# Patient Record
Sex: MALE | Race: WHITE | NOT HISPANIC OR LATINO | Employment: OTHER | URBAN - METROPOLITAN AREA
[De-identification: names, ages, dates, MRNs, and addresses within clinical notes are randomized per-mention and may not be internally consistent; named-entity substitution may affect disease eponyms.]

---

## 2017-08-24 ENCOUNTER — OFFICE VISIT (OUTPATIENT)
Dept: OPTOMETRY | Facility: CLINIC | Age: 77
End: 2017-08-24
Payer: MEDICARE

## 2017-08-24 DIAGNOSIS — Z46.0 FITTING AND ADJUSTMENT OF SPECTACLES AND CONTACT LENSES: Primary | ICD-10-CM

## 2017-08-24 DIAGNOSIS — H25.13 NUCLEAR SCLEROSIS, BILATERAL: ICD-10-CM

## 2017-08-24 DIAGNOSIS — Z13.5 GLAUCOMA SCREENING: ICD-10-CM

## 2017-08-24 DIAGNOSIS — E11.9 TYPE 2 DIABETES MELLITUS WITHOUT RETINOPATHY: Primary | ICD-10-CM

## 2017-08-24 PROCEDURE — 99999 PR PBB SHADOW E&M-NEW PATIENT-LVL II: CPT | Mod: PBBFAC,,, | Performed by: OPTOMETRIST

## 2017-08-24 PROCEDURE — 92004 COMPRE OPH EXAM NEW PT 1/>: CPT | Mod: S$GLB,,, | Performed by: OPTOMETRIST

## 2017-08-24 PROCEDURE — 99999 PR PBB SHADOW E&M-EST. PATIENT-LVL I: CPT | Mod: PBBFAC,,, | Performed by: OPTOMETRIST

## 2017-08-24 PROCEDURE — 99499 UNLISTED E&M SERVICE: CPT | Mod: S$GLB,,, | Performed by: OPTOMETRIST

## 2017-08-24 RX ORDER — NAPROXEN SODIUM 220 MG/1
81 TABLET, FILM COATED ORAL DAILY
Status: ON HOLD | COMMUNITY
End: 2019-03-01 | Stop reason: HOSPADM

## 2017-08-24 NOTE — PROGRESS NOTES
HPI     ILDA: 5+ years ago  Pt states va is blurry. States he has cleaned CL as much as he could and   states va with RGP's not clear. States wearing otc readers does not help.   Pt states he failed DMV vision test.  Denies f/f    No gtts   No results found for: HGBA1C      Last edited by Vega Kong, OD on 8/24/2017  3:27 PM. (History)        ROS     Positive for: Endocrine (DM)    Negative for: Constitutional, Gastrointestinal, Neurological, Skin,   Genitourinary, Musculoskeletal, HENT, Cardiovascular, Eyes, Respiratory,   Psychiatric, Allergic/Imm, Heme/Lymph    Last edited by Vega Kong, OD on 8/24/2017  3:52 PM. (History)        Assessment /Plan     For exam results, see Encounter Report.    Type 2 diabetes mellitus without retinopathy    Nuclear sclerosis, bilateral    Glaucoma screening      1. Reduced VA 2 to cats OU--pt wishes surgery  2. DM- WITHOUT RETINOPATHY.  Advised yearly DFE  3. Pt has been wearing PMMAs for 50+ years, the SAME PAIR for 40 years!!!!!!  CLs very scratched and scuffed.  Discussed w patient risks of warpage in long term wear, and  that once he discontinues CL wear he will have refractive changes as corneal edema reduces.    PLAN:    1. Surgical consult.  For now will just wear CL in left eye to get around as CL OD makes VA worse due to scratches on lens surface  2. NOLOS [facility] charge today

## 2017-09-27 ENCOUNTER — OFFICE VISIT (OUTPATIENT)
Dept: OPHTHALMOLOGY | Facility: CLINIC | Age: 77
End: 2017-09-27
Attending: OPHTHALMOLOGY
Payer: MEDICARE

## 2017-09-27 DIAGNOSIS — H52.13 MYOPIA, BILATERAL: ICD-10-CM

## 2017-09-27 DIAGNOSIS — H25.13 NUCLEAR SCLEROSIS, BILATERAL: Primary | ICD-10-CM

## 2017-09-27 PROCEDURE — 92014 COMPRE OPH EXAM EST PT 1/>: CPT | Mod: S$GLB,,, | Performed by: OPHTHALMOLOGY

## 2017-09-27 PROCEDURE — 99999 PR PBB SHADOW E&M-EST. PATIENT-LVL II: CPT | Mod: PBBFAC,,, | Performed by: OPHTHALMOLOGY

## 2017-09-27 PROCEDURE — 92136 OPHTHALMIC BIOMETRY: CPT | Mod: 26,RT,S$GLB, | Performed by: OPHTHALMOLOGY

## 2017-09-27 RX ORDER — LIDOCAINE HYDROCHLORIDE 10 MG/ML
1 INJECTION, SOLUTION EPIDURAL; INFILTRATION; INTRACAUDAL; PERINEURAL ONCE
Status: CANCELLED | OUTPATIENT
Start: 2017-09-27 | End: 2017-09-27

## 2017-09-27 RX ORDER — PHENYLEPHRINE HYDROCHLORIDE 25 MG/ML
1 SOLUTION/ DROPS OPHTHALMIC
Status: CANCELLED | OUTPATIENT
Start: 2017-09-27

## 2017-09-27 RX ORDER — TROPICAMIDE 10 MG/ML
1 SOLUTION/ DROPS OPHTHALMIC
Status: CANCELLED | OUTPATIENT
Start: 2017-09-27

## 2017-09-27 RX ORDER — TETRACAINE HYDROCHLORIDE 5 MG/ML
1 SOLUTION OPHTHALMIC
Status: CANCELLED | OUTPATIENT
Start: 2017-09-27

## 2017-09-27 RX ORDER — MOXIFLOXACIN 5 MG/ML
1 SOLUTION/ DROPS OPHTHALMIC
Status: CANCELLED | OUTPATIENT
Start: 2017-09-27

## 2017-09-27 NOTE — PROGRESS NOTES
HPI     Cataract    Additional comments: Both Eyes.            Comments   78 y/o male PMMA CTL wearer presents for Cataract Eval per Dr Kong.  Last wore CTL yesterday.  Finds vision just isn't as clear as it used to   be OU.    No pain or discomfort OU.        Last edited by Bonnie Arora on 9/27/2017 11:34 AM. (History)            Assessment /Plan     For exam results, see Encounter Report.    Nuclear sclerosis, bilateral  -     IOL Master - MOD 26 - OD- Right eye    Myopia, bilateral      Visually Significant Cataract: Patient reports decreased vision consistent with the clinical amount of lenticular opacity, which reaches the level of visual significance and affects activities of daily living. Risks, benefits, and alternatives to cataract surgery were discussed and the consent reviewed. IOL options were discussed, including ATIOLs and the associated side effects and additional patient cost associated with them.   IOL Selections:   Right eye  IOL: pcboo 12.5     Left eye  IOL: pcboo 13.5    Pt wishes to have right eye done first.  4+ NSC, brunescent, Still will wear glasses after surgery if needed.    The patient expresses a desire to reduce spectacle dependence. I reviewed various IOL and LASER refractive surgical options and we will attempt to minimize spectacle dependence by managing astigmatism and optimizing IOL selection. Femtosecond LASER assisted cataract surgery (FLACS) technology was explained to the patient with educational videos and discussion.  The patient voices understanding and wishes to implement this technology during the cataract procedure.  I explained the increased precision of the LASER versus manual techniques, especially as it relates to astigmatism reduction with arcuate incisions.  I emphasized that although our goal is to reduce the need for refractive correction after surgery, there may still be a need for spectacle correction to achieve optimal visual acuity, and that a  reasonable range of functional vision should be the expectation.  No guarantees are made about post operative refraction or visual acuity, as the eye may heal in unpredictable ways, and the standard risks, benefits, and alternatives to cataract surgery were explained.  The patient understands that the refractive portions of this cataract procedure are not covered by insurance, and that there is an out of pocket expense of $No charge per eye. I also explained that even though our pre-operative plan is to utilize advanced refractive technologies during surgery, that I may decide to eliminate part or all of this plan if surgical challenges or complications arise, or I feel that it is not in the patient's best interest. Consent forms and an ABN form were given to the patient to review.      Catalys Parameters:  Right Eye:   MARVIN:  12mm   ?Need to bridget patient sitting up?: n  Capsulotomy: Scanned Capsule   Grade:  3+  Arcuate:  Anterior Penetrating:  Symmetric.  Length: 35  at  Axis: 95   Incisions: OFF  Left Eye:   MARVIN:  12mm   ?Need to bridget patient sitting up?: n  Capsulotomy: Scanned Capsule  Grade:  3+  Arcuate:  Anterior Penetrating:  Symmetric.  Length: 20  at  Axis: 90  Incisions:  OFF

## 2017-10-11 ENCOUNTER — TELEPHONE (OUTPATIENT)
Dept: OPHTHALMOLOGY | Facility: CLINIC | Age: 77
End: 2017-10-11

## 2017-10-11 DIAGNOSIS — H25.11 NUCLEAR SCLEROTIC CATARACT OF RIGHT EYE: Primary | ICD-10-CM

## 2017-10-25 ENCOUNTER — TELEPHONE (OUTPATIENT)
Dept: OPHTHALMOLOGY | Facility: CLINIC | Age: 77
End: 2017-10-25

## 2017-10-25 DIAGNOSIS — H25.12 NUCLEAR SCLEROTIC CATARACT OF LEFT EYE: Primary | ICD-10-CM

## 2017-12-13 ENCOUNTER — TELEPHONE (OUTPATIENT)
Dept: OPTOMETRY | Facility: CLINIC | Age: 77
End: 2017-12-13

## 2017-12-18 ENCOUNTER — HOSPITAL ENCOUNTER (OUTPATIENT)
Facility: OTHER | Age: 77
Discharge: HOME | End: 2017-12-18
Attending: OPHTHALMOLOGY | Admitting: OPHTHALMOLOGY
Payer: MEDICARE

## 2017-12-18 ENCOUNTER — ANESTHESIA (OUTPATIENT)
Dept: SURGERY | Facility: OTHER | Age: 77
End: 2017-12-18
Payer: MEDICARE

## 2017-12-18 ENCOUNTER — ANESTHESIA EVENT (OUTPATIENT)
Dept: SURGERY | Facility: OTHER | Age: 77
End: 2017-12-18
Payer: MEDICARE

## 2017-12-18 ENCOUNTER — SURGERY (OUTPATIENT)
Age: 77
End: 2017-12-18

## 2017-12-18 VITALS
RESPIRATION RATE: 16 BRPM | HEART RATE: 85 BPM | SYSTOLIC BLOOD PRESSURE: 113 MMHG | DIASTOLIC BLOOD PRESSURE: 69 MMHG | TEMPERATURE: 98 F | BODY MASS INDEX: 28.7 KG/M2 | WEIGHT: 205 LBS | HEIGHT: 71 IN | OXYGEN SATURATION: 95 %

## 2017-12-18 DIAGNOSIS — H25.13 NUCLEAR SCLEROSIS, BILATERAL: ICD-10-CM

## 2017-12-18 PROCEDURE — 36000706: Performed by: OPHTHALMOLOGY

## 2017-12-18 PROCEDURE — 66982 XCAPSL CTRC RMVL CPLX WO ECP: CPT | Mod: RT,,, | Performed by: OPHTHALMOLOGY

## 2017-12-18 PROCEDURE — 37000008 HC ANESTHESIA 1ST 15 MINUTES: Performed by: OPHTHALMOLOGY

## 2017-12-18 PROCEDURE — 25000003 PHARM REV CODE 250: Performed by: OPHTHALMOLOGY

## 2017-12-18 PROCEDURE — V2632 POST CHMBR INTRAOCULAR LENS: HCPCS | Performed by: OPHTHALMOLOGY

## 2017-12-18 PROCEDURE — 36000707: Performed by: OPHTHALMOLOGY

## 2017-12-18 PROCEDURE — 71000015 HC POSTOP RECOV 1ST HR: Performed by: OPHTHALMOLOGY

## 2017-12-18 PROCEDURE — 37000009 HC ANESTHESIA EA ADD 15 MINS: Performed by: OPHTHALMOLOGY

## 2017-12-18 PROCEDURE — 63600175 PHARM REV CODE 636 W HCPCS: Performed by: NURSE ANESTHETIST, CERTIFIED REGISTERED

## 2017-12-18 DEVICE — IMPLANTABLE DEVICE: Type: IMPLANTABLE DEVICE | Site: EYE | Status: FUNCTIONAL

## 2017-12-18 RX ORDER — MOXIFLOXACIN 5 MG/ML
1 SOLUTION/ DROPS OPHTHALMIC
Status: COMPLETED | OUTPATIENT
Start: 2017-12-18 | End: 2017-12-18

## 2017-12-18 RX ORDER — MOXIFLOXACIN 5 MG/ML
SOLUTION/ DROPS OPHTHALMIC
Status: DISCONTINUED | OUTPATIENT
Start: 2017-12-18 | End: 2017-12-18 | Stop reason: HOSPADM

## 2017-12-18 RX ORDER — PHENYLEPHRINE HYDROCHLORIDE 100 MG/ML
SOLUTION/ DROPS OPHTHALMIC
Status: DISCONTINUED | OUTPATIENT
Start: 2017-12-18 | End: 2017-12-18 | Stop reason: HOSPADM

## 2017-12-18 RX ORDER — PHENYLEPHRINE HYDROCHLORIDE 25 MG/ML
1 SOLUTION/ DROPS OPHTHALMIC
Status: COMPLETED | OUTPATIENT
Start: 2017-12-18 | End: 2017-12-18

## 2017-12-18 RX ORDER — LIDOCAINE HYDROCHLORIDE 10 MG/ML
1 INJECTION, SOLUTION EPIDURAL; INFILTRATION; INTRACAUDAL; PERINEURAL ONCE
Status: DISCONTINUED | OUTPATIENT
Start: 2017-12-18 | End: 2017-12-18 | Stop reason: HOSPADM

## 2017-12-18 RX ORDER — TROPICAMIDE 10 MG/ML
1 SOLUTION/ DROPS OPHTHALMIC
Status: COMPLETED | OUTPATIENT
Start: 2017-12-18 | End: 2017-12-18

## 2017-12-18 RX ORDER — PROPARACAINE HYDROCHLORIDE 5 MG/ML
1 SOLUTION/ DROPS OPHTHALMIC
Status: DISCONTINUED | OUTPATIENT
Start: 2017-12-18 | End: 2017-12-18 | Stop reason: HOSPADM

## 2017-12-18 RX ORDER — MIDAZOLAM HYDROCHLORIDE 1 MG/ML
INJECTION INTRAMUSCULAR; INTRAVENOUS
Status: DISCONTINUED | OUTPATIENT
Start: 2017-12-18 | End: 2017-12-18

## 2017-12-18 RX ORDER — ACETAMINOPHEN 325 MG/1
650 TABLET ORAL EVERY 4 HOURS PRN
Status: DISCONTINUED | OUTPATIENT
Start: 2017-12-18 | End: 2017-12-18 | Stop reason: HOSPADM

## 2017-12-18 RX ORDER — LIDOCAINE HYDROCHLORIDE 40 MG/ML
INJECTION, SOLUTION RETROBULBAR
Status: DISCONTINUED | OUTPATIENT
Start: 2017-12-18 | End: 2017-12-18 | Stop reason: HOSPADM

## 2017-12-18 RX ORDER — TETRACAINE HYDROCHLORIDE 5 MG/ML
1 SOLUTION OPHTHALMIC
Status: COMPLETED | OUTPATIENT
Start: 2017-12-18 | End: 2017-12-18

## 2017-12-18 RX ORDER — TETRACAINE HYDROCHLORIDE 5 MG/ML
SOLUTION OPHTHALMIC
Status: DISCONTINUED | OUTPATIENT
Start: 2017-12-18 | End: 2017-12-18 | Stop reason: HOSPADM

## 2017-12-18 RX ADMIN — PHENYLEPHRINE HYDROCHLORIDE 1 DROP: 25 SOLUTION/ DROPS OPHTHALMIC at 09:12

## 2017-12-18 RX ADMIN — MOXIFLOXACIN HYDROCHLORIDE 1 DROP: 5 SOLUTION/ DROPS OPHTHALMIC at 09:12

## 2017-12-18 RX ADMIN — MOXIFLOXACIN HYDROCHLORIDE 1 DROP: 5 SOLUTION/ DROPS OPHTHALMIC at 12:12

## 2017-12-18 RX ADMIN — MOXIFLOXACIN HYDROCHLORIDE 1 DROP: 5 SOLUTION/ DROPS OPHTHALMIC at 11:12

## 2017-12-18 RX ADMIN — PHENYLEPHRINE HYDROCHLORIDE 1 DROP: 100 SOLUTION/ DROPS OPHTHALMIC at 10:12

## 2017-12-18 RX ADMIN — TETRACAINE HYDROCHLORIDE 1 DROP: 5 SOLUTION OPHTHALMIC at 09:12

## 2017-12-18 RX ADMIN — TROPICAMIDE 1 DROP: 10 SOLUTION/ DROPS OPHTHALMIC at 09:12

## 2017-12-18 RX ADMIN — TETRACAINE HYDROCHLORIDE 1 DROP: 5 SOLUTION OPHTHALMIC at 10:12

## 2017-12-18 RX ADMIN — BALANCED SALT SOLUTION ENRICHED WITH BICARBONATE, DEXTROSE, AND GLUTATHIONE 515 ML: KIT at 11:12

## 2017-12-18 RX ADMIN — TETRACAINE HYDROCHLORIDE 2 DROP: 5 SOLUTION OPHTHALMIC at 11:12

## 2017-12-18 RX ADMIN — LIDOCAINE HYDROCHLORIDE 2 DROP: 40 INJECTION, SOLUTION RETROBULBAR; TOPICAL at 11:12

## 2017-12-18 RX ADMIN — BALANCED SALT SOLUTION ENRICHED WITH BICARBONATE, DEXTROSE, AND GLUTATHIONE 5 ML: KIT at 10:12

## 2017-12-18 RX ADMIN — SODIUM CHONDROITIN SULFATE / SODIUM HYALURONATE 2 ML: 0.55-0.5 INJECTION INTRAOCULAR at 11:12

## 2017-12-18 RX ADMIN — MIDAZOLAM HYDROCHLORIDE 2 MG: 1 INJECTION, SOLUTION INTRAMUSCULAR; INTRAVENOUS at 11:12

## 2017-12-18 NOTE — PLAN OF CARE
Randy Camejo has met all discharge criteria from Phase II. Vital Signs are stable, ambulating  without difficulty. Discharge instructions given, patient verbalized understanding. Discharged from facility via wheelchair in stable condition.

## 2017-12-18 NOTE — ANESTHESIA PREPROCEDURE EVALUATION
12/18/2017  Randy Camejo is a 77 y.o., male.    Anesthesia Evaluation    I have reviewed the Patient Summary Reports.    I have reviewed the Nursing Notes.   I have reviewed the Medications.     Review of Systems  Anesthesia Hx:  Denies Family Hx of Anesthesia complications.   Denies Personal Hx of Anesthesia complications.   Social:  Non-Smoker    Hematology/Oncology:  Hematology Normal   Oncology Normal     EENT/Dental:EENT/Dental Normal   Cardiovascular:   Past MI (20082008 stent) CAD asymptomatic CABG/stent     Pulmonary:   Denies Asthma.    Renal/:  Renal/ Normal     Hepatic/GI:  Hepatic/GI Normal    Musculoskeletal:  Musculoskeletal Normal    Neurological:  Neurology Normal    Endocrine:  Endocrine Normal    Dermatological:  Skin Normal    Psych:  Psychiatric Normal           Physical Exam  General:  Well nourished      Dental:  Dental Findings: In tact, upper partial dentures        Mental Status:  Mental Status Findings:  Cooperative, Alert and Oriented         Anesthesia Plan  Type of Anesthesia, risks & benefits discussed:  Anesthesia Type:  MAC  Patient's Preference:   Intra-op Monitoring Plan: standard ASA monitors  Intra-op Monitoring Plan Comments:   Post Op Pain Control Plan:   Post Op Pain Control Plan Comments:   Induction:    Beta Blocker:         Informed Consent: Patient understands risks and agrees with Anesthesia plan.  Questions answered. Anesthesia consent signed with patient.  ASA Score: 3     Day of Surgery Review of History & Physical:    H&P update referred to the surgeon.         Ready For Surgery From Anesthesia Perspective.

## 2017-12-18 NOTE — DISCHARGE INSTRUCTIONS
Precautions:  DO NOT rub your eye.  You may resume moderate activity the day after surgery.  Wear protective sunglasses during the day and a shield at night for 1(one) week.  If you have pain, redness and decreased vision, call Dr. Beavers (or the on-call doctor after hours) @[phone].    Home Care Instructions for Eye Surgeries    1. ACTIVITY:  Limit your activity today. Relax at home and DO NOT exert yourself for the rest of the day. Increase activity gradually. You may return to work or school as directed by your physician.    2. DIET:  Drink plenty of fluids. Resume your normal diet unless instructed otherwise.    3. PAIN:  Expect a moderate amount of pain. If a prescription for pain is not sent home with you, you may take your commonly used pain reliever as directed. If this is not sufficient, call your physician. You may resume any other prescription medication unless otherwise directed by your physician.     Discuss any problem with your physician as soon as it arises. Do not Delay.      EMERGENCY- If you are unable to contact your physician, please go to the nearest Emergency Room.       Anesthesia: Monitored Anesthesia Care (MAC)    Anesthesia Safety  · Have an adult family member or friend drive you home after the procedure.  · For the first 24 hours after your surgery:  · Do not drive or use heavy equipment.  · Do not make important decisions or sign documents.  · Avoid alcohol.  · Have someone stay with you, if possible. They can watch for problems and help keep you safe.

## 2017-12-18 NOTE — DISCHARGE SUMMARY
Outcome: Successful outpatient ophthalmic surgical procedure  Preprinted Instructions given to patient.  Regular diet.  Activity: No restrictions  Meds: see Med Rec  Condition: stable  Follow up: 1 day with Dr Beavers  Disposition: Home  Diagnosis: s/p eye surgery

## 2017-12-18 NOTE — OP NOTE
SURGEON:  Milo Beavers M.D.    PREOPERATIVE DIAGNOSIS:    Nuclear Sclerotic Cataract    POSTOPERATIVE DIAGNOSIS:    Nuclear Sclerotic Cataract    PROCEDURES:    Complex Phacoemulsification with  intraocular lens, right eye (60837)    DATE OF SURGERY: 12/18/2017    IMPLANT: pcboo 12.5    ANESTHESIA:  MAC with topical Lidocaine    COMPLICATIONS:  None    ESTIMATED BLOOD LOSS: None    SPECIMENS: None    INDICATIONS:    The patient has a history of painless progressive visual loss and  difficulty with activities of daily living secondary to cataract formation.  After a thorough discussion of the risks, benefits, and alternatives to cataract surgery, including, but not limited to, the rare risks of infection, retinal detachment, hemorrhage, need for additional surgery, loss of vision, and even loss of the eye, the patient voices understanding and desires to proceed.    DESCRIPTION OF PROCEDURE:    The patients IOL calculations were reviewed, and the lens selection confirmed.   After verification and marking of the proper eye in the preop holding area, the patient was brought to the operating room in supine position where the eye was prepped and draped in standard sterile fashion with 5% Betadine and a lid speculum placed in the eye.   Topical 4% Lidocaine was used in addition to the preoperative anesthesia and the procedure was begun by the creation of a paracentesis incision through which viscoelastic was used to fill the anterior chamber.  Next, a keratome blade was used to create a triplanar temporal clear corneal incision and a cystotome and Utrata forceps used to fashion a continuous curvilinear capsulorrhexis.  Hydrodissection was carried out using the [place] hydrodissection cannula and the nucleus was found to be mobile.  Phacoemulsification of the nucleus was carried out using a quick chop technique, and all remaining epinuclear and cortical material was removed.  The eye was then reformed with Viscoelastic and  the  intraocular lens was implanted into the capsular bag.  All remaining viscoelastics were removed from the eye and at the end of the case the pupil was round, the lens was well-centered within the capsular bag and all wounds were found to be water tight.  Drops of Vigamox and Pred Forte were instilled and a shield was placed over the eye. The patient will follow up with Dr. Beavers in the morning.    ADDENDUM: Because the patient had a dense cataract and loss of red reflex, trypan blue was used to stain the anterior capsule.

## 2017-12-18 NOTE — ANESTHESIA POSTPROCEDURE EVALUATION
"Anesthesia Post Evaluation    Patient: Randy Camejo    Procedure(s) Performed: Procedure(s) (LRB):  PHACOEMULSIFICATION-ASPIRATION-CATARACT (Right)  INSERTION-INTRAOCULAR LENS (IOL) (Right)    Final Anesthesia Type: MAC  Patient location during evaluation: [facility]  Patient participation: Yes- Able to Participate  Level of consciousness: awake and alert  Post-procedure vital signs: reviewed and stable  Pain management: adequate  Airway patency: patent  PONV status at discharge: No PONV  Anesthetic complications: no      Cardiovascular status: blood pressure returned to baseline  Respiratory status: unassisted, spontaneous ventilation and room air  Hydration status: euvolemic  Follow-up not needed.        Visit Vitals  BP (!) 158/92 (BP Location: Left arm, Patient Position: Lying)   Pulse 92   Temp 36.7 °C (98.1 °F) (Oral)   Resp 16   Ht 5' 11" (1.803 m)   Wt 93 kg (205 lb)   SpO2 97%   BMI 28.59 kg/m²       Pain/Kaylin Score: Pain Assessment Performed: Yes (12/18/2017  9:43 AM)  Presence of Pain: denies (12/18/2017  9:43 AM)      "

## 2017-12-19 ENCOUNTER — OFFICE VISIT (OUTPATIENT)
Dept: OPHTHALMOLOGY | Facility: CLINIC | Age: 77
End: 2017-12-19
Attending: OPHTHALMOLOGY
Payer: MEDICARE

## 2017-12-19 DIAGNOSIS — Z98.890 POST-OPERATIVE STATE: Primary | ICD-10-CM

## 2017-12-19 DIAGNOSIS — H25.11 NUCLEAR SCLEROTIC CATARACT OF RIGHT EYE: ICD-10-CM

## 2017-12-19 PROCEDURE — 99999 PR PBB SHADOW E&M-EST. PATIENT-LVL II: CPT | Mod: PBBFAC,,, | Performed by: OPHTHALMOLOGY

## 2017-12-19 PROCEDURE — 99024 POSTOP FOLLOW-UP VISIT: CPT | Mod: S$GLB,,, | Performed by: OPHTHALMOLOGY

## 2017-12-19 NOTE — PROGRESS NOTES
HPI     POD 1 Phaco w/IOL OD December 18, 2017    MEDS:    Pred/Gati/Nepaf TID OD    Patient states he is doing well with no complaints.    Last edited by Kindra Hwang on 12/19/2017  9:09 AM. (History)            Assessment /Plan     For exam results, see Encounter Report.    Post-operative state    Nuclear sclerotic cataract of right eye      Slit lamp exam:  L/L: nl  K: clear, wound sealed  AC: 1+ cell  Lens: IOL centered and stable    POD1 s/p Phaco/IOL  Appropriate precautions and post op medications reviewed.  Patient instructed to call or come in if symptoms of redness, decreased vision, or pain are experienced.

## 2017-12-26 ENCOUNTER — OFFICE VISIT (OUTPATIENT)
Dept: OPHTHALMOLOGY | Facility: CLINIC | Age: 77
End: 2017-12-26
Attending: OPHTHALMOLOGY
Payer: MEDICARE

## 2017-12-26 DIAGNOSIS — H25.12 NUCLEAR SCLEROSIS OF LEFT EYE: ICD-10-CM

## 2017-12-26 DIAGNOSIS — Z98.890 POST-OPERATIVE STATE: Primary | ICD-10-CM

## 2017-12-26 DIAGNOSIS — H25.11 NUCLEAR SCLEROTIC CATARACT OF RIGHT EYE: ICD-10-CM

## 2017-12-26 PROCEDURE — 99999 PR PBB SHADOW E&M-EST. PATIENT-LVL II: CPT | Mod: PBBFAC,,, | Performed by: OPHTHALMOLOGY

## 2017-12-26 PROCEDURE — 99024 POSTOP FOLLOW-UP VISIT: CPT | Mod: S$GLB,,, | Performed by: OPHTHALMOLOGY

## 2017-12-26 RX ORDER — PHENYLEPHRINE HYDROCHLORIDE 25 MG/ML
1 SOLUTION/ DROPS OPHTHALMIC
Status: CANCELLED | OUTPATIENT
Start: 2017-12-26

## 2017-12-26 RX ORDER — MOXIFLOXACIN 5 MG/ML
1 SOLUTION/ DROPS OPHTHALMIC
Status: CANCELLED | OUTPATIENT
Start: 2017-12-26

## 2017-12-26 RX ORDER — TETRACAINE HYDROCHLORIDE 5 MG/ML
1 SOLUTION OPHTHALMIC
Status: CANCELLED | OUTPATIENT
Start: 2017-12-26

## 2017-12-26 RX ORDER — TROPICAMIDE 10 MG/ML
1 SOLUTION/ DROPS OPHTHALMIC
Status: CANCELLED | OUTPATIENT
Start: 2017-12-26

## 2017-12-26 NOTE — PROGRESS NOTES
HPI     POW 1 Phaco w/IOL OD December 18, 2017    MEDS:    Pred/Gati/Nepaf TID OD    Patient states he is doing well with no complaints.    Last edited by Kindra Hwang on 12/26/2017  9:31 AM. (History)            Assessment /Plan     For exam results, see Encounter Report.    Post-operative state    Nuclear sclerotic cataract of right eye      Slit lamp exam:  L/L: nl  K: clear, wound sealed  AC: trace cell  Iris/Lens: IOL centered and stable    POW1 s/p phaco: Surgery healing well with no signs of infection or abnormal inflammation.    Patient wishes to proceed with surgery in the second eye. Risks, benefits, alternatives reviewed. IOL selection reviewed.     Left eye  IOL: pcboo 13.5    Pt wishes to have right eye done first.  4+ NSC, brunescent, Still will wear glasses after surgery if needed.    The patient expresses a desire to reduce spectacle dependence. I reviewed various IOL and LASER refractive surgical options and we will attempt to minimize spectacle dependence by managing astigmatism and optimizing IOL selection. Femtosecond LASER assisted cataract surgery (FLACS) technology was explained to the patient with educational videos and discussion.  The patient voices understanding and wishes to implement this technology during the cataract procedure.  I explained the increased precision of the LASER versus manual techniques, especially as it relates to astigmatism reduction with arcuate incisions.  I emphasized that although our goal is to reduce the need for refractive correction after surgery, there may still be a need for spectacle correction to achieve optimal visual acuity, and that a reasonable range of functional vision should be the expectation.  No guarantees are made about post operative refraction or visual acuity, as the eye may heal in unpredictable ways, and the standard risks, benefits, and alternatives to cataract surgery were explained.  The patient understands that the refractive  portions of this cataract procedure are not covered by insurance, and that there is an out of pocket expense of $No charge per eye. I also explained that even though our pre-operative plan is to utilize advanced refractive technologies during surgery, that I may decide to eliminate part or all of this plan if surgical challenges or complications arise, or I feel that it is not in the patient's best interest. Consent forms and an ABN form were given to the patient to review.      Catalys Parameters:    Left Eye:   MARVIN:  12mm   ?Need to bridget patient sitting up?: n  Capsulotomy: Scanned Capsule  Grade:  3+  Arcuate:  Anterior Penetrating:  Symmetric.  Length: 20  at  Axis: 90  Incisions:  OFF

## 2018-01-04 ENCOUNTER — TELEPHONE (OUTPATIENT)
Dept: OPTOMETRY | Facility: CLINIC | Age: 78
End: 2018-01-04

## 2018-01-08 ENCOUNTER — ANESTHESIA (OUTPATIENT)
Dept: SURGERY | Facility: OTHER | Age: 78
End: 2018-01-08
Payer: MEDICARE

## 2018-01-08 ENCOUNTER — ANESTHESIA EVENT (OUTPATIENT)
Dept: SURGERY | Facility: OTHER | Age: 78
End: 2018-01-08
Payer: MEDICARE

## 2018-01-08 ENCOUNTER — HOSPITAL ENCOUNTER (OUTPATIENT)
Facility: OTHER | Age: 78
Discharge: HOME | End: 2018-01-08
Attending: OPHTHALMOLOGY | Admitting: OPHTHALMOLOGY
Payer: MEDICARE

## 2018-01-08 ENCOUNTER — SURGERY (OUTPATIENT)
Age: 78
End: 2018-01-08

## 2018-01-08 VITALS
TEMPERATURE: 99 F | OXYGEN SATURATION: 96 % | HEIGHT: 71 IN | HEART RATE: 85 BPM | DIASTOLIC BLOOD PRESSURE: 72 MMHG | BODY MASS INDEX: 26.6 KG/M2 | SYSTOLIC BLOOD PRESSURE: 120 MMHG | WEIGHT: 190 LBS | RESPIRATION RATE: 16 BRPM

## 2018-01-08 DIAGNOSIS — Z98.890 POST-OPERATIVE STATE: ICD-10-CM

## 2018-01-08 DIAGNOSIS — H25.12 NUCLEAR SCLEROSIS OF LEFT EYE: ICD-10-CM

## 2018-01-08 DIAGNOSIS — H25.12 NUCLEAR SCLEROTIC CATARACT OF LEFT EYE: Primary | ICD-10-CM

## 2018-01-08 PROCEDURE — 36000706: Performed by: OPHTHALMOLOGY

## 2018-01-08 PROCEDURE — 25000003 PHARM REV CODE 250: Performed by: NURSE ANESTHETIST, CERTIFIED REGISTERED

## 2018-01-08 PROCEDURE — 71000015 HC POSTOP RECOV 1ST HR: Performed by: OPHTHALMOLOGY

## 2018-01-08 PROCEDURE — 25000003 PHARM REV CODE 250: Performed by: OPHTHALMOLOGY

## 2018-01-08 PROCEDURE — 37000009 HC ANESTHESIA EA ADD 15 MINS: Performed by: OPHTHALMOLOGY

## 2018-01-08 PROCEDURE — 63600175 PHARM REV CODE 636 W HCPCS: Performed by: NURSE ANESTHETIST, CERTIFIED REGISTERED

## 2018-01-08 PROCEDURE — 66984 XCAPSL CTRC RMVL W/O ECP: CPT | Mod: 79,LT,, | Performed by: OPHTHALMOLOGY

## 2018-01-08 PROCEDURE — 37000008 HC ANESTHESIA 1ST 15 MINUTES: Performed by: OPHTHALMOLOGY

## 2018-01-08 PROCEDURE — 36000707: Performed by: OPHTHALMOLOGY

## 2018-01-08 PROCEDURE — V2632 POST CHMBR INTRAOCULAR LENS: HCPCS | Performed by: OPHTHALMOLOGY

## 2018-01-08 DEVICE — LENS IOL ITEC PRELOAD 13.5D: Type: IMPLANTABLE DEVICE | Site: EYE | Status: FUNCTIONAL

## 2018-01-08 RX ORDER — MOXIFLOXACIN 5 MG/ML
SOLUTION/ DROPS OPHTHALMIC
Status: DISCONTINUED | OUTPATIENT
Start: 2018-01-08 | End: 2018-01-08 | Stop reason: HOSPADM

## 2018-01-08 RX ORDER — TROPICAMIDE 10 MG/ML
1 SOLUTION/ DROPS OPHTHALMIC
Status: COMPLETED | OUTPATIENT
Start: 2018-01-08 | End: 2018-01-08

## 2018-01-08 RX ORDER — LIDOCAINE HYDROCHLORIDE 40 MG/ML
INJECTION, SOLUTION RETROBULBAR
Status: DISCONTINUED | OUTPATIENT
Start: 2018-01-08 | End: 2018-01-08 | Stop reason: HOSPADM

## 2018-01-08 RX ORDER — PHENYLEPHRINE HYDROCHLORIDE 100 MG/ML
SOLUTION/ DROPS OPHTHALMIC
Status: DISCONTINUED | OUTPATIENT
Start: 2018-01-08 | End: 2018-01-08 | Stop reason: HOSPADM

## 2018-01-08 RX ORDER — PROPARACAINE HYDROCHLORIDE 5 MG/ML
1 SOLUTION/ DROPS OPHTHALMIC
Status: DISCONTINUED | OUTPATIENT
Start: 2018-01-08 | End: 2018-01-08 | Stop reason: HOSPADM

## 2018-01-08 RX ORDER — PHENYLEPHRINE HYDROCHLORIDE 25 MG/ML
1 SOLUTION/ DROPS OPHTHALMIC
Status: COMPLETED | OUTPATIENT
Start: 2018-01-08 | End: 2018-01-08

## 2018-01-08 RX ORDER — SODIUM CHLORIDE 9 MG/ML
INJECTION, SOLUTION INTRAVENOUS CONTINUOUS PRN
Status: DISCONTINUED | OUTPATIENT
Start: 2018-01-08 | End: 2018-01-08

## 2018-01-08 RX ORDER — MOXIFLOXACIN 5 MG/ML
1 SOLUTION/ DROPS OPHTHALMIC
Status: COMPLETED | OUTPATIENT
Start: 2018-01-08 | End: 2018-01-08

## 2018-01-08 RX ORDER — ACETAMINOPHEN 325 MG/1
650 TABLET ORAL EVERY 4 HOURS PRN
Status: DISCONTINUED | OUTPATIENT
Start: 2018-01-08 | End: 2018-01-08 | Stop reason: HOSPADM

## 2018-01-08 RX ORDER — MIDAZOLAM HYDROCHLORIDE 1 MG/ML
INJECTION INTRAMUSCULAR; INTRAVENOUS
Status: DISCONTINUED | OUTPATIENT
Start: 2018-01-08 | End: 2018-01-08

## 2018-01-08 RX ORDER — TETRACAINE HYDROCHLORIDE 5 MG/ML
1 SOLUTION OPHTHALMIC
Status: COMPLETED | OUTPATIENT
Start: 2018-01-08 | End: 2018-01-08

## 2018-01-08 RX ORDER — TETRACAINE HYDROCHLORIDE 5 MG/ML
SOLUTION OPHTHALMIC
Status: DISCONTINUED | OUTPATIENT
Start: 2018-01-08 | End: 2018-01-08 | Stop reason: HOSPADM

## 2018-01-08 RX ADMIN — MOXIFLOXACIN HYDROCHLORIDE 1 DROP: 5 SOLUTION/ DROPS OPHTHALMIC at 10:01

## 2018-01-08 RX ADMIN — TETRACAINE HYDROCHLORIDE 1 DROP: 5 SOLUTION OPHTHALMIC at 09:01

## 2018-01-08 RX ADMIN — TETRACAINE HYDROCHLORIDE 1 DROP: 5 SOLUTION OPHTHALMIC at 08:01

## 2018-01-08 RX ADMIN — PHENYLEPHRINE HYDROCHLORIDE 1 DROP: 25 SOLUTION/ DROPS OPHTHALMIC at 08:01

## 2018-01-08 RX ADMIN — LIDOCAINE HYDROCHLORIDE 1 DROP: 40 INJECTION, SOLUTION RETROBULBAR; TOPICAL at 10:01

## 2018-01-08 RX ADMIN — SODIUM CHONDROITIN SULFATE / SODIUM HYALURONATE 2 ML: 0.55-0.5 INJECTION INTRAOCULAR at 10:01

## 2018-01-08 RX ADMIN — TROPICAMIDE 1 DROP: 10 SOLUTION/ DROPS OPHTHALMIC at 08:01

## 2018-01-08 RX ADMIN — MOXIFLOXACIN HYDROCHLORIDE 1 DROP: 5 SOLUTION/ DROPS OPHTHALMIC at 08:01

## 2018-01-08 RX ADMIN — SODIUM CHLORIDE: 0.9 INJECTION, SOLUTION INTRAVENOUS at 09:01

## 2018-01-08 RX ADMIN — TETRACAINE HYDROCHLORIDE 1 DROP: 5 SOLUTION OPHTHALMIC at 10:01

## 2018-01-08 RX ADMIN — BALANCED SALT SOLUTION ENRICHED WITH BICARBONATE, DEXTROSE, AND GLUTATHIONE 5 ML: KIT at 09:01

## 2018-01-08 RX ADMIN — MIDAZOLAM HYDROCHLORIDE 2 MG: 1 INJECTION, SOLUTION INTRAMUSCULAR; INTRAVENOUS at 09:01

## 2018-01-08 RX ADMIN — BALANCED SALT SOLUTION ENRICHED WITH BICARBONATE, DEXTROSE, AND GLUTATHIONE 500 ML: KIT at 10:01

## 2018-01-08 RX ADMIN — PHENYLEPHRINE HYDROCHLORIDE 1 DROP: 100 SOLUTION/ DROPS OPHTHALMIC at 09:01

## 2018-01-08 NOTE — OP NOTE
SURGEON:  Milo Beavers M.D.    PREOPERATIVE DIAGNOSIS:    Nuclear Sclerotic Cataract Left Eye    POSTOPERATIVE DIAGNOSIS:    Nuclear Sclerotic Cataract Left Eye    PROCEDURES:    Phacoemulsification with  intraocular lens, Left eye (89310)    DATE OF SURGERY: 01/08/2018    IMPLANT: pcboo 13.5    ANESTHESIA:  MAC with topical Lidocaine    COMPLICATIONS:  None    ESTIMATED BLOOD LOSS: None    SPECIMENS: None    INDICATIONS:    The patient has a history of painless progressive visual loss and  difficulty with activities of daily living secondary to cataract formation.  After a thorough discussion of the risks, benefits, and alternatives to cataract surgery, including, but not limited to, the rare risks of infection, retinal detachment, hemorrhage, need for additional surgery, loss of vision, and even loss of the eye, the patient voices understanding and desires to proceed.    DESCRIPTION OF PROCEDURE:    The patients IOL calculations were reviewed, and the lens selection confirmed.   After verification and marking of the proper eye in the preop holding area, the patient was brought to the operating room in supine position where the eye was prepped and draped in standard sterile fashion with 5% Betadine and a lid speculum placed in the eye.   Topical 4% Lidocaine was used in addition to the preoperative anesthesia and the procedure was begun by the creation of a paracentesis incision through which viscoelastic was used to fill the anterior chamber.  Next, a keratome blade was used to create a triplanar temporal clear corneal incision and a cystotome and Utrata forceps used to fashion a continuous curvilinear capsulorrhexis.  Hydrodissection was carried out using the [place] hydrodissection cannula and the nucleus was found to be mobile.  Phacoemulsification of the nucleus was carried out using a quick chop technique, and all remaining epinuclear and cortical material was removed.  The eye was then reformed with  Viscoelastic and the  intraocular lens was implanted into the capsular bag.  All remaining viscoelastics were removed from the eye and at the end of the case the pupil was round, the lens was well-centered within the capsular bag and all wounds were found to be water tight.  Drops of Vigamox and Pred Forte were instilled and a shield was placed over the eye. The patient will follow up with Dr. Beavers in the morning.

## 2018-01-08 NOTE — ANESTHESIA PREPROCEDURE EVALUATION
01/08/2018  Randy Camejo is a 77 y.o., male.    Pre-op Assessment    I have reviewed the Patient Summary Reports.     I have reviewed the Nursing Notes.   I have reviewed the Medications.     Review of Systems  Anesthesia Hx:  Denies Family Hx of Anesthesia complications.   Denies Personal Hx of Anesthesia complications.   Social:  Non-Smoker    Hematology/Oncology:  Hematology Normal   Oncology Normal     EENT/Dental:EENT/Dental Normal   Cardiovascular:   Past MI (20082008 stent) CAD asymptomatic CABG/stent     Pulmonary:   Denies Asthma.    Renal/:  Renal/ Normal     Hepatic/GI:  Hepatic/GI Normal    Musculoskeletal:  Musculoskeletal Normal    Neurological:  Neurology Normal    Endocrine:  Endocrine Normal    Dermatological:  Skin Normal    Psych:  Psychiatric Normal           Physical Exam  General:  Well nourished      Dental:  Dental Findings: In tact, upper partial dentures        Mental Status:  Mental Status Findings:  Cooperative, Alert and Oriented         Anesthesia Plan  Type of Anesthesia, risks & benefits discussed:  Anesthesia Type:  MAC  Patient's Preference:   Intra-op Monitoring Plan: standard ASA monitors  Intra-op Monitoring Plan Comments:   Post Op Pain Control Plan:   Post Op Pain Control Plan Comments:   Induction:    Beta Blocker:         Informed Consent: Patient understands risks and agrees with Anesthesia plan.  Questions answered. Anesthesia consent signed with patient.  ASA Score: 3     Day of Surgery Review of History & Physical:    H&P update referred to the surgeon.         Ready For Surgery From Anesthesia Perspective.

## 2018-01-08 NOTE — DISCHARGE INSTRUCTIONS
Precautions:  DO NOT rub your eye.  You may resume moderate activity the day after surgery.  Wear protective sunglasses during the day and a shield at night for 1(one) week.  If you have pain, redness and decreased vision, call Dr. Beavers (or the on-call doctor after hours) @[phone].    Home Care Instructions for Eye Surgeries    1. ACTIVITY:  Limit your activity today. Relax at home and DO NOT exert yourself for the rest of the day. Increase activity gradually. You may return to work or school as directed by your physician.    2. DIET:  Drink plenty of fluids. Resume your normal diet unless instructed otherwise.    3. PAIN:  Expect a moderate amount of pain. If a prescription for pain is not sent home with you, you may take your commonly used pain reliever as directed. If this is not sufficient, call your physician. You may resume any other prescription medication unless otherwise directed by your physician.     Discuss any problem with your physician as soon as it arises. Do not Delay.      EMERGENCY- If you are unable to contact your physician, please go to the nearest Emergency Room.       Anesthesia: Monitored Anesthesia Care (MAC)    Anesthesia Safety  · Have an adult family member or friend drive you home after the procedure.  · For the first 24 hours after your surgery:  · Do not drive or use heavy equipment.  · Do not make important decisions or sign documents.  · Avoid alcohol.  · Have someone stay with you, if possible. They can watch for problems and help keep you safe.

## 2018-01-08 NOTE — ANESTHESIA POSTPROCEDURE EVALUATION
"Anesthesia Post Evaluation    Patient: Randy Camejo    Procedure(s) Performed: Procedure(s) (LRB):  PHACOEMULSIFICATION-ASPIRATION-CATARACT (Left)  INSERTION-INTRAOCULAR LENS (IOL) (Left)    Final Anesthesia Type: MAC  Patient location during evaluation: [facility]  Patient participation: Yes- Able to Participate  Level of consciousness: awake and alert and oriented  Post-procedure vital signs: reviewed and stable  Pain management: adequate  Airway patency: patent  PONV status at discharge: No PONV  Anesthetic complications: no      Cardiovascular status: stable  Respiratory status: unassisted, spontaneous ventilation and room air  Hydration status: euvolemic  Follow-up not needed.        Visit Vitals  BP (!) 143/82 (BP Location: Right arm, Patient Position: Sitting)   Pulse 88   Temp 37.1 °C (98.7 °F) (Oral)   Resp 16   Ht 5' 11" (1.803 m)   Wt 86.2 kg (190 lb)   SpO2 98%   BMI 26.50 kg/m²       Pain/Kaylin Score: Pain Assessment Performed: Yes (1/8/2018  8:25 AM)  Presence of Pain: denies (1/8/2018  8:25 AM)      "

## 2018-01-09 ENCOUNTER — OFFICE VISIT (OUTPATIENT)
Dept: OPHTHALMOLOGY | Facility: CLINIC | Age: 78
End: 2018-01-09
Attending: OPHTHALMOLOGY
Payer: MEDICARE

## 2018-01-09 DIAGNOSIS — H25.12 NUCLEAR SCLEROSIS OF LEFT EYE: ICD-10-CM

## 2018-01-09 DIAGNOSIS — Z98.890 POST-OPERATIVE STATE: Primary | ICD-10-CM

## 2018-01-09 PROCEDURE — 99999 PR PBB SHADOW E&M-EST. PATIENT-LVL II: CPT | Mod: PBBFAC,,, | Performed by: OPHTHALMOLOGY

## 2018-01-09 PROCEDURE — 99024 POSTOP FOLLOW-UP VISIT: CPT | Mod: S$GLB,,, | Performed by: OPHTHALMOLOGY

## 2018-01-09 NOTE — PROGRESS NOTES
HPI     POD 1 PC IOL OS  1/8/18 Dr. Beavers    Pt states: bothered by lights but VA is OK.     Eye meds:  P/M/B  TID     Last edited by Gala Mackay on 1/9/2018  8:45 AM. (History)            Assessment /Plan     For exam results, see Encounter Report.    Post-operative state    Nuclear sclerosis of left eye      Slit lamp exam:  L/L: nl  K: clear, wound sealed  AC: 1+ cell  Lens: IOL centered and stable    POD1 s/p Phaco/IOL  Appropriate precautions and post op medications reviewed.  Patient instructed to call or come in if symptoms of redness, decreased vision, or pain are experienced.

## 2018-02-09 ENCOUNTER — OFFICE VISIT (OUTPATIENT)
Dept: OPTOMETRY | Facility: CLINIC | Age: 78
End: 2018-02-09
Payer: MEDICARE

## 2018-02-09 DIAGNOSIS — Z98.42 S/P BILATERAL CATARACT EXTRACTION: Primary | ICD-10-CM

## 2018-02-09 DIAGNOSIS — Z98.41 S/P BILATERAL CATARACT EXTRACTION: Primary | ICD-10-CM

## 2018-02-09 PROCEDURE — 99024 POSTOP FOLLOW-UP VISIT: CPT | Mod: S$GLB,,, | Performed by: OPTOMETRIST

## 2018-02-09 PROCEDURE — 99999 PR PBB SHADOW E&M-EST. PATIENT-LVL II: CPT | Mod: PBBFAC,,, | Performed by: OPTOMETRIST

## 2018-02-09 NOTE — PROGRESS NOTES
HPI     Post-op Evaluation    Additional comments: 1 month phaco OS           Comments   Last eye exam was 1/9/18 with Dr. Ojeda.  Patient states overall vision is better since cataract sx's.  Patient denies diplopia, headaches, flashes/floaters, and pain.    12/18/2017 IMPLANT: pcboo 12.5 OD  01/08/2018 IMPLANT: pcboo 13.5 OS       Last edited by Avril Garcia on 2/9/2018 10:01 AM. (History)            Assessment /Plan     For exam results, see Encounter Report.    S/P bilateral cataract extraction  -     OCT- Retina            1.  Pt doing well.  No rx given.  Continue with OTC readers.  Eye health normal OU.  Return care to Dr. Kong.

## 2019-01-31 ENCOUNTER — OFFICE VISIT (OUTPATIENT)
Dept: URGENT CARE | Facility: CLINIC | Age: 79
End: 2019-01-31
Payer: MEDICARE

## 2019-01-31 VITALS
DIASTOLIC BLOOD PRESSURE: 88 MMHG | WEIGHT: 190 LBS | BODY MASS INDEX: 26.6 KG/M2 | OXYGEN SATURATION: 99 % | TEMPERATURE: 98 F | SYSTOLIC BLOOD PRESSURE: 147 MMHG | HEIGHT: 71 IN | HEART RATE: 100 BPM

## 2019-01-31 DIAGNOSIS — G57.90 NEUROPATHY OF FOOT, UNSPECIFIED LATERALITY: ICD-10-CM

## 2019-01-31 DIAGNOSIS — M25.474 BILATERAL SWELLING OF FEET AND ANKLES: ICD-10-CM

## 2019-01-31 DIAGNOSIS — B35.1 ONYCHOMYCOSIS OF TOENAIL: ICD-10-CM

## 2019-01-31 DIAGNOSIS — M25.471 BILATERAL SWELLING OF FEET AND ANKLES: ICD-10-CM

## 2019-01-31 DIAGNOSIS — M25.475 BILATERAL SWELLING OF FEET AND ANKLES: ICD-10-CM

## 2019-01-31 DIAGNOSIS — M25.472 BILATERAL SWELLING OF FEET AND ANKLES: ICD-10-CM

## 2019-01-31 DIAGNOSIS — L03.032 CELLULITIS OF SECOND TOE OF LEFT FOOT: Primary | ICD-10-CM

## 2019-01-31 PROCEDURE — 1101F PR PT FALLS ASSESS DOC 0-1 FALLS W/OUT INJ PAST YR: ICD-10-PCS | Mod: CPTII,S$GLB,, | Performed by: NURSE PRACTITIONER

## 2019-01-31 PROCEDURE — 99203 OFFICE O/P NEW LOW 30 MIN: CPT | Mod: S$GLB,,, | Performed by: NURSE PRACTITIONER

## 2019-01-31 PROCEDURE — 99203 PR OFFICE/OUTPT VISIT, NEW, LEVL III, 30-44 MIN: ICD-10-PCS | Mod: S$GLB,,, | Performed by: NURSE PRACTITIONER

## 2019-01-31 PROCEDURE — 1101F PT FALLS ASSESS-DOCD LE1/YR: CPT | Mod: CPTII,S$GLB,, | Performed by: NURSE PRACTITIONER

## 2019-01-31 RX ORDER — MUPIROCIN 20 MG/G
OINTMENT TOPICAL 2 TIMES DAILY
Qty: 1 TUBE | Refills: 0 | Status: ON HOLD | OUTPATIENT
Start: 2019-01-31 | End: 2019-08-29

## 2019-01-31 RX ORDER — CEPHALEXIN 250 MG/1
250 CAPSULE ORAL 4 TIMES DAILY
Qty: 40 CAPSULE | Refills: 0 | Status: SHIPPED | OUTPATIENT
Start: 2019-01-31 | End: 2019-02-10

## 2019-02-01 NOTE — PATIENT INSTRUCTIONS
Follow up with your doctor in a few days.  Return to the urgent care or go to the ER if symptoms get worse.    Start oral antibiotic (keflex)  Apply antibiotic ointment twice a day.  Clean area with warm water and mild soap-keep dry.    Follow up with internal medicine and podiatry.  Please call [phone] if you do not hear from [facility] to get your referral appointment we discussed.          Cellulitis  Cellulitis is an infection of the deep layers of skin. A break in the skin, such as a cut or scratch, can let bacteria under the skin. If the bacteria get to deep layers of the skin, it can be serious. If not treated, cellulitis can get into the bloodstream and lymph nodes. The infection can then spread throughout the body. This causes serious illness.  Cellulitis causes the affected skin to become red, swollen, warm, and sore. The reddened areas have a visible border. An open sore may leak fluid (pus). You may have a fever, chills, and pain.  Cellulitis is treated with antibiotics taken for 7 to 10 days. An open sore may be cleaned and covered with cool wet gauze. Symptoms should get better 1 to 2 days after treatment is started. Make sure to take all the antibiotics for the full number of days until they are gone. Keep taking the medicine even if your symptoms go away.  Home care  Follow these tips:  · Limit the use of the part of your body with cellulitis.   · If the infection is on your leg, keep your leg raised while sitting. This will help to reduce swelling.  · Take all of the antibiotic medicine exactly as directed until it is gone. Do not miss any doses, especially during the first 7 days. Dont stop taking the medicine when your symptoms get better.  · Keep the affected area clean and dry.  · Wash your hands with soap and warm water before and after touching your skin. Anyone else who touches your skin should also wash his or her hands. Don't share towels.  Follow-up care  Follow up with your  healthcare provider, or as advised. If your infection does not go away on the first antibiotic, your healthcare provider will prescribe a different one.  When to seek medical advice  Call your healthcare provider right away if any of these occur:  · Red areas that spread  · Swelling or pain that gets worse  · Fluid leaking from the skin (pus)  · Fever higher of 100.4º F (38.0º C) or higher after 2 days on antibiotics  Date Last Reviewed: 9/1/2016  © 4248-7873 [employer]. [address] [place] [address], [place] [address]. All rights reserved. This information is not intended as a substitute for professional medical care. Always follow your healthcare professional's instructions.

## 2019-02-01 NOTE — PROGRESS NOTES
"Subjective:       Patient ID: Randy Camejo is a 78 y.o. male.    Vitals:  height is 5' 11" (1.803 m) and weight is 86.2 kg (190 lb). His temperature is 98.1 °F (36.7 °C). His blood pressure is 147/88 (abnormal) and his pulse is 100. His oxygen saturation is 99%.     Chief Complaint: Foot Pain    Left foot pain and swelling specifically in the 2nd digit for 4 days . Patient "had" diabetes  Reports stopped taking metformin years ago.      Toe Pain    The incident occurred at home. There was no injury mechanism. The pain is present in the left foot. The quality of the pain is described as aching. He reports no foreign bodies present. The symptoms are aggravated by movement. He has tried nothing for the symptoms.       Constitution: Negative for chills, fatigue and fever.   HENT: Negative for congestion and sore throat.    Neck: Negative for painful lymph nodes.   Cardiovascular: Negative for chest pain and leg swelling.   Eyes: Negative for double vision and blurred vision.   Respiratory: Negative for cough and shortness of breath.    Gastrointestinal: Negative for nausea, vomiting and diarrhea.   Genitourinary: Negative for dysuria, frequency and urgency.   Musculoskeletal: Positive for joint pain. Negative for joint swelling, muscle cramps and muscle ache.   Skin: Positive for erythema and abscess. Negative for color change, pale and rash.   Allergic/Immunologic: Negative for seasonal allergies.   Neurological: Negative for dizziness, history of vertigo, light-headedness, passing out and headaches.   Hematologic/Lymphatic: Negative for swollen lymph nodes, easy bruising/bleeding and history of blood clots. Does not bruise/bleed easily.   Psychiatric/Behavioral: Negative for nervous/anxious, sleep disturbance and depression. The patient is not nervous/anxious.        Objective:      Physical Exam   Constitutional: He is oriented to person, place, and time. He appears well-developed and well-nourished.   HENT: "   Head: Normocephalic and atraumatic. Head is without abrasion, without contusion and without laceration.   Right Ear: External ear normal.   Left Ear: External ear normal.   Nose: Nose normal.   Mouth/Throat: Oropharynx is clear and moist.   Eyes: Conjunctivae, EOM and lids are normal. Pupils are equal, round, and reactive to light.   Neck: Trachea normal, full passive range of motion without pain and phonation normal. Neck supple.   Cardiovascular: Normal rate, regular rhythm and normal heart sounds.   Pulses:       Carotid pulses are 2+ on the right side, and 2+ on the left side.       Dorsalis pedis pulses are 2+ on the right side, and 2+ on the left side.   2+ bilateral ankle edema   Pulmonary/Chest: Effort normal and breath sounds normal. No stridor. No respiratory distress.   Musculoskeletal: Normal range of motion.   Neurological: He is alert and oriented to person, place, and time.   Skin: Skin is warm, dry and intact. Capillary refill takes less than 2 seconds. Lesion noted. No abrasion, no bruising, no burn, no ecchymosis, no laceration and no rash noted. There is erythema.        Left second toe with open 1mm sores to plantar aspect, erythema and swelling and ttp along toe and base of toe. No fluctuance, no purulence.    Bilateral toe nails with onychomycosis like cracked/thick/yellow nails.    Psychiatric: He has a normal mood and affect. His speech is normal and behavior is normal. Judgment and thought content normal. Cognition and memory are normal.   Nursing note and vitals reviewed.      Assessment:       1. Cellulitis of second toe of left foot    2. Bilateral swelling of feet and ankles    3. Neuropathy of foot, unspecified laterality    4. Onychomycosis of toenail        Plan:         Cellulitis of second toe of left foot  -     cephALEXin (KEFLEX) 250 MG capsule; Take 1 capsule (250 mg total) by mouth 4 (four) times daily. for 10 days  Dispense: 40 capsule; Refill: 0  -     mupirocin (BACTROBAN)  2 % ointment; Apply topically 2 (two) times daily.  Dispense: 1 Tube; Refill: 0  -     Ambulatory referral to Internal Medicine  -     Ambulatory referral to Podiatry    Bilateral swelling of feet and ankles    Neuropathy of foot, unspecified laterality  -     Ambulatory referral to Internal Medicine  -     Ambulatory referral to Podiatry    Onychomycosis of toenail  -     Ambulatory referral to Podiatry      Patient Instructions   Follow up with your doctor in a few days.  Return to the urgent care or go to the ER if symptoms get worse.    Start oral antibiotic (keflex)  Apply antibiotic ointment twice a day.  Clean area with warm water and mild soap-keep dry.    Follow up with internal medicine and podiatry.  Please call [phone] if you do not hear from [facility] to get your referral appointment we discussed.          Cellulitis  Cellulitis is an infection of the deep layers of skin. A break in the skin, such as a cut or scratch, can let bacteria under the skin. If the bacteria get to deep layers of the skin, it can be serious. If not treated, cellulitis can get into the bloodstream and lymph nodes. The infection can then spread throughout the body. This causes serious illness.  Cellulitis causes the affected skin to become red, swollen, warm, and sore. The reddened areas have a visible border. An open sore may leak fluid (pus). You may have a fever, chills, and pain.  Cellulitis is treated with antibiotics taken for 7 to 10 days. An open sore may be cleaned and covered with cool wet gauze. Symptoms should get better 1 to 2 days after treatment is started. Make sure to take all the antibiotics for the full number of days until they are gone. Keep taking the medicine even if your symptoms go away.  Home care  Follow these tips:  · Limit the use of the part of your body with cellulitis.   · If the infection is on your leg, keep your leg raised while sitting. This will help to reduce swelling.  · Take all of  the antibiotic medicine exactly as directed until it is gone. Do not miss any doses, especially during the first 7 days. Dont stop taking the medicine when your symptoms get better.  · Keep the affected area clean and dry.  · Wash your hands with soap and warm water before and after touching your skin. Anyone else who touches your skin should also wash his or her hands. Don't share towels.  Follow-up care  Follow up with your healthcare provider, or as advised. If your infection does not go away on the first antibiotic, your healthcare provider will prescribe a different one.  When to seek medical advice  Call your healthcare provider right away if any of these occur:  · Red areas that spread  · Swelling or pain that gets worse  · Fluid leaking from the skin (pus)  · Fever higher of 100.4º F (38.0º C) or higher after 2 days on antibiotics  Date Last Reviewed: 9/1/2016  © 3202-7923 [employer]. [address] [place] [address], [place] [address]. All rights reserved. This information is not intended as a substitute for professional medical care. Always follow your healthcare professional's instructions.

## 2019-02-19 ENCOUNTER — HOSPITAL ENCOUNTER (INPATIENT)
Facility: OTHER | Age: 79
LOS: 10 days | Discharge: HOME HEALTH | DRG: 308 | End: 2019-03-01
Attending: EMERGENCY MEDICINE | Admitting: HOSPITALIST
Payer: MEDICARE

## 2019-02-19 DIAGNOSIS — E87.5 HYPERKALEMIA: ICD-10-CM

## 2019-02-19 DIAGNOSIS — E86.0 DEHYDRATION: ICD-10-CM

## 2019-02-19 DIAGNOSIS — E11.10 DIABETIC KETOACIDOSIS WITHOUT COMA ASSOCIATED WITH TYPE 2 DIABETES MELLITUS: ICD-10-CM

## 2019-02-19 DIAGNOSIS — E11.65 TYPE 2 DIABETES MELLITUS WITH HYPERGLYCEMIA, WITHOUT LONG-TERM CURRENT USE OF INSULIN: ICD-10-CM

## 2019-02-19 DIAGNOSIS — I48.91 ATRIAL FIBRILLATION, UNSPECIFIED TYPE: ICD-10-CM

## 2019-02-19 DIAGNOSIS — I48.91 ATRIAL FIBRILLATION WITH RVR: ICD-10-CM

## 2019-02-19 DIAGNOSIS — I48.91 NEW ONSET A-FIB: ICD-10-CM

## 2019-02-19 DIAGNOSIS — I50.21 ACUTE SYSTOLIC HEART FAILURE: Primary | ICD-10-CM

## 2019-02-19 DIAGNOSIS — N17.9 ACUTE KIDNEY INJURY: ICD-10-CM

## 2019-02-19 DIAGNOSIS — I48.91 ATRIAL FIBRILLATION: ICD-10-CM

## 2019-02-19 DIAGNOSIS — R07.9 CHEST PAIN AT REST: ICD-10-CM

## 2019-02-19 LAB
ALBUMIN SERPL BCP-MCNC: 3.7 G/DL
ALLENS TEST: ABNORMAL
ALP SERPL-CCNC: 78 U/L
ALT SERPL W/O P-5'-P-CCNC: 151 U/L
ANION GAP SERPL CALC-SCNC: 16 MMOL/L
ANION GAP SERPL CALC-SCNC: 17 MMOL/L
AST SERPL-CCNC: 90 U/L
B-OH-BUTYR BLD STRIP-SCNC: 2.1 MMOL/L
BACTERIA #/AREA URNS HPF: ABNORMAL /HPF
BASOPHILS # BLD AUTO: 0.01 K/UL
BASOPHILS NFR BLD: 0.1 %
BILIRUB SERPL-MCNC: 1.4 MG/DL
BILIRUB UR QL STRIP: NEGATIVE
BUN SERPL-MCNC: 60 MG/DL
BUN SERPL-MCNC: 61 MG/DL
CALCIUM SERPL-MCNC: 8.1 MG/DL
CALCIUM SERPL-MCNC: 9.1 MG/DL
CHLORIDE SERPL-SCNC: 102 MMOL/L
CHLORIDE SERPL-SCNC: 93 MMOL/L
CLARITY UR: CLEAR
CO2 SERPL-SCNC: 15 MMOL/L
CO2 SERPL-SCNC: 20 MMOL/L
COLOR UR: YELLOW
CREAT SERPL-MCNC: 1.9 MG/DL
CREAT SERPL-MCNC: 2.3 MG/DL
CREAT UR-MCNC: 77 MG/DL
DELSYS: ABNORMAL
DIFFERENTIAL METHOD: ABNORMAL
EOSINOPHIL # BLD AUTO: 0 K/UL
EOSINOPHIL NFR BLD: 0 %
ERYTHROCYTE [DISTWIDTH] IN BLOOD BY AUTOMATED COUNT: 12.7 %
ERYTHROCYTE [SEDIMENTATION RATE] IN BLOOD BY WESTERGREN METHOD: 20 MM/H
EST. GFR  (AFRICAN AMERICAN): 31 ML/MIN/1.73 M^2
EST. GFR  (AFRICAN AMERICAN): 38 ML/MIN/1.73 M^2
EST. GFR  (NON AFRICAN AMERICAN): 26 ML/MIN/1.73 M^2
EST. GFR  (NON AFRICAN AMERICAN): 33 ML/MIN/1.73 M^2
ESTIMATED AVG GLUCOSE: 315 MG/DL
FIO2: 21
FLOW: 0
GLUCOSE SERPL-MCNC: 486 MG/DL
GLUCOSE SERPL-MCNC: 595 MG/DL
GLUCOSE UR QL STRIP: ABNORMAL
HBA1C MFR BLD HPLC: 12.6 %
HCO3 UR-SCNC: 21.1 MMOL/L (ref 24–28)
HCT VFR BLD AUTO: 45.8 %
HGB BLD-MCNC: 15.5 G/DL
HGB UR QL STRIP: NEGATIVE
HYALINE CASTS #/AREA URNS LPF: 10 /LPF
KETONES UR QL STRIP: NEGATIVE
LEUKOCYTE ESTERASE UR QL STRIP: NEGATIVE
LIPASE SERPL-CCNC: 16 U/L
LYMPHOCYTES # BLD AUTO: 0.9 K/UL
LYMPHOCYTES NFR BLD: 8.2 %
MAGNESIUM SERPL-MCNC: 2.6 MG/DL
MCH RBC QN AUTO: 32.5 PG
MCHC RBC AUTO-ENTMCNC: 33.8 G/DL
MCV RBC AUTO: 96 FL
MICROSCOPIC COMMENT: ABNORMAL
MODE: ABNORMAL
MONOCYTES # BLD AUTO: 1 K/UL
MONOCYTES NFR BLD: 8.3 %
NEUTROPHILS # BLD AUTO: 9.5 K/UL
NEUTROPHILS NFR BLD: 83.1 %
NITRITE UR QL STRIP: NEGATIVE
PCO2 BLDA: 50.3 MMHG (ref 35–45)
PH SMN: 7.23 [PH] (ref 7.35–7.45)
PH UR STRIP: 5 [PH] (ref 5–8)
PHOSPHATE SERPL-MCNC: 4.2 MG/DL
PLATELET # BLD AUTO: 132 K/UL
PMV BLD AUTO: 13.6 FL
PO2 BLDA: 25 MMHG (ref 40–60)
POC BE: -6 MMOL/L
POC SATURATED O2: 36 % (ref 95–100)
POCT GLUCOSE: 356 MG/DL (ref 70–110)
POCT GLUCOSE: 393 MG/DL (ref 70–110)
POCT GLUCOSE: 488 MG/DL (ref 70–110)
POTASSIUM SERPL-SCNC: 4.7 MMOL/L
POTASSIUM SERPL-SCNC: 4.8 MMOL/L
PROT SERPL-MCNC: 8.4 G/DL
PROT UR QL STRIP: NEGATIVE
PROT UR-MCNC: 18 MG/DL
PROT/CREAT UR: 0.23 MG/G{CREAT}
RBC # BLD AUTO: 4.77 M/UL
SAMPLE: ABNORMAL
SITE: ABNORMAL
SODIUM SERPL-SCNC: 130 MMOL/L
SODIUM SERPL-SCNC: 133 MMOL/L
SODIUM UR-SCNC: <20 MMOL/L
SP GR UR STRIP: 1.02 (ref 1–1.03)
SP02: 90
URN SPEC COLLECT METH UR: ABNORMAL
UROBILINOGEN UR STRIP-ACNC: NEGATIVE EU/DL
WBC # BLD AUTO: 11.45 K/UL
WBC #/AREA URNS HPF: 1 /HPF (ref 0–5)
YEAST URNS QL MICRO: ABNORMAL

## 2019-02-19 PROCEDURE — 82803 BLOOD GASES ANY COMBINATION: CPT

## 2019-02-19 PROCEDURE — 99900035 HC TECH TIME PER 15 MIN (STAT)

## 2019-02-19 PROCEDURE — 93010 ELECTROCARDIOGRAM REPORT: CPT | Mod: ,,, | Performed by: INTERNAL MEDICINE

## 2019-02-19 PROCEDURE — 80048 BASIC METABOLIC PNL TOTAL CA: CPT

## 2019-02-19 PROCEDURE — 99291 CRITICAL CARE FIRST HOUR: CPT | Mod: 25

## 2019-02-19 PROCEDURE — 80053 COMPREHEN METABOLIC PANEL: CPT

## 2019-02-19 PROCEDURE — 93010 EKG 12-LEAD: ICD-10-PCS | Mod: ,,, | Performed by: INTERNAL MEDICINE

## 2019-02-19 PROCEDURE — 20000000 HC ICU ROOM

## 2019-02-19 PROCEDURE — 63600175 PHARM REV CODE 636 W HCPCS: Performed by: PHYSICIAN ASSISTANT

## 2019-02-19 PROCEDURE — 83735 ASSAY OF MAGNESIUM: CPT

## 2019-02-19 PROCEDURE — 84100 ASSAY OF PHOSPHORUS: CPT

## 2019-02-19 PROCEDURE — 84300 ASSAY OF URINE SODIUM: CPT

## 2019-02-19 PROCEDURE — 84156 ASSAY OF PROTEIN URINE: CPT

## 2019-02-19 PROCEDURE — 25000003 PHARM REV CODE 250: Performed by: EMERGENCY MEDICINE

## 2019-02-19 PROCEDURE — 83935 ASSAY OF URINE OSMOLALITY: CPT

## 2019-02-19 PROCEDURE — 96375 TX/PRO/DX INJ NEW DRUG ADDON: CPT

## 2019-02-19 PROCEDURE — 25000003 PHARM REV CODE 250: Performed by: PHYSICIAN ASSISTANT

## 2019-02-19 PROCEDURE — 81000 URINALYSIS NONAUTO W/SCOPE: CPT

## 2019-02-19 PROCEDURE — 83036 HEMOGLOBIN GLYCOSYLATED A1C: CPT

## 2019-02-19 PROCEDURE — 96365 THER/PROPH/DIAG IV INF INIT: CPT

## 2019-02-19 PROCEDURE — 36415 COLL VENOUS BLD VENIPUNCTURE: CPT

## 2019-02-19 PROCEDURE — 96361 HYDRATE IV INFUSION ADD-ON: CPT

## 2019-02-19 PROCEDURE — 82962 GLUCOSE BLOOD TEST: CPT

## 2019-02-19 PROCEDURE — 96376 TX/PRO/DX INJ SAME DRUG ADON: CPT

## 2019-02-19 PROCEDURE — 93005 ELECTROCARDIOGRAM TRACING: CPT

## 2019-02-19 PROCEDURE — 63600175 PHARM REV CODE 636 W HCPCS: Performed by: EMERGENCY MEDICINE

## 2019-02-19 PROCEDURE — 85025 COMPLETE CBC W/AUTO DIFF WBC: CPT

## 2019-02-19 PROCEDURE — 82010 KETONE BODYS QUAN: CPT

## 2019-02-19 PROCEDURE — 83690 ASSAY OF LIPASE: CPT

## 2019-02-19 RX ORDER — SODIUM CHLORIDE 9 MG/ML
1000 INJECTION, SOLUTION INTRAVENOUS
Status: COMPLETED | OUTPATIENT
Start: 2019-02-19 | End: 2019-02-19

## 2019-02-19 RX ORDER — METOPROLOL TARTRATE 1 MG/ML
5 INJECTION, SOLUTION INTRAVENOUS EVERY 10 MIN PRN
Status: COMPLETED | OUTPATIENT
Start: 2019-02-19 | End: 2019-02-19

## 2019-02-19 RX ORDER — ONDANSETRON 2 MG/ML
4 INJECTION INTRAMUSCULAR; INTRAVENOUS
Status: COMPLETED | OUTPATIENT
Start: 2019-02-19 | End: 2019-02-19

## 2019-02-19 RX ORDER — NITROGLYCERIN 0.4 MG/1
0.4 TABLET SUBLINGUAL EVERY 5 MIN PRN
Status: DISCONTINUED | OUTPATIENT
Start: 2019-02-20 | End: 2019-02-25

## 2019-02-19 RX ORDER — DILTIAZEM HCL 1 MG/ML
15 INJECTION, SOLUTION INTRAVENOUS CONTINUOUS
Status: DISCONTINUED | OUTPATIENT
Start: 2019-02-19 | End: 2019-02-20

## 2019-02-19 RX ORDER — ONDANSETRON 4 MG/1
4 TABLET, ORALLY DISINTEGRATING ORAL EVERY 6 HOURS PRN
Status: DISCONTINUED | OUTPATIENT
Start: 2019-02-19 | End: 2019-03-01 | Stop reason: HOSPADM

## 2019-02-19 RX ORDER — DILTIAZEM HYDROCHLORIDE 5 MG/ML
10 INJECTION INTRAVENOUS
Status: COMPLETED | OUTPATIENT
Start: 2019-02-19 | End: 2019-02-19

## 2019-02-19 RX ORDER — SODIUM CHLORIDE 0.9 % (FLUSH) 0.9 %
5 SYRINGE (ML) INJECTION
Status: DISCONTINUED | OUTPATIENT
Start: 2019-02-19 | End: 2019-03-01 | Stop reason: HOSPADM

## 2019-02-19 RX ORDER — SODIUM CHLORIDE 9 MG/ML
INJECTION, SOLUTION INTRAVENOUS CONTINUOUS
Status: DISCONTINUED | OUTPATIENT
Start: 2019-02-19 | End: 2019-02-20

## 2019-02-19 RX ORDER — HEPARIN SODIUM 5000 [USP'U]/ML
5000 INJECTION, SOLUTION INTRAVENOUS; SUBCUTANEOUS EVERY 12 HOURS
Status: DISCONTINUED | OUTPATIENT
Start: 2019-02-19 | End: 2019-02-20

## 2019-02-19 RX ORDER — ACETAMINOPHEN 325 MG/1
650 TABLET ORAL EVERY 4 HOURS PRN
Status: DISCONTINUED | OUTPATIENT
Start: 2019-02-19 | End: 2019-03-01 | Stop reason: HOSPADM

## 2019-02-19 RX ORDER — NAPROXEN SODIUM 220 MG/1
81 TABLET, FILM COATED ORAL DAILY
Status: DISCONTINUED | OUTPATIENT
Start: 2019-02-20 | End: 2019-02-20

## 2019-02-19 RX ORDER — DILTIAZEM HCL 1 MG/ML
5 INJECTION, SOLUTION INTRAVENOUS
Status: COMPLETED | OUTPATIENT
Start: 2019-02-19 | End: 2019-02-19

## 2019-02-19 RX ADMIN — DILTIAZEM HYDROCHLORIDE 15 MG/HR: 5 INJECTION INTRAVENOUS at 09:02

## 2019-02-19 RX ADMIN — INSULIN HUMAN 8 UNITS: 100 INJECTION, SOLUTION PARENTERAL at 08:02

## 2019-02-19 RX ADMIN — ONDANSETRON 4 MG: 2 INJECTION INTRAMUSCULAR; INTRAVENOUS at 06:02

## 2019-02-19 RX ADMIN — HEPARIN SODIUM 5000 UNITS: 5000 INJECTION, SOLUTION INTRAVENOUS; SUBCUTANEOUS at 09:02

## 2019-02-19 RX ADMIN — DILTIAZEM HYDROCHLORIDE 10 MG: 5 INJECTION INTRAVENOUS at 07:02

## 2019-02-19 RX ADMIN — METOPROLOL TARTRATE 5 MG: 5 INJECTION, SOLUTION INTRAVENOUS at 09:02

## 2019-02-19 RX ADMIN — METOPROLOL TARTRATE 5 MG: 5 INJECTION, SOLUTION INTRAVENOUS at 10:02

## 2019-02-19 RX ADMIN — SODIUM CHLORIDE 4 UNITS/HR: 9 INJECTION, SOLUTION INTRAVENOUS at 09:02

## 2019-02-19 RX ADMIN — SODIUM CHLORIDE 1000 ML: 0.9 INJECTION, SOLUTION INTRAVENOUS at 07:02

## 2019-02-19 RX ADMIN — DILTIAZEM HYDROCHLORIDE 5 MG/HR: 5 INJECTION INTRAVENOUS at 08:02

## 2019-02-19 RX ADMIN — SODIUM CHLORIDE 1000 ML: 0.9 INJECTION, SOLUTION INTRAVENOUS at 06:02

## 2019-02-19 RX ADMIN — SODIUM CHLORIDE: 0.9 INJECTION, SOLUTION INTRAVENOUS at 09:02

## 2019-02-19 NOTE — ED PROVIDER NOTES
Encounter Date: 2/19/2019    SCRIBE #1 NOTE: Anel ENRIQUEZ, am scribing for, and in the presence of, Dr. Luo.       History     Chief Complaint   Patient presents with    Abdominal Pain     Reports abdominal pain w/ n/v/d x 5 days, reports seen at Urgent Care, advised to come to ED for further evaluation     Time seen by provider: 5:59 PM    This is a 78 y.o. male who presents with complaint of abdominal pain that began four days ago. The patient states onset initially was accompanied by nausea, vomiting, and diarrhea. Vomiting, and diarrhea has since resolved. He reports nausea is still present. He denies fever, chills, chest pain, palpitations, SOB, dysuria or urinary frequency. He states he has not eaten in three days, and has not been receiving adequate fluids. Patient was seen in Urgent Care today for same symptoms, in which he was advised to come to ED for further evaluation. He reports history of diabetes, but denies history of atrial fibrillation, or anemia. He reports undergoing stent placements, but denies undergoing any abdominal surgeries. He denies any known drug allergies. He denies use of tobacco, alcohol, or illicit drugs.      The history is provided by the patient.     Review of patient's allergies indicates:  No Known Allergies  Past Medical History:   Diagnosis Date    Asthma     childhood    Coronary artery disease     Heart attack     stent     Past Surgical History:   Procedure Laterality Date    CARDIAC CATHETERIZATION      CATARACT EXTRACTION W/  INTRAOCULAR LENS IMPLANT Right 12/18/2017    Dr. Beavers    CATARACT EXTRACTION W/  INTRAOCULAR LENS IMPLANT Left 01/08/2018    Dr. Beavers    INSERTION-INTRAOCULAR LENS (IOL) Left 1/8/2018    Performed by Milo [facility]vers [facility]    INSERTION-INTRAOCULAR LENS (IOL) Right 12/18/2017    [facility]    PHACOEMULSIFICATION-ASPIRATION-CATARACT Left 1/8/2018    [facility]     PHACOEMULSIFICATION-ASPIRATION-CATARACT Right 12/18/2017    [facility]    TONSILLECTOMY       Family History   Problem Relation Age of Onset    Cataracts Father     Amblyopia Neg Hx     Blindness Neg Hx     Glaucoma Neg Hx     Macular degeneration Neg Hx     Retinal detachment Neg Hx     Strabismus Neg Hx      Social History     Tobacco Use    Smoking status: Never Smoker    Smokeless tobacco: Never Used   Substance Use Topics    Alcohol use: No    Drug use: No     Review of Systems   Constitutional: Negative for chills and fever.   HENT: Negative for congestion, sore throat and trouble swallowing.    Eyes: Negative for visual disturbance.   Respiratory: Negative for cough and shortness of breath.    Cardiovascular: Negative for chest pain and palpitations.   Gastrointestinal: Positive for abdominal pain and nausea. Negative for diarrhea and vomiting.   Genitourinary: Negative for dysuria, frequency and hematuria.   Musculoskeletal: Negative for back pain and neck pain.   Skin: Negative for rash and wound.   Neurological: Negative for weakness and headaches.       Physical Exam     Initial Vitals [02/19/19 1735]   BP Pulse Resp Temp SpO2   107/77 110 18 97.3 °F (36.3 °C) 96 %      MAP       --         Physical Exam    Nursing note and vitals reviewed.  Constitutional: He appears well-developed and well-nourished. No distress.   HENT:   Head: Normocephalic and atraumatic.   Right Ear: External ear normal.   Left Ear: External ear normal.   Very dry mucous membranes.   Eyes: Conjunctivae and EOM are normal. Pupils are equal, round, and reactive to light.   Neck: Normal range of motion. Neck supple.   Cardiovascular: Regular rhythm and normal heart sounds. Tachycardia present.  Exam reveals no gallop and no friction rub.    No murmur heard.  Pulmonary/Chest: Breath sounds normal. No respiratory distress. He has no wheezes. He has no rhonchi. He has no rales.   Abdominal: Soft.  There is no tenderness. There is no rebound and no guarding.   Musculoskeletal: Normal range of motion. He exhibits no edema or tenderness.   Neurological: He is alert and oriented to person, place, and time. He has normal strength.   Skin: Skin is dry. No rash noted.   Psychiatric: He has a normal mood and affect. His behavior is normal. Judgment and thought content normal.         ED Course   Critical Care  Date/Time: 2/19/2019 9:47 PM  Performed by: Jono Luo II, MD  Authorized by: Basil So MD   Direct patient critical care time: 35 minutes  Additional history critical care time: 5 minutes  Ordering / reviewing critical care time: 15 minutes  Documentation critical care time: 10 minutes  Consulting other physicians critical care time: 5 minutes  Total critical care time (exclusive of procedural time) : 70 minutes  Critical care was necessary to treat or prevent imminent or life-threatening deterioration of the following conditions: cardiac failure, endocrine crisis, metabolic crisis, dehydration and renal failure.        Labs Reviewed   CBC W/ AUTO DIFFERENTIAL - Abnormal; Notable for the following components:       Result Value    MCH 32.5 (*)     Platelets 132 (*)     MPV 13.6 (*)     Gran # (ANC) 9.5 (*)     Lymph # 0.9 (*)     Gran% 83.1 (*)     Lymph% 8.2 (*)     All other components within normal limits   COMPREHENSIVE METABOLIC PANEL - Abnormal; Notable for the following components:    Sodium 130 (*)     Chloride 93 (*)     CO2 20 (*)     Glucose 595 (*)     BUN, Bld 61 (*)     Creatinine 2.3 (*)     Total Bilirubin 1.4 (*)     AST 90 (*)      (*)     Anion Gap 17 (*)     eGFR if  31 (*)     eGFR if non  26 (*)     All other components within normal limits    Narrative:     GLU critical result(s) called and verbal readback obtained from Reinaldo Prasad RN, 02/19/2019 19:05   BETA - HYDROXYBUTYRATE, SERUM - Abnormal; Notable for the following components:     Beta-Hydroxybutyrate 2.1 (*)     All other components within normal limits   ISTAT PROCEDURE - Abnormal; Notable for the following components:    POC PH 7.230 (*)     POC PCO2 50.3 (*)     POC PO2 25 (*)     POC HCO3 21.1 (*)     POC SATURATED O2 36 (*)     All other components within normal limits   LIPASE   URINALYSIS   POCT GLUCOSE MONITORING CONTINUOUS     EKG Readings: (Independently Interpreted)   Initial Reading: No STEMI.   Ventricular rate of 175 bpm. No specific ST/T wave changes.       Imaging Results    None          Medical Decision Making:   Clinical Tests:   Lab Tests: Ordered and Reviewed  Medical Tests: Ordered and Reviewed  ED Management:  7:58 PM- Case discussed with Carmen Vu PA-C, or hospital service. Will admit to Dr. So.            Scribe Attestation:   Scribe #1: I performed the above scribed service and the documentation accurately describes the services I performed. I attest to the accuracy of the note.    Attending Attestation:           Physician Attestation for Scribe:  Physician Attestation Statement for Scribe #1: I, Dr. Luo, reviewed documentation, as scribed by Anel Hinojosa in my presence, and it is both accurate and complete.           Patient presents complaining of nausea vomiting diarrhea.  Abdominal cramping, rather than pain. Also complains of feels tired, dehydrated. he is not having chest pain or palpitations, dizziness or syncope, shortness of breath.  On exam he appears quite dehydrated.  His heart rate upon my evaluation is 160-180, irregular he reports this was not the case at his primary care clinic office nor at triage however these were taken with a pulse ox type finger monitor and I suspect they may have been erroneous.  No history of AFib in the past.  Given 2 L of fluid with some reduction heart rate, however still approximately 160 therefore given bolus of Cardizem with minimal rate reduction.  Will need Cardizem drip.  Patient's laboratory studies  also significant for glucose of nearly 600, slight acidosis/anion gap.  VBG shows a pH is 7.25 but PCO2 is elevated, suggests mixed acidosis.  Also with acute kidney injury, presumably from dehydration.  Patient updated on findings, plan of care.  Will be admitted to the ICU for titration of Cardizem, monitoring of glucose..  Case discussed with the hospitalist service.             Clinical Impression:     1. Atrial fibrillation with RVR    2. Type 2 diabetes mellitus with hyperglycemia, without long-term current use of insulin    3. Dehydration    4. Acute kidney injury                                  Jono Luo II, MD  02/19/19 4491

## 2019-02-20 LAB
ALLENS TEST: ABNORMAL
ANION GAP SERPL CALC-SCNC: 11 MMOL/L
ANION GAP SERPL CALC-SCNC: 11 MMOL/L
ANION GAP SERPL CALC-SCNC: 12 MMOL/L
ANION GAP SERPL CALC-SCNC: 13 MMOL/L
ANION GAP SERPL CALC-SCNC: 14 MMOL/L
ANION GAP SERPL CALC-SCNC: 15 MMOL/L
AORTIC ROOT ANNULUS: 3.62 CM
AORTIC VALVE CUSP SEPERATION: 1.99 CM
APTT BLDCRRT: 30.3 SEC
APTT BLDCRRT: 50.7 SEC
APTT BLDCRRT: 69.9 SEC
ASCENDING AORTA: 3.47 CM
AV INDEX (PROSTH): 0.71
AV MEAN GRADIENT: 0.79 MMHG
AV PEAK GRADIENT: 1.39 MMHG
AV VALVE AREA: 2.18 CM2
AV VELOCITY RATIO: 0.86
BASOPHILS # BLD AUTO: 0.01 K/UL
BASOPHILS NFR BLD: 0.1 %
BNP SERPL-MCNC: 1072 PG/ML
BSA FOR ECHO PROCEDURE: 2.1 M2
BUN SERPL-MCNC: 61 MG/DL
BUN SERPL-MCNC: 62 MG/DL
BUN SERPL-MCNC: 64 MG/DL
BUN SERPL-MCNC: 65 MG/DL
BUN SERPL-MCNC: 67 MG/DL
BUN SERPL-MCNC: 67 MG/DL
CALCIUM SERPL-MCNC: 7.7 MG/DL
CALCIUM SERPL-MCNC: 7.8 MG/DL
CALCIUM SERPL-MCNC: 7.8 MG/DL
CALCIUM SERPL-MCNC: 7.9 MG/DL
CHLORIDE SERPL-SCNC: 102 MMOL/L
CHLORIDE SERPL-SCNC: 104 MMOL/L
CHLORIDE SERPL-SCNC: 105 MMOL/L
CHLORIDE SERPL-SCNC: 106 MMOL/L
CHOLEST SERPL-MCNC: 137 MG/DL
CHOLEST/HDLC SERPL: 5.3 {RATIO}
CK MB SERPL-MCNC: 1.3 NG/ML
CK MB SERPL-MCNC: 1.7 NG/ML
CK MB SERPL-RTO: 3.9 %
CK MB SERPL-RTO: 5.2 %
CK SERPL-CCNC: 33 U/L
CK SERPL-CCNC: 33 U/L
CO2 SERPL-SCNC: 14 MMOL/L
CO2 SERPL-SCNC: 15 MMOL/L
CO2 SERPL-SCNC: 16 MMOL/L
CO2 SERPL-SCNC: 18 MMOL/L
CO2 SERPL-SCNC: 19 MMOL/L
CO2 SERPL-SCNC: 19 MMOL/L
CREAT SERPL-MCNC: 1.8 MG/DL
CREAT SERPL-MCNC: 1.9 MG/DL
CREAT SERPL-MCNC: 1.9 MG/DL
CREAT SERPL-MCNC: 2 MG/DL
CREAT SERPL-MCNC: 2.1 MG/DL
CREAT SERPL-MCNC: 2.1 MG/DL
CV ECHO LV RWT: 0.24 CM
DELSYS: ABNORMAL
DIFFERENTIAL METHOD: ABNORMAL
DOP CALC AO PEAK VEL: 0.59 M/S
DOP CALC AO VTI: 8.57 CM
DOP CALC LVOT AREA: 3.08 CM2
DOP CALC LVOT DIAMETER: 1.98 CM
DOP CALC LVOT PEAK VEL: 0.51 M/S
DOP CALC LVOT STROKE VOLUME: 18.71 CM3
DOP CALCLVOT PEAK VEL VTI: 6.08 CM
E WAVE DECELERATION TIME: 159.93 MSEC
E/E' RATIO: 32.67
ECHO LV POSTERIOR WALL: 0.73 CM (ref 0.6–1.1)
EOSINOPHIL # BLD AUTO: 0 K/UL
EOSINOPHIL NFR BLD: 0.2 %
ERYTHROCYTE [DISTWIDTH] IN BLOOD BY AUTOMATED COUNT: 12.8 %
ERYTHROCYTE [DISTWIDTH] IN BLOOD BY AUTOMATED COUNT: 12.9 %
EST. GFR  (AFRICAN AMERICAN): 34 ML/MIN/1.73 M^2
EST. GFR  (AFRICAN AMERICAN): 34 ML/MIN/1.73 M^2
EST. GFR  (AFRICAN AMERICAN): 36 ML/MIN/1.73 M^2
EST. GFR  (AFRICAN AMERICAN): 38 ML/MIN/1.73 M^2
EST. GFR  (AFRICAN AMERICAN): 38 ML/MIN/1.73 M^2
EST. GFR  (AFRICAN AMERICAN): 41 ML/MIN/1.73 M^2
EST. GFR  (NON AFRICAN AMERICAN): 29 ML/MIN/1.73 M^2
EST. GFR  (NON AFRICAN AMERICAN): 29 ML/MIN/1.73 M^2
EST. GFR  (NON AFRICAN AMERICAN): 31 ML/MIN/1.73 M^2
EST. GFR  (NON AFRICAN AMERICAN): 33 ML/MIN/1.73 M^2
EST. GFR  (NON AFRICAN AMERICAN): 33 ML/MIN/1.73 M^2
EST. GFR  (NON AFRICAN AMERICAN): 36 ML/MIN/1.73 M^2
FIO2: 40
FLOW: 5
FRACTIONAL SHORTENING: 11 % (ref 28–44)
GLUCOSE SERPL-MCNC: 149 MG/DL
GLUCOSE SERPL-MCNC: 175 MG/DL
GLUCOSE SERPL-MCNC: 212 MG/DL
GLUCOSE SERPL-MCNC: 226 MG/DL
GLUCOSE SERPL-MCNC: 228 MG/DL
GLUCOSE SERPL-MCNC: 349 MG/DL
HCO3 UR-SCNC: 16.1 MMOL/L (ref 24–28)
HCT VFR BLD AUTO: 41.7 %
HCT VFR BLD AUTO: 44 %
HDLC SERPL-MCNC: 26 MG/DL
HDLC SERPL: 19 %
HGB BLD-MCNC: 14.1 G/DL
HGB BLD-MCNC: 15 G/DL
INFLUENZA A, MOLECULAR: NEGATIVE
INFLUENZA B, MOLECULAR: NEGATIVE
INR PPP: 1.2
INTERVENTRICULAR SEPTUM: 0.72 CM (ref 0.6–1.1)
IVRT: 0.08 MSEC
LA MAJOR: 5.17 CM
LA MINOR: 6 CM
LA WIDTH: 4.75 CM
LACTATE SERPL-SCNC: 1.6 MMOL/L
LACTATE SERPL-SCNC: 4 MMOL/L
LDLC SERPL CALC-MCNC: 93.6 MG/DL
LEFT ATRIUM SIZE: 4.24 CM
LEFT ATRIUM VOLUME INDEX: 45.7 ML/M2
LEFT ATRIUM VOLUME: 95.08 CM3
LEFT INTERNAL DIMENSION IN SYSTOLE: 5.33 CM (ref 2.1–4)
LEFT VENTRICLE DIASTOLIC VOLUME INDEX: 86.67 ML/M2
LEFT VENTRICLE DIASTOLIC VOLUME: 180.26 ML
LEFT VENTRICLE MASS INDEX: 79.3 G/M2
LEFT VENTRICLE SYSTOLIC VOLUME INDEX: 65.9 ML/M2
LEFT VENTRICLE SYSTOLIC VOLUME: 136.97 ML
LEFT VENTRICULAR INTERNAL DIMENSION IN DIASTOLE: 6 CM (ref 3.5–6)
LEFT VENTRICULAR MASS: 164.91 G
LV LATERAL E/E' RATIO: 32.67
LV SEPTAL E/E' RATIO: 32.67
LYMPHOCYTES # BLD AUTO: 1.1 K/UL
LYMPHOCYTES NFR BLD: 11.3 %
MCH RBC QN AUTO: 32.4 PG
MCH RBC QN AUTO: 32.5 PG
MCHC RBC AUTO-ENTMCNC: 33.8 G/DL
MCHC RBC AUTO-ENTMCNC: 34.1 G/DL
MCV RBC AUTO: 95 FL
MCV RBC AUTO: 96 FL
MODE: ABNORMAL
MONOCYTES # BLD AUTO: 0.8 K/UL
MONOCYTES NFR BLD: 8.5 %
MV PEAK E VEL: 0.98 M/S
NEUTROPHILS # BLD AUTO: 7.6 K/UL
NEUTROPHILS NFR BLD: 79.6 %
NONHDLC SERPL-MCNC: 111 MG/DL
OSMOLALITY SERPL: 307 MOSM/KG
OSMOLALITY UR: 662 MOSM/KG
PCO2 BLDA: 34.4 MMHG (ref 35–45)
PH SMN: 7.28 [PH] (ref 7.35–7.45)
PISA TR MAX VEL: 2.4 M/S
PLATELET # BLD AUTO: 101 K/UL
PLATELET # BLD AUTO: 107 K/UL
PMV BLD AUTO: 12.6 FL
PMV BLD AUTO: 13.2 FL
PO2 BLDA: 60 MMHG (ref 80–100)
POC BE: -11 MMOL/L
POC SATURATED O2: 88 % (ref 95–100)
POCT GLUCOSE: 141 MG/DL (ref 70–110)
POCT GLUCOSE: 146 MG/DL (ref 70–110)
POCT GLUCOSE: 173 MG/DL (ref 70–110)
POCT GLUCOSE: 213 MG/DL (ref 70–110)
POCT GLUCOSE: 220 MG/DL (ref 70–110)
POCT GLUCOSE: 225 MG/DL (ref 70–110)
POCT GLUCOSE: 228 MG/DL (ref 70–110)
POCT GLUCOSE: 249 MG/DL (ref 70–110)
POCT GLUCOSE: 250 MG/DL (ref 70–110)
POCT GLUCOSE: 251 MG/DL (ref 70–110)
POCT GLUCOSE: 306 MG/DL (ref 70–110)
POCT GLUCOSE: 365 MG/DL (ref 70–110)
POCT GLUCOSE: >500 MG/DL (ref 70–110)
POTASSIUM SERPL-SCNC: 3.9 MMOL/L
POTASSIUM SERPL-SCNC: 4.3 MMOL/L
POTASSIUM SERPL-SCNC: 4.5 MMOL/L
POTASSIUM SERPL-SCNC: 4.5 MMOL/L
POTASSIUM SERPL-SCNC: 4.6 MMOL/L
POTASSIUM SERPL-SCNC: 5 MMOL/L
PROCALCITONIN SERPL IA-MCNC: 0.78 NG/ML
PROTHROMBIN TIME: 13.7 SEC
PV PEAK VELOCITY: 0.54 CM/S
RA MAJOR: 4.86 CM
RA PRESSURE: 15 MMHG
RA WIDTH: 3.91 CM
RBC # BLD AUTO: 4.35 M/UL
RBC # BLD AUTO: 4.61 M/UL
RIGHT VENTRICULAR END-DIASTOLIC DIMENSION: 3.9 CM
RV TISSUE DOPPLER FREE WALL SYSTOLIC VELOCITY 1 (APICAL 4 CHAMBER VIEW): 10.25 M/S
SAMPLE: ABNORMAL
SINUS: 3.71 CM
SITE: ABNORMAL
SODIUM SERPL-SCNC: 132 MMOL/L
SODIUM SERPL-SCNC: 133 MMOL/L
SODIUM SERPL-SCNC: 133 MMOL/L
SODIUM SERPL-SCNC: 134 MMOL/L
SODIUM SERPL-SCNC: 134 MMOL/L
SODIUM SERPL-SCNC: 136 MMOL/L
SPECIMEN SOURCE: NORMAL
STJ: 3.63 CM
TDI LATERAL: 0.03
TDI SEPTAL: 0.03
TDI: 0.03
TR MAX PG: 23.04 MMHG
TRICUSPID ANNULAR PLANE SYSTOLIC EXCURSION: 1.05 CM
TRIGL SERPL-MCNC: 87 MG/DL
TROPONIN I SERPL DL<=0.01 NG/ML-MCNC: 0.08 NG/ML
TROPONIN I SERPL DL<=0.01 NG/ML-MCNC: 0.1 NG/ML
TROPONIN I SERPL DL<=0.01 NG/ML-MCNC: 0.1 NG/ML
TV REST PULMONARY ARTERY PRESSURE: 38 MMHG
WBC # BLD AUTO: 11.23 K/UL
WBC # BLD AUTO: 9.61 K/UL

## 2019-02-20 PROCEDURE — 20000000 HC ICU ROOM

## 2019-02-20 PROCEDURE — 99291 PR CRITICAL CARE, E/M 30-74 MINUTES: ICD-10-PCS | Mod: ,,, | Performed by: HOSPITALIST

## 2019-02-20 PROCEDURE — 80048 BASIC METABOLIC PNL TOTAL CA: CPT | Mod: 91

## 2019-02-20 PROCEDURE — 94761 N-INVAS EAR/PLS OXIMETRY MLT: CPT

## 2019-02-20 PROCEDURE — 99291 CRITICAL CARE FIRST HOUR: CPT | Mod: GC,,, | Performed by: INTERNAL MEDICINE

## 2019-02-20 PROCEDURE — 85025 COMPLETE CBC W/AUTO DIFF WBC: CPT

## 2019-02-20 PROCEDURE — 87040 BLOOD CULTURE FOR BACTERIA: CPT

## 2019-02-20 PROCEDURE — 82803 BLOOD GASES ANY COMBINATION: CPT

## 2019-02-20 PROCEDURE — 93010 EKG 12-LEAD: ICD-10-PCS | Mod: ,,, | Performed by: INTERNAL MEDICINE

## 2019-02-20 PROCEDURE — 93005 ELECTROCARDIOGRAM TRACING: CPT

## 2019-02-20 PROCEDURE — 63600175 PHARM REV CODE 636 W HCPCS: Performed by: INTERNAL MEDICINE

## 2019-02-20 PROCEDURE — 63600175 PHARM REV CODE 636 W HCPCS: Performed by: STUDENT IN AN ORGANIZED HEALTH CARE EDUCATION/TRAINING PROGRAM

## 2019-02-20 PROCEDURE — 93010 ELECTROCARDIOGRAM REPORT: CPT | Mod: ,,, | Performed by: INTERNAL MEDICINE

## 2019-02-20 PROCEDURE — 82550 ASSAY OF CK (CPK): CPT | Mod: 91

## 2019-02-20 PROCEDURE — 99291 CRITICAL CARE FIRST HOUR: CPT | Mod: ,,, | Performed by: HOSPITALIST

## 2019-02-20 PROCEDURE — 99223 1ST HOSP IP/OBS HIGH 75: CPT | Mod: ,,, | Performed by: PHYSICIAN ASSISTANT

## 2019-02-20 PROCEDURE — 25000003 PHARM REV CODE 250: Performed by: PHYSICIAN ASSISTANT

## 2019-02-20 PROCEDURE — 85027 COMPLETE CBC AUTOMATED: CPT

## 2019-02-20 PROCEDURE — 63600175 PHARM REV CODE 636 W HCPCS: Performed by: PHYSICIAN ASSISTANT

## 2019-02-20 PROCEDURE — 80048 BASIC METABOLIC PNL TOTAL CA: CPT

## 2019-02-20 PROCEDURE — 36415 COLL VENOUS BLD VENIPUNCTURE: CPT

## 2019-02-20 PROCEDURE — 84484 ASSAY OF TROPONIN QUANT: CPT

## 2019-02-20 PROCEDURE — 80061 LIPID PANEL: CPT

## 2019-02-20 PROCEDURE — 99223 PR INITIAL HOSPITAL CARE,LEVL III: ICD-10-PCS | Mod: ,,, | Performed by: PHYSICIAN ASSISTANT

## 2019-02-20 PROCEDURE — 84145 PROCALCITONIN (PCT): CPT

## 2019-02-20 PROCEDURE — 82553 CREATINE MB FRACTION: CPT | Mod: 91

## 2019-02-20 PROCEDURE — 83605 ASSAY OF LACTIC ACID: CPT

## 2019-02-20 PROCEDURE — 87502 INFLUENZA DNA AMP PROBE: CPT

## 2019-02-20 PROCEDURE — S5571 INSULIN DISPOS PEN 3 ML: HCPCS | Performed by: HOSPITALIST

## 2019-02-20 PROCEDURE — 83605 ASSAY OF LACTIC ACID: CPT | Mod: 91

## 2019-02-20 PROCEDURE — 27000221 HC OXYGEN, UP TO 24 HOURS

## 2019-02-20 PROCEDURE — 85730 THROMBOPLASTIN TIME PARTIAL: CPT | Mod: 91

## 2019-02-20 PROCEDURE — 83880 ASSAY OF NATRIURETIC PEPTIDE: CPT

## 2019-02-20 PROCEDURE — 85730 THROMBOPLASTIN TIME PARTIAL: CPT

## 2019-02-20 PROCEDURE — 99900035 HC TECH TIME PER 15 MIN (STAT)

## 2019-02-20 PROCEDURE — 25000003 PHARM REV CODE 250: Performed by: STUDENT IN AN ORGANIZED HEALTH CARE EDUCATION/TRAINING PROGRAM

## 2019-02-20 PROCEDURE — 25000003 PHARM REV CODE 250: Performed by: HOSPITALIST

## 2019-02-20 PROCEDURE — 36600 WITHDRAWAL OF ARTERIAL BLOOD: CPT

## 2019-02-20 PROCEDURE — 84484 ASSAY OF TROPONIN QUANT: CPT | Mod: 91

## 2019-02-20 PROCEDURE — 63600175 PHARM REV CODE 636 W HCPCS

## 2019-02-20 PROCEDURE — 85610 PROTHROMBIN TIME: CPT

## 2019-02-20 PROCEDURE — 83930 ASSAY OF BLOOD OSMOLALITY: CPT

## 2019-02-20 PROCEDURE — S5010 5% DEXTROSE AND 0.45% SALINE: HCPCS | Performed by: HOSPITALIST

## 2019-02-20 PROCEDURE — 63600175 PHARM REV CODE 636 W HCPCS: Performed by: HOSPITALIST

## 2019-02-20 PROCEDURE — 99291 PR CRITICAL CARE, E/M 30-74 MINUTES: ICD-10-PCS | Mod: GC,,, | Performed by: INTERNAL MEDICINE

## 2019-02-20 RX ORDER — GLUCAGON 1 MG
1 KIT INJECTION
Status: DISCONTINUED | OUTPATIENT
Start: 2019-02-20 | End: 2019-02-20

## 2019-02-20 RX ORDER — INSULIN ASPART 100 [IU]/ML
1-10 INJECTION, SOLUTION INTRAVENOUS; SUBCUTANEOUS
Status: DISCONTINUED | OUTPATIENT
Start: 2019-02-20 | End: 2019-02-20

## 2019-02-20 RX ORDER — IBUPROFEN 200 MG
16 TABLET ORAL
Status: DISCONTINUED | OUTPATIENT
Start: 2019-02-20 | End: 2019-02-20

## 2019-02-20 RX ORDER — IBUPROFEN 200 MG
24 TABLET ORAL
Status: DISCONTINUED | OUTPATIENT
Start: 2019-02-20 | End: 2019-03-01 | Stop reason: HOSPADM

## 2019-02-20 RX ORDER — INSULIN ASPART 100 [IU]/ML
1-10 INJECTION, SOLUTION INTRAVENOUS; SUBCUTANEOUS
Status: DISCONTINUED | OUTPATIENT
Start: 2019-02-20 | End: 2019-03-01 | Stop reason: HOSPADM

## 2019-02-20 RX ORDER — NAPROXEN SODIUM 220 MG/1
81 TABLET, FILM COATED ORAL DAILY
Status: DISCONTINUED | OUTPATIENT
Start: 2019-02-20 | End: 2019-02-20

## 2019-02-20 RX ORDER — LORAZEPAM 0.5 MG/1
0.5 TABLET ORAL ONCE
Status: DISCONTINUED | OUTPATIENT
Start: 2019-02-20 | End: 2019-02-26

## 2019-02-20 RX ORDER — IBUPROFEN 200 MG
24 TABLET ORAL
Status: DISCONTINUED | OUTPATIENT
Start: 2019-02-20 | End: 2019-02-20

## 2019-02-20 RX ORDER — GLUCAGON 1 MG
1 KIT INJECTION
Status: DISCONTINUED | OUTPATIENT
Start: 2019-02-20 | End: 2019-03-01 | Stop reason: HOSPADM

## 2019-02-20 RX ORDER — ONDANSETRON 2 MG/ML
4 INJECTION INTRAMUSCULAR; INTRAVENOUS EVERY 6 HOURS PRN
Status: DISCONTINUED | OUTPATIENT
Start: 2019-02-20 | End: 2019-03-01 | Stop reason: HOSPADM

## 2019-02-20 RX ORDER — DIGOXIN 0.25 MG/ML
250 INJECTION INTRAMUSCULAR; INTRAVENOUS
Status: COMPLETED | OUTPATIENT
Start: 2019-02-20 | End: 2019-02-20

## 2019-02-20 RX ORDER — SODIUM CHLORIDE 9 MG/ML
INJECTION, SOLUTION INTRAVENOUS CONTINUOUS
Status: DISCONTINUED | OUTPATIENT
Start: 2019-02-20 | End: 2019-02-20

## 2019-02-20 RX ORDER — METOPROLOL TARTRATE 1 MG/ML
5 INJECTION, SOLUTION INTRAVENOUS EVERY 10 MIN PRN
Status: COMPLETED | OUTPATIENT
Start: 2019-02-20 | End: 2019-02-20

## 2019-02-20 RX ORDER — ONDANSETRON 2 MG/ML
INJECTION INTRAMUSCULAR; INTRAVENOUS
Status: COMPLETED
Start: 2019-02-20 | End: 2019-02-20

## 2019-02-20 RX ORDER — SODIUM CHLORIDE 9 MG/ML
INJECTION, SOLUTION INTRAVENOUS CONTINUOUS
Status: ACTIVE | OUTPATIENT
Start: 2019-02-21 | End: 2019-02-21

## 2019-02-20 RX ORDER — IBUPROFEN 200 MG
16 TABLET ORAL
Status: DISCONTINUED | OUTPATIENT
Start: 2019-02-20 | End: 2019-03-01 | Stop reason: HOSPADM

## 2019-02-20 RX ORDER — HEPARIN SODIUM,PORCINE/D5W 25000/250
12 INTRAVENOUS SOLUTION INTRAVENOUS CONTINUOUS
Status: DISPENSED | OUTPATIENT
Start: 2019-02-20 | End: 2019-02-22

## 2019-02-20 RX ORDER — DEXTROSE MONOHYDRATE AND SODIUM CHLORIDE 5; .45 G/100ML; G/100ML
INJECTION, SOLUTION INTRAVENOUS CONTINUOUS
Status: DISCONTINUED | OUTPATIENT
Start: 2019-02-20 | End: 2019-02-20

## 2019-02-20 RX ADMIN — INSULIN ASPART 4 UNITS: 100 INJECTION, SOLUTION INTRAVENOUS; SUBCUTANEOUS at 06:02

## 2019-02-20 RX ADMIN — AMIODARONE HYDROCHLORIDE 150 MG: 1.5 INJECTION, SOLUTION INTRAVENOUS at 03:02

## 2019-02-20 RX ADMIN — SODIUM CHLORIDE: 0.9 INJECTION, SOLUTION INTRAVENOUS at 03:02

## 2019-02-20 RX ADMIN — ASPIRIN 81 MG CHEWABLE TABLET 81 MG: 81 TABLET CHEWABLE at 04:02

## 2019-02-20 RX ADMIN — ONDANSETRON 4 MG: 4 TABLET, ORALLY DISINTEGRATING ORAL at 08:02

## 2019-02-20 RX ADMIN — INSULIN DETEMIR 5 UNITS: 100 INJECTION, SOLUTION SUBCUTANEOUS at 10:02

## 2019-02-20 RX ADMIN — DIGOXIN 250 MCG: 0.25 INJECTION INTRAMUSCULAR; INTRAVENOUS at 03:02

## 2019-02-20 RX ADMIN — METOPROLOL TARTRATE 5 MG: 5 INJECTION, SOLUTION INTRAVENOUS at 03:02

## 2019-02-20 RX ADMIN — AMIODARONE HYDROCHLORIDE 1 MG/MIN: 1.8 INJECTION, SOLUTION INTRAVENOUS at 03:02

## 2019-02-20 RX ADMIN — DIGOXIN 250 MCG: 0.25 INJECTION INTRAMUSCULAR; INTRAVENOUS at 08:02

## 2019-02-20 RX ADMIN — AMIODARONE HYDROCHLORIDE 0.5 MG/MIN: 1.8 INJECTION, SOLUTION INTRAVENOUS at 10:02

## 2019-02-20 RX ADMIN — INSULIN DETEMIR 10 UNITS: 100 INJECTION, SOLUTION SUBCUTANEOUS at 08:02

## 2019-02-20 RX ADMIN — ONDANSETRON 4 MG: 2 INJECTION INTRAMUSCULAR; INTRAVENOUS at 08:02

## 2019-02-20 RX ADMIN — METOPROLOL TARTRATE 5 MG: 5 INJECTION, SOLUTION INTRAVENOUS at 02:02

## 2019-02-20 RX ADMIN — METOPROLOL TARTRATE 5 MG: 5 INJECTION, SOLUTION INTRAVENOUS at 01:02

## 2019-02-20 RX ADMIN — DILTIAZEM HYDROCHLORIDE 15 MG/HR: 5 INJECTION INTRAVENOUS at 02:02

## 2019-02-20 RX ADMIN — DIGOXIN 250 MCG: 0.25 INJECTION INTRAMUSCULAR; INTRAVENOUS at 11:02

## 2019-02-20 RX ADMIN — HEPARIN SODIUM 12 UNITS/KG/HR: 10000 INJECTION, SOLUTION INTRAVENOUS at 06:02

## 2019-02-20 RX ADMIN — DEXTROSE AND SODIUM CHLORIDE: 5; .45 INJECTION, SOLUTION INTRAVENOUS at 08:02

## 2019-02-20 RX ADMIN — AMIODARONE HYDROCHLORIDE 1 MG/MIN: 1.8 INJECTION, SOLUTION INTRAVENOUS at 11:02

## 2019-02-20 RX ADMIN — INSULIN ASPART 2 UNITS: 100 INJECTION, SOLUTION INTRAVENOUS; SUBCUTANEOUS at 11:02

## 2019-02-20 NOTE — PLAN OF CARE
Problem: Adult Inpatient Plan of Care  Goal: Plan of Care Review  Outcome: Ongoing (interventions implemented as appropriate)  Pt remains afib throughout the shift, 30mg in total of metoprolol iv given, eicu informed that pt is already on max dose of diltiazem with short/mild relief only obtained. Diltiazem shifted to amiodarone as ordered. After the loading dose, pt is trying to stand up to use the urinal, then he suddenly passed out. He regain consciousness after about 10 seconds. We were unable to get blood pressure in a while, 500cc saline  Bolus given with good outcome. Started on heparin drip and amiodarone drip re started as ordered by Dr. Joseph. Will continue to monitor.

## 2019-02-20 NOTE — CONSULTS
Consult Note  Pulmonary Medicine    SUBJECTIVE     Reason for consult: AF/RVR    History of Present Illness  PCCM is consulted for AF/RVR in this 78-yo male admitted to ICU via ED last night. Pt had suffered four days of emesis, nausea, and loose stools at home. On arrival to ED, he was found to be very tachycardic. EKG revealed AF. He also had hyperglycemia with AGMA , lactic acidosis, and BHB 2.1. TTE revealed EF 15% and other abnormalities. Diltiazem was started, but Pt became hypotensive, so amiodarone was started. Pt remained mildly hypoxic. 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 However, Pt rarely visits doctors and had not been on meds at home except for ASA for some years.       Past Medical History:   Diagnosis Date    Asthma     childhood    Coronary artery disease     Heart attack     stent       Past Surgical History:   Procedure Laterality Date    CARDIAC CATHETERIZATION      CATARACT EXTRACTION W/  INTRAOCULAR LENS IMPLANT Right 12/18/2017    Dr. Beavers    CATARACT EXTRACTION W/  INTRAOCULAR LENS IMPLANT Left 01/08/2018    Dr. Beavers    INSERTION-INTRAOCULAR LENS (IOL) Left 1/8/2018    [facility]formed [facility]    INSERTION-INTRAOCULAR LENS (IOL) Right 12/18/2017    [facility]    PHACOEMULSIFICATION-ASPIRATION-CATARACT Left 1/8/2018    [facility]    PHACOEMULSIFICATION-ASPIRATION-CATARACT Right 12/18/2017    [facility]    TONSILLECTOMY         Family History   Problem Relation Age of Onset    Cataracts Father     Amblyopia Neg Hx     Blindness Neg Hx     Glaucoma Neg Hx     Macular degeneration Neg Hx     Retinal detachment Neg Hx     Strabismus Neg Hx        Social History     Socioeconomic History    Marital status:      Spouse name: None    Number of children: None    Years of education: None    Highest education  level: None   Social Needs    Financial resource strain: None    Food insecurity - worry: None    Food insecurity - inability: None    Transportation needs - medical: None    Transportation needs - non-medical: None   Occupational History    None   Tobacco Use    Smoking status: Never Smoker    Smokeless tobacco: Never Used   Substance and Sexual Activity    Alcohol use: No    Drug use: No    Sexual activity: None   Other Topics Concern    None   Social History Narrative    None       Current Facility-Administered Medications   Medication Dose Route Frequency Provider Last Rate Last Dose    [START ON 2/21/2019] 0.9%  NaCl infusion   Intravenous Continuous Panchito Joseph MD        acetaminophen tablet 650 mg  650 mg Oral Q4H PRN Carmen Vu PA-C        amiodarone 360 mg/200 mL (1.8 mg/mL) infusion  0.5 mg/min Intravenous Continuous Carmen Vu PA-C        dextrose 50% injection 12.5 g  12.5 g Intravenous PRN Basil So MD        dextrose 50% injection 25 g  25 g Intravenous PRN Basil So MD        digoxin injection 250 mcg  250 mcg Intravenous Q3H Panchito Joseph MD   250 mcg at 02/20/19 0853    glucagon (human recombinant) injection 1 mg  1 mg Intramuscular PRN Basil So MD        glucose chewable tablet 16 g  16 g Oral PRN Basil So MD        glucose chewable tablet 24 g  24 g Oral PRN Basil So MD        heparin 25,000 units in dextrose 5% (100 units/ml) IV bolus from bag - ADDITIONAL PRN BOLUS - 30 units/kg (max bolus 4000 units)  30 Units/kg (Adjusted) Intravenous PRN Avril Hwang MD        heparin 25,000 units in dextrose 5% (100 units/ml) IV bolus from bag - ADDITIONAL PRN BOLUS - 60 units/kg (max bolus 4000 units)  49.8 Units/kg (Adjusted) Intravenous PRN Avril Hwang MD        heparin 25,000 units in dextrose 5% 250 mL (100 units/mL) infusion LOW INTENSITY nomogram - [facility]  12 Units/kg/hr (Adjusted) Intravenous Continuous Avril  MD Eri 9.6 mL/hr at 02/20/19 0609 12 Units/kg/hr at 02/20/19 0609    insulin aspart U-100 pen 1-10 Units  1-10 Units Subcutaneous QID (AC + HS) PRN Basil So MD        nitroGLYCERIN SL tablet 0.4 mg  0.4 mg Sublingual Q5 Min PRN Avril Hwang MD        ondansetron disintegrating tablet 4 mg  4 mg Oral Q6H PRN Carmen Vu PA-C   4 mg at 02/20/19 0826    ondansetron injection 4 mg  4 mg Intravenous Q6H PRN Panchito Joseph MD   4 mg at 02/20/19 0855    sodium chloride 0.9% flush 5 mL  5 mL Intravenous PRN Carmen Vu PA-C           Review of patient's allergies indicates:  No Known Allergies      Review of Systems  As above. Additionally:   Constitutional: Negative for chills, diaphoresis and fever.   Respiratory: Positive for chest tightness. Negative for cough, shortness of breath and wheezing.    Cardiovascular: Negative for chest pain and palpitations.   Gastrointestinal: Positive for diarrhea, vomiting and nausea. Negative for abdominal pain, blood in stool.   Genitourinary: Positive for decreased urine volume. Negative for difficulty urinating, dysuria, frequency, hematuria and urgency.   Skin: Negative for rash and wound.   Neurological: Negative for dizziness, syncope, weakness, numbness and headaches.   Hematological: Does not bruise/bleed easily.   Psychiatric/Behavioral: Negative for confusion and decreased concentration.         OBJECTIVE     Vitals:    02/20/19 0830   BP: 100/67   Pulse: (!) 126   Resp: (!) 22   Temp:        Physical Exam  Constitutional: Patient is oriented to person, place, and time. Patient appears well-developed and well-nourished. No distress.   HENT: Head: Normocephalic and atraumatic. Right Ear: External ear normal. Left Ear: External ear normal. Nose: Nose normal.   Mouth/Throat: Oropharynx is clear and moist. No oropharyngeal exudate.   Eyes: Conjunctivae and EOM are normal. Right eye exhibits no discharge. Left eye exhibits no discharge. No  scleral icterus.   Neck: Normal range of motion. Neck supple. No JVD present.   Cardiovascular: Tachycardic, irregularly irregular rhythm, normal heart sounds and intact distal pulses.  Exam reveals no gallop and no friction rub. No murmur heard.  Pulmonary/Chest: No distress. Effort normal and breath sounds normal. Patient has no wheezes. Patient has mild bibasilar rales.   Abdominal: Soft. Bowel sounds are normal. Patient exhibits no distension and no mass. There is no tenderness.   Musculoskeletal: Normal range of motion. Patient exhibits no edema or tenderness.   Neurological: Patient is alert and oriented to person, place, and time.   Skin: Skin is warm and dry. No rash noted. Patient is not diaphoretic. No erythema. No pallor.   Psychiatric: Patient has a normal mood and affect. Behavior is normal. Judgment and thought content normal.       Laboratory  Recent Labs   Lab 02/20/19  0557   INR 1.2       Recent Labs   Lab 02/19/19 1817 02/20/19  0332   WBC 11.45 9.61   HGB 15.5 14.1   HCT 45.8 41.7   * 107*       Recent Labs   Lab 02/19/19 1817 02/20/19  0044 02/20/19  0332 02/20/19  0703   *   < > 133* 134* 136   K 4.8   < > 4.5 4.3 4.5   CL 93*   < > 104 105 106   CO2 20*   < > 14* 15* 19*   BUN 61*   < > 62* 61* 64*   CREATININE 2.3*   < > 1.9* 1.8* 1.9*   CALCIUM 9.1   < > 7.9* 7.9* 7.8*   PROT 8.4  --   --   --   --    BILITOT 1.4*  --   --   --   --    ALKPHOS 78  --   --   --   --    *  --   --   --   --    AST 90*  --   --   --   --     < > = values in this interval not displayed.     Recent Labs   Lab 02/20/19  0438   PH 7.280*   PCO2 34.4*   PO2 60*   HCO3 16.1*   POCSATURATED 88*   BE -11       Diagnostic Results  CXR shows bibasilar edema vs infiltrates.         Assessment / Recommendations     * Atrial fibrillation with RVR     - likely a reaction to systemic illness, likely gastroenteritis, with other end-organ damage  - TTE grossly abnormal, as above  - improved only  marginally on diltiazem gtt and amiodarone gtt  - anticipate improvement with treatment of underlying illness  - hypotension resolved by lunchtime today  - Cardiology considering DILLAN/DCCV tomorrow   - once improved, consider appropriate meds bundle including BB, ACE-I, ASA, statin      DKA     - also a reaction to systemic illness  - mild case; acidosis and other abnormalities resolved with fluid resuscitation and insulin  - Pt transitioned from gtt to long-acting insulin and diet today  - BMP this afternoon to monitor       JAMES (acute kidney injury)     - suspect this is 2/2 significant dehydration  - continue IV fluid infusion  - JAMES urine studies sent       Pt seen and examined and plan discussed with Dr. Mullen, staff. Thank you for this consult. Pulmonary will continue to follow.     Torito English MD  Fellow, Pulmonary & Critical Care Medicine

## 2019-02-20 NOTE — PLAN OF CARE
LMSW attempted to met with the patient at the bedside. [facility] medical staff at the bedside.

## 2019-02-20 NOTE — CONSULTS
[facility]  Cardiology  Consult Note    Patient Name: Randy Camejo  MRN: [id number]  Admission Date: 2/19/2019  Hospital Length of Stay: 1 days  Code Status: Full Code   Attending Provider: Basil So MD   Consulting Provider: Panchito Joseph MD  Primary Care Physician: Primary Doctor No  Principal Problem:Atrial fibrillation with RVR    Patient information was obtained from patient, past medical records and ER records.     Inpatient consult to Cardiology  Consult performed by: Panchito Joseph MD  Consult ordered by: Carmen Vu PA-C  Reason for consult: Atrial fibrillation        Subjective:     Chief Complaint:  Weakness.     HPI:    Randy Camejo is a 78 y.o.male with hypertension and diabetes mellitus type 2. He suffered a myocardial infarction in 2000 when he presented to [facility] and had a stent placed. He was treated for DM2 for a few years with metformin but then stopped it. He has not had any regular medical follow up for several years.    He began feeling weak and have diarrhea on 2/15/2019. He diarrhea continued and he became weaker over the next several weak. He had nausea and vomited. On 2/19/2019 he went to  and was referred to the ER. He was noted to be in atrial fibrillation and admitted.      Past Medical History:   Diagnosis Date    Asthma     childhood    Coronary artery disease     Heart attack     stent       Past Surgical History:   Procedure Laterality Date    CARDIAC CATHETERIZATION      CATARACT EXTRACTION W/  INTRAOCULAR LENS IMPLANT Right 12/18/2017    Dr. Beavers    CATARACT EXTRACTION W/  INTRAOCULAR LENS IMPLANT Left 01/08/2018    Dr. Beavers    INSERTION-INTRAOCULAR LENS (IOL) Left 1/8/2018    Performed by [facility] at [facility] OR    INSERTION-INTRAOCULAR LENS (IOL) Right 12/18/2017    [facility]    PHACOEMULSIFICATION-ASPIRATION-CATARACT Left 1/8/2018    [facility]     PHACOEMULSIFICATION-ASPIRATION-CATARACT Right 12/18/2017    [facility] [place]    TONSILLECTOMY         Review of patient's allergies indicates:  No Known Allergies    No current facility-administered medications on file prior to encounter.      Current Outpatient Medications on File Prior to Encounter   Medication Sig    aspirin 81 MG Chew Take 81 mg by mouth once daily.    mupirocin (BACTROBAN) 2 % ointment Apply topically 2 (two) times daily.     Family History     Problem Relation (Age of Onset)    Cataracts Father        Tobacco Use    Smoking status: Never Smoker    Smokeless tobacco: Never Used   Substance and Sexual Activity    Alcohol use: No    Drug use: No    Sexual activity: Not on file     Review of Systems   Constitution: Positive for weakness and malaise/fatigue. Negative for chills and fever.   HENT: Negative for nosebleeds.    Eyes: Negative for double vision, vision loss in left eye and vision loss in right eye.   Cardiovascular: Negative for chest pain, claudication, dyspnea on exertion, irregular heartbeat, leg swelling, near-syncope, orthopnea, palpitations, paroxysmal nocturnal dyspnea and syncope.   Respiratory: Negative for cough, hemoptysis, shortness of breath and wheezing.    Endocrine: Negative for cold intolerance and heat intolerance.   Hematologic/Lymphatic: Negative for bleeding problem. Does not bruise/bleed easily.   Skin: Negative for color change and rash.   Musculoskeletal: Negative for back pain, falls, muscle weakness and myalgias.   Gastrointestinal: Positive for diarrhea, nausea and vomiting. Negative for heartburn, hematemesis, hematochezia, hemorrhoids, jaundice and melena.   Genitourinary: Negative for dysuria and hematuria.   Neurological: Negative for dizziness, focal weakness, headaches, light-headedness, loss of balance, numbness and vertigo.   Psychiatric/Behavioral: Negative for altered mental status, depression and memory loss. The  patient is not nervous/anxious.    Allergic/Immunologic: Negative for hives and persistent infections.     Objective:     Vital Signs (Most Recent):  Temp: 97.9 °F (36.6 °C) (02/20/19 0305)  Pulse: (!) 132 (02/20/19 0717)  Resp: (!) 23 (02/20/19 0717)  BP: (!) 75/53 (02/20/19 0717)  SpO2: (!) 88 % (02/20/19 0717) Vital Signs (24h Range):  Temp:  [97.3 °F (36.3 °C)-97.9 °F (36.6 °C)] 97.9 °F (36.6 °C)  Pulse:  [] 132  Resp:  [15-25] 23  SpO2:  [88 %-97 %] 88 %  BP: ()/(53-90) 75/53     Weight: 87.8 kg (193 lb 9 oz)  Body mass index is 27 kg/m².    SpO2: (!) 88 %  O2 Device (Oxygen Therapy): (S) nasal cannula w/ humidification      Intake/Output Summary (Last 24 hours) at 2/20/2019 0821  Last data filed at 2/20/2019 0643  Gross per 24 hour   Intake 2589.03 ml   Output 300 ml   Net 2289.03 ml       Lines/Drains/Airways     Peripheral Intravenous Line                 Peripheral IV - Single Lumen 02/19/19 1818 Left Antecubital less than 1 day         Peripheral IV - Single Lumen 02/19/19 1818 Right Antecubital less than 1 day                Physical Exam   Constitutional: He is oriented to person, place, and time. He appears well-developed and well-nourished. He appears ill. No distress.   HENT:   Head: Normocephalic and atraumatic.   Nose: Nose normal.   Eyes: Right eye exhibits no discharge. Left eye exhibits no discharge. Right conjunctiva is not injected. Left conjunctiva is not injected. Right pupil is round. Left pupil is round. Pupils are equal.   Neck: Neck supple. No JVD present. Carotid bruit is not present. No thyromegaly present.   Cardiovascular: S1 normal and S2 normal. An irregularly irregular rhythm present.  No extrasystoles are present. Tachycardia present. PMI is displaced. Exam reveals gallop and S3.   Pulses:       Radial pulses are 2+ on the right side, and 2+ on the left side.        Femoral pulses are 2+ on the right side, and 2+ on the left side.       Dorsalis pedis pulses are 1+ on  the right side, and 1+ on the left side.        Posterior tibial pulses are 1+ on the right side, and 1+ on the left side.   Pulmonary/Chest: Effort normal. He has rales in the right lower field and the left lower field.   Abdominal: Soft. Normal appearance. There is no hepatosplenomegaly. There is no tenderness.   Musculoskeletal:        Right ankle: He exhibits no swelling, no ecchymosis and no deformity.        Left ankle: He exhibits no swelling, no ecchymosis and no deformity.   Lymphadenopathy:        Head (right side): No submandibular adenopathy present.        Head (left side): No submandibular adenopathy present.     He has no cervical adenopathy.   Neurological: He is alert and oriented to person, place, and time. He is not disoriented. No cranial nerve deficit.   Skin: Skin is warm, dry and intact. No rash noted. He is not diaphoretic. Nails show no clubbing.   Psychiatric: He has a normal mood and affect. His speech is normal and behavior is normal. Judgment and thought content normal. Cognition and memory are normal.     Current Medications:     aspirin  81 mg Oral Daily     Current Laboratory Results:    Recent Results (from the past 24 hour(s))   CBC auto differential    Collection Time: 02/19/19  6:17 PM   Result Value Ref Range    WBC 11.45 3.90 - 12.70 K/uL    RBC 4.77 4.60 - 6.20 M/uL    Hemoglobin 15.5 14.0 - 18.0 g/dL    Hematocrit 45.8 40.0 - 54.0 %    MCV 96 82 - 98 fL    MCH 32.5 (H) 27.0 - 31.0 pg    MCHC 33.8 32.0 - 36.0 g/dL    RDW 12.7 11.5 - 14.5 %    Platelets 132 (L) 150 - 350 K/uL    MPV 13.6 (H) 9.2 - 12.9 fL    Gran # (ANC) 9.5 (H) 1.8 - 7.7 K/uL    Lymph # 0.9 (L) 1.0 - 4.8 K/uL    Mono # 1.0 0.3 - 1.0 K/uL    Eos # 0.0 0.0 - 0.5 K/uL    Baso # 0.01 0.00 - 0.20 K/uL    Gran% 83.1 (H) 38.0 - 73.0 %    Lymph% 8.2 (L) 18.0 - 48.0 %    Mono% 8.3 4.0 - 15.0 %    Eosinophil% 0.0 0.0 - 8.0 %    Basophil% 0.1 0.0 - 1.9 %    Differential Method Automated    Comprehensive metabolic panel     Collection Time: 02/19/19  6:17 PM   Result Value Ref Range    Sodium 130 (L) 136 - 145 mmol/L    Potassium 4.8 3.5 - 5.1 mmol/L    Chloride 93 (L) 95 - 110 mmol/L    CO2 20 (L) 23 - 29 mmol/L    Glucose 595 (HH) 70 - 110 mg/dL    BUN, Bld 61 (H) 8 - 23 mg/dL    Creatinine 2.3 (H) 0.5 - 1.4 mg/dL    Calcium 9.1 8.7 - 10.5 mg/dL    Total Protein 8.4 6.0 - 8.4 g/dL    Albumin 3.7 3.5 - 5.2 g/dL    Total Bilirubin 1.4 (H) 0.1 - 1.0 mg/dL    Alkaline Phosphatase 78 55 - 135 U/L    AST 90 (H) 10 - 40 U/L     (H) 10 - 44 U/L    Anion Gap 17 (H) 8 - 16 mmol/L    eGFR if African American 31 (A) >60 mL/min/1.73 m^2    eGFR if non African American 26 (A) >60 mL/min/1.73 m^2   Lipase    Collection Time: 02/19/19  6:17 PM   Result Value Ref Range    Lipase 16 4 - 60 U/L   ISTAT PROCEDURE    Collection Time: 02/19/19  7:34 PM   Result Value Ref Range    POC PH 7.230 (L) 7.35 - 7.45    POC PCO2 50.3 (H) 35 - 45 mmHg    POC PO2 25 (LL) 40 - 60 mmHg    POC HCO3 21.1 (L) 24 - 28 mmol/L    POC BE -6 -2 to 2 mmol/L    POC SATURATED O2 36 (L) 95 - 100 %    Rate 20     Sample VENOUS     Site Other     Allens Test N/A     DelSys Room Air     Mode SPONT     Flow 0     FiO2 21     Sp02 90    Beta - Hydroxybutyrate, Serum    Collection Time: 02/19/19  7:43 PM   Result Value Ref Range    Beta-Hydroxybutyrate 2.1 (H) 0.0 - 0.5 mmol/L   POCT glucose    Collection Time: 02/19/19  9:10 PM   Result Value Ref Range    POCT Glucose 488 (HH) 70 - 110 mg/dL   Basic metabolic panel    Collection Time: 02/19/19  9:12 PM   Result Value Ref Range    Sodium 133 (L) 136 - 145 mmol/L    Potassium 4.7 3.5 - 5.1 mmol/L    Chloride 102 95 - 110 mmol/L    CO2 15 (L) 23 - 29 mmol/L    Glucose 486 (HH) 70 - 110 mg/dL    BUN, Bld 60 (H) 8 - 23 mg/dL    Creatinine 1.9 (H) 0.5 - 1.4 mg/dL    Calcium 8.1 (L) 8.7 - 10.5 mg/dL    Anion Gap 16 8 - 16 mmol/L    eGFR if African American 38 (A) >60 mL/min/1.73 m^2    eGFR if non  33 (A) >60  mL/min/1.73 m^2   Magnesium    Collection Time: 02/19/19  9:12 PM   Result Value Ref Range    Magnesium 2.6 1.6 - 2.6 mg/dL   Phosphorus    Collection Time: 02/19/19  9:12 PM   Result Value Ref Range    Phosphorus 4.2 2.7 - 4.5 mg/dL   Hemoglobin A1c    Collection Time: 02/19/19  9:12 PM   Result Value Ref Range    Hemoglobin A1C 12.6 (H) 4.0 - 5.6 %    Estimated Avg Glucose 315 (H) 68 - 131 mg/dL   Protein / creatinine ratio, urine    Collection Time: 02/19/19 10:35 PM   Result Value Ref Range    Protein, Urine Random 18 (H) 0 - 15 mg/dL    Creatinine, Random Ur 77.0 23.0 - 375.0 mg/dL    Prot/Creat Ratio, Ur 0.23 (H) 0.00 - 0.20   Sodium, urine, random    Collection Time: 02/19/19 10:35 PM   Result Value Ref Range    Sodium Urine Random <20 20 - 250 mmol/L   Urinalysis - Clean Catch    Collection Time: 02/19/19 10:41 PM   Result Value Ref Range    Specimen UA Urine, Clean Catch     Color, UA Yellow Yellow, Straw, Magy    Appearance, UA Clear Clear    pH, UA 5.0 5.0 - 8.0    Specific Gravity, UA 1.020 1.005 - 1.030    Protein, UA Negative Negative    Glucose, UA 3+ (A) Negative    Ketones, UA Negative Negative    Bilirubin (UA) Negative Negative    Occult Blood UA Negative Negative    Nitrite, UA Negative Negative    Urobilinogen, UA Negative <2.0 EU/dL    Leukocytes, UA Negative Negative   Urinalysis Microscopic    Collection Time: 02/19/19 10:41 PM   Result Value Ref Range    WBC, UA 1 0 - 5 /hpf    Bacteria, UA Few (A) None-Occ /hpf    Yeast, UA None None    Hyaline Casts, UA 10 (A) 0-1/lpf /lpf    Microscopic Comment SEE COMMENT    POCT glucose    Collection Time: 02/19/19 10:53 PM   Result Value Ref Range    POCT Glucose 393 (H) 70 - 110 mg/dL   POCT glucose    Collection Time: 02/19/19 11:46 PM   Result Value Ref Range    POCT Glucose 356 (H) 70 - 110 mg/dL   POCT glucose    Collection Time: 02/20/19 12:42 AM   Result Value Ref Range    POCT Glucose 365 (H) 70 - 110 mg/dL   Basic metabolic panel     Collection Time: 02/20/19 12:44 AM   Result Value Ref Range    Sodium 133 (L) 136 - 145 mmol/L    Potassium 4.5 3.5 - 5.1 mmol/L    Chloride 104 95 - 110 mmol/L    CO2 14 (L) 23 - 29 mmol/L    Glucose 349 (H) 70 - 110 mg/dL    BUN, Bld 62 (H) 8 - 23 mg/dL    Creatinine 1.9 (H) 0.5 - 1.4 mg/dL    Calcium 7.9 (L) 8.7 - 10.5 mg/dL    Anion Gap 15 8 - 16 mmol/L    eGFR if African American 38 (A) >60 mL/min/1.73 m^2    eGFR if non African American 33 (A) >60 mL/min/1.73 m^2   POCT glucose    Collection Time: 02/20/19  1:56 AM   Result Value Ref Range    POCT Glucose 306 (H) 70 - 110 mg/dL   POCT glucose    Collection Time: 02/20/19  2:48 AM   Result Value Ref Range    POCT Glucose 250 (H) 70 - 110 mg/dL   POCT glucose    Collection Time: 02/20/19  3:31 AM   Result Value Ref Range    POCT Glucose 228 (H) 70 - 110 mg/dL   CBC with Automated Differential    Collection Time: 02/20/19  3:32 AM   Result Value Ref Range    WBC 9.61 3.90 - 12.70 K/uL    RBC 4.35 (L) 4.60 - 6.20 M/uL    Hemoglobin 14.1 14.0 - 18.0 g/dL    Hematocrit 41.7 40.0 - 54.0 %    MCV 96 82 - 98 fL    MCH 32.4 (H) 27.0 - 31.0 pg    MCHC 33.8 32.0 - 36.0 g/dL    RDW 12.8 11.5 - 14.5 %    Platelets 107 (L) 150 - 350 K/uL    MPV 13.2 (H) 9.2 - 12.9 fL    Gran # (ANC) 7.6 1.8 - 7.7 K/uL    Lymph # 1.1 1.0 - 4.8 K/uL    Mono # 0.8 0.3 - 1.0 K/uL    Eos # 0.0 0.0 - 0.5 K/uL    Baso # 0.01 0.00 - 0.20 K/uL    Gran% 79.6 (H) 38.0 - 73.0 %    Lymph% 11.3 (L) 18.0 - 48.0 %    Mono% 8.5 4.0 - 15.0 %    Eosinophil% 0.2 0.0 - 8.0 %    Basophil% 0.1 0.0 - 1.9 %    Differential Method Automated    Basic metabolic panel    Collection Time: 02/20/19  3:32 AM   Result Value Ref Range    Sodium 134 (L) 136 - 145 mmol/L    Potassium 4.3 3.5 - 5.1 mmol/L    Chloride 105 95 - 110 mmol/L    CO2 15 (L) 23 - 29 mmol/L    Glucose 226 (H) 70 - 110 mg/dL    BUN, Bld 61 (H) 8 - 23 mg/dL    Creatinine 1.8 (H) 0.5 - 1.4 mg/dL    Calcium 7.9 (L) 8.7 - 10.5 mg/dL    Anion Gap 14 8 - 16  mmol/L    eGFR if African American 41 (A) >60 mL/min/1.73 m^2    eGFR if non African American 36 (A) >60 mL/min/1.73 m^2   Troponin I    Collection Time: 02/20/19  3:32 AM   Result Value Ref Range    Troponin I 0.083 (H) 0.000 - 0.026 ng/mL   Brain natriuretic peptide    Collection Time: 02/20/19  3:32 AM   Result Value Ref Range    BNP 1,072 (H) 0 - 99 pg/mL   POCT glucose    Collection Time: 02/20/19  4:08 AM   Result Value Ref Range    POCT Glucose 251 (H) 70 - 110 mg/dL   ISTAT PROCEDURE    Collection Time: 02/20/19  4:38 AM   Result Value Ref Range    POC PH 7.280 (LL) 7.35 - 7.45    POC PCO2 34.4 (L) 35 - 45 mmHg    POC PO2 60 (L) 80 - 100 mmHg    POC HCO3 16.1 (L) 24 - 28 mmol/L    POC BE -11 -2 to 2 mmol/L    POC SATURATED O2 88 (L) 95 - 100 %    Sample ARTERIAL     Site LR     Allens Test Pass     DelSys Nasal Can     Mode SPONT     Flow 5     FiO2 40    POCT glucose    Collection Time: 02/20/19  4:53 AM   Result Value Ref Range    POCT Glucose 249 (H) 70 - 110 mg/dL   POCT glucose    Collection Time: 02/20/19  5:52 AM   Result Value Ref Range    POCT Glucose 213 (H) 70 - 110 mg/dL   APTT    Collection Time: 02/20/19  5:57 AM   Result Value Ref Range    aPTT 30.3 21.0 - 32.0 sec   Protime-INR    Collection Time: 02/20/19  5:57 AM   Result Value Ref Range    Prothrombin Time 13.7 (H) 9.0 - 12.5 sec    INR 1.2 0.8 - 1.2   Basic metabolic panel    Collection Time: 02/20/19  7:03 AM   Result Value Ref Range    Sodium 136 136 - 145 mmol/L    Potassium 4.5 3.5 - 5.1 mmol/L    Chloride 106 95 - 110 mmol/L    CO2 19 (L) 23 - 29 mmol/L    Glucose 149 (H) 70 - 110 mg/dL    BUN, Bld 64 (H) 8 - 23 mg/dL    Creatinine 1.9 (H) 0.5 - 1.4 mg/dL    Calcium 7.8 (L) 8.7 - 10.5 mg/dL    Anion Gap 11 8 - 16 mmol/L    eGFR if African American 38 (A) >60 mL/min/1.73 m^2    eGFR if non African American 33 (A) >60 mL/min/1.73 m^2   Transthoracic echo (TTE) complete (Cupid Only)    Collection Time: 02/20/19  7:05 AM   Result Value  Ref Range    BSA 2.1 m2    TDI SEPTAL 0.03     LV LATERAL E/E' RATIO 32.67     LV SEPTAL E/E' RATIO 32.67     LA WIDTH 4.75 cm    AORTIC VALVE CUSP SEPERATION 1.99 cm    TDI LATERAL 0.03     PV PEAK VELOCITY 0.54 cm/s    LVIDD 6.00 3.5 - 6.0 cm    IVS 0.72 0.6 - 1.1 cm    PW 0.73 0.6 - 1.1 cm    Ao root annulus 3.62 cm    LVIDS 5.33 (A) 2.1 - 4.0 cm    FS 11 28 - 44 %    LA volume 95.08 cm3    Sinus 3.71 cm    STJ 3.63 cm    Ascending aorta 3.47 cm    LV mass 164.91 g    LA size 4.24 cm    RVDD 3.90 cm    TAPSE 1.05 cm    RV S' 10.25 m/s    Left Ventricle Relative Wall Thickness 0.24 cm    AV mean gradient 0.79 mmHg    AV valve area 2.18 cm2    AV Velocity Ratio 0.86     AV index (prosthetic) 0.71     Mean e' 0.03     E wave decelartion time 159.93 msec    IVRT 0.08 msec    LVOT diameter 1.98 cm    LVOT area 3.08 cm2    LVOT peak raza [id number] m/s    LVOT peak VTI 6.08 cm    Ao peak raza 0.59 m/s    Ao VTI 8.57 cm    LVOT stroke volume 18.71 cm3    AV peak gradient 1.39 mmHg    E/E' ratio 32.67     MV Peak E Raza 0.98 m/s    TR Max Raza 2.40 m/s    LV Systolic Volume 136.97 mL    LV Systolic Volume Index 65.9 mL/m2    LV Diastolic Volume 180.26 mL    LV Diastolic Volume Index 86.67 mL/m2    LA Volume Index 45.7 mL/m2    LV Mass Index 79.3 g/m2    RA Major Axis 4.86 cm    Left Atrium Minor Axis 6.00 cm    Left Atrium Major Axis 5.17 cm    Triscuspid Valve Regurgitation Peak Gradient 23.04 mmHg    RA Width 3.91 cm    Right Atrial Pressure (from IVC) 15 mmHg    TV rest pulmonary artery pressure 38 mmHg   POCT glucose    Collection Time: 02/20/19  7:19 AM   Result Value Ref Range    POCT Glucose 146 (H) 70 - 110 mg/dL     2/20/2019: Echo:    Mild left ventricular enlargement.  Moderate left atrial enlargement.  Severely decreased left ventricular systolic function. The estimated ejection fraction is 15%  Severe global hypokinetic wall motion.  Atrial fibrillation observed.  Mild right ventricular  enlargement.  Moderately reduced right ventricular systolic function.  Moderate right atrial enlargement.  Moderate mitral regurgitation.  Mild tricuspid regurgitation.  Elevated central venous pressure (15 mm Hg).  The estimated PA systolic pressure is 38 mm Hg      Current Imaging Results:    X-Ray Chest AP Portable   Final Result      Bibasilar airspace opacities and small pleural effusions, possibly pulmonary edema versus pneumonia or aspiration.  Recommend correlation with clinical findings and follow-up to ensure improvement/resolution.         Electronically signed by: Remi Adam MD   Date:    02/20/2019   Time:    04:34      X-Ray Chest AP Portable    (Results Pending)       Assessment and Plan:     Active Diagnoses:    Diagnosis Date Noted POA    PRINCIPAL PROBLEM:  Atrial fibrillation with RVR [I48.91] 02/19/2019 Yes    Type 2 diabetes mellitus with hyperglycemia, without long-term current use of insulin [E11.65] 02/19/2019 Yes    JAMES (acute kidney injury) [N17.9] 02/19/2019 Unknown      Problems Resolved During this Admission:     1. Atrial Fibrillation   Persistent atrial fibrillation with fast VRR.   Uncertain duration.   [id number] (CHA2DV).   Rate control with amiodarone and digoxin.   2/21/2019: Plan DILLAN guided CV.    2. Chronic Anticoagulation   [id number]=5 (CHA2DV).   On heparin iv.   Lifelong anticoagulation.    3. Heart Failure, Systolic, Acute on Chronic   2/20/2019: Echo: Mildly dilated left ventricle with severe left ventricular dysfunction. EF 15%. Moderately dilated LA. Moderate RV dysfunction.   Maybe at least partially tachycardia induced.   Betablockade, ACEI and spironolactone long term.   Consider cath at later date.    4. Coronary Artery Disease   2000: Concecpion: MI and stent.   Clinically stable.    5. Hypertension   Appears to have been mild.    6. Diabetes Mellitus, Type 2   2015: Diagnosed. Complications: CAD. Medications: Diet.   2/19/2019: DKA.    7. Primary Care   In  transition.    The planned procedure was discussed in detail with the patient and the family members present. Risk, benefit and alternatives were reviewed. All questions answered. Consent was then signed.    If further questions or concerns arise the patient was encouraged to contact me prior to the planned procedure.       VTE Risk Mitigation (From admission, onward)        Ordered     heparin 25,000 units in dextrose 5% 250 mL (100 units/mL) infusion LOW INTENSITY nomogram - [facility]  Continuous      02/20/19 0514     heparin 25,000 units in dextrose 5% (100 units/ml) IV bolus from bag - ADDITIONAL PRN BOLUS - 60 units/kg (max bolus 4000 units)  As needed (PRN)      02/20/19 0514     heparin 25,000 units in dextrose 5% (100 units/ml) IV bolus from bag - ADDITIONAL PRN BOLUS - 30 units/kg (max bolus 4000 units)  As needed (PRN)      02/20/19 0514     IP VTE HIGH RISK PATIENT  Once      02/20/19 0135     Place sequential compression device  Until discontinued      02/20/19 0135     Place sequential compression device  Until discontinued      02/19/19 2054          Thank you for your consult.    I will follow-up with patient. Please contact us if you have any additional questions.    Panchito Joseph MD  Cardiology   [facility]

## 2019-02-20 NOTE — PLAN OF CARE
Problem: Adult Inpatient Plan of Care  Goal: Plan of Care Review  Outcome: Ongoing (interventions implemented as appropriate)  Patient up and alert on 50% Venti mask saturations 94%,changed to NC 4L will monitor.

## 2019-02-20 NOTE — PLAN OF CARE
Problem: Adult Inpatient Plan of Care  Goal: Plan of Care Review  Outcome: Ongoing (interventions implemented as appropriate)  Switched patient to 50% Ventimask due to low SpO2. Pt SpO2 91% on 12 L and 50%. Abg done, results given to EICU. Will continue to monitor.

## 2019-02-20 NOTE — SUBJECTIVE & OBJECTIVE
Past Medical History:   Diagnosis Date    Asthma     childhood    Coronary artery disease     Heart attack     stent       Past Surgical History:   Procedure Laterality Date    CARDIAC CATHETERIZATION      CATARACT EXTRACTION W/  INTRAOCULAR LENS IMPLANT Right 12/18/2017    Dr. Beavers    CATARACT EXTRACTION W/  INTRAOCULAR LENS IMPLANT Left 01/08/2018    Dr. Beavers    INSERTION-INTRAOCULAR LENS (IOL) Left 1/8/2018    [facility] OR    INSERTION-INTRAOCULAR LENS (IOL) Right 12/18/2017    [facility]    PHACOEMULSIFICATION-ASPIRATION-CATARACT Left 1/8/2018    [facility]    PHACOEMULSIFICATION-ASPIRATION-CATARACT Right 12/18/2017    [facility]    TONSILLECTOMY         Review of patient's allergies indicates:  No Known Allergies    No current facility-administered medications on file prior to encounter.      Current Outpatient Medications on File Prior to Encounter   Medication Sig    aspirin 81 MG Chew Take 81 mg by mouth once daily.    mupirocin (BACTROBAN) 2 % ointment Apply topically 2 (two) times daily.     Family History     Problem Relation (Age of Onset)    Cataracts Father        Tobacco Use    Smoking status: Never Smoker    Smokeless tobacco: Never Used   Substance and Sexual Activity    Alcohol use: No    Drug use: No    Sexual activity: Not on file     Review of Systems   Constitutional: Negative for chills, diaphoresis and fever.   Respiratory: Positive for chest tightness. Negative for cough, shortness of breath and wheezing.    Cardiovascular: Negative for chest pain and palpitations.   Gastrointestinal: Positive for diarrhea and nausea. Negative for abdominal pain, blood in stool and vomiting.   Genitourinary: Positive for decreased urine volume. Negative for difficulty urinating, dysuria, frequency, hematuria and urgency.   Skin: Negative for rash and wound.    Neurological: Negative for dizziness, syncope, weakness, numbness and headaches.   Hematological: Does not bruise/bleed easily.   Psychiatric/Behavioral: Negative for confusion and decreased concentration.     Objective:     Vital Signs (Most Recent):  Temp: 97.9 °F (36.6 °C) (02/20/19 0305)  Pulse: (!) 132 (02/20/19 0305)  Resp: 20 (02/20/19 0305)  BP: (!) 101/55 (02/20/19 0305)  SpO2: 96 % (02/20/19 0305) Vital Signs (24h Range):  Temp:  [97.3 °F (36.3 °C)-97.9 °F (36.6 °C)] 97.9 °F (36.6 °C)  Pulse:  [] 132  Resp:  [15-22] 20  SpO2:  [92 %-97 %] 96 %  BP: ()/(55-90) 101/55     Weight: 87.8 kg (193 lb 9 oz)  Body mass index is 27 kg/m².    Physical Exam   Constitutional: He is oriented to person, place, and time. Vital signs are normal. He appears well-developed and well-nourished.  Non-toxic appearance. He does not have a sickly appearance. He does not appear ill. No distress.   HENT:   Head: Normocephalic and atraumatic.   Right Ear: External ear normal.   Left Ear: External ear normal.   Eyes: Conjunctivae and EOM are normal. Pupils are equal, round, and reactive to light. No scleral icterus.   Neck: Normal range of motion. Neck supple. No JVD present. No tracheal deviation present.   Cardiovascular: Normal rate, regular rhythm, normal heart sounds and intact distal pulses. Exam reveals no gallop and no friction rub.   No murmur heard.  Pulmonary/Chest: Effort normal and breath sounds normal. No stridor. No respiratory distress. He has no wheezes. He has no rales.   Abdominal: Soft. Bowel sounds are normal. He exhibits no distension and no mass. There is tenderness (mild epigastric discomfort upon palpation). There is no guarding.   Neurological: He is alert and oriented to person, place, and time.   Skin: Skin is warm and dry. He is not diaphoretic. No pallor.   Psychiatric: He has a normal mood and affect. His behavior is normal. Judgment and thought content normal.   Nursing note and vitals  reviewed.        CRANIAL NERVES     CN III, IV, VI   Pupils are equal, round, and reactive to light.  Extraocular motions are normal.        Significant Labs:   BMP:   Recent Labs   Lab 02/19/19 2112 02/20/19  0044   * 349*   * 133*   K 4.7 4.5    104   CO2 15* 14*   BUN 60* 62*   CREATININE 1.9* 1.9*   CALCIUM 8.1* 7.9*   MG 2.6  --      CBC:   Recent Labs   Lab 02/19/19 1817   WBC 11.45   HGB 15.5   HCT 45.8   *     CMP:   Recent Labs   Lab 02/19/19 1817 02/19/19 2112 02/20/19  0044   * 133* 133*   K 4.8 4.7 4.5   CL 93* 102 104   CO2 20* 15* 14*   * 486* 349*   BUN 61* 60* 62*   CREATININE 2.3* 1.9* 1.9*   CALCIUM 9.1 8.1* 7.9*   PROT 8.4  --   --    ALBUMIN 3.7  --   --    BILITOT 1.4*  --   --    ALKPHOS 78  --   --    AST 90*  --   --    *  --   --    ANIONGAP 17* 16 15   EGFRNONAA 26* 33* 33*     Urine Culture: No results for input(s): LABURIN in the last 48 hours.  Urine Studies:   Recent Labs   Lab 02/19/19  2241   COLORU Yellow   APPEARANCEUA Clear   PHUR 5.0   SPECGRAV 1.020   PROTEINUA Negative   GLUCUA 3+*   KETONESU Negative   BILIRUBINUA Negative   OCCULTUA Negative   NITRITE Negative   UROBILINOGEN Negative   LEUKOCYTESUR Negative   WBCUA 1   BACTERIA Few*   HYALINECASTS 10*     All pertinent labs within the past 24 hours have been reviewed.    Significant Imaging: I have reviewed all pertinent imaging results/findings within the past 24 hours.   Imaging Results    None

## 2019-02-20 NOTE — HPI
Mr. Randy Camejo is a 78 y.o. male, with PMH of NIDDM-2, CAD, who presented to [facility] ED on 2/19/19 2/2 abdominal pain x 5 days. Associated symptoms include N/V/D (all non-bloody), which has since resolved, except for the nausea. He was seen in Urgent Care PTA. In the ED, he was found to be tachycardic, and rhythm was A. Fib. He has never before been diagnosed with the same. He was given 2L bolus of IV fluids, then a dose if IV Cardizem, but rate was still elevated. He was started on a Cardizem drip, and admitted to the ICU.

## 2019-02-20 NOTE — ED NOTES
BSSR received, pt resting in bed comfortably w/family at bedside. HR elevated,160s-170s, MD aware. Bed in the lowest locked position, side rails up x 2, call light within reach. Pt denies any needs at this time. NAD noted, will continue to monitor.

## 2019-02-20 NOTE — ASSESSMENT & PLAN NOTE
- suspect this is 2/2 significant dehydration  - continue IV fluid infusion  - JAMES urine studies sent

## 2019-02-20 NOTE — EICU
New admit    77 y/o M history of borderline diabetes, CAD s/p stent in 2010, not on maintenance medications except for aspirin presents with 4 day history of nausea and decreased PO intake.  Patient states it started out with diarrhea and vomiting.  He denies any fever.  He was found to be in DKA on presentation, HbA1C came back at 12.6.  He is now on fluids and insulin.  Patient states he continues to have significant urine output despite not eating of drinking much.    Camera assessment:  Patient appears comfortable although notes of chest tightness on deep inspiration which is relieved when he props bed up.  He denies chest pain.  He claims nausea has improved.    Telemetry  /70    O2 92%    Data:  Na 133, K 4.7, creatinine 1.9 from 2.3, glucose 400s to 500s  CO2 15, AG 16  WBC 11.45, H/H 15.5/45.8, plt 132  Beta hydroxybutyrate 2.1  UA ketones negative, WBC 1    · New onset afib RVR on cardizem drip, still not rate controlled.  Ordered metoprolol 5 mg IV prn for HR >130 x 3 doses.  Continue hydration.  If persistent consider amiodarone  · DKA, new onset, on insulin drip and fluids as per protocol  · CAD s/p stent, nitro for chest pain. Troponin as chest pain can be atypical in diabetics. Lipid panel for risk stratification  · Ordered CXR

## 2019-02-20 NOTE — NURSING
Pt has not voided this AM shift. Pt's bladder scanned and showed approximately 103ml in bladder. Dr. So notified. Per MD, encourage fluid intake.

## 2019-02-20 NOTE — ASSESSMENT & PLAN NOTE
- Mr. Randy Camejo is admitted to inpatient status to the ICU  - He is on a Cardizem drip after receiving 2L NS and a dose of IV Cardizem as he was tachycardic and in A. Fib in the ED   - this is he first known episode of A. Fib  - continue Cardizem drip  - Cardiology consulted

## 2019-02-20 NOTE — H&P
[facility] Hospital Medicine  History & Physical    Patient Name: Randy Camejo  MRN: [id number]  Admission Date: 2/19/2019  Attending Physician: Basil So MD   Primary Care Provider: Primary Doctor No         Patient information was obtained from patient, past medical records and ER records.     Subjective:     Principal Problem:Atrial fibrillation with RVR    Chief Complaint:   Chief Complaint   Patient presents with    Abdominal Pain     Reports abdominal pain w/ n/v/d x 5 days, reports seen at Urgent Care, advised to come to ED for further evaluation        HPI: Mr. Randy Camejo is a 78 y.o. male, with PMH of NIDDM-2, who presented to [facility] ED on 2/19/19 2/2 abdominal pain x 5 days. Associated symptoms include N/V/D (all non-bloody), which has since resolved, except for the nausea. He was seen in Urgent Care PTA. In the ED, he was found to be tachycardic, and rhythm was A. Fib. He has never before been diagnosed with the same. He was given 2L bolus of IV fluids, then a dose if IV Cardizem, but rate was still elevated. He was started on a Cardizem drip, and admitted to the ICU.     Past Medical History:   Diagnosis Date    Asthma     childhood    Coronary artery disease     Heart attack     stent       Past Surgical History:   Procedure Laterality Date    CARDIAC CATHETERIZATION      CATARACT EXTRACTION W/  INTRAOCULAR LENS IMPLANT Right 12/18/2017    Dr. Beavers    CATARACT EXTRACTION W/  INTRAOCULAR LENS IMPLANT Left 01/08/2018    Dr. Beavers    INSERTION-INTRAOCULAR LENS (IOL) Left 1/8/2018    [facility] OR    INSERTION-INTRAOCULAR LENS (IOL) Right 12/18/2017    [facility]    PHACOEMULSIFICATION-ASPIRATION-CATARACT Left 1/8/2018    [facility]    PHACOEMULSIFICATION-ASPIRATION-CATARACT Right 12/18/2017    [facility]    TONSILLECTOMY         Review  of patient's allergies indicates:  No Known Allergies    No current facility-administered medications on file prior to encounter.      Current Outpatient Medications on File Prior to Encounter   Medication Sig    aspirin 81 MG Chew Take 81 mg by mouth once daily.    mupirocin (BACTROBAN) 2 % ointment Apply topically 2 (two) times daily.     Family History     Problem Relation (Age of Onset)    Cataracts Father        Tobacco Use    Smoking status: Never Smoker    Smokeless tobacco: Never Used   Substance and Sexual Activity    Alcohol use: No    Drug use: No    Sexual activity: Not on file     Review of Systems   Constitutional: Negative for chills, diaphoresis and fever.   Respiratory: Positive for chest tightness. Negative for cough, shortness of breath and wheezing.    Cardiovascular: Negative for chest pain and palpitations.   Gastrointestinal: Positive for diarrhea and nausea. Negative for abdominal pain, blood in stool and vomiting.   Genitourinary: Positive for decreased urine volume. Negative for difficulty urinating, dysuria, frequency, hematuria and urgency.   Skin: Negative for rash and wound.   Neurological: Negative for dizziness, syncope, weakness, numbness and headaches.   Hematological: Does not bruise/bleed easily.   Psychiatric/Behavioral: Negative for confusion and decreased concentration.     Objective:     Vital Signs (Most Recent):  Temp: 97.9 °F (36.6 °C) (02/20/19 0305)  Pulse: (!) 132 (02/20/19 0305)  Resp: 20 (02/20/19 0305)  BP: (!) 101/55 (02/20/19 0305)  SpO2: 96 % (02/20/19 0305) Vital Signs (24h Range):  Temp:  [97.3 °F (36.3 °C)-97.9 °F (36.6 °C)] 97.9 °F (36.6 °C)  Pulse:  [] 132  Resp:  [15-22] 20  SpO2:  [92 %-97 %] 96 %  BP: ()/(55-90) 101/55     Weight: 87.8 kg (193 lb 9 oz)  Body mass index is 27 kg/m².    Physical Exam   Constitutional: He is oriented to person, place, and time. Vital signs are normal. He appears well-developed and well-nourished.  Non-toxic  appearance. He does not have a sickly appearance. He does not appear ill. No distress.   HENT:   Head: Normocephalic and atraumatic.   Right Ear: External ear normal.   Left Ear: External ear normal.   Eyes: Conjunctivae and EOM are normal. Pupils are equal, round, and reactive to light. No scleral icterus.   Neck: Normal range of motion. Neck supple. No JVD present. No tracheal deviation present.   Cardiovascular: Normal rate, regular rhythm, normal heart sounds and intact distal pulses. Exam reveals no gallop and no friction rub.   No murmur heard.  Pulmonary/Chest: Effort normal and breath sounds normal. No stridor. No respiratory distress. He has no wheezes. He has no rales.   Abdominal: Soft. Bowel sounds are normal. He exhibits no distension and no mass. There is tenderness (mild epigastric discomfort upon palpation). There is no guarding.   Neurological: He is alert and oriented to person, place, and time.   Skin: Skin is warm and dry. He is not diaphoretic. No pallor.   Psychiatric: He has a normal mood and affect. His behavior is normal. Judgment and thought content normal.   Nursing note and vitals reviewed.        CRANIAL NERVES     CN III, IV, VI   Pupils are equal, round, and reactive to light.  Extraocular motions are normal.        Significant Labs:   BMP:   Recent Labs   Lab 02/19/19 2112 02/20/19  0044   * 349*   * 133*   K 4.7 4.5    104   CO2 15* 14*   BUN 60* 62*   CREATININE 1.9* 1.9*   CALCIUM 8.1* 7.9*   MG 2.6  --      CBC:   Recent Labs   Lab 02/19/19 1817   WBC 11.45   HGB 15.5   HCT 45.8   *     CMP:   Recent Labs   Lab 02/19/19 1817 02/19/19 2112 02/20/19  0044   * 133* 133*   K 4.8 4.7 4.5   CL 93* 102 104   CO2 20* 15* 14*   * 486* 349*   BUN 61* 60* 62*   CREATININE 2.3* 1.9* 1.9*   CALCIUM 9.1 8.1* 7.9*   PROT 8.4  --   --    ALBUMIN 3.7  --   --    BILITOT 1.4*  --   --    ALKPHOS 78  --   --    AST 90*  --   --    *  --   --     ANIONGAP 17* 16 15   EGFRNONAA 26* 33* 33*     Urine Culture: No results for input(s): LABURIN in the last 48 hours.  Urine Studies:   Recent Labs   Lab 02/19/19  2241   COLORU Yellow   APPEARANCEUA Clear   PHUR 5.0   SPECGRAV 1.020   PROTEINUA Negative   GLUCUA 3+*   KETONESU Negative   BILIRUBINUA Negative   OCCULTUA Negative   NITRITE Negative   UROBILINOGEN Negative   LEUKOCYTESUR Negative   WBCUA 1   BACTERIA Few*   HYALINECASTS 10*     All pertinent labs within the past 24 hours have been reviewed.    Significant Imaging: I have reviewed all pertinent imaging results/findings within the past 24 hours.   Imaging Results    None          Assessment/Plan:     * Atrial fibrillation with RVR    - Mr. Randy Camejo is admitted to inpatient status to the ICU  - He is on a Cardizem drip after receiving 2L NS and a dose of IV Cardizem as he was tachycardic and in A. Fib in the ED   - this is he first known episode of A. Fib  - continue Cardizem drip  - Cardiology consulted        Type 2 diabetes mellitus with hyperglycemia, without long-term current use of insulin    - last A1C: No results found for: LABA1C, HGBA1C   - A1C pending for AM   - hyperglycemic with blood glucose >500 upon arrival with open anion gap of 17, and elevated BHB of 2.1   - repeat STAT BMP and Mg & Phos now that patient has had 8 Units IV regular insulin    - after repeat labs, insulin drip started   - hold oral antidiabetic meds   - NPO  - hourly accuchekcs       JAMES (acute kidney injury)    - suspect this is 2/2 significant dehydration  - continue IV fluid infusion  - JAMES urine studies sent       VTE Risk Mitigation (From admission, onward)        Ordered     IP VTE HIGH RISK PATIENT  Once      02/20/19 0135     Place sequential compression device  Until discontinued      02/20/19 0135     heparin (porcine) injection 5,000 Units  Every 12 hours      02/19/19 2054     Place sequential compression device  Until discontinued      02/19/19 2054              Carmen Vu PA-C  Department of Hospital Medicine   [facility]

## 2019-02-20 NOTE — PLAN OF CARE
LMSW attempted to met with patient. Staff at bedside.    LMSW contacted the patients rosey Hansen [phone]. No answer.     LMSW completed chart review.     LMSW will continue to attempt to see the patient for discharge planning assessment.        02/20/19 1682   Discharge Assessment   Assessment Type Discharge Planning Assessment   Confirmed/corrected address and phone number on facesheet? Yes   Assessment information obtained from? Patient;Caregiver;Medical Record   Prior to hospitilization cognitive status: Unable to Assess

## 2019-02-20 NOTE — EICU
eLert for hypotension    Patient given amiodarone 150 mg bolus due to persistent afib RVR despite cardizem, metoprolol and fluids. Both Cardizem and amiodarone was off when patient got up to the side of the bed to use the bathroom and lost consciousness for a few seconds and regained consciousness once he was laid back on the bed.  He was noted to be hypotensive. Given LRS 500cc. Glucose 200s.    · Orthostatic hypotension, likely still volume deplete. Will need to rule out cardiogenic etiology. 2 D echo ordered. Troponin pending.

## 2019-02-20 NOTE — CHAPLAIN
spoke with nurse and provided a compassionate presence, reflective listening and prayer support for patient and family.   also left a voicemail message for a  to provide anointing of the sick for patient as requested by patient.

## 2019-02-20 NOTE — ASSESSMENT & PLAN NOTE
- last A1C: No results found for: LABA1C, HGBA1C   - A1C pending for AM   - hyperglycemic with blood glucose >500 upon arrival with open anion gap of 17, and elevated BHB of 2.1   - repeat STAT BMP and Mg & Phos now that patient has had 8 Units IV regular insulin    - after repeat labs, insulin drip started   - hold oral antidiabetic meds   - NPO  - hourly accuchekcs

## 2019-02-21 PROBLEM — J96.01 ACUTE HYPOXEMIC RESPIRATORY FAILURE: Status: ACTIVE | Noted: 2019-02-21

## 2019-02-21 PROBLEM — E87.20 LACTIC ACIDOSIS: Status: ACTIVE | Noted: 2019-02-21

## 2019-02-21 PROBLEM — I50.21 ACUTE SYSTOLIC HEART FAILURE: Status: ACTIVE | Noted: 2019-02-21

## 2019-02-21 PROBLEM — E11.10 DIABETIC KETOACIDOSIS WITHOUT COMA ASSOCIATED WITH TYPE 2 DIABETES MELLITUS: Status: ACTIVE | Noted: 2019-02-19

## 2019-02-21 PROBLEM — D69.6 THROMBOCYTOPENIA: Status: ACTIVE | Noted: 2019-02-21

## 2019-02-21 LAB
ANION GAP SERPL CALC-SCNC: 9 MMOL/L
APTT BLDCRRT: 42.4 SEC
BASOPHILS # BLD AUTO: 0.02 K/UL
BASOPHILS # BLD AUTO: 0.02 K/UL
BASOPHILS NFR BLD: 0.2 %
BASOPHILS NFR BLD: 0.2 %
BUN SERPL-MCNC: 52 MG/DL
CALCIUM SERPL-MCNC: 7.9 MG/DL
CHLORIDE SERPL-SCNC: 104 MMOL/L
CO2 SERPL-SCNC: 22 MMOL/L
CREAT SERPL-MCNC: 1.5 MG/DL
DIFFERENTIAL METHOD: ABNORMAL
DIFFERENTIAL METHOD: ABNORMAL
EOSINOPHIL # BLD AUTO: 0.2 K/UL
EOSINOPHIL # BLD AUTO: 0.2 K/UL
EOSINOPHIL NFR BLD: 1.9 %
EOSINOPHIL NFR BLD: 1.9 %
ERYTHROCYTE [DISTWIDTH] IN BLOOD BY AUTOMATED COUNT: 12.9 %
ERYTHROCYTE [DISTWIDTH] IN BLOOD BY AUTOMATED COUNT: 12.9 %
EST. GFR  (AFRICAN AMERICAN): 51 ML/MIN/1.73 M^2
EST. GFR  (NON AFRICAN AMERICAN): 44 ML/MIN/1.73 M^2
GLUCOSE SERPL-MCNC: 155 MG/DL
HCT VFR BLD AUTO: 42.2 %
HCT VFR BLD AUTO: 42.2 %
HGB BLD-MCNC: 14.4 G/DL
HGB BLD-MCNC: 14.4 G/DL
LYMPHOCYTES # BLD AUTO: 1 K/UL
LYMPHOCYTES # BLD AUTO: 1 K/UL
LYMPHOCYTES NFR BLD: 10.4 %
LYMPHOCYTES NFR BLD: 10.4 %
MAGNESIUM SERPL-MCNC: 2.5 MG/DL
MCH RBC QN AUTO: 32.1 PG
MCH RBC QN AUTO: 32.1 PG
MCHC RBC AUTO-ENTMCNC: 34.1 G/DL
MCHC RBC AUTO-ENTMCNC: 34.1 G/DL
MCV RBC AUTO: 94 FL
MCV RBC AUTO: 94 FL
MONOCYTES # BLD AUTO: 0.9 K/UL
MONOCYTES # BLD AUTO: 0.9 K/UL
MONOCYTES NFR BLD: 9.8 %
MONOCYTES NFR BLD: 9.8 %
NEUTROPHILS # BLD AUTO: 7.5 K/UL
NEUTROPHILS # BLD AUTO: 7.5 K/UL
NEUTROPHILS NFR BLD: 77.4 %
NEUTROPHILS NFR BLD: 77.4 %
PLATELET # BLD AUTO: 108 K/UL
PLATELET # BLD AUTO: 108 K/UL
PMV BLD AUTO: 12.1 FL
PMV BLD AUTO: 12.1 FL
POCT GLUCOSE: 179 MG/DL (ref 70–110)
POCT GLUCOSE: 183 MG/DL (ref 70–110)
POCT GLUCOSE: 219 MG/DL (ref 70–110)
POTASSIUM SERPL-SCNC: 3.8 MMOL/L
RBC # BLD AUTO: 4.49 M/UL
RBC # BLD AUTO: 4.49 M/UL
SODIUM SERPL-SCNC: 135 MMOL/L
WBC # BLD AUTO: 9.64 K/UL
WBC # BLD AUTO: 9.64 K/UL

## 2019-02-21 PROCEDURE — 25000003 PHARM REV CODE 250: Performed by: INTERNAL MEDICINE

## 2019-02-21 PROCEDURE — 85730 THROMBOPLASTIN TIME PARTIAL: CPT

## 2019-02-21 PROCEDURE — 80048 BASIC METABOLIC PNL TOTAL CA: CPT

## 2019-02-21 PROCEDURE — 99291 PR CRITICAL CARE, E/M 30-74 MINUTES: ICD-10-PCS | Mod: ,,, | Performed by: HOSPITALIST

## 2019-02-21 PROCEDURE — 83735 ASSAY OF MAGNESIUM: CPT

## 2019-02-21 PROCEDURE — 94761 N-INVAS EAR/PLS OXIMETRY MLT: CPT

## 2019-02-21 PROCEDURE — 36415 COLL VENOUS BLD VENIPUNCTURE: CPT

## 2019-02-21 PROCEDURE — 99900035 HC TECH TIME PER 15 MIN (STAT)

## 2019-02-21 PROCEDURE — 85025 COMPLETE CBC W/AUTO DIFF WBC: CPT

## 2019-02-21 PROCEDURE — 99291 CRITICAL CARE FIRST HOUR: CPT | Mod: ,,, | Performed by: HOSPITALIST

## 2019-02-21 PROCEDURE — 20000000 HC ICU ROOM

## 2019-02-21 PROCEDURE — 63600175 PHARM REV CODE 636 W HCPCS: Performed by: STUDENT IN AN ORGANIZED HEALTH CARE EDUCATION/TRAINING PROGRAM

## 2019-02-21 RX ORDER — AMIODARONE HYDROCHLORIDE 200 MG/1
200 TABLET ORAL
Status: DISCONTINUED | OUTPATIENT
Start: 2019-02-21 | End: 2019-02-27

## 2019-02-21 RX ORDER — DIGOXIN 125 MCG
0.25 TABLET ORAL DAILY
Status: DISCONTINUED | OUTPATIENT
Start: 2019-02-21 | End: 2019-02-27

## 2019-02-21 RX ORDER — DIGOXIN 125 MCG
0.12 TABLET ORAL DAILY
Status: DISCONTINUED | OUTPATIENT
Start: 2019-02-22 | End: 2019-02-21

## 2019-02-21 RX ORDER — METOPROLOL TARTRATE 50 MG/1
6.25 TABLET ORAL 2 TIMES DAILY
Status: DISCONTINUED | OUTPATIENT
Start: 2019-02-21 | End: 2019-02-21 | Stop reason: SDUPTHER

## 2019-02-21 RX ADMIN — Medication 6.25 MG: at 09:02

## 2019-02-21 RX ADMIN — DIGOXIN 0.25 MG: 125 TABLET ORAL at 10:02

## 2019-02-21 RX ADMIN — AMIODARONE HYDROCHLORIDE 200 MG: 200 TABLET ORAL at 05:02

## 2019-02-21 RX ADMIN — INSULIN ASPART 4 UNITS: 100 INJECTION, SOLUTION INTRAVENOUS; SUBCUTANEOUS at 01:02

## 2019-02-21 RX ADMIN — HEPARIN SODIUM 10 UNITS/KG/HR: 10000 INJECTION, SOLUTION INTRAVENOUS at 03:02

## 2019-02-21 RX ADMIN — AMIODARONE HYDROCHLORIDE 200 MG: 200 TABLET ORAL at 10:02

## 2019-02-21 RX ADMIN — INSULIN ASPART 2 UNITS: 100 INJECTION, SOLUTION INTRAVENOUS; SUBCUTANEOUS at 05:02

## 2019-02-21 RX ADMIN — INSULIN ASPART 1 UNITS: 100 INJECTION, SOLUTION INTRAVENOUS; SUBCUTANEOUS at 10:02

## 2019-02-21 NOTE — PROGRESS NOTES
"[facility]  Cardiology  Progress Note    Patient Name: Randy Camejo  MRN: [id number]  Admission Date: 2/19/2019  Hospital Length of Stay: 2 days  Code Status: Full Code   Attending Physician: Basil So MD   Primary Care Physician: Primary Doctor No  Expected Discharge Date:   Principal Problem:Atrial fibrillation with RVR    Subjective:     Brief HPI:    Randy Camejo is a 78 y.o.male with hypertension and diabetes mellitus type 2. He suffered a myocardial infarction in 2000 when he presented to [facility] and had a stent placed. He was treated for DM2 for a few years with metformin but then stopped it. He has not had any regular medical follow up for several years.     He began feeling weak and have diarrhea on 2/15/2019. He diarrhea continued and he became weaker over the next several weak. He had nausea and vomited. On 2/19/2019 he went to  and was referred to the ER. He was noted to be in atrial fibrillation and admitted.    Hospital Course:     2/21/2019: Plan was DILLAN guided CV. He did not "feel" he was ready.    Rate control with amiodarone, digoxin and metoprolol.    Anticoagulation with heparin iv.    Interval History:     All was set for a DILLAN guided CV at 7:10 am today. He decided to not to have the procedure as he felt that he was not ready.  The ventricular response rate remains elevated.     Review of Systems   Constitution: Positive for weakness and malaise/fatigue. Negative for chills and fever.   HENT: Negative for nosebleeds.    Eyes: Negative for vision loss in left eye and vision loss in right eye.   Cardiovascular: Negative for chest pain, leg swelling, orthopnea, palpitations and paroxysmal nocturnal dyspnea.   Respiratory: Positive for shortness of breath. Negative for cough, hemoptysis, sputum production and wheezing.    Hematologic/Lymphatic: Negative for bleeding problem.   Skin: Negative for rash.   Musculoskeletal: Negative for myalgias.   Gastrointestinal: " Negative for abdominal pain, heartburn, hematemesis, hematochezia, jaundice, melena, nausea and vomiting.   Genitourinary: Negative for hematuria.   Neurological: Negative for dizziness, headaches, light-headedness and vertigo.   Psychiatric/Behavioral: Negative for altered mental status. The patient is not nervous/anxious.    Allergic/Immunologic: Negative for persistent infections.     Objective:     Vital Signs (Most Recent):  Temp: 97.7 °F (36.5 °C) (02/21/19 0730)  Pulse: (!) 120 (02/21/19 0930)  Resp: 18 (02/21/19 0930)  BP: 121/71 (02/21/19 0930)  SpO2: 97 % (02/21/19 0930) Vital Signs (24h Range):  Temp:  [97.6 °F (36.4 °C)-97.8 °F (36.6 °C)] 97.7 °F (36.5 °C)  Pulse:  [112-126] 120  Resp:  [12-21] 18  SpO2:  [93 %-98 %] 97 %  BP: ()/() 121/71     Weight: 87.8 kg (193 lb 9 oz)  Body mass index is 27 kg/m².    SpO2: 97 %  O2 Device (Oxygen Therapy): nasal cannula      Intake/Output Summary (Last 24 hours) at 2/21/2019 0959  Last data filed at 2/21/2019 0600  Gross per 24 hour   Intake 1298.38 ml   Output 1400 ml   Net -101.62 ml       Lines/Drains/Airways     Peripheral Intravenous Line                 Peripheral IV - Single Lumen 02/19/19 1200 Anterior;Right Hand 1 day         Peripheral IV - Single Lumen 02/21/19 0022 Left Upper Arm less than 1 day                Physical Exam   Constitutional: He is oriented to person, place, and time. He appears well-developed and well-nourished. He does not appear ill. No distress.   Eyes: Right conjunctiva is not injected. Right conjunctiva has no hemorrhage. Left conjunctiva is not injected. Left conjunctiva has no hemorrhage. Right pupil is round. Left pupil is round.   Neck: Neck supple. No JVD present.   Cardiovascular: S1 normal and S2 normal. An irregularly irregular rhythm present. Tachycardia present. Exam reveals gallop and S3.   Pulmonary/Chest: Effort normal. He has rales in the right lower field and the left lower field.   Abdominal: Soft. Normal  appearance. He exhibits no distension. There is no tenderness.   Musculoskeletal: He exhibits no edema.        Right ankle: He exhibits no swelling.        Left ankle: He exhibits no swelling.   Neurological: He is alert and oriented to person, place, and time. He is not disoriented.   Skin: Skin is warm and dry. No rash noted.   Psychiatric: He has a normal mood and affect. His speech is normal and behavior is normal. Judgment and thought content normal. Cognition and memory are normal.       Assessment and Plan:     Problem List:    Active Diagnoses:    Diagnosis Date Noted POA    PRINCIPAL PROBLEM:  Atrial fibrillation with RVR [I48.91] 02/19/2019 Yes    Acute systolic heart failure [I50.21] 02/21/2019 Yes    Acute hypoxemic respiratory failure [J96.01] 02/21/2019 Yes    Thrombocytopenia [D69.6] 02/21/2019 Yes    Lactic acidosis [E87.2] 02/21/2019 Yes    Diabetic ketoacidosis without coma associated with type 2 diabetes mellitus [E11.10] 02/19/2019 Yes    JAMES (acute kidney injury) [N17.9] 02/19/2019 Yes      Problems Resolved During this Admission:     Assessment and Plan:  1. Atrial Fibrillation              Persistent atrial fibrillation with fast VRR.              Uncertain duration.              [id number]=1 (CHA2DV).              2/21/2019: Plan was DILLAN guided CV but patient did not want to proceed.   Rate control with amiodarone, digoxin and very cautious dose of metoprolol.     2. Chronic Anticoagulation              [id number]=1 (CHA2DV).              On heparin iv.              Lifelong anticoagulation.     3. Heart Failure, Systolic, Acute on Chronic              2/20/2019: Echo: Mildly dilated left ventricle with severe left ventricular dysfunction. EF 15%. Moderately dilated LA. Moderate RV dysfunction.              Maybe at least partially tachycardia induced.              Betablockade, ACEI and spironolactone long term.              Consider cath at later date.     4. Coronary Artery  Disease              2000: [place]: MI and stent.              Clinically stable.     5. Hypertension              Appears to have been mild.     6. Diabetes Mellitus, Type 2              2015: Diagnosed. Complications: CAD. Medications: Diet.              2/19/2019: DKA.     7. Primary Care              In transition.      VTE Risk Mitigation (From admission, onward)        Ordered     heparin 25,000 units in dextrose 5% 250 mL (100 units/mL) infusion LOW INTENSITY nomogram - [facility]  Continuous      02/20/19 0514     heparin 25,000 units in dextrose 5% (100 units/ml) IV bolus from bag - ADDITIONAL PRN BOLUS - 60 units/kg (max bolus 4000 units)  As needed (PRN)      02/20/19 0514     heparin 25,000 units in dextrose 5% (100 units/ml) IV bolus from bag - ADDITIONAL PRN BOLUS - 30 units/kg (max bolus 4000 units)  As needed (PRN)      02/20/19 0514     IP VTE HIGH RISK PATIENT  Once      02/20/19 0135     Place sequential compression device  Until discontinued      02/20/19 0135     Place sequential compression device  Until discontinued      02/19/19 2054          Panchito Joseph MD  Cardiology  [facility]

## 2019-02-21 NOTE — ASSESSMENT & PLAN NOTE
-Not on treatment at home  -A1c found to be 12.6  -On admit he had hyperglycemia, elevated beta-hydroxybutyrate and minimally elevated anion-gap  -He was treated with insulin drip and transitioned to subQ insulin on 2/20  -Remains hyperglycemic today  -Will increase detemir to 10 units and continue insulin sliding scale  -Will require diabetic and insulin teaching prior to discharge

## 2019-02-21 NOTE — NURSING
Patient reports he is reluctant to have DILLAN/Cardioversion in a.m., states he is nervous about the procedure.  States he would like to wait another day and would like a more conservative approach.   HR  at present, remains in a-fib.  Listened to patient's concerns, and assured patient that it is his decision to make, and that the Doctor would explain the procedure beforehand and if he still does not want the procedure done, his wishes would be respected.  Also called DIANNA POOLE for something for anxiety, but patient does not want the Ativan that was ordered.

## 2019-02-21 NOTE — NURSING
Patient unsure about procedure today. Dr Joseph here and speaking with the patient and  His family. Shawnee Villalobos instructing on diabetes.

## 2019-02-21 NOTE — ASSESSMENT & PLAN NOTE
-No previous history of CHF noted.  -Now with new onset afib rvr and systolic dysfunction.    -Diminished EF could be related to his tachycardia  -He was hypovolemic on admit due to multiple days of diminished oral intake.  Presently net negative 100cc on this hospitalization  -Continue gentle IV fluids for now.  -Will likely need acei, beta-blocker and lasix initiation prior to discharge  -Appreciate input of Dr. Joseph.

## 2019-02-21 NOTE — ASSESSMENT & PLAN NOTE
-suspect this is 2/2 significant dehydration from diminished oral intake as well as autodiuresis from DKA  -continue gentle IV fluid infusion  -Repeat labs today and in AM.

## 2019-02-21 NOTE — PLAN OF CARE
Problem: Adult Inpatient Plan of Care  Goal: Plan of Care Review  Outcome: Ongoing (interventions implemented as appropriate)  Patient remains on NC 2L saturations WNL no new changes to pt respiratory status will monitor.

## 2019-02-21 NOTE — CARE UPDATE
Pt AAOx4. Remains in A Fib with HR in low 100s to 120s this shift. BP improved throughout shift. Heparin gtt at 10units/kg/hr at end of shift and amiodarone gtt at 0.5 mg/min. Insulin gtt D/C'd at at 0930. Long acting insulin administered. Pt able to tolerate clear diet only in AM, but was able to advance to full by end of shift. N/V improved by mid-day. Pt voided 125ml total this shift. Bladder was scanned. MD aware. Fluid intake po encouraged. Plans for cardioversion in place for AM. Procedure explained by Dr. Joseph and RN. Consents obtained from pt. Plans for NPO after 2200. Family and pt updated on plan of care.

## 2019-02-21 NOTE — NURSING
Pt currently refusing cardioversion and katt. Family at bedside. Dr. Joseph and Dr. Pina informed.

## 2019-02-21 NOTE — CONSULTS
Pt seen by DM ed. Reviewed what most recent A1C was (12.6%) and goal A1C. Pt states he was previously prediabetic and taking metformin but he never followed up with getting a new PCP once his previous one left. States he was in denial about his health for many years. Also, wife and grandson at bedside for ed.     Concerning pt's DM, encouraged to focus on starting new medication regime. Ed on insulin, types of insulin and how they work together to keep BG controlled. Insulin tip sheet provided. Ed on types of insulin and safety considerations for each. Ed on insulin injection technique, site rotation, storage and needle disposal. Insulin admin worksheet provided as well. Ed on increased risk of low BG with insulin and how to treat and prevent low BG.     Log sheets provided and ed on SMBG schedule/BG goals.     Encouraged pt to choose PCP to establish with and to follow up outpatient w/ a diabetes education program to help transition safely into new regime.     All questions answered at this time.

## 2019-02-21 NOTE — SUBJECTIVE & OBJECTIVE
Interval History: No acute events overnight.  BP has improved.  Remains on amiodarone drip with moderate rate control.  Anxious about possible cardioversion today.  Denies chest pain or sob.    Review of Systems   Constitutional: Negative for activity change and appetite change.   HENT: Negative for congestion and dental problem.    Eyes: Negative for discharge and itching.   Respiratory: Negative for apnea and chest tightness.    Cardiovascular: Negative for chest pain and leg swelling.   Gastrointestinal: Negative for abdominal distention and abdominal pain.   Endocrine: Negative for cold intolerance and heat intolerance.   Genitourinary: Negative for difficulty urinating and dysuria.   Musculoskeletal: Negative for arthralgias and back pain.   Skin: Negative for color change and pallor.   Neurological: Negative for dizziness and facial asymmetry.   Hematological: Negative for adenopathy.     Objective:     Vital Signs (Most Recent):  Temp: 97.7 °F (36.5 °C) (02/21/19 0530)  Pulse: (!) 112 (02/21/19 0600)  Resp: 15 (02/21/19 0600)  BP: (!) 158/91 (02/21/19 0600)  SpO2: 97 % (02/21/19 0600) Vital Signs (24h Range):  Temp:  [97.6 °F (36.4 °C)-97.8 °F (36.6 °C)] 97.7 °F (36.5 °C)  Pulse:  [112-128] 112  Resp:  [14-33] 15  SpO2:  [92 %-98 %] 97 %  BP: ()/() 158/91     Weight: 87.8 kg (193 lb 9 oz)  Body mass index is 27 kg/m².    Intake/Output Summary (Last 24 hours) at 2/21/2019 0721  Last data filed at 2/21/2019 0600  Gross per 24 hour   Intake 1298.38 ml   Output 1400 ml   Net -101.62 ml      Physical Exam   Constitutional: He is oriented to person, place, and time. He appears well-developed and well-nourished.   HENT:   Head: Normocephalic and atraumatic.   Eyes: EOM are normal. Pupils are equal, round, and reactive to light.   Neck: Normal range of motion. Neck supple.   Cardiovascular: Normal rate and normal heart sounds.   Irregularly irregular   Pulmonary/Chest: Effort normal and breath sounds  normal. No respiratory distress.   Abdominal: Soft. Bowel sounds are normal. He exhibits no distension. There is no tenderness.   Musculoskeletal: Normal range of motion. He exhibits no edema.   Neurological: He is oriented to person, place, and time. No cranial nerve deficit. Coordination normal.   Skin: Skin is warm and dry.   Psychiatric: He has a normal mood and affect. His behavior is normal.   Vitals reviewed.      Significant Labs: All pertinent labs within the past 24 hours have been reviewed.    Significant Imaging: I have reviewed and interpreted all pertinent imaging results/findings within the past 24 hours.

## 2019-02-21 NOTE — NURSING
Patient up to the bedside chair and tolerated well. Visiting with family. Accuchecks done and encouraged the patient to interact with insulin accuchecks and diet- he did receive teaching today.

## 2019-02-21 NOTE — ASSESSMENT & PLAN NOTE
-Etiology unclear but was present on admission  -No evidence of bleeding  -Monitor closely while on heparin drip.

## 2019-02-21 NOTE — ASSESSMENT & PLAN NOTE
-Mr. Camejo was admitted to ICU status  -He was placed on cardizem, amiodarone and heparin drips.  -He remains on amiodarone and heparin drips with moderate rate control.  -Echo obtained and shows EF 15%  -this is he first known episode of A. Fib  -Dr. Joseph on board and input appreciated.  Plan possible cardioversion today, however patient is rather anxious about this.

## 2019-02-21 NOTE — PLAN OF CARE
Problem: Adult Inpatient Plan of Care  Goal: Plan of Care Review  Outcome: Ongoing (interventions implemented as appropriate)  NPO since 2200 last night for possible DILLAN/cardioversion this a.m.  Patient anxious about procedure, declined Ativan.  Family at  this a.m.  Heparin infusing,  PTT therapeutic x2.  Amiodarone @ 0.5mg/min. HR , patient denies SOB or CP.  Up at side of bed to urinate, hesitancy noted.  Voiding clear yellow urine. Free from falls or injury.

## 2019-02-21 NOTE — PROGRESS NOTES
[facility] Hospital Medicine  Progress Note    Patient Name: Randy Camejo  MRN: [id number]  Patient Class: IP- Inpatient   Admission Date: 2/19/2019  Length of Stay: 2 days  Attending Physician: Basil So MD  Primary Care Provider: Primary Doctor No        Subjective:     Principal Problem:Atrial fibrillation with RVR    HPI:  Mr. Randy Camejo is a 78 y.o. male, with PMH of NIDDM-2, CAD, who presented to [facility] ED on 2/19/19 2/2 abdominal pain x 5 days. Associated symptoms include N/V/D (all non-bloody), which has since resolved, except for the nausea. He was seen in Urgent Care PTA. In the ED, he was found to be tachycardic, and rhythm was A. Fib. He has never before been diagnosed with the same. He was given 2L bolus of IV fluids, then a dose if IV Cardizem, but rate was still elevated. He was started on a Cardizem drip, and admitted to the ICU.     Hospital Course:  No notes on file    Interval History: No acute events overnight.  BP has improved.  Remains on amiodarone drip with moderate rate control.  Anxious about possible cardioversion today.  Denies chest pain or sob.    Review of Systems   Constitutional: Negative for activity change and appetite change.   HENT: Negative for congestion and dental problem.    Eyes: Negative for discharge and itching.   Respiratory: Negative for apnea and chest tightness.    Cardiovascular: Negative for chest pain and leg swelling.   Gastrointestinal: Negative for abdominal distention and abdominal pain.   Endocrine: Negative for cold intolerance and heat intolerance.   Genitourinary: Negative for difficulty urinating and dysuria.   Musculoskeletal: Negative for arthralgias and back pain.   Skin: Negative for color change and pallor.   Neurological: Negative for dizziness and facial asymmetry.   Hematological: Negative for adenopathy.     Objective:     Vital Signs (Most Recent):  Temp: 97.7 °F (36.5 °C) (02/21/19 0530)  Pulse: (!) 112  (02/21/19 0600)  Resp: 15 (02/21/19 0600)  BP: (!) 158/91 (02/21/19 0600)  SpO2: 97 % (02/21/19 0600) Vital Signs (24h Range):  Temp:  [97.6 °F (36.4 °C)-97.8 °F (36.6 °C)] 97.7 °F (36.5 °C)  Pulse:  [112-128] 112  Resp:  [14-33] 15  SpO2:  [92 %-98 %] 97 %  BP: ()/() 158/91     Weight: 87.8 kg (193 lb 9 oz)  Body mass index is 27 kg/m².    Intake/Output Summary (Last 24 hours) at 2/21/2019 0721  Last data filed at 2/21/2019 0600  Gross per 24 hour   Intake 1298.38 ml   Output 1400 ml   Net -101.62 ml      Physical Exam   Constitutional: He is oriented to person, place, and time. He appears well-developed and well-nourished.   HENT:   Head: Normocephalic and atraumatic.   Eyes: EOM are normal. Pupils are equal, round, and reactive to light.   Neck: Normal range of motion. Neck supple.   Cardiovascular: Normal rate and normal heart sounds.   Irregularly irregular   Pulmonary/Chest: Effort normal and breath sounds normal. No respiratory distress.   Abdominal: Soft. Bowel sounds are normal. He exhibits no distension. There is no tenderness.   Musculoskeletal: Normal range of motion. He exhibits no edema.   Neurological: He is oriented to person, place, and time. No cranial nerve deficit. Coordination normal.   Skin: Skin is warm and dry.   Psychiatric: He has a normal mood and affect. His behavior is normal.   Vitals reviewed.      Significant Labs: All pertinent labs within the past 24 hours have been reviewed.    Significant Imaging: I have reviewed and interpreted all pertinent imaging results/findings within the past 24 hours.    Assessment/Plan:      * Atrial fibrillation with RVR    -Mr. Camejo was admitted to ICU status  -He was placed on cardizem, amiodarone and heparin drips.  -He remains on amiodarone and heparin drips with moderate rate control.  -Echo obtained and shows EF 15%  -this is he first known episode of A. Fib  -Dr. Joseph on board and input appreciated.  Plan possible cardioversion  today, however patient is rather anxious about this.     Acute systolic heart failure    -No previous history of CHF noted.  -Now with new onset afib rvr and systolic dysfunction.    -Diminished EF could be related to his tachycardia  -He was hypovolemic on admit due to multiple days of diminished oral intake.  Presently net negative 100cc on this hospitalization  -Continue gentle IV fluids for now.  -Will likely need acei, beta-blocker and lasix initiation prior to discharge  -Appreciate input of Dr. Joseph.       Diabetic ketoacidosis without coma associated with type 2 diabetes mellitus    -Not on treatment at home  -A1c found to be 12.6  -On admit he had hyperglycemia, elevated beta-hydroxybutyrate and minimally elevated anion-gap  -He was treated with insulin drip and transitioned to subQ insulin on 2/20  -Remains hyperglycemic today  -Will increase detemir to 10 units and continue insulin sliding scale  -Will require diabetic and insulin teaching prior to discharge     Acute hypoxemic respiratory failure    -Desat requiring VM noted.  Likely due to transient hypoxia with afib/rvr as well as sytolic chf  -We have quickly been able to transition to nasal canula  -I consulted early Pulm critical care on 2/20 incase he deteriorated and required intubation, but fortunately he has done very well  -Appreciate input from pulm critical care.     JAMES (acute kidney injury)    -suspect this is 2/2 significant dehydration from diminished oral intake as well as autodiuresis from DKA  -continue gentle IV fluid infusion  -Repeat labs today and in AM.     Lactic acidosis    -On admit patient had lactic acidosis with anion gap acidosis.  -lactate 4.0 on admit and repeated at 1.6  -Suspect this was primarily due to DKA with dehydration.         Thrombocytopenia    -Etiology unclear but was present on admission  -No evidence of bleeding  -Monitor closely while on heparin drip.         VTE Risk Mitigation (From admission,  onward)        Ordered     heparin 25,000 units in dextrose 5% 250 mL (100 units/mL) infusion LOW INTENSITY nomogram - [facility]  Continuous      02/20/19 0514     heparin 25,000 units in dextrose 5% (100 units/ml) IV bolus from bag - ADDITIONAL PRN BOLUS - 60 units/kg (max bolus 4000 units)  As needed (PRN)      02/20/19 0514     heparin 25,000 units in dextrose 5% (100 units/ml) IV bolus from bag - ADDITIONAL PRN BOLUS - 30 units/kg (max bolus 4000 units)  As needed (PRN)      02/20/19 0514     IP VTE HIGH RISK PATIENT  Once      02/20/19 0135     Place sequential compression device  Until discontinued      02/20/19 0135     Place sequential compression device  Until discontinued      02/19/19 2054        35 min cc time      Basil So MD  Department of Hospital Medicine   [facility]

## 2019-02-21 NOTE — PLAN OF CARE
Problem: Fall Injury Risk  Goal: Absence of Fall and Fall-Related Injury  Outcome: Ongoing (interventions implemented as appropriate)  Fall risk armband on   Bed alarm on   Instructed the patient to call for help before attempting to get out of the bed for safety

## 2019-02-21 NOTE — PLAN OF CARE
Problem: Adult Inpatient Plan of Care  Goal: Plan of Care Review  Outcome: Ongoing (interventions implemented as appropriate)  Pt on 2 lpm nasal cannula, SpO2 97%.

## 2019-02-21 NOTE — ASSESSMENT & PLAN NOTE
-On admit patient had lactic acidosis with anion gap acidosis.  -lactate 4.0 on admit and repeated at 1.6  -Suspect this was primarily due to DKA with dehydration.

## 2019-02-21 NOTE — ASSESSMENT & PLAN NOTE
-Desat requiring VM noted.  Likely due to transient hypoxia with afib/rvr as well as sytolic chf  -We have quickly been able to transition to nasal canula  -I consulted early Pulm critical care on 2/20 incase he deteriorated and required intubation, but fortunately he has done very well  -Appreciate input from pulm critical care.

## 2019-02-22 LAB
ANION GAP SERPL CALC-SCNC: 6 MMOL/L
APTT BLDCRRT: 47.4 SEC
BASOPHILS # BLD AUTO: 0.01 K/UL
BASOPHILS NFR BLD: 0.1 %
BUN SERPL-MCNC: 35 MG/DL
CALCIUM SERPL-MCNC: 7.9 MG/DL
CHLORIDE SERPL-SCNC: 106 MMOL/L
CO2 SERPL-SCNC: 23 MMOL/L
CREAT SERPL-MCNC: 1.1 MG/DL
DIFFERENTIAL METHOD: ABNORMAL
EOSINOPHIL # BLD AUTO: 0.2 K/UL
EOSINOPHIL NFR BLD: 2.4 %
ERYTHROCYTE [DISTWIDTH] IN BLOOD BY AUTOMATED COUNT: 13.3 %
EST. GFR  (AFRICAN AMERICAN): >60 ML/MIN/1.73 M^2
EST. GFR  (NON AFRICAN AMERICAN): >60 ML/MIN/1.73 M^2
GLUCOSE SERPL-MCNC: 173 MG/DL
HCT VFR BLD AUTO: 42 %
HGB BLD-MCNC: 14.2 G/DL
LYMPHOCYTES # BLD AUTO: 0.8 K/UL
LYMPHOCYTES NFR BLD: 9 %
MAGNESIUM SERPL-MCNC: 2.5 MG/DL
MCH RBC QN AUTO: 32.2 PG
MCHC RBC AUTO-ENTMCNC: 33.8 G/DL
MCV RBC AUTO: 95 FL
MONOCYTES # BLD AUTO: 0.9 K/UL
MONOCYTES NFR BLD: 10.7 %
NEUTROPHILS # BLD AUTO: 6.6 K/UL
NEUTROPHILS NFR BLD: 77.4 %
PLATELET # BLD AUTO: 104 K/UL
PMV BLD AUTO: 12 FL
POCT GLUCOSE: 146 MG/DL (ref 70–110)
POCT GLUCOSE: 160 MG/DL (ref 70–110)
POCT GLUCOSE: 199 MG/DL (ref 70–110)
POCT GLUCOSE: 201 MG/DL (ref 70–110)
POCT GLUCOSE: 235 MG/DL (ref 70–110)
POTASSIUM SERPL-SCNC: 3.8 MMOL/L
RBC # BLD AUTO: 4.41 M/UL
SODIUM SERPL-SCNC: 135 MMOL/L
WBC # BLD AUTO: 8.49 K/UL

## 2019-02-22 PROCEDURE — 94761 N-INVAS EAR/PLS OXIMETRY MLT: CPT

## 2019-02-22 PROCEDURE — 83735 ASSAY OF MAGNESIUM: CPT

## 2019-02-22 PROCEDURE — 85025 COMPLETE CBC W/AUTO DIFF WBC: CPT

## 2019-02-22 PROCEDURE — 27000221 HC OXYGEN, UP TO 24 HOURS

## 2019-02-22 PROCEDURE — 85730 THROMBOPLASTIN TIME PARTIAL: CPT

## 2019-02-22 PROCEDURE — 36415 COLL VENOUS BLD VENIPUNCTURE: CPT

## 2019-02-22 PROCEDURE — 25000003 PHARM REV CODE 250: Performed by: INTERNAL MEDICINE

## 2019-02-22 PROCEDURE — 80048 BASIC METABOLIC PNL TOTAL CA: CPT

## 2019-02-22 PROCEDURE — 99233 PR SUBSEQUENT HOSPITAL CARE,LEVL III: ICD-10-PCS | Mod: ,,, | Performed by: HOSPITALIST

## 2019-02-22 PROCEDURE — 20000000 HC ICU ROOM

## 2019-02-22 PROCEDURE — 63600175 PHARM REV CODE 636 W HCPCS: Performed by: STUDENT IN AN ORGANIZED HEALTH CARE EDUCATION/TRAINING PROGRAM

## 2019-02-22 PROCEDURE — 99233 SBSQ HOSP IP/OBS HIGH 50: CPT | Mod: ,,, | Performed by: HOSPITALIST

## 2019-02-22 RX ADMIN — APIXABAN 5 MG: 2.5 TABLET, FILM COATED ORAL at 09:02

## 2019-02-22 RX ADMIN — Medication 6.25 MG: at 09:02

## 2019-02-22 RX ADMIN — HEPARIN SODIUM 10 UNITS/KG/HR: 10000 INJECTION, SOLUTION INTRAVENOUS at 10:02

## 2019-02-22 RX ADMIN — INSULIN ASPART 2 UNITS: 100 INJECTION, SOLUTION INTRAVENOUS; SUBCUTANEOUS at 01:02

## 2019-02-22 RX ADMIN — AMIODARONE HYDROCHLORIDE 200 MG: 200 TABLET ORAL at 10:02

## 2019-02-22 RX ADMIN — AMIODARONE HYDROCHLORIDE 200 MG: 200 TABLET ORAL at 06:02

## 2019-02-22 RX ADMIN — DIGOXIN 0.25 MG: 125 TABLET ORAL at 09:02

## 2019-02-22 RX ADMIN — AMIODARONE HYDROCHLORIDE 200 MG: 200 TABLET ORAL at 04:02

## 2019-02-22 RX ADMIN — INSULIN ASPART 2 UNITS: 100 INJECTION, SOLUTION INTRAVENOUS; SUBCUTANEOUS at 09:02

## 2019-02-22 RX ADMIN — INSULIN ASPART 2 UNITS: 100 INJECTION, SOLUTION INTRAVENOUS; SUBCUTANEOUS at 06:02

## 2019-02-22 RX ADMIN — Medication 6.25 MG: at 04:02

## 2019-02-22 NOTE — PROGRESS NOTES
"[facility]  Cardiology  Progress Note    Patient Name: Randy Camejo  MRN: [id number]  Admission Date: 2/19/2019  Hospital Length of Stay: 3 days  Code Status: Full Code   Attending Physician: Basil So MD   Primary Care Physician: Primary Doctor No  Expected Discharge Date:   Principal Problem:Atrial fibrillation with RVR    Subjective:     Brief HPI:    Randy Camejo is a 78 y.o.male with hypertension and diabetes mellitus type 2. He suffered a myocardial infarction in 2000 when he presented to [facility] and had a stent placed. He was treated for DM2 for a few years with metformin but then stopped it. He has not had any regular medical follow up for several years.     He began feeling weak and have diarrhea on 2/15/2019. He diarrhea continued and he became weaker over the next several weak. He had nausea and vomited. On 2/19/2019 he went to  and was referred to the ER. He was noted to be in atrial fibrillation and admitted.    Hospital Course:     2/21/2019: Plan was DILLAN guided CV. He did not "feel" he was ready.    Rate control with amiodarone, digoxin and metoprolol.    Anticoagulation with heparin iv.    Interval History:     Weak but overall feeling slightly better. Will change heparin to apixiban.     Review of Systems   Constitution: Positive for weakness and malaise/fatigue. Negative for chills and fever.   HENT: Negative for nosebleeds.    Eyes: Negative for vision loss in left eye and vision loss in right eye.   Cardiovascular: Negative for chest pain, leg swelling, orthopnea, palpitations and paroxysmal nocturnal dyspnea.   Respiratory: Positive for shortness of breath. Negative for cough, hemoptysis, sputum production and wheezing.    Hematologic/Lymphatic: Negative for bleeding problem.   Skin: Negative for rash.   Musculoskeletal: Negative for myalgias.   Gastrointestinal: Negative for abdominal pain, heartburn, hematemesis, hematochezia, jaundice, melena, nausea and " vomiting.   Genitourinary: Negative for hematuria.   Neurological: Negative for dizziness, headaches, light-headedness and vertigo.   Psychiatric/Behavioral: Negative for altered mental status. The patient is not nervous/anxious.    Allergic/Immunologic: Negative for persistent infections.     Objective:     Vital Signs (Most Recent):  Temp: 97.8 °F (36.6 °C) (02/22/19 0700)  Pulse: (!) 120 (02/22/19 0936)  Resp: 16 (02/22/19 0936)  BP: (!) 141/106 (02/22/19 0936)  SpO2: 97 % (02/22/19 0936) Vital Signs (24h Range):  Temp:  [97.2 °F (36.2 °C)-98.1 °F (36.7 °C)] 97.8 °F (36.6 °C)  Pulse:  [112-132] 120  Resp:  [14-29] 16  SpO2:  [92 %-98 %] 97 %  BP: (106-141)/() 141/106     Weight: 87.8 kg (193 lb 9 oz)  Body mass index is 27 kg/m².    SpO2: 97 %  O2 Device (Oxygen Therapy): nasal cannula      Intake/Output Summary (Last 24 hours) at 2/22/2019 1550  Last data filed at 2/22/2019 0944  Gross per 24 hour   Intake 492 ml   Output 1950 ml   Net -1458 ml       Lines/Drains/Airways     Peripheral Intravenous Line                 Peripheral IV - Single Lumen 02/19/19 1200 Anterior;Right Hand 3 days         Peripheral IV - Single Lumen 02/21/19 0022 Left Upper Arm 1 day                Physical Exam   Constitutional: He is oriented to person, place, and time. He appears well-developed and well-nourished. He does not appear ill. No distress.   Eyes: Right conjunctiva is not injected. Right conjunctiva has no hemorrhage. Left conjunctiva is not injected. Left conjunctiva has no hemorrhage. Right pupil is round. Left pupil is round.   Neck: Neck supple. No JVD present.   Cardiovascular: S1 normal and S2 normal. An irregularly irregular rhythm present. Tachycardia present. Exam reveals gallop and S3.   Pulmonary/Chest: Effort normal. He has rales in the right lower field and the left lower field.   Abdominal: Soft. Normal appearance. He exhibits no distension. There is no tenderness.   Musculoskeletal: He exhibits no edema.         Right ankle: He exhibits no swelling.        Left ankle: He exhibits no swelling.   Neurological: He is alert and oriented to person, place, and time. He is not disoriented.   Skin: Skin is warm and dry. No rash noted.   Psychiatric: He has a normal mood and affect. His speech is normal and behavior is normal. Judgment and thought content normal. Cognition and memory are normal.       Assessment and Plan:     Problem List:    Active Diagnoses:    Diagnosis Date Noted POA    PRINCIPAL PROBLEM:  Atrial fibrillation with RVR [I48.91] 02/19/2019 Yes    Acute systolic heart failure [I50.21] 02/21/2019 Yes    Acute hypoxemic respiratory failure [J96.01] 02/21/2019 Yes    Thrombocytopenia [D69.6] 02/21/2019 Yes    Lactic acidosis [E87.2] 02/21/2019 Yes    Diabetic ketoacidosis without coma associated with type 2 diabetes mellitus [E11.10] 02/19/2019 Yes    JAMES (acute kidney injury) [N17.9] 02/19/2019 Yes      Problems Resolved During this Admission:     Assessment and Plan:  1. Atrial Fibrillation              Persistent atrial fibrillation with fast VRR.              Uncertain duration.              [id number] (CHA2DV).              2/21/2019: Plan was DILLAN guided CV but patient did not want to proceed.   Rate control with amiodarone, digoxin and very cautious dose of metoprolol.   2/25/2019: Plan DILLAN guided CV.     2. Chronic Anticoagulation              [id number] (CHA2DV).              On heparin iv.              Lifelong anticoagulation.   2/23/2019: Change to apixiban 5 mg Q12.     3. Heart Failure, Systolic, Acute on Chronic              2/20/2019: Echo: Mildly dilated left ventricle with severe left ventricular dysfunction. EF 15%. Moderately dilated LA. Moderate RV dysfunction.              Maybe at least partially tachycardia induced.              Betablockade, ACEI and spironolactone long term.              Consider cath at later date.   On metoprolol 6.25 mg Q12.   Will increase metoprolol  very cautiously.     4. Coronary Artery Disease              2000: [facility]: MI and stent.              Clinically stable.     5. Hypertension              Appears to have been mild.     6. Diabetes Mellitus, Type 2              2015: Diagnosed. Complications: CAD. Medications: Diet.              2/19/2019: DKA.     7. Primary Care              In transition.    The planned procedure was discussed in detail with the patient and the family members present. Risk, benefit and alternatives were reviewed. All questions answered. Consent was then signed.    If further questions or concerns arise the patient was encouraged to contact me prior to the planned procedure.       VTE Risk Mitigation (From admission, onward)        Ordered     heparin 25,000 units in dextrose 5% 250 mL (100 units/mL) infusion LOW INTENSITY nomogram - [facility]  Continuous      02/20/19 0514     heparin 25,000 units in dextrose 5% (100 units/ml) IV bolus from bag - ADDITIONAL PRN BOLUS - 60 units/kg (max bolus 4000 units)  As needed (PRN)      02/20/19 0514     heparin 25,000 units in dextrose 5% (100 units/ml) IV bolus from bag - ADDITIONAL PRN BOLUS - 30 units/kg (max bolus 4000 units)  As needed (PRN)      02/20/19 0514     IP VTE HIGH RISK PATIENT  Once      02/20/19 0135     Place sequential compression device  Until discontinued      02/20/19 0135     Place sequential compression device  Until discontinued      02/19/19 2054          Panchito Joseph MD  Cardiology  [facility]

## 2019-02-22 NOTE — PROGRESS NOTES
[facility] Hospital Medicine  Progress Note    Patient Name: Randy Camejo  MRN: [id number]  Patient Class: IP- Inpatient   Admission Date: 2/19/2019  Length of Stay: 3 days  Attending Physician: Basil So MD  Primary Care Provider: Primary Doctor No        Subjective:     Principal Problem:Atrial fibrillation with RVR    HPI:  Mr. Randy Camejo is a 78 y.o. male, with PMH of NIDDM-2, CAD, who presented to [facility] ED on 2/19/19 2/2 abdominal pain x 5 days. Associated symptoms include N/V/D (all non-bloody), which has since resolved, except for the nausea. He was seen in Urgent Care PTA. In the ED, he was found to be tachycardic, and rhythm was A. Fib. He has never before been diagnosed with the same. He was given 2L bolus of IV fluids, then a dose if IV Cardizem, but rate was still elevated. He was started on a Cardizem drip, and admitted to the ICU.     Hospital Course:  No notes on file    Interval History: No acute events overnight.  Patient now comfortable going ahead with DILLAN/cardioversion but unclear when it can be performed.  Denies chest pain or sob.  HR still not well controlled.    Review of Systems   Constitutional: Negative for activity change and appetite change.   HENT: Negative for congestion and dental problem.    Eyes: Negative for discharge and itching.   Respiratory: Negative for apnea and chest tightness.    Cardiovascular: Negative for chest pain and leg swelling.   Gastrointestinal: Negative for abdominal distention and abdominal pain.   Endocrine: Negative for cold intolerance and heat intolerance.   Genitourinary: Negative for difficulty urinating and dysuria.   Musculoskeletal: Negative for arthralgias and back pain.   Skin: Negative for color change and pallor.   Neurological: Negative for dizziness and facial asymmetry.   Hematological: Negative for adenopathy.     Objective:     Vital Signs (Most Recent):  Temp: 98.1 °F (36.7 °C) (02/22/19 0312)  Pulse:  (!) 124 (02/22/19 0700)  Resp: 16 (02/22/19 0700)  BP: 106/74 (02/22/19 0700)  SpO2: 96 % (02/22/19 0700) Vital Signs (24h Range):  Temp:  [97.2 °F (36.2 °C)-98.6 °F (37 °C)] 98.1 °F (36.7 °C)  Pulse:  [112-132] 124  Resp:  [12-29] 16  SpO2:  [92 %-98 %] 96 %  BP: (106-158)/(59-96) 106/74     Weight: 87.8 kg (193 lb 9 oz)  Body mass index is 27 kg/m².    Intake/Output Summary (Last 24 hours) at 2/22/2019 0806  Last data filed at 2/22/2019 0600  Gross per 24 hour   Intake 792 ml   Output 2000 ml   Net -1208 ml      Physical Exam   Constitutional: He is oriented to person, place, and time. He appears well-developed and well-nourished.   HENT:   Head: Normocephalic and atraumatic.   Eyes: EOM are normal. Pupils are equal, round, and reactive to light.   Neck: Normal range of motion. Neck supple.   Cardiovascular: Normal rate and normal heart sounds.   Irregularly irregular   Pulmonary/Chest: Effort normal and breath sounds normal. No respiratory distress.   Abdominal: Soft. Bowel sounds are normal. He exhibits no distension. There is no tenderness.   Musculoskeletal: Normal range of motion. He exhibits no edema.   Neurological: He is oriented to person, place, and time. No cranial nerve deficit. Coordination normal.   Skin: Skin is warm and dry.   Psychiatric: He has a normal mood and affect. His behavior is normal.   Vitals reviewed.      Significant Labs: All pertinent labs within the past 24 hours have been reviewed.    Significant Imaging: I have reviewed and interpreted all pertinent imaging results/findings within the past 24 hours.    Assessment/Plan:      * Atrial fibrillation with RVR    -Mr. Camejo was admitted to ICU status  -He was placed on cardizem, amiodarone and heparin drips.  -He has been transitioned to oral amiodarone and metoprolol with moderate rate control.  -He remains on heparin drip.  -Echo obtained and shows EF 15%  -this is he first known episode of A. Fib  -Wasn't comfortable going ahead  with cardioversion yesterday, but appears amenable to this today.  Await input from Dr. Joseph on possibility of timing for this.  -Dr. Joseph on board and input appreciated.       Acute systolic heart failure    -No previous history of CHF noted.  -Now with new onset afib rvr and systolic dysfunction.    -Diminished EF could be related to his tachycardia  -He was hypovolemic on admit due to multiple days of diminished oral intake.  Presently net negative 100cc on this hospitalization  -Continue gentle IV fluids for now.  -Will likely need acei, beta-blocker and lasix initiation prior to discharge  -Appreciate input of Dr. Joseph.       Diabetic ketoacidosis without coma associated with type 2 diabetes mellitus    -Not on treatment at home  -A1c found to be 12.6  -On admit he had hyperglycemia, elevated beta-hydroxybutyrate and minimally elevated anion-gap  -He was treated with insulin drip and transitioned to subQ insulin on 2/20  -When eating after procedure will increase detemir to 15 units and continue insulin sliding scale  -Will require diabetic and insulin teaching prior to discharge     Acute hypoxemic respiratory failure    -Desat requiring VM noted.  Likely due to transient hypoxia with afib/rvr as well as sytolic chf  -We have quickly been able to transition to nasal canula  -I consulted early Pulm critical care on 2/20 incase he deteriorated and required intubation, but fortunately he has done very well  -Appreciate input from pulm critical care.     JAMES (acute kidney injury)    -suspect this is 2/2 significant dehydration from diminished oral intake as well as autodiuresis from DKA  -Cr much improved at 1.1 this morning.  -Repeat labs today and in AM.     Lactic acidosis    -On admit patient had lactic acidosis with anion gap acidosis.  -lactate 4.0 on admit and repeated at 1.6  -Suspect this was primarily due to DKA with dehydration.         Thrombocytopenia    -Etiology unclear but was  present on admission  -No evidence of bleeding  -Platelets still above 100k  -Monitor closely while on heparin drip.         VTE Risk Mitigation (From admission, onward)        Ordered     heparin 25,000 units in dextrose 5% 250 mL (100 units/mL) infusion LOW INTENSITY nomogram - [facility]  Continuous      02/20/19 0514     heparin 25,000 units in dextrose 5% (100 units/ml) IV bolus from bag - ADDITIONAL PRN BOLUS - 60 units/kg (max bolus 4000 units)  As needed (PRN)      02/20/19 0514     heparin 25,000 units in dextrose 5% (100 units/ml) IV bolus from bag - ADDITIONAL PRN BOLUS - 30 units/kg (max bolus 4000 units)  As needed (PRN)      02/20/19 0514     IP VTE HIGH RISK PATIENT  Once      02/20/19 0135     Place sequential compression device  Until discontinued      02/20/19 0135     Place sequential compression device  Until discontinued      02/19/19 2054              Basil So MD  Department of Hospital Medicine   [facility]

## 2019-02-22 NOTE — CARE UPDATE
Patient remained in A fib throughout the night.  Agreed to Cardioversion.  PTT 47.4 and within therapeutic range this AM.  No changes necessary.  All other vss.  No acute distress noted.  Patient moved independently throughout the night.

## 2019-02-22 NOTE — SUBJECTIVE & OBJECTIVE
Interval History: No acute events overnight.  Patient now comfortable going ahead with DILLAN/cardioversion but unclear when it can be performed.  Denies chest pain or sob.  HR still not well controlled.    Review of Systems   Constitutional: Negative for activity change and appetite change.   HENT: Negative for congestion and dental problem.    Eyes: Negative for discharge and itching.   Respiratory: Negative for apnea and chest tightness.    Cardiovascular: Negative for chest pain and leg swelling.   Gastrointestinal: Negative for abdominal distention and abdominal pain.   Endocrine: Negative for cold intolerance and heat intolerance.   Genitourinary: Negative for difficulty urinating and dysuria.   Musculoskeletal: Negative for arthralgias and back pain.   Skin: Negative for color change and pallor.   Neurological: Negative for dizziness and facial asymmetry.   Hematological: Negative for adenopathy.     Objective:     Vital Signs (Most Recent):  Temp: 98.1 °F (36.7 °C) (02/22/19 0312)  Pulse: (!) 124 (02/22/19 0700)  Resp: 16 (02/22/19 0700)  BP: 106/74 (02/22/19 0700)  SpO2: 96 % (02/22/19 0700) Vital Signs (24h Range):  Temp:  [97.2 °F (36.2 °C)-98.6 °F (37 °C)] 98.1 °F (36.7 °C)  Pulse:  [112-132] 124  Resp:  [12-29] 16  SpO2:  [92 %-98 %] 96 %  BP: (106-158)/(59-96) 106/74     Weight: 87.8 kg (193 lb 9 oz)  Body mass index is 27 kg/m².    Intake/Output Summary (Last 24 hours) at 2/22/2019 0806  Last data filed at 2/22/2019 0600  Gross per 24 hour   Intake 792 ml   Output 2000 ml   Net -1208 ml      Physical Exam   Constitutional: He is oriented to person, place, and time. He appears well-developed and well-nourished.   HENT:   Head: Normocephalic and atraumatic.   Eyes: EOM are normal. Pupils are equal, round, and reactive to light.   Neck: Normal range of motion. Neck supple.   Cardiovascular: Normal rate and normal heart sounds.   Irregularly irregular   Pulmonary/Chest: Effort normal and breath sounds  normal. No respiratory distress.   Abdominal: Soft. Bowel sounds are normal. He exhibits no distension. There is no tenderness.   Musculoskeletal: Normal range of motion. He exhibits no edema.   Neurological: He is oriented to person, place, and time. No cranial nerve deficit. Coordination normal.   Skin: Skin is warm and dry.   Psychiatric: He has a normal mood and affect. His behavior is normal.   Vitals reviewed.      Significant Labs: All pertinent labs within the past 24 hours have been reviewed.    Significant Imaging: I have reviewed and interpreted all pertinent imaging results/findings within the past 24 hours.

## 2019-02-22 NOTE — ASSESSMENT & PLAN NOTE
-Not on treatment at home  -A1c found to be 12.6  -On admit he had hyperglycemia, elevated beta-hydroxybutyrate and minimally elevated anion-gap  -He was treated with insulin drip and transitioned to subQ insulin on 2/20  -When eating after procedure will increase detemir to 15 units and continue insulin sliding scale  -Will require diabetic and insulin teaching prior to discharge   Statement Selected

## 2019-02-22 NOTE — PLAN OF CARE
Problem: Adult Inpatient Plan of Care  Goal: Plan of Care Review  Outcome: Ongoing (interventions implemented as appropriate)  Patient in no apparent distress. Sat's 95  % on room air. Will continue to monitor.

## 2019-02-22 NOTE — PLAN OF CARE
Problem: Adult Inpatient Plan of Care  Goal: Plan of Care Review  Outcome: Ongoing (interventions implemented as appropriate)  Pt received on 2LNC with adequate saturation. No acute distress noted at this time.

## 2019-02-22 NOTE — NURSING
Patient sitting up in the bedside chair and eating supper. In no distress with no complaints of shortness of breath or chest pain. Some bruising noted to the arms and hands with bilateral swelling to the hands elevated on a pillow. Patient needs encouragement to do tasks for himself. Patient and daughter at the bedside would like to proceed with cardioversion and DILLAN unsure when that will take place will let Dr Joseph know in the am.

## 2019-02-22 NOTE — ASSESSMENT & PLAN NOTE
-Mr. Camejo was admitted to ICU status  -He was placed on cardizem, amiodarone and heparin drips.  -He has been transitioned to oral amiodarone and metoprolol with moderate rate control.  -He remains on heparin drip.  -Echo obtained and shows EF 15%  -this is he first known episode of A. Fib  -Wasn't comfortable going ahead with cardioversion yesterday, but appears amenable to this today.  Await input from Dr. Joseph on possibility of timing for this.  -Dr. Joseph on board and input appreciated.

## 2019-02-22 NOTE — ASSESSMENT & PLAN NOTE
-suspect this is 2/2 significant dehydration from diminished oral intake as well as autodiuresis from DKA  -Cr much improved at 1.1 this morning.  -Repeat labs today and in AM.

## 2019-02-22 NOTE — ASSESSMENT & PLAN NOTE
-Etiology unclear but was present on admission  -No evidence of bleeding  -Platelets still above 100k  -Monitor closely while on heparin drip.

## 2019-02-23 LAB
ANION GAP SERPL CALC-SCNC: 9 MMOL/L
APTT BLDCRRT: 34.5 SEC
BASOPHILS # BLD AUTO: 0.02 K/UL
BASOPHILS NFR BLD: 0.2 %
BUN SERPL-MCNC: 24 MG/DL
CALCIUM SERPL-MCNC: 8 MG/DL
CHLORIDE SERPL-SCNC: 105 MMOL/L
CO2 SERPL-SCNC: 21 MMOL/L
CREAT SERPL-MCNC: 1 MG/DL
DIFFERENTIAL METHOD: ABNORMAL
EOSINOPHIL # BLD AUTO: 0.2 K/UL
EOSINOPHIL NFR BLD: 2.4 %
ERYTHROCYTE [DISTWIDTH] IN BLOOD BY AUTOMATED COUNT: 13.3 %
EST. GFR  (AFRICAN AMERICAN): >60 ML/MIN/1.73 M^2
EST. GFR  (NON AFRICAN AMERICAN): >60 ML/MIN/1.73 M^2
GLUCOSE SERPL-MCNC: 166 MG/DL
HCT VFR BLD AUTO: 42.5 %
HGB BLD-MCNC: 14.3 G/DL
LYMPHOCYTES # BLD AUTO: 0.9 K/UL
LYMPHOCYTES NFR BLD: 10.6 %
MAGNESIUM SERPL-MCNC: 2.2 MG/DL
MCH RBC QN AUTO: 32.1 PG
MCHC RBC AUTO-ENTMCNC: 33.6 G/DL
MCV RBC AUTO: 95 FL
MONOCYTES # BLD AUTO: 0.9 K/UL
MONOCYTES NFR BLD: 10.3 %
NEUTROPHILS # BLD AUTO: 6.6 K/UL
NEUTROPHILS NFR BLD: 76.2 %
PLATELET # BLD AUTO: 117 K/UL
PMV BLD AUTO: 11.7 FL
POCT GLUCOSE: 154 MG/DL (ref 70–110)
POCT GLUCOSE: 161 MG/DL (ref 70–110)
POCT GLUCOSE: 243 MG/DL (ref 70–110)
POCT GLUCOSE: 244 MG/DL (ref 70–110)
POCT GLUCOSE: 253 MG/DL (ref 70–110)
POTASSIUM SERPL-SCNC: 3.9 MMOL/L
RBC # BLD AUTO: 4.46 M/UL
SODIUM SERPL-SCNC: 135 MMOL/L
WBC # BLD AUTO: 8.67 K/UL

## 2019-02-23 PROCEDURE — 27000221 HC OXYGEN, UP TO 24 HOURS

## 2019-02-23 PROCEDURE — 83735 ASSAY OF MAGNESIUM: CPT

## 2019-02-23 PROCEDURE — 63600175 PHARM REV CODE 636 W HCPCS: Performed by: HOSPITALIST

## 2019-02-23 PROCEDURE — 99233 SBSQ HOSP IP/OBS HIGH 50: CPT | Mod: ,,, | Performed by: HOSPITALIST

## 2019-02-23 PROCEDURE — 99233 PR SUBSEQUENT HOSPITAL CARE,LEVL III: ICD-10-PCS | Mod: ,,, | Performed by: HOSPITALIST

## 2019-02-23 PROCEDURE — 25000003 PHARM REV CODE 250: Performed by: PHYSICIAN ASSISTANT

## 2019-02-23 PROCEDURE — 85025 COMPLETE CBC W/AUTO DIFF WBC: CPT

## 2019-02-23 PROCEDURE — 85730 THROMBOPLASTIN TIME PARTIAL: CPT

## 2019-02-23 PROCEDURE — 80048 BASIC METABOLIC PNL TOTAL CA: CPT

## 2019-02-23 PROCEDURE — 36415 COLL VENOUS BLD VENIPUNCTURE: CPT

## 2019-02-23 PROCEDURE — 94761 N-INVAS EAR/PLS OXIMETRY MLT: CPT

## 2019-02-23 PROCEDURE — C9113 INJ PANTOPRAZOLE SODIUM, VIA: HCPCS | Performed by: HOSPITALIST

## 2019-02-23 PROCEDURE — 25000003 PHARM REV CODE 250: Performed by: HOSPITALIST

## 2019-02-23 PROCEDURE — 25000003 PHARM REV CODE 250: Performed by: INTERNAL MEDICINE

## 2019-02-23 PROCEDURE — 20000000 HC ICU ROOM

## 2019-02-23 RX ORDER — METOPROLOL TARTRATE 25 MG/1
12.5 TABLET ORAL 4 TIMES DAILY
Status: DISCONTINUED | OUTPATIENT
Start: 2019-02-23 | End: 2019-02-25

## 2019-02-23 RX ORDER — PANTOPRAZOLE SODIUM 40 MG/10ML
40 INJECTION, POWDER, LYOPHILIZED, FOR SOLUTION INTRAVENOUS DAILY
Status: DISCONTINUED | OUTPATIENT
Start: 2019-02-23 | End: 2019-02-25

## 2019-02-23 RX ORDER — CALCIUM CARBONATE 200(500)MG
1000 TABLET,CHEWABLE ORAL 4 TIMES DAILY PRN
Status: DISCONTINUED | OUTPATIENT
Start: 2019-02-23 | End: 2019-03-01 | Stop reason: HOSPADM

## 2019-02-23 RX ADMIN — INSULIN ASPART 4 UNITS: 100 INJECTION, SOLUTION INTRAVENOUS; SUBCUTANEOUS at 11:02

## 2019-02-23 RX ADMIN — APIXABAN 5 MG: 2.5 TABLET, FILM COATED ORAL at 09:02

## 2019-02-23 RX ADMIN — AMIODARONE HYDROCHLORIDE 200 MG: 200 TABLET ORAL at 11:02

## 2019-02-23 RX ADMIN — CALCIUM CARBONATE 1000 MG: 500 TABLET, CHEWABLE ORAL at 07:02

## 2019-02-23 RX ADMIN — INSULIN ASPART 4 UNITS: 100 INJECTION, SOLUTION INTRAVENOUS; SUBCUTANEOUS at 05:02

## 2019-02-23 RX ADMIN — METOPROLOL TARTRATE 12.5 MG: 25 TABLET, FILM COATED ORAL at 09:02

## 2019-02-23 RX ADMIN — DIGOXIN 0.25 MG: 125 TABLET ORAL at 09:02

## 2019-02-23 RX ADMIN — METOPROLOL TARTRATE 12.5 MG: 25 TABLET, FILM COATED ORAL at 01:02

## 2019-02-23 RX ADMIN — PANTOPRAZOLE SODIUM 40 MG: 40 INJECTION, POWDER, LYOPHILIZED, FOR SOLUTION INTRAVENOUS at 07:02

## 2019-02-23 RX ADMIN — INSULIN ASPART 3 UNITS: 100 INJECTION, SOLUTION INTRAVENOUS; SUBCUTANEOUS at 09:02

## 2019-02-23 RX ADMIN — METOPROLOL TARTRATE 12.5 MG: 25 TABLET, FILM COATED ORAL at 05:02

## 2019-02-23 RX ADMIN — AMIODARONE HYDROCHLORIDE 200 MG: 200 TABLET ORAL at 06:02

## 2019-02-23 RX ADMIN — AMIODARONE HYDROCHLORIDE 200 MG: 200 TABLET ORAL at 05:02

## 2019-02-23 RX ADMIN — ONDANSETRON 4 MG: 4 TABLET, ORALLY DISINTEGRATING ORAL at 05:02

## 2019-02-23 RX ADMIN — INSULIN ASPART 2 UNITS: 100 INJECTION, SOLUTION INTRAVENOUS; SUBCUTANEOUS at 07:02

## 2019-02-23 NOTE — ASSESSMENT & PLAN NOTE
-Etiology unclear but was present on admission  -No evidence of bleeding  -Platelets still above 100k  -Monitor closely while on anticoagulation

## 2019-02-23 NOTE — ASSESSMENT & PLAN NOTE
-Mr. Camejo was admitted to ICU status  -He was placed on cardizem, amiodarone and heparin drips.  -He has been transitioned to oral amiodarone and metoprolol with suboptimal rate control.  -He remains on heparin drip.  -Echo obtained and shows EF 15%  -this is he first known episode of A. Fib  -Plan DILLAN/cardioversion on Monday  -Will sliglhtly increase dose of metoprolol with hold parameters placed.  Need to increase cautiously.  -As long as sub-optimal rate control we need to continue care in the ICU  -Dr. Joseph on board and input appreciated.

## 2019-02-23 NOTE — ASSESSMENT & PLAN NOTE
-No previous history of CHF noted.  -Now with new onset afib rvr and systolic dysfunction.    -Diminished EF could be related to his tachycardia  -He was hypovolemic on admit due to multiple days of diminished oral intake.  Presently net positive about 100cc on this hospitalization  -Continues on metoprolol for now.  -May need acei and lasix initiation prior to discharge depending on EF after cardioversion.  -Appreciate input of Dr. Joseph.

## 2019-02-23 NOTE — PLAN OF CARE
Problem: Adult Inpatient Plan of Care  Goal: Plan of Care Review  Outcome: Ongoing (interventions implemented as appropriate)  Patient on 2L nc. Sats 94%. Pt. in no distress, will continue to monitor.

## 2019-02-23 NOTE — SUBJECTIVE & OBJECTIVE
Interval History: No acute events overnight.  Transitioned from heparin drip to apixaban last night.  Still sub-optimal rate control.  Denies cp and sob.    Review of Systems   Constitutional: Negative for activity change and appetite change.   HENT: Negative for congestion and dental problem.    Eyes: Negative for discharge and itching.   Respiratory: Negative for apnea and chest tightness.    Cardiovascular: Negative for chest pain and leg swelling.   Gastrointestinal: Negative for abdominal distention and abdominal pain.   Endocrine: Negative for cold intolerance and heat intolerance.   Genitourinary: Negative for difficulty urinating and dysuria.   Musculoskeletal: Negative for arthralgias and back pain.   Skin: Negative for color change and pallor.   Neurological: Negative for dizziness and facial asymmetry.   Hematological: Negative for adenopathy.     Objective:     Vital Signs (Most Recent):  Temp: 97.8 °F (36.6 °C) (02/23/19 0305)  Pulse: (!) 114 (02/23/19 0643)  Resp: 17 (02/23/19 0643)  BP: 130/77 (02/23/19 0600)  SpO2: 97 % (02/23/19 0643) Vital Signs (24h Range):  Temp:  [97.8 °F (36.6 °C)-98.1 °F (36.7 °C)] 97.8 °F (36.6 °C)  Pulse:  [114-124] 114  Resp:  [12-28] 17  SpO2:  [93 %-98 %] 97 %  BP: (106-156)/() 130/77     Weight: 86.9 kg (191 lb 9.3 oz)  Body mass index is 26.72 kg/m².    Intake/Output Summary (Last 24 hours) at 2/23/2019 0752  Last data filed at 2/23/2019 0345  Gross per 24 hour   Intake 884 ml   Output 1700 ml   Net -816 ml      Physical Exam   Constitutional: He is oriented to person, place, and time. He appears well-developed and well-nourished.   HENT:   Head: Normocephalic and atraumatic.   Eyes: EOM are normal. Pupils are equal, round, and reactive to light.   Neck: Normal range of motion. Neck supple.   Cardiovascular: Normal rate and normal heart sounds.   Irregularly irregular   Pulmonary/Chest: Effort normal and breath sounds normal. No respiratory distress.   Abdominal:  Soft. Bowel sounds are normal. He exhibits no distension. There is no tenderness.   Musculoskeletal: Normal range of motion. He exhibits no edema.   Neurological: He is oriented to person, place, and time. No cranial nerve deficit. Coordination normal.   Skin: Skin is warm and dry.   Psychiatric: He has a normal mood and affect. His behavior is normal.   Vitals reviewed.      Significant Labs: All pertinent labs within the past 24 hours have been reviewed.    Significant Imaging: I have reviewed and interpreted all pertinent imaging results/findings within the past 24 hours.

## 2019-02-23 NOTE — PROGRESS NOTES
Sitting up, out of bed, eating lunch.  Says he feels well. Denies breathlessness.    Vitals:    02/23/19 0515 02/23/19 0600 02/23/19 0643 02/23/19 0758   BP:  130/77     Pulse:  (!) 124 (!) 114 (!) 124   Resp:  14 17 20   Temp:       TempSrc:       SpO2:  98% 97% (!) 89%   Weight: 86.9 kg (191 lb 9.3 oz)      Height:         Chest clear  Cor: no gallop    Imp: AF with rapid VR, poor LV systolic function.  Plan: Continue Amiodarone 200 po tid, CV on Monday.

## 2019-02-23 NOTE — ASSESSMENT & PLAN NOTE
-Not on treatment at home  -A1c found to be 12.6  -On admit he had hyperglycemia, elevated beta-hydroxybutyrate and minimally elevated anion-gap  -He was treated with insulin drip and transitioned to subQ insulin on 2/20  -Sugars improving.  Will increase detemir to 15 qhs.  -Will require diabetic and insulin teaching prior to discharge

## 2019-02-23 NOTE — ASSESSMENT & PLAN NOTE
-suspect this is 2/2 significant dehydration from diminished oral intake as well as autodiuresis from DKA  -Cr much improved at 1.0 this morning.  -Repeat labs in AM.

## 2019-02-23 NOTE — PROGRESS NOTES
[facility] Hospital Medicine  Progress Note    Patient Name: Randy Camejo  MRN: [id number]  Patient Class: IP- Inpatient   Admission Date: 2/19/2019  Length of Stay: 4 days  Attending Physician: Basil So MD  Primary Care Provider: Primary Doctor No        Subjective:     Principal Problem:Atrial fibrillation with RVR    HPI:  Mr. Randy Camejo is a 78 y.o. male, with PMH of NIDDM-2, CAD, who presented to [facility] ED on 2/19/19 2/2 abdominal pain x 5 days. Associated symptoms include N/V/D (all non-bloody), which has since resolved, except for the nausea. He was seen in Urgent Care PTA. In the ED, he was found to be tachycardic, and rhythm was A. Fib. He has never before been diagnosed with the same. He was given 2L bolus of IV fluids, then a dose if IV Cardizem, but rate was still elevated. He was started on a Cardizem drip, and admitted to the ICU.     Hospital Course:  No notes on file    Interval History: No acute events overnight.  Transitioned from heparin drip to apixaban last night.  Still sub-optimal rate control.  Denies cp and sob.    Review of Systems   Constitutional: Negative for activity change and appetite change.   HENT: Negative for congestion and dental problem.    Eyes: Negative for discharge and itching.   Respiratory: Negative for apnea and chest tightness.    Cardiovascular: Negative for chest pain and leg swelling.   Gastrointestinal: Negative for abdominal distention and abdominal pain.   Endocrine: Negative for cold intolerance and heat intolerance.   Genitourinary: Negative for difficulty urinating and dysuria.   Musculoskeletal: Negative for arthralgias and back pain.   Skin: Negative for color change and pallor.   Neurological: Negative for dizziness and facial asymmetry.   Hematological: Negative for adenopathy.     Objective:     Vital Signs (Most Recent):  Temp: 97.8 °F (36.6 °C) (02/23/19 0305)  Pulse: (!) 114 (02/23/19 0643)  Resp: 17 (02/23/19  0643)  BP: 130/77 (02/23/19 0600)  SpO2: 97 % (02/23/19 0643) Vital Signs (24h Range):  Temp:  [97.8 °F (36.6 °C)-98.1 °F (36.7 °C)] 97.8 °F (36.6 °C)  Pulse:  [114-124] 114  Resp:  [12-28] 17  SpO2:  [93 %-98 %] 97 %  BP: (106-156)/() 130/77     Weight: 86.9 kg (191 lb 9.3 oz)  Body mass index is 26.72 kg/m².    Intake/Output Summary (Last 24 hours) at 2/23/2019 0752  Last data filed at 2/23/2019 0345  Gross per 24 hour   Intake 884 ml   Output 1700 ml   Net -816 ml      Physical Exam   Constitutional: He is oriented to person, place, and time. He appears well-developed and well-nourished.   HENT:   Head: Normocephalic and atraumatic.   Eyes: EOM are normal. Pupils are equal, round, and reactive to light.   Neck: Normal range of motion. Neck supple.   Cardiovascular: Normal rate and normal heart sounds.   Irregularly irregular   Pulmonary/Chest: Effort normal and breath sounds normal. No respiratory distress.   Abdominal: Soft. Bowel sounds are normal. He exhibits no distension. There is no tenderness.   Musculoskeletal: Normal range of motion. He exhibits no edema.   Neurological: He is oriented to person, place, and time. No cranial nerve deficit. Coordination normal.   Skin: Skin is warm and dry.   Psychiatric: He has a normal mood and affect. His behavior is normal.   Vitals reviewed.      Significant Labs: All pertinent labs within the past 24 hours have been reviewed.    Significant Imaging: I have reviewed and interpreted all pertinent imaging results/findings within the past 24 hours.    Assessment/Plan:      * Atrial fibrillation with RVR    -Mr. Camejo was admitted to ICU status  -He was placed on cardizem, amiodarone and heparin drips.  -He has been transitioned to oral amiodarone and metoprolol with suboptimal rate control.  -He remains on heparin drip.  -Echo obtained and shows EF 15%  -this is he first known episode of A. Fib  -Plan DILLAN/cardioversion on Monday  -Will sliglhtly increase dose of  metoprolol with hold parameters placed.  Need to increase cautiously.  -As long as sub-optimal rate control we need to continue care in the ICU  -Dr. Joseph on board and input appreciated.       Acute systolic heart failure    -No previous history of CHF noted.  -Now with new onset afib rvr and systolic dysfunction.    -Diminished EF could be related to his tachycardia  -He was hypovolemic on admit due to multiple days of diminished oral intake.  Presently net positive about 100cc on this hospitalization  -Continues on metoprolol for now.  -May need acei and lasix initiation prior to discharge depending on EF after cardioversion.  -Appreciate input of Dr. Joseph.       Diabetic ketoacidosis without coma associated with type 2 diabetes mellitus    -Not on treatment at home  -A1c found to be 12.6  -On admit he had hyperglycemia, elevated beta-hydroxybutyrate and minimally elevated anion-gap  -He was treated with insulin drip and transitioned to subQ insulin on 2/20  -Sugars improving.  Will increase detemir to 15 qhs.  -Will require diabetic and insulin teaching prior to discharge     Acute hypoxemic respiratory failure    -Desat requiring VM noted.  Likely due to transient hypoxia with afib/rvr as well as sytolic chf  -We have quickly been able to transition to nasal canula  -I consulted early Pulm critical care on 2/20 incase he deteriorated and required intubation, but fortunately he has done very well  -Appreciate input from pulm critical care.     JAMES (acute kidney injury)    -suspect this is 2/2 significant dehydration from diminished oral intake as well as autodiuresis from DKA  -Cr much improved at 1.0 this morning.  -Repeat labs in AM.     Lactic acidosis    -On admit patient had lactic acidosis with anion gap acidosis.  -lactate 4.0 on admit and repeated at 1.6  -Suspect this was primarily due to DKA with dehydration.         Thrombocytopenia    -Etiology unclear but was present on admission  -No  evidence of bleeding  -Platelets still above 100k  -Monitor closely while on anticoagulation       VTE Risk Mitigation (From admission, onward)        Ordered     apixaban tablet 5 mg  2 times daily      02/22/19 1555     heparin 25,000 units in dextrose 5% 250 mL (100 units/mL) infusion LOW INTENSITY nomogram - [facility]  Continuous      02/20/19 0514     heparin 25,000 units in dextrose 5% (100 units/ml) IV bolus from bag - ADDITIONAL PRN BOLUS - 60 units/kg (max bolus 4000 units)  As needed (PRN)      02/20/19 0514     heparin 25,000 units in dextrose 5% (100 units/ml) IV bolus from bag - ADDITIONAL PRN BOLUS - 30 units/kg (max bolus 4000 units)  As needed (PRN)      02/20/19 0514     IP VTE HIGH RISK PATIENT  Once      02/20/19 0135     Place sequential compression device  Until discontinued      02/20/19 0135     Place sequential compression device  Until discontinued      02/19/19 2054              Basil So MD  Department of Hospital Medicine   [facility]

## 2019-02-23 NOTE — PLAN OF CARE
Problem: Adult Inpatient Plan of Care  Goal: Plan of Care Review  Outcome: Ongoing (interventions implemented as appropriate)  Patient remains in AFIB. Heparin drip stopped at 2200 per MD order, apixaban started. Patient denies any pain or discomfort. Patient with adequate urine output throughout the night. Patient remains free from any falls or injury. RN will continue to monitor closely.

## 2019-02-24 PROBLEM — I73.9 PAD (PERIPHERAL ARTERY DISEASE): Status: ACTIVE | Noted: 2019-02-24

## 2019-02-24 PROBLEM — L84 PRE-ULCERATIVE CORN OR CALLOUS: Status: ACTIVE | Noted: 2019-02-24

## 2019-02-24 LAB
ANION GAP SERPL CALC-SCNC: 9 MMOL/L
APTT BLDCRRT: 26.4 SEC
BASOPHILS # BLD AUTO: 0.01 K/UL
BASOPHILS NFR BLD: 0.1 %
BUN SERPL-MCNC: 22 MG/DL
CALCIUM SERPL-MCNC: 8.1 MG/DL
CHLORIDE SERPL-SCNC: 105 MMOL/L
CO2 SERPL-SCNC: 22 MMOL/L
CREAT SERPL-MCNC: 1 MG/DL
DIFFERENTIAL METHOD: ABNORMAL
EOSINOPHIL # BLD AUTO: 0.3 K/UL
EOSINOPHIL NFR BLD: 3.4 %
ERYTHROCYTE [DISTWIDTH] IN BLOOD BY AUTOMATED COUNT: 13.3 %
EST. GFR  (AFRICAN AMERICAN): >60 ML/MIN/1.73 M^2
EST. GFR  (NON AFRICAN AMERICAN): >60 ML/MIN/1.73 M^2
GLUCOSE SERPL-MCNC: 186 MG/DL
HCT VFR BLD AUTO: 42.9 %
HGB BLD-MCNC: 14.5 G/DL
LYMPHOCYTES # BLD AUTO: 1.1 K/UL
LYMPHOCYTES NFR BLD: 14.2 %
MAGNESIUM SERPL-MCNC: 2.2 MG/DL
MCH RBC QN AUTO: 32.2 PG
MCHC RBC AUTO-ENTMCNC: 33.8 G/DL
MCV RBC AUTO: 95 FL
MONOCYTES # BLD AUTO: 0.9 K/UL
MONOCYTES NFR BLD: 10.8 %
NEUTROPHILS # BLD AUTO: 5.7 K/UL
NEUTROPHILS NFR BLD: 71.1 %
PLATELET # BLD AUTO: 118 K/UL
PMV BLD AUTO: 11.2 FL
POCT GLUCOSE: 152 MG/DL (ref 70–110)
POCT GLUCOSE: 160 MG/DL (ref 70–110)
POCT GLUCOSE: 228 MG/DL (ref 70–110)
POCT GLUCOSE: 234 MG/DL (ref 70–110)
POTASSIUM SERPL-SCNC: 4.1 MMOL/L
RBC # BLD AUTO: 4.5 M/UL
SODIUM SERPL-SCNC: 136 MMOL/L
WBC # BLD AUTO: 8.04 K/UL

## 2019-02-24 PROCEDURE — C9113 INJ PANTOPRAZOLE SODIUM, VIA: HCPCS | Performed by: HOSPITALIST

## 2019-02-24 PROCEDURE — 99900035 HC TECH TIME PER 15 MIN (STAT)

## 2019-02-24 PROCEDURE — 25000003 PHARM REV CODE 250: Performed by: HOSPITALIST

## 2019-02-24 PROCEDURE — 20000000 HC ICU ROOM

## 2019-02-24 PROCEDURE — 99232 PR SUBSEQUENT HOSPITAL CARE,LEVL II: ICD-10-PCS | Mod: ,,, | Performed by: HOSPITALIST

## 2019-02-24 PROCEDURE — 99232 SBSQ HOSP IP/OBS MODERATE 35: CPT | Mod: ,,, | Performed by: HOSPITALIST

## 2019-02-24 PROCEDURE — 63600175 PHARM REV CODE 636 W HCPCS: Performed by: HOSPITALIST

## 2019-02-24 PROCEDURE — 94761 N-INVAS EAR/PLS OXIMETRY MLT: CPT

## 2019-02-24 PROCEDURE — 36415 COLL VENOUS BLD VENIPUNCTURE: CPT

## 2019-02-24 PROCEDURE — 51798 US URINE CAPACITY MEASURE: CPT

## 2019-02-24 PROCEDURE — 85730 THROMBOPLASTIN TIME PARTIAL: CPT

## 2019-02-24 PROCEDURE — 99222 PR INITIAL HOSPITAL CARE,LEVL II: ICD-10-PCS | Mod: ,,, | Performed by: PODIATRIST

## 2019-02-24 PROCEDURE — 99222 1ST HOSP IP/OBS MODERATE 55: CPT | Mod: ,,, | Performed by: PODIATRIST

## 2019-02-24 PROCEDURE — 85025 COMPLETE CBC W/AUTO DIFF WBC: CPT

## 2019-02-24 PROCEDURE — 80048 BASIC METABOLIC PNL TOTAL CA: CPT

## 2019-02-24 PROCEDURE — 83735 ASSAY OF MAGNESIUM: CPT

## 2019-02-24 PROCEDURE — 25000003 PHARM REV CODE 250: Performed by: INTERNAL MEDICINE

## 2019-02-24 PROCEDURE — S5571 INSULIN DISPOS PEN 3 ML: HCPCS | Performed by: HOSPITALIST

## 2019-02-24 RX ORDER — SODIUM CHLORIDE 9 MG/ML
INJECTION, SOLUTION INTRAVENOUS CONTINUOUS
Status: DISCONTINUED | OUTPATIENT
Start: 2019-02-25 | End: 2019-02-25

## 2019-02-24 RX ADMIN — INSULIN ASPART 2 UNITS: 100 INJECTION, SOLUTION INTRAVENOUS; SUBCUTANEOUS at 09:02

## 2019-02-24 RX ADMIN — APIXABAN 5 MG: 2.5 TABLET, FILM COATED ORAL at 08:02

## 2019-02-24 RX ADMIN — METOPROLOL TARTRATE 12.5 MG: 25 TABLET, FILM COATED ORAL at 02:02

## 2019-02-24 RX ADMIN — INSULIN ASPART 2 UNITS: 100 INJECTION, SOLUTION INTRAVENOUS; SUBCUTANEOUS at 04:02

## 2019-02-24 RX ADMIN — PANTOPRAZOLE SODIUM 40 MG: 40 INJECTION, POWDER, LYOPHILIZED, FOR SOLUTION INTRAVENOUS at 08:02

## 2019-02-24 RX ADMIN — AMIODARONE HYDROCHLORIDE 200 MG: 200 TABLET ORAL at 06:02

## 2019-02-24 RX ADMIN — INSULIN DETEMIR 5 UNITS: 100 INJECTION, SOLUTION SUBCUTANEOUS at 08:02

## 2019-02-24 RX ADMIN — CALCIUM CARBONATE 1000 MG: 500 TABLET, CHEWABLE ORAL at 08:02

## 2019-02-24 RX ADMIN — INSULIN ASPART 4 UNITS: 100 INJECTION, SOLUTION INTRAVENOUS; SUBCUTANEOUS at 11:02

## 2019-02-24 RX ADMIN — METOPROLOL TARTRATE 12.5 MG: 25 TABLET, FILM COATED ORAL at 08:02

## 2019-02-24 RX ADMIN — CALCIUM CARBONATE 1000 MG: 500 TABLET, CHEWABLE ORAL at 05:02

## 2019-02-24 RX ADMIN — AMIODARONE HYDROCHLORIDE 200 MG: 200 TABLET ORAL at 10:02

## 2019-02-24 RX ADMIN — INSULIN ASPART 2 UNITS: 100 INJECTION, SOLUTION INTRAVENOUS; SUBCUTANEOUS at 07:02

## 2019-02-24 RX ADMIN — AMIODARONE HYDROCHLORIDE 200 MG: 200 TABLET ORAL at 04:02

## 2019-02-24 RX ADMIN — METOPROLOL TARTRATE 12.5 MG: 25 TABLET, FILM COATED ORAL at 04:02

## 2019-02-24 RX ADMIN — DIGOXIN 0.25 MG: 125 TABLET ORAL at 08:02

## 2019-02-24 NOTE — ASSESSMENT & PLAN NOTE
-suspect this is 2/2 significant dehydration from diminished oral intake as well as autodiuresis from DKA  -Cr has normalized as of 2/23  -Repeat labs in AM.

## 2019-02-24 NOTE — ASSESSMENT & PLAN NOTE
Plan:  - Ordered Arterial US of b/l LE to evaluate for any significant stenoses. Recommend follow up with OP ICARDs or Vascular as needed.

## 2019-02-24 NOTE — SUBJECTIVE & OBJECTIVE
Interval History: No acute events overnight.  Still sub-optimal rate control.  Denies cp and sob.  Eating well.    Review of Systems   Constitutional: Negative for activity change and appetite change.   HENT: Negative for congestion and dental problem.    Eyes: Negative for discharge and itching.   Respiratory: Negative for apnea and chest tightness.    Cardiovascular: Negative for chest pain and leg swelling.   Gastrointestinal: Negative for abdominal distention and abdominal pain.   Endocrine: Negative for cold intolerance and heat intolerance.   Genitourinary: Negative for difficulty urinating and dysuria.   Musculoskeletal: Negative for arthralgias and back pain.   Skin: Negative for color change and pallor.   Neurological: Negative for dizziness and facial asymmetry.   Hematological: Negative for adenopathy.     Objective:     Vital Signs (Most Recent):  Temp: 98 °F (36.7 °C) (02/24/19 0315)  Pulse: 110 (02/24/19 0400)  Resp: 16 (02/24/19 0400)  BP: 113/78 (02/24/19 0600)  SpO2: 98 % (02/24/19 0400) Vital Signs (24h Range):  Temp:  [97.8 °F (36.6 °C)-98.2 °F (36.8 °C)] 98 °F (36.7 °C)  Pulse:  [110-124] 110  Resp:  [13-23] 16  SpO2:  [89 %-98 %] 98 %  BP: ()/(67-79) 113/78     Weight: 86.9 kg (191 lb 9.3 oz)  Body mass index is 26.72 kg/m².    Intake/Output Summary (Last 24 hours) at 2/24/2019 0744  Last data filed at 2/24/2019 0600  Gross per 24 hour   Intake 180 ml   Output 2275 ml   Net -2095 ml      Physical Exam   Constitutional: He is oriented to person, place, and time. He appears well-developed and well-nourished.   HENT:   Head: Normocephalic and atraumatic.   Eyes: EOM are normal. Pupils are equal, round, and reactive to light.   Neck: Normal range of motion. Neck supple.   Cardiovascular: Normal rate and normal heart sounds.   Irregularly irregular   Pulmonary/Chest: Effort normal and breath sounds normal. No respiratory distress.   Abdominal: Soft. Bowel sounds are normal. He exhibits no  distension. There is no tenderness.   Musculoskeletal: Normal range of motion. He exhibits no edema.   Neurological: He is oriented to person, place, and time. No cranial nerve deficit. Coordination normal.   Skin: Skin is warm and dry.   Psychiatric: He has a normal mood and affect. His behavior is normal.   Vitals reviewed.      Significant Labs: All pertinent labs within the past 24 hours have been reviewed.    Significant Imaging: I have reviewed and interpreted all pertinent imaging results/findings within the past 24 hours.

## 2019-02-24 NOTE — ASSESSMENT & PLAN NOTE
-No previous history of CHF noted.  -Now with new onset afib rvr and systolic dysfunction.    -Diminished EF could be related to his tachycardia  -He was hypovolemic on admit due to multiple days of diminished oral intake.  Presently net negative about 1.9L on this hospitalization  -Continues on metoprolol for now.  -May need acei and lasix initiation prior to discharge depending on EF after cardioversion.  -Appreciate input of Dr. Joseph.

## 2019-02-24 NOTE — ASSESSMENT & PLAN NOTE
-Was not on treatment at home  -A1c found to be 12.6  -On admit he had hyperglycemia, elevated beta-hydroxybutyrate and minimally elevated anion-gap  -He was treated with insulin drip and transitioned to subQ insulin on 2/20  -Sugars remain elevated.  Increased to detemir 15 qhs on 2/23.  Will be NPO tonight for cardiversion in AM.  Will give 5units detemir now and another 5 units tonight and continue insulin sliding scale.  When eating again after procedure tomorrow plan to increase to 20 units qhs.  Ultimately may need pre-prandial aspart as well.  -Will require diabetic and insulin teaching prior to discharge

## 2019-02-24 NOTE — HPI
Mr. Randy Camejo is a 78 y.o. male, with PMH of NIDDM-2, who presented to [facility] ED on 2/19/19 2/2 abdominal pain x 5 days. Patient was consulted to podiatry to evaluate his lower extremities with concern to the left 2nd toe. Patient reports being seen at an [facility] for cellulitis to the L 2nd toe and was prescribed mupiricin ointment and keflex. He denies any pedal pain and claudication.

## 2019-02-24 NOTE — PROGRESS NOTES
Comfortable. Says he is not short of breath.    Vitals:    02/24/19 0800 02/24/19 0900 02/24/19 1000 02/24/19 1007   BP: 113/72 110/76 118/80    Pulse: (!) 112 (!) 112 106 106   Resp: (!) 22 (!) 24 16 16   Temp:       TempSrc:       SpO2: 97% 97% 97% 97%   Weight:       Height:         Chest clear  Cor: Irregularly irregular. No murmur or gallop.  No ankle edema.    Labs reviewed.    For cardioversion in am tomorrow.

## 2019-02-24 NOTE — PROGRESS NOTES
[facility] Hospital Medicine  Progress Note    Patient Name: Randy Camejo  MRN: [id number]  Patient Class: IP- Inpatient   Admission Date: 2/19/2019  Length of Stay: 5 days  Attending Physician: Basil So MD  Primary Care Provider: Primary Doctor No        Subjective:     Principal Problem:Atrial fibrillation with RVR    HPI:  Mr. Randy Camejo is a 78 y.o. male, with PMH of NIDDM-2, CAD, who presented to [facility] ED on 2/19/19 2/2 abdominal pain x 5 days. Associated symptoms include N/V/D (all non-bloody), which has since resolved, except for the nausea. He was seen in Urgent Care PTA. In the ED, he was found to be tachycardic, and rhythm was A. Fib. He has never before been diagnosed with the same. He was given 2L bolus of IV fluids, then a dose if IV Cardizem, but rate was still elevated. He was started on a Cardizem drip, and admitted to the ICU.     Hospital Course:  No notes on file    Interval History: No acute events overnight.  Still sub-optimal rate control.  Denies cp and sob.  Eating well.    Review of Systems   Constitutional: Negative for activity change and appetite change.   HENT: Negative for congestion and dental problem.    Eyes: Negative for discharge and itching.   Respiratory: Negative for apnea and chest tightness.    Cardiovascular: Negative for chest pain and leg swelling.   Gastrointestinal: Negative for abdominal distention and abdominal pain.   Endocrine: Negative for cold intolerance and heat intolerance.   Genitourinary: Negative for difficulty urinating and dysuria.   Musculoskeletal: Negative for arthralgias and back pain.   Skin: Negative for color change and pallor.   Neurological: Negative for dizziness and facial asymmetry.   Hematological: Negative for adenopathy.     Objective:     Vital Signs (Most Recent):  Temp: 98 °F (36.7 °C) (02/24/19 0315)  Pulse: 110 (02/24/19 0400)  Resp: 16 (02/24/19 0400)  BP: 113/78 (02/24/19 0600)  SpO2: 98 %  (02/24/19 0400) Vital Signs (24h Range):  Temp:  [97.8 °F (36.6 °C)-98.2 °F (36.8 °C)] 98 °F (36.7 °C)  Pulse:  [110-124] 110  Resp:  [13-23] 16  SpO2:  [89 %-98 %] 98 %  BP: ()/(67-79) 113/78     Weight: 86.9 kg (191 lb 9.3 oz)  Body mass index is 26.72 kg/m².    Intake/Output Summary (Last 24 hours) at 2/24/2019 0744  Last data filed at 2/24/2019 0600  Gross per 24 hour   Intake 180 ml   Output 2275 ml   Net -2095 ml      Physical Exam   Constitutional: He is oriented to person, place, and time. He appears well-developed and well-nourished.   HENT:   Head: Normocephalic and atraumatic.   Eyes: EOM are normal. Pupils are equal, round, and reactive to light.   Neck: Normal range of motion. Neck supple.   Cardiovascular: Normal rate and normal heart sounds.   Irregularly irregular   Pulmonary/Chest: Effort normal and breath sounds normal. No respiratory distress.   Abdominal: Soft. Bowel sounds are normal. He exhibits no distension. There is no tenderness.   Musculoskeletal: Normal range of motion. He exhibits no edema.   Neurological: He is oriented to person, place, and time. No cranial nerve deficit. Coordination normal.   Skin: Skin is warm and dry.   Psychiatric: He has a normal mood and affect. His behavior is normal.   Vitals reviewed.      Significant Labs: All pertinent labs within the past 24 hours have been reviewed.    Significant Imaging: I have reviewed and interpreted all pertinent imaging results/findings within the past 24 hours.    Assessment/Plan:      * Atrial fibrillation with RVR    -Mr. Camejo was admitted to ICU status  -Initially he was placed on cardizem, amiodarone and heparin drip  -He has been transitioned to oral amiodarone and metoprolol with suboptimal rate control.  -Heparin drip converted to apixaban on 2/22.    -Echo obtained and shows EF 15%  -this is his first known episode of A. Fib  -Plan DILLAN/cardioversion on tomorrow.  -On 2/23 I sliglhtly increased dose of metoprolol  with hold parameters placed.  Need to increase cautiously.  -As long as sub-optimal rate control we need to continue care in the ICU  -Dr. Joseph on board and input appreciated.       Acute systolic heart failure    -No previous history of CHF noted.  -Now with new onset afib rvr and systolic dysfunction.    -Diminished EF could be related to his tachycardia  -He was hypovolemic on admit due to multiple days of diminished oral intake.  Presently net negative about 1.9L on this hospitalization  -Continues on metoprolol for now.  -May need acei and lasix initiation prior to discharge depending on EF after cardioversion.  -Appreciate input of Dr. Joseph.       Diabetic ketoacidosis without coma associated with type 2 diabetes mellitus    -Was not on treatment at home  -A1c found to be 12.6  -On admit he had hyperglycemia, elevated beta-hydroxybutyrate and minimally elevated anion-gap  -He was treated with insulin drip and transitioned to subQ insulin on 2/20  -Sugars remain elevated.  Increased to detemir 15 qhs on 2/23.  Will be NPO tonight for cardiversion in AM.  Will give 5units detemir now and another 5 units tonight and continue insulin sliding scale.  When eating again after procedure tomorrow plan to increase to 20 units qhs.  Ultimately may need pre-prandial aspart as well.  -Will require diabetic and insulin teaching prior to discharge     Acute hypoxemic respiratory failure    -Desat requiring VM noted.  Likely due to transient hypoxia with afib/rvr as well as sytolic chf  -We have quickly been able to transition to nasal canula  -I consulted early Pulm critical care on 2/20 incase he deteriorated and required intubation, but fortunately he has done very well  -Appreciate input from pulm critical care.     JAMES (acute kidney injury)    -suspect this is 2/2 significant dehydration from diminished oral intake as well as autodiuresis from DKA  -Cr has normalized as of 2/23  -Repeat labs in AM.     Lactic  acidosis    -On admit patient had lactic acidosis with anion gap acidosis.  -lactate 4.0 on admit and repeated at 1.6  -Suspect this was primarily due to DKA with dehydration.         Thrombocytopenia    -Etiology unclear but was present on admission  -No evidence of bleeding  -Platelets still above 100k  -Monitor closely while on anticoagulation       VTE Risk Mitigation (From admission, onward)        Ordered     apixaban tablet 5 mg  2 times daily      02/22/19 1555     heparin 25,000 units in dextrose 5% 250 mL (100 units/mL) infusion LOW INTENSITY nomogram - [facility]  Continuous      02/20/19 0514     heparin 25,000 units in dextrose 5% (100 units/ml) IV bolus from bag - ADDITIONAL PRN BOLUS - 60 units/kg (max bolus 4000 units)  As needed (PRN)      02/20/19 0514     heparin 25,000 units in dextrose 5% (100 units/ml) IV bolus from bag - ADDITIONAL PRN BOLUS - 30 units/kg (max bolus 4000 units)  As needed (PRN)      02/20/19 0514     IP VTE HIGH RISK PATIENT  Once      02/20/19 0135     Place sequential compression device  Until discontinued      02/20/19 0135     Place sequential compression device  Until discontinued      02/19/19 2054              Basil So MD  Department of Hospital Medicine   [facility]

## 2019-02-24 NOTE — PLAN OF CARE
Problem: Adult Inpatient Plan of Care  Goal: Plan of Care Review  Outcome: Ongoing (interventions implemented as appropriate)  Patient has a dx of A-Fib with RVR. AAOx4, on RA, VSS, afebrile. Diabetes managed with ac/hs accucheck monitoring, scheduled Lantus, and prn SSI. No c/o pain or n/v. Up ad ranjit.

## 2019-02-24 NOTE — SUBJECTIVE & OBJECTIVE
Scheduled Meds:   amiodarone  200 mg Oral TID AC    apixaban  5 mg Oral BID    digoxin  0.25 mg Oral Daily    insulin detemir U-100  5 Units Subcutaneous QHS    LORazepam  0.5 mg Oral Once    metoprolol tartrate  12.5 mg Oral QID    pantoprazole  40 mg Intravenous Daily     Continuous Infusions:  PRN Meds:acetaminophen, calcium carbonate, dextrose 50%, dextrose 50%, glucagon (human recombinant), glucose, glucose, heparin (PORCINE), heparin (PORCINE), insulin aspart U-100, nitroGLYCERIN, ondansetron, ondansetron, sodium chloride 0.9%    Review of patient's allergies indicates:  No Known Allergies     Past Medical History:   Diagnosis Date    Asthma     childhood    Coronary artery disease     Heart attack     stent     Past Surgical History:   Procedure Laterality Date    CARDIAC CATHETERIZATION      CARDIOVERSION N/A 2/21/2019    Performed by [facility] Joseph [facility] at [facility] CATH LAB    CATARACT EXTRACTION W/  INTRAOCULAR LENS IMPLANT Right 12/18/2017    Dr. Beavers    CATARACT EXTRACTION W/  INTRAOCULAR LENS IMPLANT Left 01/08/2018    Dr. Beavers    ECHOCARDIOGRAM,TRANSESOPHAGEAL N/A 2/21/2019    Performed by [facility]anchito [facility] at [facility] CATH LAB    INSERTION-INTRAOCULAR LENS (IOL) Left 1/8/2018    Performed by Milo Beavers MD at [facility] OR    INSERTION-INTRAOCULAR LENS (IOL) Right 12/18/2017    [facility] by [facility] at [facility] OR    PHACOEMULSIFICATION-ASPIRATION-CATARACT Left 1/8/2018    [facility] at [facility] OR    PHACOEMULSIFICATION-ASPIRATION-CATARACT Right 12/18/2017    [facility] at [facility] OR    TONSILLECTOMY         Family History     Problem Relation (Age of Onset)    Cataracts Father        Tobacco Use    Smoking status: Never Smoker    Smokeless tobacco: Never Used   Substance and Sexual Activity    Alcohol use: No    Drug use: No    Sexual activity: Not on file     Review of Systems   Constitutional: Negative for chills and fever.   Cardiovascular:  Positive for leg swelling.   Gastrointestinal: Negative for nausea and vomiting.   Skin: Positive for color change and pallor. Negative for wound.     Objective:     Vital Signs (Most Recent):  Temp: 97.8 °F (36.6 °C) (02/24/19 0715)  Pulse: 106 (02/24/19 1007)  Resp: 16 (02/24/19 1007)  BP: 118/80 (02/24/19 1000)  SpO2: 97 % (02/24/19 1007) Vital Signs (24h Range):  Temp:  [97.8 °F (36.6 °C)-98.2 °F (36.8 °C)] 97.8 °F (36.6 °C)  Pulse:  [106-118] 106  Resp:  [13-24] 16  SpO2:  [89 %-98 %] 97 %  BP: ()/(67-80) 118/80     Weight: 86.9 kg (191 lb 9.3 oz)  Body mass index is 26.72 kg/m².    Foot Exam    General  General Appearance: appears stated age and healthy   Orientation: alert and oriented to person, place, and time       Right Foot/Ankle     Inspection and Palpation  Tenderness: none   Swelling: (+2 pitting)  Hallux valgus: yes (With underlapping of second toe)  Skin Exam: abnormal color; no ulcer (pre-ulcerative, hyperkeratotic lesions at pressure areas between 1st and 2nd toes. + dependancy rubor)    Neurovascular  Dorsalis pedis: absent  Posterior tibial: absent    Muscle Strength  Ankle dorsiflexion: 4+  Ankle plantar flexion: 4+  Ankle inversion: 4+  Ankle eversion: 4+  Great toe extension: 4+  Great toe flexion: 4+      Left Foot/Ankle      Inspection and Palpation  Tenderness: none   Swelling: (+2 pitting )  Hallux valgus: yes (With underlapping of 2nd toe)  Skin Exam: abnormal color; no ulcer (pre-ulcerative, hyperkeratotic lesions at pressure areas between 1st and 2nd toes. + dependency rubor )    Neurovascular  Dorsalis pedis: absent  Posterior tibial: absent    Muscle Strength  Ankle dorsiflexion: 4+  Ankle plantar flexion: 4+  Ankle inversion: 4+  Ankle eversion: 4+  Great toe extension: 4+  Great toe flexion: 4+            Laboratory:  CBC:   Recent Labs   Lab 02/24/19  0410   WBC 8.04   RBC 4.50*   HGB 14.5   HCT 42.9   *   MCV 95   MCH 32.2*   MCHC 33.8     CMP:   Recent Labs   Lab  02/19/19  1817  02/24/19  0410   *   < > 186*   CALCIUM 9.1   < > 8.1*   ALBUMIN 3.7  --   --    PROT 8.4  --   --    *   < > 136   K 4.8   < > 4.1   CO2 20*   < > 22*   CL 93*   < > 105   BUN 61*   < > 22   CREATININE 2.3*   < > 1.0   ALKPHOS 78  --   --    *  --   --    AST 90*  --   --    BILITOT 1.4*  --   --     < > = values in this interval not displayed.     CRP: No results for input(s): CRP in the last 168 hours.  ESR: No results for input(s): SEDRATE in the last 168 hours.    Diagnostic Results:  None    Clinical Findings:          Patient with b/l LE edema and dependency rubor. Also with pre-ulcerative lesions at pressure areas between the 1st and 2nd toes.     Nails are discolored and dystrophic.    Pulses non-palpable, may be due to diffuse edema.

## 2019-02-24 NOTE — PLAN OF CARE
Problem: Adult Inpatient Plan of Care  Goal: Plan of Care Review  Outcome: Ongoing (interventions implemented as appropriate)  Patient in no apparent distress. Sat's 95  % on room air . Will continue to monitor.

## 2019-02-24 NOTE — ASSESSMENT & PLAN NOTE
-Mr. aCmejo was admitted to ICU status  -Initially he was placed on cardizem, amiodarone and heparin drip  -He has been transitioned to oral amiodarone and metoprolol with suboptimal rate control.  -Heparin drip converted to apixaban on 2/22.    -Echo obtained and shows EF 15%  -this is his first known episode of A. Fib  -Plan DILLAN/cardioversion on tomorrow.  -On 2/23 I sliglhtly increased dose of metoprolol with hold parameters placed.  Need to increase cautiously.  -As long as sub-optimal rate control we need to continue care in the ICU  -Dr. Joseph on board and input appreciated.

## 2019-02-24 NOTE — CONSULTS
[facility]  Podiatry  Consult Note    Patient Name: Randy Camejo  MRN: [id number]  Admission Date: 2/19/2019  Hospital Length of Stay: 5 days  Attending Physician: Basil So MD  Primary Care Provider: Primary Doctor No     Inpatient consult to Podiatry  Consult performed by: Rodger Skelton MD  Consult ordered by: Basil So MD  Reason for consult: follow up for 2nd toe cellulitis        Subjective:     History of Present Illness:  Mr. Randy Camejo is a 78 y.o. male, with PMH of NIDDM-2, who presented to [facility] ED on 2/19/19 2/2 abdominal pain x 5 days. Patient was consulted to podiatry to evaluate his lower extremities with concern to the left 2nd toe. Patient reports being seen at an [facility] clinic for cellulitis to the L 2nd toe and was prescribed mupiricin ointment and keflex. He denies any pedal pain and claudication.     Scheduled Meds:   amiodarone  200 mg Oral TID AC    apixaban  5 mg Oral BID    digoxin  0.25 mg Oral Daily    insulin detemir U-100  5 Units Subcutaneous QHS    LORazepam  0.5 mg Oral Once    metoprolol tartrate  12.5 mg Oral QID    pantoprazole  40 mg Intravenous Daily     Continuous Infusions:  PRN Meds:acetaminophen, calcium carbonate, dextrose 50%, dextrose 50%, glucagon (human recombinant), glucose, glucose, heparin (PORCINE), heparin (PORCINE), insulin aspart U-100, nitroGLYCERIN, ondansetron, ondansetron, sodium chloride 0.9%    Review of patient's allergies indicates:  No Known Allergies     Past Medical History:   Diagnosis Date    Asthma     childhood    Coronary artery disease     Heart attack     stent     Past Surgical History:   Procedure Laterality Date    CARDIAC CATHETERIZATION      CARDIOVERSION N/A 2/21/2019    Performed by [facility] at [facility] CATH LAB    CATARACT EXTRACTION W/  INTRAOCULAR LENS IMPLANT Right 12/18/2017    Dr. Beavers    CATARACT EXTRACTION W/  INTRAOCULAR LENS IMPLANT Left 01/08/2018    Dr. Beavers     ECHOCARDIOGRAM,TRANSESOPHAGEAL N/A 2/21/2019    Performed by [facility] at [facility] CATH LAB    INSERTION-INTRAOCULAR LENS (IOL) Left 1/8/2018    Performed by Milo Beavers MD at [facility] OR    INSERTION-INTRAOCULAR LENS (IOL) Right 12/18/2017    Performed by Miol Beavers MD at [facility] OR    PHACOEMULSIFICATION-ASPIRATION-CATARACT Left 1/8/2018    Performed by [facility]vers MD at [facility] OR    PHACOEMULSIFICATION-ASPIRATION-CATARACT Right 12/18/2017    Performed by [facility] at [facility] OR    TONSILLECTOMY         Family History     Problem Relation (Age of Onset)    Cataracts Father        Tobacco Use    Smoking status: Never Smoker    Smokeless tobacco: Never Used   Substance and Sexual Activity    Alcohol use: No    Drug use: No    Sexual activity: Not on file     Review of Systems   Constitutional: Negative for chills and fever.   Cardiovascular: Positive for leg swelling.   Gastrointestinal: Negative for nausea and vomiting.   Skin: Positive for color change and pallor. Negative for wound.     Objective:     Vital Signs (Most Recent):  Temp: 97.8 °F (36.6 °C) (02/24/19 0715)  Pulse: 106 (02/24/19 1007)  Resp: 16 (02/24/19 1007)  BP: 118/80 (02/24/19 1000)  SpO2: 97 % (02/24/19 1007) Vital Signs (24h Range):  Temp:  [97.8 °F (36.6 °C)-98.2 °F (36.8 °C)] 97.8 °F (36.6 °C)  Pulse:  [106-118] 106  Resp:  [13-24] 16  SpO2:  [89 %-98 %] 97 %  BP: ()/(67-80) 118/80     Weight: 86.9 kg (191 lb 9.3 oz)  Body mass index is 26.72 kg/m².    Foot Exam    General  General Appearance: appears stated age and healthy   Orientation: alert and oriented to person, place, and time       Right Foot/Ankle     Inspection and Palpation  Tenderness: none   Swelling: (+2 pitting)  Hallux valgus: yes (With underlapping of second toe)  Skin Exam: abnormal color; no ulcer (pre-ulcerative, hyperkeratotic lesions at pressure areas between 1st and 2nd toes. + dependancy rubor)    Neurovascular  Dorsalis pedis:  absent  Posterior tibial: absent    Muscle Strength  Ankle dorsiflexion: 4+  Ankle plantar flexion: 4+  Ankle inversion: 4+  Ankle eversion: 4+  Great toe extension: 4+  Great toe flexion: 4+      Left Foot/Ankle      Inspection and Palpation  Tenderness: none   Swelling: (+2 pitting )  Hallux valgus: yes (With underlapping of 2nd toe)  Skin Exam: abnormal color; no ulcer (pre-ulcerative, hyperkeratotic lesions at pressure areas between 1st and 2nd toes. + dependency rubor )    Neurovascular  Dorsalis pedis: absent  Posterior tibial: absent    Muscle Strength  Ankle dorsiflexion: 4+  Ankle plantar flexion: 4+  Ankle inversion: 4+  Ankle eversion: 4+  Great toe extension: 4+  Great toe flexion: 4+            Laboratory:  CBC:   Recent Labs   Lab 02/24/19  0410   WBC 8.04   RBC 4.50*   HGB 14.5   HCT 42.9   *   MCV 95   MCH 32.2*   MCHC 33.8     CMP:   Recent Labs   Lab 02/19/19  1817  02/24/19  0410   *   < > 186*   CALCIUM 9.1   < > 8.1*   ALBUMIN 3.7  --   --    PROT 8.4  --   --    *   < > 136   K 4.8   < > 4.1   CO2 20*   < > 22*   CL 93*   < > 105   BUN 61*   < > 22   CREATININE 2.3*   < > 1.0   ALKPHOS 78  --   --    *  --   --    AST 90*  --   --    BILITOT 1.4*  --   --     < > = values in this interval not displayed.     CRP: No results for input(s): CRP in the last 168 hours.  ESR: No results for input(s): SEDRATE in the last 168 hours.    Diagnostic Results:  None    Clinical Findings:          Patient with b/l LE edema and dependency rubor. Also with pre-ulcerative lesions at pressure areas between the 1st and 2nd toes.     Nails are discolored and dystrophic.    Pulses non-palpable, may be due to diffuse edema.    Assessment/Plan:     PAD (peripheral artery disease)    Plan:  - Ordered Arterial US of b/l LE to evaluate for any significant stenoses. Recommend follow up with OP ICARDs or Vascular as needed.       Pre-ulcerative corn or callous    Plan:  - Pre-ulcerative corns to  the feet. Recommend toe spaces and wide, extra depth shoes. No emergent intervention necessary.   - Patient can establish follow up with OP podiatry upon DC.           Thank you for your consult. I will sign off. Please contact us if you have any additional questions.    Rodger Skelton MD  Podiatry  [facility]

## 2019-02-24 NOTE — ASSESSMENT & PLAN NOTE
Plan:  - Pre-ulcerative corns to the feet. Recommend toe spaces and wide, extra depth shoes. No emergent intervention necessary.   - Patient can establish follow up with OP podiatry upon DC.

## 2019-02-25 ENCOUNTER — ANESTHESIA EVENT (OUTPATIENT)
Dept: CARDIOLOGY | Facility: OTHER | Age: 79
DRG: 308 | End: 2019-02-25
Payer: MEDICARE

## 2019-02-25 ENCOUNTER — ANESTHESIA (OUTPATIENT)
Dept: CARDIOLOGY | Facility: OTHER | Age: 79
DRG: 308 | End: 2019-02-25
Payer: MEDICARE

## 2019-02-25 PROBLEM — Z79.01 CHRONIC ANTICOAGULATION: Status: ACTIVE | Noted: 2019-02-25

## 2019-02-25 PROBLEM — I43 TACHYCARDIA INDUCED CARDIOMYOPATHY: Status: ACTIVE | Noted: 2019-02-25

## 2019-02-25 PROBLEM — I42.8 TACHYCARDIA INDUCED CARDIOMYOPATHY: Status: ACTIVE | Noted: 2019-02-25

## 2019-02-25 PROBLEM — I25.10 CORONARY ARTERY DISEASE: Status: ACTIVE | Noted: 2019-02-25

## 2019-02-25 PROBLEM — R00.0 TACHYCARDIA INDUCED CARDIOMYOPATHY: Status: ACTIVE | Noted: 2019-02-25

## 2019-02-25 LAB
ANION GAP SERPL CALC-SCNC: 9 MMOL/L
APTT BLDCRRT: 32 SEC
BACTERIA BLD CULT: NORMAL
BACTERIA BLD CULT: NORMAL
BASOPHILS # BLD AUTO: 0.01 K/UL
BASOPHILS NFR BLD: 0.1 %
BSA FOR ECHO PROCEDURE: 2.1 M2
BUN SERPL-MCNC: 20 MG/DL
CALCIUM SERPL-MCNC: 8.5 MG/DL
CHLORIDE SERPL-SCNC: 105 MMOL/L
CO2 SERPL-SCNC: 23 MMOL/L
CREAT SERPL-MCNC: 1 MG/DL
DIFFERENTIAL METHOD: ABNORMAL
EOSINOPHIL # BLD AUTO: 0.3 K/UL
EOSINOPHIL NFR BLD: 3.8 %
ERYTHROCYTE [DISTWIDTH] IN BLOOD BY AUTOMATED COUNT: 13.5 %
EST. GFR  (AFRICAN AMERICAN): >60 ML/MIN/1.73 M^2
EST. GFR  (NON AFRICAN AMERICAN): >60 ML/MIN/1.73 M^2
GLUCOSE SERPL-MCNC: 126 MG/DL
HCT VFR BLD AUTO: 44.9 %
HGB BLD-MCNC: 15 G/DL
LYMPHOCYTES # BLD AUTO: 1.3 K/UL
LYMPHOCYTES NFR BLD: 16.8 %
MAGNESIUM SERPL-MCNC: 2.1 MG/DL
MCH RBC QN AUTO: 32.2 PG
MCHC RBC AUTO-ENTMCNC: 33.4 G/DL
MCV RBC AUTO: 96 FL
MONOCYTES # BLD AUTO: 0.7 K/UL
MONOCYTES NFR BLD: 8.7 %
NEUTROPHILS # BLD AUTO: 5.3 K/UL
NEUTROPHILS NFR BLD: 70.3 %
PLATELET # BLD AUTO: 116 K/UL
PMV BLD AUTO: 11.6 FL
POCT GLUCOSE: 115 MG/DL (ref 70–110)
POCT GLUCOSE: 164 MG/DL (ref 70–110)
POCT GLUCOSE: 204 MG/DL (ref 70–110)
POCT GLUCOSE: 209 MG/DL (ref 70–110)
POCT GLUCOSE: 223 MG/DL (ref 70–110)
POTASSIUM SERPL-SCNC: 4.2 MMOL/L
RBC # BLD AUTO: 4.66 M/UL
SODIUM SERPL-SCNC: 137 MMOL/L
WBC # BLD AUTO: 7.46 K/UL

## 2019-02-25 PROCEDURE — 25000003 PHARM REV CODE 250: Performed by: INTERNAL MEDICINE

## 2019-02-25 PROCEDURE — 83735 ASSAY OF MAGNESIUM: CPT

## 2019-02-25 PROCEDURE — 37000008 HC ANESTHESIA 1ST 15 MINUTES: Performed by: INTERNAL MEDICINE

## 2019-02-25 PROCEDURE — 36415 COLL VENOUS BLD VENIPUNCTURE: CPT

## 2019-02-25 PROCEDURE — 25000003 PHARM REV CODE 250: Performed by: PHYSICIAN ASSISTANT

## 2019-02-25 PROCEDURE — 94761 N-INVAS EAR/PLS OXIMETRY MLT: CPT

## 2019-02-25 PROCEDURE — 27000221 HC OXYGEN, UP TO 24 HOURS

## 2019-02-25 PROCEDURE — 85025 COMPLETE CBC W/AUTO DIFF WBC: CPT

## 2019-02-25 PROCEDURE — 93005 ELECTROCARDIOGRAM TRACING: CPT

## 2019-02-25 PROCEDURE — 99233 PR SUBSEQUENT HOSPITAL CARE,LEVL III: ICD-10-PCS | Mod: ,,, | Performed by: HOSPITALIST

## 2019-02-25 PROCEDURE — 99233 SBSQ HOSP IP/OBS HIGH 50: CPT | Mod: ,,, | Performed by: HOSPITALIST

## 2019-02-25 PROCEDURE — 63600175 PHARM REV CODE 636 W HCPCS: Performed by: HOSPITALIST

## 2019-02-25 PROCEDURE — 93010 EKG 12-LEAD: ICD-10-PCS | Mod: 59,,, | Performed by: INTERNAL MEDICINE

## 2019-02-25 PROCEDURE — 25000003 PHARM REV CODE 250: Performed by: HOSPITALIST

## 2019-02-25 PROCEDURE — 80048 BASIC METABOLIC PNL TOTAL CA: CPT

## 2019-02-25 PROCEDURE — 63600175 PHARM REV CODE 636 W HCPCS: Performed by: INTERNAL MEDICINE

## 2019-02-25 PROCEDURE — 11000001 HC ACUTE MED/SURG PRIVATE ROOM

## 2019-02-25 PROCEDURE — 37000009 HC ANESTHESIA EA ADD 15 MINS: Performed by: INTERNAL MEDICINE

## 2019-02-25 PROCEDURE — 92960 CARDIOVERSION ELECTRIC EXT: CPT | Performed by: INTERNAL MEDICINE

## 2019-02-25 PROCEDURE — 85730 THROMBOPLASTIN TIME PARTIAL: CPT

## 2019-02-25 PROCEDURE — 63600175 PHARM REV CODE 636 W HCPCS: Performed by: NURSE ANESTHETIST, CERTIFIED REGISTERED

## 2019-02-25 PROCEDURE — 93010 ELECTROCARDIOGRAM REPORT: CPT | Mod: 59,,, | Performed by: INTERNAL MEDICINE

## 2019-02-25 RX ORDER — MIDAZOLAM HYDROCHLORIDE 1 MG/ML
INJECTION, SOLUTION INTRAMUSCULAR; INTRAVENOUS
Status: DISCONTINUED | OUTPATIENT
Start: 2019-02-25 | End: 2019-02-25 | Stop reason: HOSPADM

## 2019-02-25 RX ORDER — ENALAPRIL MALEATE 2.5 MG/1
2.5 TABLET ORAL 2 TIMES DAILY
Status: DISCONTINUED | OUTPATIENT
Start: 2019-02-25 | End: 2019-02-26

## 2019-02-25 RX ORDER — PANTOPRAZOLE SODIUM 40 MG/1
40 TABLET, DELAYED RELEASE ORAL DAILY
Status: DISCONTINUED | OUTPATIENT
Start: 2019-02-25 | End: 2019-03-01 | Stop reason: HOSPADM

## 2019-02-25 RX ORDER — METOPROLOL TARTRATE 25 MG/1
25 TABLET, FILM COATED ORAL 2 TIMES DAILY
Status: DISCONTINUED | OUTPATIENT
Start: 2019-02-25 | End: 2019-02-28

## 2019-02-25 RX ORDER — PROPOFOL 10 MG/ML
VIAL (ML) INTRAVENOUS
Status: DISCONTINUED | OUTPATIENT
Start: 2019-02-25 | End: 2019-02-25

## 2019-02-25 RX ORDER — ATORVASTATIN CALCIUM 20 MG/1
20 TABLET, FILM COATED ORAL DAILY
Status: DISCONTINUED | OUTPATIENT
Start: 2019-02-26 | End: 2019-03-01 | Stop reason: HOSPADM

## 2019-02-25 RX ORDER — FENTANYL CITRATE 50 UG/ML
INJECTION, SOLUTION INTRAMUSCULAR; INTRAVENOUS
Status: DISCONTINUED | OUTPATIENT
Start: 2019-02-25 | End: 2019-02-25 | Stop reason: HOSPADM

## 2019-02-25 RX ADMIN — INSULIN ASPART 2 UNITS: 100 INJECTION, SOLUTION INTRAVENOUS; SUBCUTANEOUS at 08:02

## 2019-02-25 RX ADMIN — METOPROLOL TARTRATE 25 MG: 25 TABLET, FILM COATED ORAL at 09:02

## 2019-02-25 RX ADMIN — PANTOPRAZOLE SODIUM 40 MG: 40 TABLET, DELAYED RELEASE ORAL at 09:02

## 2019-02-25 RX ADMIN — INSULIN ASPART 2 UNITS: 100 INJECTION, SOLUTION INTRAVENOUS; SUBCUTANEOUS at 10:02

## 2019-02-25 RX ADMIN — METOPROLOL TARTRATE 25 MG: 25 TABLET, FILM COATED ORAL at 08:02

## 2019-02-25 RX ADMIN — INSULIN ASPART 4 UNITS: 100 INJECTION, SOLUTION INTRAVENOUS; SUBCUTANEOUS at 05:02

## 2019-02-25 RX ADMIN — INSULIN ASPART 4 UNITS: 100 INJECTION, SOLUTION INTRAVENOUS; SUBCUTANEOUS at 01:02

## 2019-02-25 RX ADMIN — AMIODARONE HYDROCHLORIDE 200 MG: 200 TABLET ORAL at 11:02

## 2019-02-25 RX ADMIN — CALCIUM CARBONATE 1000 MG: 500 TABLET, CHEWABLE ORAL at 12:02

## 2019-02-25 RX ADMIN — ONDANSETRON 4 MG: 4 TABLET, ORALLY DISINTEGRATING ORAL at 01:02

## 2019-02-25 RX ADMIN — PROPOFOL 30 MG: 10 INJECTION, EMULSION INTRAVENOUS at 08:02

## 2019-02-25 RX ADMIN — APIXABAN 5 MG: 2.5 TABLET, FILM COATED ORAL at 08:02

## 2019-02-25 RX ADMIN — CALCIUM CARBONATE 1000 MG: 500 TABLET, CHEWABLE ORAL at 05:02

## 2019-02-25 RX ADMIN — AMIODARONE HYDROCHLORIDE 200 MG: 200 TABLET ORAL at 04:02

## 2019-02-25 RX ADMIN — DIGOXIN 0.25 MG: 125 TABLET ORAL at 09:02

## 2019-02-25 RX ADMIN — ENALAPRIL MALEATE 2.5 MG: 2.5 TABLET ORAL at 05:02

## 2019-02-25 RX ADMIN — APIXABAN 5 MG: 2.5 TABLET, FILM COATED ORAL at 09:02

## 2019-02-25 RX ADMIN — AMIODARONE HYDROCHLORIDE 200 MG: 200 TABLET ORAL at 06:02

## 2019-02-25 NOTE — ASSESSMENT & PLAN NOTE
-Etiology unclear but was present on admission  -No evidence of bleeding  -Platelets still above 100k and stable  -Monitor closely while on anticoagulation

## 2019-02-25 NOTE — SUBJECTIVE & OBJECTIVE
Interval History: No acute events overnight.  Still sub-optimal rate control.  Denies cp and sob.  Plan cardioversion today.    Review of Systems   Constitutional: Negative for activity change and appetite change.   HENT: Negative for congestion and dental problem.    Eyes: Negative for discharge and itching.   Respiratory: Negative for apnea and chest tightness.    Cardiovascular: Negative for chest pain and leg swelling.   Gastrointestinal: Negative for abdominal distention and abdominal pain.   Endocrine: Negative for cold intolerance and heat intolerance.   Genitourinary: Negative for difficulty urinating and dysuria.   Musculoskeletal: Negative for arthralgias and back pain.   Skin: Negative for color change and pallor.   Neurological: Negative for dizziness and facial asymmetry.   Hematological: Negative for adenopathy.     Objective:     Vital Signs (Most Recent):  Temp: 98.1 °F (36.7 °C) (02/25/19 0700)  Pulse: 66 (02/25/19 0915)  Resp: 16 (02/25/19 0915)  BP: 132/73 (02/25/19 0915)  SpO2: 99 % (02/25/19 0915) Vital Signs (24h Range):  Temp:  [97.4 °F (36.3 °C)-98.3 °F (36.8 °C)] 98.1 °F (36.7 °C)  Pulse:  [] 66  Resp:  [13-28] 16  SpO2:  [87 %-99 %] 99 %  BP: (103-191)/() 132/73     Weight: 86.9 kg (191 lb 9.3 oz)  Body mass index is 26.72 kg/m².    Intake/Output Summary (Last 24 hours) at 2/25/2019 1022  Last data filed at 2/25/2019 0222  Gross per 24 hour   Intake 250 ml   Output 1350 ml   Net -1100 ml      Physical Exam   Constitutional: He is oriented to person, place, and time. He appears well-developed and well-nourished.   HENT:   Head: Normocephalic and atraumatic.   Eyes: EOM are normal. Pupils are equal, round, and reactive to light.   Neck: Normal range of motion. Neck supple.   Cardiovascular: Normal rate and normal heart sounds.   Irregularly irregular   Pulmonary/Chest: Effort normal and breath sounds normal. No respiratory distress.   Abdominal: Soft. Bowel sounds are normal. He  exhibits no distension. There is no tenderness.   Musculoskeletal: Normal range of motion. He exhibits no edema.   Neurological: He is oriented to person, place, and time. No cranial nerve deficit. Coordination normal.   Skin: Skin is warm and dry.   Psychiatric: He has a normal mood and affect. His behavior is normal.   Vitals reviewed.      Significant Labs: All pertinent labs within the past 24 hours have been reviewed.    Significant Imaging: I have reviewed and interpreted all pertinent imaging results/findings within the past 24 hours.

## 2019-02-25 NOTE — BRIEF OP NOTE
2/25/2019: [facility]: DILLAN & CV: Severe left ventricular dysfunction. EF 15%. TERESO with spontaneous echo contrast. No thrombus.  J to sinus rhythm.    Panchito Joseph M.D.

## 2019-02-25 NOTE — ASSESSMENT & PLAN NOTE
-Mr. Camejo was admitted to ICU status  -Initially he was placed on cardizem, amiodarone and heparin drip  -He has been transitioned to oral amiodarone and metoprolol with suboptimal rate control.  -Heparin drip converted to apixaban on 2/22.    -Echo obtained and shows EF 15%  -this is his first known episode of A. Fib  -Had cardioversion today which was successful.  OK to transfer to the floor after this.  -On 2/23 I sliglhtly increased dose of metoprolol with hold parameters placed.  Need to increase cautiously.  Dr. Joseph has increased metoprolol to 25mg po bid andhe continues on amiodarone 200mg tid and digoxin 0.25mg daily.  -Dr. Joseph on board and input appreciated.

## 2019-02-25 NOTE — PLAN OF CARE
Problem: Adult Inpatient Plan of Care  Goal: Plan of Care Review  Outcome: Ongoing (interventions implemented as appropriate)  Care assumed at 0300.  Patient has been NPO since 2200 for planned DILLAN/Cardioversion this a.m.  Remains in Afib, HR .  Denies CP or SOB. O2 at 1 liter per nasal cannula.   Voiding without difficulty.  Free from falls or injury.

## 2019-02-25 NOTE — HOSPITAL COURSE
Mr. Camejo is a 78 year-old man with history of diabetes mellitus and coronary disease who had not been taking previously prescribed prescription medications presented to the hospital with nausea, vomiting, and abdominal pain.  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  Patient was treated with intravenous diltiazem infusion for rate control.  Patient also started on anticoagulation with a continuous heparin infusion.  He subsequently also required intravenous amiodarone to achieve reasonable rate control.  Patient's diabetic ketoacidosis was treated with medical therapy with closure of his anion gap.  Patient subsequently converted to a subcutaneous insulin regimen reasonable glycemic control.    Echocardiography revealed an ejection fraction of 15%.  Patient remained in atrial fibrillation but underwent successful cardioversion performed by Dr. Panchito Joseph.  Patient has remained in normal sinus rhythm.  Patient also transition for continuous heparin infusion to oral apixaban for stroke prevention.  Patient's medical therapy for his heart failure adjusted.  Patient did not tolerate a low dose of oral enalapril due to hyperkalemia.  Once hyperkalemia resolved with supportive measures patient was transitioned to oral started which she has tolerated well with stable kidney function without recurrence of marked hyperkalemia.    Patient clinically stable to be discharged home on medical therapy for his significant heart failure along with diabetic insulin regimen.  Patient advised on close clinic follow-up with Dr. Varner to monitors his volume status, repeat laboratory studies, and further adjustments in his heart failure regimen.  Patient also advised to establish care with a primary care provider in order to manage his  diabetes.  Patient advised to check capillary blood glucose readings before meals and at bedtime and to make a log of his readings and to review that wall with a physician in the coming weeks.  Patient also advised importance of a low-salt cardiac diet.    Patient discharged home with home health for further physical therapy to address debility related to acute illness along with assistance in managing his insulin regimen.

## 2019-02-25 NOTE — ASSESSMENT & PLAN NOTE
-Was not on treatment at home  -A1c found to be 12.6  -On admit he had hyperglycemia, elevated beta-hydroxybutyrate and minimally elevated anion-gap  -He was treated with insulin drip and transitioned to subQ insulin on 2/20  -Sugars relatively well controlled but not ideal.  Will increase detemir to 20 units QHS.  Ultimately may need pre-prandial aspart as well.  -Will require diabetic and insulin teaching prior to discharge

## 2019-02-25 NOTE — CONSULTS
Brought patient second Diabetes Education manual per wife's request. No new questions about insulin admin at this time. Encouraged pt to call if anything new comes up prior to discharge.

## 2019-02-25 NOTE — PROGRESS NOTES
Pt arrived back from cardioversion. He is in stable condition, AA&O x4, moves all extremities on command, NSR noted on monitor. Able to swallow sips of water and pills. Spoke with Valeria  about pt's wife's concerns about being sure Medications will be delivered at dc. She stated a member of the team would be around to speak with them. Will follow up if necessary.

## 2019-02-25 NOTE — ASSESSMENT & PLAN NOTE
-No previous history of CHF noted.  -Now with new onset afib rvr and systolic dysfunction.    -Diminished EF 15%, could be related to his tachycardia  -He was hypovolemic on admit due to multiple days of diminished oral intake.  Presently net negative about 1.9L on this hospitalization  -Continues on metoprolol for now.  Has been started on enalapril.  -May need acei and lasix initiation prior to discharge depending on EF after cardioversion.  -Appreciate input of Dr. Joseph.

## 2019-02-25 NOTE — PROGRESS NOTES
"[facility]  Cardiology  Progress Note    Patient Name: Randy Camejo  MRN: [id number]  Admission Date: 2/19/2019  Hospital Length of Stay: 6 days  Code Status: Full Code   Attending Physician: Basil So MD   Primary Care Physician: Primary Doctor No  Expected Discharge Date:   Principal Problem:Atrial fibrillation with RVR    Subjective:     Brief HPI:    Randy Camejo is a 78 y.o.male with hypertension and diabetes mellitus type 2. He suffered a myocardial infarction in 2000 when he presented to [facility] and had a stent placed. He was treated for DM2 for a few years with metformin but then stopped it. He has not had any regular medical follow up for several years.     He began feeling weak and have diarrhea on 2/15/2019. He diarrhea continued and he became weaker over the next several weak. He had nausea and vomited. On 2/19/2019 he went to  and was referred to the ER. He was noted to be in atrial fibrillation and admitted.    Hospital Course:     2/21/2019: Plan was DILLAN guided CV. He did not "feel" he was ready.    Rate control with amiodarone, digoxin and metoprolol.    Anticoagulation with heparin iv and later apixiban.    2/25/2019: [facility]: DILLAN & CV: Severe left ventricular dysfunction. EF 15%. TERESO with spontaneous echo contrast. No thrombus.  J to sinus rhythm.       Interval History:     Weak but overall feeling better. Denies CP or dyspnea. Remained in atrial fibrillation over weekend.     Review of Systems   Constitution: Positive for weakness and malaise/fatigue. Negative for chills and fever.   HENT: Negative for nosebleeds.    Eyes: Negative for vision loss in left eye and vision loss in right eye.   Cardiovascular: Negative for chest pain, leg swelling, orthopnea, palpitations and paroxysmal nocturnal dyspnea.   Respiratory: Negative for cough, hemoptysis, shortness of breath, sputum production and wheezing.    Hematologic/Lymphatic: Negative for bleeding problem.   Skin: " Negative for rash.   Musculoskeletal: Negative for myalgias.   Gastrointestinal: Negative for abdominal pain, heartburn, hematemesis, hematochezia, jaundice, melena, nausea and vomiting.   Genitourinary: Negative for hematuria.   Neurological: Negative for dizziness, headaches, light-headedness and vertigo.   Psychiatric/Behavioral: Negative for altered mental status. The patient is not nervous/anxious.    Allergic/Immunologic: Negative for persistent infections.     Objective:     Vital Signs (Most Recent):  Temp: 98.1 °F (36.7 °C) (02/25/19 0700)  Pulse: (!) 114 (02/25/19 0850)  Resp: (!) 25 (02/25/19 0820)  BP: (!) 190/100 (02/25/19 0850)  SpO2: 97 % (02/25/19 0850) Vital Signs (24h Range):  Temp:  [97.4 °F (36.3 °C)-98.3 °F (36.8 °C)] 98.1 °F (36.7 °C)  Pulse:  [] 114  Resp:  [13-28] 25  SpO2:  [87 %-98 %] 97 %  BP: (103-191)/() 190/100     Weight: 86.9 kg (191 lb 9.3 oz)  Body mass index is 26.72 kg/m².    SpO2: 97 %  O2 Device (Oxygen Therapy): nasal cannula      Intake/Output Summary (Last 24 hours) at 2/25/2019 0908  Last data filed at 2/25/2019 0222  Gross per 24 hour   Intake 250 ml   Output 1350 ml   Net -1100 ml       Lines/Drains/Airways     Airway                 Airway - Non-Surgical 02/25/19 0847 Nasal Cannula less than 1 day          Peripheral Intravenous Line                 Peripheral IV - Single Lumen 02/19/19 1200 Anterior;Right Hand 5 days         Peripheral IV - Single Lumen 02/21/19 0022 Left Upper Arm 4 days                Physical Exam   Constitutional: He is oriented to person, place, and time. He appears well-developed and well-nourished. He does not appear ill. No distress.   Eyes: Right conjunctiva is not injected. Right conjunctiva has no hemorrhage. Left conjunctiva is not injected. Left conjunctiva has no hemorrhage. Right pupil is round. Left pupil is round.   Neck: Neck supple. No JVD present.   Cardiovascular: Normal rate, regular rhythm, S1 normal and S2 normal. Exam  reveals gallop and S3.   Pulmonary/Chest: Effort normal and breath sounds normal. He has no rales.   Abdominal: Soft. Normal appearance. He exhibits no distension. There is no tenderness.   Musculoskeletal: He exhibits no edema.        Right ankle: He exhibits no swelling.        Left ankle: He exhibits no swelling.   Neurological: He is alert and oriented to person, place, and time. He is not disoriented.   Skin: Skin is warm and dry. No rash noted.   Psychiatric: He has a normal mood and affect. His speech is normal and behavior is normal. Judgment and thought content normal. Cognition and memory are normal.       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] 02/25/2019 Yes    Coronary artery disease [I25.10] 02/25/2019 Yes    Chronic anticoagulation [Z79.01] 02/25/2019 Not Applicable    Diabetic ketoacidosis without coma associated with type 2 diabetes mellitus [E11.10] 02/19/2019 Yes    Pre-ulcerative corn or callous [L84] 02/24/2019 Yes    PAD (peripheral artery disease) [I73.9] 02/24/2019 Yes    Acute hypoxemic respiratory failure [J96.01] 02/21/2019 Yes    Thrombocytopenia [D69.6] 02/21/2019 Yes    Lactic acidosis [E87.2] 02/21/2019 Yes    JAMES (acute kidney injury) [N17.9] 02/19/2019 Yes      Problems Resolved During this Admission:     Assessment and Plan:  1. Atrial Fibrillation              Persistent atrial fibrillation with fast VRR.              Uncertain duration.              [id number]=1 (CHA2DV).              2/21/2019: Plan was DILLAN guided CV but patient did not want to proceed.   Rate control with amiodarone, digoxin and very cautious dose of metoprolol.   2/25/2019: [facility]: DILLAN & CV: Severe left ventricular dysfunction. EF 15%. TERESO with spontaneous echo contrast.  No thrombus.  J to sinus rhythm.       2. Chronic Anticoagulation              [id number]=9 (CHA2DV).              On heparin iv.              Lifelong anticoagulation.   2/23/2019: Changed to apixiban 5 mg Q12.     3. Heart Failure, Systolic, Acute on Chronic              2/20/2019: Echo: Mildly dilated left ventricle with severe left ventricular dysfunction. EF 15%. Moderately dilated LA. Moderate RV dysfunction.              Maybe at least partially tachycardia induced.              Betablockade, ACEI and spironolactone long term.              Consider cath at later date.   On metoprolol 25 mg Q12.   Will increase metoprolol very cautiously.   Add enalapril this afternoon.     4. Coronary Artery Disease              2000: [facility]: MI and stent.              Clinically stable.     5. Hypertension              Appears to have been mild.     6. Diabetes Mellitus, Type 2              2015: Diagnosed. Complications: CAD. Medications: Diet.              2/19/2019: DKA.     7. Primary Care              In transition.    The planned procedure was discussed in detail with the patient and the family members present. Risk, benefit and alternatives were reviewed. All questions answered. Consent was then signed.    If further questions or concerns arise the patient was encouraged to contact me prior to the planned procedure.       VTE Risk Mitigation (From admission, onward)        Ordered     apixaban tablet 5 mg  2 times daily      02/22/19 1555     heparin 25,000 units in dextrose 5% 250 mL (100 units/mL) infusion LOW INTENSITY nomogram - [facility]  Continuous      02/20/19 0514     heparin 25,000 units in dextrose 5% (100 units/ml) IV bolus from bag - ADDITIONAL PRN BOLUS - 60 units/kg (max bolus 4000 units)  As needed (PRN)      02/20/19 0514     heparin 25,000 units in dextrose 5% (100 units/ml) IV bolus from bag - ADDITIONAL PRN BOLUS - 30 units/kg (max bolus 4000 units)  As needed (PRN)      02/20/19 0514     IP VTE  HIGH RISK PATIENT  Once      02/20/19 0135     Place sequential compression device  Until discontinued      02/20/19 0135     Place sequential compression device  Until discontinued      02/19/19 2054          Panchito Joseph MD  Cardiology  [facility]

## 2019-02-25 NOTE — ANESTHESIA PREPROCEDURE EVALUATION
02/25/2019  Randy Camejo is a 78 y.o., male.    Anesthesia Evaluation    I have reviewed the Patient Summary Reports.    I have reviewed the Nursing Notes.   I have reviewed the Medications.     Review of Systems  Anesthesia Hx:  No problems with previous Anesthesia  History of prior surgery of interest to airway management or planning: Previous anesthesia: MAC  Cataract recently Caodaism with MAC.  Procedure performed at an [facility]. Denies Family Hx of Anesthesia complications.   Denies Personal Hx of Anesthesia complications.   Social:  Non-Smoker    Hematology/Oncology:  Hematology Normal   Oncology Normal     EENT/Dental:EENT/Dental Normal   Cardiovascular:   Exercise tolerance: poor Past MI (20082008 stent) CAD asymptomatic CABG/stent Dysrhythmias atrial fibrillation ECG has been reviewed. Prev cardiac stent   Pulmonary:   Denies Asthma.    Renal/:   renal calculi    Hepatic/GI:  Hepatic/GI Normal    Musculoskeletal:  Musculoskeletal Normal    Neurological:  Neurology Normal    Endocrine:  Endocrine Normal    Dermatological:  Skin Normal    Psych:  Psychiatric Normal           Physical Exam  General:  Obesity    Airway/Jaw/Neck:  Airway Findings: Mouth Opening: Normal Tongue: Normal  Mallampati: II      Dental:  Dental Findings: Periodontal disease, Severe        Mental Status:  Mental Status Findings:  Cooperative, Alert and Oriented         Anesthesia Plan  Type of Anesthesia, risks & benefits discussed:  Anesthesia Type:  general  Patient's Preference:   Intra-op Monitoring Plan:   Intra-op Monitoring Plan Comments:   Post Op Pain Control Plan:   Post Op Pain Control Plan Comments:   Induction:   IV  Beta Blocker:         Informed Consent: Patient understands risks and agrees with Anesthesia plan.  Questions answered. Anesthesia consent signed with patient.  ASA Score: 3     Day of  Surgery Review of History & Physical:    H&P update referred to the surgeon.     Anesthesia Plan Notes: DILLAN the CV w propofol if successful        Ready For Surgery From Anesthesia Perspective.

## 2019-02-25 NOTE — ANESTHESIA POSTPROCEDURE EVALUATION
"Anesthesia Post Evaluation    Patient: Randy Camejo    Procedure(s) Performed: Procedure(s) (LRB):  ECHOCARDIOGRAM,TRANSESOPHAGEAL (N/A)  CARDIOVERSION OR DEFIBRILLATION (N/A)    Final Anesthesia Type: general  Patient location during evaluation: Cath Lab  Patient participation: Yes- Able to Participate  Level of consciousness: awake and alert  Post-procedure vital signs: reviewed and stable  Pain management: adequate  Airway patency: patent  PONV status at discharge: No PONV  Anesthetic complications: no      Cardiovascular status: blood pressure returned to baseline  Respiratory status: unassisted  Hydration status: euvolemic  Follow-up not needed.        Visit Vitals  /74   Pulse 70   Temp 36.2 °C (97.1 °F) (Oral)   Resp (!) 38   Ht 5' 11" (1.803 m)   Wt 86.9 kg (191 lb 9.3 oz)   SpO2 97%   BMI 26.72 kg/m²       Pain/Kaylin Score: Pain Rating Prior to Med Admin: 0 (2/25/2019 11:05 AM)  Pain Rating Post Med Admin: 0 (2/25/2019  9:10 AM)  Kaylin Score: 9 (2/25/2019  9:12 AM)        "

## 2019-02-25 NOTE — TRANSFER OF CARE
"Anesthesia Transfer of Care Note    Patient: Randy Camejo    Procedure(s) Performed: Procedure(s) (LRB):  ECHOCARDIOGRAM,TRANSESOPHAGEAL (N/A)  CARDIOVERSION OR DEFIBRILLATION (N/A)    Patient location: Cath Lab    Anesthesia Type: general    Transport from OR: Transported from OR on 2-3 L/min O2 by NC with adequate spontaneous ventilation    Post pain: adequate analgesia    Post assessment: no apparent anesthetic complications    Post vital signs: stable    Level of consciousness: awake, alert and oriented    Nausea/Vomiting: no nausea/vomiting    Complications: none    Transfer of care protocol was followed      Last vitals:   Visit Vitals  BP (!) 190/100   Pulse (!) 114   Temp 36.7 °C (98.1 °F) (Oral)   Resp (!) 25   Ht 5' 11" (1.803 m)   Wt 86.9 kg (191 lb 9.3 oz)   SpO2 97%   BMI 26.72 kg/m²     "

## 2019-02-25 NOTE — PROGRESS NOTES
[facility] Hospital Medicine  Progress Note    Patient Name: Randy Camejo  MRN: [id number]  Patient Class: IP- Inpatient   Admission Date: 2/19/2019  Length of Stay: 6 days  Attending Physician: Basil oS MD  Primary Care Provider: Primary Doctor No        Subjective:     Principal Problem:Atrial fibrillation with RVR    HPI:  Mr. Randy Camejo is a 78 y.o. male, with PMH of NIDDM-2, CAD, who presented to [facility] ED on 2/19/19 2/2 abdominal pain x 5 days. Associated symptoms include N/V/D (all non-bloody), which has since resolved, except for the nausea. He was seen in Urgent Care PTA. In the ED, he was found to be tachycardic, and rhythm was A. Fib. He has never before been diagnosed with the same. He was given 2L bolus of IV fluids, then a dose if IV Cardizem, but rate was still elevated. He was started on a Cardizem drip, and admitted to the ICU.     Hospital Course:  No notes on file    Interval History: No acute events overnight.  Still sub-optimal rate control.  Denies cp and sob.  Plan cardioversion today.    Review of Systems   Constitutional: Negative for activity change and appetite change.   HENT: Negative for congestion and dental problem.    Eyes: Negative for discharge and itching.   Respiratory: Negative for apnea and chest tightness.    Cardiovascular: Negative for chest pain and leg swelling.   Gastrointestinal: Negative for abdominal distention and abdominal pain.   Endocrine: Negative for cold intolerance and heat intolerance.   Genitourinary: Negative for difficulty urinating and dysuria.   Musculoskeletal: Negative for arthralgias and back pain.   Skin: Negative for color change and pallor.   Neurological: Negative for dizziness and facial asymmetry.   Hematological: Negative for adenopathy.     Objective:     Vital Signs (Most Recent):  Temp: 98.1 °F (36.7 °C) (02/25/19 0700)  Pulse: 66 (02/25/19 0915)  Resp: 16 (02/25/19 0915)  BP: 132/73 (02/25/19  0915)  SpO2: 99 % (02/25/19 0915) Vital Signs (24h Range):  Temp:  [97.4 °F (36.3 °C)-98.3 °F (36.8 °C)] 98.1 °F (36.7 °C)  Pulse:  [] 66  Resp:  [13-28] 16  SpO2:  [87 %-99 %] 99 %  BP: (103-191)/() 132/73     Weight: 86.9 kg (191 lb 9.3 oz)  Body mass index is 26.72 kg/m².    Intake/Output Summary (Last 24 hours) at 2/25/2019 1022  Last data filed at 2/25/2019 0222  Gross per 24 hour   Intake 250 ml   Output 1350 ml   Net -1100 ml      Physical Exam   Constitutional: He is oriented to person, place, and time. He appears well-developed and well-nourished.   HENT:   Head: Normocephalic and atraumatic.   Eyes: EOM are normal. Pupils are equal, round, and reactive to light.   Neck: Normal range of motion. Neck supple.   Cardiovascular: Normal rate and normal heart sounds.   Irregularly irregular   Pulmonary/Chest: Effort normal and breath sounds normal. No respiratory distress.   Abdominal: Soft. Bowel sounds are normal. He exhibits no distension. There is no tenderness.   Musculoskeletal: Normal range of motion. He exhibits no edema.   Neurological: He is oriented to person, place, and time. No cranial nerve deficit. Coordination normal.   Skin: Skin is warm and dry.   Psychiatric: He has a normal mood and affect. His behavior is normal.   Vitals reviewed.      Significant Labs: All pertinent labs within the past 24 hours have been reviewed.    Significant Imaging: I have reviewed and interpreted all pertinent imaging results/findings within the past 24 hours.    Assessment/Plan:      * Atrial fibrillation with RVR    -Mr. Camejo was admitted to ICU status  -Initially he was placed on cardizem, amiodarone and heparin drip  -He has been transitioned to oral amiodarone and metoprolol with suboptimal rate control.  -Heparin drip converted to apixaban on 2/22.    -Echo obtained and shows EF 15%  -this is his first known episode of A. Fib  -Had cardioversion today which was successful.  OK to transfer to the  floor after this.  -On 2/23 I sliglhtly increased dose of metoprolol with hold parameters placed.  Need to increase cautiously.  Dr. Joseph has increased metoprolol to 25mg po bid andhe continues on amiodarone 200mg tid and digoxin 0.25mg daily.  -Dr. Joseph on board and input appreciated.       Acute systolic heart failure    -No previous history of CHF noted.  -Now with new onset afib rvr and systolic dysfunction.    -Diminished EF 15%, could be related to his tachycardia  -He was hypovolemic on admit due to multiple days of diminished oral intake.  Presently net negative about 1.9L on this hospitalization  -Continues on metoprolol for now.  Has been started on enalapril.  -May need acei and lasix initiation prior to discharge depending on EF after cardioversion.  -Appreciate input of Dr. Joseph.       Diabetic ketoacidosis without coma associated with type 2 diabetes mellitus    -Was not on treatment at home  -A1c found to be 12.6  -On admit he had hyperglycemia, elevated beta-hydroxybutyrate and minimally elevated anion-gap  -He was treated with insulin drip and transitioned to subQ insulin on 2/20  -Sugars relatively well controlled but not ideal.  Will increase detemir to 20 units QHS.  Ultimately may need pre-prandial aspart as well.  -Will require diabetic and insulin teaching prior to discharge     Acute hypoxemic respiratory failure    -Desat requiring VM noted.  Likely due to transient hypoxia with afib/rvr as well as sytolic chf  -We have quickly been able to transition to nasal canula  -I consulted early Pulm critical care on 2/20 incase he deteriorated and required intubation, but fortunately he has done very well  -Appreciate input from pulm critical care.     JAMES (acute kidney injury)    -suspect this is 2/2 significant dehydration from diminished oral intake as well as autodiuresis from DKA  -Cr has normalized as of 2/23  -Repeat labs in AM.     Tachycardia induced cardiomyopathy    -As  above.       Pre-ulcerative corn or callous    -Seen by podiatry  -Needs outpatient follow up       Lactic acidosis    -On admit patient had lactic acidosis with anion gap acidosis.  -lactate 4.0 on admit and repeated at 1.6  -Suspect this was primarily due to DKA with dehydration.         Thrombocytopenia    -Etiology unclear but was present on admission  -No evidence of bleeding  -Platelets still above 100k and stable  -Monitor closely while on anticoagulation       VTE Risk Mitigation (From admission, onward)        Ordered     apixaban tablet 5 mg  2 times daily      02/22/19 1555     heparin 25,000 units in dextrose 5% 250 mL (100 units/mL) infusion LOW INTENSITY nomogram - [facility]  Continuous      02/20/19 0514     IP VTE HIGH RISK PATIENT  Once      02/20/19 0135     Place sequential compression device  Until discontinued      02/20/19 0135     Place sequential compression device  Until discontinued      02/19/19 2054              Basil So MD  Department of Hospital Medicine   [facility]

## 2019-02-25 NOTE — PLAN OF CARE
Problem: Adult Inpatient Plan of Care  Goal: Plan of Care Review  Outcome: Ongoing (interventions implemented as appropriate)  Patient in no apparent distress. Sat's  97 % on room air . Will continue to monitor.

## 2019-02-26 PROBLEM — J96.01 ACUTE HYPOXEMIC RESPIRATORY FAILURE: Status: RESOLVED | Noted: 2019-02-21 | Resolved: 2019-02-26

## 2019-02-26 PROBLEM — E87.5 HYPERKALEMIA: Status: ACTIVE | Noted: 2019-02-26

## 2019-02-26 PROBLEM — N17.9 AKI (ACUTE KIDNEY INJURY): Status: RESOLVED | Noted: 2019-02-19 | Resolved: 2019-02-26

## 2019-02-26 PROBLEM — E87.20 LACTIC ACIDOSIS: Status: RESOLVED | Noted: 2019-02-21 | Resolved: 2019-02-26

## 2019-02-26 PROBLEM — D69.6 THROMBOCYTOPENIA: Status: RESOLVED | Noted: 2019-02-21 | Resolved: 2019-02-26

## 2019-02-26 LAB
ANION GAP SERPL CALC-SCNC: 8 MMOL/L
ANION GAP SERPL CALC-SCNC: 8 MMOL/L
APTT BLDCRRT: 35.9 SEC
BASOPHILS # BLD AUTO: 0.01 K/UL
BASOPHILS NFR BLD: 0.1 %
BUN SERPL-MCNC: 22 MG/DL
BUN SERPL-MCNC: 23 MG/DL
CALCIUM SERPL-MCNC: 8.7 MG/DL
CALCIUM SERPL-MCNC: 8.8 MG/DL
CHLORIDE SERPL-SCNC: 100 MMOL/L
CHLORIDE SERPL-SCNC: 101 MMOL/L
CO2 SERPL-SCNC: 27 MMOL/L
CO2 SERPL-SCNC: 28 MMOL/L
CREAT SERPL-MCNC: 1.2 MG/DL
CREAT SERPL-MCNC: 1.3 MG/DL
DIFFERENTIAL METHOD: ABNORMAL
EOSINOPHIL # BLD AUTO: 0 K/UL
EOSINOPHIL NFR BLD: 0.4 %
ERYTHROCYTE [DISTWIDTH] IN BLOOD BY AUTOMATED COUNT: 13.3 %
EST. GFR  (AFRICAN AMERICAN): >60 ML/MIN/1.73 M^2
EST. GFR  (AFRICAN AMERICAN): >60 ML/MIN/1.73 M^2
EST. GFR  (NON AFRICAN AMERICAN): 53 ML/MIN/1.73 M^2
EST. GFR  (NON AFRICAN AMERICAN): 58 ML/MIN/1.73 M^2
GLUCOSE SERPL-MCNC: 126 MG/DL
GLUCOSE SERPL-MCNC: 150 MG/DL
HCT VFR BLD AUTO: 46.1 %
HGB BLD-MCNC: 15.3 G/DL
LYMPHOCYTES # BLD AUTO: 0.9 K/UL
LYMPHOCYTES NFR BLD: 8.4 %
MAGNESIUM SERPL-MCNC: 2.4 MG/DL
MCH RBC QN AUTO: 32.3 PG
MCHC RBC AUTO-ENTMCNC: 33.2 G/DL
MCV RBC AUTO: 97 FL
MONOCYTES # BLD AUTO: 0.9 K/UL
MONOCYTES NFR BLD: 7.9 %
NEUTROPHILS # BLD AUTO: 9.3 K/UL
NEUTROPHILS NFR BLD: 82.8 %
PLATELET # BLD AUTO: 159 K/UL
PMV BLD AUTO: 10.8 FL
POCT GLUCOSE: 124 MG/DL (ref 70–110)
POCT GLUCOSE: 153 MG/DL (ref 70–110)
POCT GLUCOSE: 193 MG/DL (ref 70–110)
POCT GLUCOSE: 205 MG/DL (ref 70–110)
POTASSIUM SERPL-SCNC: 5.5 MMOL/L
POTASSIUM SERPL-SCNC: 6 MMOL/L
RBC # BLD AUTO: 4.74 M/UL
SODIUM SERPL-SCNC: 135 MMOL/L
SODIUM SERPL-SCNC: 137 MMOL/L
WBC # BLD AUTO: 11.2 K/UL

## 2019-02-26 PROCEDURE — 11000001 HC ACUTE MED/SURG PRIVATE ROOM

## 2019-02-26 PROCEDURE — 25000003 PHARM REV CODE 250: Performed by: HOSPITALIST

## 2019-02-26 PROCEDURE — 83735 ASSAY OF MAGNESIUM: CPT

## 2019-02-26 PROCEDURE — 99233 PR SUBSEQUENT HOSPITAL CARE,LEVL III: ICD-10-PCS | Mod: ,,, | Performed by: HOSPITALIST

## 2019-02-26 PROCEDURE — 99233 SBSQ HOSP IP/OBS HIGH 50: CPT | Mod: ,,, | Performed by: HOSPITALIST

## 2019-02-26 PROCEDURE — 36415 COLL VENOUS BLD VENIPUNCTURE: CPT

## 2019-02-26 PROCEDURE — 85730 THROMBOPLASTIN TIME PARTIAL: CPT

## 2019-02-26 PROCEDURE — 85025 COMPLETE CBC W/AUTO DIFF WBC: CPT

## 2019-02-26 PROCEDURE — 25000003 PHARM REV CODE 250: Performed by: INTERNAL MEDICINE

## 2019-02-26 PROCEDURE — 94761 N-INVAS EAR/PLS OXIMETRY MLT: CPT

## 2019-02-26 PROCEDURE — 80048 BASIC METABOLIC PNL TOTAL CA: CPT | Mod: 91

## 2019-02-26 RX ORDER — FUROSEMIDE 20 MG/1
20 TABLET ORAL DAILY
Status: DISCONTINUED | OUTPATIENT
Start: 2019-02-26 | End: 2019-03-01 | Stop reason: HOSPADM

## 2019-02-26 RX ORDER — INSULIN ASPART 100 [IU]/ML
3 INJECTION, SOLUTION INTRAVENOUS; SUBCUTANEOUS
Status: DISCONTINUED | OUTPATIENT
Start: 2019-02-27 | End: 2019-03-01 | Stop reason: HOSPADM

## 2019-02-26 RX ADMIN — ENALAPRIL MALEATE 2.5 MG: 2.5 TABLET ORAL at 08:02

## 2019-02-26 RX ADMIN — PANTOPRAZOLE SODIUM 40 MG: 40 TABLET, DELAYED RELEASE ORAL at 08:02

## 2019-02-26 RX ADMIN — CALCIUM CARBONATE 1000 MG: 500 TABLET, CHEWABLE ORAL at 08:02

## 2019-02-26 RX ADMIN — FUROSEMIDE 20 MG: 20 TABLET ORAL at 04:02

## 2019-02-26 RX ADMIN — APIXABAN 5 MG: 2.5 TABLET, FILM COATED ORAL at 08:02

## 2019-02-26 RX ADMIN — INSULIN ASPART 2 UNITS: 100 INJECTION, SOLUTION INTRAVENOUS; SUBCUTANEOUS at 02:02

## 2019-02-26 RX ADMIN — INSULIN ASPART 2 UNITS: 100 INJECTION, SOLUTION INTRAVENOUS; SUBCUTANEOUS at 09:02

## 2019-02-26 RX ADMIN — AMIODARONE HYDROCHLORIDE 200 MG: 200 TABLET ORAL at 06:02

## 2019-02-26 RX ADMIN — METOPROLOL TARTRATE 25 MG: 25 TABLET, FILM COATED ORAL at 09:02

## 2019-02-26 RX ADMIN — CALCIUM CARBONATE 1000 MG: 500 TABLET, CHEWABLE ORAL at 05:02

## 2019-02-26 RX ADMIN — CALCIUM CARBONATE 1000 MG: 500 TABLET, CHEWABLE ORAL at 01:02

## 2019-02-26 RX ADMIN — METOPROLOL TARTRATE 25 MG: 25 TABLET, FILM COATED ORAL at 08:02

## 2019-02-26 RX ADMIN — INSULIN ASPART 2 UNITS: 100 INJECTION, SOLUTION INTRAVENOUS; SUBCUTANEOUS at 05:02

## 2019-02-26 RX ADMIN — DIGOXIN 0.25 MG: 125 TABLET ORAL at 08:02

## 2019-02-26 RX ADMIN — ATORVASTATIN CALCIUM 20 MG: 20 TABLET, FILM COATED ORAL at 08:02

## 2019-02-26 RX ADMIN — AMIODARONE HYDROCHLORIDE 200 MG: 200 TABLET ORAL at 11:02

## 2019-02-26 RX ADMIN — APIXABAN 5 MG: 2.5 TABLET, FILM COATED ORAL at 09:02

## 2019-02-26 RX ADMIN — AMIODARONE HYDROCHLORIDE 200 MG: 200 TABLET ORAL at 04:02

## 2019-02-26 NOTE — PLAN OF CARE
CM met with pt and spouse for discharge planning reassessment.    Pt and spouse request a wheelchair for home use, CM to order.    Pt will have transportation home when discharged.     PT requesting bedside delivery for meds prior to discharge. CM to inform MD.    CM to follow for plans and arrangements.     02/26/19 1546   Discharge Assessment   Assessment Type Discharge Planning Reassessment   Confirmed/corrected address and phone number on facesheet? Yes   Assessment information obtained from? Patient;Caregiver;Medical Record   Prior to hospitilization cognitive status: Alert/Oriented   Prior to hospitalization functional status: Independent   Current cognitive status: Alert/Oriented   Current Functional Status: Assistive Equipment   Lives With spouse   Able to Return to Prior Arrangements yes   Is patient able to care for self after discharge? Yes  (with family assist)   Patient's perception of discharge disposition home or selfcare   Readmission Within the Last 30 Days no previous admission in last 30 days   Equipment Currently Used at Home none  (pt and spouse request a wheelchair for home use-)   Do you have any problems affording any of your prescribed medications? No   Is the patient taking medications as prescribed? yes   Does the patient have transportation home? Yes   Does the patient receive services at the [facility]? No   Discharge Plan A Home   Discharge Plan B Home   DME Needed Upon Discharge  wheelchair   Patient/Family in Agreement with Plan yes

## 2019-02-26 NOTE — NURSING TRANSFER
Nursing Transfer Note      2/25/2019     Transfer To: [id number]    Transfer via wheelchair    Transfer with  O2, cardiac monitoring    Transported by Marilu, MICHELINE    Medicines sent: insulin pens    Chart send with patient: Yes    Notified: daughter ( at bedside)    Patient reassessed at: 2- @ 2000    Upon arrival to floor: bed low with wheels locked, O2 on, Call light in reach, RN in room

## 2019-02-26 NOTE — NURSING
"Pt had c/o of shortness of breath, He stated,"I may be feeling anxious." Pt was repositioned and Hob raised. Pt has low grade temp of 99.6, and O2 is 94% on 2L NC. He was also assessed by Charge nurse. Spk to Daryl Vu & informed her of patient's symptoms & vital signs. She advised if patient's SOB presents again without anxiousness, an x-ray maybe ordered. Will continue to monitor.  "

## 2019-02-26 NOTE — PLAN OF CARE
Problem: Adult Inpatient Plan of Care  Goal: Plan of Care Review  Outcome: Ongoing (interventions implemented as appropriate)  Pt Remains free from fall & injury. Pt attempted to stand at bedside to use urinal, generalized weakness noted, informed to use urinal while in bed. He verbalized understanding. VS stable on 2L NC and afebrile @ this time. Positioned with 2 person assist. Head of bed elevated to facilitate breathing. IV sites WNL. Plan of care reviewed with patient and all questions answered. Bed low, locked w/ bed alarm on. Call light within reach. Purposeful rounding performed. No other complaints at this time.

## 2019-02-26 NOTE — PROGRESS NOTES
"[facility]  Cardiology  Progress Note    Patient Name: Randy Camejo  MRN: [id number]  Admission Date: 2/19/2019  Hospital Length of Stay: 7 days  Code Status: Full Code   Attending Physician: Yan Hernandez MD   Primary Care Physician: Primary Doctor No  Expected Discharge Date:   Principal Problem:Atrial fibrillation with RVR    Subjective:     Brief HPI:    Randy Camejo is a 78 y.o.male with hypertension and diabetes mellitus type 2. He suffered a myocardial infarction in 2000 when he presented to [facility] and had a stent placed. He was treated for DM2 for a few years with metformin but then stopped it. He has not had any regular medical follow up for several years.     He began feeling weak and have diarrhea on 2/15/2019. He diarrhea continued and he became weaker over the next several weak. He had nausea and vomited. On 2/19/2019 he went to  and was referred to the ER. He was noted to be in atrial fibrillation and admitted.    Hospital Course:     2/21/2019: Plan was DILLAN guided CV. He did not "feel" he was ready.    Rate control with amiodarone, digoxin and metoprolol.    Anticoagulation with heparin iv and later apixiban.    2/25/2019: [facility]: DILLAN & CV: Severe left ventricular dysfunction. EF 15%. TERESO with spontaneous echo contrast. No thrombus.  J to sinus rhythm.       Interval History:     Weak but overall feeling better. Denies CP or dyspnea. Remains in sinus rhythm.    Review of Systems   Constitution: Positive for weakness and malaise/fatigue. Negative for chills and fever.   HENT: Negative for nosebleeds.    Eyes: Negative for vision loss in left eye and vision loss in right eye.   Cardiovascular: Negative for chest pain, leg swelling, orthopnea, palpitations and paroxysmal nocturnal dyspnea.   Respiratory: Negative for cough, hemoptysis, shortness of breath, sputum production and wheezing.    Hematologic/Lymphatic: Negative for bleeding problem.   Skin: Negative for rash. "   Musculoskeletal: Negative for myalgias.   Gastrointestinal: Negative for abdominal pain, heartburn, hematemesis, hematochezia, jaundice, melena, nausea and vomiting.   Genitourinary: Negative for hematuria.   Neurological: Negative for dizziness, headaches, light-headedness and vertigo.   Psychiatric/Behavioral: Negative for altered mental status. The patient is not nervous/anxious.    Allergic/Immunologic: Negative for persistent infections.     Objective:     Vital Signs (Most Recent):  Temp: 98.2 °F (36.8 °C) (02/26/19 0816)  Pulse: 61 (02/26/19 1000)  Resp: 16 (02/26/19 0816)  BP: 108/62 (02/26/19 0816)  SpO2: (!) 94 % (02/26/19 0816) Vital Signs (24h Range):  Temp:  [97.1 °F (36.2 °C)-99.6 °F (37.6 °C)] 98.2 °F (36.8 °C)  Pulse:  [56-83] 61  Resp:  [16-46] 16  SpO2:  [92 %-98 %] 94 %  BP: (104-131)/(62-74) 108/62     Weight: 86.9 kg (191 lb 9.3 oz)  Body mass index is 26.72 kg/m².    SpO2: (!) 94 %  O2 Device (Oxygen Therapy): nasal cannula      Intake/Output Summary (Last 24 hours) at 2/26/2019 1049  Last data filed at 2/25/2019 2000  Gross per 24 hour   Intake 200 ml   Output 550 ml   Net -350 ml       Lines/Drains/Airways     Airway                 Airway - Non-Surgical 02/25/19 0847 Nasal Cannula 1 day          Peripheral Intravenous Line                 Peripheral IV - Single Lumen 02/19/19 1200 Anterior;Right Hand 6 days         Peripheral IV - Single Lumen 02/21/19 0022 Left Upper Arm 5 days                Physical Exam   Constitutional: He is oriented to person, place, and time. He appears well-developed and well-nourished. He does not appear ill. No distress.   Eyes: Right conjunctiva is not injected. Right conjunctiva has no hemorrhage. Left conjunctiva is not injected. Left conjunctiva has no hemorrhage. Right pupil is round. Left pupil is round.   Neck: Neck supple. No JVD present.   Cardiovascular: Normal rate, regular rhythm, S1 normal and S2 normal. Exam reveals gallop and S3.    Pulmonary/Chest: Effort normal and breath sounds normal. He has no rales.   Abdominal: Soft. Normal appearance. He exhibits no distension. There is no tenderness.   Musculoskeletal: He exhibits no edema.        Right ankle: He exhibits no swelling.        Left ankle: He exhibits no swelling.   Neurological: He is alert and oriented to person, place, and time. He is not disoriented.   Skin: Skin is warm and dry. No rash noted.   Psychiatric: He has a normal mood and affect. His speech is normal and behavior is normal. Judgment and thought content normal. Cognition and memory are normal.       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] 02/25/2019 Yes    Coronary artery disease [I25.10] 02/25/2019 Yes    Chronic anticoagulation [Z79.01] 02/25/2019 Not Applicable    Diabetic ketoacidosis without coma associated with type 2 diabetes mellitus [E11.10] 02/19/2019 Yes    Pre-ulcerative corn or callous [L84] 02/24/2019 Yes    PAD (peripheral artery disease) [I73.9] 02/24/2019 Yes    Acute hypoxemic respiratory failure [J96.01] 02/21/2019 Yes    Thrombocytopenia [D69.6] 02/21/2019 Yes    Lactic acidosis [E87.2] 02/21/2019 Yes    JAMES (acute kidney injury) [N17.9] 02/19/2019 Yes      Problems Resolved During this Admission:     Assessment and Plan:    1. Atrial Fibrillation              Persistent atrial fibrillation with fast VRR.              Uncertain duration.              [id number] (CHA2DV).              2/21/2019: Plan was DILLAN guided CV but patient did not want to proceed.   Rate control with amiodarone, digoxin and very cautious dose of metoprolol.   2/25/2019: [facility]: DILLAN & CV: Severe left ventricular dysfunction. EF 15%. TERESO with spontaneous echo contrast. No thrombus.  J  to sinus rhythm.    On amiodarone 200 mg Q8, metoprolol 25 mg Q12 and digoxin 0.25 mg Q24.    2. High Risk Medication   2/19/2019: Began amiodarone.   On amiodarone 200 mg Q8.      3. Chronic Anticoagulation              [id number] (CHA2DV).              On heparin iv.              Lifelong anticoagulation.   2/23/2019: Changed to apixiban 5 mg Q12.    On apixiban 5 mg Q12.    4. Heart Failure, Systolic, Acute on Chronic              2/20/2019: Echo: Mildly dilated left ventricle with severe left ventricular dysfunction. EF 15%. Moderately dilated LA. Moderate RV dysfunction.              Maybe at least partially tachycardia induced.              Betablockade, ACEI and spironolactone long term.              Consider cath at later date.   On metoprolol 25 mg Q12.   Will increase metoprolol very cautiously.   2/25/2019: Began enalapril.   On enalapril 2.5 mg Q12.   K 6.0 = hyperkalemia. Will repeat and hold enalapril.     5. Coronary Artery Disease              2000: [facility]: MI and stent.              Clinically stable.     6. Hypertension              Appears to have been mild.     7. Diabetes Mellitus, Type 2              2015: Diagnosed. Complications: CAD. Medications: Diet.              2/19/2019: DKA.     8. Primary Care              In transition.    VTE Risk Mitigation (From admission, onward)        Ordered     apixaban tablet 5 mg  2 times daily      02/22/19 1555     heparin 25,000 units in dextrose 5% 250 mL (100 units/mL) infusion LOW INTENSITY nomogram - [facility]  Continuous      02/20/19 0514     IP VTE HIGH RISK PATIENT  Once      02/20/19 0135     Place sequential compression device  Until discontinued      02/20/19 0135     Place sequential compression device  Until discontinued      02/19/19 2054          Panchito Joseph MD  Cardiology  [facility]

## 2019-02-26 NOTE — PLAN OF CARE
Problem: Adult Inpatient Plan of Care  Goal: Plan of Care Review  Outcome: Ongoing (interventions implemented as appropriate)  Plan of care reviewed with patient. VSS this shift. Blood glucose monitored prior to meals. Insulin coverage given as needed. Patient voiding via urinal. Strict I/O recorded. Patient complains of heartburn. Tums given as ordered prn prior to meals per patient request. Purposeful rounding done. Bed kept locked and in lowest position. Call light in reach. Will continue to monitor.

## 2019-02-27 PROBLEM — E87.5 HYPERKALEMIA: Status: RESOLVED | Noted: 2019-02-26 | Resolved: 2019-02-27

## 2019-02-27 LAB
ANION GAP SERPL CALC-SCNC: 7 MMOL/L
APTT BLDCRRT: 36.3 SEC
BASOPHILS # BLD AUTO: 0.01 K/UL
BASOPHILS NFR BLD: 0.1 %
BUN SERPL-MCNC: 22 MG/DL
CALCIUM SERPL-MCNC: 8.4 MG/DL
CHLORIDE SERPL-SCNC: 100 MMOL/L
CO2 SERPL-SCNC: 30 MMOL/L
CREAT SERPL-MCNC: 1.2 MG/DL
DIFFERENTIAL METHOD: ABNORMAL
EOSINOPHIL # BLD AUTO: 0.2 K/UL
EOSINOPHIL NFR BLD: 1.9 %
ERYTHROCYTE [DISTWIDTH] IN BLOOD BY AUTOMATED COUNT: 13.2 %
EST. GFR  (AFRICAN AMERICAN): >60 ML/MIN/1.73 M^2
EST. GFR  (NON AFRICAN AMERICAN): 58 ML/MIN/1.73 M^2
GLUCOSE SERPL-MCNC: 96 MG/DL
HCT VFR BLD AUTO: 43.6 %
HGB BLD-MCNC: 14 G/DL
LYMPHOCYTES # BLD AUTO: 1.1 K/UL
LYMPHOCYTES NFR BLD: 12 %
MAGNESIUM SERPL-MCNC: 2.1 MG/DL
MCH RBC QN AUTO: 31.3 PG
MCHC RBC AUTO-ENTMCNC: 32.1 G/DL
MCV RBC AUTO: 98 FL
MONOCYTES # BLD AUTO: 1.1 K/UL
MONOCYTES NFR BLD: 11.9 %
NEUTROPHILS # BLD AUTO: 7 K/UL
NEUTROPHILS NFR BLD: 73.9 %
PLATELET # BLD AUTO: 142 K/UL
PMV BLD AUTO: 10.5 FL
POCT GLUCOSE: 120 MG/DL (ref 70–110)
POCT GLUCOSE: 150 MG/DL (ref 70–110)
POCT GLUCOSE: 183 MG/DL (ref 70–110)
POCT GLUCOSE: 91 MG/DL (ref 70–110)
POTASSIUM SERPL-SCNC: 4.9 MMOL/L
RBC # BLD AUTO: 4.47 M/UL
SODIUM SERPL-SCNC: 137 MMOL/L
WBC # BLD AUTO: 9.48 K/UL

## 2019-02-27 PROCEDURE — 94761 N-INVAS EAR/PLS OXIMETRY MLT: CPT

## 2019-02-27 PROCEDURE — 83735 ASSAY OF MAGNESIUM: CPT

## 2019-02-27 PROCEDURE — 97530 THERAPEUTIC ACTIVITIES: CPT

## 2019-02-27 PROCEDURE — 36415 COLL VENOUS BLD VENIPUNCTURE: CPT

## 2019-02-27 PROCEDURE — 80048 BASIC METABOLIC PNL TOTAL CA: CPT

## 2019-02-27 PROCEDURE — 85730 THROMBOPLASTIN TIME PARTIAL: CPT

## 2019-02-27 PROCEDURE — 63600175 PHARM REV CODE 636 W HCPCS: Performed by: HOSPITALIST

## 2019-02-27 PROCEDURE — 99233 PR SUBSEQUENT HOSPITAL CARE,LEVL III: ICD-10-PCS | Mod: ,,, | Performed by: HOSPITALIST

## 2019-02-27 PROCEDURE — 25000003 PHARM REV CODE 250: Performed by: HOSPITALIST

## 2019-02-27 PROCEDURE — 25000003 PHARM REV CODE 250: Performed by: INTERNAL MEDICINE

## 2019-02-27 PROCEDURE — 99233 SBSQ HOSP IP/OBS HIGH 50: CPT | Mod: ,,, | Performed by: HOSPITALIST

## 2019-02-27 PROCEDURE — 11000001 HC ACUTE MED/SURG PRIVATE ROOM

## 2019-02-27 PROCEDURE — 85025 COMPLETE CBC W/AUTO DIFF WBC: CPT

## 2019-02-27 PROCEDURE — 97161 PT EVAL LOW COMPLEX 20 MIN: CPT

## 2019-02-27 RX ORDER — AMIODARONE HYDROCHLORIDE 200 MG/1
200 TABLET ORAL 2 TIMES DAILY
Status: DISCONTINUED | OUTPATIENT
Start: 2019-02-28 | End: 2019-02-28

## 2019-02-27 RX ORDER — DIGOXIN 125 MCG
0.12 TABLET ORAL DAILY
Status: DISCONTINUED | OUTPATIENT
Start: 2019-02-28 | End: 2019-03-01 | Stop reason: HOSPADM

## 2019-02-27 RX ORDER — LOSARTAN POTASSIUM 25 MG/1
25 TABLET ORAL DAILY
Status: DISCONTINUED | OUTPATIENT
Start: 2019-02-27 | End: 2019-03-01 | Stop reason: HOSPADM

## 2019-02-27 RX ADMIN — DIGOXIN 0.25 MG: 125 TABLET ORAL at 09:02

## 2019-02-27 RX ADMIN — APIXABAN 5 MG: 2.5 TABLET, FILM COATED ORAL at 08:02

## 2019-02-27 RX ADMIN — AMIODARONE HYDROCHLORIDE 200 MG: 200 TABLET ORAL at 06:02

## 2019-02-27 RX ADMIN — LOSARTAN POTASSIUM 25 MG: 25 TABLET, FILM COATED ORAL at 05:02

## 2019-02-27 RX ADMIN — APIXABAN 5 MG: 2.5 TABLET, FILM COATED ORAL at 09:02

## 2019-02-27 RX ADMIN — ATORVASTATIN CALCIUM 20 MG: 20 TABLET, FILM COATED ORAL at 09:02

## 2019-02-27 RX ADMIN — INSULIN ASPART 3 UNITS: 100 INJECTION, SOLUTION INTRAVENOUS; SUBCUTANEOUS at 12:02

## 2019-02-27 RX ADMIN — INSULIN ASPART 1 UNITS: 100 INJECTION, SOLUTION INTRAVENOUS; SUBCUTANEOUS at 08:02

## 2019-02-27 RX ADMIN — FUROSEMIDE 20 MG: 20 TABLET ORAL at 09:02

## 2019-02-27 RX ADMIN — CALCIUM CARBONATE 1000 MG: 500 TABLET, CHEWABLE ORAL at 05:02

## 2019-02-27 RX ADMIN — CALCIUM CARBONATE 1000 MG: 500 TABLET, CHEWABLE ORAL at 09:02

## 2019-02-27 RX ADMIN — METOPROLOL TARTRATE 25 MG: 25 TABLET, FILM COATED ORAL at 08:02

## 2019-02-27 RX ADMIN — INSULIN ASPART 3 UNITS: 100 INJECTION, SOLUTION INTRAVENOUS; SUBCUTANEOUS at 05:02

## 2019-02-27 RX ADMIN — INSULIN ASPART 3 UNITS: 100 INJECTION, SOLUTION INTRAVENOUS; SUBCUTANEOUS at 09:02

## 2019-02-27 RX ADMIN — PANTOPRAZOLE SODIUM 40 MG: 40 TABLET, DELAYED RELEASE ORAL at 09:02

## 2019-02-27 RX ADMIN — AMIODARONE HYDROCHLORIDE 200 MG: 200 TABLET ORAL at 12:02

## 2019-02-27 NOTE — ASSESSMENT & PLAN NOTE
New onset.  Ejection fraction 15%.  Possibly tachycardia induced cardiomyopathy.  Continue med of cool therapy including beta-blocker.  Holding ACE-inhibitor in light of hyperkalemia.  Will likely need some dose of diuretic at home.  Starting oral furosemide today.

## 2019-02-27 NOTE — PROGRESS NOTES
[facility] Hospital Medicine  Progress Note    Patient Name: Randy Camejo  MRN: [id number]  Patient Class: IP- Inpatient   Admission Date: 2/19/2019  Length of Stay: 7 days  Attending Physician: Yan Hernandez MD  Primary Care Provider: Primary Doctor No        Subjective:     Principal Problem:Hyperkalemia    HPI:  Mr. Randy Camejo is a 78 y.o. male, with PMH of NIDDM-2, CAD, who presented to [facility] ED on 2/19/19 2/2 abdominal pain x 5 days. Associated symptoms include N/V/D (all non-bloody), which has since resolved, except for the nausea. He was seen in Urgent Care PTA. In the ED, he was found to be tachycardic, and rhythm was A. Fib. He has never before been diagnosed with the same. He was given 2L bolus of IV fluids, then a dose if IV Cardizem, but rate was still elevated. He was started on a Cardizem drip, and admitted to the ICU.     Hospital Course:  Mr. Camejo is a 78 year old man with history of untreated diabetes and coronary artery disease who came to the hospital for evaluation of abdominal pain following several days of nausea, vomiting and diarrhea who was found to have diabetic ketoacidosis, atrial fibrillation with rapid ventricular response and acute systolic chf thought secondary to tachycardia.  He was admitted to the ICU on a cardizem drip and heparin drip was started for anticoagulation.  Subsequently he still required an amiodarone drip as well.  Echocardiogram was obtained which showed ejection fraction of 15%.  He was seen by Dr. Joseph who recommended cardioversion.  Cardioversion was performed today and was successful with conversion to NSR.  He has been continued on oral amiodarone, metoprolol, digoxin and eliquis.  Additionally we have started low dose enalapril.  For his DKA he was treated with an insulin drip and subsequently transitioned to long acting insulin, which we are still titrating.  He has a stable and chronic thrombocytopenia of unclear  etiology.  Finally, he did have a brief episode early on with O2 desaturation but quickly improved with supplemental oxygen.  Patient was stepped down from the intensive care unit.  Patient's continue to well except developed evidence of marked hyperkalemia this morning.    Interval History:  Hyperkalemic this morning.  Improving on repeat check.    Review of Systems   Constitutional: Negative for chills and fever.   Respiratory: Negative for shortness of breath.    Cardiovascular: Negative for chest pain.   Gastrointestinal: Negative for abdominal pain, constipation, diarrhea, nausea and vomiting.     Objective:     Vital Signs (Most Recent):  Temp: 97.4 °F (36.3 °C) (02/26/19 1927)  Pulse: 66 (02/26/19 2000)  Resp: 18 (02/26/19 1927)  BP: 109/61 (02/26/19 1927)  SpO2: 95 % (02/26/19 1927) Vital Signs (24h Range):  Temp:  [97.4 °F (36.3 °C)-99.6 °F (37.6 °C)] 97.4 °F (36.3 °C)  Pulse:  [56-83] 66  Resp:  [16-19] 18  SpO2:  [94 %-98 %] 95 %  BP: (103-131)/(57-74) 109/61     Weight: 86.9 kg (191 lb 9.3 oz)  Body mass index is 26.72 kg/m².    Intake/Output Summary (Last 24 hours) at 2/26/2019 2200  Last data filed at 2/26/2019 1406  Gross per 24 hour   Intake 240 ml   Output 450 ml   Net -210 ml      Physical Exam   Constitutional: He is oriented to person, place, and time.   Cardiovascular: Normal rate, regular rhythm, normal heart sounds and intact distal pulses. Exam reveals no gallop and no friction rub.   No murmur heard.  Pulmonary/Chest: Effort normal and breath sounds normal. No respiratory distress. He has no wheezes. He has no rales.   Abdominal: Soft. Bowel sounds are normal. He exhibits no distension. There is no tenderness.   Musculoskeletal: Normal range of motion. He exhibits no edema or tenderness.   Neurological: He is alert and oriented to person, place, and time.   Skin: Skin is warm and dry. No rash noted. No erythema. No pallor.       Significant Labs: All pertinent labs within the past 24 hours  have been reviewed.    Significant Imaging: I have reviewed all pertinent imaging results/findings within the past 24 hours.    Assessment/Plan:      * Hyperkalemia    Clinically improving however developed hyperkalemia this morning likely secondary to enalapril.  Will give a dose of furosemide to lower serum potassium further.  Repeat serum potassium tomorrow morning.     Acute systolic heart failure    New onset.  Ejection fraction 15%.  Possibly tachycardia induced cardiomyopathy.  Continue med of cool therapy including beta-blocker.  Holding ACE-inhibitor in light of hyperkalemia.  Will likely need some dose of diuretic at home.  Starting oral furosemide today.     Atrial fibrillation with rapid ventricular response    Status post successful cardioversion.  Continue medical therapy as per Dr. Panchito Joseph.     Tachycardia induced cardiomyopathy    -As above.       Pre-ulcerative corn or callous    -Seen by podiatry  -Needs outpatient follow up       Diabetic ketoacidosis without coma associated with type 2 diabetes mellitus    Status post resolution of ke diabetic ketoacidosis.  Hemoglobin A1c at 12.6%.  Will add low-dose short-acting preprandial insulin in addition to long-acting insulin.       VTE Risk Mitigation (From admission, onward)        Ordered     apixaban tablet 5 mg  2 times daily      02/22/19 1555     heparin 25,000 units in dextrose 5% 250 mL (100 units/mL) infusion LOW INTENSITY nomogram - [facility]  Continuous      02/20/19 0514     IP VTE HIGH RISK PATIENT  Once      02/20/19 0135     Place sequential compression device  Until discontinued      02/20/19 0135     Place sequential compression device  Until discontinued      02/19/19 2054              Yan Hernandez MD  Department of Hospital Medicine   [facility]

## 2019-02-27 NOTE — SUBJECTIVE & OBJECTIVE
Interval History:  Hyperkalemic this morning.  Improving on repeat check.    Review of Systems   Constitutional: Negative for chills and fever.   Respiratory: Negative for shortness of breath.    Cardiovascular: Negative for chest pain.   Gastrointestinal: Negative for abdominal pain, constipation, diarrhea, nausea and vomiting.     Objective:     Vital Signs (Most Recent):  Temp: 97.4 °F (36.3 °C) (02/26/19 1927)  Pulse: 66 (02/26/19 2000)  Resp: 18 (02/26/19 1927)  BP: 109/61 (02/26/19 1927)  SpO2: 95 % (02/26/19 1927) Vital Signs (24h Range):  Temp:  [97.4 °F (36.3 °C)-99.6 °F (37.6 °C)] 97.4 °F (36.3 °C)  Pulse:  [56-83] 66  Resp:  [16-19] 18  SpO2:  [94 %-98 %] 95 %  BP: (103-131)/(57-74) 109/61     Weight: 86.9 kg (191 lb 9.3 oz)  Body mass index is 26.72 kg/m².    Intake/Output Summary (Last 24 hours) at 2/26/2019 2200  Last data filed at 2/26/2019 1406  Gross per 24 hour   Intake 240 ml   Output 450 ml   Net -210 ml      Physical Exam   Constitutional: He is oriented to person, place, and time.   Cardiovascular: Normal rate, regular rhythm, normal heart sounds and intact distal pulses. Exam reveals no gallop and no friction rub.   No murmur heard.  Pulmonary/Chest: Effort normal and breath sounds normal. No respiratory distress. He has no wheezes. He has no rales.   Abdominal: Soft. Bowel sounds are normal. He exhibits no distension. There is no tenderness.   Musculoskeletal: Normal range of motion. He exhibits no edema or tenderness.   Neurological: He is alert and oriented to person, place, and time.   Skin: Skin is warm and dry. No rash noted. No erythema. No pallor.       Significant Labs: All pertinent labs within the past 24 hours have been reviewed.    Significant Imaging: I have reviewed all pertinent imaging results/findings within the past 24 hours.

## 2019-02-27 NOTE — ASSESSMENT & PLAN NOTE
Clinically improving however developed hyperkalemia this morning likely secondary to enalapril.  Will give a dose of furosemide to lower serum potassium further.  Repeat serum potassium tomorrow morning.

## 2019-02-27 NOTE — PLAN OF CARE
Problem: Adult Inpatient Plan of Care  Goal: Plan of Care Review  Outcome: Ongoing (interventions implemented as appropriate)  Pt Remains free from fall & injury. Uses urinal while sitting up at bedside.VS stable on 2L NC and afebrile @ this time. Positioned with 2 person assist. Head of bed elevated to facilitate breathing. IV sites WNL. Plan of care reviewed with patient and all questions answered. Bed low, locked w/ bed alarm on. Call light within reach. Daughter at bedside.Purposeful rounding performed. No other complaints at this time.

## 2019-02-27 NOTE — PROGRESS NOTES
"[facility]  Cardiology  Progress Note    Patient Name: Randy Camejo  MRN: [id number]  Admission Date: 2/19/2019  Hospital Length of Stay: 8 days  Code Status: Full Code   Attending Physician: Yan Hernandez MD   Primary Care Physician: Primary Doctor No  Expected Discharge Date:   Principal Problem:Hyperkalemia    Subjective:     Brief HPI:    Randy Cameoj is a 78 y.o.male with hypertension and diabetes mellitus type 2. He suffered a myocardial infarction in 2000 when he presented to [facility] and had a stent placed. He was treated for DM2 for a few years with metformin but then stopped it. He has not had any regular medical follow up for several years.     He began feeling weak and have diarrhea on 2/15/2019. He diarrhea continued and he became weaker over the next several weak. He had nausea and vomited. On 2/19/2019 he went to  and was referred to the ER. He was noted to be in atrial fibrillation and admitted.    Hospital Course:     2/21/2019: Plan was DILLAN guided CV. He did not "feel" he was ready.    Rate control with amiodarone, digoxin and metoprolol.    Anticoagulation with heparin iv and later apixiban.    2/25/2019: [facility]: DILLAN & CV: Severe left ventricular dysfunction. EF 15%. TERESO with spontaneous echo contrast. No thrombus.  J to sinus rhythm.     2/26/2019: Hyperkalemia with K 6.0 after given enalapril.      Interval History:     Weak.Feeling better. Denies CP or dyspnea. Been ambulating. Remains in sinus rhythm.    Review of Systems   Constitution: Positive for weakness and malaise/fatigue. Negative for chills and fever.   HENT: Negative for nosebleeds.    Eyes: Negative for vision loss in left eye and vision loss in right eye.   Cardiovascular: Negative for chest pain, leg swelling, orthopnea, palpitations and paroxysmal nocturnal dyspnea.   Respiratory: Negative for cough, hemoptysis, shortness of breath, sputum production and wheezing.    Hematologic/Lymphatic: Negative " for bleeding problem.   Skin: Negative for rash.   Musculoskeletal: Negative for myalgias.   Gastrointestinal: Negative for abdominal pain, heartburn, hematemesis, hematochezia, jaundice, melena, nausea and vomiting.   Genitourinary: Negative for hematuria.   Neurological: Negative for dizziness, headaches, light-headedness and vertigo.   Psychiatric/Behavioral: Negative for altered mental status. The patient is not nervous/anxious.    Allergic/Immunologic: Negative for persistent infections.     Objective:     Vital Signs (Most Recent):  Temp: 98 °F (36.7 °C) (02/27/19 1645)  Pulse: 61 (02/27/19 1645)  Resp: 18 (02/27/19 1645)  BP: 120/61 (02/27/19 1645)  SpO2: (!) 94 % (02/27/19 1645) Vital Signs (24h Range):  Temp:  [97.4 °F (36.3 °C)-98.3 °F (36.8 °C)] 98 °F (36.7 °C)  Pulse:  [55-67] 61  Resp:  [18-20] 18  SpO2:  [92 %-97 %] 94 %  BP: (105-120)/(56-67) 120/61     Weight: 86.9 kg (191 lb 9.3 oz)  Body mass index is 26.72 kg/m².    SpO2: (!) 94 %  O2 Device (Oxygen Therapy): nasal cannula    No intake or output data in the 24 hours ending 02/27/19 1738    Lines/Drains/Airways     Airway                 Airway - Non-Surgical 02/25/19 0847 Nasal Cannula 2 days          Peripheral Intravenous Line                 Peripheral IV - Single Lumen 02/19/19 1200 Anterior;Right Hand 8 days         Peripheral IV - Single Lumen 02/21/19 0022 Left Upper Arm 6 days                Physical Exam   Constitutional: He is oriented to person, place, and time. He appears well-developed and well-nourished. He does not appear ill. No distress.   Eyes: Right conjunctiva is not injected. Right conjunctiva has no hemorrhage. Left conjunctiva is not injected. Left conjunctiva has no hemorrhage. Right pupil is round. Left pupil is round.   Neck: Neck supple. No JVD present.   Cardiovascular: Normal rate, regular rhythm, S1 normal and S2 normal. Exam reveals gallop and S3.   Pulmonary/Chest: Effort normal. He has rhonchi. He has no rales.    Abdominal: Soft. Normal appearance. He exhibits no distension. There is no tenderness.   Musculoskeletal: He exhibits no edema.        Right ankle: He exhibits no swelling.        Left ankle: He exhibits no swelling.   Neurological: He is alert and oriented to person, place, and time. He is not disoriented.   Skin: Skin is warm and dry. No rash noted.   Psychiatric: He has a normal mood and affect. His speech is normal and behavior is normal. Judgment and thought content normal. Cognition and memory are normal.     Current Medications:     amiodarone  200 mg Oral TID AC    apixaban  5 mg Oral BID    atorvastatin  20 mg Oral Daily    digoxin  0.25 mg Oral Daily    furosemide  20 mg Oral Daily    insulin aspart U-100  3 Units Subcutaneous TIDWM    insulin detemir U-100  20 Units Subcutaneous QHS    losartan  25 mg Oral Daily    metoprolol tartrate  25 mg Oral BID    pantoprazole  40 mg Oral Daily     Current Laboratory Results:    Recent Results (from the past 24 hour(s))   POCT glucose    Collection Time: 02/26/19  7:56 PM   Result Value Ref Range    POCT Glucose 205 (H) 70 - 110 mg/dL   CBC with Automated Differential    Collection Time: 02/27/19  5:53 AM   Result Value Ref Range    WBC 9.48 3.90 - 12.70 K/uL    RBC 4.47 (L) 4.60 - 6.20 M/uL    Hemoglobin 14.0 14.0 - 18.0 g/dL    Hematocrit 43.6 40.0 - 54.0 %    MCV 98 82 - 98 fL    MCH 31.3 (H) 27.0 - 31.0 pg    MCHC 32.1 32.0 - 36.0 g/dL    RDW 13.2 11.5 - 14.5 %    Platelets 142 (L) 150 - 350 K/uL    MPV 10.5 9.2 - 12.9 fL    Gran # (ANC) 7.0 1.8 - 7.7 K/uL    Lymph # 1.1 1.0 - 4.8 K/uL    Mono # 1.1 (H) 0.3 - 1.0 K/uL    Eos # 0.2 0.0 - 0.5 K/uL    Baso # 0.01 0.00 - 0.20 K/uL    Gran% 73.9 (H) 38.0 - 73.0 %    Lymph% 12.0 (L) 18.0 - 48.0 %    Mono% 11.9 4.0 - 15.0 %    Eosinophil% 1.9 0.0 - 8.0 %    Basophil% 0.1 0.0 - 1.9 %    Differential Method Automated    APTT    Collection Time: 02/27/19  5:53 AM   Result Value Ref Range    aPTT 36.3 (H) 21.0  - 32.0 sec   Basic metabolic panel    Collection Time: 02/27/19  5:53 AM   Result Value Ref Range    Sodium 137 136 - 145 mmol/L    Potassium 4.9 3.5 - 5.1 mmol/L    Chloride 100 95 - 110 mmol/L    CO2 30 (H) 23 - 29 mmol/L    Glucose 96 70 - 110 mg/dL    BUN, Bld 22 8 - 23 mg/dL    Creatinine 1.2 0.5 - 1.4 mg/dL    Calcium 8.4 (L) 8.7 - 10.5 mg/dL    Anion Gap 7 (L) 8 - 16 mmol/L    eGFR if African American >60 >60 mL/min/1.73 m^2    eGFR if non African American 58 (A) >60 mL/min/1.73 m^2   Magnesium    Collection Time: 02/27/19  5:53 AM   Result Value Ref Range    Magnesium 2.1 1.6 - 2.6 mg/dL   POCT glucose    Collection Time: 02/27/19  8:06 AM   Result Value Ref Range    POCT Glucose 91 70 - 110 mg/dL   POCT glucose    Collection Time: 02/27/19 11:42 AM   Result Value Ref Range    POCT Glucose 120 (H) 70 - 110 mg/dL   POCT glucose    Collection Time: 02/27/19  4:43 PM   Result Value Ref Range    POCT Glucose 150 (H) 70 - 110 mg/dL     Current Imaging Results:    US Lower Extremity Arteries Bilateral   Final Result      No hemodynamically significant stenosis demonstrated in the right or left lower extremity arterial system.         Electronically signed by: Edgardo Gama MD   Date:    02/25/2019   Time:    08:49      X-Ray Chest AP Portable   Final Result      Bibasilar airspace opacities and small pleural effusions, possibly pulmonary edema versus pneumonia or aspiration.  Recommend correlation with clinical findings and follow-up to ensure improvement/resolution.         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] 02/25/2019 Yes    Coronary artery disease  [I25.10] 02/25/2019 Yes    Atrial fibrillation with rapid ventricular response [I48.91] 02/19/2019 Yes    Chronic anticoagulation [Z79.01] 02/25/2019 Not Applicable    Diabetic ketoacidosis without coma associated with type 2 diabetes mellitus [E11.10] 02/19/2019 Yes    Pre-ulcerative corn or callous [L84] 02/24/2019 Yes    PAD (peripheral artery disease) [I73.9] 02/24/2019 Yes      Problems Resolved During this Admission:    Diagnosis Date Noted Date Resolved POA    Acute hypoxemic respiratory failure [J96.01] 02/21/2019 02/26/2019 Yes    Thrombocytopenia [D69.6] 02/21/2019 02/26/2019 Yes    Lactic acidosis [E87.2] 02/21/2019 02/26/2019 Yes    JAMES (acute kidney injury) [N17.9] 02/19/2019 02/26/2019 Yes     Assessment and Plan:    1. Atrial Fibrillation              Persistent atrial fibrillation with fast VRR.              Uncertain duration.              [id number]=4 (CHA2DV).              2/21/2019: Plan was DILLAN guided CV but patient did not want to proceed.   Rate control with amiodarone, digoxin and very cautious dose of metoprolol.   2/25/2019: [facility]: DILLAN & CV: Severe left ventricular dysfunction. EF 15%. TERESO with spontaneous echo contrast. No thrombus.  J to sinus rhythm.    On amiodarone 200 mg Q8, metoprolol 25 mg Q12 and digoxin 0.25 mg Q24.   2/27/2019: Reduce digoxin to 0.125 mg Q24.    2. High Risk Medication   2/19/2019: Began amiodarone.   On amiodarone 200 mg Q8.      3. Chronic Anticoagulation              [id number] (CHA2DV).              On heparin iv.              Lifelong anticoagulation.   2/23/2019: Changed to apixiban 5 mg Q12.    On apixiban 5 mg Q12.    4. Heart Failure, Systolic, Acute on Chronic              2/20/2019: Echo: Mildly dilated left ventricle with severe left ventricular dysfunction. EF 15%. Moderately dilated LA. Moderate RV dysfunction.              Maybe at least partially tachycardia induced.              Betablockade, ACEI and spironolactone long  term.              Consider cath at later date.   On metoprolol 25 mg Q12.   Will increase metoprolol very cautiously.   2/25/2019: Began enalapril.   On enalapril 2.5 mg Q12.   K 6.0 = hyperkalemia. Will repeat and hold enalapril.   2/27/2019: K 4.9. Try losartan 25 mg Q24.     5. Coronary Artery Disease              2000: [facility]: MI and stent.              Clinically stable.   No aspirin as anticoagulated with apixiban.     6. Hypertension              Appears to have been mild.     7. Diabetes Mellitus, Type 2              2015: Diagnosed. Complications: CAD. Medications: Diet.              2/19/2019: DKA.     8. Primary Care              In transition.    VTE Risk Mitigation (From admission, onward)        Ordered     apixaban tablet 5 mg  2 times daily      02/22/19 1555     heparin 25,000 units in dextrose 5% 250 mL (100 units/mL) infusion LOW INTENSITY nomogram - [facility]  Continuous      02/20/19 0514     IP VTE HIGH RISK PATIENT  Once      02/20/19 0135     Place sequential compression device  Until discontinued      02/20/19 0135     Place sequential compression device  Until discontinued      02/19/19 2054          Panchito Joseph MD  Cardiology  [facility]

## 2019-02-27 NOTE — ASSESSMENT & PLAN NOTE
Status post resolution of ke diabetic ketoacidosis.  Hemoglobin A1c at 12.6%.  Will add low-dose short-acting preprandial insulin in addition to long-acting insulin.

## 2019-02-27 NOTE — PT/OT/SLP EVAL
Physical Therapy Evaluation and Discharge Note    Patient Name:  Randy Camejo   MRN:  [id number]    Recommendations:     Discharge Recommendations:  (HH PT)   Discharge Equipment Recommendations: none   Barriers to discharge: None    Assessment:     Randy Camejo is a 78 y.o. male admitted with a medical diagnosis of Hyperkalemia. .  At this time, patient is functioning at their prior level of function and does not require further acute PT services.     Patient with no acute PT needs at this time. Patient safe to ambulate with RW under supervision of family and/or nursing staff. Would benefit from HH PT upon D/C to improve overall strength and aerobic capacity in order to optimize functional mobility outcomes and maximize patient safety.     Recent Surgery: Procedure(s) (LRB):  ECHOCARDIOGRAM,TRANSESOPHAGEAL (N/A)  CARDIOVERSION OR DEFIBRILLATION (N/A) 2 Days Post-Op    Plan:     During this hospitalization, patient does not require further acute PT services.  Please re-consult if situation changes.      Subjective     Chief Complaint: Weakness and lethargy  Patient/Family Comments/goals: Patient's daughter inquired about patient not ambulating within the past 7 days while admitted at [facility]. Patient's daughter also asked about potentially causes of low O2 saturation. Patient agreeable to ambulating with PT.  Pain/Comfort:  · Pain Rating 1: 0/10  · Pain Rating Post-Intervention 1: 0/10    Patients cultural, spiritual, Jain conflicts given the current situation: no    Living Environment:  · Lives in [facility] with 3 LIEN and SHR for front entrance; no handrail for back entrance -- plan to add railing   · Lives with wife and son; wife doesn't work and son is PRN PTA at [employer]-- both available to help upon d/c  · Walk-in shower  · Raised toilet seat  · Was independent with all mobility prior to admit   · Driving  · Works 5 days/wk -- [employer] and @[employer]; also, sells records in spare time   · Occasionally  uses cane; has RW at home.   · Equipment used at home: raised toilet, cane, straight, walker, rolling.     Objective:     Communicated with RN prior to session.  Patient found sitting in chair upon PT entry to room found with: peripheral IV, oxygen     General Precautions: Standard, anti-coagulation medicine, fall   Orthopedic Precautions:N/A   Braces: N/A     Exams:  · Cognition:   · Patient is oriented to name, , location and situation  · Pt follows approximately 100% of multi-step commands.    · Mood: Pleasant and cooperative. Joking throughout session.   · Musculoskeletal:  · Posture:    · -       Rounded shoulders  · -       Forward head  · LE ROM/Strength:   · 4/5 BLE hip flexion, knee flexion, knee ext, ankle DF  · Neuromuscular:  · Sensation: Intact to light touch bilateral LEs. C/o neuropathy in BLE but gross sensation intact  · Coordination/Tone/Reflexes: No impairments identified with functional mobility. No formal testing performed.   · Balance: SBA for balance with RW  · Visual-vestibular: No impairments identified with functional mobility. No formal testing performed  · Integument: Visible skin intact  · Cardiopulmonary:  · Vital signs:   · At rest  · SpO2 on RA: 94% Heart rate 60's bpm  · BP: 100/58  · Ambulating  · SpO2 on RA: 95-92% Heart rate 60's bpm  · Edema:   · BLE 2+    Functional Mobility:  · Transfers:     · Sit <> Stand:  stand by assistance with rolling walker  · Verbal cues for hand placement during t/f   · Gait:   · Ambulated 310' with RW, SBA  · Verbal cues to maintain contact with RW and floor when turning 1x during session -- no verbal cues after initial reminder  · Adequate gait speed  · No safety concerns  · Poor posture: forward head and shoulders   · Balance:   · SBA when standing with RW  · Mod I for seated balance    AM-PAC 6 CLICK MOBILITY  Total Score:23       Therapeutic Activities and Exercises:  · Discussed d/c planning  · Seated scapular retraction for posture  · 10x --  encouraged to perform throughout day  · Verbal and tactile cues     AM-PAC 6 CLICK MOBILITY  Total Score:23     Patient left seated EOB with all lines intact, call button in reach and family present.    GOALS:   Multidisciplinary Problems     Physical Therapy Goals        Problem: Physical Therapy Goal    Goal Priority Disciplines Outcome Goal Variances Interventions   Physical Therapy Goal     PT, PT/OT      Description:  Patient with no acute PT needs at this time. Patient safe to ambulate with RW under supervision of family and/or nursing staff. Would benefit from HH PT upon D/C to improve overall strength and aerobic capacity in order to optimize functional mobility outcomes and maximize patient safety.                       History:     Past Medical History:   Diagnosis Date    Asthma     childhood    Chronic anticoagulation 2/25/2019    Coronary artery disease     Coronary artery disease 2/25/2019    Heart attack     stent    Tachycardia induced cardiomyopathy 2/25/2019       Past Surgical History:   Procedure Laterality Date    CARDIAC CATHETERIZATION      CARDIOVERSION N/A 2/21/2019    Performed by [facility] at [facility] CATH LAB    CARDIOVERSION OR DEFIBRILLATION N/A 2/25/2019    Performed by [facility] at [facility] CATH LAB    CATARACT EXTRACTION W/  INTRAOCULAR LENS IMPLANT Right 12/18/2017    Dr. Beavers    CATARACT EXTRACTION W/  INTRAOCULAR LENS IMPLANT Left 01/08/2018    Dr. Beavers    ECHOCARDIOGRAM,TRANSESOPHAGEAL N/A 2/25/2019    Performed by Panch[facility] at [facility] CATH LAB    ECHOCARDIOGRAM,TRANSESOPHAGEAL N/A 2/21/2019    Performed by Panchito Joseph MD at [facility] CATH LAB    INSERTION-INTRAOCULAR LENS (IOL) Left 1/8/2018    Performed by Milo Beavers MD at [facility] OR    INSERTION-INTRAOCULAR LENS (IOL) Right 12/18/2017    Performed by Milo Beavers MD at [facility] OR    PHACOEMULSIFICATION-ASPIRATION-CATARACT Left 1/8/2018    Performed by Milo Beavers MD at [facility] OR     PHACOEMULSIFICATION-ASPIRATION-CATARACT Right 12/18/2017    [facility] at [facility] OR    TONSILLECTOMY         Time Tracking:     PT Received On: 02/27/19  PT Start Time: 0940     PT Stop Time: 1010  PT Total Time (min): 30 min     Billable Minutes: Evaluation 20 and Therapeutic Activity 10      Erika Grayson, PT  02/27/2019

## 2019-02-27 NOTE — PLAN OF CARE
Problem: Adult Inpatient Plan of Care  Goal: Plan of Care Review  Outcome: Ongoing (interventions implemented as appropriate)  Plan of care reviewed with patient and family. No complaints of pain today. Blood glucose monitored prior to meals. Insulin coverage given with meals. Bowel Movementx1 today. Purposeful rounding done. Bed kept locked and in lowest position. Call light in reach. Will continue to monitor.

## 2019-02-28 LAB
ANION GAP SERPL CALC-SCNC: 7 MMOL/L
APTT BLDCRRT: 36.2 SEC
BASOPHILS # BLD AUTO: 0.02 K/UL
BASOPHILS NFR BLD: 0.2 %
BUN SERPL-MCNC: 19 MG/DL
CALCIUM SERPL-MCNC: 8.3 MG/DL
CHLORIDE SERPL-SCNC: 101 MMOL/L
CO2 SERPL-SCNC: 28 MMOL/L
CREAT SERPL-MCNC: 1 MG/DL
DIFFERENTIAL METHOD: ABNORMAL
EOSINOPHIL # BLD AUTO: 0.2 K/UL
EOSINOPHIL NFR BLD: 1.6 %
ERYTHROCYTE [DISTWIDTH] IN BLOOD BY AUTOMATED COUNT: 13.1 %
EST. GFR  (AFRICAN AMERICAN): >60 ML/MIN/1.73 M^2
EST. GFR  (NON AFRICAN AMERICAN): >60 ML/MIN/1.73 M^2
GLUCOSE SERPL-MCNC: 79 MG/DL
HCT VFR BLD AUTO: 41.4 %
HGB BLD-MCNC: 13.5 G/DL
LYMPHOCYTES # BLD AUTO: 1.3 K/UL
LYMPHOCYTES NFR BLD: 14 %
MAGNESIUM SERPL-MCNC: 2.1 MG/DL
MCH RBC QN AUTO: 31.8 PG
MCHC RBC AUTO-ENTMCNC: 32.6 G/DL
MCV RBC AUTO: 97 FL
MONOCYTES # BLD AUTO: 1.1 K/UL
MONOCYTES NFR BLD: 11.7 %
NEUTROPHILS # BLD AUTO: 6.8 K/UL
NEUTROPHILS NFR BLD: 72.1 %
PLATELET # BLD AUTO: 170 K/UL
PMV BLD AUTO: 10.3 FL
POCT GLUCOSE: 127 MG/DL (ref 70–110)
POCT GLUCOSE: 128 MG/DL (ref 70–110)
POCT GLUCOSE: 163 MG/DL (ref 70–110)
POCT GLUCOSE: 74 MG/DL (ref 70–110)
POTASSIUM SERPL-SCNC: 4.7 MMOL/L
RBC # BLD AUTO: 4.25 M/UL
SODIUM SERPL-SCNC: 136 MMOL/L
WBC # BLD AUTO: 9.45 K/UL

## 2019-02-28 PROCEDURE — 80048 BASIC METABOLIC PNL TOTAL CA: CPT

## 2019-02-28 PROCEDURE — 99900035 HC TECH TIME PER 15 MIN (STAT)

## 2019-02-28 PROCEDURE — 85025 COMPLETE CBC W/AUTO DIFF WBC: CPT

## 2019-02-28 PROCEDURE — 11000001 HC ACUTE MED/SURG PRIVATE ROOM

## 2019-02-28 PROCEDURE — 85730 THROMBOPLASTIN TIME PARTIAL: CPT

## 2019-02-28 PROCEDURE — 83735 ASSAY OF MAGNESIUM: CPT

## 2019-02-28 PROCEDURE — 94761 N-INVAS EAR/PLS OXIMETRY MLT: CPT

## 2019-02-28 PROCEDURE — 36415 COLL VENOUS BLD VENIPUNCTURE: CPT

## 2019-02-28 PROCEDURE — 25000003 PHARM REV CODE 250: Performed by: HOSPITALIST

## 2019-02-28 PROCEDURE — 99232 SBSQ HOSP IP/OBS MODERATE 35: CPT | Mod: ,,, | Performed by: HOSPITALIST

## 2019-02-28 PROCEDURE — 99232 PR SUBSEQUENT HOSPITAL CARE,LEVL II: ICD-10-PCS | Mod: ,,, | Performed by: HOSPITALIST

## 2019-02-28 PROCEDURE — 97802 MEDICAL NUTRITION INDIV IN: CPT

## 2019-02-28 PROCEDURE — 25000003 PHARM REV CODE 250: Performed by: INTERNAL MEDICINE

## 2019-02-28 RX ORDER — AMIODARONE HYDROCHLORIDE 200 MG/1
200 TABLET ORAL DAILY
Status: DISCONTINUED | OUTPATIENT
Start: 2019-03-01 | End: 2019-03-01 | Stop reason: HOSPADM

## 2019-02-28 RX ORDER — CARVEDILOL 3.12 MG/1
3.12 TABLET ORAL 2 TIMES DAILY
Status: DISCONTINUED | OUTPATIENT
Start: 2019-02-28 | End: 2019-03-01 | Stop reason: HOSPADM

## 2019-02-28 RX ADMIN — CARVEDILOL 3.12 MG: 3.12 TABLET, FILM COATED ORAL at 07:02

## 2019-02-28 RX ADMIN — ATORVASTATIN CALCIUM 20 MG: 20 TABLET, FILM COATED ORAL at 08:02

## 2019-02-28 RX ADMIN — DIGOXIN 0.12 MG: 125 TABLET ORAL at 08:02

## 2019-02-28 RX ADMIN — AMIODARONE HYDROCHLORIDE 200 MG: 200 TABLET ORAL at 08:02

## 2019-02-28 RX ADMIN — LOSARTAN POTASSIUM 25 MG: 25 TABLET, FILM COATED ORAL at 08:02

## 2019-02-28 RX ADMIN — CALCIUM CARBONATE 1000 MG: 500 TABLET, CHEWABLE ORAL at 12:02

## 2019-02-28 RX ADMIN — FUROSEMIDE 20 MG: 20 TABLET ORAL at 08:02

## 2019-02-28 RX ADMIN — APIXABAN 5 MG: 2.5 TABLET, FILM COATED ORAL at 08:02

## 2019-02-28 RX ADMIN — APIXABAN 5 MG: 2.5 TABLET, FILM COATED ORAL at 09:02

## 2019-02-28 RX ADMIN — PANTOPRAZOLE SODIUM 40 MG: 40 TABLET, DELAYED RELEASE ORAL at 08:02

## 2019-02-28 RX ADMIN — CALCIUM CARBONATE 1000 MG: 500 TABLET, CHEWABLE ORAL at 06:02

## 2019-02-28 RX ADMIN — METOPROLOL TARTRATE 25 MG: 25 TABLET, FILM COATED ORAL at 08:02

## 2019-02-28 RX ADMIN — INSULIN ASPART 3 UNITS: 100 INJECTION, SOLUTION INTRAVENOUS; SUBCUTANEOUS at 06:02

## 2019-02-28 NOTE — PLAN OF CARE
Problem: Adult Inpatient Plan of Care  Goal: Plan of Care Review  Recommendation:   1. Recommend 2000kcal DM diet   2. Provided DM diet education (written + verbal)     Goals:   1. Meet >85% EEN and EPN   2. Prevent wt loss of >2% per week   3. Promote nutrition related labs wnl

## 2019-02-28 NOTE — SUBJECTIVE & OBJECTIVE
Interval History:  No acute events.  Patient feeling stronger.    Review of Systems   Constitutional: Negative for chills and fever.   Respiratory: Negative for shortness of breath.    Cardiovascular: Negative for chest pain.   Gastrointestinal: Negative for abdominal pain, constipation, diarrhea, nausea and vomiting.     Objective:     Vital Signs (Most Recent):  Temp: 97.7 °F (36.5 °C) (02/28/19 0358)  Pulse: (!) 58 (02/28/19 1000)  Resp: 18 (02/28/19 0919)  BP: 106/62 (02/28/19 0358)  SpO2: 96 % (02/28/19 0919) Vital Signs (24h Range):  Temp:  [97.7 °F (36.5 °C)-98.6 °F (37 °C)] 97.7 °F (36.5 °C)  Pulse:  [54-74] 58  Resp:  [18-20] 18  SpO2:  [92 %-96 %] 96 %  BP: (102-121)/(59-64) 106/62     Weight: 86.9 kg (191 lb 9.3 oz)  Body mass index is 26.72 kg/m².    Intake/Output Summary (Last 24 hours) at 2/28/2019 1055  Last data filed at 2/27/2019 1251  Gross per 24 hour   Intake --   Output 1 ml   Net -1 ml      Physical Exam   Constitutional: He is oriented to person, place, and time.   Cardiovascular: Normal rate, regular rhythm, normal heart sounds and intact distal pulses. Exam reveals no gallop and no friction rub.   No murmur heard.  Pulmonary/Chest: Effort normal and breath sounds normal. No respiratory distress. He has no wheezes. He has no rales.   Abdominal: Soft. Bowel sounds are normal. He exhibits no distension. There is no tenderness.   Musculoskeletal: Normal range of motion. He exhibits no edema or tenderness.   Neurological: He is alert and oriented to person, place, and time.   Skin: Skin is warm and dry. No rash noted. No erythema. No pallor.       Significant Labs: All pertinent labs within the past 24 hours have been reviewed.    Significant Imaging: I have reviewed all pertinent imaging results/findings within the past 24 hours.

## 2019-02-28 NOTE — PROGRESS NOTES
"[facility]  Adult Nutrition  Progress Note    SUMMARY       Recommendations    Recommendation:   1. Recommend 2000kcal DM diet   2. Provided DM diet education (written + verbal)    Goals:   1. Meet >85% EEN and EPN   2. Prevent wt loss of >2% per week   3. Promote nutrition related labs wnl    Nutrition Goal Status: new  Communication of RAGHU Recs: discussed on rounds    Reason for Assessment    Reason For Assessment: length of stay  Diagnosis: other (see comments)(hyperkalemia)  Relevant Medical History: NIDDM-2, CAD  Interdisciplinary Rounds: attended  General Information Comments: Pt reports excellent appetite, finishing 100% of meals, denies any weight changes. Pt has some temporal depletion but otherwise appears well nourished. Does not follow DM diet at home but was educated by diabetic educator this admission. Provided additional education at visit. Denies N/V/D/C.   Nutrition Discharge Planning: d/c on DM scar    Nutrition Risk Screen    Nutrition Risk Screen: no indicators present    Nutrition/Diet History    Patient Reported Diet/Restrictions/Preferences: general  Spiritual, Cultural Beliefs, [employer] Practices, Values that Affect Care: no    Anthropometrics    Temp: 98.4 °F (36.9 °C)  Height Method: Stated  Height: 5' 11" (180.3 cm)  Height (inches): 71 in  Weight Method: Bed Scale  Weight: 86.6 kg (191 lb)  Weight (lb): 191 lb  Ideal Body Weight (IBW), Male: 172 lb  % Ideal Body Weight, Male (lb): 111.05 lb  BMI (Calculated): 26.7  BMI Grade: 25 - 29.9 - overweight     Lab/Procedures/Meds    Pertinent Labs: Reviewed  Lab Results   Component Value Date    CALCIUM 8.3 (L) 02/28/2019     POCT Glucose   Date Value Ref Range Status   02/28/2019 127 (H) 70 - 110 mg/dL Final   02/28/2019 74 70 - 110 mg/dL Final   02/27/2019 183 (H) 70 - 110 mg/dL Final   02/27/2019 150 (H) 70 - 110 mg/dL Final   02/27/2019 120 (H) 70 - 110 mg/dL Final   02/27/2019 91 70 - 110 mg/dL Final   02/26/2019 205 (H) " 70 - 110 mg/dL Final   02/26/2019 193 (H) 70 - 110 mg/dL Final   02/26/2019 153 (H) 70 - 110 mg/dL Final   02/26/2019 124 (H) 70 - 110 mg/dL Final   02/25/2019 209 (H) 70 - 110 mg/dL Final   02/25/2019 204 (H) 70 - 110 mg/dL Final     Lab Results   Component Value Date    HGBA1C 12.6 (H) 02/19/2019     Pertinent Meds: Reviewed  Scheduled Meds:   amiodarone  200 mg Oral BID    apixaban  5 mg Oral BID    atorvastatin  20 mg Oral Daily    digoxin  0.125 mg Oral Daily    furosemide  20 mg Oral Daily    insulin aspart U-100  3 Units Subcutaneous TIDWM    insulin detemir U-100  15 Units Subcutaneous QHS    losartan  25 mg Oral Daily    metoprolol tartrate  25 mg Oral BID    pantoprazole  40 mg Oral Daily     Estimated/Assessed Needs    Weight Used For Calorie Calculations: 86.9 kg (191 lb 9.3 oz)  Energy Calorie Requirements (kcal): 2020  Energy Need Method: [place]-St Jeor(stress factor 1.3)  Protein Requirements:  gm/d (1-1.2 gm/kg)  Weight Used For Protein Calculations: 86.9 kg (191 lb 9.3 oz)  Fluid Requirements (mL): 2020(or per team)  Estimated Fluid Requirement Method: RDA Method  RDA Method (mL): 2020  CHO Requirement: 50%      Nutrition Prescription Ordered    Current Diet Order: 1500kcal DM and cardiac    Evaluation of Received Nutrient/Fluid Intake       % Intake of Estimated Energy Needs: 75 - 100 %  % Meal Intake: 75 - 100 %    Nutrition Risk    Level of Risk/Frequency of Follow-up: low     Assessment and Plan    Nutrition Problem  Altered nutrition-related laboratory values  Related to (etiology):   endocrine dysfunction  Signs and Symptoms (as evidenced by):   HbA1C of 12.6 and elevated POCT glucose   Interventions:  Nutrition education and Collaboration with Providers   Nutrition Diagnosis Status:   New    Monitor and Evaluation    Food and Nutrient Intake: energy intake, food and beverage intake  Knowledge/Beliefs/Attitudes: food and nutrition knowledge/skill, beliefs and  attitudes  Anthropometric Measurements: weight, weight change  Biochemical Data, Medical Tests and Procedures: electrolyte and renal panel, gastrointestinal profile, glucose/endocrine profile, inflammatory profile, lipid profile  Nutrition-Focused Physical Findings: overall appearance, extremities, muscles and bones, skin     Nutrition Follow-Up    RD Follow-up?: Yes    Mei Mcduffie, MS, RD, LDN   Dietitian, [facility]  [phone]

## 2019-02-28 NOTE — PROGRESS NOTES
[facility]  Progress Note    Patient Name: Randy Camejo  MRN: [id number]  Patient Class: IP- Inpatient   Admission Date: 2/19/2019  Length of Stay: 9 days  Attending Physician: Yan Hernandez MD  Primary Care Provider: Primary Doctor No        Subjective:     Principal Problem:Hyperkalemia    HPI:  Mr. Randy Camejo is a 78 y.o. male, with PMH of NIDDM-2, CAD, who presented to [facility] ED on 2/19/19 2/2 abdominal pain x 5 days. Associated symptoms include N/V/D (all non-bloody), which has since resolved, except for the nausea. He was seen in Urgent Care PTA. In the ED, he was found to be tachycardic, and rhythm was A. Fib. He has never before been diagnosed with the same. He was given 2L bolus of IV fluids, then a dose if IV Cardizem, but rate was still elevated. He was started on a Cardizem drip, and admitted to the ICU.     Hospital Course:  Mr. Camejo is a 78 year old man with history of untreated diabetes and coronary artery disease who came to the hospital for evaluation of abdominal pain following several days of nausea, vomiting and diarrhea who was found to have diabetic ketoacidosis, atrial fibrillation with rapid ventricular response and acute systolic chf thought secondary to tachycardia.  He was admitted to the ICU on a cardizem drip and heparin drip was started for anticoagulation.  Subsequently he still required an amiodarone drip as well.  Echocardiogram was obtained which showed ejection fraction of 15%.  He was seen by Dr. Joseph who recommended cardioversion.  Cardioversion was performed today and was successful with conversion to NSR.  He has been continued on oral amiodarone, metoprolol, digoxin and eliquis.  Additionally we have started low dose enalapril.  For his DKA he was treated with an insulin drip and subsequently transitioned to long acting insulin, which we are still titrating.  He has a stable and chronic thrombocytopenia of unclear  etiology.  Finally, he did have a brief episode early on with O2 desaturation but quickly improved with supplemental oxygen.  Patient was stepped down from the intensive care unit.  Patient's continue to do well except developed evidence of marked hyperkalemia after starting enalapril.  Discontinued and potassium within normal limits.    Interval History:  No acute events.  Patient feeling stronger.    Review of Systems   Constitutional: Negative for chills and fever.   Respiratory: Negative for shortness of breath.    Cardiovascular: Negative for chest pain.   Gastrointestinal: Negative for abdominal pain, constipation, diarrhea, nausea and vomiting.     Objective:     Vital Signs (Most Recent):  Temp: 97.7 °F (36.5 °C) (02/28/19 0358)  Pulse: (!) 58 (02/28/19 1000)  Resp: 18 (02/28/19 0919)  BP: 106/62 (02/28/19 0358)  SpO2: 96 % (02/28/19 0919) Vital Signs (24h Range):  Temp:  [97.7 °F (36.5 °C)-98.6 °F (37 °C)] 97.7 °F (36.5 °C)  Pulse:  [54-74] 58  Resp:  [18-20] 18  SpO2:  [92 %-96 %] 96 %  BP: (102-121)/(59-64) 106/62     Weight: 86.9 kg (191 lb 9.3 oz)  Body mass index is 26.72 kg/m².    Intake/Output Summary (Last 24 hours) at 2/28/2019 1055  Last data filed at 2/27/2019 1251  Gross per 24 hour   Intake --   Output 1 ml   Net -1 ml      Physical Exam   Constitutional: He is oriented to person, place, and time.   Cardiovascular: Normal rate, regular rhythm, normal heart sounds and intact distal pulses. Exam reveals no gallop and no friction rub.   No murmur heard.  Pulmonary/Chest: Effort normal and breath sounds normal. No respiratory distress. He has no wheezes. He has no rales.   Abdominal: Soft. Bowel sounds are normal. He exhibits no distension. There is no tenderness.   Musculoskeletal: Normal range of motion. He exhibits no edema or tenderness.   Neurological: He is alert and oriented to person, place, and time.   Skin: Skin is warm and dry. No rash noted. No erythema. No pallor.       Significant  Labs: All pertinent labs within the past 24 hours have been reviewed.    Significant Imaging: I have reviewed all pertinent imaging results/findings within the past 24 hours.    Assessment/Plan:      Acute systolic heart failure    New onset.  Ejection fraction 15%.  Possibly tachycardia induced cardiomyopathy.  Continue medical therapy including beta-blocker.  ACE-inhibitor discontinued due to hyperkalemia.  Appear to be tolerating losartan well.  Patient on a low dose of furosemide 20 mg oral daily.  Will continue.     Atrial fibrillation with rapid ventricular response    Status post successful cardioversion.  Continue medical therapy as per Dr. Panchito Joseph.     Tachycardia induced cardiomyopathy    -As above.       Pre-ulcerative corn or callous    -Seen by podiatry  -Needs outpatient follow up       Diabetic ketoacidosis without coma associated with type 2 diabetes mellitus    Status post resolution of diabetic ketoacidosis.  Hemoglobin A1c at 12.6%.  Low glucose this morning.  Reduce long-acting insulin.  Continue to monitor and adjust further as needed.       VTE Risk Mitigation (From admission, onward)        Ordered     apixaban tablet 5 mg  2 times daily      02/22/19 1555     heparin 25,000 units in dextrose 5% 250 mL (100 units/mL) infusion LOW INTENSITY nomogram - [facility]  Continuous      02/20/19 0514     IP VTE HIGH RISK PATIENT  Once      02/20/19 0135     Place sequential compression device  Until discontinued      02/20/19 0135     Place sequential compression device  Until discontinued      02/19/19 2054              Yan Hernandez MD  Department of Hospital Medicine   [facility]

## 2019-02-28 NOTE — PLAN OF CARE
Problem: Adult Inpatient Plan of Care  Goal: Plan of Care Review  Outcome: Ongoing (interventions implemented as appropriate)  O2 on standby, SpO2 93% on room air.

## 2019-02-28 NOTE — ASSESSMENT & PLAN NOTE
New onset.  Ejection fraction 15%.  Possibly tachycardia induced cardiomyopathy.  Continue med of cool therapy including beta-blocker.  Holding ACE-inhibitor in light of hyperkalemia.  Discussed with Dr. Panchito Joseph will challenge with losartan and closely monitor serum potassium level.

## 2019-02-28 NOTE — ASSESSMENT & PLAN NOTE
Status post resolution of diabetic ketoacidosis.  Hemoglobin A1c at 12.6%.  Low glucose this morning.  Reduce long-acting insulin.  Continue to monitor and adjust further as needed.

## 2019-02-28 NOTE — ASSESSMENT & PLAN NOTE
New onset.  Ejection fraction 15%.  Possibly tachycardia induced cardiomyopathy.  Continue medical therapy including beta-blocker.  ACE-inhibitor discontinued due to hyperkalemia.  Appear to be tolerating losartan well.  Patient on a low dose of furosemide 20 mg oral daily.  Will continue.

## 2019-02-28 NOTE — ASSESSMENT & PLAN NOTE
Status post resolution of ke diabetic ketoacidosis.  Hemoglobin A1c at 12.6%.  Reasonably well controlled with current regimen.  Will continue.

## 2019-02-28 NOTE — PROGRESS NOTES
[facility] Medicine  Progress Note    Patient Name: Randy Camejo  MRN: [id number]  Patient Class: IP- Inpatient   Admission Date: 2/19/2019  Length of Stay: 8 days  Attending Physician: Yan Hernandez MD  Primary Care Provider: Primary Doctor No        Subjective:     Principal Problem:Hyperkalemia    HPI:  Mr. Randy Camejo is a 78 y.o. male, with PMH of NIDDM-2, CAD, who presented to [facility] ED on 2/19/19 2/2 abdominal pain x 5 days. Associated symptoms include N/V/D (all non-bloody), which has since resolved, except for the nausea. He was seen in Urgent Care PTA. In the ED, he was found to be tachycardic, and rhythm was A. Fib. He has never before been diagnosed with the same. He was given 2L bolus of IV fluids, then a dose if IV Cardizem, but rate was still elevated. He was started on a Cardizem drip, and admitted to the ICU.     Hospital Course:  Mr. Camejo is a 78 year old man with history of untreated diabetes and coronary artery disease who came to the hospital for evaluation of abdominal pain following several days of nausea, vomiting and diarrhea who was found to have diabetic ketoacidosis, atrial fibrillation with rapid ventricular response and acute systolic chf thought secondary to tachycardia.  He was admitted to the ICU on a cardizem drip and heparin drip was started for anticoagulation.  Subsequently he still required an amiodarone drip as well.  Echocardiogram was obtained which showed ejection fraction of 15%.  He was seen by Dr. Joseph who recommended cardioversion.  Cardioversion was performed today and was successful with conversion to NSR.  He has been continued on oral amiodarone, metoprolol, digoxin and eliquis.  Additionally we have started low dose enalapril.  For his DKA he was treated with an insulin drip and subsequently transitioned to long acting insulin, which we are still titrating.  He has a stable and chronic thrombocytopenia of unclear  etiology.  Finally, he did have a brief episode early on with O2 desaturation but quickly improved with supplemental oxygen.  Patient was stepped down from the intensive care unit.  Patient's continue to do well except developed evidence of marked hyperkalemia after starting enalapril.  Discontinued and potassium within normal limits.    Interval History:  Hyperkalemia resolved.  No acute events.    Review of Systems   Constitutional: Negative for chills and fever.   Respiratory: Negative for shortness of breath.    Cardiovascular: Negative for chest pain.   Gastrointestinal: Negative for abdominal pain, constipation, diarrhea, nausea and vomiting.     Objective:     Vital Signs (Most Recent):  Temp: 98 °F (36.7 °C) (02/27/19 1645)  Pulse: 66 (02/27/19 1800)  Resp: 18 (02/27/19 1645)  BP: 120/61 (02/27/19 1645)  SpO2: (!) 94 % (02/27/19 1645) Vital Signs (24h Range):  Temp:  [97.4 °F (36.3 °C)-98.3 °F (36.8 °C)] 98 °F (36.7 °C)  Pulse:  [55-67] 66  Resp:  [18-20] 18  SpO2:  [92 %-97 %] 94 %  BP: (105-120)/(56-67) 120/61     Weight: 86.9 kg (191 lb 9.3 oz)  Body mass index is 26.72 kg/m².    Intake/Output Summary (Last 24 hours) at 2/27/2019 1902  Last data filed at 2/27/2019 1251  Gross per 24 hour   Intake --   Output 1 ml   Net -1 ml      Physical Exam   Constitutional: He is oriented to person, place, and time.   Cardiovascular: Normal rate, regular rhythm, normal heart sounds and intact distal pulses. Exam reveals no gallop and no friction rub.   No murmur heard.  Pulmonary/Chest: Effort normal and breath sounds normal. No respiratory distress. He has no wheezes. He has no rales.   Abdominal: Soft. Bowel sounds are normal. He exhibits no distension. There is no tenderness.   Musculoskeletal: Normal range of motion. He exhibits no edema or tenderness.   Neurological: He is alert and oriented to person, place, and time.   Skin: Skin is warm and dry. No rash noted. No erythema. No pallor.       Significant Labs:  All pertinent labs within the past 24 hours have been reviewed.    Significant Imaging: I have reviewed all pertinent imaging results/findings within the past 24 hours.    Assessment/Plan:      Acute systolic heart failure    New onset.  Ejection fraction 15%.  Possibly tachycardia induced cardiomyopathy.  Continue med of cool therapy including beta-blocker.  Holding ACE-inhibitor in light of hyperkalemia.  Discussed with Dr. Panchito Joseph will challenge with losartan and closely monitor serum potassium level.     Atrial fibrillation with rapid ventricular response    Status post successful cardioversion.  Continue medical therapy as per Dr. Panchito Joseph.     Tachycardia induced cardiomyopathy    -As above.       Pre-ulcerative corn or callous    -Seen by podiatry  -Needs outpatient follow up       Diabetic ketoacidosis without coma associated with type 2 diabetes mellitus    Status post resolution of ke diabetic ketoacidosis.  Hemoglobin A1c at 12.6%.  Reasonably well controlled with current regimen.  Will continue.       VTE Risk Mitigation (From admission, onward)        Ordered     apixaban tablet 5 mg  2 times daily      02/22/19 1555     heparin 25,000 units in dextrose 5% 250 mL (100 units/mL) infusion LOW INTENSITY nomogram - [facility]  Continuous      02/20/19 0514     IP VTE HIGH RISK PATIENT  Once      02/20/19 0135     Place sequential compression device  Until discontinued      02/20/19 0135     Place sequential compression device  Until discontinued      02/19/19 2054              Yan Hernandez MD  Department of Hospital Medicine   [facility]

## 2019-02-28 NOTE — SUBJECTIVE & OBJECTIVE
Interval History:  Hyperkalemia resolved.  No acute events.    Review of Systems   Constitutional: Negative for chills and fever.   Respiratory: Negative for shortness of breath.    Cardiovascular: Negative for chest pain.   Gastrointestinal: Negative for abdominal pain, constipation, diarrhea, nausea and vomiting.     Objective:     Vital Signs (Most Recent):  Temp: 98 °F (36.7 °C) (02/27/19 1645)  Pulse: 66 (02/27/19 1800)  Resp: 18 (02/27/19 1645)  BP: 120/61 (02/27/19 1645)  SpO2: (!) 94 % (02/27/19 1645) Vital Signs (24h Range):  Temp:  [97.4 °F (36.3 °C)-98.3 °F (36.8 °C)] 98 °F (36.7 °C)  Pulse:  [55-67] 66  Resp:  [18-20] 18  SpO2:  [92 %-97 %] 94 %  BP: (105-120)/(56-67) 120/61     Weight: 86.9 kg (191 lb 9.3 oz)  Body mass index is 26.72 kg/m².    Intake/Output Summary (Last 24 hours) at 2/27/2019 1902  Last data filed at 2/27/2019 1251  Gross per 24 hour   Intake --   Output 1 ml   Net -1 ml      Physical Exam   Constitutional: He is oriented to person, place, and time.   Cardiovascular: Normal rate, regular rhythm, normal heart sounds and intact distal pulses. Exam reveals no gallop and no friction rub.   No murmur heard.  Pulmonary/Chest: Effort normal and breath sounds normal. No respiratory distress. He has no wheezes. He has no rales.   Abdominal: Soft. Bowel sounds are normal. He exhibits no distension. There is no tenderness.   Musculoskeletal: Normal range of motion. He exhibits no edema or tenderness.   Neurological: He is alert and oriented to person, place, and time.   Skin: Skin is warm and dry. No rash noted. No erythema. No pallor.       Significant Labs: All pertinent labs within the past 24 hours have been reviewed.    Significant Imaging: I have reviewed all pertinent imaging results/findings within the past 24 hours.

## 2019-03-01 ENCOUNTER — NURSE TRIAGE (OUTPATIENT)
Dept: ADMINISTRATIVE | Facility: CLINIC | Age: 79
End: 2019-03-01

## 2019-03-01 VITALS
OXYGEN SATURATION: 94 % | HEART RATE: 60 BPM | DIASTOLIC BLOOD PRESSURE: 60 MMHG | HEIGHT: 71 IN | BODY MASS INDEX: 26.74 KG/M2 | TEMPERATURE: 98 F | SYSTOLIC BLOOD PRESSURE: 110 MMHG | RESPIRATION RATE: 18 BRPM | WEIGHT: 191 LBS

## 2019-03-01 LAB
ANION GAP SERPL CALC-SCNC: 7 MMOL/L
APTT BLDCRRT: 35.5 SEC
BASOPHILS # BLD AUTO: 0.02 K/UL
BASOPHILS NFR BLD: 0.2 %
BUN SERPL-MCNC: 19 MG/DL
CALCIUM SERPL-MCNC: 8.6 MG/DL
CHLORIDE SERPL-SCNC: 101 MMOL/L
CO2 SERPL-SCNC: 29 MMOL/L
CREAT SERPL-MCNC: 1.2 MG/DL
DIFFERENTIAL METHOD: ABNORMAL
EOSINOPHIL # BLD AUTO: 0.2 K/UL
EOSINOPHIL NFR BLD: 2 %
ERYTHROCYTE [DISTWIDTH] IN BLOOD BY AUTOMATED COUNT: 13.1 %
EST. GFR  (AFRICAN AMERICAN): >60 ML/MIN/1.73 M^2
EST. GFR  (NON AFRICAN AMERICAN): 58 ML/MIN/1.73 M^2
GLUCOSE SERPL-MCNC: 116 MG/DL
HCT VFR BLD AUTO: 41.1 %
HGB BLD-MCNC: 13.6 G/DL
LYMPHOCYTES # BLD AUTO: 1.3 K/UL
LYMPHOCYTES NFR BLD: 14.1 %
MAGNESIUM SERPL-MCNC: 2.1 MG/DL
MCH RBC QN AUTO: 32.2 PG
MCHC RBC AUTO-ENTMCNC: 33.1 G/DL
MCV RBC AUTO: 97 FL
MONOCYTES # BLD AUTO: 0.9 K/UL
MONOCYTES NFR BLD: 9.9 %
NEUTROPHILS # BLD AUTO: 6.6 K/UL
NEUTROPHILS NFR BLD: 73.5 %
PHOSPHATE SERPL-MCNC: 3.1 MG/DL
PLATELET # BLD AUTO: 187 K/UL
PMV BLD AUTO: 10.5 FL
POCT GLUCOSE: 103 MG/DL (ref 70–110)
POCT GLUCOSE: 111 MG/DL (ref 70–110)
POTASSIUM SERPL-SCNC: 4.8 MMOL/L
RBC # BLD AUTO: 4.23 M/UL
SODIUM SERPL-SCNC: 137 MMOL/L
WBC # BLD AUTO: 9 K/UL

## 2019-03-01 PROCEDURE — 25000003 PHARM REV CODE 250: Performed by: HOSPITALIST

## 2019-03-01 PROCEDURE — 80048 BASIC METABOLIC PNL TOTAL CA: CPT

## 2019-03-01 PROCEDURE — 99239 HOSP IP/OBS DSCHRG MGMT >30: CPT | Mod: ,,, | Performed by: HOSPITALIST

## 2019-03-01 PROCEDURE — 85730 THROMBOPLASTIN TIME PARTIAL: CPT

## 2019-03-01 PROCEDURE — 94761 N-INVAS EAR/PLS OXIMETRY MLT: CPT

## 2019-03-01 PROCEDURE — 85025 COMPLETE CBC W/AUTO DIFF WBC: CPT

## 2019-03-01 PROCEDURE — 25000003 PHARM REV CODE 250: Performed by: INTERNAL MEDICINE

## 2019-03-01 PROCEDURE — 84100 ASSAY OF PHOSPHORUS: CPT

## 2019-03-01 PROCEDURE — 99239 PR HOSPITAL DISCHARGE DAY,>30 MIN: ICD-10-PCS | Mod: ,,, | Performed by: HOSPITALIST

## 2019-03-01 PROCEDURE — 36415 COLL VENOUS BLD VENIPUNCTURE: CPT

## 2019-03-01 PROCEDURE — 83735 ASSAY OF MAGNESIUM: CPT

## 2019-03-01 RX ORDER — BLOOD-GLUCOSE CONTROL, NORMAL
EACH MISCELLANEOUS
Qty: 200 EACH | Refills: 0 | Status: SHIPPED | OUTPATIENT
Start: 2019-03-01 | End: 2019-05-31

## 2019-03-01 RX ORDER — DIGOXIN 125 MCG
0.12 TABLET ORAL DAILY
Qty: 90 TABLET | Refills: 0 | Status: SHIPPED | OUTPATIENT
Start: 2019-03-02 | End: 2019-03-22

## 2019-03-01 RX ORDER — INSULIN ASPART 100 [IU]/ML
3 INJECTION, SOLUTION INTRAVENOUS; SUBCUTANEOUS 3 TIMES DAILY
Qty: 15 ML | Refills: 0 | Status: SHIPPED | OUTPATIENT
Start: 2019-03-01 | End: 2019-05-23

## 2019-03-01 RX ORDER — CARVEDILOL 3.12 MG/1
3.12 TABLET ORAL 2 TIMES DAILY
Qty: 180 TABLET | Refills: 0 | Status: SHIPPED | OUTPATIENT
Start: 2019-03-01 | End: 2019-03-22 | Stop reason: SDUPTHER

## 2019-03-01 RX ORDER — LOSARTAN POTASSIUM 25 MG/1
25 TABLET ORAL DAILY
Qty: 90 TABLET | Refills: 0 | Status: SHIPPED | OUTPATIENT
Start: 2019-03-02 | End: 2019-03-22 | Stop reason: SDUPTHER

## 2019-03-01 RX ORDER — INSULIN PUMP SYRINGE, 3 ML
EACH MISCELLANEOUS
Qty: 1 EACH | Refills: 0 | Status: ON HOLD | OUTPATIENT
Start: 2019-03-01 | End: 2019-08-29

## 2019-03-01 RX ORDER — AMIODARONE HYDROCHLORIDE 200 MG/1
200 TABLET ORAL DAILY
Qty: 90 TABLET | Refills: 0 | Status: SHIPPED | OUTPATIENT
Start: 2019-03-02 | End: 2019-03-22 | Stop reason: SDUPTHER

## 2019-03-01 RX ORDER — ATORVASTATIN CALCIUM 20 MG/1
20 TABLET, FILM COATED ORAL DAILY
Qty: 90 TABLET | Refills: 0 | Status: SHIPPED | OUTPATIENT
Start: 2019-03-02 | End: 2019-03-22 | Stop reason: SDUPTHER

## 2019-03-01 RX ORDER — FUROSEMIDE 20 MG/1
20 TABLET ORAL DAILY
Qty: 90 TABLET | Refills: 0 | Status: SHIPPED | OUTPATIENT
Start: 2019-03-02 | End: 2019-03-22 | Stop reason: SDUPTHER

## 2019-03-01 RX ORDER — ACETAMINOPHEN 500 MG
1000 TABLET ORAL EVERY 6 HOURS PRN
COMMUNITY
Start: 2019-03-01 | End: 2019-06-05

## 2019-03-01 RX ADMIN — DIGOXIN 0.12 MG: 125 TABLET ORAL at 08:03

## 2019-03-01 RX ADMIN — AMIODARONE HYDROCHLORIDE 200 MG: 200 TABLET ORAL at 08:03

## 2019-03-01 RX ADMIN — FUROSEMIDE 20 MG: 20 TABLET ORAL at 08:03

## 2019-03-01 RX ADMIN — INSULIN ASPART 3 UNITS: 100 INJECTION, SOLUTION INTRAVENOUS; SUBCUTANEOUS at 12:03

## 2019-03-01 RX ADMIN — PANTOPRAZOLE SODIUM 40 MG: 40 TABLET, DELAYED RELEASE ORAL at 08:03

## 2019-03-01 RX ADMIN — LOSARTAN POTASSIUM 25 MG: 25 TABLET, FILM COATED ORAL at 08:03

## 2019-03-01 RX ADMIN — ATORVASTATIN CALCIUM 20 MG: 20 TABLET, FILM COATED ORAL at 08:03

## 2019-03-01 RX ADMIN — APIXABAN 5 MG: 2.5 TABLET, FILM COATED ORAL at 08:03

## 2019-03-01 RX ADMIN — CARVEDILOL 3.12 MG: 3.12 TABLET, FILM COATED ORAL at 08:03

## 2019-03-01 RX ADMIN — INSULIN ASPART 3 UNITS: 100 INJECTION, SOLUTION INTRAVENOUS; SUBCUTANEOUS at 08:03

## 2019-03-01 NOTE — NURSING
Discharge paperwork given and explained to pt & family at bedside. All verbalize understanding. Pt education given on how to test blood glucose on home monitor & how to use insulin pens. Pt verbalized understanding. Awaiting transportation to [facility]. Will continue to monitor.

## 2019-03-01 NOTE — PLAN OF CARE
Problem: Adult Inpatient Plan of Care  Goal: Optimal Comfort and Wellbeing  Plan of care reviewed with patient, medications explained. Pt currently resting comfortably in bed and appears to be sleeping in between care. VSS, bed in lowest, locked position with call light in reach. Purposeful rounding done.  No new needs identified at this time, will continue to monitor.

## 2019-03-01 NOTE — PROGRESS NOTES
"[facility]  Cardiology  Progress Note    Patient Name: Randy Camejo  MRN: [id number]  Admission Date: 2/19/2019  Hospital Length of Stay: 10 days  Code Status: Full Code   Attending Physician: Yan Hernandez MD   Primary Care Physician: Primary Doctor No  Expected Discharge Date:   Principal Problem:Hyperkalemia    Subjective:     Brief HPI:    Randy Camejo is a 78 y.o.male with hypertension and diabetes mellitus type 2. He suffered a myocardial infarction in 2000 when he presented to [facility] and had a stent placed. He was treated for DM2 for a few years with metformin but then stopped it. He has not had any regular medical follow up for several years.     He began feeling weak and have diarrhea on 2/15/2019. He diarrhea continued and he became weaker over the next several weak. He had nausea and vomited. On 2/19/2019 he went to  and was referred to the ER. He was noted to be in atrial fibrillation and admitted.    Hospital Course:     2/21/2019: Plan was DILLAN guided CV. He did not "feel" he was ready.    Rate control with amiodarone, digoxin and metoprolol.    Anticoagulation with heparin iv and later apixiban.    2/25/2019: [facility]: DILLAN & CV: Severe left ventricular dysfunction. EF 15%. TERESO with spontaneous echo contrast. No thrombus.  J to sinus rhythm.     2/26/2019: Hyperkalemia with K 6.0 after given enalapril.      Interval History:     Weak. Feeling better. Denies CP or dyspnea. Been ambulating around nursing station. Remains in sinus rhythm.    Review of Systems   Constitution: Positive for weakness and malaise/fatigue. Negative for chills and fever.   HENT: Negative for nosebleeds.    Eyes: Negative for vision loss in left eye and vision loss in right eye.   Cardiovascular: Negative for chest pain, leg swelling, orthopnea, palpitations and paroxysmal nocturnal dyspnea.   Respiratory: Negative for cough, hemoptysis, shortness of breath, sputum production and wheezing.  "   Hematologic/Lymphatic: Negative for bleeding problem.   Skin: Negative for rash.   Musculoskeletal: Negative for myalgias.   Gastrointestinal: Negative for abdominal pain, heartburn, hematemesis, hematochezia, jaundice, melena, nausea and vomiting.   Genitourinary: Negative for hematuria.   Neurological: Negative for dizziness, headaches, light-headedness and vertigo.   Psychiatric/Behavioral: Negative for altered mental status. The patient is not nervous/anxious.    Allergic/Immunologic: Negative for persistent infections.     Objective:     Vital Signs (Most Recent):  Temp: 98 °F (36.7 °C) (03/01/19 0738)  Pulse: 60 (03/01/19 1400)  Resp: 18 (03/01/19 0738)  BP: 110/60 (03/01/19 0738)  SpO2: (!) 94 % (03/01/19 0800) Vital Signs (24h Range):  Temp:  [98 °F (36.7 °C)-98.4 °F (36.9 °C)] 98 °F (36.7 °C)  Pulse:  [54-64] 60  Resp:  [17-18] 18  SpO2:  [92 %-96 %] 94 %  BP: (110-121)/(58-66) 110/60     Weight: 86.6 kg (191 lb)  Body mass index is 26.64 kg/m².    SpO2: (!) 94 %  O2 Device (Oxygen Therapy): room air    No intake or output data in the 24 hours ending 03/01/19 1537    Lines/Drains/Airways     Airway                 Airway - Non-Surgical 02/25/19 0847 Nasal Cannula 4 days          Peripheral Intravenous Line                 Peripheral IV - Single Lumen 02/19/19 1200 Anterior;Right Hand 10 days         Peripheral IV - Single Lumen 02/21/19 0022 Left Upper Arm 8 days                Physical Exam   Constitutional: He is oriented to person, place, and time. He appears well-developed and well-nourished. He does not appear ill. No distress.   Eyes: Right conjunctiva is not injected. Right conjunctiva has no hemorrhage. Left conjunctiva is not injected. Left conjunctiva has no hemorrhage. Right pupil is round. Left pupil is round.   Neck: Neck supple. No JVD present.   Cardiovascular: Normal rate, regular rhythm, S1 normal and S2 normal. Exam reveals gallop and S3.   Pulmonary/Chest: Effort normal. He has rhonchi.  He has no rales.   Abdominal: Soft. Normal appearance. He exhibits no distension. There is no tenderness.   Musculoskeletal: He exhibits no edema.        Right ankle: He exhibits no swelling.        Left ankle: He exhibits no swelling.   Neurological: He is alert and oriented to person, place, and time. He is not disoriented.   Skin: Skin is warm and dry. No rash noted.   Psychiatric: He has a normal mood and affect. His speech is normal and behavior is normal. Judgment and thought content normal. Cognition and memory are normal.     Current Medications:     amiodarone  200 mg Oral Daily    apixaban  5 mg Oral BID    atorvastatin  20 mg Oral Daily    carvedilol  3.125 mg Oral BID    digoxin  0.125 mg Oral Daily    furosemide  20 mg Oral Daily    insulin aspart U-100  3 Units Subcutaneous TIDWM    insulin detemir U-100  15 Units Subcutaneous QHS    losartan  25 mg Oral Daily    pantoprazole  40 mg Oral Daily     Current Laboratory Results:    Recent Results (from the past 24 hour(s))   POCT glucose    Collection Time: 02/28/19  4:56 PM   Result Value Ref Range    POCT Glucose 128 (H) 70 - 110 mg/dL   POCT glucose    Collection Time: 02/28/19  9:30 PM   Result Value Ref Range    POCT Glucose 163 (H) 70 - 110 mg/dL   CBC with Automated Differential    Collection Time: 03/01/19  4:27 AM   Result Value Ref Range    WBC 9.00 3.90 - 12.70 K/uL    RBC 4.23 (L) 4.60 - 6.20 M/uL    Hemoglobin 13.6 (L) 14.0 - 18.0 g/dL    Hematocrit 41.1 40.0 - 54.0 %    MCV 97 82 - 98 fL    MCH 32.2 (H) 27.0 - 31.0 pg    MCHC 33.1 32.0 - 36.0 g/dL    RDW 13.1 11.5 - 14.5 %    Platelets 187 150 - 350 K/uL    MPV 10.5 9.2 - 12.9 fL    Gran # (ANC) 6.6 1.8 - 7.7 K/uL    Lymph # 1.3 1.0 - 4.8 K/uL    Mono # 0.9 0.3 - 1.0 K/uL    Eos # 0.2 0.0 - 0.5 K/uL    Baso # 0.02 0.00 - 0.20 K/uL    Gran% 73.5 (H) 38.0 - 73.0 %    Lymph% 14.1 (L) 18.0 - 48.0 %    Mono% 9.9 4.0 - 15.0 %    Eosinophil% 2.0 0.0 - 8.0 %    Basophil% 0.2 0.0 - 1.9 %     Differential Method Automated    APTT    Collection Time: 03/01/19  4:27 AM   Result Value Ref Range    aPTT 35.5 (H) 21.0 - 32.0 sec   POCT glucose    Collection Time: 03/01/19  7:13 AM   Result Value Ref Range    POCT Glucose 103 70 - 110 mg/dL   Basic metabolic panel    Collection Time: 03/01/19  9:08 AM   Result Value Ref Range    Sodium 137 136 - 145 mmol/L    Potassium 4.8 3.5 - 5.1 mmol/L    Chloride 101 95 - 110 mmol/L    CO2 29 23 - 29 mmol/L    Glucose 116 (H) 70 - 110 mg/dL    BUN, Bld 19 8 - 23 mg/dL    Creatinine 1.2 0.5 - 1.4 mg/dL    Calcium 8.6 (L) 8.7 - 10.5 mg/dL    Anion Gap 7 (L) 8 - 16 mmol/L    eGFR if African American >60 >60 mL/min/1.73 m^2    eGFR if non African American 58 (A) >60 mL/min/1.73 m^2   Magnesium    Collection Time: 03/01/19  9:08 AM   Result Value Ref Range    Magnesium 2.1 1.6 - 2.6 mg/dL   Phosphorus    Collection Time: 03/01/19  9:08 AM   Result Value Ref Range    Phosphorus 3.1 2.7 - 4.5 mg/dL   POCT glucose    Collection Time: 03/01/19 12:19 PM   Result Value Ref Range    POCT Glucose 111 (H) 70 - 110 mg/dL     Current Imaging Results:    US Lower Extremity Arteries Bilateral   Final Result      No hemodynamically significant stenosis demonstrated in the right or left lower extremity arterial system.         Electronically signed by: Edgardo Gama MD   Date:    02/25/2019   Time:    08:49      X-Ray Chest AP Portable   Final Result      Bibasilar airspace opacities and small pleural effusions, possibly pulmonary edema versus pneumonia or aspiration.  Recommend correlation with clinical findings and follow-up to ensure improvement/resolution.         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] 02/25/2019 Yes    Coronary artery disease [I25.10] 02/25/2019 Yes    Atrial fibrillation with rapid ventricular response [I48.91] 02/19/2019 Yes    Chronic anticoagulation [Z79.01] 02/25/2019 Not Applicable    Diabetic ketoacidosis without coma associated with type 2 diabetes mellitus [E11.10] 02/19/2019 Yes    Pre-ulcerative corn or callous [L84] 02/24/2019 Yes    PAD (peripheral artery disease) [I73.9] 02/24/2019 Yes      Problems Resolved During this Admission:    Diagnosis Date Noted Date Resolved POA    PRINCIPAL PROBLEM:  Hyperkalemia [E87.5] 02/26/2019 02/27/2019 Yes    Acute hypoxemic respiratory failure [J96.01] 02/21/2019 02/26/2019 Yes    Thrombocytopenia [D69.6] 02/21/2019 02/26/2019 Yes    Lactic acidosis [E87.2] 02/21/2019 02/26/2019 Yes    JAMES (acute kidney injury) [N17.9] 02/19/2019 02/26/2019 Yes     Assessment and Plan:    1. Atrial Fibrillation              Persistent atrial fibrillation with fast VRR.              Uncertain duration.              [id number]=2 (CHA2DV).              2/21/2019: Plan was DILLAN guided CV but patient did not want to proceed.   Rate control with amiodarone, digoxin and very cautious dose of metoprolol.   2/25/2019: [facility]: DILLAN & CV: Severe left ventricular dysfunction. EF 15%. TERESO with spontaneous echo contrast. No thrombus.  J to sinus rhythm.    2/27/2019: Reduced digoxin to 0.125 mg Q24.   On amiodarone 200 mg Q24, carvedilol 3.125 mg Q12 and digoxin 0.125 mg Q24.    2. High Risk Medication   2/19/2019: Began amiodarone.   On amiodarone 200 mg Q24.   4/2019: Plan next TSH. Then every 4 months.      3. Chronic Anticoagulation              [id number]=4 (CHA2DV).              On heparin iv.              Lifelong anticoagulation.   2/23/2019: Changed to apixiban.    On apixiban 5 mg Q12.    4. Heart Failure, Systolic, Acute on Chronic              2/20/2019: Echo: Mildly dilated left ventricle with severe left ventricular  dysfunction. EF 15%. Moderately dilated LA. Moderate RV dysfunction.              Maybe at least partially tachycardia induced.              Betablockade, ACEI and spironolactone long term.              Consider cath at later date.   On metoprolol 25 mg Q12.   Will increase metoprolol very cautiously.   2/25/2019: Began enalapril.   On enalapril 2.5 mg Q12.   K 6.0 = hyperkalemia. Will repeat and hold enalapril.   2/27/2019: K 4.9.  Began losartan.   On carvedilol 3.125 mg Q12, losartan 25 mg Q24 and furosemide 20 mg Q24     5. Coronary Artery Disease              2000: [facility]: MI and stent.              Clinically stable.   No aspirin as anticoagulated with apixiban.     6. Hypertension              Appears to have been mild.     7. Diabetes Mellitus, Type 2              2015: Diagnosed. Complications: CAD. Medications: Diet.              2/19/2019: DKA.     8. Primary Care              In transition.    9. Disposition   3/1/2019: Plan discharge.       VTE Risk Mitigation (From admission, onward)        Ordered     apixaban tablet 5 mg  2 times daily      02/22/19 1555     heparin 25,000 units in dextrose 5% 250 mL (100 units/mL) infusion LOW INTENSITY nomogram - [facility]  Continuous      02/20/19 0514     IP VTE HIGH RISK PATIENT  Once      02/20/19 0135     Place sequential compression device  Until discontinued      02/20/19 0135     Place sequential compression device  Until discontinued      02/19/19 2054          Panchito Joseph MD  Cardiology  [facility]

## 2019-03-01 NOTE — PROGRESS NOTES
"[facility]  Cardiology  Progress Note    Patient Name: Randy Camejo  MRN: [id number]  Admission Date: 2/19/2019  Hospital Length of Stay: 9 days  Code Status: Full Code   Attending Physician: Yan Hernandez MD   Primary Care Physician: Primary Doctor No  Expected Discharge Date:   Principal Problem:Hyperkalemia    Subjective:     Brief HPI:    Randy Camejo is a 78 y.o.male with hypertension and diabetes mellitus type 2. He suffered a myocardial infarction in 2000 when he presented to [facility] and had a stent placed. He was treated for DM2 for a few years with metformin but then stopped it. He has not had any regular medical follow up for several years.     He began feeling weak and have diarrhea on 2/15/2019. He diarrhea continued and he became weaker over the next several weak. He had nausea and vomited. On 2/19/2019 he went to  and was referred to the ER. He was noted to be in atrial fibrillation and admitted.    Hospital Course:     2/21/2019: Plan was DILLAN guided CV. He did not "feel" he was ready.    Rate control with amiodarone, digoxin and metoprolol.    Anticoagulation with heparin iv and later apixiban.    2/25/2019: [facility]: DILLAN & CV: Severe left ventricular dysfunction. EF 15%. TERESO with spontaneous echo contrast. No thrombus.  J to sinus rhythm.     2/26/2019: Hyperkalemia with K 6.0 after given enalapril.      Interval History:     Weak. Feeling better. Denies CP or dyspnea. Been ambulating a little more. Remains in sinus rhythm.    Review of Systems   Constitution: Positive for weakness and malaise/fatigue. Negative for chills and fever.   HENT: Negative for nosebleeds.    Eyes: Negative for vision loss in left eye and vision loss in right eye.   Cardiovascular: Positive for leg swelling. Negative for chest pain, orthopnea, palpitations and paroxysmal nocturnal dyspnea.   Respiratory: Negative for cough, hemoptysis, shortness of breath, sputum production and wheezing.  "   Hematologic/Lymphatic: Negative for bleeding problem.   Skin: Negative for rash.   Musculoskeletal: Negative for myalgias.   Gastrointestinal: Negative for abdominal pain, heartburn, hematemesis, hematochezia, jaundice, melena, nausea and vomiting.   Genitourinary: Negative for hematuria.   Neurological: Negative for dizziness, headaches, light-headedness and vertigo.   Psychiatric/Behavioral: Negative for altered mental status. The patient is not nervous/anxious.    Allergic/Immunologic: Negative for persistent infections.     Objective:     Vital Signs (Most Recent):  Temp: 98.2 °F (36.8 °C) (02/28/19 1658)  Pulse: (!) 57 (02/28/19 1800)  Resp: 18 (02/28/19 0919)  BP: 119/62 (02/28/19 1658)  SpO2: 95 % (02/28/19 1658) Vital Signs (24h Range):  Temp:  [97.7 °F (36.5 °C)-98.6 °F (37 °C)] 98.2 °F (36.8 °C)  Pulse:  [54-74] 57  Resp:  [17-20] 18  SpO2:  [92 %-98 %] 95 %  BP: ()/(51-64) 119/62     Weight: 86.6 kg (191 lb)  Body mass index is 26.64 kg/m².    SpO2: 95 %  O2 Device (Oxygen Therapy): room air    No intake or output data in the 24 hours ending 02/28/19 1914    Lines/Drains/Airways     Airway                 Airway - Non-Surgical 02/25/19 0847 Nasal Cannula 3 days          Peripheral Intravenous Line                 Peripheral IV - Single Lumen 02/19/19 1200 Anterior;Right Hand 9 days         Peripheral IV - Single Lumen 02/21/19 0022 Left Upper Arm 7 days                Physical Exam   Constitutional: He is oriented to person, place, and time. He appears well-developed and well-nourished. He does not appear ill. No distress.   Eyes: Right conjunctiva is not injected. Right conjunctiva has no hemorrhage. Left conjunctiva is not injected. Left conjunctiva has no hemorrhage. Right pupil is round. Left pupil is round.   Neck: Neck supple. No JVD present.   Cardiovascular: Normal rate, regular rhythm, S1 normal and S2 normal. Exam reveals gallop and S3.   Pulmonary/Chest: Effort normal. He has rhonchi.  He has no rales.   Abdominal: Soft. Normal appearance. He exhibits no distension. There is no tenderness.   Musculoskeletal: He exhibits no edema.        Right ankle: He exhibits no swelling.        Left ankle: He exhibits no swelling.   Neurological: He is alert and oriented to person, place, and time. He is not disoriented.   Skin: Skin is warm and dry. No rash noted.   Psychiatric: He has a normal mood and affect. His speech is normal and behavior is normal. Judgment and thought content normal. Cognition and memory are normal.     Current Medications:     amiodarone  200 mg Oral BID    apixaban  5 mg Oral BID    atorvastatin  20 mg Oral Daily    digoxin  0.125 mg Oral Daily    furosemide  20 mg Oral Daily    insulin aspart U-100  3 Units Subcutaneous TIDWM    insulin detemir U-100  15 Units Subcutaneous QHS    losartan  25 mg Oral Daily    metoprolol tartrate  25 mg Oral BID    pantoprazole  40 mg Oral Daily     Current Laboratory Results:    Recent Results (from the past 24 hour(s))   POCT glucose    Collection Time: 02/27/19  8:05 PM   Result Value Ref Range    POCT Glucose 183 (H) 70 - 110 mg/dL   CBC with Automated Differential    Collection Time: 02/28/19  4:47 AM   Result Value Ref Range    WBC 9.45 3.90 - 12.70 K/uL    RBC 4.25 (L) 4.60 - 6.20 M/uL    Hemoglobin 13.5 (L) 14.0 - 18.0 g/dL    Hematocrit 41.4 40.0 - 54.0 %    MCV 97 82 - 98 fL    MCH 31.8 (H) 27.0 - 31.0 pg    MCHC 32.6 32.0 - 36.0 g/dL    RDW 13.1 11.5 - 14.5 %    Platelets 170 150 - 350 K/uL    MPV 10.3 9.2 - 12.9 fL    Gran # (ANC) 6.8 1.8 - 7.7 K/uL    Lymph # 1.3 1.0 - 4.8 K/uL    Mono # 1.1 (H) 0.3 - 1.0 K/uL    Eos # 0.2 0.0 - 0.5 K/uL    Baso # 0.02 0.00 - 0.20 K/uL    Gran% 72.1 38.0 - 73.0 %    Lymph% 14.0 (L) 18.0 - 48.0 %    Mono% 11.7 4.0 - 15.0 %    Eosinophil% 1.6 0.0 - 8.0 %    Basophil% 0.2 0.0 - 1.9 %    Differential Method Automated    APTT    Collection Time: 02/28/19  4:47 AM   Result Value Ref Range    aPTT  36.2 (H) 21.0 - 32.0 sec   Basic metabolic panel    Collection Time: 02/28/19  4:47 AM   Result Value Ref Range    Sodium 136 136 - 145 mmol/L    Potassium 4.7 3.5 - 5.1 mmol/L    Chloride 101 95 - 110 mmol/L    CO2 28 23 - 29 mmol/L    Glucose 79 70 - 110 mg/dL    BUN, Bld 19 8 - 23 mg/dL    Creatinine 1.0 0.5 - 1.4 mg/dL    Calcium 8.3 (L) 8.7 - 10.5 mg/dL    Anion Gap 7 (L) 8 - 16 mmol/L    eGFR if African American >60 >60 mL/min/1.73 m^2    eGFR if non African American >60 >60 mL/min/1.73 m^2   Magnesium    Collection Time: 02/28/19  4:47 AM   Result Value Ref Range    Magnesium 2.1 1.6 - 2.6 mg/dL   POCT glucose    Collection Time: 02/28/19  9:07 AM   Result Value Ref Range    POCT Glucose 74 70 - 110 mg/dL   POCT glucose    Collection Time: 02/28/19 11:58 AM   Result Value Ref Range    POCT Glucose 127 (H) 70 - 110 mg/dL   POCT glucose    Collection Time: 02/28/19  4:56 PM   Result Value Ref Range    POCT Glucose 128 (H) 70 - 110 mg/dL     Current Imaging Results:    US Lower Extremity Arteries Bilateral   Final Result      No hemodynamically significant stenosis demonstrated in the right or left lower extremity arterial system.         Electronically signed by: Edgardo Gama MD   Date:    02/25/2019   Time:    08:49      X-Ray Chest AP Portable   Final Result      Bibasilar airspace opacities and small pleural effusions, possibly pulmonary edema versus pneumonia or aspiration.  Recommend correlation with clinical findings and follow-up to ensure improvement/resolution.         Electronically signed by: Remi Adam MD   Date:    02/20/2019   Time:    04:34      X-Ray Chest AP Portable    (Results Pending)           Assessment and Plan:     Problem List:    Active Diagnoses:    Diagnosis Date Noted POA    Acute systolic heart failure [I50.21] 02/21/2019 Yes   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] 02/25/2019 Yes    Coronary artery disease [I25.10] 02/25/2019 Yes    Atrial fibrillation with  rapid ventricular response [I48.91] 02/19/2019 Yes    Chronic anticoagulation [Z79.01] 02/25/2019 Not Applicable    Diabetic ketoacidosis without coma associated with type 2 diabetes mellitus [E11.10] 02/19/2019 Yes    Pre-ulcerative corn or callous [L84] 02/24/2019 Yes    PAD (peripheral artery disease) [I73.9] 02/24/2019 Yes      Problems Resolved During this Admission:    Diagnosis Date Noted Date Resolved POA    PRINCIPAL PROBLEM:  Hyperkalemia [E87.5] 02/26/2019 02/27/2019 Yes    Acute hypoxemic respiratory failure [J96.01] 02/21/2019 02/26/2019 Yes    Thrombocytopenia [D69.6] 02/21/2019 02/26/2019 Yes    Lactic acidosis [E87.2] 02/21/2019 02/26/2019 Yes    JAMES (acute kidney injury) [N17.9] 02/19/2019 02/26/2019 Yes     Assessment and Plan:    1. Atrial Fibrillation              Persistent atrial fibrillation with fast VRR.              Uncertain duration.              [id number]=3 (CHA2DV).              2/21/2019: Plan was DILLAN guided CV but patient did not want to proceed.   Rate control with amiodarone, digoxin and very cautious dose of metoprolol.   2/25/2019: [facility]: DILLAN & CV: Severe left ventricular dysfunction. EF 15%. TERESO with spontaneous echo contrast. No thrombus.  J to sinus rhythm.    2/27/2019: Reduced digoxin to 0.125 mg Q24.   On amiodarone 200 mg Q24, metoprolol 25 mg Q12 and digoxin 0.125 mg Q24.    2. High Risk Medication   2/19/2019: Began amiodarone.   On amiodarone 200 mg Q24.      3. Chronic Anticoagulation              [id number]=6 (CHA2DV).              On heparin iv.              Lifelong anticoagulation.   2/23/2019: Changed to apixiban 5 mg Q12.    On apixiban 5 mg Q12.    4. Heart Failure, Systolic, Acute on Chronic              2/20/2019: Echo: Mildly dilated left ventricle with severe left ventricular dysfunction. EF 15%. Moderately dilated LA. Moderate RV dysfunction.              Maybe at least partially tachycardia induced.              Betablockade, ACEI and  spironolactone long term.              Consider cath at later date.   On metoprolol 25 mg Q12.   Will increase metoprolol very cautiously.   2/25/2019: Began enalapril.   On enalapril 2.5 mg Q12.   K 6.0 = hyperkalemia. Will repeat and hold enalapril.   2/27/2019: K 4.9.  Began losartan.   On carvedilol 3.125 mg Q12, losartan 25 mg Q24 and furosemide 20 mg Q24     5. Coronary Artery Disease              2000: [facility]: MI and stent.              Clinically stable.   No aspirin as anticoagulated with apixiban.     6. Hypertension              Appears to have been mild.     7. Diabetes Mellitus, Type 2              2015: Diagnosed. Complications: CAD. Medications: Diet.              2/19/2019: DKA.     8. Primary Care              In transition.    9. Disposition   3/1/2019: Plan discharge.       VTE Risk Mitigation (From admission, onward)        Ordered     apixaban tablet 5 mg  2 times daily      02/22/19 1555     heparin 25,000 units in dextrose 5% 250 mL (100 units/mL) infusion LOW INTENSITY nomogram - [facility]  Continuous      02/20/19 0514     IP VTE HIGH RISK PATIENT  Once      02/20/19 0135     Place sequential compression device  Until discontinued      02/20/19 0135     Place sequential compression device  Until discontinued      02/19/19 2054          Panchito Joseph MD  Cardiology  [facility]

## 2019-03-01 NOTE — DISCHARGE SUMMARY
[facility] Hospital Medicine  Discharge Summary      Patient Name: Randy Camejo  MRN: [id number]  Admission Date: 2/19/2019  Hospital Length of Stay: 10 days  Discharge Date and Time: 3/1/2019  4:51 PM  Attending Physician: Yan Hernandez MD   Discharging Provider: Yan Hernandez MD      HPI:   Mr. Randy Camejo is a 78 y.o. male, with PMH of NIDDM-2, CAD, who presented to [facility] ED on 2/19/19 2/2 abdominal pain x 5 days. Associated symptoms include N/V/D (all non-bloody), which has since resolved, except for the nausea. He was seen in Urgent Care PTA. In the ED, he was found to be tachycardic, and rhythm was A. Fib. He has never before been diagnosed with the same. He was given 2L bolus of IV fluids, then a dose if IV Cardizem, but rate was still elevated. He was started on a Cardizem drip, and admitted to the ICU.     Procedure(s) (LRB):  ECHOCARDIOGRAM,TRANSESOPHAGEAL (N/A)  CARDIOVERSION OR DEFIBRILLATION (N/A)      Hospital Course:   Mr. Camejo is a 78 year-old man with history of diabetes mellitus and coronary disease who had not been taking previously prescribed prescription medications presented to the hospital with nausea, vomiting, and abdominal pain.  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  Patient was treated with intravenous diltiazem infusion for rate control.  Patient also started on anticoagulation with a continuous heparin infusion.  He subsequently also required intravenous amiodarone to achieve reasonable rate control.  Patient's diabetic ketoacidosis was treated with medical therapy with closure of his anion gap.  Patient subsequently converted to a subcutaneous insulin regimen reasonable glycemic control.    Echocardiography revealed an ejection fraction of 15%.  Patient  remained in atrial fibrillation but underwent successful cardioversion performed by Dr. Panchito Joseph.  Patient has remained in normal sinus rhythm.  Patient also transition for continuous heparin infusion to oral apixaban for stroke prevention.  Patient's medical therapy for his heart failure adjusted.  Patient did not tolerate a low dose of oral enalapril due to hyperkalemia.  Once hyperkalemia resolved with supportive measures patient was transitioned to oral started which she has tolerated well with stable kidney function without recurrence of marked hyperkalemia.    Patient clinically stable to be discharged home on medical therapy for his significant heart failure along with diabetic insulin regimen.  Patient advised on close clinic follow-up with Dr. Varner to monitors his volume status, repeat laboratory studies, and further adjustments in his heart failure regimen.  Patient also advised to establish care with a primary care provider in order to manage his diabetes.  Patient advised to check capillary blood glucose readings before meals and at bedtime and to make a log of his readings and to review that wall with a physician in the coming weeks.  Patient also advised importance of a low-salt cardiac diet.    Patient discharged home with home health for further physical therapy to address debility related to acute illness along with assistance in managing his insulin regimen.     Consults:   Consults (From admission, onward)        Status Ordering Provider     Inpatient consult to Cardiology  Once     Provider:  Panchito Joseph MD    Completed RAJIV GALINDO     Inpatient consult to Diabetes educator  Once     Provider:  (Not yet assigned)    Completed KAREN GUADALUPE     Inpatient consult to Diabetes educator  Once     Provider:  (Not yet assigned)    Completed KAREN GUADALUPE     Inpatient consult to Podiatry  Once     Provider:  (Not yet assigned)    Completed KAREN GUADALUPE     Inpatient consult  "to Pulmonary Critical Care  Once     Provider:  Torito English MD    Completed KAREN GUADALUPE          Final Active Diagnoses:    Diagnosis Date Noted POA    Acute systolic heart failure [I50.21] 02/21/2019 Yes    Atrial fibrillation with rapid ventricular response [I48.91] 02/19/2019 Yes   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] 02/25/2019 Yes    Coronary artery disease [I25.10] 02/25/2019 Yes    Chronic anticoagulation [Z79.01] 02/25/2019 Not Applicable    Pre-ulcerative corn or callous [L84] 02/24/2019 Yes    PAD (peripheral artery disease) [I73.9] 02/24/2019 Yes    Diabetic ketoacidosis without coma associated with type 2 diabetes mellitus [E11.10] 02/19/2019 Yes      Problems Resolved During this Admission:    Diagnosis Date Noted Date Resolved POA    PRINCIPAL PROBLEM:  Hyperkalemia [E87.5] 02/26/2019 02/27/2019 Yes    Acute hypoxemic respiratory failure [J96.01] 02/21/2019 02/26/2019 Yes    Thrombocytopenia [D69.6] 02/21/2019 02/26/2019 Yes    Lactic acidosis [E87.2] 02/21/2019 02/26/2019 Yes    JAMES (acute kidney injury) [N17.9] 02/19/2019 02/26/2019 Yes       Discharged Condition: Stable    Disposition: Home or Self Care    Follow Up:  Follow-up Information     Schedule an appointment as soon as possible for a visit with Panchito Joseph MD.    Specialty:  Cardiology  Why:  Management of heart failure and repeat lab studies.  Contact information:  [address]  [facility] [id number] [phone]             [facility]. Schedule an appointment as soon as possible for a visit in 3 days.    Why:  new pcp -  # [phone]            [facility] [place].    Specialty:  Home Health Services  Contact information:  [address] [facility] [address] [phone]                 Patient Instructions:      WHEELCHAIR FOR HOME USE     Order Specific Question Answer Comments   Hours in W/C per day: 4    Type of Wheelchair: Standard    Size(Width): 18"(STD adult)  " "  Leg Support: Elevating leg rests    Lap Belt: Velcro    Cushion: Basic    Height: 5' 11" (1.803 m)    Weight: 86.9 kg (191 lb 9.3 oz)    Does patient have medical equipment at home? none    Length of need (1-99 months): 99    Please check all that apply: Caregiver is capable and willing to operate wheelchair safely.    Please check all that apply: Patient's upper body strength is sufficient for propulsion.    Please check all that apply: The patient has significant edema of the lower extremities that requires an elevating leg rest.    Please check all that apply: The patient requires the use of a w/c for activities of daily living within the Home.    Vendor: [facility]   Expected Date of Delivery: 2/26/2019      WALKER FOR HOME USE     Order Specific Question Answer Comments   Type of Walker: Adult (5'4"-6'6")    With wheels? Yes    Height: 5' 11" (1.803 m)    Weight: 86.6 kg (191 lb)    Length of need (1-99 months): 99    Does patient have medical equipment at home? raised toilet    Does patient have medical equipment at home? cane, straight    Does patient have medical equipment at home? walker, rolling    Please check all that apply: Patient's condition impairs ambulation.      BLOOD GLUCOSE MONITOR FOR HOME USE     Order Specific Question Answer Comments   Height: 5' 11" (1.803 m)    Weight: 86.6 kg (191 lb)    Does patient have medical equipment at home? raised toilet    Does patient have medical equipment at home? cane, straight    Does patient have medical equipment at home? walker, rolling    Length of need (1-99 months): 99      DIABETIC SUPPLIES FOR HOME USE   Order Comments: DME: ORDER FOR GLUCOSE MOTORING SYSTEM-      PT HAS ALSO ORDER FR STRIPS LANCETS     Order Specific Question Answer Comments   Height: 5' 11" (1.803 m)    Weight: 86.6 kg (191 lb)    Does patient have medical equipment at home? raised toilet    Does patient have medical equipment at home? cane, straight    Does patient " have medical equipment at home? walker, rolling    Length of need (1-99 months): 99    Is the Patient insulin dependant? Yes    How many times each day does your Patient test his or her glucose level? QID    Do you follow the Patient at least every 6 months for Management of Diabetes? No    Home blood glucose monitor? No  NEW ORDER FOR GLOCUSE METER    Lancing device? No    Blood glucose strips (if YES, put quantity in Comments field)? No    Lancets (if YES, put quantity in Comments field)? No    Control solution? No    Alcohol wipes? No    Betadine/iodine wipes? No      Diet diabetic     Diet Cardiac     Notify your health care provider if you experience any of the following:  temperature >100.4     Notify your health care provider if you experience any of the following:  persistent nausea and vomiting or diarrhea     Notify your health care provider if you experience any of the following:  severe uncontrolled pain     Notify your health care provider if you experience any of the following:  difficulty breathing or increased cough     Notify your health care provider if you experience any of the following:  severe persistent headache     Notify your health care provider if you experience any of the following:  worsening rash     Notify your health care provider if you experience any of the following:  persistent dizziness, light-headedness, or visual disturbances     Notify your health care provider if you experience any of the following:  increased confusion or weakness     Activity as tolerated      Medications:  Reconciled Home Medications:      Medication List      START taking these medications    acetaminophen 500 MG tablet  Commonly known as:  TYLENOL  Take 2 tablets (1,000 mg total) by mouth every 6 (six) hours as needed for Pain.     amiodarone 200 MG Tab  Commonly known as:  PACERONE  Take 1 tablet (200 mg total) by mouth once daily.  Start taking on:  3/2/2019     atorvastatin 20 MG tablet  Commonly  "known as:  LIPITOR  Take 1 tablet (20 mg total) by mouth once daily.  Start taking on:  3/2/2019     BD ULTRA-FINE SHORT PEN NEEDLE 31 gauge x 5/16" Ndle  Generic drug:  pen needle, diabetic  use with insulins     carvedilol 3.125 MG tablet  Commonly known as:  COREG  Take 1 tablet (3.125 mg total) by mouth 2 (two) times daily.     digoxin 125 mcg tablet  Commonly known as:  LANOXIN  Take 1 tablet (0.125 mg total) by mouth once daily.  Start taking on:  3/2/2019     ELIQUIS 5 mg Tab  Generic drug:  apixaban  Take 1 tablet (5 mg total) by mouth 2 (two) times daily.     furosemide 20 MG tablet  Commonly known as:  LASIX  Take 1 tablet (20 mg total) by mouth once daily.  Start taking on:  3/2/2019     LEVEMIR FLEXTOUCH U-100 INSULN 100 unit/mL (3 mL) Inpn pen  Generic drug:  insulin detemir U-100  Inject 15 Units into the skin every evening.     losartan 25 MG tablet  Commonly known as:  COZAAR  Take 1 tablet (25 mg total) by mouth once daily.  Start taking on:  3/2/2019     NovoLOG Flexpen U-100 Insulin 100 unit/mL Inpn pen  Generic drug:  insulin aspart U-100  Inject 3 Units into the skin 3 (three) times daily.     TRUE METRIX AIR GLUCOSE METER kit  Generic drug:  blood-glucose meter  test as directed     * TRUE METRIX GLUCOSE TEST STRIP Strp  Generic drug:  blood sugar diagnostic  TEST  FOUR TIMES DAILY     * blood sugar diagnostic Strp  To check BG QID times daily, to use with insurance preferred meter     * TRUEPLUS LANCETS 30 gauge Misc  Generic drug:  lancets  TEST FOUR TIMES DAILY     * lancets Misc  To check BG QID times daily, to use with insurance preferred meter         * This list has 4 medication(s) that are the same as other medications prescribed for you. Read the directions carefully, and ask your doctor or other care provider to review them with you.            CONTINUE taking these medications    mupirocin 2 % ointment  Commonly known as:  BACTROBAN  Apply topically 2 (two) times daily.        STOP " taking these medications    aspirin 81 MG Chew            Indwelling Lines/Drains at time of discharge:   Lines/Drains/Airways     Airway                 Airway - Non-Surgical 02/25/19 0847 Nasal Cannula 4 days                Time spent on the discharge of patient: 35 minutes  Patient was seen and examined on the date of discharge and determined to be suitable for discharge.         Yan Hernandez MD  Department of Hospital Medicine  [facility]

## 2019-03-01 NOTE — PLAN OF CARE
Attn: team     Dc  PLANNING     PCP - [facility] - DME -R WALKER      PT WANTS CHOOSE PCP - LATER - WILL PUT MAIN NUMBER ON AVS TO CALL SCHEDULING IF HE WANTS TO CHOOSE  PCP [facility]          NEEDS DME RWALKER TO BE DELIVERED TO PT ROOM -- DONE    PER PT HAS ACCESS TO WHEEL CHAIR  - WILL SEND [facility] BACK TO DME      HOME HEALTH : - PENDING PT CHOICE - [facility] - PENDING MD TEAM TO PUT ORDERS IN [facility]     YOANNA MEANSW ON CASE ALSO     Jenny Diego RN  Case management 3/1/998540:09 PM  # [phone] (FAX) [phone]

## 2019-03-01 NOTE — PLAN OF CARE
[facility]    HOME HEALTH ORDERS  FACE TO FACE ENCOUNTER    Patient Name: Randy Camejo  YOB: 1940    PCP: Primary Doctor No   PCP Address: None  PCP Phone Number: None  PCP Fax: None    Encounter Date: 03/01/2019    Admit to Home Health    Diagnoses:  Active Hospital Problems    Diagnosis  POA    Acute systolic heart failure [I50.21]  Yes     Priority: 3     Atrial fibrillation with rapid ventricular response [I48.91]  Yes     Priority: 9      2/25/2019: [facility]: DILLAN & CV: Severe left ventricular dysfunction. EF 15%. TERESO with spontaneous echo contrast. No thrombus.  J to sinus rhythm.     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]  Yes    Coronary artery disease [I25.10]  Yes    Chronic anticoagulation [Z79.01]  Not Applicable    Pre-ulcerative corn or callous [L84]  Yes    PAD (peripheral artery disease) [I73.9]  Yes    Diabetic ketoacidosis without coma associated with type 2 diabetes mellitus [E11.10]  Yes      Resolved Hospital Problems    Diagnosis Date Resolved POA    *Hyperkalemia [E87.5] 02/27/2019 Yes     Priority: 1 - High    Acute hypoxemic respiratory failure [J96.01] 02/26/2019 Yes    Thrombocytopenia [D69.6] 02/26/2019 Yes    Lactic acidosis [E87.2] 02/26/2019 Yes    JAMES (acute kidney injury) [N17.9] 02/26/2019 Yes       No future appointments.  Follow-up Information     Schedule an appointment as soon as possible for a visit with Panchito Joseph MD.    Specialty:  Cardiology  Why:  Management of heart failure and repeat lab studies.  Contact information:  [address]  [facility] [id number] [phone]             [facility]. Schedule an appointment as soon as possible for a visit in 3 days.    Why:  new pcp -  # [phone]                    I have seen and examined this patient face to face today. My clinical findings that support the need for the home health skilled services and home bound status are the  following:  Weakness/numbness causing balance and gait disturbance due to Heart Failure and Weakness/Debility making it taxing to leave home.    Allergies:Review of patient's allergies indicates:  No Known Allergies    Diet: diabetic diet: 1800 calorie and 2 gram sodium diet    Activities: activity as tolerated    Nursing:   SN to complete comprehensive assessment including routine vital signs. Instruct on disease process and s/s of complications to report to MD. Review/verify medication list sent home with the patient at time of discharge  and instruct patient/caregiver as needed. Frequency may be adjusted depending on start of care date.    Notify MD if SBP > 160 or < 90; DBP > 90 or < 50; HR > 120 or < 50; Temp > 101      CONSULTS:    Physical Therapy to evaluate and treat. Evaluate for home safety and equipment needs; Establish/upgrade home exercise program. Perform / instruct on therapeutic exercises, gait training, transfer training, and Range of Motion.  Occupational Therapy to evaluate and treat. Evaluate home environment for safety and equipment needs. Perform/Instruct on transfers, ADL training, ROM, and therapeutic exercises.   to evaluate for community resources/long-range planning.  Aide to provide assistance with personal care, ADLs, and vital signs.    MISCELLANEOUS CARE:  Diabetic Care:   SN to perform and educate Diabetic management with blood glucose monitoring:, Fingerstick blood sugar AC and HS and Report CBG < 60 or > 350 to physician.  Heart Failure:      SN to instruct on the following:    Instruct on the definition of CHF.   Instruct on the signs/sympoms of CHF to be reported.   Instruct on and monitor daily weights.   Instruct on factors that cause exacerbation.   Instruct on action, dose, schedule, and side effects of medications.   Instruct on diet as prescribed.   Instruct on activity allowed.   Instruct on life-style modifications for life long management of CHF   SN to  "assess compliance with daily weights, diet, medications, fluid retention,    safety precautions, activities permitted and life-style modifications.   Additional 1-2 SN visits per week as needed for signs and symptoms     of CHF exacerbation.      For Weight Gain > 2-3 lbs in 1 day or 4-6 lbs over 1 week notify his cardiologist (Dr. Panchito Joseph).    Medications: Review discharge medications with patient and family and provide education.      Current Discharge Medication List      START taking these medications    Details   acetaminophen (TYLENOL) 500 MG tablet Take 2 tablets (1,000 mg total) by mouth every 6 (six) hours as needed for Pain.      amiodarone (PACERONE) 200 MG Tab Take 1 tablet (200 mg total) by mouth once daily.  Qty: 90 tablet, Refills: 0      apixaban (ELIQUIS) 5 mg Tab Take 1 tablet (5 mg total) by mouth 2 (two) times daily.  Qty: 180 tablet, Refills: 0      atorvastatin (LIPITOR) 20 MG tablet Take 1 tablet (20 mg total) by mouth once daily.  Qty: 90 tablet, Refills: 0      carvedilol (COREG) 3.125 MG tablet Take 1 tablet (3.125 mg total) by mouth 2 (two) times daily.  Qty: 180 tablet, Refills: 0      digoxin (LANOXIN) 125 mcg tablet Take 1 tablet (0.125 mg total) by mouth once daily.  Qty: 90 tablet, Refills: 0      furosemide (LASIX) 20 MG tablet Take 1 tablet (20 mg total) by mouth once daily.  Qty: 90 tablet, Refills: 0      insulin aspart U-100 (NOVOLOG) 100 unit/mL InPn pen Inject 3 Units into the skin 3 (three) times daily.  Qty: 15 mL, Refills: 0      insulin detemir U-100 (LEVEMIR FLEXTOUCH) 100 unit/mL (3 mL) SubQ InPn pen Inject 15 Units into the skin every evening.  Qty: 15 mL, Refills: 0      losartan (COZAAR) 25 MG tablet Take 1 tablet (25 mg total) by mouth once daily.  Qty: 90 tablet, Refills: 0      pen needle, diabetic 31 gauge x 5/16" Ndle use with insulins  Qty: 400 each, Refills: 0         CONTINUE these medications which have NOT CHANGED    Details   mupirocin (BACTROBAN) 2 % " ointment Apply topically 2 (two) times daily.  Qty: 1 Tube, Refills: 0    Associated Diagnoses: Cellulitis of second toe of left foot         STOP taking these medications       aspirin 81 MG Chew Comments:   Reason for Stopping:               I certify that this patient is confined to his home and needs intermittent skilled nursing care, physical therapy and occupational therapy.

## 2019-03-02 NOTE — TELEPHONE ENCOUNTER
Reason for Disposition   Low blood sugar prevention, questions about    Protocols used:  DIABETES - LOW BLOOD SUGAR-A-    Pt calling with concerns of low blood glucose reading of 41 earlier.  Daughter states orange juice given and rechecking blood glucose.  Now reading of 99-blood glucose.  Care advice given.

## 2019-03-08 ENCOUNTER — LAB VISIT (OUTPATIENT)
Dept: LAB | Facility: HOSPITAL | Age: 79
End: 2019-03-08
Attending: HOSPITALIST
Payer: MEDICARE

## 2019-03-08 ENCOUNTER — OFFICE VISIT (OUTPATIENT)
Dept: INTERNAL MEDICINE | Facility: CLINIC | Age: 79
End: 2019-03-08
Payer: MEDICARE

## 2019-03-08 VITALS
DIASTOLIC BLOOD PRESSURE: 54 MMHG | OXYGEN SATURATION: 98 % | HEIGHT: 71 IN | WEIGHT: 167.75 LBS | RESPIRATION RATE: 18 BRPM | BODY MASS INDEX: 23.48 KG/M2 | SYSTOLIC BLOOD PRESSURE: 98 MMHG | TEMPERATURE: 98 F | HEART RATE: 59 BPM

## 2019-03-08 DIAGNOSIS — I10 ESSENTIAL HYPERTENSION: ICD-10-CM

## 2019-03-08 DIAGNOSIS — I48.19 PERSISTENT ATRIAL FIBRILLATION: ICD-10-CM

## 2019-03-08 DIAGNOSIS — I25.10 CORONARY ARTERY DISEASE INVOLVING NATIVE CORONARY ARTERY OF NATIVE HEART WITHOUT ANGINA PECTORIS: ICD-10-CM

## 2019-03-08 DIAGNOSIS — Z09 HOSPITAL DISCHARGE FOLLOW-UP: Primary | ICD-10-CM

## 2019-03-08 DIAGNOSIS — I50.22 CHRONIC SYSTOLIC HEART FAILURE: ICD-10-CM

## 2019-03-08 DIAGNOSIS — E11.59 TYPE 2 DIABETES MELLITUS WITH OTHER CIRCULATORY COMPLICATION, WITH LONG-TERM CURRENT USE OF INSULIN: ICD-10-CM

## 2019-03-08 DIAGNOSIS — Q84.5 ENLARGED AND HYPERTROPHIC NAILS: ICD-10-CM

## 2019-03-08 DIAGNOSIS — E11.9 ENCOUNTER FOR DIABETIC FOOT EXAM: ICD-10-CM

## 2019-03-08 DIAGNOSIS — Z79.4 TYPE 2 DIABETES MELLITUS WITH OTHER CIRCULATORY COMPLICATION, WITH LONG-TERM CURRENT USE OF INSULIN: ICD-10-CM

## 2019-03-08 DIAGNOSIS — Z09 HOSPITAL DISCHARGE FOLLOW-UP: ICD-10-CM

## 2019-03-08 LAB
ALBUMIN SERPL BCP-MCNC: 3.1 G/DL
ALP SERPL-CCNC: 51 U/L
ALT SERPL W/O P-5'-P-CCNC: 15 U/L
ANION GAP SERPL CALC-SCNC: 9 MMOL/L
AST SERPL-CCNC: 19 U/L
BASOPHILS # BLD AUTO: 0.06 K/UL
BASOPHILS NFR BLD: 1 %
BILIRUB SERPL-MCNC: 0.8 MG/DL
BUN SERPL-MCNC: 20 MG/DL
CALCIUM SERPL-MCNC: 9 MG/DL
CHLORIDE SERPL-SCNC: 101 MMOL/L
CO2 SERPL-SCNC: 30 MMOL/L
CREAT SERPL-MCNC: 1.3 MG/DL
DIFFERENTIAL METHOD: ABNORMAL
DIGOXIN SERPL-MCNC: 0.7 NG/ML
EOSINOPHIL # BLD AUTO: 0.1 K/UL
EOSINOPHIL NFR BLD: 2.2 %
ERYTHROCYTE [DISTWIDTH] IN BLOOD BY AUTOMATED COUNT: 12.6 %
EST. GFR  (AFRICAN AMERICAN): >60 ML/MIN/1.73 M^2
EST. GFR  (NON AFRICAN AMERICAN): 52.6 ML/MIN/1.73 M^2
GLUCOSE SERPL-MCNC: 145 MG/DL
HCT VFR BLD AUTO: 42.9 %
HGB BLD-MCNC: 13.8 G/DL
IMM GRANULOCYTES # BLD AUTO: 0.03 K/UL
IMM GRANULOCYTES NFR BLD AUTO: 0.5 %
LYMPHOCYTES # BLD AUTO: 1.2 K/UL
LYMPHOCYTES NFR BLD: 20.9 %
MCH RBC QN AUTO: 31.5 PG
MCHC RBC AUTO-ENTMCNC: 32.2 G/DL
MCV RBC AUTO: 98 FL
MONOCYTES # BLD AUTO: 0.7 K/UL
MONOCYTES NFR BLD: 11.4 %
NEUTROPHILS # BLD AUTO: 3.8 K/UL
NEUTROPHILS NFR BLD: 64 %
NRBC BLD-RTO: 0 /100 WBC
PLATELET # BLD AUTO: 210 K/UL
PMV BLD AUTO: 10.4 FL
POTASSIUM SERPL-SCNC: 4.4 MMOL/L
PROT SERPL-MCNC: 7.9 G/DL
RBC # BLD AUTO: 4.38 M/UL
SODIUM SERPL-SCNC: 140 MMOL/L
T4 FREE SERPL-MCNC: 1.01 NG/DL
TSH SERPL DL<=0.005 MIU/L-ACNC: 6.35 UIU/ML
WBC # BLD AUTO: 5.94 K/UL

## 2019-03-08 PROCEDURE — 99999 PR PBB SHADOW E&M-EST. PATIENT-LVL IV: CPT | Mod: PBBFAC,,, | Performed by: HOSPITALIST

## 2019-03-08 PROCEDURE — 85025 COMPLETE CBC W/AUTO DIFF WBC: CPT

## 2019-03-08 PROCEDURE — 84439 ASSAY OF FREE THYROXINE: CPT

## 2019-03-08 PROCEDURE — 99999 PR PBB SHADOW E&M-EST. PATIENT-LVL IV: ICD-10-PCS | Mod: PBBFAC,,, | Performed by: HOSPITALIST

## 2019-03-08 PROCEDURE — 99214 OFFICE O/P EST MOD 30 MIN: CPT | Mod: S$GLB,,, | Performed by: HOSPITALIST

## 2019-03-08 PROCEDURE — 36415 COLL VENOUS BLD VENIPUNCTURE: CPT | Mod: PO

## 2019-03-08 PROCEDURE — 84443 ASSAY THYROID STIM HORMONE: CPT

## 2019-03-08 PROCEDURE — 80162 ASSAY OF DIGOXIN TOTAL: CPT

## 2019-03-08 PROCEDURE — 80053 COMPREHEN METABOLIC PANEL: CPT

## 2019-03-08 PROCEDURE — 99214 PR OFFICE/OUTPT VISIT, EST, LEVL IV, 30-39 MIN: ICD-10-PCS | Mod: S$GLB,,, | Performed by: HOSPITALIST

## 2019-03-08 NOTE — PROGRESS NOTES
Transitional Care Note  Subjective:       Patient ID: Randy Camejo is a 78 y.o. male.  Chief Complaint: Hospital Follow Up ([facility]:  flu, DM, Afib)    Family and/or Caretaker present at visit?  Yes.  Diagnostic tests reviewed/disposition: No diagnosic tests pending after this hospitalization.  Disease/illness education: yes  Home health/community services discussion/referrals: Patient has home health established at [facility].   Establishment or re-establishment of referral orders for community resources: No other necessary community resources.   Discussion with other health care providers: No discussion with other health care providers necessary.   HPI   77 yo male with pmhx of HLD, DM2, CAD s/p PCI presents with his wife for hospital follow-up. He was admitted to [facility] from 2/19/19 - 3/1/19 w/ abdominal pain, n/v. Pt was found to be in AFib with RVR and DKA. Placed on diltiazem drip and admitted to ICU. He was also found to be in systolic HF with EF 15% He was placed on amiodarone gtt and heparin gtt. Cardiology consulted during the hospitalization. He underwent succesful cardioversion with cardiology. He was later discharged with home health. Pt reports that he is fine since discharge. States that he has been compliant with his mediations. His wife brought his log of blood sugars and shows that BG mostly 130-170s on insulin. He has been monitoring his diet. He has been doing well with Home health and working with physical therapy        Review of Systems   Constitutional: Negative for chills and fever.   HENT: Negative for congestion and sore throat.    Eyes: Negative for pain and visual disturbance.   Respiratory: Negative for cough and shortness of breath.    Cardiovascular: Negative for chest pain and leg swelling.   Gastrointestinal: Negative for abdominal pain, nausea and vomiting.   Endocrine: Negative for polydipsia and polyuria.   Genitourinary: Negative for difficulty  "urinating and dysuria.   Musculoskeletal: Negative for arthralgias and back pain.   Skin: Negative for rash and wound.   Neurological: Negative for weakness and headaches.   Psychiatric/Behavioral: Negative for agitation and confusion.       Objective:       Vitals:    03/08/19 1035   BP: (!) 98/54   BP Location: Right arm   Patient Position: Sitting   BP Method: Medium (Manual)   Pulse: (!) 59   Resp: 18   Temp: 97.7 °F (36.5 °C)   TempSrc: Oral   SpO2: 98%   Weight: 76.1 kg (167 lb 12.3 oz)   Height: 5' 11" (1.803 m)       Physical Exam   Constitutional: He is oriented to person, place, and time. He appears well-developed and well-nourished. No distress.   HENT:   Head: Normocephalic and atraumatic.   Mouth/Throat: Oropharynx is clear and moist. No oropharyngeal exudate.   Eyes: Conjunctivae are normal. Pupils are equal, round, and reactive to light. Right eye exhibits no discharge. Left eye exhibits no discharge.   Neck: Normal range of motion. Neck supple.   Cardiovascular: Normal rate, regular rhythm and intact distal pulses. Exam reveals no friction rub.   Murmur heard.  Pulmonary/Chest: Effort normal and breath sounds normal.   Abdominal: Soft. Bowel sounds are normal. He exhibits no distension. There is no tenderness.   Musculoskeletal: Normal range of motion. He exhibits edema.   Pedal edema b/l    Lymphadenopathy:     He has no cervical adenopathy.   Neurological: He is alert and oriented to person, place, and time.   Skin: Skin is warm and dry.   Psychiatric: He has a normal mood and affect. His behavior is normal.   Vitals reviewed.     Protective Sensation (w/ 10 gram monofilament):  Right: Decreased  Left: Decreased    Visual Inspection:  Normal -  Bilateral +hypertrophic nails     Pedal Pulses:   Right: Present  Left: Present    Posterior tibialis:   Right:Present  Left: difficult to palpate due to edema    Reviewed imaging report of arterial u/s of lower ext: negative for significant stenosis "       Assessment:       1. Hospital discharge follow-up    2. Chronic systolic heart failure    3. Type 2 diabetes mellitus with other circulatory complication, with long-term current use of insulin    4. Persistent atrial fibrillation    5. Coronary artery disease involving native coronary artery of native heart without angina pectoris    6. Essential hypertension    7. Enlarged and hypertrophic nails    8. Encounter for diabetic foot exam         Plan:     Randy was seen today for hospital follow up.    Diagnoses and all orders for this visit:    Hospital discharge follow-up  - Pt appears to be stable after hospital discharge. To continue current medication regimen. Will refer back to cardiology for further monitoring.   -     Ambulatory Referral to Cardiology  -     Digoxin level; Future  -     TSH; Future  -     Comprehensive metabolic panel; Future  -     CBC W/ AUTO DIFFERENTIAL; Future    Chronic systolic heart failure  -     Ambulatory Referral to Cardiology  -     Digoxin level; Future  -     TSH; Future  -     Comprehensive metabolic panel; Future  -     CBC W/ AUTO DIFFERENTIAL; Future    Type 2 diabetes mellitus with other circulatory complication, with long-term current use of insulin  -     Ambulatory Referral to Podiatry    Persistent atrial fibrillation  -     Ambulatory Referral to Cardiology    Coronary artery disease involving native coronary artery of native heart without angina pectoris  -     Ambulatory Referral to Cardiology    Essential hypertension         - BP well controlled. Continue coreg    Enlarged and hypertrophic nails  -     Ambulatory Referral to Podiatry    Encounter for diabetic foot exam    Susie Lazo MD  Internal Medicine    RTC 3 months

## 2019-03-11 ENCOUNTER — TELEPHONE (OUTPATIENT)
Dept: INTERNAL MEDICINE | Facility: CLINIC | Age: 79
End: 2019-03-11

## 2019-03-11 NOTE — TELEPHONE ENCOUNTER
----- Message from Susie Lazo MD sent at 3/9/2019 10:44 AM CST -----  Please notify pt that digoxin level is mildly low; ok to continue current dose of digoxin. Thyroid was mildly abnormal but can monitor this periodically. CBC is normal/acceptable range. Electrolytes and liver function are normal. Kidney function shows mild dysfunction but is stable and acceptable range.

## 2019-03-13 ENCOUNTER — TELEPHONE (OUTPATIENT)
Dept: INTERNAL MEDICINE | Facility: CLINIC | Age: 79
End: 2019-03-13

## 2019-03-13 NOTE — TELEPHONE ENCOUNTER
Patient wanting to know if he can go back to work?  Patient feels better. He works at a [employer] 2 days out of the week. He sits down all day and answers questions. His wife brings and picks him up.

## 2019-03-13 NOTE — TELEPHONE ENCOUNTER
----- Message from Mary Harry sent at 3/13/2019  3:14 PM CDT -----  Contact: SELF/ [phone]  Please call patient about going back to work.

## 2019-03-22 ENCOUNTER — OFFICE VISIT (OUTPATIENT)
Dept: CARDIOLOGY | Facility: CLINIC | Age: 79
End: 2019-03-22
Attending: INTERNAL MEDICINE
Payer: MEDICARE

## 2019-03-22 VITALS
SYSTOLIC BLOOD PRESSURE: 120 MMHG | BODY MASS INDEX: 23.94 KG/M2 | HEIGHT: 71 IN | WEIGHT: 171 LBS | HEART RATE: 54 BPM | DIASTOLIC BLOOD PRESSURE: 71 MMHG

## 2019-03-22 DIAGNOSIS — Z79.4 TYPE 2 DIABETES MELLITUS WITH OTHER CIRCULATORY COMPLICATION, WITH LONG-TERM CURRENT USE OF INSULIN: ICD-10-CM

## 2019-03-22 DIAGNOSIS — I43 TACHYCARDIA INDUCED CARDIOMYOPATHY: ICD-10-CM

## 2019-03-22 DIAGNOSIS — I10 ESSENTIAL HYPERTENSION: ICD-10-CM

## 2019-03-22 DIAGNOSIS — I48.0 PAROXYSMAL ATRIAL FIBRILLATION: ICD-10-CM

## 2019-03-22 DIAGNOSIS — Z79.01 CHRONIC ANTICOAGULATION: ICD-10-CM

## 2019-03-22 DIAGNOSIS — E78.00 HYPERCHOLESTEROLEMIA: ICD-10-CM

## 2019-03-22 DIAGNOSIS — I50.22 HEART FAILURE, SYSTOLIC, CHRONIC: ICD-10-CM

## 2019-03-22 DIAGNOSIS — R00.0 TACHYCARDIA INDUCED CARDIOMYOPATHY: ICD-10-CM

## 2019-03-22 DIAGNOSIS — I25.10 CORONARY ARTERY DISEASE INVOLVING NATIVE CORONARY ARTERY OF NATIVE HEART WITHOUT ANGINA PECTORIS: ICD-10-CM

## 2019-03-22 DIAGNOSIS — E11.59 TYPE 2 DIABETES MELLITUS WITH OTHER CIRCULATORY COMPLICATION, WITH LONG-TERM CURRENT USE OF INSULIN: ICD-10-CM

## 2019-03-22 DIAGNOSIS — Z95.5 HISTORY OF CORONARY ARTERY STENT PLACEMENT: ICD-10-CM

## 2019-03-22 DIAGNOSIS — I25.2 HISTORY OF MYOCARDIAL INFARCTION: ICD-10-CM

## 2019-03-22 DIAGNOSIS — Z79.899 HIGH RISK MEDICATION USE: ICD-10-CM

## 2019-03-22 PROBLEM — I73.9 PAD (PERIPHERAL ARTERY DISEASE): Status: RESOLVED | Noted: 2019-02-24 | Resolved: 2019-03-22

## 2019-03-22 PROBLEM — E11.9 DIABETES MELLITUS, TYPE 2: Status: ACTIVE | Noted: 2019-02-19

## 2019-03-22 PROBLEM — Z98.890 POST-OPERATIVE STATE: Status: RESOLVED | Noted: 2018-01-08 | Resolved: 2019-03-22

## 2019-03-22 PROCEDURE — 1101F PR PT FALLS ASSESS DOC 0-1 FALLS W/OUT INJ PAST YR: ICD-10-PCS | Mod: CPTII,S$GLB,, | Performed by: INTERNAL MEDICINE

## 2019-03-22 PROCEDURE — 3078F DIAST BP <80 MM HG: CPT | Mod: CPTII,S$GLB,, | Performed by: INTERNAL MEDICINE

## 2019-03-22 PROCEDURE — 93000 ELECTROCARDIOGRAM COMPLETE: CPT | Mod: S$GLB,,, | Performed by: INTERNAL MEDICINE

## 2019-03-22 PROCEDURE — 3074F SYST BP LT 130 MM HG: CPT | Mod: CPTII,S$GLB,, | Performed by: INTERNAL MEDICINE

## 2019-03-22 PROCEDURE — 99215 OFFICE O/P EST HI 40 MIN: CPT | Mod: 25,S$GLB,, | Performed by: INTERNAL MEDICINE

## 2019-03-22 PROCEDURE — 93000 PR ELECTROCARDIOGRAM, COMPLETE: ICD-10-PCS | Mod: S$GLB,,, | Performed by: INTERNAL MEDICINE

## 2019-03-22 PROCEDURE — 1101F PT FALLS ASSESS-DOCD LE1/YR: CPT | Mod: CPTII,S$GLB,, | Performed by: INTERNAL MEDICINE

## 2019-03-22 PROCEDURE — 3074F PR MOST RECENT SYSTOLIC BLOOD PRESSURE < 130 MM HG: ICD-10-PCS | Mod: CPTII,S$GLB,, | Performed by: INTERNAL MEDICINE

## 2019-03-22 PROCEDURE — 3078F PR MOST RECENT DIASTOLIC BLOOD PRESSURE < 80 MM HG: ICD-10-PCS | Mod: CPTII,S$GLB,, | Performed by: INTERNAL MEDICINE

## 2019-03-22 PROCEDURE — 99215 PR OFFICE/OUTPT VISIT, EST, LEVL V, 40-54 MIN: ICD-10-PCS | Mod: 25,S$GLB,, | Performed by: INTERNAL MEDICINE

## 2019-03-22 RX ORDER — ATORVASTATIN CALCIUM 20 MG/1
20 TABLET, FILM COATED ORAL DAILY
Qty: 90 TABLET | Refills: 3 | Status: SHIPPED | OUTPATIENT
Start: 2019-03-22 | End: 2019-05-03 | Stop reason: SDUPTHER

## 2019-03-22 RX ORDER — AMIODARONE HYDROCHLORIDE 200 MG/1
200 TABLET ORAL DAILY
Qty: 90 TABLET | Refills: 0 | Status: SHIPPED | OUTPATIENT
Start: 2019-03-22 | End: 2019-05-03 | Stop reason: SDUPTHER

## 2019-03-22 RX ORDER — LOSARTAN POTASSIUM 50 MG/1
50 TABLET ORAL DAILY
Qty: 90 TABLET | Refills: 3 | Status: SHIPPED | OUTPATIENT
Start: 2019-03-22 | End: 2019-05-03 | Stop reason: SDUPTHER

## 2019-03-22 RX ORDER — FUROSEMIDE 20 MG/1
20 TABLET ORAL DAILY
Qty: 90 TABLET | Refills: 3 | Status: SHIPPED | OUTPATIENT
Start: 2019-03-22 | End: 2019-05-03 | Stop reason: SDUPTHER

## 2019-03-22 RX ORDER — CARVEDILOL 6.25 MG/1
6.25 TABLET ORAL 2 TIMES DAILY
Qty: 180 TABLET | Refills: 3 | Status: SHIPPED | OUTPATIENT
Start: 2019-03-22 | End: 2019-05-03 | Stop reason: SDUPTHER

## 2019-03-22 NOTE — PROGRESS NOTES
Subjective:     Randy Camejo is a 78 y.o. male with hypertension and diabetes mellitus type 2. He suffered a myocardial infarction in 2000 when he presented to [facility] and had a stent placed. He was treated for DM2 for a few years with metformin but then stopped it. He did not had any regular medical follow up for several years. He began feeling weak and have diarrhea on 2/15/2019. He diarrhea continued and he became weaker over the next several weak. He had nausea and vomited. On 2/19/2019 he went to  and was referred to the ER. He was noted to be in atrial fibrillation and admitted. 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 He was given amiodarone, digoxin and metoprolol. He was anticoagulation with heparin iv and later apixiban. On 2/25/2019 he underwent a DILLAN guided CV. There was severe left ventricular dysfunction with an EF of 15%. The TERESO had spontaneous echo contrast ut no thrombus. He was cardioverted with a 100 J shock to sinus rhythm. On 2/26/2019 he became hyperkalemic with a K of 6.0 after given enalapril.  He slowly gained strength and was slowly gaining strength.      Congestive Heart Failure   Presents for follow-up visit. Pertinent negatives include no abdominal pain, chest pain, chest pressure, claudication, edema, fatigue, muscle weakness, near-syncope, nocturia, orthopnea, palpitations, paroxysmal nocturnal dyspnea, shortness of breath or unexpected weight change. The symptoms have been improving. His past medical history is significant for CAD.   Coronary Artery Disease   Presents for follow-up visit. Pertinent negatives include no chest pain, chest pressure, chest tightness, dizziness, leg swelling, muscle weakness, palpitations, shortness of breath or weight gain. Risk factors include hyperlipidemia. Risk factors do not include hypertension or obesity. His past medical history is significant for  CHF. The symptoms have been stable.   Atrial Fibrillation   Presents for follow-up visit. Symptoms are negative for bradycardia, chest pain, dizziness, hemodynamic instability, hypertension, hypotension, palpitations, shortness of breath, syncope, tachycardia and weakness. The symptoms have been stable. Past medical history includes atrial fibrillation, CAD, CHF and hyperlipidemia.   Hypertension   The current episode started more than 1 year ago. The problem is unchanged. The problem is controlled. Pertinent negatives include no anxiety, blurred vision, chest pain, headaches, malaise/fatigue, neck pain, orthopnea, palpitations, PND, shortness of breath or sweats. There is no history of chronic renal disease.   Hyperlipidemia   This is a chronic problem. The current episode started more than 1 year ago. The problem is controlled. Exacerbating diseases include diabetes. He has no history of chronic renal disease, hypothyroidism, liver disease, obesity or nephrotic syndrome. Pertinent negatives include no chest pain, focal sensory loss, focal weakness, myalgias or shortness of breath.       Review of Systems   Constitution: Negative for chills, fatigue, fever, weakness, malaise/fatigue, unexpected weight change and weight gain.   HENT: Negative for nosebleeds.    Eyes: Negative for blurred vision, double vision, vision loss in left eye and vision loss in right eye.   Cardiovascular: Negative for chest pain, claudication, dyspnea on exertion, irregular heartbeat, leg swelling, near-syncope, orthopnea, palpitations, paroxysmal nocturnal dyspnea and syncope.   Respiratory: Negative for chest tightness, cough, hemoptysis, shortness of breath and wheezing.    Endocrine: Negative for cold intolerance and heat intolerance.   Hematologic/Lymphatic: Negative for bleeding problem. Does not bruise/bleed easily.   Skin: Negative for color change and rash.   Musculoskeletal: Negative for back pain, falls, muscle weakness, myalgias  and neck pain.   Gastrointestinal: Negative for abdominal pain, heartburn, hematemesis, hematochezia, hemorrhoids, jaundice, melena, nausea and vomiting.   Genitourinary: Negative for dysuria, hematuria and nocturia.   Neurological: Negative for dizziness, focal weakness, headaches, light-headedness, loss of balance, numbness and vertigo.   Psychiatric/Behavioral: Negative for altered mental status, depression and memory loss. The patient is not nervous/anxious.    Allergic/Immunologic: Negative for hives and persistent infections.       Current Outpatient Medications on File Prior to Visit   Medication Sig Dispense Refill    acetaminophen (TYLENOL) 500 MG tablet Take 2 tablets (1,000 mg total) by mouth every 6 (six) hours as needed for Pain.      amiodarone (PACERONE) 200 MG Tab Take 1 tablet (200 mg total) by mouth once daily. 90 tablet 0    apixaban (ELIQUIS) 5 mg Tab Take 1 tablet (5 mg total) by mouth 2 (two) times daily. 180 tablet 0    atorvastatin (LIPITOR) 20 MG tablet Take 1 tablet (20 mg total) by mouth once daily. 90 tablet 0    blood sugar diagnostic Strp TEST  FOUR TIMES DAILY 200 strip 0    blood sugar diagnostic Strp To check BG QID times daily, to use with insurance preferred meter 200 strip 0    blood-glucose meter kit test as directed 1 each 0    carvedilol (COREG) 3.125 MG tablet Take 1 tablet (3.125 mg total) by mouth 2 (two) times daily. 180 tablet 0    digoxin (LANOXIN) 125 mcg tablet Take 1 tablet (0.125 mg total) by mouth once daily. 90 tablet 0    furosemide (LASIX) 20 MG tablet Take 1 tablet (20 mg total) by mouth once daily. 90 tablet 0    insulin aspart U-100 (NOVOLOG) 100 unit/mL InPn pen Inject 3 Units into the skin 3 (three) times daily. 15 mL 0    insulin detemir U-100 (LEVEMIR FLEXTOUCH) 100 unit/mL (3 mL) SubQ InPn pen Inject 15 Units into the skin every evening. 15 mL 0    lancets 30 gauge Misc TEST FOUR TIMES DAILY 200 each 0    lancets Misc To check BG QID times  "daily, to use with insurance preferred meter 200 each 0    losartan (COZAAR) 25 MG tablet Take 1 tablet (25 mg total) by mouth once daily. 90 tablet 0    mupirocin (BACTROBAN) 2 % ointment Apply topically 2 (two) times daily. 1 Tube 0    pen needle, diabetic 31 gauge x 5/16" Ndle use with insulins 400 each 0     No current facility-administered medications on file prior to visit.        /71   Pulse (!) 54   Ht 5' 11" (1.803 m)   Wt 77.6 kg (171 lb)   BMI 23.85 kg/m²       Objective:     Physical Exam   Constitutional: He is oriented to person, place, and time. He appears well-developed and well-nourished.  Non-toxic appearance. No distress.   HENT:   Head: Normocephalic and atraumatic.   Nose: Nose normal.   Eyes: Right eye exhibits no discharge. Left eye exhibits no discharge. Right conjunctiva is not injected. Left conjunctiva is not injected. Right pupil is round. Left pupil is round. Pupils are equal.   Neck: Neck supple. No JVD present. Carotid bruit is not present. No thyromegaly present.   Cardiovascular: Normal rate, regular rhythm, S1 normal and S2 normal.  No extrasystoles are present. PMI is not displaced. Exam reveals gallop and S3.   Pulses:       Radial pulses are 2+ on the right side, and 2+ on the left side.        Femoral pulses are 2+ on the right side, and 2+ on the left side.       Dorsalis pedis pulses are 2+ on the right side, and 2+ on the left side.        Posterior tibial pulses are 2+ on the right side, and 2+ on the left side.   Pulmonary/Chest: Effort normal and breath sounds normal.   Abdominal: Soft. Normal appearance. There is no hepatosplenomegaly. There is no tenderness.   Musculoskeletal:        Right ankle: He exhibits no swelling, no ecchymosis and no deformity.        Left ankle: He exhibits no swelling, no ecchymosis and no deformity.   Lymphadenopathy:        Head (right side): No submandibular adenopathy present.        Head (left side): No submandibular " adenopathy present.     He has no cervical adenopathy.   Neurological: He is alert and oriented to person, place, and time. He is not disoriented. No cranial nerve deficit.   Skin: Skin is warm, dry and intact. No rash noted. He is not diaphoretic.   Psychiatric: He has a normal mood and affect. His speech is normal and behavior is normal. Judgment and thought content normal. Cognition and memory are normal.       Assessment:     1. Heart failure, systolic, chronic    2. Tachycardia induced cardiomyopathy    3. Coronary artery disease involving native coronary artery of native heart without angina pectoris    4. History of myocardial infarction    5. History of coronary artery stent placement    6. Paroxysmal atrial fibrillation    7. High risk medication use    8. Chronic anticoagulation    9. Essential hypertension    10. Hypercholesterolemia    11. Type 2 diabetes mellitus with other circulatory complication, with long-term current use of insulin        Plan:     1. Heart Failure, Systolic, Chronic              2/20/2019: Echo: Mildly dilated left ventricle with severe left ventricular dysfunction. EF 15%. Moderately dilated LA. Moderate RV dysfunction.              Maybe at least partially tachycardia induced.              Betablockade, ACEI and spironolactone long term.              Consider cath at later date.              On metoprolol 25 mg Q12.              Will increase metoprolol very cautiously.              2/25/2019: Began enalapril.              On enalapril 2.5 mg Q12.              K 6.0 = hyperkalemia. Will repeat and hold enalapril.              2/27/2019: K 4.9.  Began losartan.              On carvedilol 3.125 mg Q12, losartan 25 mg Q24 and furosemide 20 mg Q24.   Well compensated.   3/22/2019: Increase carvedilol to 6.25 Q12 and increas losartan to 50 mg Q24   4/1/2019: BMP and BNP.   4/2019: Do Echo.     2. Coronary Artery Disease              2000: [facility]: MI and stent.              Clinically  stable.              No aspirin as anticoagulated with apixiban.     3. Atrial Fibrillation              Persistent atrial fibrillation with fast VRR.              Uncertain duration.              [id number] (CHA2DV).              2/21/2019: Plan was DILLAN guided CV but patient did not want to proceed.              Rate control with amiodarone, digoxin and very cautious dose of metoprolol.              2/25/2019: [facility]: DILLAN & CV: Severe left ventricular dysfunction. EF 15%. TERESO with spontaneous echo contrast. No thrombus.  J to sinus rhythm.               2/27/2019: Reduced digoxin to 0.125 mg Q24.              On amiodarone 200 mg Q24, carvedilol 3.125 mg Q12 and digoxin 0.125 mg Q24.   3/22/2019: Stop digoxin.     4. High Risk Medication              2/19/2019: Began amiodarone.              On amiodarone 200 mg Q24.              4/2019: Plan next TSH. Then every 4 months.                 5. Chronic Anticoagulation              [id number] (CHA2DV).              On heparin iv.              Lifelong anticoagulation.              2/23/2019: Changed to apixiban.              On apixiban 5 mg Q12.     6. Hypertension              Appears to have been mild.    7. Hypercholesterolemia   2/20/2019: Chol 137. HDL 26. LDL 94. TG 87.   On atorvastatin 20 mg Q24.   Do lipid panel.     8. Diabetes Mellitus, Type 2              2015: Diagnosed. Complications: CAD. Medications: Diet.              2/19/2019: DKA.     9. Primary Care              Dr. Susie Ray.    F/u 1 month.    Panchito Joseph M.D.      4/11/2019 5:39 PM, Addendum:    4/5/2019: Echo: Moderately dilated left ventricle with moderate systolic dysfunction. EF 35-40%. Anteroseptal and Inferior WMAs. Moderate diastolic dysfunction. Moderately dilated LA. Mild aortic valve sclerosis - 1.1 m'/s. Mild AR. Moderate MR.    Plan cath on follow up.    I discussed the above test result and the implications of the findings over the phone.    Panchito Joseph  M.D.

## 2019-04-05 ENCOUNTER — CLINICAL SUPPORT (OUTPATIENT)
Dept: CARDIOLOGY | Facility: CLINIC | Age: 79
End: 2019-04-05
Attending: INTERNAL MEDICINE
Payer: MEDICARE

## 2019-04-05 DIAGNOSIS — R00.0 TACHYCARDIA INDUCED CARDIOMYOPATHY: ICD-10-CM

## 2019-04-05 DIAGNOSIS — I43 TACHYCARDIA INDUCED CARDIOMYOPATHY: ICD-10-CM

## 2019-04-05 DIAGNOSIS — I50.22 HEART FAILURE, SYSTOLIC, CHRONIC: ICD-10-CM

## 2019-04-05 PROCEDURE — 93306 TTE W/DOPPLER COMPLETE: CPT | Mod: S$GLB,,, | Performed by: INTERNAL MEDICINE

## 2019-04-05 PROCEDURE — 93306 PR ECHO HEART XTHORACIC,COMPLETE W DOPPLER: ICD-10-PCS | Mod: S$GLB,,, | Performed by: INTERNAL MEDICINE

## 2019-04-10 ENCOUNTER — LAB VISIT (OUTPATIENT)
Dept: LAB | Facility: HOSPITAL | Age: 79
End: 2019-04-10
Attending: INTERNAL MEDICINE
Payer: MEDICARE

## 2019-04-10 DIAGNOSIS — I50.22 HEART FAILURE, SYSTOLIC, CHRONIC: ICD-10-CM

## 2019-04-10 DIAGNOSIS — Z79.899 HIGH RISK MEDICATION USE: ICD-10-CM

## 2019-04-10 DIAGNOSIS — E78.00 HYPERCHOLESTEROLEMIA: ICD-10-CM

## 2019-04-10 DIAGNOSIS — E11.59 TYPE 2 DIABETES MELLITUS WITH OTHER CIRCULATORY COMPLICATION, WITH LONG-TERM CURRENT USE OF INSULIN: ICD-10-CM

## 2019-04-10 DIAGNOSIS — Z79.4 TYPE 2 DIABETES MELLITUS WITH OTHER CIRCULATORY COMPLICATION, WITH LONG-TERM CURRENT USE OF INSULIN: ICD-10-CM

## 2019-04-10 LAB
ANION GAP SERPL CALC-SCNC: 6 MMOL/L (ref 8–16)
BNP SERPL-MCNC: 640 PG/ML (ref 0–99)
BUN SERPL-MCNC: 17 MG/DL (ref 8–23)
CALCIUM SERPL-MCNC: 9.1 MG/DL (ref 8.7–10.5)
CHLORIDE SERPL-SCNC: 105 MMOL/L (ref 95–110)
CHOLEST SERPL-MCNC: 95 MG/DL (ref 120–199)
CHOLEST/HDLC SERPL: 3.4 {RATIO} (ref 2–5)
CO2 SERPL-SCNC: 28 MMOL/L (ref 23–29)
CREAT SERPL-MCNC: 1.2 MG/DL (ref 0.5–1.4)
EST. GFR  (AFRICAN AMERICAN): >60 ML/MIN/1.73 M^2
EST. GFR  (NON AFRICAN AMERICAN): 58 ML/MIN/1.73 M^2
ESTIMATED AVG GLUCOSE: 194 MG/DL (ref 68–131)
GLUCOSE SERPL-MCNC: 94 MG/DL (ref 70–110)
HBA1C MFR BLD HPLC: 8.4 % (ref 4–5.6)
HDLC SERPL-MCNC: 28 MG/DL (ref 40–75)
HDLC SERPL: 29.5 % (ref 20–50)
LDLC SERPL CALC-MCNC: 57 MG/DL (ref 63–159)
NONHDLC SERPL-MCNC: 67 MG/DL
POTASSIUM SERPL-SCNC: 4.2 MMOL/L (ref 3.5–5.1)
SODIUM SERPL-SCNC: 139 MMOL/L (ref 136–145)
T4 FREE SERPL-MCNC: 0.88 NG/DL (ref 0.71–1.51)
TRIGL SERPL-MCNC: 50 MG/DL (ref 30–150)
TSH SERPL DL<=0.005 MIU/L-ACNC: 8.18 UIU/ML (ref 0.4–4)

## 2019-04-10 PROCEDURE — 36415 COLL VENOUS BLD VENIPUNCTURE: CPT | Mod: PO

## 2019-04-10 PROCEDURE — 80061 LIPID PANEL: CPT

## 2019-04-10 PROCEDURE — 83036 HEMOGLOBIN GLYCOSYLATED A1C: CPT

## 2019-04-10 PROCEDURE — 83880 ASSAY OF NATRIURETIC PEPTIDE: CPT

## 2019-04-10 PROCEDURE — 80048 BASIC METABOLIC PNL TOTAL CA: CPT

## 2019-04-10 PROCEDURE — 84443 ASSAY THYROID STIM HORMONE: CPT

## 2019-04-10 PROCEDURE — 84439 ASSAY OF FREE THYROXINE: CPT

## 2019-04-11 DIAGNOSIS — Z79.899 HIGH RISK MEDICATION USE: Primary | ICD-10-CM

## 2019-04-11 RX ORDER — LEVOTHYROXINE SODIUM 50 UG/1
50 TABLET ORAL DAILY
Qty: 90 TABLET | Refills: 3 | Status: SHIPPED | OUTPATIENT
Start: 2019-04-11 | End: 2019-05-03 | Stop reason: SDUPTHER

## 2019-05-01 ENCOUNTER — TELEPHONE (OUTPATIENT)
Dept: INTERNAL MEDICINE | Facility: CLINIC | Age: 79
End: 2019-05-01

## 2019-05-01 NOTE — TELEPHONE ENCOUNTER
----- Message from Daria Ryan sent at 5/1/2019  3:14 PM CDT -----  Contact: self   Pt would like to get a recommendation for an [facility] podiatrist.

## 2019-05-01 NOTE — TELEPHONE ENCOUNTER
Patient informed that Dr. Lazo does not have a particular podiatrist in mind. Patient verbalized understanding.

## 2019-05-03 ENCOUNTER — OFFICE VISIT (OUTPATIENT)
Dept: CARDIOLOGY | Facility: CLINIC | Age: 79
End: 2019-05-03
Attending: INTERNAL MEDICINE
Payer: MEDICARE

## 2019-05-03 VITALS
BODY MASS INDEX: 23.1 KG/M2 | DIASTOLIC BLOOD PRESSURE: 60 MMHG | HEART RATE: 55 BPM | HEIGHT: 71 IN | WEIGHT: 165 LBS | SYSTOLIC BLOOD PRESSURE: 105 MMHG

## 2019-05-03 DIAGNOSIS — Z95.5 HISTORY OF CORONARY ARTERY STENT PLACEMENT: ICD-10-CM

## 2019-05-03 DIAGNOSIS — I10 ESSENTIAL HYPERTENSION: ICD-10-CM

## 2019-05-03 DIAGNOSIS — Z79.01 CHRONIC ANTICOAGULATION: ICD-10-CM

## 2019-05-03 DIAGNOSIS — R00.0 TACHYCARDIA INDUCED CARDIOMYOPATHY: ICD-10-CM

## 2019-05-03 DIAGNOSIS — I50.22 HEART FAILURE, SYSTOLIC, CHRONIC: ICD-10-CM

## 2019-05-03 DIAGNOSIS — I43 TACHYCARDIA INDUCED CARDIOMYOPATHY: ICD-10-CM

## 2019-05-03 DIAGNOSIS — E11.59 TYPE 2 DIABETES MELLITUS WITH OTHER CIRCULATORY COMPLICATION, WITH LONG-TERM CURRENT USE OF INSULIN: ICD-10-CM

## 2019-05-03 DIAGNOSIS — Z79.899 HIGH RISK MEDICATION USE: ICD-10-CM

## 2019-05-03 DIAGNOSIS — I48.0 PAROXYSMAL ATRIAL FIBRILLATION: ICD-10-CM

## 2019-05-03 DIAGNOSIS — I25.10 CORONARY ARTERY DISEASE INVOLVING NATIVE CORONARY ARTERY OF NATIVE HEART WITHOUT ANGINA PECTORIS: ICD-10-CM

## 2019-05-03 DIAGNOSIS — E78.00 HYPERCHOLESTEROLEMIA: ICD-10-CM

## 2019-05-03 DIAGNOSIS — Z79.4 TYPE 2 DIABETES MELLITUS WITH OTHER CIRCULATORY COMPLICATION, WITH LONG-TERM CURRENT USE OF INSULIN: ICD-10-CM

## 2019-05-03 DIAGNOSIS — I25.2 HISTORY OF MYOCARDIAL INFARCTION: ICD-10-CM

## 2019-05-03 PROCEDURE — 3074F SYST BP LT 130 MM HG: CPT | Mod: CPTII,S$GLB,, | Performed by: INTERNAL MEDICINE

## 2019-05-03 PROCEDURE — 3074F PR MOST RECENT SYSTOLIC BLOOD PRESSURE < 130 MM HG: ICD-10-PCS | Mod: CPTII,S$GLB,, | Performed by: INTERNAL MEDICINE

## 2019-05-03 PROCEDURE — 3078F PR MOST RECENT DIASTOLIC BLOOD PRESSURE < 80 MM HG: ICD-10-PCS | Mod: CPTII,S$GLB,, | Performed by: INTERNAL MEDICINE

## 2019-05-03 PROCEDURE — 99214 OFFICE O/P EST MOD 30 MIN: CPT | Mod: 25,S$GLB,, | Performed by: INTERNAL MEDICINE

## 2019-05-03 PROCEDURE — 1101F PR PT FALLS ASSESS DOC 0-1 FALLS W/OUT INJ PAST YR: ICD-10-PCS | Mod: CPTII,S$GLB,, | Performed by: INTERNAL MEDICINE

## 2019-05-03 PROCEDURE — 99214 PR OFFICE/OUTPT VISIT, EST, LEVL IV, 30-39 MIN: ICD-10-PCS | Mod: 25,S$GLB,, | Performed by: INTERNAL MEDICINE

## 2019-05-03 PROCEDURE — 3078F DIAST BP <80 MM HG: CPT | Mod: CPTII,S$GLB,, | Performed by: INTERNAL MEDICINE

## 2019-05-03 PROCEDURE — 1101F PT FALLS ASSESS-DOCD LE1/YR: CPT | Mod: CPTII,S$GLB,, | Performed by: INTERNAL MEDICINE

## 2019-05-03 PROCEDURE — 93000 ELECTROCARDIOGRAM COMPLETE: CPT | Mod: S$GLB,,, | Performed by: INTERNAL MEDICINE

## 2019-05-03 PROCEDURE — 93000 PR ELECTROCARDIOGRAM, COMPLETE: ICD-10-PCS | Mod: S$GLB,,, | Performed by: INTERNAL MEDICINE

## 2019-05-03 RX ORDER — AMIODARONE HYDROCHLORIDE 200 MG/1
200 TABLET ORAL DAILY
Qty: 90 TABLET | Refills: 3 | Status: SHIPPED | OUTPATIENT
Start: 2019-05-03 | End: 2020-05-26

## 2019-05-03 RX ORDER — CARVEDILOL 6.25 MG/1
6.25 TABLET ORAL 2 TIMES DAILY
Qty: 180 TABLET | Refills: 3 | Status: SHIPPED | OUTPATIENT
Start: 2019-05-03 | End: 2020-04-16

## 2019-05-03 RX ORDER — LEVOTHYROXINE SODIUM 50 UG/1
50 TABLET ORAL DAILY
Qty: 90 TABLET | Refills: 3 | Status: SHIPPED | OUTPATIENT
Start: 2019-05-03 | End: 2020-07-15

## 2019-05-03 RX ORDER — LOSARTAN POTASSIUM 50 MG/1
50 TABLET ORAL DAILY
Qty: 90 TABLET | Refills: 3 | Status: SHIPPED | OUTPATIENT
Start: 2019-05-03 | End: 2020-04-13

## 2019-05-03 RX ORDER — ATORVASTATIN CALCIUM 20 MG/1
20 TABLET, FILM COATED ORAL DAILY
Qty: 90 TABLET | Refills: 3 | Status: SHIPPED | OUTPATIENT
Start: 2019-05-03 | End: 2020-05-25 | Stop reason: SDUPTHER

## 2019-05-03 RX ORDER — FUROSEMIDE 20 MG/1
20 TABLET ORAL DAILY
Qty: 90 TABLET | Refills: 3 | Status: SHIPPED | OUTPATIENT
Start: 2019-05-03 | End: 2020-05-13

## 2019-05-03 NOTE — PROGRESS NOTES
Subjective:     Randy Camejo is a 78 y.o. male with hypertension and diabetes mellitus type 2. He suffered a myocardial infarction in 2000 when he presented to [facility] and had a stent placed. He was treated for DM2 for a few years with metformin but then stopped it. He did not had any regular medical follow up for several years. He began feeling weak and have diarrhea on 2/15/2019. He diarrhea continued and he became weaker over the next several weak. He had nausea and vomited. On 2/19/2019 he went to  and was referred to the ER. He was noted to be in atrial fibrillation and admitted. 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 He was given amiodarone, digoxin and metoprolol. He was anticoagulation with heparin iv and later apixiban. On 2/25/2019 he underwent a DILLAN guided CV. There was severe left ventricular dysfunction with an EF of 15%. The TERESO had spontaneous echo contrast ut no thrombus. He was cardioverted with a 100 J shock to sinus rhythm. On 2/26/2019 he became hyperkalemic with a K of 6.0 after given enalapril. On 4/5/2019 he had a follow up Echo that revealed a moderately dilated left ventricle with moderate systolic dysfunction. The EF was in the 35-40% range. There was an anteroseptal as well as an inferior WMA. There was moderate diastolic dysfunction, a moderately dilated LA, mild aortic valve sclerosis with apeak velocity of 1.1 m/s, mild AR and moderate MR. He has slowly gained strength. No exertional chest pain or exertional dyspnea. No palpitations or weak spells. He however has some back pain and is sometimes a bit unsteady on his feet. Feeling well overall.       Congestive Heart Failure   Presents for follow-up visit. Pertinent negatives include no abdominal pain, chest pain, chest pressure, claudication, edema, fatigue, muscle weakness, near-syncope, nocturia, orthopnea, palpitations, paroxysmal  nocturnal dyspnea, shortness of breath or unexpected weight change. The symptoms have been stable. His past medical history is significant for CAD.   Coronary Artery Disease   Presents for follow-up visit. Pertinent negatives include no chest pain, chest pressure, chest tightness, dizziness, leg swelling, muscle weakness, palpitations, shortness of breath or weight gain. Risk factors include hyperlipidemia. Risk factors do not include hypertension or obesity. His past medical history is significant for CHF. The symptoms have been stable.   Atrial Fibrillation   Presents for follow-up visit. Symptoms are negative for bradycardia, chest pain, dizziness, hemodynamic instability, hypertension, hypotension, palpitations, shortness of breath, syncope, tachycardia and weakness. The symptoms have been stable. Past medical history includes atrial fibrillation, CAD, CHF and hyperlipidemia.   Hypertension   The current episode started more than 1 year ago. The problem is unchanged. The problem is controlled (usually 110-120/60-70 mmHg at home). Pertinent negatives include no anxiety, blurred vision, chest pain, headaches, malaise/fatigue, neck pain, orthopnea, palpitations, PND, shortness of breath or sweats. There is no history of chronic renal disease.   Hyperlipidemia   This is a chronic problem. The current episode started more than 1 year ago. The problem is controlled. Exacerbating diseases include diabetes. He has no history of chronic renal disease, hypothyroidism, liver disease, obesity or nephrotic syndrome. Pertinent negatives include no chest pain, focal sensory loss, focal weakness, myalgias or shortness of breath.       Review of Systems   Constitution: Negative for chills, fatigue, fever, malaise/fatigue, unexpected weight change and weight gain.   HENT: Negative for nosebleeds.    Eyes: Negative for blurred vision, double vision, vision loss in left eye and vision loss in right eye.   Cardiovascular: Negative  for chest pain, claudication, dyspnea on exertion, irregular heartbeat, leg swelling, near-syncope, orthopnea, palpitations, paroxysmal nocturnal dyspnea and syncope.   Respiratory: Negative for chest tightness, cough, hemoptysis, shortness of breath and wheezing.    Endocrine: Negative for cold intolerance and heat intolerance.   Hematologic/Lymphatic: Negative for bleeding problem. Does not bruise/bleed easily.   Skin: Negative for color change and rash.   Musculoskeletal: Negative for back pain, falls, muscle weakness, myalgias and neck pain.   Gastrointestinal: Negative for abdominal pain, heartburn, hematemesis, hematochezia, hemorrhoids, jaundice, melena, nausea and vomiting.   Genitourinary: Negative for dysuria, hematuria and nocturia.   Neurological: Negative for dizziness, focal weakness, headaches, light-headedness, loss of balance, numbness, vertigo and weakness.   Psychiatric/Behavioral: Negative for altered mental status, depression and memory loss. The patient is not nervous/anxious.    Allergic/Immunologic: Negative for hives and persistent infections.       Current Outpatient Medications on File Prior to Visit   Medication Sig Dispense Refill    acetaminophen (TYLENOL) 500 MG tablet Take 2 tablets (1,000 mg total) by mouth every 6 (six) hours as needed for Pain.      amiodarone (PACERONE) 200 MG Tab Take 1 tablet (200 mg total) by mouth once daily. 90 tablet 0    apixaban (ELIQUIS) 5 mg Tab Take 1 tablet (5 mg total) by mouth 2 (two) times daily. 180 tablet 3    atorvastatin (LIPITOR) 20 MG tablet Take 1 tablet (20 mg total) by mouth once daily. 90 tablet 3    blood sugar diagnostic Strp TEST  FOUR TIMES DAILY 200 strip 0    blood sugar diagnostic Strp Use to check blood glucose four times daily, to use with insurance preferred meter 200 strip 0    blood-glucose meter kit test as directed 1 each 0    carvedilol (COREG) 6.25 MG tablet Take 1 tablet (6.25 mg total) by mouth 2 (two) times  "daily. 180 tablet 3    furosemide (LASIX) 20 MG tablet Take 1 tablet (20 mg total) by mouth once daily. 90 tablet 3    insulin aspart U-100 (NOVOLOG) 100 unit/mL InPn pen Inject 3 Units into the skin 3 (three) times daily. 15 mL 0    insulin detemir U-100 (LEVEMIR FLEXTOUCH) 100 unit/mL (3 mL) SubQ InPn pen Inject 15 Units into the skin every evening. 15 mL 0    lancets 30 gauge Misc TEST FOUR TIMES DAILY 200 each 0    lancets 30 gauge Misc test four times daily 200 each 0    levothyroxine (SYNTHROID) 50 MCG tablet Take 1 tablet (50 mcg total) by mouth once daily. 90 tablet 3    losartan (COZAAR) 50 MG tablet Take 1 tablet (50 mg total) by mouth once daily. 90 tablet 3    mupirocin (BACTROBAN) 2 % ointment Apply topically 2 (two) times daily. 1 Tube 0    pen needle, diabetic 31 gauge x 5/16" Ndle use with insulins 400 each 0     No current facility-administered medications on file prior to visit.        /60   Pulse (!) 55   Ht 5' 11" (1.803 m)   Wt 74.8 kg (165 lb)   BMI 23.01 kg/m²       Objective:     Physical Exam   Constitutional: He is oriented to person, place, and time. He appears well-developed and well-nourished.  Non-toxic appearance. No distress.   HENT:   Head: Normocephalic and atraumatic.   Nose: Nose normal.   Eyes: Right eye exhibits no discharge. Left eye exhibits no discharge. Right conjunctiva is not injected. Left conjunctiva is not injected. Right pupil is round. Left pupil is round. Pupils are equal.   Neck: Neck supple. No JVD present. Carotid bruit is not present. No thyromegaly present.   Cardiovascular: Normal rate, regular rhythm, S1 normal and S2 normal.  No extrasystoles are present. PMI is not displaced. Exam reveals gallop and S3.   Pulses:       Radial pulses are 2+ on the right side, and 2+ on the left side.        Femoral pulses are 2+ on the right side, and 2+ on the left side.       Dorsalis pedis pulses are 2+ on the right side, and 2+ on the left side.        " Posterior tibial pulses are 2+ on the right side, and 2+ on the left side.   Pulmonary/Chest: Effort normal and breath sounds normal.   Abdominal: Soft. Normal appearance. There is no hepatosplenomegaly. There is no tenderness.   Musculoskeletal:        Right ankle: He exhibits no swelling, no ecchymosis and no deformity.        Left ankle: He exhibits no swelling, no ecchymosis and no deformity.   Lymphadenopathy:        Head (right side): No submandibular adenopathy present.        Head (left side): No submandibular adenopathy present.     He has no cervical adenopathy.   Neurological: He is alert and oriented to person, place, and time. He is not disoriented. No cranial nerve deficit.   Skin: Skin is warm, dry and intact. No rash noted. He is not diaphoretic.   Psychiatric: He has a normal mood and affect. His speech is normal and behavior is normal. Judgment and thought content normal. Cognition and memory are normal.       Assessment:     1. Heart failure, systolic, chronic    2. Tachycardia induced cardiomyopathy    3. Coronary artery disease involving native coronary artery of native heart without angina pectoris    4. History of myocardial infarction    5. History of coronary artery stent placement    6. Paroxysmal atrial fibrillation    7. High risk medication use    8. Chronic anticoagulation    9. Essential hypertension    10. Hypercholesterolemia    11. Type 2 diabetes mellitus with other circulatory complication, with long-term current use of insulin        Plan:     1. Heart Failure, Systolic, Chronic              2/20/2019: Echo: Mildly dilated left ventricle with severe left ventricular dysfunction. EF 15%. Moderately dilated LA. Moderate RV dysfunction.   4/5/2019: Echo: Moderately dilated left ventricle with moderate systolic dysfunction. EF 35-40%. Anteroseptal and Inferior WMAs. Moderate diastolic dysfunction. Moderately dilated LA. Mild aortic valve sclerosis - 1.1 m/s. Mild AR. Moderate MR.               Maybe at least partially tachycardia induced.    2/25/2019: Began enalapril. On enalapril 2.5 mg Q12 K went to 6.0 and enalapril was discontinued.   No spironolactone with history of hyperkalemia.    On carvedilol 6.25 mg Q12, losartan 50g Q24 and furosemide 20 mg Q24.              Appears well compensated.   5/3/2019: Advised cath.      2. Coronary Artery Disease              2000: [facility]: MI and stent.              Appears stable.              No aspirin as anticoagulated with apixiban.   As above with left ventricular dysfunction and WMAs would favor cath.     3. Atrial Fibrillation   2/2019: Presented with persistent atrial fibrillation with fast VRR. Uncertain duration.              [id number]=6 (CHA2DV).              2/25/2019: [facility]: DILLAN & CV: Severe left ventricular dysfunction. EF 15%. TERESO with spontaneous echo contrast. No thrombus.  J to sinus rhythm.    On apixiban.              On amiodarone 200 mg Q24 and carvedilol 6.25 mg Q12.   No clinical recurrence.       4. High Risk Medication              2/19/2019: Began amiodarone.              On amiodarone 200 mg Q24.   4/10/2019: TSH 8.   On levothyroxine 50 mcg Q24.              6/2019: Plan next TSH. Then every 4 months.                 5. Chronic Anticoagulation              [id number]=1 (CHA2DV).              2/23/2019: Began apixiban.              On apixiban 5 mg Q12.   No aspirin or NSAIDs.   No bleeding.     6. Hypertension   2002: Diagnosed.   On carvedilol 6.25 mg Q12, losartan 50 mg Q24 and furosemide 20 mg Q24.              Well controlled.    7. Hypercholesterolemia   2002: Began statin.   2/20/2019: Chol 137. HDL 26. LDL 94. TG 87.   On atorvastatin 20 mg Q24.   4/10/2019: Chol 95. HDL 28. LDL 57. TG 50.   On atorvastatin 20 mg Q24.   Well controlled.     8. Diabetes Mellitus, Type 2              2015: Diagnosed. Complications: CAD. Medications: Insulin.              2/19/2019: DKA.     9. Primary Care              Dr. Richards  Cory.    The planned procedure was discussed in detail with the patient and the family members present. Risk, benefit and alternatives were reviewed. All questions answered. Consent was then signed.    If further questions or concerns arise the patient was encouraged to contact me prior to the planned procedure.     F/u 2 months.    Panchito Joseph M.D.

## 2019-05-16 ENCOUNTER — OFFICE VISIT (OUTPATIENT)
Dept: PODIATRY | Facility: CLINIC | Age: 79
End: 2019-05-16
Payer: MEDICARE

## 2019-05-16 VITALS
HEART RATE: 54 BPM | BODY MASS INDEX: 23.1 KG/M2 | WEIGHT: 165 LBS | SYSTOLIC BLOOD PRESSURE: 84 MMHG | DIASTOLIC BLOOD PRESSURE: 47 MMHG | HEIGHT: 71 IN

## 2019-05-16 DIAGNOSIS — M20.5X9 HALLUX LIMITUS, UNSPECIFIED LATERALITY: ICD-10-CM

## 2019-05-16 DIAGNOSIS — B35.1 ONYCHOMYCOSIS DUE TO DERMATOPHYTE: ICD-10-CM

## 2019-05-16 DIAGNOSIS — M20.42 HAMMER TOES OF BOTH FEET: ICD-10-CM

## 2019-05-16 DIAGNOSIS — E11.42 TYPE 2 DIABETES MELLITUS WITH DIABETIC POLYNEUROPATHY, WITH LONG-TERM CURRENT USE OF INSULIN: Primary | ICD-10-CM

## 2019-05-16 DIAGNOSIS — M20.12 VALGUS DEFORMITY OF BOTH GREAT TOES: ICD-10-CM

## 2019-05-16 DIAGNOSIS — E11.51 DIABETES MELLITUS WITH PERIPHERAL CIRCULATORY DISORDER: ICD-10-CM

## 2019-05-16 DIAGNOSIS — L84 CORN OR CALLUS: ICD-10-CM

## 2019-05-16 DIAGNOSIS — M20.11 VALGUS DEFORMITY OF BOTH GREAT TOES: ICD-10-CM

## 2019-05-16 DIAGNOSIS — M20.41 HAMMER TOES OF BOTH FEET: ICD-10-CM

## 2019-05-16 DIAGNOSIS — Z79.4 TYPE 2 DIABETES MELLITUS WITH DIABETIC POLYNEUROPATHY, WITH LONG-TERM CURRENT USE OF INSULIN: Primary | ICD-10-CM

## 2019-05-16 PROBLEM — E11.49 TYPE 2 DIABETES MELLITUS WITH NEUROLOGIC COMPLICATION, WITH LONG-TERM CURRENT USE OF INSULIN: Status: ACTIVE | Noted: 2019-02-19

## 2019-05-16 PROCEDURE — 11057 PR TRIM BENIGN HYPERKERATOTIC SKIN LESION,>4: ICD-10-PCS | Mod: Q9,S$GLB,,

## 2019-05-16 PROCEDURE — 99999 PR PBB SHADOW E&M-EST. PATIENT-LVL III: ICD-10-PCS | Mod: PBBFAC,,,

## 2019-05-16 PROCEDURE — 3078F PR MOST RECENT DIASTOLIC BLOOD PRESSURE < 80 MM HG: ICD-10-PCS | Mod: CPTII,S$GLB,,

## 2019-05-16 PROCEDURE — 3074F PR MOST RECENT SYSTOLIC BLOOD PRESSURE < 130 MM HG: ICD-10-PCS | Mod: CPTII,S$GLB,,

## 2019-05-16 PROCEDURE — 11721 DEBRIDE NAIL 6 OR MORE: CPT | Mod: 59,Q9,S$GLB,

## 2019-05-16 PROCEDURE — 11721 PR DEBRIDEMENT OF NAILS, 6 OR MORE: ICD-10-PCS | Mod: 59,Q9,S$GLB,

## 2019-05-16 PROCEDURE — 3078F DIAST BP <80 MM HG: CPT | Mod: CPTII,S$GLB,,

## 2019-05-16 PROCEDURE — 99214 OFFICE O/P EST MOD 30 MIN: CPT | Mod: 25,S$GLB,,

## 2019-05-16 PROCEDURE — 99999 PR PBB SHADOW E&M-EST. PATIENT-LVL III: CPT | Mod: PBBFAC,,,

## 2019-05-16 PROCEDURE — 11057 PARNG/CUTG B9 HYPRKR LES >4: CPT | Mod: Q9,S$GLB,,

## 2019-05-16 PROCEDURE — 99214 PR OFFICE/OUTPT VISIT, EST, LEVL IV, 30-39 MIN: ICD-10-PCS | Mod: 25,S$GLB,,

## 2019-05-16 PROCEDURE — 3074F SYST BP LT 130 MM HG: CPT | Mod: CPTII,S$GLB,,

## 2019-05-16 PROCEDURE — 1101F PR PT FALLS ASSESS DOC 0-1 FALLS W/OUT INJ PAST YR: ICD-10-PCS | Mod: CPTII,S$GLB,,

## 2019-05-16 PROCEDURE — 1101F PT FALLS ASSESS-DOCD LE1/YR: CPT | Mod: CPTII,S$GLB,,

## 2019-05-16 NOTE — PROGRESS NOTES
Subjective:      Patient ID: Randy Camejo is a 78 y.o. male.    Chief Complaint: Diabetic Foot Exam (Susie Lazo MD 3-8-19 A1c 8.4 4-10-19) and Diabetes Mellitus    Randy is a 78 y.o. male who presents to the clinic for evaluation and treatment of high risk feet. 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 The patient's chief complaint is long, thick toenails. This patient has documented high risk feet requiring routine maintenance secondary to peripheral vascular disease.    PCP: Susie Lazo MD        Current shoe gear:  Affected Foot: sketchers     Unaffected Foot: sketchers    Hemoglobin A1C   Date Value Ref Range Status   04/10/2019 8.4 (H) 4.0 - 5.6 % Final     Comment:     ADA Screening Guidelines:  5.7-6.4%  Consistent with prediabetes  >or=6.5%  Consistent with diabetes  High levels of fetal hemoglobin interfere with the HbA1C  assay. Heterozygous hemoglobin variants (HbS, HgC, etc)do  not significantly interfere with this assay.   However, presence of multiple variants may affect accuracy.     02/19/2019 12.6 (H) 4.0 - 5.6 % Final     Comment:     ADA Screening Guidelines:  5.7-6.4%  Consistent with prediabetes  >or=6.5%  Consistent with diabetes  High levels of fetal hemoglobin interfere with the HbA1C  assay. Heterozygous hemoglobin variants (HbS, HgC, etc)do  not significantly interfere with this assay.   However, presence of multiple variants may affect accuracy.         Past Medical History:   Diagnosis Date    Asthma     childhood    Atrial  fibrillation     CHF (congestive heart failure)     Chronic anticoagulation 2/25/2019    Coronary artery disease     Coronary artery disease 2/25/2019    Diabetes mellitus, type 2     Heart attack     stent    High risk medication use 3/22/2019    2/19/2019: Began amiodarone.    History of coronary artery stent placement 3/22/2019    2000: [facility]: MI and stent.    History of myocardial infarction 3/22/2019    2000: [facility]: MI and stent.    Hypercholesterolemia 3/22/2019    Hypertension 3/22/2019    Tachycardia induced cardiomyopathy 2/25/2019       Past Surgical History:   Procedure Laterality Date    CARDIAC CATHETERIZATION      CARDIOVERSION N/A 2/21/2019    [facility]    CARDIOVERSION OR DEFIBRILLATION N/A 2/25/2019    [facility]    CATARACT EXTRACTION W/  INTRAOCULAR LENS IMPLANT Right 12/18/2017    Dr. Beavers    CATARACT EXTRACTION W/  INTRAOCULAR LENS IMPLANT Left 01/08/2018    Dr. Beavers    ECHOCARDIOGRAM,TRANSESOPHAGEAL N/A 2/25/2019    [facility] CATH [facility]    ECHOCARDIOGRAM,TRANSESOPHAGEAL N/A 2/21/2019    [facility] CATH [facility]    INSERTION-INTRAOCULAR LENS (IOL) Left 1/8/2018    Performed by Milo Beavers [facility]    INSERTION-INTRAOCULAR LENS (IOL) Right 12/18/2017    [facility]    PHACOEMULSIFICATION-ASPIRATION-CATARACT Left 1/8/2018    [facility]    PHACOEMULSIFICATION-ASPIRATION-CATARACT Right 12/18/2017    [facility]    TONSILLECTOMY         Family History   Problem Relation Age of Onset    Cataracts Father     Cancer Father         stomach    Stroke Mother     Diabetes Mother     Amblyopia Neg Hx     Blindness Neg Hx     Glaucoma Neg Hx     Macular degeneration Neg Hx     Retinal detachment Neg Hx     Strabismus Neg Hx        Social History      Socioeconomic History    Marital status:      Spouse name: Not on file    Number of children: 3    Years of education: Not on file    Highest education level: Not on file   Occupational History    Occupation: Teacher     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   Social Needs    Financial resource strain: Not on file    Food insecurity:     Worry: Not on file     Inability: Not on file    Transportation needs:     Medical: Not on file     Non-medical: Not on file   Tobacco Use    Smoking status: Never Smoker    Smokeless tobacco: Never Used   Substance and Sexual Activity    Alcohol use: No    Drug use: No    Sexual activity: Not on file   Lifestyle    Physical activity:     Days per week: Not on file     Minutes per session: Not on file    Stress: Not on file   Relationships    Social connections:     Talks on phone: Not on file     Gets together: Not on file     Attends Buddhist service: Not on file     Active member of club or organization: Not on file     Attends meetings of clubs or organizations: Not on file     Relationship status: Not on file   Other Topics Concern    Not on file   Social History Narrative    Not on file       Current Outpatient Medications   Medication Sig Dispense Refill    amiodarone (PACERONE) 200 MG Tab Take 1 tablet (200 mg total) by mouth once daily. 90 tablet 3    apixaban (ELIQUIS) 5 mg Tab Take 1 tablet (5 mg total) by mouth 2 (two) times daily. 180 tablet 3    atorvastatin (LIPITOR) 20 MG tablet Take 1 tablet (20 mg total) by mouth once daily. 90 tablet 3    blood sugar diagnostic Strp TEST  FOUR TIMES DAILY 200 strip 0    blood sugar diagnostic Strp Use to check blood glucose four times daily, to use with insurance preferred meter 200 strip 0    blood-glucose meter kit test as directed 1 each 0    carvedilol (COREG) 6.25 MG tablet Take 1 tablet (6.25 mg total) by mouth 2 (two) times daily. 180 tablet 3    furosemide (LASIX) 20 MG tablet Take 1 tablet  "(20 mg total) by mouth once daily. 90 tablet 3    insulin aspart U-100 (NOVOLOG) 100 unit/mL InPn pen Inject 3 Units into the skin 3 (three) times daily. 15 mL 0    insulin detemir U-100 (LEVEMIR FLEXTOUCH) 100 unit/mL (3 mL) SubQ InPn pen Inject 15 Units into the skin every evening. 15 mL 0    lancets 30 gauge Misc TEST FOUR TIMES DAILY 200 each 0    lancets 30 gauge Misc test four times daily 200 each 0    levothyroxine (SYNTHROID) 50 MCG tablet Take 1 tablet (50 mcg total) by mouth once daily. 90 tablet 3    losartan (COZAAR) 50 MG tablet Take 1 tablet (50 mg total) by mouth once daily. 90 tablet 3    pen needle, diabetic 31 gauge x 5/16" Ndle use with insulins 400 each 0    acetaminophen (TYLENOL) 500 MG tablet Take 2 tablets (1,000 mg total) by mouth every 6 (six) hours as needed for Pain.      mupirocin (BACTROBAN) 2 % ointment Apply topically 2 (two) times daily. 1 Tube 0     No current facility-administered medications for this visit.        Review of patient's allergies indicates:  No Known Allergies      ROS  ROS:  Constitution: Negative for chills, fever, weakness and malaise/fatigue.   HEENT: Negative for headaches.   Cardiovascular: Negative for chest pain and claudication.   Respiratory: Negative for cough and shortness of breath.   Musculoskeletal: (+) for foot pain.  Negative for muscle cramps and muscle weakness.   Gastrointestinal: Negative for nausea and vomiting.   Neurological:(+) for numbness, tingling and paresthesias.   Dermatological:  - for skin rash, (+) for keratosis, (+) for fungal toenails, (--) for wound.          Objective:      Physical Exam  Constitutional:  Patient is oriented to person, place, and time. Vital signs are normal.  Appears well-developed and well-nourished.     Vascular:  Dorsalis pedis pulses are 1/4 on the right side, and 1/4 on the left side.   Posterior tibial pulses are 1/4 on the right side, and 1/4 on the left side.   (--) digital hair growth, " capillary fill time to all toes <3 seconds, toes are cool touch, trace pedal swelling    Skin/Dermatological:  Skin is warm and intact.  No cyanosis or clubbing.  No rashes noted.  No open wounds.  Right 1, 2, 3, 4, 5 and left 1, 2, 3, 4, 5  toenails yellow discolored, thickened 2-4 mm to base with subungual debris.  (+) keratosis b/l 5th lateral toes, left medial pip7 2nd, right 2,3,4 MTH    Musculoskeletal:      Hallux abducto valgus bilaterally, hammertoes 2-5 observed, left 2nd severely contracted at both mpj, pipj.  Pedal rom within normal limits.  (--) ankle joint DF restriction with both knee flexed and extened.    Neurological:  (+) deficits to sharp/dull, light touch or vibratory sensation bilateral feet, ten points tested.   Muscle strength to tibialis anterior, extensor hallucis longus, extensor digitorum longus, peroneal muscles, flexor hallucis/digotorum longus, posterior tibial and gastrosoleal complex is 5/5, normal tone without assymmetry   Patellar reflexes are 2+ on the right side and 2+ on the left side.  Achilles reflexes are 2+ on the right side and 2+ on the left side.          Assessment:       Encounter Diagnoses   Name Primary?    Type 2 diabetes mellitus with diabetic polyneuropathy, with long-term current use of insulin Yes    Diabetes mellitus with peripheral circulatory disorder     Hallux limitus, unspecified laterality     Hammer toes of both feet     Valgus deformity of both great toes     Corn or callus     Onychomycosis due to dermatophyte          Plan:       Randy was seen today for diabetic foot exam and diabetes mellitus.    Diagnoses and all orders for this visit:    Type 2 diabetes mellitus with diabetic polyneuropathy, with long-term current use of insulin  -     DIABETIC SHOES FOR HOME USE    Diabetes mellitus with peripheral circulatory disorder  -     US Lower Extrem Arteries Bilat with JUAN; Future  -     DIABETIC SHOES FOR HOME USE    Hallux limitus, unspecified  laterality  -     DIABETIC SHOES FOR HOME USE    Hammer toes of both feet  -     DIABETIC SHOES FOR HOME USE    Valgus deformity of both great toes  -     DIABETIC SHOES FOR HOME USE    Corn or callus  -     DIABETIC SHOES FOR HOME USE    Onychomycosis due to dermatophyte  -     DIABETIC SHOES FOR HOME USE      I counseled the patient on his conditions, their implications and medical management.    Shoe inspection. Diabetic Foot Education. Patient reminded of the importance of good nutrition and blood sugar control to help prevent podiatric complications of diabetes. Patient instructed on proper foot hygeine. We discussed wearing proper shoe gear, daily foot inspections, never walking without protective shoe gear, never putting sharp instruments to feet.  We also discussed padding and shoes with high toe boxes for foot deformities.    - With patient's permission, right 1, 2, 3, 4, 5 and left 1, 2, 3, 4, 5 toenails were aggressively reduced and debrided  to their soft tissue attachment mechanically with nail nipper, removing all offending nail and debris. The porokeratotic tissue was pared x 6 with a 15 blade.  Patient relates relief following the procedure. Patient will continue to monitor the areas daily, inspect feet, wear protective shoe gear when ambulatory, moisturizer to maintain skin integrity and follow in this office in approximately 2-3 months, sooner pkathleen.        Jorge Moraes DPM

## 2019-05-23 RX ORDER — INSULIN ASPART 100 [IU]/ML
3 INJECTION, SOLUTION INTRAVENOUS; SUBCUTANEOUS 3 TIMES DAILY
Qty: 15 ML | Refills: 1 | Status: SHIPPED | OUTPATIENT
Start: 2019-05-23 | End: 2020-01-17

## 2019-05-31 ENCOUNTER — TELEPHONE (OUTPATIENT)
Dept: INTERNAL MEDICINE | Facility: CLINIC | Age: 79
End: 2019-05-31

## 2019-05-31 RX ORDER — BLOOD-GLUCOSE CONTROL, NORMAL
EACH MISCELLANEOUS
Qty: 200 EACH | Refills: 4 | Status: SHIPPED | OUTPATIENT
Start: 2019-05-31 | End: 2020-05-28

## 2019-05-31 NOTE — TELEPHONE ENCOUNTER
----- Message from Whit Flaherty sent at 5/31/2019  9:30 AM CDT -----  Contact: Jade, wife [phone]  Pt was discharged from hospital with diabetes medicine/ 3 months supply. Pt needs a new rx for test strips and lancets for True Metrix. Pt is testing 4 times daily. Please order this from [facility] [phone] . Please order this so patient can pick it up today. Pharmacy is closed on the weekend. Please call pt to confirm.

## 2019-06-03 RX ORDER — PEN NEEDLE, DIABETIC 30 GX3/16"
NEEDLE, DISPOSABLE MISCELLANEOUS
Qty: 400 EACH | Refills: 1 | Status: SHIPPED | OUTPATIENT
Start: 2019-06-03 | End: 2019-07-02

## 2019-06-03 NOTE — TELEPHONE ENCOUNTER
"----- Message from Ian Pérez sent at 6/3/2019 12:35 PM CDT -----  Contact: Wife guy   Patient is calling for an RX refill or new RX.  Is this a refill or new RX:    RX name and strength:pen needle, diabetic 31 gauge x 5/16" Ndle 400 each    Directions (copy/paste from chart):    Is this a 30 day or 90 day RX:    Local pharmacy or mail order pharmacy: [facility]   Pharmacy name and phone # (copy/paste from chart):     Comments:        "

## 2019-06-03 NOTE — TELEPHONE ENCOUNTER
"----- Message from Ian Pérez sent at 6/3/2019 12:35 PM CDT -----  Contact: Wife guy   Patient is calling for an RX refill or new RX.  Is this a refill or new RX:    RX name and strength:pen needle, diabetic 31 gauge x 5/16" Ndle 400 each    Directions (copy/paste from chart):    Is this a 30 day or 90 day RX:    Local pharmacy or mail order pharmacy: [facility]   Pharmacy name and phone # (copy/paste from chart):     Comments:        "

## 2019-06-03 NOTE — TELEPHONE ENCOUNTER
"RX name and strength:pen needle, diabetic 31 gauge x 5/16" Ndle 400 each     Directions (copy/paste from chart):     Is this a 30 day or 90 day RX:     Local pharmacy or mail order pharmacy: [facility]   Pharmacy name and phone # (copy/paste from chart):     "

## 2019-06-05 ENCOUNTER — OFFICE VISIT (OUTPATIENT)
Dept: INTERNAL MEDICINE | Facility: CLINIC | Age: 79
End: 2019-06-05
Payer: MEDICARE

## 2019-06-05 ENCOUNTER — LAB VISIT (OUTPATIENT)
Dept: LAB | Facility: HOSPITAL | Age: 79
End: 2019-06-05
Attending: HOSPITALIST
Payer: MEDICARE

## 2019-06-05 VITALS
SYSTOLIC BLOOD PRESSURE: 88 MMHG | RESPIRATION RATE: 16 BRPM | TEMPERATURE: 98 F | HEIGHT: 72 IN | DIASTOLIC BLOOD PRESSURE: 68 MMHG | HEART RATE: 64 BPM | BODY MASS INDEX: 23.56 KG/M2 | WEIGHT: 173.94 LBS

## 2019-06-05 DIAGNOSIS — I48.0 PAROXYSMAL ATRIAL FIBRILLATION: ICD-10-CM

## 2019-06-05 DIAGNOSIS — E03.8 SUBCLINICAL HYPOTHYROIDISM: ICD-10-CM

## 2019-06-05 DIAGNOSIS — I10 ESSENTIAL HYPERTENSION: ICD-10-CM

## 2019-06-05 DIAGNOSIS — E11.42 TYPE 2 DIABETES MELLITUS WITH DIABETIC POLYNEUROPATHY, WITH LONG-TERM CURRENT USE OF INSULIN: ICD-10-CM

## 2019-06-05 DIAGNOSIS — I50.22 HEART FAILURE, SYSTOLIC, CHRONIC: ICD-10-CM

## 2019-06-05 DIAGNOSIS — Z79.4 TYPE 2 DIABETES MELLITUS WITH DIABETIC POLYNEUROPATHY, WITH LONG-TERM CURRENT USE OF INSULIN: Primary | ICD-10-CM

## 2019-06-05 DIAGNOSIS — I25.10 CORONARY ARTERY DISEASE INVOLVING NATIVE CORONARY ARTERY OF NATIVE HEART WITHOUT ANGINA PECTORIS: ICD-10-CM

## 2019-06-05 DIAGNOSIS — Z79.4 TYPE 2 DIABETES MELLITUS WITH DIABETIC POLYNEUROPATHY, WITH LONG-TERM CURRENT USE OF INSULIN: ICD-10-CM

## 2019-06-05 DIAGNOSIS — E11.42 TYPE 2 DIABETES MELLITUS WITH DIABETIC POLYNEUROPATHY, WITH LONG-TERM CURRENT USE OF INSULIN: Primary | ICD-10-CM

## 2019-06-05 LAB
ANION GAP SERPL CALC-SCNC: 6 MMOL/L (ref 8–16)
BUN SERPL-MCNC: 27 MG/DL (ref 8–23)
CALCIUM SERPL-MCNC: 9.1 MG/DL (ref 8.7–10.5)
CHLORIDE SERPL-SCNC: 110 MMOL/L (ref 95–110)
CO2 SERPL-SCNC: 26 MMOL/L (ref 23–29)
CREAT SERPL-MCNC: 1.4 MG/DL (ref 0.5–1.4)
EST. GFR  (AFRICAN AMERICAN): 55.6 ML/MIN/1.73 M^2
EST. GFR  (NON AFRICAN AMERICAN): 48.1 ML/MIN/1.73 M^2
ESTIMATED AVG GLUCOSE: 134 MG/DL (ref 68–131)
GLUCOSE SERPL-MCNC: 132 MG/DL (ref 70–110)
HBA1C MFR BLD HPLC: 6.3 % (ref 4–5.6)
POTASSIUM SERPL-SCNC: 4.9 MMOL/L (ref 3.5–5.1)
SODIUM SERPL-SCNC: 142 MMOL/L (ref 136–145)
TSH SERPL DL<=0.005 MIU/L-ACNC: 2.91 UIU/ML (ref 0.4–4)

## 2019-06-05 PROCEDURE — 80048 BASIC METABOLIC PNL TOTAL CA: CPT

## 2019-06-05 PROCEDURE — 3078F PR MOST RECENT DIASTOLIC BLOOD PRESSURE < 80 MM HG: ICD-10-PCS | Mod: CPTII,S$GLB,, | Performed by: HOSPITALIST

## 2019-06-05 PROCEDURE — 3078F DIAST BP <80 MM HG: CPT | Mod: CPTII,S$GLB,, | Performed by: HOSPITALIST

## 2019-06-05 PROCEDURE — 99214 PR OFFICE/OUTPT VISIT, EST, LEVL IV, 30-39 MIN: ICD-10-PCS | Mod: S$GLB,,, | Performed by: HOSPITALIST

## 2019-06-05 PROCEDURE — 1101F PR PT FALLS ASSESS DOC 0-1 FALLS W/OUT INJ PAST YR: ICD-10-PCS | Mod: CPTII,S$GLB,, | Performed by: HOSPITALIST

## 2019-06-05 PROCEDURE — 36415 COLL VENOUS BLD VENIPUNCTURE: CPT | Mod: PO

## 2019-06-05 PROCEDURE — 1101F PT FALLS ASSESS-DOCD LE1/YR: CPT | Mod: CPTII,S$GLB,, | Performed by: HOSPITALIST

## 2019-06-05 PROCEDURE — 99999 PR PBB SHADOW E&M-EST. PATIENT-LVL IV: CPT | Mod: PBBFAC,,, | Performed by: HOSPITALIST

## 2019-06-05 PROCEDURE — 83036 HEMOGLOBIN GLYCOSYLATED A1C: CPT

## 2019-06-05 PROCEDURE — 99214 OFFICE O/P EST MOD 30 MIN: CPT | Mod: S$GLB,,, | Performed by: HOSPITALIST

## 2019-06-05 PROCEDURE — 3074F PR MOST RECENT SYSTOLIC BLOOD PRESSURE < 130 MM HG: ICD-10-PCS | Mod: CPTII,S$GLB,, | Performed by: HOSPITALIST

## 2019-06-05 PROCEDURE — 3074F SYST BP LT 130 MM HG: CPT | Mod: CPTII,S$GLB,, | Performed by: HOSPITALIST

## 2019-06-05 PROCEDURE — 99999 PR PBB SHADOW E&M-EST. PATIENT-LVL IV: ICD-10-PCS | Mod: PBBFAC,,, | Performed by: HOSPITALIST

## 2019-06-05 PROCEDURE — 84443 ASSAY THYROID STIM HORMONE: CPT

## 2019-06-05 NOTE — PROGRESS NOTES
"Subjective:     @Patient ID: Randy Camejo is a 78 y.o. male.    Chief Complaint: No chief complaint on file.    HPI    79 yo male presents for f/u of chronic conditions. Pt had hospitalization 2/2019 and dx with diabetes, Afib and CHF. He has since seen cardiology. Last echo showed improvement of EF 35-40% from EF 15%. Started on synthroid as TSH increased again. Pt reports he feels unsteady and off-balance for the past few months. Denies dizziness. He does have neuropathy and reports he is careful with ambulation. He reports he is eating more and he has gained 6 lb since last visit. He reports compliance with his medications but states he would like to stop novolog in the future as he is tired of frequent insulin injections. He remains active with 2 part-time jobs.     Review of Systems   Constitutional: Negative for chills and fever.   HENT: Negative for congestion and sore throat.    Eyes: Negative for pain and visual disturbance.   Respiratory: Negative for cough and shortness of breath.    Cardiovascular: Negative for chest pain and leg swelling.   Gastrointestinal: Negative for abdominal pain, nausea and vomiting.   Endocrine: Negative for polydipsia and polyuria.   Genitourinary: Negative for difficulty urinating and dysuria.   Musculoskeletal: Negative for arthralgias and back pain.   Skin: Negative for rash and wound.   Neurological: Positive for weakness. Negative for headaches.   Psychiatric/Behavioral: Negative for agitation and confusion.     Past medical history, surgical history, and family medical history reviewed and updated as appropriate.    Medications and allergies reviewed.     Objective:     Vitals:    06/05/19 1106   BP: (!) 88/68   BP Location: Right arm   Patient Position: Sitting   BP Method: Medium (Manual)   Pulse: 64   Resp: 16   Temp: 98.2 °F (36.8 °C)   TempSrc: Oral   Weight: 78.9 kg (173 lb 15.1 oz)   Height: 5' 11.5" (1.816 m)     Body mass index is 23.92 kg/m².  Physical Exam "   Constitutional: He is oriented to person, place, and time. He appears well-developed and well-nourished. No distress.   HENT:   Head: Normocephalic and atraumatic.   Mouth/Throat: Oropharynx is clear and moist. No oropharyngeal exudate.   Eyes: Conjunctivae are normal. Right eye exhibits no discharge. Left eye exhibits no discharge.   Neck: Normal range of motion. Neck supple.   Cardiovascular: Normal rate and regular rhythm. Exam reveals no friction rub.   No murmur heard.  Pulmonary/Chest: Effort normal and breath sounds normal.   Abdominal: Soft. Bowel sounds are normal. He exhibits no distension. There is no tenderness.   Musculoskeletal: Normal range of motion. He exhibits no edema.   Lymphadenopathy:     He has no cervical adenopathy.   Neurological: He is alert and oriented to person, place, and time.   Skin: Skin is warm and dry.   Psychiatric: He has a normal mood and affect. His behavior is normal.   Vitals reviewed.      Lab Results   Component Value Date    WBC 5.94 03/08/2019    HGB 13.8 (L) 03/08/2019    HCT 42.9 03/08/2019     03/08/2019    CHOL 95 (L) 04/10/2019    TRIG 50 04/10/2019    HDL 28 (L) 04/10/2019    ALT 15 03/08/2019    AST 19 03/08/2019     04/10/2019    K 4.2 04/10/2019     04/10/2019    CREATININE 1.2 04/10/2019    BUN 17 04/10/2019    CO2 28 04/10/2019    TSH 8.176 (H) 04/10/2019    INR 1.2 02/20/2019    HGBA1C 8.4 (H) 04/10/2019       Assessment:     1. Type 2 diabetes mellitus with diabetic polyneuropathy, with long-term current use of insulin    2. Paroxysmal atrial fibrillation    3. Heart failure, systolic, chronic    4. Essential hypertension    5. Coronary artery disease involving native coronary artery of native heart without angina pectoris    6. Subclinical hypothyroidism      Plan:   Randy was seen today for follow-up.    Diagnoses and all orders for this visit:    Type 2 diabetes mellitus with diabetic polyneuropathy, with long-term current use of  insulin  - Pt to make eye appt today for diabetic eye exam. Pt declines shingles, pneumonia and tetanus shot today. Would like to think on it first.   - Pt would like to discontinue novolog in future if possible. Will consider starting oral medication at next visit and possibly still remaining on levemir    - Likely neuropathy causing imbalance. Pt declines PT at this time  -     Basic metabolic panel; Future  -     HEMOGLOBIN A1C; Future    Paroxysmal atrial fibrillation       - Rate controlled. Continue coreg and eliquis    Heart failure, systolic, chronic        - EF improved per cardiology note 4/2019.  Continue medications per cards    Essential hypertension        - BP on low side. Pt otherwise asx    Coronary artery disease involving native coronary artery of native heart without angina pectoris        - Continue home meds asa, lipitor, coreg, losartan     Subclinical hypothyroidism  -     TSH; Future. Currently on synthroid       Follow up in about 3 months (around 9/5/2019), or if symptoms worsen or fail to improve.    Susie Lazo MD  Internal Medicine    6/5/2019

## 2019-06-06 ENCOUNTER — TELEPHONE (OUTPATIENT)
Dept: INTERNAL MEDICINE | Facility: CLINIC | Age: 79
End: 2019-06-06

## 2019-06-06 PROBLEM — N18.30 STAGE 3 CHRONIC KIDNEY DISEASE: Status: ACTIVE | Noted: 2019-06-06

## 2019-06-06 NOTE — TELEPHONE ENCOUNTER
----- Message from Susie Lazo MD sent at 6/6/2019  8:18 AM CDT -----  Please notify pt:  - A1c is much improved at 6.3. Diabetes is controlled  - Kidney function shows chronic kidney disease. Will continue to monitor periodically  - Thyroid level is normal  - Electrolytes are normal/acceptable range

## 2019-06-14 ENCOUNTER — OFFICE VISIT (OUTPATIENT)
Dept: OPTOMETRY | Facility: CLINIC | Age: 79
End: 2019-06-14
Payer: MEDICARE

## 2019-06-14 DIAGNOSIS — E11.9 TYPE 2 DIABETES MELLITUS WITHOUT RETINOPATHY: Primary | ICD-10-CM

## 2019-06-14 DIAGNOSIS — Z13.5 GLAUCOMA SCREENING: ICD-10-CM

## 2019-06-14 DIAGNOSIS — H52.4 PRESBYOPIA: ICD-10-CM

## 2019-06-14 PROCEDURE — 99999 PR PBB SHADOW E&M-EST. PATIENT-LVL II: CPT | Mod: PBBFAC,,, | Performed by: OPTOMETRIST

## 2019-06-14 PROCEDURE — 92014 PR EYE EXAM, EST PATIENT,COMPREHESV: ICD-10-PCS | Mod: S$GLB,,, | Performed by: OPTOMETRIST

## 2019-06-14 PROCEDURE — 92014 COMPRE OPH EXAM EST PT 1/>: CPT | Mod: S$GLB,,, | Performed by: OPTOMETRIST

## 2019-06-14 PROCEDURE — 99999 PR PBB SHADOW E&M-EST. PATIENT-LVL II: ICD-10-PCS | Mod: PBBFAC,,, | Performed by: OPTOMETRIST

## 2019-06-14 PROCEDURE — 92015 DETERMINE REFRACTIVE STATE: CPT | Mod: S$GLB,,, | Performed by: OPTOMETRIST

## 2019-06-14 PROCEDURE — 92015 PR REFRACTION: ICD-10-PCS | Mod: S$GLB,,, | Performed by: OPTOMETRIST

## 2019-06-14 NOTE — PROGRESS NOTES
HPI     DLS:02/09/18  Patient states he needs reading glasses since his cataract sx. He uses OTC   readers, but strength unknown. He reports clear distance vision.  No flashes, floaters, diplopia  No eye pain  No eye drops  DM-  Hemoglobin A1C       Date                     Value               Ref Range             Status                06/05/2019               6.3 (H)             4.0 - 5.6 %           Final                 04/10/2019               8.4 (H)             4.0 - 5.6 %           Final         02/19/2019               12.6 (H)            4.0 - 5.6 %           Final                        Last edited by Vega Kong, OD on 6/14/2019  4:10 PM. (History)        ROS     Positive for: Endocrine (DM), Eyes (cat surgery)    Negative for: Constitutional, Gastrointestinal, Neurological, Skin,   Genitourinary, Musculoskeletal, HENT, Cardiovascular, Respiratory,   Psychiatric, Allergic/Imm, Heme/Lymph    Last edited by Vega Kong, OD on 6/14/2019  4:10 PM. (History)        Assessment /Plan     For exam results, see Encounter Report.    Type 2 diabetes mellitus without retinopathy    Glaucoma screening    Presbyopia      1. Mild pco sp pciol OU--pt happy w otc readers  2. DM- WITHOUT RETINOPATHY.  Advised yearly DFE  3. 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 ) with resultant K scarring OU    PLAN:    rtc 1 yr

## 2019-06-23 ENCOUNTER — OFFICE VISIT (OUTPATIENT)
Dept: URGENT CARE | Facility: CLINIC | Age: 79
End: 2019-06-23
Payer: MEDICARE

## 2019-06-23 VITALS
RESPIRATION RATE: 20 BRPM | TEMPERATURE: 98 F | BODY MASS INDEX: 23.43 KG/M2 | WEIGHT: 173 LBS | SYSTOLIC BLOOD PRESSURE: 109 MMHG | DIASTOLIC BLOOD PRESSURE: 63 MMHG | HEIGHT: 72 IN | OXYGEN SATURATION: 98 % | HEART RATE: 59 BPM

## 2019-06-23 DIAGNOSIS — E11.628 CELLULITIS IN DIABETIC FOOT: Primary | ICD-10-CM

## 2019-06-23 DIAGNOSIS — L03.119 CELLULITIS IN DIABETIC FOOT: Primary | ICD-10-CM

## 2019-06-23 PROCEDURE — 1101F PR PT FALLS ASSESS DOC 0-1 FALLS W/OUT INJ PAST YR: ICD-10-PCS | Mod: CPTII,S$GLB,, | Performed by: SURGERY

## 2019-06-23 PROCEDURE — 3074F PR MOST RECENT SYSTOLIC BLOOD PRESSURE < 130 MM HG: ICD-10-PCS | Mod: CPTII,S$GLB,, | Performed by: SURGERY

## 2019-06-23 PROCEDURE — 1101F PT FALLS ASSESS-DOCD LE1/YR: CPT | Mod: CPTII,S$GLB,, | Performed by: SURGERY

## 2019-06-23 PROCEDURE — 3078F PR MOST RECENT DIASTOLIC BLOOD PRESSURE < 80 MM HG: ICD-10-PCS | Mod: CPTII,S$GLB,, | Performed by: SURGERY

## 2019-06-23 PROCEDURE — 3078F DIAST BP <80 MM HG: CPT | Mod: CPTII,S$GLB,, | Performed by: SURGERY

## 2019-06-23 PROCEDURE — 99214 OFFICE O/P EST MOD 30 MIN: CPT | Mod: S$GLB,,, | Performed by: SURGERY

## 2019-06-23 PROCEDURE — 99214 PR OFFICE/OUTPT VISIT, EST, LEVL IV, 30-39 MIN: ICD-10-PCS | Mod: S$GLB,,, | Performed by: SURGERY

## 2019-06-23 PROCEDURE — 3074F SYST BP LT 130 MM HG: CPT | Mod: CPTII,S$GLB,, | Performed by: SURGERY

## 2019-06-23 RX ORDER — MUPIROCIN 20 MG/G
OINTMENT TOPICAL DAILY
Qty: 1 TUBE | Refills: 0 | Status: ON HOLD | OUTPATIENT
Start: 2019-06-23 | End: 2019-08-29

## 2019-06-23 RX ORDER — CEPHALEXIN 500 MG/1
500 CAPSULE ORAL 4 TIMES DAILY
Qty: 28 CAPSULE | Refills: 0 | Status: SHIPPED | OUTPATIENT
Start: 2019-06-23 | End: 2019-06-27

## 2019-06-23 NOTE — PATIENT INSTRUCTIONS
Discharge Instructions for Cellulitis  You have been diagnosed with cellulitis. This is an infection in the deepest layer of the skin. In some cases, the infection also affects the muscle. Cellulitis is caused by bacteria. The bacteria can enter the body through broken skin. This can happen with a cut, scratch, animal bite, or an insect bite that has been scratched. You may have been treated in the hospital with antibiotics and fluids. You will likely be given a prescription for antibiotics to take at home. This sheet will help you take care of yourself at home.  Home care  When you are home:  · Take the prescribed antibiotic medicine you are given as directed until it is gone. Take it even if you feel better. It treats the infection and stops it from returning. Not taking all the medicine can make future infections hard to treat.  · Keep the infected area clean.  · When possible, raise the infected area above the level of your heart. This helps keep swelling down.  · Talk with your healthcare provider if you are in pain. Ask what kind of over-the-counter medicine you can take for pain.  · Apply clean bandages as advised.  · Take your temperature once a day for a week.  · Wash your hands often to prevent spreading the infection.  In the future, wash your hands before and after you touch cuts, scratches, or bandages. This will help prevent infection.   When to call your healthcare provider  Call your healthcare provider immediately if you have any of the following:  · Difficulty or pain when moving the joints above or below the infected area  · Discharge or pus draining from the area  · Fever of 100.4°F (38°C) or higher, or as directed by your healthcare provider  · Pain that gets worse in or around the infected   · Redness that gets worse in or around the infected area, particularly if the area of redness expands to a wider area  · Shaking chills  · Swelling of the infected area  · Vomiting   Date Last Reviewed:  8/1/2016  © 0992-2075 [employer]. [address] [place] [address], [place] [address]. All rights reserved. This information is not intended as a substitute for professional medical care. Always follow your healthcare professional's instructions.

## 2019-06-23 NOTE — PROGRESS NOTES
"Subjective:       Patient ID: Randy Camejo is a 78 y.o. male.    Vitals:  height is 5' 11.5" (1.816 m) and weight is 78.5 kg (173 lb). His temperature is 98 °F (36.7 °C). His blood pressure is 109/63 and his pulse is 59 (abnormal). His respiration is 20 and oxygen saturation is 98%.     Chief Complaint: Wound Infection    Pt states for three days he has been having a wound on his right little toe that has become infected. He has some swelling in his foot , mild pain and redness. He used a topical ointment that was prescribed  him a while ago.    He worked and stood on it all day. He has been soaking it in water.     Toe Pain    The incident occurred 3 to 5 days ago. There was no injury mechanism. The pain is present in the right toes. The pain is mild.       Constitution: Negative for chills, fatigue and fever.   HENT: Negative for congestion and sore throat.    Neck: Negative for painful lymph nodes.   Cardiovascular: Negative for chest pain and leg swelling.   Eyes: Negative for double vision and blurred vision.   Respiratory: Negative for cough and shortness of breath.    Gastrointestinal: Negative for abdominal pain, nausea, vomiting and diarrhea.   Genitourinary: Negative for dysuria, frequency, urgency, urine decreased, hematuria, history of kidney stones, genital trauma, painful intercourse, genital sore, penile discharge, painful ejaculation, penile pain, penile swelling, scrotal swelling and testicular pain.   Musculoskeletal: Negative for joint pain, joint swelling, back pain, muscle cramps and muscle ache.   Skin: Negative for color change, pale, rash and lesion.   Allergic/Immunologic: Negative for seasonal allergies.   Neurological: Negative for dizziness, history of vertigo, light-headedness, passing out and headaches.   Hematologic/Lymphatic: Negative for swollen lymph nodes, easy bruising/bleeding and history of blood clots. Does not bruise/bleed easily.   Psychiatric/Behavioral: Negative for " nervous/anxious, sleep disturbance and depression. The patient is not nervous/anxious.        Objective:      Physical Exam   Constitutional: He is oriented to person, place, and time. He appears well-developed and well-nourished. He is cooperative.  Non-toxic appearance. He does not appear ill. No distress.   HENT:   Head: Normocephalic and atraumatic.   Right Ear: Hearing, tympanic membrane, external ear and ear canal normal.   Left Ear: Hearing, tympanic membrane, external ear and ear canal normal.   Nose: Nose normal. No mucosal edema, rhinorrhea or nasal deformity. No epistaxis. Right sinus exhibits no maxillary sinus tenderness and no frontal sinus tenderness. Left sinus exhibits no maxillary sinus tenderness and no frontal sinus tenderness.   Mouth/Throat: Uvula is midline, oropharynx is clear and moist and mucous membranes are normal. No trismus in the jaw. Normal dentition. No uvula swelling. No posterior oropharyngeal erythema.   Eyes: Conjunctivae and lids are normal. Right eye exhibits no discharge. Left eye exhibits no discharge. No scleral icterus.   Sclera clear bilat   Neck: Trachea normal, normal range of motion, full passive range of motion without pain and phonation normal. Neck supple.   Cardiovascular: Normal rate, regular rhythm, normal heart sounds, intact distal pulses and normal pulses.   Pulmonary/Chest: Effort normal and breath sounds normal. No respiratory distress.   Abdominal: Soft. Normal appearance and bowel sounds are normal. He exhibits no distension, no pulsatile midline mass and no mass. There is no tenderness.   Musculoskeletal: He exhibits no edema or deformity.        Right foot: There is decreased range of motion, tenderness and swelling.        Feet:    Neurological: He is alert and oriented to person, place, and time. He exhibits normal muscle tone. Coordination normal.   Skin: Skin is warm, dry and intact. He is not diaphoretic. No pallor.   Psychiatric: He has a normal  mood and affect. His speech is normal and behavior is normal. Judgment and thought content normal. Cognition and memory are normal.   Nursing note and vitals reviewed.      Assessment:       1. Cellulitis in diabetic foot        Plan:         Cellulitis in diabetic foot  -     cephALEXin (KEFLEX) 500 MG capsule; Take 1 capsule (500 mg total) by mouth 4 (four) times daily. for 7 days  Dispense: 28 capsule; Refill: 0  -     mupirocin (BACTROBAN) 2 % ointment; Apply topically once daily.  Dispense: 1 Tube; Refill: 0  -     Ambulatory referral to Wound Clinic    Wound cleaned with betadyne and dressed with dry gauze.  Will follow up with primary and wound care    Patient Instructions       Discharge Instructions for Cellulitis  You have been diagnosed with cellulitis. This is an infection in the deepest layer of the skin. In some cases, the infection also affects the muscle. Cellulitis is caused by bacteria. The bacteria can enter the body through broken skin. This can happen with a cut, scratch, animal bite, or an insect bite that has been scratched. You may have been treated in the hospital with antibiotics and fluids. You will likely be given a prescription for antibiotics to take at home. This sheet will help you take care of yourself at home.  Home care  When you are home:  · Take the prescribed antibiotic medicine you are given as directed until it is gone. Take it even if you feel better. It treats the infection and stops it from returning. Not taking all the medicine can make future infections hard to treat.  · Keep the infected area clean.  · When possible, raise the infected area above the level of your heart. This helps keep swelling down.  · Talk with your healthcare provider if you are in pain. Ask what kind of over-the-counter medicine you can take for pain.  · Apply clean bandages as advised.  · Take your temperature once a day for a week.  · Wash your hands often to prevent spreading the infection.  In  the future, wash your hands before and after you touch cuts, scratches, or bandages. This will help prevent infection.   When to call your healthcare provider  Call your healthcare provider immediately if you have any of the following:  · Difficulty or pain when moving the joints above or below the infected area  · Discharge or pus draining from the area  · Fever of 100.4°F (38°C) or higher, or as directed by your healthcare provider  · Pain that gets worse in or around the infected   · Redness that gets worse in or around the infected area, particularly if the area of redness expands to a wider area  · Shaking chills  · Swelling of the infected area  · Vomiting   Date Last Reviewed: 8/1/2016  © 2865-5681 [employer]. [address] [place] [address], [place] [address]. All rights reserved. This information is not intended as a substitute for professional medical care. Always follow your healthcare professional's instructions.

## 2019-06-24 ENCOUNTER — TELEPHONE (OUTPATIENT)
Dept: PODIATRY | Facility: CLINIC | Age: 79
End: 2019-06-24

## 2019-06-24 NOTE — TELEPHONE ENCOUNTER
Left voicemail informing patient that an appointment was scheduled for patient regarding referral for an office visit for Cellulitis.

## 2019-06-27 ENCOUNTER — OFFICE VISIT (OUTPATIENT)
Dept: URGENT CARE | Facility: CLINIC | Age: 79
End: 2019-06-27
Payer: MEDICARE

## 2019-06-27 VITALS
SYSTOLIC BLOOD PRESSURE: 99 MMHG | BODY MASS INDEX: 23.43 KG/M2 | RESPIRATION RATE: 16 BRPM | TEMPERATURE: 98 F | HEIGHT: 72 IN | OXYGEN SATURATION: 97 % | DIASTOLIC BLOOD PRESSURE: 55 MMHG | WEIGHT: 173 LBS | HEART RATE: 57 BPM

## 2019-06-27 DIAGNOSIS — L97.519 DIABETIC ULCER OF TOE OF RIGHT FOOT ASSOCIATED WITH TYPE 2 DIABETES MELLITUS, UNSPECIFIED ULCER STAGE: Primary | ICD-10-CM

## 2019-06-27 DIAGNOSIS — L03.119 CELLULITIS IN DIABETIC FOOT: ICD-10-CM

## 2019-06-27 DIAGNOSIS — E11.628 CELLULITIS IN DIABETIC FOOT: ICD-10-CM

## 2019-06-27 DIAGNOSIS — E11.621 DIABETIC ULCER OF TOE OF RIGHT FOOT ASSOCIATED WITH TYPE 2 DIABETES MELLITUS, UNSPECIFIED ULCER STAGE: Primary | ICD-10-CM

## 2019-06-27 DIAGNOSIS — G57.90 NEUROPATHY OF FOOT, UNSPECIFIED LATERALITY: ICD-10-CM

## 2019-06-27 PROCEDURE — 3078F PR MOST RECENT DIASTOLIC BLOOD PRESSURE < 80 MM HG: ICD-10-PCS | Mod: CPTII,S$GLB,, | Performed by: NURSE PRACTITIONER

## 2019-06-27 PROCEDURE — 1101F PR PT FALLS ASSESS DOC 0-1 FALLS W/OUT INJ PAST YR: ICD-10-PCS | Mod: CPTII,S$GLB,, | Performed by: NURSE PRACTITIONER

## 2019-06-27 PROCEDURE — 3078F DIAST BP <80 MM HG: CPT | Mod: CPTII,S$GLB,, | Performed by: NURSE PRACTITIONER

## 2019-06-27 PROCEDURE — 99214 PR OFFICE/OUTPT VISIT, EST, LEVL IV, 30-39 MIN: ICD-10-PCS | Mod: S$GLB,,, | Performed by: NURSE PRACTITIONER

## 2019-06-27 PROCEDURE — 1101F PT FALLS ASSESS-DOCD LE1/YR: CPT | Mod: CPTII,S$GLB,, | Performed by: NURSE PRACTITIONER

## 2019-06-27 PROCEDURE — 99214 OFFICE O/P EST MOD 30 MIN: CPT | Mod: S$GLB,,, | Performed by: NURSE PRACTITIONER

## 2019-06-27 PROCEDURE — 3074F PR MOST RECENT SYSTOLIC BLOOD PRESSURE < 130 MM HG: ICD-10-PCS | Mod: CPTII,S$GLB,, | Performed by: NURSE PRACTITIONER

## 2019-06-27 PROCEDURE — 3074F SYST BP LT 130 MM HG: CPT | Mod: CPTII,S$GLB,, | Performed by: NURSE PRACTITIONER

## 2019-06-27 RX ORDER — CLINDAMYCIN PHOSPHATE 150 MG/ML
600 INJECTION, SOLUTION INTRAVENOUS
Status: COMPLETED | OUTPATIENT
Start: 2019-06-27 | End: 2019-06-27

## 2019-06-27 RX ORDER — CLINDAMYCIN HYDROCHLORIDE 300 MG/1
300 CAPSULE ORAL 3 TIMES DAILY
Qty: 21 CAPSULE | Refills: 0 | Status: SHIPPED | OUTPATIENT
Start: 2019-06-27 | End: 2019-06-27

## 2019-06-27 RX ORDER — SULFAMETHOXAZOLE AND TRIMETHOPRIM 800; 160 MG/1; MG/1
1 TABLET ORAL 2 TIMES DAILY
Qty: 14 TABLET | Refills: 0 | Status: SHIPPED | OUTPATIENT
Start: 2019-06-27 | End: 2019-07-04

## 2019-06-27 RX ORDER — AMOXICILLIN AND CLAVULANATE POTASSIUM 875; 125 MG/1; MG/1
1 TABLET, FILM COATED ORAL 2 TIMES DAILY
Qty: 14 TABLET | Refills: 0 | Status: SHIPPED | OUTPATIENT
Start: 2019-06-27 | End: 2019-07-04

## 2019-06-27 RX ADMIN — CLINDAMYCIN PHOSPHATE 600 MG: 150 INJECTION, SOLUTION INTRAVENOUS at 03:06

## 2019-06-27 NOTE — PATIENT INSTRUCTIONS
YOU NEED TO KEEP YOUR FOLLOW UP WITH YOUR PODIATRIST TOMORROW FOR FURTHER EVALUATION TO RULE OUT BONE INVOLVEMENT AND WOUND CARE MANAGEMENT    START TAKING YOUR ORAL ANTIBIOTICS TONIGHT    You must understand that you've received an Urgent Care treatment only and that you may be released before all your medical problems are known or treated. You, the patient, will arrange for follow up care as instructed.  If your condition worsens we recommend that you receive another evaluation at the emergency room immediately or contact your primary medical clinics after hours call service to discuss your concerns.  Please return here or go to the Emergency Department for any concerns or worsening of condition.      Discharge Instructions for Diabetic Foot Ulcers  You have been diagnosed with foot ulcers related to diabetes. Diabetes is a disease that makes it very hard to control your blood sugar. One dangerous complication of diabetes is a higher risk of developing serious foot problems. Wounds, even minor ones, can easily become infected when you have diabetes. These infections can become life-threatening if they aren't treated. Infections that settle in the bones can also spread throughout your foot and leg, destroying bone as they travel.    Your healthcare provider wants you to practice good diabetic foot care. This can help make sure that hot spots, small cracks, or sores are treated before they get infected. If infection is already present, medicines may be prescribed. Follow the tips on this sheet to take better care of your feet.  After surgery  If you have had surgery, change your dressings every 12 hours to prevent infection. Regular wound care helps keep your foot free of infection and aids healing:  · Wash your hands.  · Put on disposable gloves if your foot is infected.  · Gently remove the old dressing and discard it in a plastic bag.  · Take off the gloves.  · Wash your hands again.  · Put on new  gloves.  · Clean and dress the wound as directed by your healthcare provider.  · Discard any used materials or trash in a plastic bag before placing in a trash can.  · Dispose of sharp objects in specially designated sharps containers.  Daily foot check   Here is what you can do:  · With a mirror, look at the bottom of your feet every day. This way, you can catch small skin changes before they turn into bigger problems, such as ulcers, or before they become infected.  · Call your healthcare provider right away if you notice any of the following: hot spots, red streaks, swelling, cracks, sores, injuries, or foreign objects embedded in your foot.  · Before putting on shoes, check the soles and insides for eze or splinters. Remove these as they could break the skin or put added pressure on your feet.  Foot care  Suggestions include the following:   · Wash your feet every day; use lukewarm (not hot) water and mild soap. Make sure to wash between your toes.  · Use a soft towel to dry your feet well, especially between the toes. Pat gently; don't rub.  · Apply a cream or lanolin lotion, especially on the heels, to keep the skin smooth. If it is cracked, talk to your healthcare provider about how to treat it. Do not apply lotion between the toes as this can lead to fungal infections.   · Dust your feet with nonmedicated powder before putting on shoes, socks, or stockings. This will help keep them dry.  · Before putting on your shoes, check your socks to make sure they are not bunched up. Also make sure they don't have folds or creases in them. Even little bumps created by bunched socks can cause a serious foot wound.   · Talk to your healthcare provider before treating calluses, corns, or bunions.  · To prevent ingrown toenails, cut toenails straight across. Try soaking your toenails in warm water to soften them before cutting.  · Try to keep your feet from getting too hot or too cold.  · Don't go barefoot.  · Avoid  rough surfaces or surfaces with sharp objects.  · Don't wear shoes that are too tight or uncomfortable.  · Don't test the temperature of the bathtub water with your feet if you have diminished sensation.  · Follow your healthcare providers instructions about walking and other activity. There may be some restrictions depending on the condition of your feet. You may need special shoes or inserts to take the pressure off the ulcers.   · Take all medicines exactly as directed.  Follow-Up  Your healthcare provider may refer you to a special wound-care center for treatment.     When to call your healthcare provider  Call your healthcare provider if you have any of the following:  · Fever of 100.4°F (38°C) or higher, or as directed by your healthcare provider   · Redness, swelling, or pain in the foot that doesn't go away  · Numbness or tingling in any part of your foot  · Chills, light-headedness, or fainting  · Odor from wounds or swollen areas   Date Last Reviewed: 6/1/2016  © 7955-0185 [employer]. [address] [place] [address]. All rights reserved. This information is not intended as a substitute for professional medical care. Always follow your healthcare professional's instructions.

## 2019-06-27 NOTE — PROGRESS NOTES
"Subjective:       Patient ID: Randy Camejo is a 78 y.o. male.    Vitals:    06/27/19 1446   BP: (!) 99/55   Pulse: (!) 57   Resp: 16   Temp: 98.1 °F (36.7 °C)   TempSrc: Oral   SpO2: 97%   Weight: 78.5 kg (173 lb)   Height: 5' 11.5" (1.816 m)       Chief Complaint: Wound Check (Right  5th toe)    This is a 78 year old male who presents today with complaints of a diabetic wound to his right great toe. He states that he was seen at [facility] location and was put on Keflex and Bactroban. He states that he has an appt tomorrow with Dr. Wright (podiatry). He states he is just here today because he would like to have it checked again. He denies any fever or chills. He states he does have neuropathy.     Wound Check   Treated in ED: 9 days. Previous treatment included oral antibiotics (topical antibiotic oniment). His temperature was unmeasured prior to arrival. There has been bloody discharge from the wound. The redness has not changed. There is no swelling present. There is no pain present.     Review of Systems   Constitution: Negative for chills and fever.   HENT: Negative for sore throat.    Eyes: Negative for blurred vision.   Cardiovascular: Negative for chest pain.   Respiratory: Negative for shortness of breath.    Skin: Negative for rash.   Musculoskeletal: Negative for back pain and joint pain.   Gastrointestinal: Negative for abdominal pain, diarrhea, nausea and vomiting.   Neurological: Negative for headaches.   Psychiatric/Behavioral: The patient is not nervous/anxious.        Objective:      Physical Exam   Constitutional: He is oriented to person, place, and time. He appears well-developed and well-nourished. He is cooperative.  Non-toxic appearance. He does not appear ill. No distress.   HENT:   Head: Normocephalic and atraumatic.   Right Ear: Hearing, tympanic membrane, external ear and ear canal normal.   Left Ear: Hearing, tympanic membrane, external ear and ear canal normal.   Nose: Nose normal. " No mucosal edema, rhinorrhea or nasal deformity. No epistaxis. Right sinus exhibits no maxillary sinus tenderness and no frontal sinus tenderness. Left sinus exhibits no maxillary sinus tenderness and no frontal sinus tenderness.   Mouth/Throat: Uvula is midline, oropharynx is clear and moist and mucous membranes are normal. No trismus in the jaw. Normal dentition. No uvula swelling. No posterior oropharyngeal erythema.   Eyes: Conjunctivae and lids are normal. Right eye exhibits no discharge. Left eye exhibits no discharge. No scleral icterus.   Sclera clear bilat   Neck: Trachea normal, normal range of motion, full passive range of motion without pain and phonation normal. Neck supple.   Cardiovascular: Normal rate, regular rhythm, normal heart sounds, intact distal pulses and normal pulses.   Pulmonary/Chest: Effort normal and breath sounds normal. No respiratory distress.   Abdominal: Soft. Normal appearance and bowel sounds are normal. He exhibits no distension, no pulsatile midline mass and no mass. There is no tenderness.   Musculoskeletal: He exhibits no edema or deformity.        Right foot: There is decreased range of motion, tenderness and swelling.        Feet:    Neurological: He is alert and oriented to person, place, and time. He exhibits normal muscle tone. Coordination normal.   Skin: Skin is warm, dry and intact. He is not diaphoretic. No pallor.   Psychiatric: He has a normal mood and affect. His speech is normal and behavior is normal. Judgment and thought content normal. Cognition and memory are normal.   Nursing note and vitals reviewed.        Dead tissue manually debrided. Wound dressed with Bactroban and gauze with kerlex.   Assessment:       1. Diabetic ulcer of toe of right foot associated with type 2 diabetes mellitus, unspecified ulcer stage    2. Neuropathy of foot, unspecified laterality    3. Cellulitis in diabetic foot        Plan:       Dr. Rey also evaluated patient. Case  discussed and he agrees with treatment regimen. Patient understands the importance of following up with podiatry to rule out osteo and for further wound care management. His appointment is at 8: 45 AM tomorrow.  Patient on amiodarone so will avoid flouroquinolones.     Randy was seen today for wound check.    Diagnoses and all orders for this visit:    Diabetic ulcer of toe of right foot associated with type 2 diabetes mellitus, unspecified ulcer stage  -     sulfamethoxazole-trimethoprim 800-160mg (BACTRIM DS) 800-160 mg Tab; Take 1 tablet by mouth 2 (two) times daily. for 7 days  -     amoxicillin-clavulanate 875-125mg (AUGMENTIN) 875-125 mg per tablet; Take 1 tablet by mouth 2 (two) times daily. for 7 days    Neuropathy of foot, unspecified laterality    Cellulitis in diabetic foot  -     clindamycin injection 600 mg            Patient Instructions     YOU NEED TO KEEP YOUR FOLLOW UP WITH YOUR PODIATRIST TOMORROW FOR FURTHER EVALUATION TO RULE OUT BONE INVOLVEMENT AND WOUND CARE MANAGEMENT    START TAKING YOUR ORAL ANTIBIOTICS TONIGHT    You must understand that you've received an Urgent Care treatment only and that you may be released before all your medical problems are known or treated. You, the patient, will arrange for follow up care as instructed.  If your condition worsens we recommend that you receive another evaluation at the emergency room immediately or contact your primary medical clinics after hours call service to discuss your concerns.  Please return here or go to the Emergency Department for any concerns or worsening of condition.      Discharge Instructions for Diabetic Foot Ulcers  You have been diagnosed with foot ulcers related to diabetes. Diabetes is a disease that makes it very hard to control your blood sugar. One dangerous complication of diabetes is a higher risk of developing serious foot problems. Wounds, even minor ones, can easily become infected when you have diabetes. These  infections can become life-threatening if they aren't treated. Infections that settle in the bones can also spread throughout your foot and leg, destroying bone as they travel.    Your healthcare provider wants you to practice good diabetic foot care. This can help make sure that hot spots, small cracks, or sores are treated before they get infected. If infection is already present, medicines may be prescribed. Follow the tips on this sheet to take better care of your feet.  After surgery  If you have had surgery, change your dressings every 12 hours to prevent infection. Regular wound care helps keep your foot free of infection and aids healing:  · Wash your hands.  · Put on disposable gloves if your foot is infected.  · Gently remove the old dressing and discard it in a plastic bag.  · Take off the gloves.  · Wash your hands again.  · Put on new gloves.  · Clean and dress the wound as directed by your healthcare provider.  · Discard any used materials or trash in a plastic bag before placing in a trash can.  · Dispose of sharp objects in specially designated sharps containers.  Daily foot check   Here is what you can do:  · With a mirror, look at the bottom of your feet every day. This way, you can catch small skin changes before they turn into bigger problems, such as ulcers, or before they become infected.  · Call your healthcare provider right away if you notice any of the following: hot spots, red streaks, swelling, cracks, sores, injuries, or foreign objects embedded in your foot.  · Before putting on shoes, check the soles and insides for eze or splinters. Remove these as they could break the skin or put added pressure on your feet.  Foot care  Suggestions include the following:   · Wash your feet every day; use lukewarm (not hot) water and mild soap. Make sure to wash between your toes.  · Use a soft towel to dry your feet well, especially between the toes. Pat gently; don't rub.  · Apply a cream or  lanolin lotion, especially on the heels, to keep the skin smooth. If it is cracked, talk to your healthcare provider about how to treat it. Do not apply lotion between the toes as this can lead to fungal infections.   · Dust your feet with nonmedicated powder before putting on shoes, socks, or stockings. This will help keep them dry.  · Before putting on your shoes, check your socks to make sure they are not bunched up. Also make sure they don't have folds or creases in them. Even little bumps created by bunched socks can cause a serious foot wound.   · Talk to your healthcare provider before treating calluses, corns, or bunions.  · To prevent ingrown toenails, cut toenails straight across. Try soaking your toenails in warm water to soften them before cutting.  · Try to keep your feet from getting too hot or too cold.  · Don't go barefoot.  · Avoid rough surfaces or surfaces with sharp objects.  · Don't wear shoes that are too tight or uncomfortable.  · Don't test the temperature of the bathtub water with your feet if you have diminished sensation.  · Follow your healthcare providers instructions about walking and other activity. There may be some restrictions depending on the condition of your feet. You may need special shoes or inserts to take the pressure off the ulcers.   · Take all medicines exactly as directed.  Follow-Up  Your healthcare provider may refer you to a special wound-care center for treatment.     When to call your healthcare provider  Call your healthcare provider if you have any of the following:  · Fever of 100.4°F (38°C) or higher, or as directed by your healthcare provider   · Redness, swelling, or pain in the foot that doesn't go away  · Numbness or tingling in any part of your foot  · Chills, light-headedness, or fainting  · Odor from wounds or swollen areas   Date Last Reviewed: 6/1/2016  © 3086-6271 [employer]. [address] [place] [address], [place] [address]. All rights  reserved. This information is not intended as a substitute for professional medical care. Always follow your healthcare professional's instructions.

## 2019-07-02 RX ORDER — PEN NEEDLE, DIABETIC 30 GX3/16"
1 NEEDLE, DISPOSABLE MISCELLANEOUS 4 TIMES DAILY
Qty: 200 EACH | Refills: 11 | Status: SHIPPED | OUTPATIENT
Start: 2019-07-02 | End: 2020-12-09 | Stop reason: SDUPTHER

## 2019-07-02 RX ORDER — PEN NEEDLE, DIABETIC 30 GX3/16"
NEEDLE, DISPOSABLE MISCELLANEOUS
Qty: 400 EACH | Refills: 1 | Status: CANCELLED | OUTPATIENT
Start: 2019-07-02

## 2019-07-02 NOTE — TELEPHONE ENCOUNTER
"----- Message from Teresa Red sent at 7/2/2019  1:06 PM CDT -----  Contact: wife, Jade Camejo [phone]  Patient is calling for an RX refill or new RX.  Is this a refill or new RX:  Refill  RX name and strength: pen needle, Diabetic 31 gauge x 5/16" Ndle  Directions (copy/paste from chart):  pen needle, diabetic 31 gauge x 5/16" Ndle  Is this a 30 day or 90 day RX: 30   Local pharmacy or mail order pharmacy:  local  Pharmacy name and phone # (copy/paste from chart):  [facility] [phone]   Comments:        "

## 2019-07-09 ENCOUNTER — TELEPHONE (OUTPATIENT)
Dept: WOUND CARE | Facility: CLINIC | Age: 79
End: 2019-07-09

## 2019-07-09 NOTE — TELEPHONE ENCOUNTER
----- Message from Dorene Harvey sent at 7/9/2019  1:28 PM CDT -----  Contact: pts daughter- Bernice Alfonso  Needs Advice    Reason for call: Caller states the pt needs to be seen as soon as possible the pt is diabetic and now has Cellulitis in diabetic foot. Pt has referral in system.         Communication Preference: [phone]    Additional Information:

## 2019-07-10 ENCOUNTER — LAB VISIT (OUTPATIENT)
Dept: LAB | Facility: HOSPITAL | Age: 79
End: 2019-07-10
Payer: MEDICARE

## 2019-07-10 ENCOUNTER — OFFICE VISIT (OUTPATIENT)
Dept: WOUND CARE | Facility: CLINIC | Age: 79
End: 2019-07-10
Payer: MEDICARE

## 2019-07-10 VITALS
DIASTOLIC BLOOD PRESSURE: 51 MMHG | WEIGHT: 172.81 LBS | HEIGHT: 72 IN | SYSTOLIC BLOOD PRESSURE: 90 MMHG | BODY MASS INDEX: 23.41 KG/M2 | HEART RATE: 49 BPM | TEMPERATURE: 97 F

## 2019-07-10 DIAGNOSIS — E11.40 TYPE 2 DIABETES MELLITUS WITH DIABETIC NEUROPATHY, WITH LONG-TERM CURRENT USE OF INSULIN: ICD-10-CM

## 2019-07-10 DIAGNOSIS — I73.9 PERIPHERAL VASCULAR DISEASE: ICD-10-CM

## 2019-07-10 DIAGNOSIS — L97.512 SKIN ULCER OF TOE OF RIGHT FOOT WITH FAT LAYER EXPOSED: Primary | ICD-10-CM

## 2019-07-10 DIAGNOSIS — Z79.4 TYPE 2 DIABETES MELLITUS WITH DIABETIC NEUROPATHY, WITH LONG-TERM CURRENT USE OF INSULIN: ICD-10-CM

## 2019-07-10 DIAGNOSIS — L97.512 SKIN ULCER OF TOE OF RIGHT FOOT WITH FAT LAYER EXPOSED: ICD-10-CM

## 2019-07-10 DIAGNOSIS — I73.9 PERIPHERAL VASCULAR DISEASE: Primary | ICD-10-CM

## 2019-07-10 PROBLEM — L97.502 SKIN ULCER OF TOE WITH FAT LAYER EXPOSED: Status: ACTIVE | Noted: 2019-07-10

## 2019-07-10 LAB
CRP SERPL-MCNC: 4.5 MG/L (ref 0–8.2)
ERYTHROCYTE [SEDIMENTATION RATE] IN BLOOD BY WESTERGREN METHOD: 23 MM/HR (ref 0–23)
PREALB SERPL-MCNC: 21 MG/DL (ref 20–43)

## 2019-07-10 PROCEDURE — 1101F PR PT FALLS ASSESS DOC 0-1 FALLS W/OUT INJ PAST YR: ICD-10-PCS | Mod: CPTII,S$GLB,, | Performed by: NURSE PRACTITIONER

## 2019-07-10 PROCEDURE — 99999 PR PBB SHADOW E&M-EST. PATIENT-LVL V: ICD-10-PCS | Mod: PBBFAC,,, | Performed by: NURSE PRACTITIONER

## 2019-07-10 PROCEDURE — 85652 RBC SED RATE AUTOMATED: CPT

## 2019-07-10 PROCEDURE — 3074F PR MOST RECENT SYSTOLIC BLOOD PRESSURE < 130 MM HG: ICD-10-PCS | Mod: CPTII,S$GLB,, | Performed by: NURSE PRACTITIONER

## 2019-07-10 PROCEDURE — 86140 C-REACTIVE PROTEIN: CPT

## 2019-07-10 PROCEDURE — 3078F DIAST BP <80 MM HG: CPT | Mod: CPTII,S$GLB,, | Performed by: NURSE PRACTITIONER

## 2019-07-10 PROCEDURE — 1101F PT FALLS ASSESS-DOCD LE1/YR: CPT | Mod: CPTII,S$GLB,, | Performed by: NURSE PRACTITIONER

## 2019-07-10 PROCEDURE — 3078F PR MOST RECENT DIASTOLIC BLOOD PRESSURE < 80 MM HG: ICD-10-PCS | Mod: CPTII,S$GLB,, | Performed by: NURSE PRACTITIONER

## 2019-07-10 PROCEDURE — 99203 PR OFFICE/OUTPT VISIT, NEW, LEVL III, 30-44 MIN: ICD-10-PCS | Mod: S$GLB,,, | Performed by: NURSE PRACTITIONER

## 2019-07-10 PROCEDURE — 84134 ASSAY OF PREALBUMIN: CPT

## 2019-07-10 PROCEDURE — 99203 OFFICE O/P NEW LOW 30 MIN: CPT | Mod: S$GLB,,, | Performed by: NURSE PRACTITIONER

## 2019-07-10 PROCEDURE — 3074F SYST BP LT 130 MM HG: CPT | Mod: CPTII,S$GLB,, | Performed by: NURSE PRACTITIONER

## 2019-07-10 PROCEDURE — 99999 PR PBB SHADOW E&M-EST. PATIENT-LVL V: CPT | Mod: PBBFAC,,, | Performed by: NURSE PRACTITIONER

## 2019-07-10 PROCEDURE — 36415 COLL VENOUS BLD VENIPUNCTURE: CPT

## 2019-07-10 RX ORDER — SULFAMETHOXAZOLE AND TRIMETHOPRIM 800; 160 MG/1; MG/1
1 TABLET ORAL 2 TIMES DAILY
Status: ON HOLD | COMMUNITY
End: 2019-08-29

## 2019-07-10 NOTE — PROGRESS NOTES
Subjective:       Patient ID: Randy Camejo is a 78 y.o. male.    Chief Complaint: Wound Check    HPI   This is a 78 year old male referred for evaluation and management of an ulcer to the right fifth toe.  He reports this wound has been present since around June 21, 2019.  He was seen twice in urgent care, once on 6/23/19 and again on 6/27/19.  He was given keflex on the first visit and clindamycin on the second.  He was also given mupiprocin to use topically.  He was then seen by an outside podiatrist who gave him bactrim orally and ordered betadine to be used topically.  He reports the redness is improved.  His medical history is significant for atrial fibrillation, heart failure, CAD, HTN and type II diabetes.  He denies increased swelling or purulent drainage.  He is afebrile. He does not report any pain.   Review of Systems   Constitutional: Negative for chills, diaphoresis and fever.   HENT: Positive for rhinorrhea. Negative for hearing loss, postnasal drip, sinus pressure, sneezing, sore throat, tinnitus and trouble swallowing.    Eyes: Negative for visual disturbance.   Respiratory: Negative for apnea, cough, shortness of breath and wheezing.    Cardiovascular: Negative for chest pain, palpitations and leg swelling.   Gastrointestinal: Positive for nausea. Negative for constipation, diarrhea and vomiting.   Genitourinary: Negative for difficulty urinating, dysuria, frequency and hematuria.   Musculoskeletal: Negative for arthralgias, back pain and joint swelling.   Skin: Positive for wound.   Neurological: Positive for dizziness. Negative for weakness, light-headedness and headaches.   Hematological: Does not bruise/bleed easily.   Psychiatric/Behavioral: Negative for confusion, decreased concentration, dysphoric mood and sleep disturbance. The patient is nervous/anxious.        Objective:      Physical Exam   Constitutional: He is oriented to person, place, and time. He appears well-developed and  well-nourished. No distress.   HENT:   Head: Normocephalic and atraumatic.   Mouth/Throat: Oropharynx is clear and moist.   Poor dentition   Eyes: Pupils are equal, round, and reactive to light. Conjunctivae and EOM are normal. Right eye exhibits no discharge. Left eye exhibits no discharge. No scleral icterus.   Neck: Neck supple. No JVD present. No tracheal deviation present. No thyromegaly present.   Cardiovascular: Normal rate, regular rhythm and normal heart sounds. Exam reveals no gallop and no friction rub.   No murmur heard.  Pulmonary/Chest: Effort normal and breath sounds normal. No respiratory distress. He has no wheezes. He has no rales.   Abdominal: Soft. Bowel sounds are normal. He exhibits no distension. There is no tenderness.   Musculoskeletal: He exhibits no edema or tenderness.        Feet:    Neurological: He is alert and oriented to person, place, and time.   Skin: Skin is warm and dry. No rash noted. He is not diaphoretic. No erythema.   Psychiatric: He has a normal mood and affect. His behavior is normal. Judgment and thought content normal.   Nursing note and vitals reviewed.      ..  Hemoglobin A1C   Date Value Ref Range Status   06/05/2019 6.3 (H) 4.0 - 5.6 % Final     Comment:     ADA Screening Guidelines:  5.7-6.4%  Consistent with prediabetes  >or=6.5%  Consistent with diabetes  High levels of fetal hemoglobin interfere with the HbA1C  assay. Heterozygous hemoglobin variants (HbS, HgC, etc)do  not significantly interfere with this assay.   However, presence of multiple variants may affect accuracy.     04/10/2019 8.4 (H) 4.0 - 5.6 % Final     Comment:     ADA Screening Guidelines:  5.7-6.4%  Consistent with prediabetes  >or=6.5%  Consistent with diabetes  High levels of fetal hemoglobin interfere with the HbA1C  assay. Heterozygous hemoglobin variants (HbS, HgC, etc)do  not significantly interfere with this assay.   However, presence of multiple variants may affect accuracy.      02/19/2019 12.6 (H) 4.0 - 5.6 % Final     Comment:     ADA Screening Guidelines:  5.7-6.4%  Consistent with prediabetes  >or=6.5%  Consistent with diabetes  High levels of fetal hemoglobin interfere with the HbA1C  assay. Heterozygous hemoglobin variants (HbS, HgC, etc)do  not significantly interfere with this assay.   However, presence of multiple variants may affect accuracy.       ..  Lab Results   Component Value Date    ALBUMIN 3.1 (L) 03/08/2019     Assessment:       1. Skin ulcer of toe of right foot with fat layer exposed    2. Type 2 diabetes mellitus with diabetic neuropathy, with long-term current use of insulin    3. Peripheral vascular disease               Plan:            Prealbumin, ESR, CRP.  Xray right fifth toe to rule out osteo.  JUAN/PVRs.  Cotton in between toes.  Paint right fifth toe ulcer with betadine, cover with gauze and secure with roll gauze.  Darco shoe for ambulation.  Return to clinic in one month.

## 2019-07-10 NOTE — PATIENT INSTRUCTIONS
Keep your wound dry.  Cotton in between toes.  Paint right fifth toe ulcer with betadine, cover with gauze and secure with roll gauze.  Darco shoe for ambulation.

## 2019-07-10 NOTE — LETTER
July 10, 2019      Radha Holloway MD  [address] [address]  [address]  [facility] [address]           [facility]  [address]  [place] [address]  Phone: [phone]          Patient: Randy Camejo   MR Number: [id number]   YOB: 1940   Date of Visit: 7/10/2019       Dear Dr. Radha Holloway:    Thank you for referring Randy Camejo to me for evaluation. Attached you will find relevant portions of my assessment and plan of care.    If you have questions, please do not hesitate to call me. I look forward to following Randy Camejo along with you.    Sincerely,    Jeannie Leblanc NP    Enclosure  CC:  No Recipients    If you would like to receive this communication electronically, please contact [email or web address] or [phone] to request more information on [employer] Link access.    For providers and/or their staff who would like to refer a patient to [facility], please contact us through our one-stop-shop provider referral line, [facility], at [phone].    If you feel you have received this communication in error or would no longer like to receive these types of communications, please e-mail [email or web address]

## 2019-07-15 ENCOUNTER — TELEPHONE (OUTPATIENT)
Dept: INTERNAL MEDICINE | Facility: CLINIC | Age: 79
End: 2019-07-15

## 2019-07-15 NOTE — TELEPHONE ENCOUNTER
----- Message from Zabrina Márquez sent at 7/15/2019 10:32 AM CDT -----  Contact: Jade wife [phone]  Patient is calling for an RX refill or new RX.  Is this a refill or new RX:  New   RX name and strength: levothyroxine (SYNTHROID) 50 MCG tablet  Directions (copy/paste from chart):    Is this a 30 day or 90 day RX:  90 days  Local pharmacy or mail order pharmacy:  Local pharmacy  Pharmacy name and phone # (copy/paste from chart):  [facility] [address] [place] [address] [facility] [address] [facility] [phone] (Phone)  [phone] (Fax)  Comments:  Pt wife states that needs an prior authorization just to transfer the medication to new location, please advise.

## 2019-07-17 ENCOUNTER — TELEPHONE (OUTPATIENT)
Dept: WOUND CARE | Facility: CLINIC | Age: 79
End: 2019-07-17

## 2019-07-17 ENCOUNTER — HOSPITAL ENCOUNTER (OUTPATIENT)
Dept: RADIOLOGY | Facility: HOSPITAL | Age: 79
Discharge: HOME | End: 2019-07-17
Attending: NURSE PRACTITIONER
Payer: MEDICARE

## 2019-07-17 ENCOUNTER — TELEPHONE (OUTPATIENT)
Dept: INFECTIOUS DISEASES | Facility: CLINIC | Age: 79
End: 2019-07-17

## 2019-07-17 ENCOUNTER — CLINICAL SUPPORT (OUTPATIENT)
Dept: CARDIOLOGY | Facility: CLINIC | Age: 79
End: 2019-07-17
Attending: SURGERY
Payer: MEDICARE

## 2019-07-17 DIAGNOSIS — I73.9 PERIPHERAL VASCULAR DISEASE: ICD-10-CM

## 2019-07-17 DIAGNOSIS — L97.512 SKIN ULCER OF TOE OF RIGHT FOOT WITH FAT LAYER EXPOSED: ICD-10-CM

## 2019-07-17 LAB
LEFT ABI: 2.12
LEFT ARM BP: 120 MMHG
LEFT DORSALIS PEDIS: 254 MMHG
LEFT POSTERIOR TIBIAL: 254 MMHG
LEFT TBI: 0.57
LEFT TOE PRESSURE: 68 MMHG
RIGHT ABI: 2.12
RIGHT ARM BP: 117 MMHG
RIGHT DORSALIS PEDIS: 254 MMHG
RIGHT POSTERIOR TIBIAL: 254 MMHG
RIGHT TBI: 0.38
RIGHT TOE PRESSURE: 46 MMHG

## 2019-07-17 PROCEDURE — 93922 UPR/L XTREMITY ART 2 LEVELS: CPT | Mod: S$GLB,,, | Performed by: INTERNAL MEDICINE

## 2019-07-17 PROCEDURE — 73660 X-RAY EXAM OF TOE(S): CPT | Mod: TC,PO,RT

## 2019-07-17 PROCEDURE — 73660 X-RAY EXAM OF TOE(S): CPT | Mod: 26,RT,, | Performed by: RADIOLOGY

## 2019-07-17 PROCEDURE — 93922 CV US ANKLE BRACHIAL INDICES RESTING (CUPID ONLY): ICD-10-PCS | Mod: S$GLB,,, | Performed by: INTERNAL MEDICINE

## 2019-07-17 PROCEDURE — 73660 XR TOE 2 OR MORE VIEWS RIGHT: ICD-10-PCS | Mod: 26,RT,, | Performed by: RADIOLOGY

## 2019-07-22 ENCOUNTER — TELEPHONE (OUTPATIENT)
Dept: INTERNAL MEDICINE | Facility: CLINIC | Age: 79
End: 2019-07-22

## 2019-07-22 NOTE — TELEPHONE ENCOUNTER
Patient's wife just wanted a tranferr or test strip from one pharmacy to another .  I explain to the her she would just need to contact the pharmacy to have them tranfer .   Told her to call back if she need us

## 2019-07-22 NOTE — TELEPHONE ENCOUNTER
----- Message from Devon Anaya sent at 7/22/2019 10:13 AM CDT -----  Contact: [phone]  Patient's wife requesting a call from the office to discuss getting true metrix blood glucose test strips sent to [facility] pharmacy [phone] . Please call and advise, Thanks

## 2019-07-23 ENCOUNTER — OFFICE VISIT (OUTPATIENT)
Dept: INFECTIOUS DISEASES | Facility: CLINIC | Age: 79
End: 2019-07-23
Payer: MEDICARE

## 2019-07-23 VITALS
HEART RATE: 60 BPM | HEIGHT: 72 IN | TEMPERATURE: 98 F | DIASTOLIC BLOOD PRESSURE: 65 MMHG | BODY MASS INDEX: 24.18 KG/M2 | WEIGHT: 178.56 LBS | SYSTOLIC BLOOD PRESSURE: 119 MMHG

## 2019-07-23 DIAGNOSIS — L97.509 ULCER OF TOE, UNSPECIFIED LATERALITY, UNSPECIFIED ULCER STAGE: ICD-10-CM

## 2019-07-23 DIAGNOSIS — M86.9 OSTEOMYELITIS, UNSPECIFIED SITE, UNSPECIFIED TYPE: Primary | ICD-10-CM

## 2019-07-23 PROCEDURE — 99204 OFFICE O/P NEW MOD 45 MIN: CPT | Mod: S$GLB,,, | Performed by: INTERNAL MEDICINE

## 2019-07-23 PROCEDURE — 3074F SYST BP LT 130 MM HG: CPT | Mod: CPTII,S$GLB,, | Performed by: INTERNAL MEDICINE

## 2019-07-23 PROCEDURE — 99999 PR PBB SHADOW E&M-EST. PATIENT-LVL IV: ICD-10-PCS | Mod: PBBFAC,,, | Performed by: INTERNAL MEDICINE

## 2019-07-23 PROCEDURE — 99999 PR PBB SHADOW E&M-EST. PATIENT-LVL IV: CPT | Mod: PBBFAC,,, | Performed by: INTERNAL MEDICINE

## 2019-07-23 PROCEDURE — 3288F PR FALLS RISK ASSESSMENT DOCUMENTED: ICD-10-PCS | Mod: CPTII,S$GLB,, | Performed by: INTERNAL MEDICINE

## 2019-07-23 PROCEDURE — 3078F PR MOST RECENT DIASTOLIC BLOOD PRESSURE < 80 MM HG: ICD-10-PCS | Mod: CPTII,S$GLB,, | Performed by: INTERNAL MEDICINE

## 2019-07-23 PROCEDURE — 3074F PR MOST RECENT SYSTOLIC BLOOD PRESSURE < 130 MM HG: ICD-10-PCS | Mod: CPTII,S$GLB,, | Performed by: INTERNAL MEDICINE

## 2019-07-23 PROCEDURE — 1100F PR PT FALLS ASSESS DOC 2+ FALLS/FALL W/INJURY/YR: ICD-10-PCS | Mod: CPTII,S$GLB,, | Performed by: INTERNAL MEDICINE

## 2019-07-23 PROCEDURE — 3078F DIAST BP <80 MM HG: CPT | Mod: CPTII,S$GLB,, | Performed by: INTERNAL MEDICINE

## 2019-07-23 PROCEDURE — 99204 PR OFFICE/OUTPT VISIT, NEW, LEVL IV, 45-59 MIN: ICD-10-PCS | Mod: S$GLB,,, | Performed by: INTERNAL MEDICINE

## 2019-07-23 PROCEDURE — 3288F FALL RISK ASSESSMENT DOCD: CPT | Mod: CPTII,S$GLB,, | Performed by: INTERNAL MEDICINE

## 2019-07-23 PROCEDURE — 1100F PTFALLS ASSESS-DOCD GE2>/YR: CPT | Mod: CPTII,S$GLB,, | Performed by: INTERNAL MEDICINE

## 2019-07-23 NOTE — LETTER
July 25, 2019      Jeannie Leblanc, JENNIFER  [address]  [facility] [address]           [facility]  [address]  [facility] [address]  Phone: [phone]  Fax: [phone]          Patient: Randy Camejo   MR Number: [id number]   YOB: 1940   Date of Visit: 7/23/2019       Dear Jeannie Leblanc:    Thank you for referring Randy Camejo to me for evaluation. Attached you will find relevant portions of my assessment and plan of care.    If you have questions, please do not hesitate to call me. I look forward to following Randy Camejo along with you.    Sincerely,    Mary Arriola MD    Enclosure  CC:  No Recipients    If you would like to receive this communication electronically, please contact [email or web address] or [phone] to request more information on [employer] Link access.    For providers and/or their staff who would like to refer a patient to [facility], please contact us through our one-stop-shop provider referral line, [facility], at [phone].    If you feel you have received this communication in error or would no longer like to receive these types of communications, please e-mail [email or web address]

## 2019-07-23 NOTE — PROGRESS NOTES
INFECTIOUS DISEASE CLINIC  07/23/2019 10:45 AM    Subjective:      Chief Complaint:   Chief Complaint   Patient presents with    Follow-up       History of Present Illness:     Patient Randy Camejo is a 79 y.o. male who presents today for evaluation of L toe infection. Has h/o T2DM on insulin therapy (last A1c 6.3 from 12.6 5 months ago), CHF, Afib referred to clinic for concern of osteomyelitis from wound care. Patient reports developing a blister on the small toe of his right foot on 6/23 which rapidly developed an ulceration. He reports no significant pain to the site. Hx of neuropathy but some sensation is preserved. Family is unsure if drainage has been present as he has had iodine applied regularly which comes off on bandages. No fevers or chills. No warmth or redness of leg reported. He reports feeling generally weak which he associated with antihypertensives and is not new. He had recently completed courses of keflex and topical bactrim to contralateral foot for an ulcerated skin lesion prior to development of this skin lesion. He presented to urgent care and then his podiatrist at [facility] who started him on an extended course of clindamycin which he took for about 2 weeks. Some nausea and 1 day of diarrhea associated but was able to continue the regimen. No cultures were taken and he was referred to wound care for further management where he was seen on 7/10. Xray of the foot was ordered which is concerning for osteomyelitis with septic arthritis. ESR, CRP not elevated at 23 and 4.5. Arterial ultrasound in February 2019 showed no significant stenoses. JUAN done July 17th showed JUAN 0.38. History of implantation of surgical hardware to affected toe when patient was a teenager.     Review of Symptoms:  Constitutional: Denies fevers, chills. +fatigue  ENT: Denies dysphagia, nasal discharge, ear pain or discharge.  Cardiovascular: Denies chest pain, palpitations, orthopnea, or claudication.  Respiratory:  Denies shortness of breath, cough, hemoptysis, or wheezing.  GI: Nausea on clindamycin. One loose stool, resolved.  : Denies dysuria, incontinence, or hematuria.  Musculoskeletal: Right foot ulceration as above  Skin/breast: Denies rashes, lumps, lesions, or discharge.  Neurologic: Denies headache, dizziness, vertigo, or paresthesias.    Past Medical History:   Diagnosis Date    Asthma     childhood    Atrial fibrillation     CHF (congestive heart failure)     Chronic anticoagulation 2/25/2019    Coronary artery disease     Coronary artery disease 2/25/2019    Diabetes mellitus, type 2     Heart attack     stent    High risk medication use 3/22/2019    2/19/2019: Began amiodarone.    History of coronary artery stent placement 3/22/2019    2000: Con[facility]pcion: MI and stent.    History of myocardial infarction 3/22/2019    2000: Concepcion: MI and stent.   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 Beavers    CATARACT EXTRACTION W/  INTRAOCULAR LENS IMPLANT Left 01/08/2018    Dr. Beavers    ECHOCARDIOGRAM,TRANSESOPHAGEAL N/A 2/25/2019    [facility] CATH LAB    ECHOCARDIOGRAM,TRANSESOPHAGEAL N/A 2/21/2019    Per[facility] by [facility] CATH LAB    INSERTION-INTRAOCULAR LENS (IOL) Left 1/8/2018    [facility] Milo Beavers [facility] at [facility] OR    INSERTION-INTRAOCULAR LENS (IOL) Right 12/18/2017    [facility] at [facility] OR    PHACOEMULSIFICATION-ASPIRATION-CATARACT Left 1/8/2018    [facility] OR     PHACOEMULSIFICATION-ASPIRATION-CATARACT Right 12/18/2017    [facility] OR    TONSILLECTOMY         Family History   Problem Relation Age of Onset    Cataracts Father     Cancer Father         stomach    Stroke Mother     Diabetes Mother     Amblyopia Neg Hx     Blindness Neg Hx     Glaucoma Neg Hx     Macular degeneration Neg Hx     Retinal detachment Neg Hx     Strabismus Neg Hx        Social History     Socioeconomic History    Marital status:      Spouse name: Not on file    Number of children: 3    Years of education: Not on file    Highest education level: Not on file   Occupational History    Occupation: Teacher     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   Social Needs    Financial resource strain: Not on file    Food insecurity:     Worry: Not on file     Inability: Not on file    Transportation needs:     Medical: Not on file     Non-medical: Not on file   Tobacco Use    Smoking status: Never Smoker    Smokeless tobacco: Never Used   Substance and Sexual Activity    Alcohol use: No    Drug use: No    Sexual activity: Not on file   Lifestyle    Physical activity:     Days per week: Not on file     Minutes per session: Not on file    Stress: Not on file   Relationships    Social connections:     Talks on phone: Not on file     Gets together: Not on file     Attends Baptism service: Not on file     Active member of club or organization: Not on file     Attends meetings of clubs or organizations: Not on file     Relationship status: Not on file   Other Topics Concern    Not on file   Social History Narrative    Not on file       Review of patient's allergies indicates:  No Known Allergies      Objective:   VS (24h):   Vitals:    07/23/19 0839   BP: 119/65   Pulse: 60   Temp: 98.3 °F (36.8 °C)     General: Afebrile, alert, comfortable, no acute distress.   HEENT: VIRI. EOMI, no scleral icterus.   Pulmonary: Non labored  Abdominal:  Non-tender  Extremities: Moves all extremities x 4. No peripheral edema. 2+ pulses. R 5th toe wound similar to below photo. No signs of active cellulitis. Dark tissue on surface.   Skin: No jaundice, rashes, or visible lesions.   Neurological:  Alert and oriented x 4.           Labs:  Glucose   Date Value Ref Range Status   06/05/2019 132 (H) 70 - 110 mg/dL Final   04/10/2019 94 70 - 110 mg/dL Final   03/08/2019 145 (H) 70 - 110 mg/dL Final     Calcium   Date Value Ref Range Status   06/05/2019 9.1 8.7 - 10.5 mg/dL Final   04/10/2019 9.1 8.7 - 10.5 mg/dL Final   03/08/2019 9.0 8.7 - 10.5 mg/dL Final     Albumin   Date Value Ref Range Status   03/08/2019 3.1 (L) 3.5 - 5.2 g/dL Final   02/19/2019 3.7 3.5 - 5.2 g/dL Final     Total Protein   Date Value Ref Range Status   03/08/2019 7.9 6.0 - 8.4 g/dL Final   02/19/2019 8.4 6.0 - 8.4 g/dL Final     Sodium   Date Value Ref Range Status   06/05/2019 142 136 - 145 mmol/L Final   04/10/2019 139 136 - 145 mmol/L Final   03/08/2019 140 136 - 145 mmol/L Final     Potassium   Date Value Ref Range Status   06/05/2019 4.9 3.5 - 5.1 mmol/L Final   04/10/2019 4.2 3.5 - 5.1 mmol/L Final   03/08/2019 4.4 3.5 - 5.1 mmol/L Final     CO2   Date Value Ref Range Status   06/05/2019 26 23 - 29 mmol/L Final   04/10/2019 28 23 - 29 mmol/L Final   03/08/2019 30 (H) 23 - 29 mmol/L Final     Chloride   Date Value Ref Range Status   06/05/2019 110 95 - 110 mmol/L Final   04/10/2019 105 95 - 110 mmol/L Final   03/08/2019 101 95 - 110 mmol/L Final     BUN, Bld   Date Value Ref Range Status   06/05/2019 27 (H) 8 - 23 mg/dL Final   04/10/2019 17 8 - 23 mg/dL Final   03/08/2019 20 8 - 23 mg/dL Final     Creatinine   Date Value Ref Range Status   06/05/2019 1.4 0.5 - 1.4 mg/dL Final   04/10/2019 1.2 0.5 - 1.4 mg/dL Final   03/08/2019 1.3 0.5 - 1.4 mg/dL Final     Alkaline Phosphatase   Date Value Ref Range Status   03/08/2019 51 (L) 55 - 135 U/L Final   02/19/2019 78 55 - 135 U/L Final     ALT   Date  Value Ref Range Status   03/08/2019 15 10 - 44 U/L Final   02/19/2019 151 (H) 10 - 44 U/L Final   04/04/2005 22 0 - 40 U/L Final     AST   Date Value Ref Range Status   03/08/2019 19 10 - 40 U/L Final   02/19/2019 90 (H) 10 - 40 U/L Final     Total Bilirubin   Date Value Ref Range Status   03/08/2019 0.8 0.1 - 1.0 mg/dL Final     Comment:     For infants and newborns, interpretation of results should be based  on gestational age, weight and in agreement with clinical  observations.  Premature Infant recommended reference ranges:  Up to 24 hours.............<8.0 mg/dL  Up to 48 hours............<12.0 mg/dL  3-5 days..................<15.0 mg/dL  6-29 days.................<15.0 mg/dL     02/19/2019 1.4 (H) 0.1 - 1.0 mg/dL Final     Comment:     For infants and newborns, interpretation of results should be based  on gestational age, weight and in agreement with clinical  observations.  Premature Infant recommended reference ranges:  Up to 24 hours.............<8.0 mg/dL  Up to 48 hours............<12.0 mg/dL  3-5 days..................<15.0 mg/dL  6-29 days.................<15.0 mg/dL       WBC   Date Value Ref Range Status   03/08/2019 5.94 3.90 - 12.70 K/uL Final   03/01/2019 9.00 3.90 - 12.70 K/uL Final   02/28/2019 9.45 3.90 - 12.70 K/uL Final     Hemoglobin   Date Value Ref Range Status   03/08/2019 13.8 (L) 14.0 - 18.0 g/dL Final   03/01/2019 13.6 (L) 14.0 - 18.0 g/dL Final   02/28/2019 13.5 (L) 14.0 - 18.0 g/dL Final     Hematocrit   Date Value Ref Range Status   03/08/2019 42.9 40.0 - 54.0 % Final   03/01/2019 41.1 40.0 - 54.0 % Final   02/28/2019 41.4 40.0 - 54.0 % Final     Mean Corpuscular Volume   Date Value Ref Range Status   03/08/2019 98 82 - 98 fL Final   03/01/2019 97 82 - 98 fL Final   02/28/2019 97 82 - 98 fL Final     Platelets   Date Value Ref Range Status   03/08/2019 210 150 - 350 K/uL Final   03/01/2019 187 150 - 350 K/uL Final   02/28/2019 170 150 - 350 K/uL Final     Lab Results   Component  Value Date    CHOL 95 (L) 04/10/2019    CHOL 137 02/20/2019    CHOL 109 (L) 04/04/2005     Lab Results   Component Value Date    HDL 28 (L) 04/10/2019    HDL 26 (L) 02/20/2019    HDL 27.0 (L) 04/04/2005     Lab Results   Component Value Date    LDLCALC 57.0 (L) 04/10/2019    LDLCALC 93.6 02/20/2019    LDLCALC 56.6 (L) 04/04/2005     Lab Results   Component Value Date    TRIG 50 04/10/2019    TRIG 87 02/20/2019    TRIG 127 04/04/2005     Lab Results   Component Value Date    CHOLHDL 29.5 04/10/2019    CHOLHDL 19.0 (L) 02/20/2019    CHOLHDL 24.8 04/04/2005     No results found for: RPR  No results found for: QUANTIFERON    Medications:  Current Outpatient Medications on File Prior to Visit   Medication Sig Dispense Refill    amiodarone (PACERONE) 200 MG Tab Take 1 tablet (200 mg total) by mouth once daily. 90 tablet 3    apixaban (ELIQUIS) 5 mg Tab Take 1 tablet (5 mg total) by mouth 2 (two) times daily. 180 tablet 3    atorvastatin (LIPITOR) 20 MG tablet Take 1 tablet (20 mg total) by mouth once daily. 90 tablet 3    blood sugar diagnostic Strp Use to check blood glucose four times daily, to use with insurance preferred meter 200 strip 4    blood-glucose meter kit test as directed 1 each 0    carvedilol (COREG) 6.25 MG tablet Take 1 tablet (6.25 mg total) by mouth 2 (two) times daily. 180 tablet 3    furosemide (LASIX) 20 MG tablet Take 1 tablet (20 mg total) by mouth once daily. 90 tablet 3    insulin aspart U-100 (NOVOLOG) 100 unit/mL (3 mL) InPn pen Inject 3 Units into the skin 3 (three) times daily. 15 mL 1    insulin detemir U-100 (LEVEMIR FLEXTOUCH) 100 unit/mL (3 mL) SubQ InPn pen Inject 15 Units into the skin every evening. 15 mL 3    lancets 30 gauge Misc TEST FOUR TIMES DAILY 200 each 4    levothyroxine (SYNTHROID) 50 MCG tablet Take 1 tablet (50 mcg total) by mouth once daily. 90 tablet 3    losartan (COZAAR) 50 MG tablet Take 1 tablet (50 mg total) by mouth once daily. 90 tablet 3    pen  "needle, diabetic 31 gauge x 5/16" Ndle 1 Device by Misc.(Non-Drug; Combo Route) route 4 (four) times daily. Use with insulin 200 each 11    mupirocin (BACTROBAN) 2 % ointment Apply topically 2 (two) times daily. 1 Tube 0    mupirocin (BACTROBAN) 2 % ointment Apply topically once daily. 1 Tube 0    sulfamethoxazole-trimethoprim 800-160mg (BACTRIM DS) 800-160 mg Tab Take 1 tablet by mouth 2 (two) times daily.       No current facility-administered medications on file prior to visit.        Antibiotics:   Antibiotics (From admission, onward)    None          HIV: No components found for: HIV 1/2 AG/AB  Hepatitis C IgG: No components found for: HEPATITIS C  Syphilis: No results found for: RPR    Hepatitis A IgG: No components found for: HEPATITIS A IGG  Hepatitis Bc IgG: No components found for: HEPATITIS B CORE IGG  Hepatitis Bs IgG:  Quantiferon: No results found for: QUANTIFERON  VZV IgG: No components found for: VARICELLA IGG    No components found for: SEDIMENTATION RATE  No components found for: C-REACTIVE PROTEIN      Microbiology x 7d:   Microbiology Results (last 7 days)     ** No results found for the last 168 hours. **            There is no immunization history on file for this patient.      Assessment:     Osteomyelitis of L fifth toe    Plan:      L fifth toe needs debridement. X-ray c/w osteo. Recommend about 6 weeks of antibiotics for osteo. Had long discussion with patient and his daughter regarding cultures and targeted antibiotic therapy vs empiric treatment. Given that he doesn't have signs of active cellulitis or systemic infection, patient is in agreement that we will hold off on starting antibiotics until after cultures obtained. [facility]. Have scheduled pt for appt with Dr Corbett on 8/7. Have sent her message to request bone biopsy. Pt to call clinic several days after this is done to coordinate starting antibiotics. Trend weekly ESR, CRP, CBC, CMP during " antibiotics. JUAN and arterial ultrasound done recently without significant stenosis.     Mary Arriola MD, MPH  Infectious Disease

## 2019-07-25 ENCOUNTER — TELEPHONE (OUTPATIENT)
Dept: WOUND CARE | Facility: CLINIC | Age: 79
End: 2019-07-25

## 2019-07-25 NOTE — TELEPHONE ENCOUNTER
----- Message from Angel Gomes sent at 7/25/2019  1:27 PM CDT -----  Contact: Pt's daughter Bernice   Needs Advice    Reason for call:The caller states that the Pt saw Dr. Leblanc on 7/10/19.  There was an appt on 7/23/19 with the Infectious disease dept and they told them is all they do is recommend an antibiotic and recommend a bone culture.  They also say the Pt needs an appt with the Podiatry dept and the caller just wants to make sure that this is correct.  Please contact the caller.        Communication Preference:[phone]    Additional Information:

## 2019-07-26 ENCOUNTER — TELEPHONE (OUTPATIENT)
Dept: WOUND CARE | Facility: CLINIC | Age: 79
End: 2019-07-26

## 2019-07-31 ENCOUNTER — TELEPHONE (OUTPATIENT)
Dept: WOUND CARE | Facility: CLINIC | Age: 79
End: 2019-07-31

## 2019-07-31 NOTE — TELEPHONE ENCOUNTER
----- Message from Philly Rosa sent at 7/31/2019 10:18 AM CDT -----  Contact: Patient   Needs Advice     Reason for call: patient is asking for a sooner appointment         Communication Preference: [phone]     Additional Information: please contact the patient, currently on wait list and informed him to call daily to look for cancellations

## 2019-08-05 ENCOUNTER — PATIENT MESSAGE (OUTPATIENT)
Dept: PODIATRY | Facility: CLINIC | Age: 79
End: 2019-08-05

## 2019-08-07 ENCOUNTER — TELEPHONE (OUTPATIENT)
Dept: VASCULAR SURGERY | Facility: CLINIC | Age: 79
End: 2019-08-07

## 2019-08-07 ENCOUNTER — PATIENT MESSAGE (OUTPATIENT)
Dept: VASCULAR SURGERY | Facility: CLINIC | Age: 79
End: 2019-08-07

## 2019-08-07 ENCOUNTER — OFFICE VISIT (OUTPATIENT)
Dept: PODIATRY | Facility: CLINIC | Age: 79
End: 2019-08-07
Payer: MEDICARE

## 2019-08-07 VITALS
DIASTOLIC BLOOD PRESSURE: 66 MMHG | HEART RATE: 57 BPM | HEIGHT: 61 IN | BODY MASS INDEX: 33.61 KG/M2 | SYSTOLIC BLOOD PRESSURE: 115 MMHG | WEIGHT: 178 LBS

## 2019-08-07 DIAGNOSIS — L97.512 SKIN ULCER OF TOE OF RIGHT FOOT WITH FAT LAYER EXPOSED: ICD-10-CM

## 2019-08-07 DIAGNOSIS — I73.9 PERIPHERAL VASCULAR DISEASE: Primary | ICD-10-CM

## 2019-08-07 PROCEDURE — 1100F PR PT FALLS ASSESS DOC 2+ FALLS/FALL W/INJURY/YR: ICD-10-PCS | Mod: CPTII,S$GLB,, | Performed by: PODIATRIST

## 2019-08-07 PROCEDURE — 99213 PR OFFICE/OUTPT VISIT, EST, LEVL III, 20-29 MIN: ICD-10-PCS | Mod: 25,S$GLB,, | Performed by: PODIATRIST

## 2019-08-07 PROCEDURE — 11042 PR DEBRIDEMENT, SKIN, SUB-Q TISSUE,=<20 SQ CM: ICD-10-PCS | Mod: S$GLB,,, | Performed by: PODIATRIST

## 2019-08-07 PROCEDURE — 3288F PR FALLS RISK ASSESSMENT DOCUMENTED: ICD-10-PCS | Mod: CPTII,S$GLB,, | Performed by: PODIATRIST

## 2019-08-07 PROCEDURE — 99999 PR PBB SHADOW E&M-EST. PATIENT-LVL IV: CPT | Mod: PBBFAC,,, | Performed by: PODIATRIST

## 2019-08-07 PROCEDURE — 3074F SYST BP LT 130 MM HG: CPT | Mod: CPTII,S$GLB,, | Performed by: PODIATRIST

## 2019-08-07 PROCEDURE — 3078F DIAST BP <80 MM HG: CPT | Mod: CPTII,S$GLB,, | Performed by: PODIATRIST

## 2019-08-07 PROCEDURE — 3074F PR MOST RECENT SYSTOLIC BLOOD PRESSURE < 130 MM HG: ICD-10-PCS | Mod: CPTII,S$GLB,, | Performed by: PODIATRIST

## 2019-08-07 PROCEDURE — 99213 OFFICE O/P EST LOW 20 MIN: CPT | Mod: 25,S$GLB,, | Performed by: PODIATRIST

## 2019-08-07 PROCEDURE — 3078F PR MOST RECENT DIASTOLIC BLOOD PRESSURE < 80 MM HG: ICD-10-PCS | Mod: CPTII,S$GLB,, | Performed by: PODIATRIST

## 2019-08-07 PROCEDURE — 3288F FALL RISK ASSESSMENT DOCD: CPT | Mod: CPTII,S$GLB,, | Performed by: PODIATRIST

## 2019-08-07 PROCEDURE — 87070 CULTURE OTHR SPECIMN AEROBIC: CPT

## 2019-08-07 PROCEDURE — 87075 CULTR BACTERIA EXCEPT BLOOD: CPT

## 2019-08-07 PROCEDURE — 1100F PTFALLS ASSESS-DOCD GE2>/YR: CPT | Mod: CPTII,S$GLB,, | Performed by: PODIATRIST

## 2019-08-07 PROCEDURE — 11042 DBRDMT SUBQ TIS 1ST 20SQCM/<: CPT | Mod: S$GLB,,, | Performed by: PODIATRIST

## 2019-08-07 PROCEDURE — 99999 PR PBB SHADOW E&M-EST. PATIENT-LVL IV: ICD-10-PCS | Mod: PBBFAC,,, | Performed by: PODIATRIST

## 2019-08-07 NOTE — TELEPHONE ENCOUNTER
----- Message from Ze Bradford MD sent at 8/7/2019  3:55 PM CDT -----  Yes, I will plan to see him ASAP.  Thank you.      ----- Message -----  From: Darshan Corbett DPM  Sent: 8/7/2019   2:20 PM  To: Ze Bradford MD    [facility] Dr Bradford,   I saw a new pt today with a wound on the right 5th digit, with a necrotic center. Non-palpalble pulses, pt had JUAN done that was signed by you. Is there anyway you can see this pt for a non-healing wound?      Darshan Corbett DPM

## 2019-08-08 ENCOUNTER — TELEPHONE (OUTPATIENT)
Dept: VASCULAR SURGERY | Facility: CLINIC | Age: 79
End: 2019-08-08

## 2019-08-08 DIAGNOSIS — I73.9 PERIPHERAL VASCULAR DISEASE: Primary | ICD-10-CM

## 2019-08-08 DIAGNOSIS — L97.512 SKIN ULCER OF TOE OF RIGHT FOOT WITH FAT LAYER EXPOSED: ICD-10-CM

## 2019-08-08 NOTE — TELEPHONE ENCOUNTER
Attempted to contact patient and wife in response to message. Voice message left for patient stating nurse calling about appt with Dr. Bradford and that patient will need to arriver sooner than scheduled appt time due to needed studies. Will reattempt.

## 2019-08-08 NOTE — TELEPHONE ENCOUNTER
Attempted to contact patient and wife to notify them that he will need imaging prior to vascular lab appts with Dr. Bradford. Voice message left for patient.

## 2019-08-09 LAB — BACTERIA SPEC AEROBE CULT: NORMAL

## 2019-08-12 LAB — BACTERIA SPEC ANAEROBE CULT: NORMAL

## 2019-08-12 NOTE — PROGRESS NOTES
Subjective:      Patient ID: Randy Camejo is a 79 y.o. male.    Chief Complaint: Diabetes Mellitus (06/05/2019 dr dustin lazo) and Diabetic Foot Exam    Randy is a 79 y.o. male who presents to the clinic for evaluation and treatment of high risk feet. 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 The patient's chief complaint is foot ulcer, right 5th digit. This patient has documented high risk feet requiring routine maintenance secondary to peripheral vascular disease.    PCP: Dustin Lazo MD    Date Last Seen by PCP:     Current shoe gear:  Affected Foot: Slip-on shoes     Unaffected Foot: Slip-on shoes    History of Trauma: negative  Sign of Infection: none    Hemoglobin A1C   Date Value Ref Range Status   06/05/2019 6.3 (H) 4.0 - 5.6 % Final     Comment:     ADA Screening Guidelines:  5.7-6.4%  Consistent with prediabetes  >or=6.5%  Consistent with diabetes  High levels of fetal hemoglobin interfere with the HbA1C  assay. Heterozygous hemoglobin variants (HbS, HgC, etc)do  not significantly interfere with this assay.   However, presence of multiple variants may affect accuracy.     04/10/2019 8.4 (H) 4.0 - 5.6 % Final     Comment:     ADA Screening Guidelines:  5.7-6.4%  Consistent with prediabetes  >or=6.5%  Consistent with diabetes  High levels of fetal hemoglobin interfere with the HbA1C  assay. Heterozygous hemoglobin variants (HbS, HgC, etc)do  not significantly interfere with this assay.   However, presence of multiple variants may affect accuracy.      02/19/2019 12.6 (H) 4.0 - 5.6 % Final     Comment:     ADA Screening Guidelines:  5.7-6.4%  Consistent with prediabetes  >or=6.5%  Consistent with diabetes  High levels of fetal hemoglobin interfere with the HbA1C  assay. Heterozygous hemoglobin variants (HbS, HgC, etc)do  not significantly interfere with this assay.   However, presence of multiple variants may affect accuracy.         Review of Systems   Constitution: Negative for chills, fever and malaise/fatigue.   HENT: Negative for hearing loss.    Cardiovascular: Negative for claudication.   Respiratory: Negative for shortness of breath.    Skin: Positive for color change, dry skin, nail changes, poor wound healing and unusual hair distribution. Negative for flushing and rash.   Musculoskeletal: Negative for joint pain and myalgias.   Neurological: Negative for loss of balance, numbness, paresthesias and sensory change.   Psychiatric/Behavioral: Negative for altered mental status.           Objective:      Physical Exam   Constitutional: He appears well-developed and well-nourished. He is cooperative.   Cardiovascular:   Pulses:       Dorsalis pedis pulses are 0 on the right side, and 0 on the left side.        Posterior tibial pulses are 0 on the right side, and 1+ on the left side.   Bi-phasic pulses noted with doppler, right   Musculoskeletal:        Right ankle: He exhibits decreased range of motion and abnormal pulse. Achilles tendon exhibits normal Barajas's test results.        Left ankle: He exhibits decreased range of motion and abnormal pulse. Achilles tendon exhibits normal Barajas's test results.        Right foot: There is decreased range of motion.        Left foot: There is decreased range of motion.   Feet:   Right Foot:   Protective Sensation: 5 sites tested. 2 sites sensed.   Left Foot:   Protective Sensation: 5 sites tested. 2 sites sensed.   Neurological: He is alert.   diminished sensation noted to b/L lower extremities   Skin: Skin is  dry. Capillary refill takes more than 3 seconds.   Ulceration  Location: right 5th digit  Measurements: 2.5x 1.5x 0.3 with central eschar  Drainage: serous  Wound base: fibrotic/necrotic  SOI: probes deep to periosteum        Psychiatric: His behavior is normal.   Vitals reviewed.                    Assessment:       Encounter Diagnoses   Name Primary?    Peripheral vascular disease Yes    Skin ulcer of toe of right foot with fat layer exposed          Plan:       Randy was seen today for diabetes mellitus and diabetic foot exam.    Diagnoses and all orders for this visit:    Peripheral vascular disease    Skin ulcer of toe of right foot with fat layer exposed  -     Aerobic culture  -     Culture, Anaerobic      I counseled the patient on his conditions, their implications and medical management.      Wound cultured.   Ulceration on right 5th digit debrided through sub-q tissue using an tissue nipper. Ulceration debrided down to healthy tissue. Pt tolerated debridement well.   Due to pts compromised vascular status, bone biopsy deferred until input from vascular sx.   Football dressing applied to pts right foot by Wanda Pérez MA under my direct supervision. Pt tolerated dressing well.   RTC in 1 week or sooner if any new pedal problems arise, any signs of infection occur, or if condition worsens.     .

## 2019-08-14 ENCOUNTER — OFFICE VISIT (OUTPATIENT)
Dept: PODIATRY | Facility: CLINIC | Age: 79
End: 2019-08-14
Payer: MEDICARE

## 2019-08-14 VITALS
DIASTOLIC BLOOD PRESSURE: 75 MMHG | HEART RATE: 58 BPM | HEIGHT: 61 IN | BODY MASS INDEX: 33.61 KG/M2 | SYSTOLIC BLOOD PRESSURE: 122 MMHG | WEIGHT: 178 LBS

## 2019-08-14 DIAGNOSIS — Z91.199 NONCOMPLIANCE: ICD-10-CM

## 2019-08-14 DIAGNOSIS — I73.9 PERIPHERAL VASCULAR DISEASE: Primary | ICD-10-CM

## 2019-08-14 DIAGNOSIS — L97.512 SKIN ULCER OF TOE OF RIGHT FOOT WITH FAT LAYER EXPOSED: ICD-10-CM

## 2019-08-14 PROCEDURE — 99999 PR PBB SHADOW E&M-EST. PATIENT-LVL IV: ICD-10-PCS | Mod: PBBFAC,,, | Performed by: PODIATRIST

## 2019-08-14 PROCEDURE — 11042 DBRDMT SUBQ TIS 1ST 20SQCM/<: CPT | Mod: S$GLB,,, | Performed by: PODIATRIST

## 2019-08-14 PROCEDURE — 99999 PR PBB SHADOW E&M-EST. PATIENT-LVL IV: CPT | Mod: PBBFAC,,, | Performed by: PODIATRIST

## 2019-08-14 PROCEDURE — 99499 UNLISTED E&M SERVICE: CPT | Mod: S$GLB,,, | Performed by: PODIATRIST

## 2019-08-14 PROCEDURE — 99499 NO LOS: ICD-10-PCS | Mod: S$GLB,,, | Performed by: PODIATRIST

## 2019-08-14 PROCEDURE — 11042 PR DEBRIDEMENT, SKIN, SUB-Q TISSUE,=<20 SQ CM: ICD-10-PCS | Mod: S$GLB,,, | Performed by: PODIATRIST

## 2019-08-14 NOTE — LETTER
August 20, 2019      Mary Arriola MD  [address]  [facility] [address]           [facility]  [address]  [facility] [address]  Phone: [phone]          Patient: Randy Camejo   MR Number: [id number]   YOB: 1940   Date of Visit: 8/14/2019       Dear Dr. Mary Arriola:    Thank you for referring Randy Camejo to me for evaluation. Attached you will find relevant portions of my assessment and plan of care.    If you have questions, please do not hesitate to call me. I look forward to following Randy Camejo along with you.    Sincerely,    Pattie Corbett, DPSHADE    Enclosure  CC:  No Recipients    If you would like to receive this communication electronically, please contact [email or web address] or [phone] to request more information on [facility] [employer] access.    For providers and/or their staff who would like to refer a patient to [facility], please contact us through our one-stop-shop provider referral line, [facility], at [phone].    If you feel you have received this communication in error or would no longer like to receive these types of communications, please e-mail [email or web address]

## 2019-08-20 NOTE — PROGRESS NOTES
Subjective:      Patient ID: Randy Camejo is a 79 y.o. male.    Chief Complaint: Diabetes Mellitus (06/05/2019 dr dustin lazo) and Diabetic Foot Exam    Randy is a 79 y.o. male who presents to the clinic for evaluation and treatment of high risk feet. 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 The patient's chief complaint is foot ulcer, right 5th digit. Dr Bradford made an appointment to see pt last Friday, but pt did not go bc he states he had something to do.  This patient has documented high risk feet requiring routine maintenance secondary to peripheral vascular disease.    PCP: Dustin Lazo MD    Date Last Seen by PCP:     Current shoe gear:  Affected Foot: Football and Darco shoe on the affected foot     Unaffected Foot: Slip-on shoes    History of Trauma: negative  Sign of Infection: none    Hemoglobin A1C   Date Value Ref Range Status   06/05/2019 6.3 (H) 4.0 - 5.6 % Final     Comment:     ADA Screening Guidelines:  5.7-6.4%  Consistent with prediabetes  >or=6.5%  Consistent with diabetes  High levels of fetal hemoglobin interfere with the HbA1C  assay. Heterozygous hemoglobin variants (HbS, HgC, etc)do  not significantly interfere with this assay.   However, presence of multiple variants may affect accuracy.     04/10/2019 8.4 (H) 4.0 - 5.6 % Final     Comment:     ADA Screening Guidelines:  5.7-6.4%  Consistent with prediabetes  >or=6.5%  Consistent with diabetes  High levels of fetal hemoglobin interfere with the HbA1C  assay. Heterozygous hemoglobin  variants (HbS, HgC, etc)do  not significantly interfere with this assay.   However, presence of multiple variants may affect accuracy.     02/19/2019 12.6 (H) 4.0 - 5.6 % Final     Comment:     ADA Screening Guidelines:  5.7-6.4%  Consistent with prediabetes  >or=6.5%  Consistent with diabetes  High levels of fetal hemoglobin interfere with the HbA1C  assay. Heterozygous hemoglobin variants (HbS, HgC, etc)do  not significantly interfere with this assay.   However, presence of multiple variants may affect accuracy.         Review of Systems   Constitution: Negative for chills, fever and malaise/fatigue.   HENT: Negative for hearing loss.    Cardiovascular: Negative for claudication.   Respiratory: Negative for shortness of breath.    Skin: Positive for color change, dry skin, nail changes, poor wound healing and unusual hair distribution. Negative for flushing and rash.   Musculoskeletal: Negative for joint pain and myalgias.   Neurological: Negative for loss of balance, numbness, paresthesias and sensory change.   Psychiatric/Behavioral: Negative for altered mental status.               Objective:      Physical Exam   Constitutional: He appears well-developed and well-nourished. He is cooperative.   Cardiovascular:   Pulses:       Dorsalis pedis pulses are 0 on the right side, and 0 on the left side.        Posterior tibial pulses are 0 on the right side, and 1+ on the left side.   Bi-phasic pulses noted with doppler, right   Musculoskeletal:        Right ankle: He exhibits decreased range of motion and abnormal pulse. Achilles tendon exhibits normal Barajas's test results.        Left ankle: He exhibits decreased range of motion and abnormal pulse. Achilles tendon exhibits normal Barajas's test results.        Right foot: There is decreased range of motion.        Left foot: There is decreased range of motion.   Feet:   Right Foot:   Protective Sensation: 5 sites tested. 2 sites sensed.   Left Foot:   Protective  Sensation: 5 sites tested. 2 sites sensed.   Neurological: He is alert.   diminished sensation noted to b/L lower extremities   Skin: Skin is dry. Capillary refill takes more than 3 seconds.   Ulceration  Location: right 5th digit  Measurements: 2.5x 1.5x 0.3 with central eschar  Drainage: serous  Wound base: fibrotic/necrotic  SOI: probes deep to periosteum        Psychiatric: His behavior is normal.   Vitals reviewed.                    Assessment:       Encounter Diagnoses   Name Primary?    Peripheral vascular disease Yes    Skin ulcer of toe of right foot with fat layer exposed     Noncompliance          Plan:       Randy was seen today for diabetes mellitus and diabetic foot exam.    Diagnoses and all orders for this visit:    Peripheral vascular disease  -     Ambulatory consult to Vascular Surgery    Skin ulcer of toe of right foot with fat layer exposed  -     Ambulatory consult to Vascular Surgery    Noncompliance      I counseled the patient on his conditions, their implications and medical management.      Long discussion had with pt regarding the importance of following up with vascular sx. Due to schedule, pt wasn't able to reschedule an appointment until 9/7  Ulceration on right 5th digit debrided through sub-q tissue using an tissue nipper. Ulceration debrided down to healthy tissue. Pt tolerated debridement well.   Due to pts compromised vascular status, bone biopsy deferred until input from vascular sx.   Football dressing applied to pts right foot by Wanda Pérez MA under my direct supervision. Pt tolerated dressing well.   RTC in 1 week or sooner if any new pedal problems arise, any signs of infection occur, or if condition worsens.     .

## 2019-08-22 ENCOUNTER — HOSPITAL ENCOUNTER (OUTPATIENT)
Dept: RADIOLOGY | Facility: HOSPITAL | Age: 79
Discharge: HOME | End: 2019-08-22
Attending: PODIATRIST
Payer: MEDICARE

## 2019-08-22 ENCOUNTER — OFFICE VISIT (OUTPATIENT)
Dept: PODIATRY | Facility: CLINIC | Age: 79
End: 2019-08-22
Payer: MEDICARE

## 2019-08-22 VITALS
SYSTOLIC BLOOD PRESSURE: 104 MMHG | HEART RATE: 50 BPM | BODY MASS INDEX: 33.41 KG/M2 | WEIGHT: 178 LBS | DIASTOLIC BLOOD PRESSURE: 63 MMHG

## 2019-08-22 DIAGNOSIS — L97.512 SKIN ULCER OF TOE OF RIGHT FOOT WITH FAT LAYER EXPOSED: ICD-10-CM

## 2019-08-22 DIAGNOSIS — L97.512 SKIN ULCER OF TOE OF RIGHT FOOT WITH FAT LAYER EXPOSED: Primary | ICD-10-CM

## 2019-08-22 PROCEDURE — 73630 X-RAY EXAM OF FOOT: CPT | Mod: TC,RT

## 2019-08-22 PROCEDURE — 88307 TISSUE EXAM BY PATHOLOGIST: CPT | Mod: 26,,, | Performed by: PATHOLOGY

## 2019-08-22 PROCEDURE — 88311 PR  DECALCIFY TISSUE: ICD-10-PCS | Mod: 26,,, | Performed by: PATHOLOGY

## 2019-08-22 PROCEDURE — 88307 PR  SURG PATH,LEVEL V: ICD-10-PCS | Mod: 26,,, | Performed by: PATHOLOGY

## 2019-08-22 PROCEDURE — 87116 MYCOBACTERIA CULTURE: CPT

## 2019-08-22 PROCEDURE — 99499 NO LOS: ICD-10-PCS | Mod: S$GLB,,, | Performed by: PODIATRIST

## 2019-08-22 PROCEDURE — 20220 BONE BIOPSY TROCAR/NDL SUPFC: CPT | Mod: S$GLB,,, | Performed by: PODIATRIST

## 2019-08-22 PROCEDURE — 73630 X-RAY EXAM OF FOOT: CPT | Mod: 26,RT,, | Performed by: RADIOLOGY

## 2019-08-22 PROCEDURE — 87206 SMEAR FLUORESCENT/ACID STAI: CPT

## 2019-08-22 PROCEDURE — 20220 PR BONE BIOPSY,TROCAR/NEEDLE SUPERF: ICD-10-PCS | Mod: S$GLB,,, | Performed by: PODIATRIST

## 2019-08-22 PROCEDURE — 87070 CULTURE OTHR SPECIMN AEROBIC: CPT

## 2019-08-22 PROCEDURE — 99999 PR PBB SHADOW E&M-EST. PATIENT-LVL III: CPT | Mod: PBBFAC,,, | Performed by: PODIATRIST

## 2019-08-22 PROCEDURE — 99999 PR PBB SHADOW E&M-EST. PATIENT-LVL III: ICD-10-PCS | Mod: PBBFAC,,, | Performed by: PODIATRIST

## 2019-08-22 PROCEDURE — 87075 CULTR BACTERIA EXCEPT BLOOD: CPT

## 2019-08-22 PROCEDURE — 87102 FUNGUS ISOLATION CULTURE: CPT

## 2019-08-22 PROCEDURE — 73630 XR FOOT COMPLETE 3 VIEW RIGHT: ICD-10-PCS | Mod: 26,RT,, | Performed by: RADIOLOGY

## 2019-08-22 PROCEDURE — 88311 DECALCIFY TISSUE: CPT | Mod: 26,,, | Performed by: PATHOLOGY

## 2019-08-22 PROCEDURE — 99499 UNLISTED E&M SERVICE: CPT | Mod: S$GLB,,, | Performed by: PODIATRIST

## 2019-08-22 PROCEDURE — 87205 SMEAR GRAM STAIN: CPT

## 2019-08-22 NOTE — PROGRESS NOTES
Subjective:      Patient ID: Randy Camejo is a 79 y.o. male.    Chief Complaint: Follow-up (06/05/2019 dr dustin lazo)    Randy is a 79 y.o. male who presents to the clinic for evaluation and treatment of high risk feet. 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 The patient's chief complaint is foot ulcer, right 5th digit. Dr Bradford made an appointment to see pt last Friday, but pt did not go bc he states he had something to do.  This patient has documented high risk feet requiring routine maintenance secondary to peripheral vascular disease.    8/22/2019: He has worsening right 5th toe ulceration but denies any F/N/V/C or pain today.    PCP: Dustin Lazo MD    Date Last Seen by PCP:     Current shoe gear:  Affected Foot: Football and Darco shoe on the affected foot     Unaffected Foot: Slip-on shoes    History of Trauma: negative  Sign of Infection: none    Hemoglobin A1C   Date Value Ref Range Status   06/05/2019 6.3 (H) 4.0 - 5.6 % Final     Comment:     ADA Screening Guidelines:  5.7-6.4%  Consistent with prediabetes  >or=6.5%  Consistent with diabetes  High levels of fetal hemoglobin interfere with the HbA1C  assay. Heterozygous hemoglobin variants (HbS, HgC, etc)do  not significantly interfere with this assay.   However, presence of multiple variants may affect accuracy.     04/10/2019 8.4 (H) 4.0 - 5.6 % Final     Comment:     ADA Screening Guidelines:  5.7-6.4%  Consistent with prediabetes  >or=6.5%  Consistent with diabetes  High levels of fetal hemoglobin  interfere with the HbA1C  assay. Heterozygous hemoglobin variants (HbS, HgC, etc)do  not significantly interfere with this assay.   However, presence of multiple variants may affect accuracy.     02/19/2019 12.6 (H) 4.0 - 5.6 % Final     Comment:     ADA Screening Guidelines:  5.7-6.4%  Consistent with prediabetes  >or=6.5%  Consistent with diabetes  High levels of fetal hemoglobin interfere with the HbA1C  assay. Heterozygous hemoglobin variants (HbS, HgC, etc)do  not significantly interfere with this assay.   However, presence of multiple variants may affect accuracy.         Review of Systems   Constitution: Negative for chills, fever and malaise/fatigue.   HENT: Negative for hearing loss.    Cardiovascular: Negative for claudication.   Respiratory: Negative for shortness of breath.    Skin: Positive for color change, dry skin, nail changes, poor wound healing and unusual hair distribution. Negative for flushing and rash.   Musculoskeletal: Negative for joint pain and myalgias.   Neurological: Negative for loss of balance, numbness, paresthesias and sensory change.   Psychiatric/Behavioral: Negative for altered mental status.               Objective:      Physical Exam   Constitutional: He appears well-developed and well-nourished. He is cooperative.   Cardiovascular:   Pulses:       Dorsalis pedis pulses are 0 on the right side, and 0 on the left side.        Posterior tibial pulses are 0 on the right side, and 1+ on the left side.   Bi-phasic pulses noted with doppler, right   Musculoskeletal:        Right ankle: He exhibits decreased range of motion and abnormal pulse. Achilles tendon exhibits normal Barajas's test results.        Left ankle: He exhibits decreased range of motion and abnormal pulse. Achilles tendon exhibits normal Barajas's test results.        Right foot: There is decreased range of motion.        Left foot: There is decreased range of motion.   Feet:   Right Foot:   Protective Sensation: 5  sites tested. 2 sites sensed.   Left Foot:   Protective Sensation: 5 sites tested. 2 sites sensed.   Neurological: He is alert.   diminished sensation noted to b/L lower extremities   Skin: Skin is dry. Capillary refill takes more than 3 seconds.   Ulceration  Location: right 5th digit  Measurements: 2.5x 1.5x 0.3   Drainage: serous  Wound base: fibrogranular  SOI: probes deep to bone and exposed joint capsule       Psychiatric: His behavior is normal.   Vitals reviewed.                    Assessment:       Encounter Diagnosis   Name Primary?    Skin ulcer of toe of right foot with fat layer exposed Yes         Plan:       Randy was seen today for follow-up.    Diagnoses and all orders for this visit:    Skin ulcer of toe of right foot with fat layer exposed  -     X-Ray Foot Complete 3 view Right; Future      I counseled the patient on his conditions, their implications and medical management.      -Reiterated the importance of following up with vascular sx. Due to schedule, pt wasn't able to reschedule an appointment until 9/7  -Written consent was obtained from the patient for a bone biopsy today and bone biopsy was obtained today. Procedure note below.  -We will notify patient of culture and pathology findings as they result.  -Football dressing applied to pts right foot by Wanda Pérez MA under my direct supervision. Pt tolerated dressing well.   -RTC in 1 week or sooner if any new pedal problems arise, any signs of infection occur, or if condition worsens.     08/22/2019  Procedure: Bone biopsy of Right 5th metatarsal  Diagnosis: Osteomyelitis  Supervising provider: Dr. Pattie Corbett DPM   Performing provider: Dr. Rodger Skelton DPM PGY2    Procedure in detail: A time out was performed following WHO surgical safety checklist guidelines. All present are in agreement.     Attention to the right foot. A local block was performed using 5 mL of 1% lidocaine plain. The site was then prepped and draped in cleanly. A  stab incision was made and a jamshidi needle was inserted to extract a small piece of bone. The bone was split and sent for cultures and pathology. Hemostasis was achieved with pressure. The wound was dressed with iodosorb and a football dressing to stay intact until next follow up. Patient can WBAT in surgical shoe.    Rodger Skelton DPM PGY2      .

## 2019-08-22 NOTE — LETTER
August 27, 2019      Mary Arriola MD  [address]  [facility] [address]           [facility]  [address]  [facility] [address]  Phone: [phone]          Patient: Randy Camejo   MR Number: [id number]   YOB: 1940   Date of Visit: 8/22/2019       Dear Dr. Mary Arriola:    Thank you for referring Randy Camejo to me for evaluation. Attached you will find relevant portions of my assessment and plan of care.    If you have questions, please do not hesitate to call me. I look forward to following Randy Camejo along with you.    Sincerely,    Pattie Corbett, DPSHADE    Enclosure  CC:  No Recipients    If you would like to receive this communication electronically, please contact [email or web address] or [phone] to request more information on [employer] access.    For providers and/or their staff who would like to refer a patient to [facility], please contact us through our one-stop-shop provider referral line, [facility], at [phone].    If you feel you have received this communication in error or would no longer like to receive these types of communications, please e-mail [email or web address]

## 2019-08-23 LAB
GRAM STN SPEC: NORMAL
GRAM STN SPEC: NORMAL

## 2019-08-23 PROCEDURE — 88307 TISSUE EXAM BY PATHOLOGIST: CPT | Performed by: PATHOLOGY

## 2019-08-27 LAB — BACTERIA SPEC AEROBE CULT: NO GROWTH

## 2019-08-28 ENCOUNTER — HOSPITAL ENCOUNTER (INPATIENT)
Facility: HOSPITAL | Age: 79
LOS: 4 days | Discharge: HOME HEALTH | DRG: 617 | End: 2019-09-01
Attending: EMERGENCY MEDICINE | Admitting: INTERNAL MEDICINE
Payer: MEDICARE

## 2019-08-28 ENCOUNTER — OFFICE VISIT (OUTPATIENT)
Dept: PODIATRY | Facility: CLINIC | Age: 79
End: 2019-08-28
Payer: MEDICARE

## 2019-08-28 VITALS
SYSTOLIC BLOOD PRESSURE: 113 MMHG | HEART RATE: 54 BPM | DIASTOLIC BLOOD PRESSURE: 69 MMHG | BODY MASS INDEX: 33.61 KG/M2 | WEIGHT: 178 LBS | HEIGHT: 61 IN

## 2019-08-28 DIAGNOSIS — E11.49 TYPE 2 DIABETES MELLITUS WITH NEUROLOGIC COMPLICATION, WITH LONG-TERM CURRENT USE OF INSULIN: ICD-10-CM

## 2019-08-28 DIAGNOSIS — L97.512 SKIN ULCER OF TOE OF RIGHT FOOT WITH FAT LAYER EXPOSED: Primary | ICD-10-CM

## 2019-08-28 DIAGNOSIS — L97.519 TOE ULCER, RIGHT: ICD-10-CM

## 2019-08-28 DIAGNOSIS — I25.10 CORONARY ARTERY DISEASE, ANGINA PRESENCE UNSPECIFIED, UNSPECIFIED VESSEL OR LESION TYPE, UNSPECIFIED WHETHER NATIVE OR TRANSPLANTED HEART: ICD-10-CM

## 2019-08-28 DIAGNOSIS — L03.115 CELLULITIS OF RIGHT LOWER EXTREMITY: ICD-10-CM

## 2019-08-28 DIAGNOSIS — Z79.4 TYPE 2 DIABETES MELLITUS WITH NEUROLOGIC COMPLICATION, WITH LONG-TERM CURRENT USE OF INSULIN: ICD-10-CM

## 2019-08-28 DIAGNOSIS — M86.9 TOE OSTEOMYELITIS, RIGHT: Primary | ICD-10-CM

## 2019-08-28 DIAGNOSIS — Z79.01 CHRONIC ANTICOAGULATION: ICD-10-CM

## 2019-08-28 DIAGNOSIS — I48.0 PAROXYSMAL ATRIAL FIBRILLATION: ICD-10-CM

## 2019-08-28 LAB
ALBUMIN SERPL BCP-MCNC: 3.6 G/DL (ref 3.5–5.2)
ALP SERPL-CCNC: 79 U/L (ref 55–135)
ALT SERPL W/O P-5'-P-CCNC: 34 U/L (ref 10–44)
ANION GAP SERPL CALC-SCNC: 9 MMOL/L (ref 8–16)
AST SERPL-CCNC: 36 U/L (ref 10–40)
BASOPHILS # BLD AUTO: 0.04 K/UL (ref 0–0.2)
BASOPHILS NFR BLD: 0.7 % (ref 0–1.9)
BILIRUB SERPL-MCNC: 0.9 MG/DL (ref 0.1–1)
BUN SERPL-MCNC: 27 MG/DL (ref 8–23)
CALCIUM SERPL-MCNC: 9.1 MG/DL (ref 8.7–10.5)
CHLORIDE SERPL-SCNC: 104 MMOL/L (ref 95–110)
CO2 SERPL-SCNC: 24 MMOL/L (ref 23–29)
CREAT SERPL-MCNC: 1.4 MG/DL (ref 0.5–1.4)
CRP SERPL-MCNC: 20.5 MG/L (ref 0–8.2)
DIFFERENTIAL METHOD: ABNORMAL
EOSINOPHIL # BLD AUTO: 0.1 K/UL (ref 0–0.5)
EOSINOPHIL NFR BLD: 1.2 % (ref 0–8)
ERYTHROCYTE [DISTWIDTH] IN BLOOD BY AUTOMATED COUNT: 13.1 % (ref 11.5–14.5)
ERYTHROCYTE [SEDIMENTATION RATE] IN BLOOD BY WESTERGREN METHOD: 42 MM/HR (ref 0–23)
EST. GFR  (AFRICAN AMERICAN): 55.2 ML/MIN/1.73 M^2
EST. GFR  (NON AFRICAN AMERICAN): 47.8 ML/MIN/1.73 M^2
GLUCOSE SERPL-MCNC: 108 MG/DL (ref 70–110)
HCT VFR BLD AUTO: 36.7 % (ref 40–54)
HGB BLD-MCNC: 11.6 G/DL (ref 14–18)
IMM GRANULOCYTES # BLD AUTO: 0.01 K/UL (ref 0–0.04)
IMM GRANULOCYTES NFR BLD AUTO: 0.2 % (ref 0–0.5)
LACTATE SERPL-SCNC: 0.9 MMOL/L (ref 0.5–2.2)
LYMPHOCYTES # BLD AUTO: 0.7 K/UL (ref 1–4.8)
LYMPHOCYTES NFR BLD: 11.3 % (ref 18–48)
MAGNESIUM SERPL-MCNC: 2.3 MG/DL (ref 1.6–2.6)
MCH RBC QN AUTO: 32.5 PG (ref 27–31)
MCHC RBC AUTO-ENTMCNC: 31.6 G/DL (ref 32–36)
MCV RBC AUTO: 103 FL (ref 82–98)
MONOCYTES # BLD AUTO: 0.8 K/UL (ref 0.3–1)
MONOCYTES NFR BLD: 14.7 % (ref 4–15)
NEUTROPHILS # BLD AUTO: 4.1 K/UL (ref 1.8–7.7)
NEUTROPHILS NFR BLD: 71.9 % (ref 38–73)
NRBC BLD-RTO: 0 /100 WBC
PLATELET # BLD AUTO: 129 K/UL (ref 150–350)
PMV BLD AUTO: 11.1 FL (ref 9.2–12.9)
POCT GLUCOSE: 119 MG/DL (ref 70–110)
POCT GLUCOSE: 95 MG/DL (ref 70–110)
POTASSIUM SERPL-SCNC: 4.4 MMOL/L (ref 3.5–5.1)
POTASSIUM SERPL-SCNC: 5.6 MMOL/L (ref 3.5–5.1)
PROT SERPL-MCNC: 8 G/DL (ref 6–8.4)
RBC # BLD AUTO: 3.57 M/UL (ref 4.6–6.2)
SODIUM SERPL-SCNC: 137 MMOL/L (ref 136–145)
WBC # BLD AUTO: 5.73 K/UL (ref 3.9–12.7)

## 2019-08-28 PROCEDURE — 83735 ASSAY OF MAGNESIUM: CPT

## 2019-08-28 PROCEDURE — 85025 COMPLETE CBC W/AUTO DIFF WBC: CPT

## 2019-08-28 PROCEDURE — 80053 COMPREHEN METABOLIC PANEL: CPT

## 2019-08-28 PROCEDURE — 83605 ASSAY OF LACTIC ACID: CPT

## 2019-08-28 PROCEDURE — 84132 ASSAY OF SERUM POTASSIUM: CPT

## 2019-08-28 PROCEDURE — 87040 BLOOD CULTURE FOR BACTERIA: CPT

## 2019-08-28 PROCEDURE — 11042 DBRDMT SUBQ TIS 1ST 20SQCM/<: CPT | Mod: S$GLB,,, | Performed by: PODIATRIST

## 2019-08-28 PROCEDURE — 99223 1ST HOSP IP/OBS HIGH 75: CPT | Mod: AI,GC,, | Performed by: INTERNAL MEDICINE

## 2019-08-28 PROCEDURE — 99999 PR PBB SHADOW E&M-EST. PATIENT-LVL IV: CPT | Mod: PBBFAC,,, | Performed by: PODIATRIST

## 2019-08-28 PROCEDURE — 99499 UNLISTED E&M SERVICE: CPT | Mod: S$GLB,,, | Performed by: PODIATRIST

## 2019-08-28 PROCEDURE — 99999 PR PBB SHADOW E&M-EST. PATIENT-LVL IV: ICD-10-PCS | Mod: PBBFAC,,, | Performed by: PODIATRIST

## 2019-08-28 PROCEDURE — 99285 PR EMERGENCY DEPT VISIT,LEVEL V: ICD-10-PCS | Mod: ,,, | Performed by: PHYSICIAN ASSISTANT

## 2019-08-28 PROCEDURE — 85652 RBC SED RATE AUTOMATED: CPT

## 2019-08-28 PROCEDURE — 99223 PR INITIAL HOSPITAL CARE,LEVL III: ICD-10-PCS | Mod: AI,GC,, | Performed by: INTERNAL MEDICINE

## 2019-08-28 PROCEDURE — 99499 NO LOS: ICD-10-PCS | Mod: S$GLB,,, | Performed by: PODIATRIST

## 2019-08-28 PROCEDURE — 99285 EMERGENCY DEPT VISIT HI MDM: CPT | Mod: ,,, | Performed by: PHYSICIAN ASSISTANT

## 2019-08-28 PROCEDURE — 63600175 PHARM REV CODE 636 W HCPCS: Performed by: STUDENT IN AN ORGANIZED HEALTH CARE EDUCATION/TRAINING PROGRAM

## 2019-08-28 PROCEDURE — 86140 C-REACTIVE PROTEIN: CPT

## 2019-08-28 PROCEDURE — S5571 INSULIN DISPOS PEN 3 ML: HCPCS | Performed by: STUDENT IN AN ORGANIZED HEALTH CARE EDUCATION/TRAINING PROGRAM

## 2019-08-28 PROCEDURE — 25000003 PHARM REV CODE 250: Performed by: STUDENT IN AN ORGANIZED HEALTH CARE EDUCATION/TRAINING PROGRAM

## 2019-08-28 PROCEDURE — A9585 GADOBUTROL INJECTION: HCPCS | Performed by: INTERNAL MEDICINE

## 2019-08-28 PROCEDURE — 25500020 PHARM REV CODE 255: Performed by: INTERNAL MEDICINE

## 2019-08-28 PROCEDURE — 11042 PR DEBRIDEMENT, SKIN, SUB-Q TISSUE,=<20 SQ CM: ICD-10-PCS | Mod: S$GLB,,, | Performed by: PODIATRIST

## 2019-08-28 PROCEDURE — 99285 EMERGENCY DEPT VISIT HI MDM: CPT | Mod: 25

## 2019-08-28 PROCEDURE — 11000001 HC ACUTE MED/SURG PRIVATE ROOM

## 2019-08-28 RX ORDER — ONDANSETRON 8 MG/1
8 TABLET, ORALLY DISINTEGRATING ORAL EVERY 8 HOURS PRN
Status: DISCONTINUED | OUTPATIENT
Start: 2019-08-28 | End: 2019-09-01 | Stop reason: HOSPADM

## 2019-08-28 RX ORDER — AMOXICILLIN AND CLAVULANATE POTASSIUM 875; 125 MG/1; MG/1
1 TABLET, FILM COATED ORAL 2 TIMES DAILY
Qty: 30 TABLET | Refills: 0 | Status: SHIPPED | OUTPATIENT
Start: 2019-08-28 | End: 2019-09-01 | Stop reason: HOSPADM

## 2019-08-28 RX ORDER — AMIODARONE HYDROCHLORIDE 200 MG/1
200 TABLET ORAL DAILY
Status: DISCONTINUED | OUTPATIENT
Start: 2019-08-29 | End: 2019-09-01 | Stop reason: HOSPADM

## 2019-08-28 RX ORDER — GLUCAGON 1 MG
1 KIT INJECTION
Status: DISCONTINUED | OUTPATIENT
Start: 2019-08-28 | End: 2019-09-01 | Stop reason: HOSPADM

## 2019-08-28 RX ORDER — LOSARTAN POTASSIUM 50 MG/1
50 TABLET ORAL DAILY
Status: DISCONTINUED | OUTPATIENT
Start: 2019-08-29 | End: 2019-08-28

## 2019-08-28 RX ORDER — GADOBUTROL 604.72 MG/ML
8 INJECTION INTRAVENOUS
Status: COMPLETED | OUTPATIENT
Start: 2019-08-28 | End: 2019-08-28

## 2019-08-28 RX ORDER — ATORVASTATIN CALCIUM 20 MG/1
20 TABLET, FILM COATED ORAL DAILY
Status: DISCONTINUED | OUTPATIENT
Start: 2019-08-29 | End: 2019-09-01 | Stop reason: HOSPADM

## 2019-08-28 RX ORDER — CARVEDILOL 6.25 MG/1
6.25 TABLET ORAL 2 TIMES DAILY
Status: DISCONTINUED | OUTPATIENT
Start: 2019-08-28 | End: 2019-09-01 | Stop reason: HOSPADM

## 2019-08-28 RX ORDER — ACETAMINOPHEN 325 MG/1
650 TABLET ORAL EVERY 4 HOURS PRN
Status: DISCONTINUED | OUTPATIENT
Start: 2019-08-28 | End: 2019-09-01 | Stop reason: HOSPADM

## 2019-08-28 RX ORDER — IBUPROFEN 200 MG
16 TABLET ORAL
Status: DISCONTINUED | OUTPATIENT
Start: 2019-08-28 | End: 2019-09-01 | Stop reason: HOSPADM

## 2019-08-28 RX ORDER — IBUPROFEN 200 MG
24 TABLET ORAL
Status: DISCONTINUED | OUTPATIENT
Start: 2019-08-28 | End: 2019-09-01 | Stop reason: HOSPADM

## 2019-08-28 RX ORDER — INSULIN ASPART 100 [IU]/ML
0-5 INJECTION, SOLUTION INTRAVENOUS; SUBCUTANEOUS
Status: DISCONTINUED | OUTPATIENT
Start: 2019-08-28 | End: 2019-09-01 | Stop reason: HOSPADM

## 2019-08-28 RX ORDER — LEVOTHYROXINE SODIUM 50 UG/1
50 TABLET ORAL
Status: DISCONTINUED | OUTPATIENT
Start: 2019-08-29 | End: 2019-09-01 | Stop reason: HOSPADM

## 2019-08-28 RX ORDER — SODIUM CHLORIDE 0.9 % (FLUSH) 0.9 %
10 SYRINGE (ML) INJECTION
Status: DISCONTINUED | OUTPATIENT
Start: 2019-08-28 | End: 2019-09-01 | Stop reason: HOSPADM

## 2019-08-28 RX ADMIN — GADOBUTROL 8 ML: 604.72 INJECTION INTRAVENOUS at 11:08

## 2019-08-28 RX ADMIN — CARVEDILOL 6.25 MG: 6.25 TABLET, FILM COATED ORAL at 10:08

## 2019-08-28 RX ADMIN — INSULIN DETEMIR 10 UNITS: 100 INJECTION, SOLUTION SUBCUTANEOUS at 09:08

## 2019-08-28 RX ADMIN — PIPERACILLIN AND TAZOBACTAM 4.5 G: 4; .5 INJECTION, POWDER, LYOPHILIZED, FOR SOLUTION INTRAVENOUS; PARENTERAL at 09:08

## 2019-08-28 RX ADMIN — SODIUM CHLORIDE 500 ML: 0.9 INJECTION, SOLUTION INTRAVENOUS at 06:08

## 2019-08-28 RX ADMIN — VANCOMYCIN HYDROCHLORIDE 2250 MG: 1.25 INJECTION, POWDER, LYOPHILIZED, FOR SOLUTION INTRAVENOUS at 09:08

## 2019-08-28 NOTE — SUBJECTIVE & OBJECTIVE
Past Medical History:   Diagnosis Date    Asthma     childhood    Atrial fibrillation     CHF (congestive heart failure)     Chronic anticoagulation 2/25/2019    Coronary artery disease     Coronary artery disease 2/25/2019    Diabetes mellitus, type 2     Heart attack     stent    High risk medication use 3/22/2019    2/19/2019: Began amiodarone.    History of coronary artery stent placement 3/22/2019    2000: [facility]: MI and stent.    History of myocardial infarction 3/22/2019    2000: [facility]: MI and stent.    Hypercholesterolemia 3/22/2019    Hypertension 3/22/2019    Tachycardia induced cardiomyopathy 2/25/2019       Past Surgical History:   Procedure Laterality Date    CARDIAC CATHETERIZATION      CARDIOVERSION N/A 2/21/2019    Performed [facility] C[facility]    CARDIOVERSION OR DEFIBRILLATION N/A 2/25/2019    Per[facility] by [facility] CATH [facility]    CATARACT EXTRACTION W/  INTRAOCULAR LENS IMPLANT Right 12/18/2017    Dr. Beavers    CATARACT EXTRACTION W/  INTRAOCULAR LENS IMPLANT Left 01/08/2018    Dr. Beavers    ECHOCARDIOGRAM,TRANSESOPHAGEAL N/A 2/25/2019    Per[facility] by [facility] CATH LAB    ECHOCARDIOGRAM,TRANSESOPHAGEAL N/A 2/21/2019    Per[facility] by [facility] CATH LAB    INSERTION-INTRAOCULAR LENS (IOL) Left 1/8/2018    Performed by Milo Beavers MD at [facility] OR    INSERTION-INTRAOCULAR LENS (IOL) Right 12/18/2017    Performed [facility]    PHACOEMULSIFICATION-ASPIRATION-CATARACT Left 1/8/2018    [facility] OR    PHACOEMULSIFICATION-ASPIRATION-CATARACT Right 12/18/2017    [facility] at [facility] OR    TONSILLECTOMY         Review of patient's allergies indicates:  No Known Allergies    No current facility-administered medications on file prior to encounter.      Current Outpatient Medications on File Prior to Encounter   Medication Sig    amiodarone (PACERONE)  "200 MG Tab Take 1 tablet (200 mg total) by mouth once daily.    amoxicillin-clavulanate 875-125mg (AUGMENTIN) 875-125 mg per tablet Take 1 tablet by mouth 2 (two) times daily.    apixaban (ELIQUIS) 5 mg Tab Take 1 tablet (5 mg total) by mouth 2 (two) times daily.    atorvastatin (LIPITOR) 20 MG tablet Take 1 tablet (20 mg total) by mouth once daily.    carvedilol (COREG) 6.25 MG tablet Take 1 tablet (6.25 mg total) by mouth 2 (two) times daily.    furosemide (LASIX) 20 MG tablet Take 1 tablet (20 mg total) by mouth once daily.    insulin aspart U-100 (NOVOLOG) 100 unit/mL (3 mL) InPn pen Inject 3 Units into the skin 3 (three) times daily.    levothyroxine (SYNTHROID) 50 MCG tablet Take 1 tablet (50 mcg total) by mouth once daily.    blood sugar diagnostic Strp Use to check blood glucose four times daily, to use with insurance preferred meter    blood-glucose meter kit test as directed    insulin detemir U-100 (LEVEMIR FLEXTOUCH) 100 unit/mL (3 mL) SubQ InPn pen Inject 15 Units into the skin every evening.    lancets 30 gauge Misc TEST FOUR TIMES DAILY    losartan (COZAAR) 50 MG tablet Take 1 tablet (50 mg total) by mouth once daily.    mupirocin (BACTROBAN) 2 % ointment Apply topically 2 (two) times daily.    mupirocin (BACTROBAN) 2 % ointment Apply topically once daily.    pen needle, diabetic 31 gauge x 5/16" Ndle 1 Device by Misc.(Non-Drug; Combo Route) route 4 (four) times daily. Use with insulin    sulfamethoxazole-trimethoprim 800-160mg (BACTRIM DS) 800-160 mg Tab Take 1 tablet by mouth 2 (two) times daily.     Family History     Problem Relation (Age of Onset)    Cancer Father    Cataracts Father    Diabetes Mother    Stroke Mother        Tobacco Use    Smoking status: Never Smoker    Smokeless tobacco: Never Used   Substance and Sexual Activity    Alcohol use: No    Drug use: No    Sexual activity: Not on file     Review of Systems   Constitutional: Negative for chills, diaphoresis and " fever.   HENT: Negative for rhinorrhea and sore throat.    Respiratory: Negative for cough, shortness of breath and wheezing.    Cardiovascular: Positive for leg swelling (progressive R leg swelling). Negative for chest pain and palpitations.   Gastrointestinal: Negative for abdominal pain, constipation, diarrhea, nausea and vomiting.   Genitourinary: Negative for dysuria, flank pain and hematuria.   Musculoskeletal: Negative for joint swelling and myalgias.   Skin: Positive for color change. Negative for pallor and rash.        R shin erythema   Neurological: Negative for dizziness, light-headedness and headaches.   Psychiatric/Behavioral: Negative for confusion and decreased concentration.     Objective:     Vital Signs (Most Recent):  Temp: 98.1 °F (36.7 °C) (08/28/19 1528)  Pulse: 65 (08/28/19 1841)  Resp: 16 (08/28/19 1841)  BP: 120/62 (08/28/19 1841)  SpO2: 100 % (08/28/19 1841) Vital Signs (24h Range):  Temp:  [98.1 °F (36.7 °C)] 98.1 °F (36.7 °C)  Pulse:  [54-65] 65  Resp:  [16-17] 16  SpO2:  [96 %-100 %] 100 %  BP: (113-120)/(59-69) 120/62     Weight: 79.4 kg (175 lb)  Body mass index is 24.41 kg/m².    Physical Exam   Constitutional: He is oriented to person, place, and time. He appears well-developed and well-nourished. No distress.   HENT:   Head: Normocephalic and atraumatic.   Eyes: Conjunctivae and EOM are normal. No scleral icterus.   Neck: Normal range of motion. Neck supple. No JVD present.   Cardiovascular: Normal rate, regular rhythm, normal heart sounds and intact distal pulses.   No murmur heard.  Pulmonary/Chest: Effort normal and breath sounds normal. No stridor. No respiratory distress. He has no wheezes.   Abdominal: Soft. Bowel sounds are normal. He exhibits no distension. There is no guarding.   Musculoskeletal: Normal range of motion. He exhibits no edema.   Neurological: He is alert and oriented to person, place, and time.   Skin: Skin is warm and dry. He is not diaphoretic. There is  erythema (erythema on r shin without induration or heat).   Psychiatric: He has a normal mood and affect. His behavior is normal. Judgment and thought content normal.         CRANIAL NERVES     CN III, IV, VI   Extraocular motions are normal.        Significant Labs:   A1C:   Recent Labs   Lab 04/10/19  1003 06/05/19  1217   HGBA1C 8.4* 6.3*     Blood Culture: No results for input(s): LABBLOO in the last 48 hours.  BMP:   Recent Labs   Lab 08/28/19  1623         K 5.6*      CO2 24   BUN 27*   CREATININE 1.4   CALCIUM 9.1   MG 2.3     CBC:   Recent Labs   Lab 08/28/19  1623   WBC 5.73   HGB 11.6*   HCT 36.7*   *     CMP:   Recent Labs   Lab 08/28/19  1623      K 5.6*      CO2 24      BUN 27*   CREATININE 1.4   CALCIUM 9.1   PROT 8.0   ALBUMIN 3.6   BILITOT 0.9   ALKPHOS 79   AST 36   ALT 34   ANIONGAP 9   EGFRNONAA 47.8*     Coagulation: No results for input(s): PT, INR, APTT in the last 48 hours.  Magnesium:   Recent Labs   Lab 08/28/19  1623   MG 2.3     POCT Glucose: No results for input(s): POCTGLUCOSE in the last 48 hours.  Recent Lab Results       08/28/19  1623        Albumin 3.6     Alkaline Phosphatase 79     ALT 34     Anion Gap 9     AST 36  Comment:  *Result may be interfered by visible hemolysis     Baso # 0.04     Basophil% 0.7     BILIRUBIN TOTAL 0.9  Comment:  For infants and newborns, interpretation of results should be based  on gestational age, weight and in agreement with clinical  observations.  Premature Infant recommended reference ranges:  Up to 24 hours.............<8.0 mg/dL  Up to 48 hours............<12.0 mg/dL  3-5 days..................<15.0 mg/dL  6-29 days.................<15.0 mg/dL       BUN, Bld 27     Calcium 9.1     Chloride 104     CO2 24     Creatinine 1.4     CRP 20.5     Differential Method Automated     eGFR if African American 55.2     eGFR if non  47.8  Comment:  Calculation used to obtain the estimated glomerular  filtration  rate (eGFR) is the CKD-EPI equation.        Eos # 0.1     Eosinophil% 1.2     Glucose 108     Gran # (ANC) 4.1     Gran% 71.9     Hematocrit 36.7     Hemoglobin 11.6     Immature Grans (Abs) 0.01  Comment:  Mild elevation in immature granulocytes is non specific and   can be seen in a variety of conditions including stress response,   acute inflammation, trauma and pregnancy. Correlation with other   laboratory and clinical findings is essential.       Immature Granulocytes 0.2     Lactate, Moses 0.9  Comment:  Falsely low lactic acid results can be found in samples   containing >=13.0 mg/dL total bilirubin and/or >=3.5 mg/dL   direct bilirubin.       Lymph # 0.7     Lymph% 11.3     Magnesium 2.3     MCH 32.5     MCHC 31.6          Mono # 0.8     Mono% 14.7     MPV 11.1     nRBC 0     Platelets 129     Potassium 5.6  Comment:  *Result may be interfered by visible hemolysis     PROTEIN TOTAL 8.0  Comment:  *Result may be interfered by visible hemolysis     RBC 3.57     RDW 13.1     Sed Rate 42     Sodium 137     WBC 5.73         All pertinent labs within the past 24 hours have been reviewed.    Significant Imaging: I have reviewed all pertinent imaging results/findings within the past 24 hours.

## 2019-08-28 NOTE — ED NOTES
Patient identifiers verified and correct for Mr Camejo  C/C: Wound to right outer foot on toe SEE NN  APPEARANCE: awake and alert in NAD.  SKIN: warm, dry Redness to top of RLE, betadine and 2x1 cm area to outer right 5th toe with open wound, min drainage noted. Foot warm and dry, pink color  MUSCULOSKELETAL: Patient moving all extremities spontaneously, no obvious swelling or deformities noted. Ambulates with ortho boot  RESPIRATORY: Denies shortness of breath.Respirations unlabored. Denies fevers  CARDIAC: Denies CP, 2+ distal pulses; no peripheral edema  ABDOMEN: S/ND/NT, Denies nausea  : voids spontaneously, denies difficulty  Neurologic: AAO x 4; follows commands equal strength in all extremities; denies numbness/tingling. Denies dizziness Positive weakness, Doppler pedal pulse RLE.

## 2019-08-28 NOTE — ED TRIAGE NOTES
Patient states sent to ED per Podiatry, redness x 6 weeks, no antibiotics. Outside of right 5th toe with bone and joint exposed to outer small toe. Redness to right mid calf. Denies fevers. Has been seeing Podiatry once weekly x  4 weeks.

## 2019-08-28 NOTE — ED PROVIDER NOTES
Encounter Date: 8/28/2019       History     Chief Complaint   Patient presents with    Cellulitis     Pt presents from podiatry for further evaluation of cellulitis with necrosis present to right fifth toe.      79 year old male with medical history of Asthma, AFin on Eliquis, CHF, CAD with stents, T2DM, HTN presenting to the ED with the chief complaint of toe wound. Patient reports having an ulcer to his lateral aspect of his 5th toe for the past 6 weeks. Patient denies inciting injuries and states it started as a spontaneous blister. Patient has been followed by Podiatry weekly and was referred to the ED after his visit today. He reports having erythema to his right anterior tib-fib today that is new. He reports having pain in his right heel. Patient is not currently on antibiotics. He does not use ambulation devices for assistance. No fever, chest pain, SOB, abdominal pain, nausea, vomiting, urinary and bowel movement changes.         Review of patient's allergies indicates:  No Known Allergies  Past Medical History:   Diagnosis Date    Asthma     childhood    Atrial fibrillation     CHF (congestive heart failure)     Chronic anticoagulation 2/25/2019    Coronary artery disease     Coronary artery disease 2/25/2019    Diabetes mellitus, type 2     Heart attack     stent    High risk medication use 3/22/2019    2/19/2019: Began amiodarone.    History of coronary artery stent placement 3/22/2019    2000: [facility]: MI and stent.    History of myocardial infarction 3/22/2019    2000: Con[facility]ion: MI and stent.    Hypercholesterolemia 3/22/2019    Hypertension 3/22/2019    Tachycardia induced cardiomyopathy 2/25/2019     Past Surgical History:   Procedure Laterality Date    CARDIAC CATHETERIZATION      CARDIOVERSION N/A 2/21/2019    [facility] CATH LAB    CARDIOVERSION OR DEFIBRILLATION N/A 2/25/2019    [facility] CATH LAB    CATARACT EXTRACTION W/   INTRAOCULAR LENS IMPLANT Right 12/18/2017    Dr. Beavers    CATARACT EXTRACTION W/  INTRAOCULAR LENS IMPLANT Left 01/08/2018    Dr. Beavers    ECHOCARDIOGRAM,TRANSESOPHAGEAL N/A 2/25/2019    [facility] CATH LAB    ECHOCARDIOGRAM,TRANSESOPHAGEAL N/A 2/21/2019    [facility] by [facility] CATH LAB    INSERTION-INTRAOCULAR LENS (IOL) Left 1/8/2018    [facility] at [facility] OR    INSERTION-INTRAOCULAR LENS (IOL) Right 12/18/2017    [facility]ol [facility] at [facility] OR    PHACOEMULSIFICATION-ASPIRATION-CATARACT Left 1/8/2018    [facility] OR    PHACOEMULSIFICATION-ASPIRATION-CATARACT Right 12/18/2017    [facility] OR    TONSILLECTOMY       Family History   Problem Relation Age of Onset    Cataracts Father     Cancer Father         stomach    Stroke Mother     Diabetes Mother     Amblyopia Neg Hx     Blindness Neg Hx     Glaucoma Neg Hx     Macular degeneration Neg Hx     Retinal detachment Neg Hx     Strabismus Neg Hx      Social History     Tobacco Use    Smoking status: Never Smoker    Smokeless tobacco: Never Used   Substance Use Topics    Alcohol use: No    Drug use: No     Review of Systems   Constitutional: Negative for chills, diaphoresis and fever.   HENT: Negative for congestion and sore throat.    Respiratory: Negative for shortness of breath.    Cardiovascular: Negative for chest pain.   Gastrointestinal: Negative for nausea.   Genitourinary: Negative for dysuria and hematuria.   Musculoskeletal: Positive for arthralgias. Negative for back pain.   Skin: Positive for rash and wound.   Neurological: Negative for weakness.   Hematological: Does not bruise/bleed easily.       Physical Exam     Initial Vitals [08/28/19 1528]   BP Pulse Resp Temp SpO2   (!) 119/59 (!) 59 17 98.1 °F (36.7 °C) 96 %      MAP       --         Physical Exam    Constitutional: He appears well-developed  and well-nourished. He is not diaphoretic. No distress.   HENT:   Head: Normocephalic and atraumatic.   Mouth/Throat: Oropharynx is clear and moist. No oropharyngeal exudate.   Eyes: EOM are normal. Pupils are equal, round, and reactive to light.   Neck: Normal range of motion. Neck supple.   Cardiovascular: Normal rate and regular rhythm.   Thready palpable pulses to DP and PT bilaterally. +2 monophasic doppler signals to DP and PT bilaterally   Pulmonary/Chest: Breath sounds normal. No respiratory distress. He has no wheezes.   Abdominal: Soft. He exhibits no distension. There is no tenderness.   Musculoskeletal: Normal range of motion. He exhibits no edema or tenderness.   Ulceration to R lateral 5th toe. Exposed bone and joint space.    Neurological: He is alert and oriented to person, place, and time. He has normal strength. No cranial nerve deficit or sensory deficit.   5/5 strength dorsiflexion/plantarflexion   Skin: Skin is warm and dry. There is erythema.   Erythema and +1 edema to right anterior tib-fib     Right foot and lower extremity:            ED Course   Procedures  Labs Reviewed   CBC W/ AUTO DIFFERENTIAL - Abnormal; Notable for the following components:       Result Value    RBC 3.57 (*)     Hemoglobin 11.6 (*)     Hematocrit 36.7 (*)     Mean Corpuscular Volume 103 (*)     Mean Corpuscular Hemoglobin 32.5 (*)     Mean Corpuscular Hemoglobin Conc 31.6 (*)     Platelets 129 (*)     Lymph # 0.7 (*)     Lymph% 11.3 (*)     All other components within normal limits   COMPREHENSIVE METABOLIC PANEL - Abnormal; Notable for the following components:    Potassium 5.6 (*)     BUN, Bld 27 (*)     eGFR if  55.2 (*)     eGFR if non  47.8 (*)     All other components within normal limits   C-REACTIVE PROTEIN - Abnormal; Notable for the following components:    CRP 20.5 (*)     All other components within normal limits   SEDIMENTATION RATE - Abnormal; Notable for the following  components:    Sed Rate 42 (*)     All other components within normal limits   CULTURE, BLOOD   CULTURE, BLOOD   LACTIC ACID, PLASMA   MAGNESIUM   POTASSIUM          Imaging Results          X-Ray Foot Complete Right (Final result)  Result time 08/28/19 17:06:23    Final result by Francia Fernández MD (08/28/19 17:06:23)                 Impression:      Please see above.      Electronically signed by: Francia Fernández MD  Date:    08/28/2019  Time:    17:06             Narrative:    EXAMINATION:  XR FOOT COMPLETE 3 VIEW RIGHT    CLINICAL HISTORY:  . Non-pressure chronic ulcer of other part of right foot with unspecified severity    TECHNIQUE:  AP, lateral, and oblique views of the right foot were performed.    COMPARISON:  08/22/2019.    FINDINGS:  Site of the patient's pain is not included in the history or indicated on the images provided.    As seen on the recent study of 08/22/2019 there is deformity of the distal aspect of the 5th metatarsal bone and the proximal phalanx of the little toe.  This may represent subacute or prior trauma.  However infection is not excluded in light of the swelling of overlying soft tissue.    There is mild hallux valgus, diffuse osteopenia, but preservation of Lisfranc articulation and talar dome.  No new abnormality detected.    There is abundant calcific atherosclerosis in arteries of the distal leg, not unusual in light of the patient's age of 79 years.                                 Medical Decision Making:   History:   Old Medical Records: I decided to obtain old medical records.  Old Records Summarized: records from clinic visits.  Clinical Tests:   Lab Tests: Ordered and Reviewed  Radiological Study: Ordered and Reviewed  Other:   I have discussed this case with another health care provider.       <> Summary of the Discussion: Hospital Medicine, Podiatry       APC / Resident Notes:   79 year old male with medical history of Asthma, AFin on Eliquis, CHF, CAD with stents,  T2DM, HTN referred to the ED from podiatry clinic for evaluation of R lateral 5th toe wound. DDx includes but not limited to pressure ulceration, osteomyelitis, cellulitis, abscess, sepsis, bacteremia. Will get labs, x-ray, and MRI.     Work-up shows WBC 5.7, H/H 11/36, up-trending CRP/ESR 20.5/42 (4.5/23 on 7/10/2019), Lactate 0.9.   X-ray of R foot shows deformity of distal 5th metatarsal bone    Etiology concerning for osteomyelitis. Discussed imaging recommendations with radiology given patient's heel pain, will obtain MRI ankle with toe inclusion. Discussed patient with podiatry and will hold antibiotics at this time as patient is afebrile and non-toxic appearing. Potassium elevated likely from hemolysis, repeat order placed. Will admit to medicine for ongoing management. Patient expresses understanding and agreeable to the plan. I have discussed the care of this patient with my supervising physician.          Attending Attestation:     Physician Attestation Statement for NP/PA:   I discussed this assessment and plan of this patient with the NP/PA, but I did not personally examine the patient. The face to face encounter was performed by the NP/PA.                     Clinical Impression:       ICD-10-CM ICD-9-CM   1. Toe osteomyelitis, right M86.9 730.27   2. Toe ulcer, right L97.519 707.15   3. Coronary artery disease, angina presence unspecified, unspecified vessel or lesion type, unspecified whether native or transplanted heart I25.10 414.00   4. IDDM (insulin dependent diabetes mellitus) E11.9 250.00    Z79.4 V58.67   5. Cellulitis of right lower extremity L03.115 682.6         Disposition:   Disposition: Admitted  Condition: Serious                        Vega Tadeo PA-C  08/28/19 1748       Donn Langley MD  08/30/19 0901

## 2019-08-28 NOTE — LETTER
September 4, 2019      Mary Arriola MD  [address]  [facility] [address]           [facility] - Podiatry  [address]  [facility] [address]  Phone: [phone]          Patient: Randy Camejo   MR Number: [id number]   YOB: 1940   Date of Visit: 8/28/2019       Dear Dr. Mary Arriola:    Thank you for referring Randy Camejo to me for evaluation. Attached you will find relevant portions of my assessment and plan of care.    If you have questions, please do not hesitate to call me. I look forward to following Randy Camejo along with you.    Sincerely,    Pattie Corbett, DPSHADE    Enclosure  CC:  No Recipients    If you would like to receive this communication electronically, please contact [email or web address] or [phone] to request more information on [employer] Link access.    For providers and/or their staff who would like to refer a patient to [facility], please contact us through our one-stop-shop provider referral line, [facility], at [phone].    If you feel you have received this communication in error or would no longer like to receive these types of communications, please e-mail [email or web address]

## 2019-08-29 ENCOUNTER — TELEPHONE (OUTPATIENT)
Dept: PODIATRY | Facility: CLINIC | Age: 79
End: 2019-08-29

## 2019-08-29 LAB
ALBUMIN SERPL BCP-MCNC: 3.1 G/DL (ref 3.5–5.2)
ALP SERPL-CCNC: 78 U/L (ref 55–135)
ALT SERPL W/O P-5'-P-CCNC: 31 U/L (ref 10–44)
ANION GAP SERPL CALC-SCNC: 7 MMOL/L (ref 8–16)
APTT BLDCRRT: 30.2 SEC (ref 21–32)
APTT BLDCRRT: 63.1 SEC (ref 21–32)
AST SERPL-CCNC: 24 U/L (ref 10–40)
BASOPHILS # BLD AUTO: 0.03 K/UL (ref 0–0.2)
BASOPHILS NFR BLD: 0.7 % (ref 0–1.9)
BILIRUB SERPL-MCNC: 0.9 MG/DL (ref 0.1–1)
BUN SERPL-MCNC: 26 MG/DL (ref 8–23)
CALCIUM SERPL-MCNC: 8.5 MG/DL (ref 8.7–10.5)
CHLORIDE SERPL-SCNC: 105 MMOL/L (ref 95–110)
CO2 SERPL-SCNC: 26 MMOL/L (ref 23–29)
CREAT SERPL-MCNC: 1.3 MG/DL (ref 0.5–1.4)
DIFFERENTIAL METHOD: ABNORMAL
EOSINOPHIL # BLD AUTO: 0.1 K/UL (ref 0–0.5)
EOSINOPHIL NFR BLD: 2.5 % (ref 0–8)
ERYTHROCYTE [DISTWIDTH] IN BLOOD BY AUTOMATED COUNT: 12.9 % (ref 11.5–14.5)
EST. GFR  (AFRICAN AMERICAN): >60 ML/MIN/1.73 M^2
EST. GFR  (NON AFRICAN AMERICAN): 52.3 ML/MIN/1.73 M^2
GLUCOSE SERPL-MCNC: 124 MG/DL (ref 70–110)
HCT VFR BLD AUTO: 34.2 % (ref 40–54)
HGB BLD-MCNC: 10.7 G/DL (ref 14–18)
IMM GRANULOCYTES # BLD AUTO: 0.01 K/UL (ref 0–0.04)
IMM GRANULOCYTES NFR BLD AUTO: 0.2 % (ref 0–0.5)
INR PPP: 1.3 (ref 0.8–1.2)
LYMPHOCYTES # BLD AUTO: 0.9 K/UL (ref 1–4.8)
LYMPHOCYTES NFR BLD: 20 % (ref 18–48)
MAGNESIUM SERPL-MCNC: 2.3 MG/DL (ref 1.6–2.6)
MCH RBC QN AUTO: 32 PG (ref 27–31)
MCHC RBC AUTO-ENTMCNC: 31.3 G/DL (ref 32–36)
MCV RBC AUTO: 102 FL (ref 82–98)
MONOCYTES # BLD AUTO: 0.7 K/UL (ref 0.3–1)
MONOCYTES NFR BLD: 15.3 % (ref 4–15)
NEUTROPHILS # BLD AUTO: 2.7 K/UL (ref 1.8–7.7)
NEUTROPHILS NFR BLD: 61.3 % (ref 38–73)
NRBC BLD-RTO: 0 /100 WBC
PHOSPHATE SERPL-MCNC: 3 MG/DL (ref 2.7–4.5)
PLATELET # BLD AUTO: 123 K/UL (ref 150–350)
PMV BLD AUTO: 10.5 FL (ref 9.2–12.9)
POCT GLUCOSE: 116 MG/DL (ref 70–110)
POCT GLUCOSE: 119 MG/DL (ref 70–110)
POTASSIUM SERPL-SCNC: 4.1 MMOL/L (ref 3.5–5.1)
PROT SERPL-MCNC: 6.8 G/DL (ref 6–8.4)
PROTHROMBIN TIME: 12.7 SEC (ref 9–12.5)
RBC # BLD AUTO: 3.34 M/UL (ref 4.6–6.2)
SODIUM SERPL-SCNC: 138 MMOL/L (ref 136–145)
WBC # BLD AUTO: 4.39 K/UL (ref 3.9–12.7)

## 2019-08-29 PROCEDURE — 99232 SBSQ HOSP IP/OBS MODERATE 35: CPT | Mod: GC,,, | Performed by: INTERNAL MEDICINE

## 2019-08-29 PROCEDURE — 85025 COMPLETE CBC W/AUTO DIFF WBC: CPT

## 2019-08-29 PROCEDURE — 63600175 PHARM REV CODE 636 W HCPCS: Performed by: STUDENT IN AN ORGANIZED HEALTH CARE EDUCATION/TRAINING PROGRAM

## 2019-08-29 PROCEDURE — 36415 COLL VENOUS BLD VENIPUNCTURE: CPT

## 2019-08-29 PROCEDURE — 84100 ASSAY OF PHOSPHORUS: CPT

## 2019-08-29 PROCEDURE — 93922 UPR/L XTREMITY ART 2 LEVELS: CPT | Performed by: SURGERY

## 2019-08-29 PROCEDURE — 25000003 PHARM REV CODE 250: Performed by: STUDENT IN AN ORGANIZED HEALTH CARE EDUCATION/TRAINING PROGRAM

## 2019-08-29 PROCEDURE — 94761 N-INVAS EAR/PLS OXIMETRY MLT: CPT

## 2019-08-29 PROCEDURE — 85610 PROTHROMBIN TIME: CPT

## 2019-08-29 PROCEDURE — 99232 PR SUBSEQUENT HOSPITAL CARE,LEVL II: ICD-10-PCS | Mod: GC,,, | Performed by: INTERNAL MEDICINE

## 2019-08-29 PROCEDURE — 99223 1ST HOSP IP/OBS HIGH 75: CPT | Mod: ,,, | Performed by: PODIATRIST

## 2019-08-29 PROCEDURE — 85730 THROMBOPLASTIN TIME PARTIAL: CPT

## 2019-08-29 PROCEDURE — 85730 THROMBOPLASTIN TIME PARTIAL: CPT | Mod: 91

## 2019-08-29 PROCEDURE — 83735 ASSAY OF MAGNESIUM: CPT

## 2019-08-29 PROCEDURE — 99223 PR INITIAL HOSPITAL CARE,LEVL III: ICD-10-PCS | Mod: ,,, | Performed by: PODIATRIST

## 2019-08-29 PROCEDURE — 11000001 HC ACUTE MED/SURG PRIVATE ROOM

## 2019-08-29 PROCEDURE — 80053 COMPREHEN METABOLIC PANEL: CPT

## 2019-08-29 RX ORDER — HEPARIN SODIUM,PORCINE/D5W 25000/250
12 INTRAVENOUS SOLUTION INTRAVENOUS CONTINUOUS
Status: DISCONTINUED | OUTPATIENT
Start: 2019-08-29 | End: 2019-08-30

## 2019-08-29 RX ADMIN — INSULIN DETEMIR 10 UNITS: 100 INJECTION, SOLUTION SUBCUTANEOUS at 09:08

## 2019-08-29 RX ADMIN — LEVOTHYROXINE SODIUM 50 MCG: 50 TABLET ORAL at 05:08

## 2019-08-29 RX ADMIN — PIPERACILLIN AND TAZOBACTAM 4.5 G: 4; .5 INJECTION, POWDER, LYOPHILIZED, FOR SOLUTION INTRAVENOUS; PARENTERAL at 12:08

## 2019-08-29 RX ADMIN — AMIODARONE HYDROCHLORIDE 200 MG: 200 TABLET ORAL at 08:08

## 2019-08-29 RX ADMIN — CARVEDILOL 6.25 MG: 6.25 TABLET, FILM COATED ORAL at 09:08

## 2019-08-29 RX ADMIN — PIPERACILLIN AND TAZOBACTAM 4.5 G: 4; .5 INJECTION, POWDER, LYOPHILIZED, FOR SOLUTION INTRAVENOUS; PARENTERAL at 08:08

## 2019-08-29 RX ADMIN — PIPERACILLIN AND TAZOBACTAM 4.5 G: 4; .5 INJECTION, POWDER, LYOPHILIZED, FOR SOLUTION INTRAVENOUS; PARENTERAL at 05:08

## 2019-08-29 RX ADMIN — VANCOMYCIN HYDROCHLORIDE 1250 MG: 1.25 INJECTION, POWDER, LYOPHILIZED, FOR SOLUTION INTRAVENOUS at 09:08

## 2019-08-29 RX ADMIN — HEPARIN SODIUM AND DEXTROSE 12 UNITS/KG/HR: 10000; 5 INJECTION INTRAVENOUS at 11:08

## 2019-08-29 RX ADMIN — CARVEDILOL 6.25 MG: 6.25 TABLET, FILM COATED ORAL at 08:08

## 2019-08-29 RX ADMIN — ATORVASTATIN CALCIUM 20 MG: 20 TABLET, FILM COATED ORAL at 08:08

## 2019-08-29 NOTE — CONSULTS
[facility]  Podiatry  Consult Note    Patient Name: Randy Camejo  MRN: [id number]  Admission Date: 8/28/2019  Hospital Length of Stay: 1 days  Attending Physician: Denny De La Torre DO  Primary Care Provider: Susie Lazo MD     Inpatient consult to Podiatry  Consult performed by: Rodger Skelton MD  Consult ordered by: Vega Tadeo PA-C        Subjective:     History of Present Illness:  Randy Camejo is a 79 y.o. male who  has a past medical history of Asthma, Atrial fibrillation, CHF (congestive heart failure), Chronic anticoagulation, Coronary artery disease, Coronary artery disease, Diabetes mellitus, type 2, Heart attack, High risk medication use, History of coronary artery stent placement, History of myocardial infarction, Hypercholesterolemia, Hypertension, and Tachycardia induced cardiomyopathy.    Patient admitted for right 5th toe osteomyelitis.  Consulted to Podiatry for the same.  Patient has had non healing wound of the right 5th toe for at least a month. He reports taking an antibiotic by an outside provider but cannot remember what the medication was. He was referred to vascular surgery to evaluate for blood flow while OP but missed his appointment. OP x-rays were taken with cortical abnormalities of the 5th MTPJ concerning for prior traumatic event or OM. He had a bone biopsy of the 5th metatarsal in clinic the week prior currently with no growth. He presented to clinic yesterday with worsening of the wound and possibly some streaking erythema proximally. He denies any constitutional symptoms at the time but was advised to be admitted for IV antibiotics.    In the ED, he had an MRI of the R foot positive for osteomyelitis only to the 5th toe with sparing of the 5th metatarsal.            Scheduled Meds:   amiodarone  200 mg Oral Daily    atorvastatin  20 mg Oral Daily    carvedilol  6.25 mg Oral BID    heparin (PORCINE)  60 Units/kg (Adjusted) Intravenous Once     insulin detemir U-100  10 Units Subcutaneous QHS    levothyroxine  50 mcg Oral Before breakfast    piperacillin-tazobactam (ZOSYN) IVPB  4.5 g Intravenous Q8H    vancomycin (VANCOCIN) IVPB  15 mg/kg Intravenous Q24H     Continuous Infusions:   heparin (porcine) in D5W       PRN Meds:acetaminophen, Dextrose 10% Bolus, Dextrose 10% Bolus, glucagon (human recombinant), glucose, glucose, heparin (PORCINE), heparin (PORCINE), insulin aspart U-100, ondansetron, sodium chloride 0.9%, sodium chloride 0.9%    Review of patient's allergies indicates:  No Known Allergies     Past Medical History:   Diagnosis Date    Asthma     childhood    Atrial fibrillation     CHF (congestive heart failure)     Chronic anticoagulation 2/25/2019    Coronary artery disease     Coronary artery disease 2/25/2019    Diabetes mellitus, type 2     Heart attack     stent    High risk medication use 3/22/2019    2/19/2019: Began amiodarone.    History of coronary artery stent placement 3/22/2019    2000: [facility]ion: MI and stent.    History of myocardial infarction 3/22/2019    2000: [facility]pcion: MI and stent.   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 Beavers    CATARACT EXTRACTION W/  INTRAOCULAR LENS IMPLANT Left 01/08/2018    Dr. Beavers    ECHOCARDIOGRAM,TRANSESOPHAGEAL N/A 2/25/2019    Performed by [facility] CATH LAB    ECHOCARDIOGRAM,TRANSESOPHAGEAL N/A 2/21/2019    Performed by [facility] CATH LAB    INSERTION-INTRAOCULAR LENS (IOL) Left 1/8/2018    [facility] by [facility]o [facility] at [facility] OR     INSERTION-INTRAOCULAR LENS (IOL) Right 12/18/2017    [facility] OR    PHACOEMULSIFICATION-ASPIRATION-CATARACT Left 1/8/2018    [facility] at [facility] OR    PHACOEMULSIFICATION-ASPIRATION-CATARACT Right 12/18/2017    [facility] OR    TONSILLECTOMY         Family History     Problem Relation (Age of Onset)    Cancer Father    Cataracts Father    Diabetes Mother    Stroke Mother        Tobacco Use    Smoking status: Never Smoker    Smokeless tobacco: Never Used   Substance and Sexual Activity    Alcohol use: No    Drug use: No    Sexual activity: Not on file     Review of Systems   Constitutional: Negative for chills and fever.   Gastrointestinal: Negative for nausea and vomiting.   Skin: Positive for wound.     Objective:     Vital Signs (Most Recent):  Temp: 98.1 °F (36.7 °C) (08/29/19 0809)  Pulse: (!) 50 (08/29/19 0809)  Resp: 16 (08/29/19 0809)  BP: 109/60 (08/29/19 0809)  SpO2: 95 % (08/29/19 0840) Vital Signs (24h Range):  Temp:  [97.7 °F (36.5 °C)-98.1 °F (36.7 °C)] 98.1 °F (36.7 °C)  Pulse:  [50-97] 50  Resp:  [16-20] 16  SpO2:  [93 %-100 %] 95 %  BP: (109-120)/(59-69) 109/60     Weight: 79.4 kg (175 lb)  Body mass index is 24.41 kg/m².    Foot Exam    Right Foot/Ankle     Inspection and Palpation  Ecchymosis: none  Tenderness: none   Swelling: (Minor LE edema)  Skin Exam: ulcer;     Neurovascular  Saphenous nerve sensation: diminished  Tibial nerve sensation: diminished  Superficial peroneal nerve sensation: diminished  Deep peroneal nerve sensation: diminished  Sural nerve sensation: diminished      Left Foot/Ankle      Inspection and Palpation  Ecchymosis: none  Tenderness: none   Swelling: (Minor LE edema)    Neurovascular  Saphenous nerve sensation: diminished  Tibial nerve sensation: diminished  Superficial peroneal nerve sensation: diminished  Deep peroneal nerve sensation: diminished  Sural nerve sensation:  diminished            Laboratory:  CBC:   Recent Labs   Lab 08/29/19  0543   WBC 4.39   RBC 3.34*   HGB 10.7*   HCT 34.2*   *   *   MCH 32.0*   MCHC 31.3*     CMP:   Recent Labs   Lab 08/29/19  0543   *   CALCIUM 8.5*   ALBUMIN 3.1*   PROT 6.8      K 4.1   CO2 26      BUN 26*   CREATININE 1.3   ALKPHOS 78   ALT 31   AST 24   BILITOT 0.9       Diagnostic Results:      Clinical Findings:  Stable 5th toe ulceration on the right foot. Some proximally spreading cellulitis.                Assessment/Plan:     Toe osteomyelitis, right  Plan:  - Local wound care.  - POC presented to patient today. Bone biopsy and IV antibiotics vs amputation. He would like pursue the bone biopsy and IV antibiotics.  - Will obtain bone biopsy.  - Resumed diet.  - Podiatry will follow.        Thank you for your consult. I will follow-up with patient. Please contact us if you have any additional questions.    Rodger Skelton MD  Podiatry  [facility]

## 2019-08-29 NOTE — PROGRESS NOTES
Pharmacokinetic Initial Assessment: IV Vancomycin    Assessment/Plan:    Initiate intravenous vancomycin with loading dose of 2250 mg once.  Continue with vancomycin 1250 mg IVPB every 24 hours.  Desired empiric serum trough concentration is 10 to 20 mcg/mL  Draw vancomycin trough level prior to 3rd dose on 08/30/2019 at 2030.  Pharmacy will continue to follow and monitor vancomycin.      Please contact pharmacy at extension [phone] with any questions regarding this assessment.     Thank you for the consult,   Cash Albright       Patient brief summary:  Randy Camejo is a 79 y.o. male initiated on antimicrobial therapy with IV Vancomycin for treatment of suspected bone/joint infection.    Drug Allergies:   Review of patient's allergies indicates:  No Known Allergies    Actual Body Weight:   79.4 kg    Renal Function:   Estimated Creatinine Clearance: 46.3 mL/min (based on SCr of 1.4 mg/dL).    CBC (last 72 hours):  Recent Labs   Lab Result Units 08/28/19  1623   WBC K/uL 5.73   Hemoglobin g/dL 11.6*   Hematocrit % 36.7*   Platelets K/uL 129*   Gran% % 71.9   Lymph% % 11.3*   Mono% % 14.7   Eosinophil% % 1.2   Basophil% % 0.7   Differential Method  Automated       Metabolic Panel (last 72 hours):  Recent Labs   Lab Result Units 08/28/19  1623 08/28/19  1743   Sodium mmol/L 137  --    Potassium mmol/L 5.6* 4.4   Chloride mmol/L 104  --    CO2 mmol/L 24  --    Glucose mg/dL 108  --    BUN, Bld mg/dL 27*  --    Creatinine mg/dL 1.4  --    Albumin g/dL 3.6  --    Total Bilirubin mg/dL 0.9  --    Alkaline Phosphatase U/L 79  --    AST U/L 36  --    ALT U/L 34  --    Magnesium mg/dL 2.3  --        Drug levels (last 3 results):  No results for input(s): VANCOMYCINRA, VANCOMYCINPE, VANCOMYCINTR in the last 72 hours.    Microbiologic Results:  Microbiology Results (last 7 days)     Procedure Component Value Units Date/Time    Blood culture #2 **CANNOT BE ORDERED STAT** [[id number]] Collected:  08/28/19 1628    Order  Status:  Sent Specimen:  Blood from Peripheral, Hand, Left Updated:  08/28/19 1638    Blood culture #1 **CANNOT BE ORDERED STAT** [[id number]] Collected:  08/28/19 1623    Order Status:  Sent Specimen:  Blood from Peripheral, Wrist, Right Updated:  08/28/19 1637

## 2019-08-29 NOTE — H&P
[facility] Hospital Medicine  History & Physical    Patient Name: Randy Camejo  MRN: [id number]  Admission Date: 8/28/2019  Attending Physician: Denny De La Torre DO   Primary Care Provider: Susie Lazo MD    [facility] Medicine Team: [facility] HOSP MED 5 Rodrigue Bhakta MD     Patient information was obtained from patient, spouse/SO, relative(s) and ER records.     Subjective:     Principal Problem:<principal problem not specified>    Chief Complaint:   Chief Complaint   Patient presents with    Cellulitis     Pt presents from podiatry for further evaluation of cellulitis with necrosis present to right fifth toe.         HPI: Mr Camejo is a 80 yo male with Afib, DM2, CAD s/p stent (2011) and CHF that presented from podiatry clinic due to an ulcer on his 5th toe of R foot for the past 6 weeks. Reports that pain in R heel is sharp in nature and intermittent and occurs at both rest and with pressure but does not radiate. He denies any inciting event prior to occurrence. He has been seen by outpatient podiatry over this time and has been treated with course of ABx (amoxacillin and bactrim) with no improvement of symptoms. He developed new erythema of his R shin this morning , at which point his podiatrist referred him to the ED for workup. He is not currently on antibiotics. Denies fever, claudication, CP, SOB, chills, N/V, HA, LH.     Past Medical History:   Diagnosis Date    Asthma     childhood    Atrial fibrillation     CHF (congestive heart failure)     Chronic anticoagulation 2/25/2019    Coronary artery disease     Coronary artery disease 2/25/2019    Diabetes mellitus, type 2     Heart attack     stent    High risk medication use 3/22/2019    2/19/2019: Began amiodarone.    History of coronary artery stent placement 3/22/2019    2000: [facility]: MI and stent.    History of myocardial infarction 3/22/2019    2000: [facility]: MI and stent.   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 Beavers    CATARACT EXTRACTION W/  INTRAOCULAR LENS IMPLANT Left 01/08/2018    Dr. Beavers    ECHOCARDIOGRAM,TRANSESOPHAGEAL N/A 2/25/2019    Performed by Panchito Joseph [facility] CATH LAB    ECHOCARDIOGRAM,TRANSESOPHAGEAL N/A 2/21/2019    Performed by Panchito Joseph [facility] at [facility] CATH LAB    INSERTION-INTRAOCULAR LENS (IOL) Left 1/8/2018    Performed by Milo Beavers MD at [facility] OR    INSERTION-INTRAOCULAR LENS (IOL) Right 12/18/2017    Performed by [facility] at [facility] OR    PHACOEMULSIFICATION-ASPIRATION-CATARACT Left 1/8/2018    Performed by [facility]vers [facility] at [facility] OR    PHACOEMULSIFICATION-ASPIRATION-CATARACT Right 12/18/2017    Performed by [facility]o [facility]vers [facility] at [facility] OR    TONSILLECTOMY         Review of patient's allergies indicates:  No Known Allergies    No current facility-administered medications on file prior to encounter.      Current Outpatient Medications on File Prior to Encounter   Medication Sig    amiodarone (PACERONE) 200 MG Tab Take 1 tablet (200 mg total) by mouth once daily.    amoxicillin-clavulanate 875-125mg (AUGMENTIN) 875-125 mg per tablet Take 1 tablet by mouth 2 (two) times daily.    apixaban (ELIQUIS) 5 mg Tab Take 1 tablet (5 mg total) by mouth 2 (two) times daily.    atorvastatin (LIPITOR) 20 MG tablet Take 1 tablet (20 mg total) by mouth once daily.    carvedilol (COREG) 6.25 MG tablet Take 1 tablet (6.25 mg total) by mouth 2 (two) times daily.    furosemide (LASIX) 20 MG tablet Take 1 tablet (20 mg total) by mouth once daily.    insulin aspart U-100  "(NOVOLOG) 100 unit/mL (3 mL) InPn pen Inject 3 Units into the skin 3 (three) times daily.    levothyroxine (SYNTHROID) 50 MCG tablet Take 1 tablet (50 mcg total) by mouth once daily.    blood sugar diagnostic Strp Use to check blood glucose four times daily, to use with insurance preferred meter    blood-glucose meter kit test as directed    insulin detemir U-100 (LEVEMIR FLEXTOUCH) 100 unit/mL (3 mL) SubQ InPn pen Inject 15 Units into the skin every evening.    lancets 30 gauge Misc TEST FOUR TIMES DAILY    losartan (COZAAR) 50 MG tablet Take 1 tablet (50 mg total) by mouth once daily.    mupirocin (BACTROBAN) 2 % ointment Apply topically 2 (two) times daily.    mupirocin (BACTROBAN) 2 % ointment Apply topically once daily.    pen needle, diabetic 31 gauge x 5/16" Ndle 1 Device by Misc.(Non-Drug; Combo Route) route 4 (four) times daily. Use with insulin    sulfamethoxazole-trimethoprim 800-160mg (BACTRIM DS) 800-160 mg Tab Take 1 tablet by mouth 2 (two) times daily.     Family History     Problem Relation (Age of Onset)    Cancer Father    Cataracts Father    Diabetes Mother    Stroke Mother        Tobacco Use    Smoking status: Never Smoker    Smokeless tobacco: Never Used   Substance and Sexual Activity    Alcohol use: No    Drug use: No    Sexual activity: Not on file     Review of Systems   Constitutional: Negative for chills, diaphoresis and fever.   HENT: Negative for rhinorrhea and sore throat.    Respiratory: Negative for cough, shortness of breath and wheezing.    Cardiovascular: Positive for leg swelling (progressive R leg swelling). Negative for chest pain and palpitations.   Gastrointestinal: Negative for abdominal pain, constipation, diarrhea, nausea and vomiting.   Genitourinary: Negative for dysuria, flank pain and hematuria.   Musculoskeletal: Negative for joint swelling and myalgias.   Skin: Positive for color change. Negative for pallor and rash.        R shin erythema "   Neurological: Negative for dizziness, light-headedness and headaches.   Psychiatric/Behavioral: Negative for confusion and decreased concentration.     Objective:     Vital Signs (Most Recent):  Temp: 98.1 °F (36.7 °C) (08/28/19 1528)  Pulse: 65 (08/28/19 1841)  Resp: 16 (08/28/19 1841)  BP: 120/62 (08/28/19 1841)  SpO2: 100 % (08/28/19 1841) Vital Signs (24h Range):  Temp:  [98.1 °F (36.7 °C)] 98.1 °F (36.7 °C)  Pulse:  [54-65] 65  Resp:  [16-17] 16  SpO2:  [96 %-100 %] 100 %  BP: (113-120)/(59-69) 120/62     Weight: 79.4 kg (175 lb)  Body mass index is 24.41 kg/m².    Physical Exam   Constitutional: He is oriented to person, place, and time. He appears well-developed and well-nourished. No distress.   HENT:   Head: Normocephalic and atraumatic.   Eyes: Conjunctivae and EOM are normal. No scleral icterus.   Neck: Normal range of motion. Neck supple. No JVD present.   Cardiovascular: Normal rate, regular rhythm, normal heart sounds and intact distal pulses.   No murmur heard.  Pulmonary/Chest: Effort normal and breath sounds normal. No stridor. No respiratory distress. He has no wheezes.   Abdominal: Soft. Bowel sounds are normal. He exhibits no distension. There is no guarding.   Musculoskeletal: Normal range of motion. He exhibits no edema.   Neurological: He is alert and oriented to person, place, and time.   Skin: Skin is warm and dry. He is not diaphoretic. There is erythema (erythema on r shin without induration or heat).   Psychiatric: He has a normal mood and affect. His behavior is normal. Judgment and thought content normal.         CRANIAL NERVES     CN III, IV, VI   Extraocular motions are normal.        Significant Labs:   A1C:   Recent Labs   Lab 04/10/19  1003 06/05/19  1217   HGBA1C 8.4* 6.3*     Blood Culture: No results for input(s): LABBLOO in the last 48 hours.  BMP:   Recent Labs   Lab 08/28/19  1623         K 5.6*      CO2 24   BUN 27*   CREATININE 1.4   CALCIUM 9.1   MG  2.3     CBC:   Recent Labs   Lab 08/28/19  1623   WBC 5.73   HGB 11.6*   HCT 36.7*   *     CMP:   Recent Labs   Lab 08/28/19  1623      K 5.6*      CO2 24      BUN 27*   CREATININE 1.4   CALCIUM 9.1   PROT 8.0   ALBUMIN 3.6   BILITOT 0.9   ALKPHOS 79   AST 36   ALT 34   ANIONGAP 9   EGFRNONAA 47.8*     Coagulation: No results for input(s): PT, INR, APTT in the last 48 hours.  Magnesium:   Recent Labs   Lab 08/28/19  1623   MG 2.3     POCT Glucose: No results for input(s): POCTGLUCOSE in the last 48 hours.  Recent Lab Results       08/28/19  1623        Albumin 3.6     Alkaline Phosphatase 79     ALT 34     Anion Gap 9     AST 36  Comment:  *Result may be interfered by visible hemolysis     Baso # 0.04     Basophil% 0.7     BILIRUBIN TOTAL 0.9  Comment:  For infants and newborns, interpretation of results should be based  on gestational age, weight and in agreement with clinical  observations.  Premature Infant recommended reference ranges:  Up to 24 hours.............<8.0 mg/dL  Up to 48 hours............<12.0 mg/dL  3-5 days..................<15.0 mg/dL  6-29 days.................<15.0 mg/dL       BUN, Bld 27     Calcium 9.1     Chloride 104     CO2 24     Creatinine 1.4     CRP 20.5     Differential Method Automated     eGFR if African American 55.2     eGFR if non  47.8  Comment:  Calculation used to obtain the estimated glomerular filtration  rate (eGFR) is the CKD-EPI equation.        Eos # 0.1     Eosinophil% 1.2     Glucose 108     Gran # (ANC) 4.1     Gran% 71.9     Hematocrit 36.7     Hemoglobin 11.6     Immature Grans (Abs) 0.01  Comment:  Mild elevation in immature granulocytes is non specific and   can be seen in a variety of conditions including stress response,   acute inflammation, trauma and pregnancy. Correlation with other   laboratory and clinical findings is essential.       Immature Granulocytes 0.2     Lactate, Moses 0.9  Comment:  Falsely low lactic acid  results can be found in samples   containing >=13.0 mg/dL total bilirubin and/or >=3.5 mg/dL   direct bilirubin.       Lymph # 0.7     Lymph% 11.3     Magnesium 2.3     MCH 32.5     MCHC 31.6          Mono # 0.8     Mono% 14.7     MPV 11.1     nRBC 0     Platelets 129     Potassium 5.6  Comment:  *Result may be interfered by visible hemolysis     PROTEIN TOTAL 8.0  Comment:  *Result may be interfered by visible hemolysis     RBC 3.57     RDW 13.1     Sed Rate 42     Sodium 137     WBC 5.73         All pertinent labs within the past 24 hours have been reviewed.    Significant Imaging: I have reviewed all pertinent imaging results/findings within the past 24 hours.    Assessment/Plan:     Toe osteomyelitis, right  History of ulceration at lateral aspect of R foot for 6 weeks; Treatment with ABx (amoxacillin and bactrim) outpatient but has not taken ABx for 2 weeks prior to admission.     Xray Foot:  Site of the patient's pain is not included in the history or indicated on the images provided.  As seen on the recent study of 08/22/2019 there is deformity of the distal aspect of the 5th metatarsal bone and the proximal phalanx of the little toe.  This may represent subacute or prior trauma.  However infection is not excluded in light of the swelling of overlying soft tissue.    - Tissue Bx pending  - BCx x2 pending  - MRI foot ordered; f/u  - holding anticoagulation and NPO after midnight      Hypertension  SBP on admission 120/62    Continue Carvedilol  Holding Lasix and Losartan      Chronic anticoagulation  Holding Eloquis on admission      Coronary artery disease  History of CAD s/p stent 2011    Continuing Atorvastatin  Holding Lasix and Carvedilol      Heart failure, systolic, chronic  TTE 2/25/19: EF 20%  · Mild left ventricular enlargement.  · Severely decreased left ventricular systolic function. The estimated ejection fraction is 20%  · Severe global hypokinetic wall motion.  · Mild right ventricular  enlargement.  · Moderately reduced right ventricular systolic function.  · Moderate left atrial enlargement.  · No thrombus is present in the appendage. Abnormal appendage velocities.  · Mild right atrial enlargement.  · Mild aortic regurgitation.  · Mild mitral regurgitation.  · Mild tricuspid regurgitation.    Continuing carvedilol  Holding Lasix and Losartan      Type 2 diabetes mellitus with neurologic complication, with long-term current use of insulin  -Started on diabetic regimen inpatient  -Will monitor       Atrial fibrillation  Continue amiodarone on admission  Holding Eloquis       VTE Risk Mitigation (From admission, onward)        Ordered     Place sequential compression device  Until discontinued      08/28/19 1815     IP VTE HIGH RISK PATIENT  Once      08/28/19 1815             Rodrigue Bhakta MD  Department of Hospital Medicine   [facility]

## 2019-08-29 NOTE — ASSESSMENT & PLAN NOTE
History of ulceration at lateral aspect of R foot for 6 weeks; Treatment with ABx (amoxacillin and bactrim) outpatient but has not taken ABx for 2 weeks prior to admission.     Xray Foot:  Site of the patient's pain is not included in the history or indicated on the images provided.  As seen on the recent study of 08/22/2019 there is deformity of the distal aspect of the 5th metatarsal bone and the proximal phalanx of the little toe.  This may represent subacute or prior trauma.  However infection is not excluded in light of the swelling of overlying soft tissue.    - Tissue Bx pending  - BCx x2 pending  - MRI foot ordered; f/u  - holding anticoagulation and NPO after midnight

## 2019-08-29 NOTE — PROGRESS NOTES
[facility] Hospital Medicine  Progress Note    Patient Name: Randy Camejo  MRN: [id number]  Patient Class: IP- Inpatient   Admission Date: 8/28/2019  Length of Stay: 1 days  Attending Physician: Denny De La Torre DO  Primary Care Provider: Susie Lazo MD    [facility] Medicine Team: [facility] [place] H[facility] MED 5 Rodrigue Bhakta MD    Subjective:     Principal Problem:<principal problem not specified>        HPI:  Mr Camejo is a 78 yo male with Afib, DM2, CAD s/p stent (2011) and CHF that presented from podiatry clinic due to an ulcer on his 5th toe of R foot for the past 6 weeks. Reports that pain in R heel is sharp in nature and intermittent and occurs at both rest and with pressure but does not radiate. He denies any inciting event prior to occurrence. He has been seen by outpatient podiatry over this time and has been treated with course of ABx (amoxacillin and bactrim) with no improvement of symptoms. He developed new erythema of his R shin this morning , at which point his podiatrist referred him to the ED for workup. He is not currently on antibiotics. Denies fever, claudication, CP, SOB, chills, N/V, HA, LH.     Overview/Hospital Course:  Mr Cameoj is a 78 yo male that was admitted for workup of possible osteomyelitis and cellulitis. He presented with ulcer on 5th toe on R foot and erythema of R. Torres. He had been treated outpatient for ulcer for past 6 weeks including with bactrim and amoxacillin. Xray and MRI showed likely osteomyelitis but BCx, Anaerobic, and aerobic cultures were negative. Fungal and AFB cultures pending. He was initiated on Vanc and Zosyn treatment and podiatry was consulted. Likely amputation on 8/30.    Interval History: Patient denies any acute events overnight or change in symptoms. Reports intermitent mm spasms of L foot and pain in R foot that is controlled. Denies any increased pain or swelling of R foot/shin. Ulcer is still not draining and is not painful to him at rest  or with walking. Denies any fever ,chills, sweats, cough, SOB, LH, N/V.     Review of Systems   Constitutional: Negative for chills, diaphoresis and fever.   Respiratory: Negative for cough, shortness of breath and wheezing.    Cardiovascular: Positive for leg swelling (minor R leg swelling and erythema on shin). Negative for chest pain and palpitations.   Gastrointestinal: Negative for diarrhea, nausea and vomiting.   Genitourinary: Negative for dysuria, flank pain and hematuria.   Skin: Positive for color change (erythema and swelling of R shin. No warmth or induration).   Neurological: Negative for dizziness, light-headedness and headaches.     Objective:     Vital Signs (Most Recent):  Temp: 98.3 °F (36.8 °C) (08/29/19 1148)  Pulse: (!) 49 (08/29/19 1148)  Resp: 16 (08/29/19 1148)  BP: 113/64 (08/29/19 1148)  SpO2: (!) 91 % (08/29/19 1148) Vital Signs (24h Range):  Temp:  [97.7 °F (36.5 °C)-98.3 °F (36.8 °C)] 98.3 °F (36.8 °C)  Pulse:  [49-97] 49  Resp:  [16-20] 16  SpO2:  [91 %-100 %] 91 %  BP: (109-120)/(59-69) 113/64     Weight: 79.4 kg (175 lb)  Body mass index is 24.41 kg/m².    Intake/Output Summary (Last 24 hours) at 8/29/2019 1359  Last data filed at 8/29/2019 0522  Gross per 24 hour   Intake 700 ml   Output 300 ml   Net 400 ml      Physical Exam   Constitutional: He is oriented to person, place, and time. He appears well-developed and well-nourished. No distress.   HENT:   Head: Normocephalic and atraumatic.   Eyes: Conjunctivae and EOM are normal. No scleral icterus.   Neck: Normal range of motion. Neck supple. No JVD present.   Cardiovascular: Normal rate, regular rhythm, normal heart sounds and intact distal pulses.   No murmur heard.  Pulmonary/Chest: Effort normal and breath sounds normal. No respiratory distress. He has no wheezes.   Musculoskeletal: Normal range of motion. He exhibits edema (1+ r shin edema with erythema but no induration).   Neurological: He is alert and oriented to person,  place, and time.   Skin: Skin is warm and dry. He is not diaphoretic.   Ulcer of R foot on lateral aspect of 5th toe. No drainage or purulence     Psychiatric: He has a normal mood and affect. His behavior is normal. Judgment and thought content normal.       Significant Labs:   A1C:   Recent Labs   Lab 04/10/19  1003 06/05/19  1217   HGBA1C 8.4* 6.3*     Blood Culture:   Recent Labs   Lab 08/28/19  1623 08/28/19  1628   LABBLOO No Growth to date No Growth to date     BMP:   Recent Labs   Lab 08/29/19  0543   *      K 4.1      CO2 26   BUN 26*   CREATININE 1.3   CALCIUM 8.5*   MG 2.3     CBC:   Recent Labs   Lab 08/28/19 1623 08/29/19  0543   WBC 5.73 4.39   HGB 11.6* 10.7*   HCT 36.7* 34.2*   * 123*     CMP:   Recent Labs   Lab 08/28/19  1623 08/28/19  1743 08/29/19  0543     --  138   K 5.6* 4.4 4.1     --  105   CO2 24  --  26     --  124*   BUN 27*  --  26*   CREATININE 1.4  --  1.3   CALCIUM 9.1  --  8.5*   PROT 8.0  --  6.8   ALBUMIN 3.6  --  3.1*   BILITOT 0.9  --  0.9   ALKPHOS 79  --  78   AST 36  --  24   ALT 34  --  31   ANIONGAP 9  --  7*   EGFRNONAA 47.8*  --  52.3*     Lactic Acid:   Recent Labs   Lab 08/28/19  1623   LACTATE 0.9     Magnesium:   Recent Labs   Lab 08/28/19  1623 08/29/19  0543   MG 2.3 2.3     Pathology Results  (Last 10 years)    None        POCT Glucose:   Recent Labs   Lab 08/28/19 1853 08/28/19 2158   POCTGLUCOSE 95 119*     Recent Lab Results       08/29/19  1035   08/29/19  0543   08/28/19  2158   08/28/19  1853   08/28/19  1743        Albumin   3.1           Alkaline Phosphatase   78           ALT   31           Anion Gap   7           aPTT 30.2  Comment:  aPTT therapeutic range = 39-69 seconds             AST   24           Baso #   0.03           Basophil%   0.7           BILIRUBIN TOTAL   0.9  Comment:  For infants and newborns, interpretation of results should be based  on gestational age, weight and in agreement with  clinical  observations.  Premature Infant recommended reference ranges:  Up to 24 hours.............<8.0 mg/dL  Up to 48 hours............<12.0 mg/dL  3-5 days..................<15.0 mg/dL  6-29 days.................<15.0 mg/dL             Blood Culture, Routine               BUN, Bld   26           Calcium   8.5           Chloride   105           CO2   26           Creatinine   1.3           CRP               Differential Method   Automated           eGFR if    >60.0           eGFR if non    52.3  Comment:  Calculation used to obtain the estimated glomerular filtration  rate (eGFR) is the CKD-EPI equation.              Eos #   0.1           Eosinophil%   2.5           Glucose   124           Gran # (ANC)   2.7           Gran%   61.3           Hematocrit   34.2           Hemoglobin   10.7           Immature Grans (Abs)   0.01  Comment:  Mild elevation in immature granulocytes is non specific and   can be seen in a variety of conditions including stress response,   acute inflammation, trauma and pregnancy. Correlation with other   laboratory and clinical findings is essential.             Immature Granulocytes   0.2           Coumadin Monitoring INR 1.3  Comment:  Coumadin Therapy:  2.0 - 3.0 for INR for all indicators except mechanical heart valves  and antiphospholipid syndromes which should use 2.5 - 3.5.               Lactate, Moses               Lymph #   0.9           Lymph%   20.0           Magnesium   2.3           MCH   32.0           MCHC   31.3           MCV   102           Mono #   0.7           Mono%   15.3           MPV   10.5           nRBC   0           Phosphorus   3.0           Platelets   123           POCT Glucose     119 95       Potassium   4.1     4.4     PROTEIN TOTAL   6.8           Protime 12.7             RBC   3.34           RDW   12.9           Sed Rate               Sodium   138           WBC   4.39                            08/28/19  1628   08/28/19  1623         Albumin   3.6     Alkaline Phosphatase   79     ALT   34     Anion Gap   9     aPTT         AST   36  Comment:  *Result may be interfered by visible hemolysis     Baso #   0.04     Basophil%   0.7     BILIRUBIN TOTAL   0.9  Comment:  For infants and newborns, interpretation of results should be based  on gestational age, weight and in agreement with clinical  observations.  Premature Infant recommended reference ranges:  Up to 24 hours.............<8.0 mg/dL  Up to 48 hours............<12.0 mg/dL  3-5 days..................<15.0 mg/dL  6-29 days.................<15.0 mg/dL       Blood Culture, Routine No Growth to date[P] No Growth to date[P]     BUN, Bld   27     Calcium   9.1     Chloride   104     CO2   24     Creatinine   1.4     CRP   20.5     Differential Method   Automated     eGFR if African American   55.2     eGFR if non    47.8  Comment:  Calculation used to obtain the estimated glomerular filtration  rate (eGFR) is the CKD-EPI equation.        Eos #   0.1     Eosinophil%   1.2     Glucose   108     Gran # (ANC)   4.1     Gran%   71.9     Hematocrit   36.7     Hemoglobin   11.6     Immature Grans (Abs)   0.01  Comment:  Mild elevation in immature granulocytes is non specific and   can be seen in a variety of conditions including stress response,   acute inflammation, trauma and pregnancy. Correlation with other   laboratory and clinical findings is essential.       Immature Granulocytes   0.2     Coumadin Monitoring INR         Lactate, Moses   0.9  Comment:  Falsely low lactic acid results can be found in samples   containing >=13.0 mg/dL total bilirubin and/or >=3.5 mg/dL   direct bilirubin.       Lymph #   0.7     Lymph%   11.3     Magnesium   2.3     MCH   32.5     MCHC   31.6     MCV   103     Mono #   0.8     Mono%   14.7     MPV   11.1     nRBC   0     Phosphorus         Platelets   129     POCT Glucose         Potassium   5.6  Comment:  *Result may be interfered by visible  hemolysis     PROTEIN TOTAL   8.0  Comment:  *Result may be interfered by visible hemolysis     Protime         RBC   3.57     RDW   13.1     Sed Rate   42     Sodium   137     WBC   5.73         All pertinent labs within the past 24 hours have been reviewed.    Significant Imaging: I have reviewed all pertinent imaging results/findings within the past 24 hours.      Assessment/Plan:      Toe osteomyelitis, right  History of ulceration at lateral aspect of R foot for 6 weeks; Treatment with ABx (amoxacillin and bactrim) outpatient but has not taken ABx for 2 weeks prior to admission.     Xray Foot:  Site of the patient's pain is not included in the history or indicated on the images provided.  As seen on the recent study of 08/22/2019 there is deformity of the distal aspect of the 5th metatarsal bone and the proximal phalanx of the little toe.  This may represent subacute or prior trauma.  However infection is not excluded in light of the swelling of overlying soft tissue.    MRI Foot:  Findings of osteomyelitis of the 5th proximal, middle and distal phalanges. Diffuse skin edema could be seen with cellulitis.    - Tissue Bx pending  - BCx x2 pending  - NPO after midnight  - Podiatry following; Rec Bone biopsy and IV antibiotics vs amputation. Pt elected for biopsy and  abx currently  - Currently on heparin; can stop/reverse if amputating        Cellulitis of right lower extremity  Concern for cellulitis of RLE  Has some edema with erythema but no heat or induration and is not painful  Patient is afebrile with normal WBCs; but is on ABX    Continue Vanc and Zosyn    Essential hypertension  SBP on admission 120/62    Continue Carvedilol  Holding Lasix and Losartan      Chronic anticoagulation  Holding Eloquis on admission    Started on Heparin drip, can discontinue if podiatry to amputate    Coronary artery disease  History of CAD s/p stent 2011    Continuing Atorvastatin  Holding Lasix and Carvedilol      Heart  failure, systolic, chronic  TTE 2/25/19: EF 20%  · Mild left ventricular enlargement.  · Severely decreased left ventricular systolic function. The estimated ejection fraction is 20%  · Severe global hypokinetic wall motion.  · Mild right ventricular enlargement.  · Moderately reduced right ventricular systolic function.  · Moderate left atrial enlargement.  · No thrombus is present in the appendage. Abnormal appendage velocities.  · Mild right atrial enlargement.  · Mild aortic regurgitation.  · Mild mitral regurgitation.  · Mild tricuspid regurgitation.    Continuing carvedilol  Holding Lasix and Losartan      Type 2 diabetes mellitus with neurologic complication, with long-term current use of insulin  -Started on diabetic regimen inpatient  -Will monitor       Atrial fibrillation  Continue amiodarone on admission  Holding Eloquis, currently on heaprin drip      VTE Risk Mitigation (From admission, onward)        Ordered     heparin 25,000 units in dextrose 5% 250 mL (100 units/mL) infusion LOW INTENSITY nomogram - [facility]  Continuous      08/29/19 1012     heparin 25,000 units in dextrose 5% (100 units/ml) IV bolus from bag - ADDITIONAL PRN BOLUS - 60 units/kg  As needed (PRN)      08/29/19 1012     heparin 25,000 units in dextrose 5% (100 units/ml) IV bolus from bag - ADDITIONAL PRN BOLUS - 30 units/kg  As needed (PRN)      08/29/19 1012     IP VTE HIGH RISK PATIENT  Once      08/28/19 2100     Place DREAD hose  Until discontinued      08/28/19 2100     Place sequential compression device  Until discontinued      08/28/19 1815                Rodrigue Bhakta MD  Department of Hospital Medicine   [facility]

## 2019-08-29 NOTE — SUBJECTIVE & OBJECTIVE
Interval History: Patient denies any acute events overnight or change in symptoms. Reports intermitent mm spasms of L foot and pain in R foot that is controlled. Denies any increased pain or swelling of R foot/shin. Ulcer is still not draining and is not painful to him at rest or with walking. Denies any fever ,chills, sweats, cough, SOB, LH, N/V.     Review of Systems   Constitutional: Negative for chills, diaphoresis and fever.   Respiratory: Negative for cough, shortness of breath and wheezing.    Cardiovascular: Positive for leg swelling (minor R leg swelling and erythema on shin). Negative for chest pain and palpitations.   Gastrointestinal: Negative for diarrhea, nausea and vomiting.   Genitourinary: Negative for dysuria, flank pain and hematuria.   Skin: Positive for color change (erythema and swelling of R shin. No warmth or induration).   Neurological: Negative for dizziness, light-headedness and headaches.     Objective:     Vital Signs (Most Recent):  Temp: 98.3 °F (36.8 °C) (08/29/19 1148)  Pulse: (!) 49 (08/29/19 1148)  Resp: 16 (08/29/19 1148)  BP: 113/64 (08/29/19 1148)  SpO2: (!) 91 % (08/29/19 1148) Vital Signs (24h Range):  Temp:  [97.7 °F (36.5 °C)-98.3 °F (36.8 °C)] 98.3 °F (36.8 °C)  Pulse:  [49-97] 49  Resp:  [16-20] 16  SpO2:  [91 %-100 %] 91 %  BP: (109-120)/(59-69) 113/64     Weight: 79.4 kg (175 lb)  Body mass index is 24.41 kg/m².    Intake/Output Summary (Last 24 hours) at 8/29/2019 1359  Last data filed at 8/29/2019 0522  Gross per 24 hour   Intake 700 ml   Output 300 ml   Net 400 ml      Physical Exam   Constitutional: He is oriented to person, place, and time. He appears well-developed and well-nourished. No distress.   HENT:   Head: Normocephalic and atraumatic.   Eyes: Conjunctivae and EOM are normal. No scleral icterus.   Neck: Normal range of motion. Neck supple. No JVD present.   Cardiovascular: Normal rate, regular rhythm, normal heart sounds and intact distal pulses.   No murmur  heard.  Pulmonary/Chest: Effort normal and breath sounds normal. No respiratory distress. He has no wheezes.   Musculoskeletal: Normal range of motion. He exhibits edema (1+ r shin edema with erythema but no induration).   Neurological: He is alert and oriented to person, place, and time.   Skin: Skin is warm and dry. He is not diaphoretic.   Ulcer of R foot on lateral aspect of 5th toe. No drainage or purulence     Psychiatric: He has a normal mood and affect. His behavior is normal. Judgment and thought content normal.       Significant Labs:   A1C:   Recent Labs   Lab 04/10/19  1003 06/05/19  1217   HGBA1C 8.4* 6.3*     Blood Culture:   Recent Labs   Lab 08/28/19  1623 08/28/19  1628   LABBLOO No Growth to date No Growth to date     BMP:   Recent Labs   Lab 08/29/19  0543   *      K 4.1      CO2 26   BUN 26*   CREATININE 1.3   CALCIUM 8.5*   MG 2.3     CBC:   Recent Labs   Lab 08/28/19  1623 08/29/19  0543   WBC 5.73 4.39   HGB 11.6* 10.7*   HCT 36.7* 34.2*   * 123*     CMP:   Recent Labs   Lab 08/28/19  1623 08/28/19  1743 08/29/19  0543     --  138   K 5.6* 4.4 4.1     --  105   CO2 24  --  26     --  124*   BUN 27*  --  26*   CREATININE 1.4  --  1.3   CALCIUM 9.1  --  8.5*   PROT 8.0  --  6.8   ALBUMIN 3.6  --  3.1*   BILITOT 0.9  --  0.9   ALKPHOS 79  --  78   AST 36  --  24   ALT 34  --  31   ANIONGAP 9  --  7*   EGFRNONAA 47.8*  --  52.3*     Lactic Acid:   Recent Labs   Lab 08/28/19  1623   LACTATE 0.9     Magnesium:   Recent Labs   Lab 08/28/19  1623 08/29/19  0543   MG 2.3 2.3     Pathology Results  (Last 10 years)    None        POCT Glucose:   Recent Labs   Lab 08/28/19  1853 08/28/19  2158   POCTGLUCOSE 95 119*     Recent Lab Results       08/29/19  1035   08/29/19  0543   08/28/19  2158   08/28/19  1853   08/28/19  1743        Albumin   3.1           Alkaline Phosphatase   78           ALT   31           Anion Gap   7           aPTT 30.2  Comment:  aPTT  therapeutic range = 39-69 seconds             AST   24           Baso #   0.03           Basophil%   0.7           BILIRUBIN TOTAL   0.9  Comment:  For infants and newborns, interpretation of results should be based  on gestational age, weight and in agreement with clinical  observations.  Premature Infant recommended reference ranges:  Up to 24 hours.............<8.0 mg/dL  Up to 48 hours............<12.0 mg/dL  3-5 days..................<15.0 mg/dL  6-29 days.................<15.0 mg/dL             Blood Culture, Routine               BUN, Bld   26           Calcium   8.5           Chloride   105           CO2   26           Creatinine   1.3           CRP               Differential Method   Automated           eGFR if    >60.0           eGFR if non    52.3  Comment:  Calculation used to obtain the estimated glomerular filtration  rate (eGFR) is the CKD-EPI equation.              Eos #   0.1           Eosinophil%   2.5           Glucose   124           Gran # (ANC)   2.7           Gran%   61.3           Hematocrit   34.2           Hemoglobin   10.7           Immature Grans (Abs)   0.01  Comment:  Mild elevation in immature granulocytes is non specific and   can be seen in a variety of conditions including stress response,   acute inflammation, trauma and pregnancy. Correlation with other   laboratory and clinical findings is essential.             Immature Granulocytes   0.2           Coumadin Monitoring INR 1.3  Comment:  Coumadin Therapy:  2.0 - 3.0 for INR for all indicators except mechanical heart valves  and antiphospholipid syndromes which should use 2.5 - 3.5.               Lactate, Moses               Lymph #   0.9           Lymph%   20.0           Magnesium   2.3           MCH   32.0           MCHC   31.3           MCV   102           Mono #   0.7           Mono%   15.3           MPV   10.5           nRBC   0           Phosphorus   3.0           Platelets   123           POCT  Glucose     119 95       Potassium   4.1     4.4     PROTEIN TOTAL   6.8           Protime 12.7             RBC   3.34           RDW   12.9           Sed Rate               Sodium   138           WBC   4.39                            08/28/19  1628   08/28/19  1623        Albumin   3.6     Alkaline Phosphatase   79     ALT   34     Anion Gap   9     aPTT         AST   36  Comment:  *Result may be interfered by visible hemolysis     Baso #   0.04     Basophil%   0.7     BILIRUBIN TOTAL   0.9  Comment:  For infants and newborns, interpretation of results should be based  on gestational age, weight and in agreement with clinical  observations.  Premature Infant recommended reference ranges:  Up to 24 hours.............<8.0 mg/dL  Up to 48 hours............<12.0 mg/dL  3-5 days..................<15.0 mg/dL  6-29 days.................<15.0 mg/dL       Blood Culture, Routine No Growth to date[P] No Growth to date[P]     BUN, Bld   27     Calcium   9.1     Chloride   104     CO2   24     Creatinine   1.4     CRP   20.5     Differential Method   Automated     eGFR if African American   55.2     eGFR if non    47.8  Comment:  Calculation used to obtain the estimated glomerular filtration  rate (eGFR) is the CKD-EPI equation.        Eos #   0.1     Eosinophil%   1.2     Glucose   108     Gran # (ANC)   4.1     Gran%   71.9     Hematocrit   36.7     Hemoglobin   11.6     Immature Grans (Abs)   0.01  Comment:  Mild elevation in immature granulocytes is non specific and   can be seen in a variety of conditions including stress response,   acute inflammation, trauma and pregnancy. Correlation with other   laboratory and clinical findings is essential.       Immature Granulocytes   0.2     Coumadin Monitoring INR         Lactate, Moses   0.9  Comment:  Falsely low lactic acid results can be found in samples   containing >=13.0 mg/dL total bilirubin and/or >=3.5 mg/dL   direct bilirubin.       Lymph #   0.7     Lymph%    11.3     Magnesium   2.3     MCH   32.5     MCHC   31.6     MCV   103     Mono #   0.8     Mono%   14.7     MPV   11.1     nRBC   0     Phosphorus         Platelets   129     POCT Glucose         Potassium   5.6  Comment:  *Result may be interfered by visible hemolysis     PROTEIN TOTAL   8.0  Comment:  *Result may be interfered by visible hemolysis     Protime         RBC   3.57     RDW   13.1     Sed Rate   42     Sodium   137     WBC   5.73         All pertinent labs within the past 24 hours have been reviewed.    Significant Imaging: I have reviewed all pertinent imaging results/findings within the past 24 hours.

## 2019-08-29 NOTE — PLAN OF CARE
Problem: Adult Inpatient Plan of Care  Goal: Plan of Care Review  Outcome: Ongoing (interventions implemented as appropriate)  New admit from ED received AAOX4; NAD noted; VS stable throughout shift; afebrile; denies pain this shift; POC reviewed with patient; verbalized  Understanding; continues with IV abx x2; note to MD to address wound care orders; pending wound care consult; held NPO per orders; assessment per flowsheet; meds administered per MAR; safety precautions maintained; 0 falls, injury or acute events this shift; denies needs WCTM    Pending: AM labs, f/u cx, wound care consult/orders

## 2019-08-29 NOTE — CONSULTS
Consult received for foot wound.    Patient sent from podiatry clinic. Podiatry has been consulted for care of the foot wound.     Wound care will sign off. Please re consult if we can be of further assistance.     Chyna Anaya RN [facility]   [phone]

## 2019-08-29 NOTE — PLAN OF CARE
08/29/19 1421   Post-Acute Status   Post-Acute Authorization Home Health/Hospice   Home Health/Hospice Status Awaiting Internal Medical Clearance

## 2019-08-29 NOTE — ASSESSMENT & PLAN NOTE
Plan:  - Local wound care.  - POC presented to patient today. Bone biopsy and IV antibiotics vs amputation. He would like pursue the bone biopsy and IV antibiotics.  - Will obtain bone biopsy.  - Resumed diet.  - Podiatry will follow.

## 2019-08-29 NOTE — ASSESSMENT & PLAN NOTE
Concern for cellulitis of RLE  Has some edema with erythema but no heat or induration and is not painful  Patient is afebrile with normal WBCs; but is on ABX    Continue Vanc and Zosyn

## 2019-08-29 NOTE — TELEPHONE ENCOUNTER
----- Message from Ivy Fraga sent at 8/29/2019 10:55 AM CDT -----  Contact: Bernice / Daughter  Daughter called in requesting a call back from Dr. Corbett Concerning patients care. Bernice expressed patient was supposed to be seen by Dr. River but another podiatrists meet with him and offered different choices besides the instructions that were given by Dr Corbett.          Pls give patients daughter a call back at [phone]

## 2019-08-29 NOTE — SUBJECTIVE & OBJECTIVE
Scheduled Meds:   amiodarone  200 mg Oral Daily    atorvastatin  20 mg Oral Daily    carvedilol  6.25 mg Oral BID    heparin (PORCINE)  60 Units/kg (Adjusted) Intravenous Once    insulin detemir U-100  10 Units Subcutaneous QHS    levothyroxine  50 mcg Oral Before breakfast    piperacillin-tazobactam (ZOSYN) IVPB  4.5 g Intravenous Q8H    vancomycin (VANCOCIN) IVPB  15 mg/kg Intravenous Q24H     Continuous Infusions:   heparin (porcine) in D5W       PRN Meds:acetaminophen, Dextrose 10% Bolus, Dextrose 10% Bolus, glucagon (human recombinant), glucose, glucose, heparin (PORCINE), heparin (PORCINE), insulin aspart U-100, ondansetron, sodium chloride 0.9%, sodium chloride 0.9%    Review of patient's allergies indicates:  No Known Allergies     Past Medical History:   Diagnosis Date    Asthma     childhood    Atrial fibrillation     CHF (congestive heart failure)     Chronic anticoagulation 2/25/2019    Coronary artery disease     Coronary artery disease 2/25/2019    Diabetes mellitus, type 2     Heart attack     stent    High risk medication use 3/22/2019    2/19/2019: Began amiodarone.    History of coronary artery stent placement 3/22/2019    2000: Concepcion: MI and stent.    History of myocardial infarction 3/22/2019    2000: Concepcion: MI and stent.    Hypercholesterolemia 3/22/2019    Hypertension 3/22/2019    Tachycardia induced cardiomyopathy 2/25/2019     Past Surgical History:   Procedure Laterality Date    CARDIAC CATHETERIZATION      CARDIOVERSION N/A 2/21/2019    [facility] C[facility] LAB    CARDIOVERSION OR DEFIBRILLATION N/A 2/25/2019    [facility] by [facility] CATH LAB    CATARACT EXTRACTION W/  INTRAOCULAR LENS IMPLANT Right 12/18/2017    Dr. Beavers    CATARACT EXTRACTION W/  INTRAOCULAR LENS IMPLANT Left 01/08/2018    Dr. Beavers    ECHOCARDIOGRAM,TRANSESOPHAGEAL N/A 2/25/2019    Performed by [facility] CATH LAB     ECHOCARDIOGRAM,TRANSESOPHAGEAL N/A 2/21/2019    Performed by [facility] at [facility] CATH LAB    INSERTION-INTRAOCULAR LENS (IOL) Left 1/8/2018    Performed by Milo Beavers MD at [facility] OR    INSERTION-INTRAOCULAR LENS (IOL) Right 12/18/2017    [facility] by [facility] at [facility] OR    PHACOEMULSIFICATION-ASPIRATION-CATARACT Left 1/8/2018    Performed by [facility] at [facility] OR    PHACOEMULSIFICATION-ASPIRATION-CATARACT Right 12/18/2017    [facility] at [facility] OR    TONSILLECTOMY         Family History     Problem Relation (Age of Onset)    Cancer Father    Cataracts Father    Diabetes Mother    Stroke Mother        Tobacco Use    Smoking status: Never Smoker    Smokeless tobacco: Never Used   Substance and Sexual Activity    Alcohol use: No    Drug use: No    Sexual activity: Not on file     Review of Systems   Constitutional: Negative for chills and fever.   Gastrointestinal: Negative for nausea and vomiting.   Skin: Positive for wound.     Objective:     Vital Signs (Most Recent):  Temp: 98.1 °F (36.7 °C) (08/29/19 0809)  Pulse: (!) 50 (08/29/19 0809)  Resp: 16 (08/29/19 0809)  BP: 109/60 (08/29/19 0809)  SpO2: 95 % (08/29/19 0840) Vital Signs (24h Range):  Temp:  [97.7 °F (36.5 °C)-98.1 °F (36.7 °C)] 98.1 °F (36.7 °C)  Pulse:  [50-97] 50  Resp:  [16-20] 16  SpO2:  [93 %-100 %] 95 %  BP: (109-120)/(59-69) 109/60     Weight: 79.4 kg (175 lb)  Body mass index is 24.41 kg/m².    Foot Exam    Right Foot/Ankle     Inspection and Palpation  Ecchymosis: none  Tenderness: none   Swelling: (Minor LE edema)  Skin Exam: ulcer;     Neurovascular  Saphenous nerve sensation: diminished  Tibial nerve sensation: diminished  Superficial peroneal nerve sensation: diminished  Deep peroneal nerve sensation: diminished  Sural nerve sensation: diminished      Left Foot/Ankle      Inspection and Palpation  Ecchymosis: none  Tenderness: none   Swelling: (Minor LE edema)    Neurovascular  Saphenous  nerve sensation: diminished  Tibial nerve sensation: diminished  Superficial peroneal nerve sensation: diminished  Deep peroneal nerve sensation: diminished  Sural nerve sensation: diminished            Laboratory:  CBC:   Recent Labs   Lab 08/29/19  0543   WBC 4.39   RBC 3.34*   HGB 10.7*   HCT 34.2*   *   *   MCH 32.0*   MCHC 31.3*     CMP:   Recent Labs   Lab 08/29/19  0543   *   CALCIUM 8.5*   ALBUMIN 3.1*   PROT 6.8      K 4.1   CO2 26      BUN 26*   CREATININE 1.3   ALKPHOS 78   ALT 31   AST 24   BILITOT 0.9       Diagnostic Results:      Clinical Findings:  Stable 5th toe ulceration on the right foot. Some proximally spreading cellulitis.

## 2019-08-29 NOTE — ASSESSMENT & PLAN NOTE
TTE 2/25/19: EF 20%  · Mild left ventricular enlargement.  · Severely decreased left ventricular systolic function. The estimated ejection fraction is 20%  · Severe global hypokinetic wall motion.  · Mild right ventricular enlargement.  · Moderately reduced right ventricular systolic function.  · Moderate left atrial enlargement.  · No thrombus is present in the appendage. Abnormal appendage velocities.  · Mild right atrial enlargement.  · Mild aortic regurgitation.  · Mild mitral regurgitation.  · Mild tricuspid regurgitation.    Continuing carvedilol  Holding Lasix and Losartan

## 2019-08-29 NOTE — HPI
Randy Camejo is a 79 y.o. male who  has a past medical history of Asthma, Atrial fibrillation, CHF (congestive heart failure), Chronic anticoagulation, Coronary artery disease, Coronary artery disease, Diabetes mellitus, type 2, Heart attack, High risk medication use, History of coronary artery stent placement, History of myocardial infarction, Hypercholesterolemia, Hypertension, and Tachycardia induced cardiomyopathy.    Patient admitted for right 5th toe osteomyelitis.  Consulted to Podiatry for the same.  Patient has had non healing wound of the right 5th toe for at least a month. He reports taking an antibiotic by an outside provider but cannot remember what the medication was. He was referred to vascular surgery to evaluate for blood flow while OP but missed his appointment. OP x-rays were taken with cortical abnormalities of the 5th MTPJ concerning for prior traumatic event or OM. He had a bone biopsy of the 5th metatarsal in clinic the week prior currently with no growth. He presented to clinic yesterday with worsening of the wound and possibly some streaking erythema proximally. He denies any constitutional symptoms at the time but was advised to be admitted for IV antibiotics.    In the ED, he had an MRI of the R foot positive for osteomyelitis only to the 5th toe with sparing of the 5th metatarsal.

## 2019-08-29 NOTE — ASSESSMENT & PLAN NOTE
History of ulceration at lateral aspect of R foot for 6 weeks; Treatment with ABx (amoxacillin and bactrim) outpatient but has not taken ABx for 2 weeks prior to admission.     Xray Foot:  Site of the patient's pain is not included in the history or indicated on the images provided.  As seen on the recent study of 08/22/2019 there is deformity of the distal aspect of the 5th metatarsal bone and the proximal phalanx of the little toe.  This may represent subacute or prior trauma.  However infection is not excluded in light of the swelling of overlying soft tissue.    MRI Foot:  Findings of osteomyelitis of the 5th proximal, middle and distal phalanges. Diffuse skin edema could be seen with cellulitis.    - Tissue Bx pending  - BCx x2 pending  - NPO after midnight  - Podiatry following; Rec Bone biopsy and IV antibiotics vs amputation. Pt elected for biopsy and  abx currently  - Currently on heparin; can stop/reverse if amputating

## 2019-08-29 NOTE — HOSPITAL COURSE
Admitted for recurrent right 5th toe infection despite oral antibiotics now MRI showing osteomyelitis of the 5th toe on Vancomycin and Ceftriaxone. Toe amputation today, will decide on Abx regimen if no clear margins/depending on cultures. Stable on discharge with Abx regimen of po Doxycyline for 7 days per ID rec.

## 2019-08-29 NOTE — HPI
Mr Camejo is a 80 yo male with Afib, DM2, CAD s/p stent (2011) and CHF that presented from podiatry clinic due to an ulcer on his 5th toe of R foot for the past 6 weeks. Reports that pain in R heel is sharp in nature and intermittent and occurs at both rest and with pressure but does not radiate. He denies any inciting event prior to occurrence. He has been seen by outpatient podiatry over this time and has been treated with course of ABx (amoxacillin and bactrim) with no improvement of symptoms. He developed new erythema of his R shin this morning , at which point his podiatrist referred him to the ED for workup. He is not currently on antibiotics. Denies fever, claudication, CP, SOB, chills, N/V, HA, LH.

## 2019-08-29 NOTE — PLAN OF CARE
Problem: Adult Inpatient Plan of Care  Goal: Plan of Care Review  Outcome: Ongoing (interventions implemented as appropriate)     08/29/19 0612   Plan of Care Review   Plan of Care Reviewed With patient   Progress no change     Pt aaox4. V/s stable. Family at bedside. No complaints of pain or discomfort. Will continue to monitor and interventions as appropriate.

## 2019-08-29 NOTE — PLAN OF CARE
08/29/19 1249   Discharge Assessment   Assessment Type Discharge Planning Assessment   Confirmed/corrected address and phone number on facesheet? No   Assessment information obtained from? Patient   Communicated expected length of stay with patient/caregiver no   Prior to hospitilization cognitive status: Alert/Oriented   Prior to hospitalization functional status: Assistive Equipment;Needs Assistance   Current cognitive status: Alert/Oriented   Current Functional Status: Assistive Equipment;Needs Assistance   Facility Arrived From: Home   Lives With spouse   Able to Return to Prior Arrangements yes   Is patient able to care for self after discharge? Yes   Who are your caregiver(s) and their phone number(s)?   (Jade Camejo (Spouse) [phone])   Patient's perception of discharge disposition home or selfcare;home health   Readmission Within the Last 30 Days no previous admission in last 30 days   Patient currently being followed by outpatient case management? No   Patient currently receives any other outside agency services? No   Equipment Currently Used at Home cane, straight;walker, rolling;commode;glucometer   Do you have any problems affording any of your prescribed medications? No   Is the patient taking medications as prescribed? yes   Does the patient have transportation home? Yes   Transportation Anticipated family or friend will provide   Does the patient receive services at the [facility]? No   Discharge Plan A Home with family   Discharge Plan B Home Health;Home with family   DME Needed Upon Discharge  none   Patient/Family in Agreement with Plan yes

## 2019-08-30 ENCOUNTER — ANESTHESIA EVENT (OUTPATIENT)
Dept: SURGERY | Facility: HOSPITAL | Age: 79
DRG: 617 | End: 2019-08-30
Payer: MEDICARE

## 2019-08-30 LAB
ALBUMIN SERPL BCP-MCNC: 3.1 G/DL (ref 3.5–5.2)
ALP SERPL-CCNC: 73 U/L (ref 55–135)
ALT SERPL W/O P-5'-P-CCNC: 29 U/L (ref 10–44)
ANION GAP SERPL CALC-SCNC: 9 MMOL/L (ref 8–16)
APTT BLDCRRT: 51.5 SEC (ref 21–32)
APTT BLDCRRT: 54.8 SEC (ref 21–32)
AST SERPL-CCNC: 23 U/L (ref 10–40)
BACTERIA SPEC ANAEROBE CULT: NORMAL
BASOPHILS # BLD AUTO: 0.04 K/UL (ref 0–0.2)
BASOPHILS NFR BLD: 0.8 % (ref 0–1.9)
BILIRUB SERPL-MCNC: 0.9 MG/DL (ref 0.1–1)
BUN SERPL-MCNC: 25 MG/DL (ref 8–23)
CALCIUM SERPL-MCNC: 8.4 MG/DL (ref 8.7–10.5)
CHLORIDE SERPL-SCNC: 106 MMOL/L (ref 95–110)
CO2 SERPL-SCNC: 25 MMOL/L (ref 23–29)
CREAT SERPL-MCNC: 1.4 MG/DL (ref 0.5–1.4)
DIFFERENTIAL METHOD: ABNORMAL
EOSINOPHIL # BLD AUTO: 0.2 K/UL (ref 0–0.5)
EOSINOPHIL NFR BLD: 4.7 % (ref 0–8)
ERYTHROCYTE [DISTWIDTH] IN BLOOD BY AUTOMATED COUNT: 12.9 % (ref 11.5–14.5)
EST. GFR  (AFRICAN AMERICAN): 55.2 ML/MIN/1.73 M^2
EST. GFR  (NON AFRICAN AMERICAN): 47.8 ML/MIN/1.73 M^2
GLUCOSE SERPL-MCNC: 99 MG/DL (ref 70–110)
HCT VFR BLD AUTO: 34.3 % (ref 40–54)
HGB BLD-MCNC: 10.7 G/DL (ref 14–18)
IMM GRANULOCYTES # BLD AUTO: 0.01 K/UL (ref 0–0.04)
IMM GRANULOCYTES NFR BLD AUTO: 0.2 % (ref 0–0.5)
LYMPHOCYTES # BLD AUTO: 1 K/UL (ref 1–4.8)
LYMPHOCYTES NFR BLD: 19.6 % (ref 18–48)
MAGNESIUM SERPL-MCNC: 2.2 MG/DL (ref 1.6–2.6)
MCH RBC QN AUTO: 31.8 PG (ref 27–31)
MCHC RBC AUTO-ENTMCNC: 31.2 G/DL (ref 32–36)
MCV RBC AUTO: 102 FL (ref 82–98)
MONOCYTES # BLD AUTO: 0.6 K/UL (ref 0.3–1)
MONOCYTES NFR BLD: 12 % (ref 4–15)
NEUTROPHILS # BLD AUTO: 3.2 K/UL (ref 1.8–7.7)
NEUTROPHILS NFR BLD: 62.7 % (ref 38–73)
NRBC BLD-RTO: 0 /100 WBC
PLATELET # BLD AUTO: 126 K/UL (ref 150–350)
PMV BLD AUTO: 10.6 FL (ref 9.2–12.9)
POCT GLUCOSE: 147 MG/DL (ref 70–110)
POCT GLUCOSE: 83 MG/DL (ref 70–110)
POCT GLUCOSE: 84 MG/DL (ref 70–110)
POTASSIUM SERPL-SCNC: 3.9 MMOL/L (ref 3.5–5.1)
PROT SERPL-MCNC: 6.7 G/DL (ref 6–8.4)
RBC # BLD AUTO: 3.36 M/UL (ref 4.6–6.2)
SODIUM SERPL-SCNC: 140 MMOL/L (ref 136–145)
VANCOMYCIN TROUGH SERPL-MCNC: 13.4 UG/ML (ref 10–22)
WBC # BLD AUTO: 5.16 K/UL (ref 3.9–12.7)

## 2019-08-30 PROCEDURE — 85730 THROMBOPLASTIN TIME PARTIAL: CPT

## 2019-08-30 PROCEDURE — 11000001 HC ACUTE MED/SURG PRIVATE ROOM

## 2019-08-30 PROCEDURE — 99232 PR SUBSEQUENT HOSPITAL CARE,LEVL II: ICD-10-PCS | Mod: GC,,, | Performed by: INTERNAL MEDICINE

## 2019-08-30 PROCEDURE — 63600175 PHARM REV CODE 636 W HCPCS: Performed by: STUDENT IN AN ORGANIZED HEALTH CARE EDUCATION/TRAINING PROGRAM

## 2019-08-30 PROCEDURE — 63600175 PHARM REV CODE 636 W HCPCS: Performed by: INTERNAL MEDICINE

## 2019-08-30 PROCEDURE — 25000003 PHARM REV CODE 250: Performed by: STUDENT IN AN ORGANIZED HEALTH CARE EDUCATION/TRAINING PROGRAM

## 2019-08-30 PROCEDURE — 99232 SBSQ HOSP IP/OBS MODERATE 35: CPT | Mod: GC,,, | Performed by: INTERNAL MEDICINE

## 2019-08-30 PROCEDURE — 85025 COMPLETE CBC W/AUTO DIFF WBC: CPT

## 2019-08-30 PROCEDURE — 83735 ASSAY OF MAGNESIUM: CPT

## 2019-08-30 PROCEDURE — 99233 SBSQ HOSP IP/OBS HIGH 50: CPT | Mod: ,,, | Performed by: PODIATRIST

## 2019-08-30 PROCEDURE — 36415 COLL VENOUS BLD VENIPUNCTURE: CPT

## 2019-08-30 PROCEDURE — 99233 PR SUBSEQUENT HOSPITAL CARE,LEVL III: ICD-10-PCS | Mod: ,,, | Performed by: PODIATRIST

## 2019-08-30 PROCEDURE — 99223 PR INITIAL HOSPITAL CARE,LEVL III: ICD-10-PCS | Mod: ,,, | Performed by: INTERNAL MEDICINE

## 2019-08-30 PROCEDURE — 99223 1ST HOSP IP/OBS HIGH 75: CPT | Mod: ,,, | Performed by: INTERNAL MEDICINE

## 2019-08-30 PROCEDURE — 80053 COMPREHEN METABOLIC PANEL: CPT

## 2019-08-30 PROCEDURE — 63600175 PHARM REV CODE 636 W HCPCS: Performed by: PHYSICIAN ASSISTANT

## 2019-08-30 PROCEDURE — 80202 ASSAY OF VANCOMYCIN: CPT

## 2019-08-30 RX ORDER — HEPARIN SODIUM,PORCINE/D5W 25000/250
12 INTRAVENOUS SOLUTION INTRAVENOUS CONTINUOUS
Status: DISCONTINUED | OUTPATIENT
Start: 2019-08-30 | End: 2019-08-31

## 2019-08-30 RX ADMIN — AMIODARONE HYDROCHLORIDE 200 MG: 200 TABLET ORAL at 08:08

## 2019-08-30 RX ADMIN — LEVOTHYROXINE SODIUM 50 MCG: 50 TABLET ORAL at 05:08

## 2019-08-30 RX ADMIN — CARVEDILOL 6.25 MG: 6.25 TABLET, FILM COATED ORAL at 08:08

## 2019-08-30 RX ADMIN — HEPARIN SODIUM AND DEXTROSE 12 UNITS/KG/HR: 10000; 5 INJECTION INTRAVENOUS at 04:08

## 2019-08-30 RX ADMIN — PIPERACILLIN AND TAZOBACTAM 4.5 G: 4; .5 INJECTION, POWDER, LYOPHILIZED, FOR SOLUTION INTRAVENOUS; PARENTERAL at 04:08

## 2019-08-30 RX ADMIN — CARVEDILOL 6.25 MG: 6.25 TABLET, FILM COATED ORAL at 09:08

## 2019-08-30 RX ADMIN — ATORVASTATIN CALCIUM 20 MG: 20 TABLET, FILM COATED ORAL at 08:08

## 2019-08-30 RX ADMIN — CEFTRIAXONE 2 G: 2 INJECTION, SOLUTION INTRAVENOUS at 12:08

## 2019-08-30 NOTE — ASSESSMENT & PLAN NOTE
After speaking with patient yesterday, we planned for bone biopsy that afternoon; however, his daughter came to the clinic and discussed his treatment options and expressed desire for amputation. We advised the family to discussed amongst themselves and decide on a plan of care and that we would re-evaluate today. Upon interview today, he discussed his apprehensions with the surgery and would be okay going forward with the surgery if he is able to remain awake for the case.     Plan:  - Local wound care until toe amputation.  - Resumed diet, anticipate amputation over the weekend. Will discuss with staff regarding scheduling.  - Recommend Vascular consult to evaluate blood flow.  - Podiatry will follow.

## 2019-08-30 NOTE — PROGRESS NOTES
[facility]  Podiatry  Progress Note    Patient Name: Randy Camejo  MRN: [id number]  Admission Date: 8/28/2019  Hospital Length of Stay: 2 days  Attending Physician: Denny De La Torre DO  Primary Care Provider: Susie Lazo MD   Interval Hx:  Patient Seen at bedside for dressing change.  Patient denies F/C/N/V.  Dressings clean, dry, and intact. After speaking with patient yesterday, we planned for bone biopsy. His daughter came to clinic and discussed his treatment options and expressed desire for amputation. We advised the family to discussed amongst themselves and decide on a plan of care.    Scheduled Meds:   amiodarone  200 mg Oral Daily    atorvastatin  20 mg Oral Daily    carvedilol  6.25 mg Oral BID    cefTRIAXone (ROCEPHIN) IVPB  2 g Intravenous Q24H    insulin detemir U-100  10 Units Subcutaneous QHS    levothyroxine  50 mcg Oral Before breakfast    vancomycin (VANCOCIN) IVPB  15 mg/kg Intravenous Q24H     Continuous Infusions:   heparin (porcine) in D5W 12 Units/kg/hr (08/30/19 0420)     PRN Meds:acetaminophen, Dextrose 10% Bolus, Dextrose 10% Bolus, glucagon (human recombinant), glucose, glucose, heparin (PORCINE), heparin (PORCINE), insulin aspart U-100, ondansetron, sodium chloride 0.9%, sodium chloride 0.9%    Review of patient's allergies indicates:  No Known Allergies     Past Medical History:   Diagnosis Date    Asthma     childhood    Atrial fibrillation     CHF (congestive heart failure)     Chronic anticoagulation 2/25/2019    Coronary artery disease     Coronary artery disease 2/25/2019    Diabetes mellitus, type 2     Heart attack     stent    High risk medication use 3/22/2019    2/19/2019: Began amiodarone.    History of coronary artery stent placement 3/22/2019    2000: Concepcion: MI and stent.    History of myocardial infarction 3/22/2019    2000: Concepcion: MI and stent.    Hypercholesterolemia 3/22/2019    Hypertension 3/22/2019    Tachycardia  induced cardiomyopathy 2/25/2019     Past Surgical History:   Procedure Laterality Date    CARDIAC CATHETERIZATION      CARDIOVERSION N/A 2/21/2019    [facility]formed [facility] CATH LAB    CARDIOVERSION OR DEFIBRILLATION N/A 2/25/2019    Performed by [facility] at [facility] CATH LAB    CATARACT EXTRACTION W/  INTRAOCULAR LENS IMPLANT Right 12/18/2017    Dr. Beavers    CATARACT EXTRACTION W/  INTRAOCULAR LENS IMPLANT Left 01/08/2018    Dr. Beavers    ECHOCARDIOGRAM,TRANSESOPHAGEAL N/A 2/25/2019    Performed by [facility] CATH LAB    ECHOCARDIOGRAM,TRANSESOPHAGEAL N/A 2/21/2019    Performed by P[facility] at [facility] CATH LAB    INSERTION-INTRAOCULAR LENS (IOL) Left 1/8/2018    Performed by Milo Beavers [facility] at [facility] OR    INSERTION-INTRAOCULAR LENS (IOL) Right 12/18/2017    Performed by [facility] at [facility] OR    PHACOEMULSIFICATION-ASPIRATION-CATARACT Left 1/8/2018    Performed by [facility] at [facility] OR    PHACOEMULSIFICATION-ASPIRATION-CATARACT Right 12/18/2017    Performed by [facility] at [facility] OR    TONSILLECTOMY         Family History     Problem Relation (Age of Onset)    Cancer Father    Cataracts Father    Diabetes Mother    Stroke Mother        Tobacco Use    Smoking status: Never Smoker    Smokeless tobacco: Never Used   Substance and Sexual Activity    Alcohol use: No    Drug use: No    Sexual activity: Not on file     Review of Systems   Constitutional: Negative for chills and fever.   Gastrointestinal: Negative for nausea and vomiting.   Skin: Positive for wound.     Objective:     Vital Signs (Most Recent):  Temp: 96.1 °F (35.6 °C) (08/30/19 1203)  Pulse: (!) 54 (08/30/19 1203)  Resp: 17 (08/30/19 1203)  BP: 133/73 (08/30/19 1203)  SpO2: 96 % (08/30/19 1203) Vital Signs (24h Range):  Temp:  [96.1 °F (35.6 °C)-98.2 °F (36.8 °C)] 96.1 °F (35.6 °C)  Pulse:  [51-59] 54  Resp:  [16-18] 17  SpO2:  [94 %-97 %] 96 %  BP: (109-133)/(58-77) 133/97      Weight: 79.4 kg (175 lb)  Body mass index is 24.41 kg/m².    Foot Exam    Right Foot/Ankle     Inspection and Palpation  Ecchymosis: none  Tenderness: none   Swelling: (Minor LE edema)  Skin Exam: ulcer;     Neurovascular  Saphenous nerve sensation: diminished  Tibial nerve sensation: diminished  Superficial peroneal nerve sensation: diminished  Deep peroneal nerve sensation: diminished  Sural nerve sensation: diminished      Left Foot/Ankle      Inspection and Palpation  Ecchymosis: none  Tenderness: none   Swelling: (Minor LE edema)    Neurovascular  Saphenous nerve sensation: diminished  Tibial nerve sensation: diminished  Superficial peroneal nerve sensation: diminished  Deep peroneal nerve sensation: diminished  Sural nerve sensation: diminished            Laboratory:  CBC:   Recent Labs   Lab 08/30/19  0323   WBC 5.16   RBC 3.36*   HGB 10.7*   HCT 34.3*   *   *   MCH 31.8*   MCHC 31.2*     CMP:   Recent Labs   Lab 08/30/19  0324   GLU 99   CALCIUM 8.4*   ALBUMIN 3.1*   PROT 6.7      K 3.9   CO2 25      BUN 25*   CREATININE 1.4   ALKPHOS 73   ALT 29   AST 23   BILITOT 0.9       Diagnostic Results:      Clinical Findings:  Stable 5th toe ulceration on the right foot. Some proximally spreading cellulitis.                    Assessment/Plan:     * Toe osteomyelitis, right  After speaking with patient yesterday, we planned for bone biopsy that afternoon; however, his daughter came to the clinic and discussed his treatment options and expressed desire for amputation. We advised the family to discussed amongst themselves and decide on a plan of care and that we would re-evaluate today. Upon interview today, he discussed his apprehensions with the surgery and would be okay going forward with the surgery if he is able to remain awake for the case.     Plan:  - Local wound care until toe amputation.  - Resumed diet, anticipate amputation over the weekend. Will discuss with staff regarding  scheduling.  - VAS ABIs not reliable d/t extensive medial calcinosis; however, waveforms do not suggest significant PAOD.   - Podiatry will follow.        Rodger Skelton MD  Podiatry  [facility]

## 2019-08-30 NOTE — ASSESSMENT & PLAN NOTE
Concern for cellulitis of RLE  Has some edema with erythema but no heat or induration and is not painful  Patient is afebrile with normal WBCs; but is on ABX    Continue Vanc; Discontinue Zosyn and start rocephin per ID rec

## 2019-08-30 NOTE — PROGRESS NOTES
[facility] Hospital Medicine  Progress Note    Patient Name: Randy Camejo  MRN: [id number]  Patient Class: IP- Inpatient   Admission Date: 8/28/2019  Length of Stay: 2 days  Attending Physician: Denny De La Torre DO  Primary Care Provider: Susie Lazo MD    [facility] Medicine Team: [facility] 5 Rodrigue Bhakta MD    Subjective:     Principal Problem:Toe osteomyelitis, right        HPI:  Mr Camejo is a 78 yo male with Afib, DM2, CAD s/p stent (2011) and CHF that presented from podiatry clinic due to an ulcer on his 5th toe of R foot for the past 6 weeks. Reports that pain in R heel is sharp in nature and intermittent and occurs at both rest and with pressure but does not radiate. He denies any inciting event prior to occurrence. He has been seen by outpatient podiatry over this time and has been treated with course of ABx (amoxacillin and bactrim) with no improvement of symptoms. He developed new erythema of his R shin this morning , at which point his podiatrist referred him to the ED for workup. He is not currently on antibiotics. Denies fever, claudication, CP, SOB, chills, N/V, HA, LH.     Overview/Hospital Course:  Mr Camejo is a 78 yo male that was admitted for workup of possible osteomyelitis and cellulitis. He presented with ulcer on 5th toe on R foot and erythema of R. Torres. He had been treated outpatient for ulcer for past 6 weeks including with bactrim and amoxacillin. Xray and MRI showed likely osteomyelitis but BCx, Anaerobic, and aerobic cultures were negative. Fungal and AFB cultures pending. He was initiated on Vanc and Zosyn treatment and podiatry was consulted. Likely amputation over weekend    Interval History: Patient denied any acute events overnight. Denies any fever, chills, N/V, pain, cough, SOB, LH, confusion. Patient was counseled regarding amputation benefits and risks v home antibiotic therapy. Denies any changes in symptoms.    Review of Systems    Constitutional: Negative for chills, diaphoresis and fever.   Respiratory: Negative for cough, shortness of breath and wheezing.    Cardiovascular: Negative for chest pain and palpitations.   Gastrointestinal: Negative for abdominal distention, abdominal pain, diarrhea, nausea and vomiting.   Genitourinary: Negative for dysuria, flank pain and hematuria.   Skin: Positive for wound (Ulcer of 5th toe on R foot on lateral aspect that is not painful and not purulent).        Improving erythematous area on R shin that has minimal swelling and is not indurated   Neurological: Negative for dizziness, light-headedness and headaches.     Objective:     Vital Signs (Most Recent):  Temp: 96.1 °F (35.6 °C) (08/30/19 1203)  Pulse: (!) 54 (08/30/19 1203)  Resp: 17 (08/30/19 1203)  BP: 133/73 (08/30/19 1203)  SpO2: 96 % (08/30/19 1203) Vital Signs (24h Range):  Temp:  [96.1 °F (35.6 °C)-98.2 °F (36.8 °C)] 96.1 °F (35.6 °C)  Pulse:  [51-59] 54  Resp:  [16-18] 17  SpO2:  [94 %-97 %] 96 %  BP: (109-133)/(58-77) 133/73     Weight: 79.4 kg (175 lb)  Body mass index is 24.41 kg/m².    Intake/Output Summary (Last 24 hours) at 8/30/2019 1452  Last data filed at 8/30/2019 1400  Gross per 24 hour   Intake 1110 ml   Output 1100 ml   Net 10 ml      Physical Exam   Constitutional: He is oriented to person, place, and time. He appears well-developed and well-nourished. No distress.   HENT:   Head: Normocephalic and atraumatic.   Eyes: Pupils are equal, round, and reactive to light. EOM are normal. No scleral icterus.   Neck: Normal range of motion. Neck supple. No JVD present.   Cardiovascular: Normal rate, regular rhythm, normal heart sounds and intact distal pulses.   No murmur heard.  Pulmonary/Chest: Effort normal and breath sounds normal. No stridor. No respiratory distress. He has no wheezes.   Musculoskeletal: Normal range of motion.   Neurological: He is alert and oriented to person, place, and time.   Skin: Skin is warm and dry. He  is not diaphoretic.   Ulcer on lateral aspect of 5th toe of r foot. It is not draining or purulent. There is some mild erythema of R shin that is not indurated   Psychiatric: He has a normal mood and affect. His behavior is normal. Judgment and thought content normal.       Significant Labs:   A1C:   Recent Labs   Lab 04/10/19  1003 06/05/19  1217   HGBA1C 8.4* 6.3*     ABGs: No results for input(s): PH, PCO2, HCO3, POCSATURATED, BE, TOTALHB, COHB, METHB, O2HB, POCFIO2 in the last 48 hours.  Blood Culture:   Recent Labs   Lab 08/28/19  1623 08/28/19  1628   LABBLOO No Growth to date  No Growth to date No Growth to date  No Growth to date     BMP:   Recent Labs   Lab 08/30/19  0324   GLU 99      K 3.9      CO2 25   BUN 25*   CREATININE 1.4   CALCIUM 8.4*   MG 2.2     CBC:   Recent Labs   Lab 08/28/19  1623 08/29/19  0543 08/30/19  0323   WBC 5.73 4.39 5.16   HGB 11.6* 10.7* 10.7*   HCT 36.7* 34.2* 34.3*   * 123* 126*     CMP:   Recent Labs   Lab 08/28/19  1623 08/28/19  1743 08/29/19  0543 08/30/19  0324     --  138 140   K 5.6* 4.4 4.1 3.9     --  105 106   CO2 24  --  26 25     --  124* 99   BUN 27*  --  26* 25*   CREATININE 1.4  --  1.3 1.4   CALCIUM 9.1  --  8.5* 8.4*   PROT 8.0  --  6.8 6.7   ALBUMIN 3.6  --  3.1* 3.1*   BILITOT 0.9  --  0.9 0.9   ALKPHOS 79  --  78 73   AST 36  --  24 23   ALT 34  --  31 29   ANIONGAP 9  --  7* 9   EGFRNONAA 47.8*  --  52.3* 47.8*     Coagulation:   Recent Labs   Lab 08/29/19  1035  08/30/19  0323   INR 1.3*  --   --    APTT 30.2   < > 54.8*    < > = values in this interval not displayed.     Lipid Panel: No results for input(s): CHOL, HDL, LDLCALC, TRIG, CHOLHDL in the last 48 hours.  Pathology Results  (Last 10 years)    None        POCT Glucose:   Recent Labs   Lab 08/28/19  2158 08/29/19  1619 08/29/19  2130   POCTGLUCOSE 119* 119* 116*     Recent Lab Results       08/30/19  0324   08/30/19  0323   08/29/19  2359   08/29/19  2130    08/29/19  1628        Albumin 3.1             Alkaline Phosphatase 73             ALT 29             Anion Gap 9             aPTT   54.8  Comment:  aPTT therapeutic range = 39-69 seconds 51.5  Comment:  aPTT therapeutic range = 39-69 seconds   63.1  Comment:  aPTT therapeutic range = 39-69 seconds     AST 23             Baso #   0.04           Basophil%   0.8           BILIRUBIN TOTAL 0.9  Comment:  For infants and newborns, interpretation of results should be based  on gestational age, weight and in agreement with clinical  observations.  Premature Infant recommended reference ranges:  Up to 24 hours.............<8.0 mg/dL  Up to 48 hours............<12.0 mg/dL  3-5 days..................<15.0 mg/dL  6-29 days.................<15.0 mg/dL               BUN, Bld 25             Calcium 8.4             Chloride 106             CO2 25             Creatinine 1.4             Differential Method   Automated           eGFR if  55.2             eGFR if non  47.8  Comment:  Calculation used to obtain the estimated glomerular filtration  rate (eGFR) is the CKD-EPI equation.                Eos #   0.2           Eosinophil%   4.7           Glucose 99             Gran # (ANC)   3.2           Gran%   62.7           Hematocrit   34.3           Hemoglobin   10.7           Immature Grans (Abs)   0.01  Comment:  Mild elevation in immature granulocytes is non specific and   can be seen in a variety of conditions including stress response,   acute inflammation, trauma and pregnancy. Correlation with other   laboratory and clinical findings is essential.             Immature Granulocytes   0.2           Lymph #   1.0           Lymph%   19.6           Magnesium 2.2             MCH   31.8           MCHC   31.2           MCV   102           Mono #   0.6           Mono%   12.0           MPV   10.6           nRBC   0           Platelets   126           POCT Glucose       116       Potassium 3.9              PROTEIN TOTAL 6.7             RBC   3.36           RDW   12.9           Sodium 140             WBC   5.16                            08/29/19  1619        Albumin       Alkaline Phosphatase       ALT       Anion Gap       aPTT       AST       Baso #       Basophil%       BILIRUBIN TOTAL       BUN, Bld       Calcium       Chloride       CO2       Creatinine       Differential Method       eGFR if        eGFR if non        Eos #       Eosinophil%       Glucose       Gran # (ANC)       Gran%       Hematocrit       Hemoglobin       Immature Grans (Abs)       Immature Granulocytes       Lymph #       Lymph%       Magnesium       MCH       MCHC       MCV       Mono #       Mono%       MPV       nRBC       Platelets       POCT Glucose 119     Potassium       PROTEIN TOTAL       RBC       RDW       Sodium       WBC           All pertinent labs within the past 24 hours have been reviewed.    Significant Imaging: I have reviewed all pertinent imaging results/findings within the past 24 hours.      Assessment/Plan:      * Toe osteomyelitis, right  History of ulceration at lateral aspect of R foot for 6 weeks; Treatment with ABx (amoxacillin and bactrim) outpatient but has not taken ABx for 2 weeks prior to admission.     Xray Foot:  Site of the patient's pain is not included in the history or indicated on the images provided.  As seen on the recent study of 08/22/2019 there is deformity of the distal aspect of the 5th metatarsal bone and the proximal phalanx of the little toe.  This may represent subacute or prior trauma.  However infection is not excluded in light of the swelling of overlying soft tissue.    MRI Foot:  Findings of osteomyelitis of the 5th proximal, middle and distal phalanges. Diffuse skin edema could be seen with cellulitis.    - Tissue Bx pending  - BCx x2 pending  - NPO after midnight  - Podiatry following; Plans for amputation over weekend  - Holding heparin after 4am 8/31  in anticipation of surgery  - continuing Vanc per ID rec  - discontinue zosyn and start rocephin per ID rec  - ABIs ordered -- f/u  - Will not need ABx at home following discharge - per ID rec        Cellulitis of right lower extremity  Concern for cellulitis of RLE  Has some edema with erythema but no heat or induration and is not painful  Patient is afebrile with normal WBCs; but is on ABX    Continue Vanc; Discontinue Zosyn and start rocephin per ID rec    Essential hypertension  SBP on admission 120/62; stable     Continue Carvedilol  Holding Lasix and Losartan      Chronic anticoagulation  Holding Eloquis on admission    Started on Heparin drip, discontinuing at 4am on 8/31for podiatry to amputate    Coronary artery disease  History of CAD s/p stent 2011    Continuing Atorvastatin  Holding Lasix and Carvedilol      Heart failure, systolic, chronic  TTE 2/25/19: EF 20%  · Mild left ventricular enlargement.  · Severely decreased left ventricular systolic function. The estimated ejection fraction is 20%  · Severe global hypokinetic wall motion.  · Mild right ventricular enlargement.  · Moderately reduced right ventricular systolic function.  · Moderate left atrial enlargement.  · No thrombus is present in the appendage. Abnormal appendage velocities.  · Mild right atrial enlargement.  · Mild aortic regurgitation.  · Mild mitral regurgitation.  · Mild tricuspid regurgitation.    Continuing carvedilol  Holding Lasix and Losartan      Type 2 diabetes mellitus with neurologic complication, with long-term current use of insulin  -Started on diabetic regimen inpatient  -Will monitor       Atrial fibrillation  Continue amiodarone on admission  Holding Eloquis, currently on heaprin drip  Holding heparin drip after 4am 8/31 in anticipation of procedure      VTE Risk Mitigation (From admission, onward)        Ordered     heparin 25,000 units in dextrose 5% 250 mL (100 units/mL) infusion LOW INTENSITY nomogram - [facility]   Continuous      08/29/19 1012     heparin 25,000 units in dextrose 5% (100 units/ml) IV bolus from bag - ADDITIONAL PRN BOLUS - 60 units/kg  As needed (PRN)      08/29/19 1012     heparin 25,000 units in dextrose 5% (100 units/ml) IV bolus from bag - ADDITIONAL PRN BOLUS - 30 units/kg  As needed (PRN)      08/29/19 1012     IP VTE HIGH RISK PATIENT  Once      08/28/19 2100     Place DREAD hose  Until discontinued      08/28/19 2100     Place sequential compression device  Until discontinued      08/28/19 1815                Rodrigue Bhakta MD  Department of Hospital Medicine   [facility]

## 2019-08-30 NOTE — NURSING
Report received. Care assumed. Pt awake in bed. Spouse at bedside. No complaints at this time. Plan of care discussed. Questions answered. Call light in reach. Will continue to monitor.

## 2019-08-30 NOTE — ANESTHESIA PREPROCEDURE EVALUATION
[facility]  Anesthesia Pre-Operative Evaluation         Patient Name: Randy Camejo  YOB: 1940  MRN: [id number]    SUBJECTIVE:     Pre-operative evaluation for Procedure(s) (LRB):  AMPUTATION, 5th TOE possible partial 5th ray amputation (Right)  Scheduled for 8/31/2019    HPI 08/30/2019:  Randy Camejo is a 79 y.o. male with hx of Afib, DM2, CAD s/p stent (2011) and HFrEF (EF 15% on 2/20/2019)    Patient was admitted on 8/28/2019 via podiatry clinic for ulcer on 5th toe of R foot.    Patient presents for the above procedure(s).    Previous Airway:   No prior records in Epic.    Oxygen/Ventilation Requirements:  On room air       Current LDA:        Peripheral IV - Single Lumen 08/28/19 2053 18 G Anterior;Proximal;Right Forearm (Active)   Site Assessment Clean;Dry;Intact 8/30/2019  3:36 PM   Line Status Infusing 8/29/2019  8:00 PM   Dressing Status Clean;Dry;Intact 8/29/2019  8:00 PM   Dressing Intervention New dressing 8/28/2019  9:00 PM   Dressing Change Due 09/01/19 8/28/2019  9:00 PM   Site Change Due 09/01/19 8/29/2019  8:00 PM   Reason Not Rotated Not due 8/29/2019  8:00 PM   Number of days: 1            Peripheral IV - Single Lumen 08/30/19 0657 20 G Anterior;Left Forearm (Active)   Site Assessment Clean;Dry;Intact 8/30/2019  3:36 PM   Number of days: 0       Current Drips:   heparin (porcine) in D5W 12 Units/kg/hr (08/30/19 0420)       Patient Active Problem List   Diagnosis    Nuclear sclerosis, bilateral    Nuclear sclerotic cataract of left eye    Atrial fibrillation    Type 2 diabetes mellitus with neurologic complication, with long-term current use of insulin    Heart failure, systolic, chronic    Pre-ulcerative corn or callous    Peripheral vascular disease    Tachycardia induced cardiomyopathy    Coronary artery disease    Chronic anticoagulation    History of myocardial infarction    History of coronary artery stent placement    Essential  "hypertension    Hypercholesterolemia    High risk medication use    Subclinical hypothyroidism    Stage 3 chronic kidney disease    Skin ulcer of toe with fat layer exposed    Toe osteomyelitis, right    Cellulitis of right lower extremity       Review of patient's allergies indicates:  No Known Allergies    Outpatient Medications:  No current facility-administered medications on file prior to encounter.      Current Outpatient Medications on File Prior to Encounter   Medication Sig Dispense Refill    amiodarone (PACERONE) 200 MG Tab Take 1 tablet (200 mg total) by mouth once daily. 90 tablet 3    apixaban (ELIQUIS) 5 mg Tab Take 1 tablet (5 mg total) by mouth 2 (two) times daily. 180 tablet 3    atorvastatin (LIPITOR) 20 MG tablet Take 1 tablet (20 mg total) by mouth once daily. 90 tablet 3    blood sugar diagnostic Strp Use to check blood glucose four times daily, to use with insurance preferred meter 200 strip 4    carvedilol (COREG) 6.25 MG tablet Take 1 tablet (6.25 mg total) by mouth 2 (two) times daily. 180 tablet 3    furosemide (LASIX) 20 MG tablet Take 1 tablet (20 mg total) by mouth once daily. 90 tablet 3    insulin aspart U-100 (NOVOLOG) 100 unit/mL (3 mL) InPn pen Inject 3 Units into the skin 3 (three) times daily. 15 mL 1    insulin detemir U-100 (LEVEMIR FLEXTOUCH) 100 unit/mL (3 mL) SubQ InPn pen Inject 15 Units into the skin every evening. 15 mL 3    lancets 30 gauge Misc TEST FOUR TIMES DAILY 200 each 4    levothyroxine (SYNTHROID) 50 MCG tablet Take 1 tablet (50 mcg total) by mouth once daily. 90 tablet 3    losartan (COZAAR) 50 MG tablet Take 1 tablet (50 mg total) by mouth once daily. 90 tablet 3    pen needle, diabetic 31 gauge x 5/16" Ndle 1 Device by Misc.(Non-Drug; Combo Route) route 4 (four) times daily. Use with insulin 200 each 11    amoxicillin-clavulanate 875-125mg (AUGMENTIN) 875-125 mg per tablet Take 1 tablet by mouth 2 (two) times daily. 30 tablet 0    "     Current Inpatient Medications:   amiodarone  200 mg Oral Daily    atorvastatin  20 mg Oral Daily    carvedilol  6.25 mg Oral BID    cefTRIAXone (ROCEPHIN) IVPB  2 g Intravenous Q24H    insulin detemir U-100  7 Units Subcutaneous QHS    levothyroxine  50 mcg Oral Before breakfast    vancomycin (VANCOCIN) IVPB  15 mg/kg Intravenous Q24H       Past Surgical History:   Procedure Laterality Date    CARDIAC CATHETERIZATION      CARDIOVERSION N/A 2/21/2019    Performed [facility] CATH LAB    CARDIOVERSION OR DEFIBRILLATION N/A 2/25/2019    [facility] CATH LAB    CATARACT EXTRACTION W/  INTRAOCULAR LENS IMPLANT Right 12/18/2017    Dr. Beavers    CATARACT EXTRACTION W/  INTRAOCULAR LENS IMPLANT Left 01/08/2018    Dr. Beavers    ECHOCARDIOGRAM,TRANSESOPHAGEAL N/A 2/25/2019    Performed by [facility] CATH LAB    ECHOCARDIOGRAM,TRANSESOPHAGEAL N/A 2/21/2019    Performed by [facility] CATH LAB    INSERTION-INTRAOCULAR LENS (IOL) Left 1/8/2018    Performed by Milo Bea[facility] OR    INSERTION-INTRAOCULAR LENS (IOL) Right 12/18/2017    [facility] OR    PHACOEMULSIFICATION-ASPIRATION-CATARACT Left 1/8/2018    [facility] OR    PHACOEMULSIFICATION-ASPIRATION-CATARACT Right 12/18/2017    [facility] OR    TONSILLECTOMY         Social History     Socioeconomic History    Marital status:      Spouse name: Not on file    Number of children: 3    Years of education: Not on file    Highest education level: Not on file   Occupational History    Occupation: Teacher     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   Social Needs    Financial resource strain: Not on file    Food insecurity:     Worry: Not on file     Inability: Not on file    Transportation needs:     Medical: Not on file     Non-medical: Not on file   Tobacco Use    Smoking status: Never  Smoker    Smokeless tobacco: Never Used   Substance and Sexual Activity    Alcohol use: No    Drug use: No    Sexual activity: Not on file   Lifestyle    Physical activity:     Days per week: Not on file     Minutes per session: Not on file    Stress: Not on file   Relationships    Social connections:     Talks on phone: Not on file     Gets together: Not on file     Attends Jew service: Not on file     Active member of club or organization: Not on file     Attends meetings of clubs or organizations: Not on file     Relationship status: Not on file   Other Topics Concern    Not on file   Social History Narrative    Not on file       OBJECTIVE:   Weight:  Wt Readings from Last 4 Encounters:   08/28/19 79.4 kg (175 lb)   08/28/19 80.7 kg (178 lb)   08/22/19 80.7 kg (178 lb)   08/14/19 80.7 kg (178 lb)       Vital Signs Range (Last 24H):  Temp:  [35.6 °C (96.1 °F)-36.8 °C (98.2 °F)]   Pulse:  [54-60]   Resp:  [16-18]   BP: (114-133)/(58-77)   SpO2:  [94 %-97 %]       CBC:   Lab Results   Component Value Date    WBC 5.16 08/30/2019    HGB 10.7 (L) 08/30/2019    HCT 34.3 (L) 08/30/2019     (H) 08/30/2019     (L) 08/30/2019       CMP:     Chemistry        Component Value Date/Time     08/30/2019 0324    K 3.9 08/30/2019 0324     08/30/2019 0324    CO2 25 08/30/2019 0324    BUN 25 (H) 08/30/2019 0324    CREATININE 1.4 08/30/2019 0324    GLU 99 08/30/2019 0324        Component Value Date/Time    CALCIUM 8.4 (L) 08/30/2019 0324    ALKPHOS 73 08/30/2019 0324    AST 23 08/30/2019 0324    ALT 29 08/30/2019 0324    BILITOT 0.9 08/30/2019 0324    ESTGFRAFRICA 55.2 (A) 08/30/2019 0324    EGFRNONAA 47.8 (A) 08/30/2019 0324            INR:  Lab Results   Component Value Date    INR 1.3 (H) 08/29/2019    INR 1.2 02/20/2019       Diagnostic Studies:    EKG:   Results for orders placed or performed during the hospital encounter of 02/19/19   EKG 12-LEAD    Collection Time: 02/25/19  9:00 AM     Narrative    Test Reason :     Vent. Rate : 065 BPM     Atrial Rate : 065 BPM     P-R Int : 244 ms          QRS Dur : 116 ms      QT Int : 420 ms       P-R-T Axes : 015 064 -01 degrees     QTc Int : 436 ms    Sinus rhythm with 1st degree A-V block  Left atrial enlargement  LVH.  Possible Inferior infarct (cited on or before 20-FEB-2019)  Anterior infarct (cited on or before 20-FEB-2019)  Abnormal ECG  Confirmed by Paolo Aldridge MD ([id number]) on 2/26/2019 12:04:42 PM    Referred By: [email or web address]   SELF           Confirmed By:Paolo Aldridge MD       2D Echo:  Results for orders placed or performed during the hospital encounter of 02/19/19   Transthoracic echo (TTE) complete (Cupid Only)   Result Value Ref Range    BSA 2.1 m2    TDI SEPTAL 0.03     LV LATERAL E/E' RATIO 32.67     LV SEPTAL E/E' RATIO 32.67     LA WIDTH 4.75 cm    AORTIC VALVE CUSP SEPERATION 1.99 cm    TDI LATERAL 0.03     PV PEAK VELOCITY 0.54 cm/s    LVIDD 6.00 3.5 - 6.0 cm    IVS 0.72 0.6 - 1.1 cm    PW 0.73 0.6 - 1.1 cm    Ao root annulus 3.62 cm    LVIDS 5.33 (A) 2.1 - 4.0 cm    FS 11 28 - 44 %    LA volume 95.08 cm3    Sinus 3.71 cm    STJ 3.63 cm    Ascending aorta 3.47 cm    LV mass 164.91 g    LA size 4.24 cm    RVDD 3.90 cm    TAPSE 1.05 cm    RV S' 10.25 m/s    Left Ventricle Relative Wall Thickness 0.24 cm    AV mean gradient 0.79 mmHg    AV valve area 2.18 cm2    AV Velocity Ratio 0.86     AV index (prosthetic) 0.71     Mean e' 0.03     E wave decelartion time 159.93 msec    IVRT 0.08 msec    LVOT diameter 1.98 cm    LVOT area 3.08 cm2    LVOT peak raza [id number] m/s    LVOT peak VTI 6.08 cm    Ao peak raza 0.59 m/s    Ao VTI 8.57 cm    LVOT stroke volume 18.71 cm3    AV peak gradient 1.39 mmHg    E/E' ratio 32.67     MV Peak E Raza 0.98 m/s    TR Max Raza 2.40 m/s    LV Systolic Volume 136.97 mL    LV Systolic Volume Index 65.9 mL/m2    LV Diastolic Volume 180.26 mL    LV Diastolic Volume Index 86.67 mL/m2    LA Volume Index 45.7 mL/m2     LV Mass Index 79.3 g/m2    RA Major Axis 4.86 cm    Left Atrium Minor Axis 6.00 cm    Left Atrium Major Axis 5.17 cm    Triscuspid Valve Regurgitation Peak Gradient 23.04 mmHg    RA Width 3.91 cm    Right Atrial Pressure (from IVC) 15 mmHg    TV rest pulmonary artery pressure 38 mmHg    Narrative    · Mild left ventricular enlargement.  · Moderate left atrial enlargement.  · Severely decreased left ventricular systolic function. The estimated   ejection fraction is 15%  · Severe global hypokinetic wall motion.  · Atrial fibrillation observed.  · Mild right ventricular enlargement.  · Moderately reduced right ventricular systolic function.  · Moderate right atrial enlargement.  · Moderate mitral regurgitation.  · Mild tricuspid regurgitation.  · Elevated central venous pressure (15 mm Hg).  · The estimated PA systolic pressure is 38 mm Hg          Results for orders placed or performed during the hospital encounter of 02/19/19   Transesophageal echo (DILLAN) (Cupid Only)   Result Value Ref Range    BSA 2.1 m2    Narrative    · Mild left ventricular enlargement.  · Severely decreased left ventricular systolic function. The estimated   ejection fraction is 20%  · Severe global hypokinetic wall motion.  · Mild right ventricular enlargement.  · Moderately reduced right ventricular systolic function.  · Moderate left atrial enlargement.  · No thrombus is present in the appendage. Abnormal appendage velocities.  · Mild right atrial enlargement.  · Mild aortic regurgitation.  · Mild mitral regurgitation.  · Mild tricuspid regurgitation.            ASSESSMENT/PLAN:         Anesthesia Evaluation    I have reviewed the Patient Summary Reports.    I have reviewed the Nursing Notes.   I have reviewed the Medications.     Review of Systems  Anesthesia Hx:  No problems with previous Anesthesia  History of prior surgery of interest to airway management or planning: Previous anesthesia: MAC  Cataract recently Denominational with MAC.   Procedure performed at an [facility]. Denies Family Hx of Anesthesia complications.   Denies Personal Hx of Anesthesia complications.   Social:  Non-Smoker    Hematology/Oncology:  Hematology Normal   Oncology Normal     EENT/Dental:EENT/Dental Normal   Cardiovascular:   Exercise tolerance: poor Hypertension Past MI (20082008 stent) CAD asymptomatic CABG/stent Dysrhythmias atrial fibrillation  Denies Angina. CHF hyperlipidemia     ECG has been reviewed. Prev cardiac stent   Pulmonary:   Denies Asthma.    Renal/:   Chronic Renal Disease, CRI renal calculi    Hepatic/GI:  Hepatic/GI Normal    Musculoskeletal:  Musculoskeletal Normal    Neurological:  Neurology Normal    Endocrine:   Diabetes Hypothyroidism    Dermatological:  Skin Normal    Psych:  Psychiatric Normal           Physical Exam  General:  Well nourished    Airway/Jaw/Neck:  Airway Findings: Mouth Opening: Normal Tongue: Normal  General Airway Assessment: Adult  Mallampati: II  Jaw/Neck Findings:  Neck ROM: Normal ROM      Dental:  Dental Findings: Periodontal disease, Severe   Chest/Lungs:  Chest/Lungs Findings: Clear to auscultation, Normal Respiratory Rate     Heart/Vascular:  Heart Findings: Rate: Normal  Rhythm: Regular Rhythm  Sounds: Normal        Mental Status:  Mental Status Findings:  Cooperative, Alert and Oriented         Anesthesia Plan  Type of Anesthesia, risks & benefits discussed:  Anesthesia Type:  general, MAC  Patient's Preference:   Intra-op Monitoring Plan: standard ASA monitors  Intra-op Monitoring Plan Comments:   Post Op Pain Control Plan: multimodal analgesia, IV/PO Opioids PRN and per primary service following discharge from PACU  Post Op Pain Control Plan Comments:   Induction:   IV  Beta Blocker:  Patient is on a Beta-Blocker and has received one dose within the past 24 hours (No further documentation required).       Informed Consent: Patient understands risks and agrees with Anesthesia plan.  Questions answered.  Anesthesia consent signed with patient.  ASA Score: 4     Day of Surgery Review of History & Physical:  There are no significant changes.  H&P update referred to the surgeon.         Ready For Surgery From Anesthesia Perspective.

## 2019-08-30 NOTE — HPI
This is a 79 year old male with PMH of A. fib, DM2, CAD s/p stent (2011) and CHF that presented from podiatry clinic due to an ulcer on his 5th toe of R foot for the past 6 weeks. He has been seen by outpatient podiatry over this time and has been treated with course of oral antibiotics (amoxicillin and bactrim) with no improvement of symptoms. He developed new erythema of his R shin this morning , at which point his podiatrist referred him to the ED for workup. He is not currently on antibiotics. Pt denies fevers or chills. MRI was obtained which showed osteomyelitis of the 5th digit with edema associated with cellulitis. Pt was offered bone biopsy with IV antibiotics vs amputation of the digit and chose to save the toe at this time. Bone biopsy was obtained on 8/23 in clinic and cultures are negative. Pt currently on IV vanc and zosyn.    He is afebrile without leukocytosis. ID is consulted for antibiotic management.

## 2019-08-30 NOTE — SUBJECTIVE & OBJECTIVE
Past Medical History:   Diagnosis Date    Asthma     childhood    Atrial fibrillation     CHF (congestive heart failure)     Chronic anticoagulation 2/25/2019    Coronary artery disease     Coronary artery disease 2/25/2019    Diabetes mellitus, type 2     Heart attack     stent    High risk medication use 3/22/2019    2/19/2019: Began amiodarone.    History of coronary artery stent placement 3/22/2019    2000: [facility]: MI and stent.    History of myocardial infarction 3/22/2019    2000: [facility]: MI and stent.    Hypercholesterolemia 3/22/2019    Hypertension 3/22/2019    Tachycardia induced cardiomyopathy 2/25/2019       Past Surgical History:   Procedure Laterality Date    CARDIAC CATHETERIZATION      CARDIOVERSION N/A 2/21/2019    [facility] C[facility] LAB    CARDIOVERSION OR DEFIBRILLATION N/A 2/25/2019    Per[facility] CATH LAB    CATARACT EXTRACTION W/  INTRAOCULAR LENS IMPLANT Right 12/18/2017    Dr. Beavers    CATARACT EXTRACTION W/  INTRAOCULAR LENS IMPLANT Left 01/08/2018    Dr. Beavers    ECHOCARDIOGRAM,TRANSESOPHAGEAL N/A 2/25/2019    Performed [facility] CATH LAB    ECHOCARDIOGRAM,TRANSESOPHAGEAL N/A 2/21/2019    Performed by [facility] CATH LAB    INSERTION-INTRAOCULAR LENS (IOL) Left 1/8/2018    [facility] by [facility] at [facility] OR    INSERTION-INTRAOCULAR LENS (IOL) Right 12/18/2017    [facility] at [facility] OR    PHACOEMULSIFICATION-ASPIRATION-CATARACT Left 1/8/2018    [facility] OR    PHACOEMULSIFICATION-ASPIRATION-CATARACT Right 12/18/2017    [facility] at [facility] OR    TONSILLECTOMY         Review of patient's allergies indicates:  No Known Allergies    Medications:  Medications Prior to Admission   Medication Sig    amiodarone (PACERONE) 200 MG Tab Take 1 tablet (200 mg total) by mouth once daily.    apixaban (ELIQUIS) 5 mg Tab  "Take 1 tablet (5 mg total) by mouth 2 (two) times daily.    atorvastatin (LIPITOR) 20 MG tablet Take 1 tablet (20 mg total) by mouth once daily.    blood sugar diagnostic Strp Use to check blood glucose four times daily, to use with insurance preferred meter    carvedilol (COREG) 6.25 MG tablet Take 1 tablet (6.25 mg total) by mouth 2 (two) times daily.    furosemide (LASIX) 20 MG tablet Take 1 tablet (20 mg total) by mouth once daily.    insulin aspart U-100 (NOVOLOG) 100 unit/mL (3 mL) InPn pen Inject 3 Units into the skin 3 (three) times daily.    insulin detemir U-100 (LEVEMIR FLEXTOUCH) 100 unit/mL (3 mL) SubQ InPn pen Inject 15 Units into the skin every evening.    lancets 30 gauge Misc TEST FOUR TIMES DAILY    levothyroxine (SYNTHROID) 50 MCG tablet Take 1 tablet (50 mcg total) by mouth once daily.    losartan (COZAAR) 50 MG tablet Take 1 tablet (50 mg total) by mouth once daily.    pen needle, diabetic 31 gauge x 5/16" Ndle 1 Device by Misc.(Non-Drug; Combo Route) route 4 (four) times daily. Use with insulin    amoxicillin-clavulanate 875-125mg (AUGMENTIN) 875-125 mg per tablet Take 1 tablet by mouth 2 (two) times daily.     Antibiotics (From admission, onward)    Start     Stop Route Frequency Ordered    08/30/19 1215  cefTRIAXone (ROCEPHIN) 2 g in dextrose 5 % 50 mL IVPB      -- IV Every 24 hours (non-standard times) 08/30/19 1113    08/29/19 2100  vancomycin 1.25 g in dextrose 5% 250 mL IVPB (ready to mix)  (vancomycin IVPB)      -- IV Every 24 hours (non-standard times) 08/28/19 1959        Antifungals (From admission, onward)    None        Antivirals (From admission, onward)    None             There is no immunization history on file for this patient.    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   Social Needs    Financial resource strain: Not on file    Food insecurity:     Worry: Not on file     Inability: Not on file    Transportation needs:     Medical: Not on file     Non-medical: Not on file   Tobacco Use    Smoking status: Never Smoker    Smokeless tobacco: Never Used   Substance and Sexual Activity    Alcohol use: No    Drug use: No    Sexual activity: Not on file   Lifestyle    Physical activity:     Days per week: Not on file     Minutes per session: Not on file    Stress: Not on file   Relationships    Social connections:     Talks on phone: Not on file     Gets together: Not on file     Attends Scientologist service: Not on file     Active member of club or organization: Not on file     Attends meetings of clubs or organizations: Not on file     Relationship status: Not on file   Other Topics Concern    Not on file   Social History Narrative    Not on file     Review of Systems   Constitutional: Negative for chills and fever.   Respiratory: Negative for cough and shortness of breath.    Cardiovascular: Negative for chest pain and leg swelling.   Gastrointestinal: Negative for abdominal pain, diarrhea, nausea and vomiting.   Skin: Positive for wound. Negative for rash.   Psychiatric/Behavioral: Negative for behavioral problems. The patient is not nervous/anxious.      Objective:     Vital Signs (Most Recent):  Temp: 96.1 °F (35.6 °C) (08/30/19 1203)  Pulse: (!) 54 (08/30/19 1203)  Resp: 17 (08/30/19 1203)  BP: 133/73 (08/30/19 1203)  SpO2: 96 % (08/30/19 1203) Vital Signs (24h Range):  Temp:  [96.1 °F (35.6 °C)-98.2 °F (36.8 °C)] 96.1 °F (35.6 °C)  Pulse:  [51-59] 54  Resp:  [16-18] 17  SpO2:  [94 %-97 %] 96 %  BP: (109-133)/(58-77) 133/73     Weight: 79.4 kg (175 lb)  Body mass index is 24.41 kg/m².    Estimated  Creatinine Clearance: 46.3 mL/min (based on SCr of 1.4 mg/dL).    Physical Exam   Constitutional: He is oriented to person, place, and time. He appears well-developed and well-nourished. No distress.   Cardiovascular: Normal rate and regular rhythm.   No murmur heard.  Pulmonary/Chest: Effort normal and breath sounds normal. No respiratory distress.   Abdominal: Soft. Bowel sounds are normal. He exhibits no distension.   Musculoskeletal: Normal range of motion. He exhibits no edema.   Right 5th toe ulcer noted; exposed joint space. Exposed bone.   Minimal erythema ascending up anterior shin.   Neurological: He is alert and oriented to person, place, and time.   Skin: Skin is warm and dry.   Psychiatric: He has a normal mood and affect. His behavior is normal.       Significant Labs:   Blood Culture:   Recent Labs   Lab 08/28/19  1623 08/28/19  1628   LABBLOO No Growth to date  No Growth to date No Growth to date  No Growth to date     CBC:   Recent Labs   Lab 08/28/19  1623 08/29/19  0543 08/30/19  0323   WBC 5.73 4.39 5.16   HGB 11.6* 10.7* 10.7*   HCT 36.7* 34.2* 34.3*   * 123* 126*     CMP:   Recent Labs   Lab 08/28/19  1623 08/28/19  1743 08/29/19  0543 08/30/19  0324     --  138 140   K 5.6* 4.4 4.1 3.9     --  105 106   CO2 24  --  26 25     --  124* 99   BUN 27*  --  26* 25*   CREATININE 1.4  --  1.3 1.4   CALCIUM 9.1  --  8.5* 8.4*   PROT 8.0  --  6.8 6.7   ALBUMIN 3.6  --  3.1* 3.1*   BILITOT 0.9  --  0.9 0.9   ALKPHOS 79  --  78 73   AST 36  --  24 23   ALT 34  --  31 29   ANIONGAP 9  --  7* 9   EGFRNONAA 47.8*  --  52.3* 47.8*     Wound Culture:   Recent Labs   Lab 08/07/19  1449 08/23/19  0804   LABAERO No significant isolate No growth       Significant Imaging: I have reviewed all pertinent imaging results/findings within the past 24 hours.

## 2019-08-30 NOTE — SUBJECTIVE & OBJECTIVE
Interval Hx:  Patient Seen at bedside for dressing change.  Patient denies F/C/N/V.  Dressings clean, dry, and intact. After speaking with patient yesterday, we planned for bone biopsy. His daughter came to clinic and discussed his treatment options and expressed desire for amputation. We advised the family to discussed amongst themselves and decide on a plan of care.    Scheduled Meds:   amiodarone  200 mg Oral Daily    atorvastatin  20 mg Oral Daily    carvedilol  6.25 mg Oral BID    cefTRIAXone (ROCEPHIN) IVPB  2 g Intravenous Q24H    insulin detemir U-100  10 Units Subcutaneous QHS    levothyroxine  50 mcg Oral Before breakfast    vancomycin (VANCOCIN) IVPB  15 mg/kg Intravenous Q24H     Continuous Infusions:   heparin (porcine) in D5W 12 Units/kg/hr (08/30/19 0420)     PRN Meds:acetaminophen, Dextrose 10% Bolus, Dextrose 10% Bolus, glucagon (human recombinant), glucose, glucose, heparin (PORCINE), heparin (PORCINE), insulin aspart U-100, ondansetron, sodium chloride 0.9%, sodium chloride 0.9%    Review of patient's allergies indicates:  No Known Allergies     Past Medical History:   Diagnosis Date    Asthma     childhood    Atrial fibrillation     CHF (congestive heart failure)     Chronic anticoagulation 2/25/2019    Coronary artery disease     Coronary artery disease 2/25/2019    Diabetes mellitus, type 2     Heart attack     stent    High risk medication use 3/22/2019    2/19/2019: Began amiodarone.    History of coronary artery stent placement 3/22/2019    2000: Concepcion: MI and stent.    History of myocardial infarction 3/22/2019    2000: Concepcion: MI and stent.   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 Beavers    CATARACT EXTRACTION W/  INTRAOCULAR LENS IMPLANT Left 01/08/2018    Dr. Beavers    ECHOCARDIOGRAM,TRANSESOPHAGEAL N/A 2/25/2019    Performed by [facility] CATH LAB    ECHOCARDIOGRAM,TRANSESOPHAGEAL N/A 2/21/2019    Performed by [facility] CATH LAB    INSERTION-INTRAOCULAR LENS (IOL) Left 1/8/2018    Performed by Milo Bea[facility] at [facility] OR    INSERTION-INTRAOCULAR LENS (IOL) Right 12/18/2017    [facility] by [facility] at [facility] OR    PHACOEMULSIFICATION-ASPIRATION-CATARACT Left 1/8/2018    Performed by [facility] at [facility] OR    PHACOEMULSIFICATION-ASPIRATION-CATARACT Right 12/18/2017    [facility] by [facility] at [facility] OR    TONSILLECTOMY         Family History     Problem Relation (Age of Onset)    Cancer Father    Cataracts Father    Diabetes Mother    Stroke Mother        Tobacco Use    Smoking status: Never Smoker    Smokeless tobacco: Never Used   Substance and Sexual Activity    Alcohol use: No    Drug use: No    Sexual activity: Not on file     Review of Systems   Constitutional: Negative for chills and fever.   Gastrointestinal: Negative for nausea and vomiting.   Skin: Positive for wound.     Objective:     Vital Signs (Most Recent):  Temp: 96.1 °F (35.6 °C) (08/30/19 1203)  Pulse: (!) 54 (08/30/19 1203)  Resp: 17 (08/30/19 1203)  BP: 133/73 (08/30/19 1203)  SpO2: 96 % (08/30/19 1203) Vital Signs (24h Range):  Temp:  [96.1 °F (35.6 °C)-98.2 °F (36.8 °C)] 96.1 °F (35.6 °C)  Pulse:  [51-59] 54  Resp:  [16-18] 17  SpO2:  [94 %-97 %] 96 %  BP: (109-133)/(58-77) 133/73     Weight: 79.4 kg (175 lb)  Body mass index is 24.41 kg/m².    Foot Exam    Right Foot/Ankle     Inspection and Palpation  Ecchymosis: none  Tenderness: none   Swelling: (Minor LE edema)  Skin Exam: ulcer;     Neurovascular  Saphenous nerve sensation:  diminished  Tibial nerve sensation: diminished  Superficial peroneal nerve sensation: diminished  Deep peroneal nerve sensation: diminished  Sural nerve sensation: diminished      Left Foot/Ankle      Inspection and Palpation  Ecchymosis: none  Tenderness: none   Swelling: (Minor LE edema)    Neurovascular  Saphenous nerve sensation: diminished  Tibial nerve sensation: diminished  Superficial peroneal nerve sensation: diminished  Deep peroneal nerve sensation: diminished  Sural nerve sensation: diminished            Laboratory:  CBC:   Recent Labs   Lab 08/30/19  0323   WBC 5.16   RBC 3.36*   HGB 10.7*   HCT 34.3*   *   *   MCH 31.8*   MCHC 31.2*     CMP:   Recent Labs   Lab 08/30/19  0324   GLU 99   CALCIUM 8.4*   ALBUMIN 3.1*   PROT 6.7      K 3.9   CO2 25      BUN 25*   CREATININE 1.4   ALKPHOS 73   ALT 29   AST 23   BILITOT 0.9       Diagnostic Results:      Clinical Findings:  Stable 5th toe ulceration on the right foot. Some proximally spreading cellulitis.

## 2019-08-30 NOTE — ASSESSMENT & PLAN NOTE
History of ulceration at lateral aspect of R foot for 6 weeks; Treatment with ABx (amoxacillin and bactrim) outpatient but has not taken ABx for 2 weeks prior to admission.     Xray Foot:  Site of the patient's pain is not included in the history or indicated on the images provided.  As seen on the recent study of 08/22/2019 there is deformity of the distal aspect of the 5th metatarsal bone and the proximal phalanx of the little toe.  This may represent subacute or prior trauma.  However infection is not excluded in light of the swelling of overlying soft tissue.    MRI Foot:  Findings of osteomyelitis of the 5th proximal, middle and distal phalanges. Diffuse skin edema could be seen with cellulitis.    - Tissue Bx pending  - BCx x2 pending  - NPO after midnight  - Podiatry following; Plans for amputation over weekend  - Holding heparin after 4am 8/31 in anticipation of surgery  - continuing Vanc per ID rec  - discontinue zosyn and start rocephin per ID rec  - ABIs ordered -- f/u  - Will not need ABx at home following discharge - per ID rec

## 2019-08-30 NOTE — ASSESSMENT & PLAN NOTE
Pt has now agreed for toe amputation.    Plan:  1. Continue vancomycin  2. De-escalate from zosyn to rocephin 2 gram IV q 24 hours  3. Please obtain clear margins in the OR sent for cultures as well as pathology  4. If all infected bone amputated, will not need long term antibiotics  5. Recommend ABIs to assess bloodflow  6. Will follow cultures/pathology from [facility] over weekend

## 2019-08-30 NOTE — CONSULTS
[facility]  Infectious Disease  Consult Note    Patient Name: Randy Camejo  MRN: [id number]  Admission Date: 8/28/2019  Hospital Length of Stay: 2 days  Attending Physician: Denny De La Torre DO  Primary Care Provider: Susie Lazo MD     Isolation Status: No active isolations    Patient information was obtained from patient and past medical records.      Consults  Assessment/Plan:     * Toe osteomyelitis, right  Pt has now agreed for toe amputation.    Plan:  1. Continue vancomycin  2. De-escalate from zosyn to rocephin 2 gram IV q 24 hours  3. Please obtain clear margins in the OR sent for cultures as well as pathology  4. If all infected bone amputated, will not need long term antibiotics  5. Recommend ABIs to assess bloodflow  6. Will follow cultures/pathology from [facility] over weekend    Thank you for your consult. I will follow-up with patient. Please contact us if you have any additional questions.  ZULEMA Figueredo Pager: [phone]  Infectious Disease  [facility]    Subjective:     Principal Problem: Toe osteomyelitis, right    HPI: This is a 79 year old male with PMH of A. fib, DM2, CAD s/p stent (2011) and CHF that presented from podiatry clinic due to an ulcer on his 5th toe of R foot for the past 6 weeks. He has been seen by outpatient podiatry over this time and has been treated with course of oral antibiotics (amoxicillin and bactrim) with no improvement of symptoms. He developed new erythema of his R shin this morning , at which point his podiatrist referred him to the ED for workup. He is not currently on antibiotics. Pt denies fevers or chills. MRI was obtained which showed osteomyelitis of the 5th digit with edema associated with cellulitis. Pt was offered bone biopsy with IV antibiotics vs amputation of the digit and chose to save the toe at this time. Bone biopsy was obtained on 8/23 in clinic and cultures are negative. Pt currently on IV vanc and  zosyn.    He is afebrile without leukocytosis. ID is consulted for antibiotic management.        Past Medical History:   Diagnosis Date    Asthma     childhood    Atrial fibrillation     CHF (congestive heart failure)     Chronic anticoagulation 2/25/2019    Coronary artery disease     Coronary artery disease 2/25/2019    Diabetes mellitus, type 2     Heart attack     stent    High risk medication use 3/22/2019    2/19/2019: Began amiodarone.    History of coronary artery stent placement 3/22/2019    2000: Concepcion: MI and stent.    History of myocardial infarction 3/22/2019    2000: Concepcion: MI and stent.   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 Beavers    CATARACT EXTRACTION W/  INTRAOCULAR LENS IMPLANT Left 01/08/2018    Dr. Beavers    ECHOCARDIOGRAM,TRANSESOPHAGEAL N/A 2/25/2019    Performed by [facility] CATH LAB    ECHOCARDIOGRAM,TRANSESOPHAGEAL N/A 2/21/2019    Performed by [facility] at [facility] CATH LAB    INSERTION-INTRAOCULAR LENS (IOL) Left 1/8/2018    Performed by [facility]o Beavers [facility] at [facility] OR    INSERTION-INTRAOCULAR LENS (IOL) Right 12/18/2017    [facility]formed by [facility] at [facility] OR    PHACOEMULSIFICATION-ASPIRATION-CATARACT Left 1/8/2018    Performed by [facility] at [facility] OR    PHACOEMULSIFICATION-ASPIRATION-CATARACT Right 12/18/2017    Performed by [facility] at [facility] OR    TONSILLECTOMY         Review of patient's allergies indicates:  No Known Allergies    Medications:  Medications Prior to Admission   Medication Sig    amiodarone  "(PACERONE) 200 MG Tab Take 1 tablet (200 mg total) by mouth once daily.    apixaban (ELIQUIS) 5 mg Tab Take 1 tablet (5 mg total) by mouth 2 (two) times daily.    atorvastatin (LIPITOR) 20 MG tablet Take 1 tablet (20 mg total) by mouth once daily.    blood sugar diagnostic Strp Use to check blood glucose four times daily, to use with insurance preferred meter    carvedilol (COREG) 6.25 MG tablet Take 1 tablet (6.25 mg total) by mouth 2 (two) times daily.    furosemide (LASIX) 20 MG tablet Take 1 tablet (20 mg total) by mouth once daily.    insulin aspart U-100 (NOVOLOG) 100 unit/mL (3 mL) InPn pen Inject 3 Units into the skin 3 (three) times daily.    insulin detemir U-100 (LEVEMIR FLEXTOUCH) 100 unit/mL (3 mL) SubQ InPn pen Inject 15 Units into the skin every evening.    lancets 30 gauge Misc TEST FOUR TIMES DAILY    levothyroxine (SYNTHROID) 50 MCG tablet Take 1 tablet (50 mcg total) by mouth once daily.    losartan (COZAAR) 50 MG tablet Take 1 tablet (50 mg total) by mouth once daily.    pen needle, diabetic 31 gauge x 5/16" Ndle 1 Device by Misc.(Non-Drug; Combo Route) route 4 (four) times daily. Use with insulin    amoxicillin-clavulanate 875-125mg (AUGMENTIN) 875-125 mg per tablet Take 1 tablet by mouth 2 (two) times daily.     Antibiotics (From admission, onward)    Start     Stop Route Frequency Ordered    08/30/19 1215  cefTRIAXone (ROCEPHIN) 2 g in dextrose 5 % 50 mL IVPB      -- IV Every 24 hours (non-standard times) 08/30/19 1113    08/29/19 2100  vancomycin 1.25 g in dextrose 5% 250 mL IVPB (ready to mix)  (vancomycin IVPB)      -- IV Every 24 hours (non-standard times) 08/28/19 1959        Antifungals (From admission, onward)    None        Antivirals (From admission, onward)    None             There is no immunization history on file for this patient.    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   Social Needs    Financial resource strain: Not on file    Food insecurity:     Worry: Not on file     Inability: Not on file    Transportation needs:     Medical: Not on file     Non-medical: Not on file   Tobacco Use    Smoking status: Never Smoker    Smokeless tobacco: Never Used   Substance and Sexual Activity    Alcohol use: No    Drug use: No    Sexual activity: Not on file   Lifestyle    Physical activity:     Days per week: Not on file     Minutes per session: Not on file    Stress: Not on file   Relationships    Social connections:     Talks on phone: Not on file     Gets together: Not on file     Attends Hoahaoism service: Not on file     Active member of club or organization: Not on file     Attends meetings of clubs or organizations: Not on file     Relationship status: Not on file   Other Topics Concern    Not on file   Social History Narrative    Not on file     Review of Systems   Constitutional: Negative for chills and fever.   Respiratory: Negative for cough and shortness of breath.    Cardiovascular: Negative for chest pain and leg swelling.   Gastrointestinal: Negative for abdominal pain, diarrhea, nausea and vomiting.   Skin: Positive for wound. Negative for rash.   Psychiatric/Behavioral: Negative for behavioral problems. The patient is not nervous/anxious.      Objective:     Vital Signs (Most Recent):  Temp: 96.1 °F (35.6 °C) (08/30/19 1203)  Pulse: (!) 54 (08/30/19 1203)  Resp: 17 (08/30/19 1203)  BP: 133/73 (08/30/19 1203)  SpO2: 96 % (08/30/19 1203) Vital Signs (24h Range):  Temp:  [96.1 °F (35.6 °C)-98.2 °F (36.8 °C)] 96.1 °F (35.6 °C)  Pulse:  [51-59] 54  Resp:  [16-18] 17  SpO2:  [94 %-97  %] 96 %  BP: (109-133)/(58-77) 133/73     Weight: 79.4 kg (175 lb)  Body mass index is 24.41 kg/m².    Estimated Creatinine Clearance: 46.3 mL/min (based on SCr of 1.4 mg/dL).    Physical Exam   Constitutional: He is oriented to person, place, and time. He appears well-developed and well-nourished. No distress.   Cardiovascular: Normal rate and regular rhythm.   No murmur heard.  Pulmonary/Chest: Effort normal and breath sounds normal. No respiratory distress.   Abdominal: Soft. Bowel sounds are normal. He exhibits no distension.   Musculoskeletal: Normal range of motion. He exhibits no edema.   Right 5th toe ulcer noted; exposed joint space. Exposed bone.   Minimal erythema ascending up anterior shin.   Neurological: He is alert and oriented to person, place, and time.   Skin: Skin is warm and dry.   Psychiatric: He has a normal mood and affect. His behavior is normal.       Significant Labs:   Blood Culture:   Recent Labs   Lab 08/28/19  1623 08/28/19  1628   LABBLOO No Growth to date  No Growth to date No Growth to date  No Growth to date     CBC:   Recent Labs   Lab 08/28/19  1623 08/29/19  0543 08/30/19  0323   WBC 5.73 4.39 5.16   HGB 11.6* 10.7* 10.7*   HCT 36.7* 34.2* 34.3*   * 123* 126*     CMP:   Recent Labs   Lab 08/28/19  1623 08/28/19  1743 08/29/19  0543 08/30/19  0324     --  138 140   K 5.6* 4.4 4.1 3.9     --  105 106   CO2 24  --  26 25     --  124* 99   BUN 27*  --  26* 25*   CREATININE 1.4  --  1.3 1.4   CALCIUM 9.1  --  8.5* 8.4*   PROT 8.0  --  6.8 6.7   ALBUMIN 3.6  --  3.1* 3.1*   BILITOT 0.9  --  0.9 0.9   ALKPHOS 79  --  78 73   AST 36  --  24 23   ALT 34  --  31 29   ANIONGAP 9  --  7* 9   EGFRNONAA 47.8*  --  52.3* 47.8*     Wound Culture:   Recent Labs   Lab 08/07/19  1449 08/23/19  0804   LABAERO No significant isolate No growth       Significant Imaging: I have reviewed all pertinent imaging results/findings within the past 24 hours.

## 2019-08-30 NOTE — PLAN OF CARE
08/30/19 1329   Post-Acute Status   Post-Acute Authorization Home Health/Hospice   Home Health/Hospice Status Awaiting Internal Medical Clearance     Sw to send infusion referral to [facility] for abx with d/c tomorrow.

## 2019-08-30 NOTE — CONSULTS
[facility]  Infectious Disease  Consult Note      Inpatient consult to Infectious Diseases  Consult performed by: ZULEMA Green  Consult ordered by: Henrique Joy MD        ID consult received.  Full consult to follow.  Thanks, ZULEMA Lin-C  Pager: [phone]

## 2019-08-30 NOTE — SUBJECTIVE & OBJECTIVE
Interval History: Patient denied any acute events overnight. Denies any fever, chills, N/V, pain, cough, SOB, LH, confusion. Patient was counseled regarding amputation benefits and risks v home antibiotic therapy. Denies any changes in symptoms.    Review of Systems   Constitutional: Negative for chills, diaphoresis and fever.   Respiratory: Negative for cough, shortness of breath and wheezing.    Cardiovascular: Negative for chest pain and palpitations.   Gastrointestinal: Negative for abdominal distention, abdominal pain, diarrhea, nausea and vomiting.   Genitourinary: Negative for dysuria, flank pain and hematuria.   Skin: Positive for wound (Ulcer of 5th toe on R foot on lateral aspect that is not painful and not purulent).        Improving erythematous area on R shin that has minimal swelling and is not indurated   Neurological: Negative for dizziness, light-headedness and headaches.     Objective:     Vital Signs (Most Recent):  Temp: 96.1 °F (35.6 °C) (08/30/19 1203)  Pulse: (!) 54 (08/30/19 1203)  Resp: 17 (08/30/19 1203)  BP: 133/73 (08/30/19 1203)  SpO2: 96 % (08/30/19 1203) Vital Signs (24h Range):  Temp:  [96.1 °F (35.6 °C)-98.2 °F (36.8 °C)] 96.1 °F (35.6 °C)  Pulse:  [51-59] 54  Resp:  [16-18] 17  SpO2:  [94 %-97 %] 96 %  BP: (109-133)/(58-77) 133/73     Weight: 79.4 kg (175 lb)  Body mass index is 24.41 kg/m².    Intake/Output Summary (Last 24 hours) at 8/30/2019 1452  Last data filed at 8/30/2019 1400  Gross per 24 hour   Intake 1110 ml   Output 1100 ml   Net 10 ml      Physical Exam   Constitutional: He is oriented to person, place, and time. He appears well-developed and well-nourished. No distress.   HENT:   Head: Normocephalic and atraumatic.   Eyes: Pupils are equal, round, and reactive to light. EOM are normal. No scleral icterus.   Neck: Normal range of motion. Neck supple. No JVD present.   Cardiovascular: Normal rate, regular rhythm, normal heart sounds and intact distal pulses.   No murmur  heard.  Pulmonary/Chest: Effort normal and breath sounds normal. No stridor. No respiratory distress. He has no wheezes.   Musculoskeletal: Normal range of motion.   Neurological: He is alert and oriented to person, place, and time.   Skin: Skin is warm and dry. He is not diaphoretic.   Ulcer on lateral aspect of 5th toe of r foot. It is not draining or purulent. There is some mild erythema of R shin that is not indurated   Psychiatric: He has a normal mood and affect. His behavior is normal. Judgment and thought content normal.       Significant Labs:   A1C:   Recent Labs   Lab 04/10/19  1003 06/05/19  1217   HGBA1C 8.4* 6.3*     ABGs: No results for input(s): PH, PCO2, HCO3, POCSATURATED, BE, TOTALHB, COHB, METHB, O2HB, POCFIO2 in the last 48 hours.  Blood Culture:   Recent Labs   Lab 08/28/19  1623 08/28/19  1628   LABBLOO No Growth to date  No Growth to date No Growth to date  No Growth to date     BMP:   Recent Labs   Lab 08/30/19  0324   GLU 99      K 3.9      CO2 25   BUN 25*   CREATININE 1.4   CALCIUM 8.4*   MG 2.2     CBC:   Recent Labs   Lab 08/28/19  1623 08/29/19  0543 08/30/19  0323   WBC 5.73 4.39 5.16   HGB 11.6* 10.7* 10.7*   HCT 36.7* 34.2* 34.3*   * 123* 126*     CMP:   Recent Labs   Lab 08/28/19  1623 08/28/19  1743 08/29/19  0543 08/30/19  0324     --  138 140   K 5.6* 4.4 4.1 3.9     --  105 106   CO2 24  --  26 25     --  124* 99   BUN 27*  --  26* 25*   CREATININE 1.4  --  1.3 1.4   CALCIUM 9.1  --  8.5* 8.4*   PROT 8.0  --  6.8 6.7   ALBUMIN 3.6  --  3.1* 3.1*   BILITOT 0.9  --  0.9 0.9   ALKPHOS 79  --  78 73   AST 36  --  24 23   ALT 34  --  31 29   ANIONGAP 9  --  7* 9   EGFRNONAA 47.8*  --  52.3* 47.8*     Coagulation:   Recent Labs   Lab 08/29/19  1035  08/30/19  0323   INR 1.3*  --   --    APTT 30.2   < > 54.8*    < > = values in this interval not displayed.     Lipid Panel: No results for input(s): CHOL, HDL, LDLCALC, TRIG, CHOLHDL in the last 48  hours.  Pathology Results  (Last 10 years)    None        POCT Glucose:   Recent Labs   Lab 08/28/19  2158 08/29/19  1619 08/29/19  2130   POCTGLUCOSE 119* 119* 116*     Recent Lab Results       08/30/19  0324   08/30/19  0323   08/29/19  2359   08/29/19  2130   08/29/19  1628        Albumin 3.1             Alkaline Phosphatase 73             ALT 29             Anion Gap 9             aPTT   54.8  Comment:  aPTT therapeutic range = 39-69 seconds 51.5  Comment:  aPTT therapeutic range = 39-69 seconds   63.1  Comment:  aPTT therapeutic range = 39-69 seconds     AST 23             Baso #   0.04           Basophil%   0.8           BILIRUBIN TOTAL 0.9  Comment:  For infants and newborns, interpretation of results should be based  on gestational age, weight and in agreement with clinical  observations.  Premature Infant recommended reference ranges:  Up to 24 hours.............<8.0 mg/dL  Up to 48 hours............<12.0 mg/dL  3-5 days..................<15.0 mg/dL  6-29 days.................<15.0 mg/dL               BUN, Bld 25             Calcium 8.4             Chloride 106             CO2 25             Creatinine 1.4             Differential Method   Automated           eGFR if  55.2             eGFR if non  47.8  Comment:  Calculation used to obtain the estimated glomerular filtration  rate (eGFR) is the CKD-EPI equation.                Eos #   0.2           Eosinophil%   4.7           Glucose 99             Gran # (ANC)   3.2           Gran%   62.7           Hematocrit   34.3           Hemoglobin   10.7           Immature Grans (Abs)   0.01  Comment:  Mild elevation in immature granulocytes is non specific and   can be seen in a variety of conditions including stress response,   acute inflammation, trauma and pregnancy. Correlation with other   laboratory and clinical findings is essential.             Immature Granulocytes   0.2           Lymph #   1.0           Lymph%   19.6            Magnesium 2.2             MCH   31.8           MCHC   31.2           MCV   102           Mono #   0.6           Mono%   12.0           MPV   10.6           nRBC   0           Platelets   126           POCT Glucose       116       Potassium 3.9             PROTEIN TOTAL 6.7             RBC   3.36           RDW   12.9           Sodium 140             WBC   5.16                            08/29/19  1619        Albumin       Alkaline Phosphatase       ALT       Anion Gap       aPTT       AST       Baso #       Basophil%       BILIRUBIN TOTAL       BUN, Bld       Calcium       Chloride       CO2       Creatinine       Differential Method       eGFR if        eGFR if non        Eos #       Eosinophil%       Glucose       Gran # (ANC)       Gran%       Hematocrit       Hemoglobin       Immature Grans (Abs)       Immature Granulocytes       Lymph #       Lymph%       Magnesium       MCH       MCHC       MCV       Mono #       Mono%       MPV       nRBC       Platelets       POCT Glucose 119     Potassium       PROTEIN TOTAL       RBC       RDW       Sodium       WBC           All pertinent labs within the past 24 hours have been reviewed.    Significant Imaging: I have reviewed all pertinent imaging results/findings within the past 24 hours.

## 2019-08-30 NOTE — ASSESSMENT & PLAN NOTE
Holding Eloquis on admission    Started on Heparin drip, discontinuing at 4am on 8/31for podiatry to amputate

## 2019-08-30 NOTE — PLAN OF CARE
Problem: Adult Inpatient Plan of Care  Goal: Plan of Care Review  Outcome: Ongoing (interventions implemented as appropriate)     08/29/19 0612 08/29/19 2000   Plan of Care Review   Plan of Care Reviewed With  --  patient   Progress no change  --      Pt aaox4. V/s stable. Family at bedside. r foot dressing clean dry intact. No complaints of pain or discomfort. Will continue to monitor and interventions as appropriate.

## 2019-08-30 NOTE — PLAN OF CARE
08/30/19 1019   Post-Acute Status   Post-Acute Authorization Home Health/Hospice   Home Health/Hospice Status Awaiting Internal Medical Clearance

## 2019-08-30 NOTE — ASSESSMENT & PLAN NOTE
Continue amiodarone on admission  Holding Eloquis, currently on heaprin drip  Holding heparin drip after 4am 8/31 in anticipation of procedure

## 2019-08-31 ENCOUNTER — ANESTHESIA (OUTPATIENT)
Dept: SURGERY | Facility: HOSPITAL | Age: 79
DRG: 617 | End: 2019-08-31
Payer: MEDICARE

## 2019-08-31 LAB
ANION GAP SERPL CALC-SCNC: 6 MMOL/L (ref 8–16)
BASOPHILS # BLD AUTO: 0.04 K/UL (ref 0–0.2)
BASOPHILS NFR BLD: 0.8 % (ref 0–1.9)
BUN SERPL-MCNC: 19 MG/DL (ref 8–23)
CALCIUM SERPL-MCNC: 8.6 MG/DL (ref 8.7–10.5)
CHLORIDE SERPL-SCNC: 107 MMOL/L (ref 95–110)
CO2 SERPL-SCNC: 26 MMOL/L (ref 23–29)
CREAT SERPL-MCNC: 1.3 MG/DL (ref 0.5–1.4)
DIFFERENTIAL METHOD: ABNORMAL
EOSINOPHIL # BLD AUTO: 0.2 K/UL (ref 0–0.5)
EOSINOPHIL NFR BLD: 3.5 % (ref 0–8)
ERYTHROCYTE [DISTWIDTH] IN BLOOD BY AUTOMATED COUNT: 12.8 % (ref 11.5–14.5)
EST. GFR  (AFRICAN AMERICAN): >60 ML/MIN/1.73 M^2
EST. GFR  (NON AFRICAN AMERICAN): 52.3 ML/MIN/1.73 M^2
GLUCOSE SERPL-MCNC: 112 MG/DL (ref 70–110)
HCT VFR BLD AUTO: 37 % (ref 40–54)
HGB BLD-MCNC: 11.8 G/DL (ref 14–18)
IMM GRANULOCYTES # BLD AUTO: 0.02 K/UL (ref 0–0.04)
IMM GRANULOCYTES NFR BLD AUTO: 0.4 % (ref 0–0.5)
LYMPHOCYTES # BLD AUTO: 0.7 K/UL (ref 1–4.8)
LYMPHOCYTES NFR BLD: 13.8 % (ref 18–48)
MCH RBC QN AUTO: 31.6 PG (ref 27–31)
MCHC RBC AUTO-ENTMCNC: 31.9 G/DL (ref 32–36)
MCV RBC AUTO: 99 FL (ref 82–98)
MONOCYTES # BLD AUTO: 0.6 K/UL (ref 0.3–1)
MONOCYTES NFR BLD: 11.5 % (ref 4–15)
NEUTROPHILS # BLD AUTO: 3.6 K/UL (ref 1.8–7.7)
NEUTROPHILS NFR BLD: 70 % (ref 38–73)
NRBC BLD-RTO: 0 /100 WBC
PLATELET # BLD AUTO: 144 K/UL (ref 150–350)
PMV BLD AUTO: 10.5 FL (ref 9.2–12.9)
POCT GLUCOSE: 101 MG/DL (ref 70–110)
POCT GLUCOSE: 123 MG/DL (ref 70–110)
POCT GLUCOSE: 128 MG/DL (ref 70–110)
POTASSIUM SERPL-SCNC: 4.4 MMOL/L (ref 3.5–5.1)
RBC # BLD AUTO: 3.73 M/UL (ref 4.6–6.2)
SODIUM SERPL-SCNC: 139 MMOL/L (ref 136–145)
WBC # BLD AUTO: 5.2 K/UL (ref 3.9–12.7)

## 2019-08-31 PROCEDURE — 88311 TISSUE SPECIMEN TO PATHOLOGY - SURGERY: ICD-10-PCS | Mod: 26,,, | Performed by: PATHOLOGY

## 2019-08-31 PROCEDURE — 99233 SBSQ HOSP IP/OBS HIGH 50: CPT | Mod: GC,,,

## 2019-08-31 PROCEDURE — 37000009 HC ANESTHESIA EA ADD 15 MINS: Performed by: PODIATRIST

## 2019-08-31 PROCEDURE — 36415 COLL VENOUS BLD VENIPUNCTURE: CPT

## 2019-08-31 PROCEDURE — D9220A PRA ANESTHESIA: ICD-10-PCS | Mod: CRNA,,, | Performed by: NURSE ANESTHETIST, CERTIFIED REGISTERED

## 2019-08-31 PROCEDURE — 63600175 PHARM REV CODE 636 W HCPCS: Performed by: NURSE ANESTHETIST, CERTIFIED REGISTERED

## 2019-08-31 PROCEDURE — 88311 DECALCIFY TISSUE: CPT | Mod: 26,,, | Performed by: PATHOLOGY

## 2019-08-31 PROCEDURE — 80048 BASIC METABOLIC PNL TOTAL CA: CPT

## 2019-08-31 PROCEDURE — 11000001 HC ACUTE MED/SURG PRIVATE ROOM

## 2019-08-31 PROCEDURE — 88305 TISSUE EXAM BY PATHOLOGIST: CPT | Mod: 26,,, | Performed by: PATHOLOGY

## 2019-08-31 PROCEDURE — 36000707: Performed by: PODIATRIST

## 2019-08-31 PROCEDURE — 25000003 PHARM REV CODE 250: Performed by: STUDENT IN AN ORGANIZED HEALTH CARE EDUCATION/TRAINING PROGRAM

## 2019-08-31 PROCEDURE — 37000008 HC ANESTHESIA 1ST 15 MINUTES: Performed by: PODIATRIST

## 2019-08-31 PROCEDURE — 28810 PR AMPUTATION METATARSAL+TOE,SINGLE: ICD-10-PCS | Mod: T9,,, | Performed by: PODIATRIST

## 2019-08-31 PROCEDURE — 99233 PR SUBSEQUENT HOSPITAL CARE,LEVL III: ICD-10-PCS | Mod: GC,,,

## 2019-08-31 PROCEDURE — 88305 TISSUE EXAM BY PATHOLOGIST: CPT | Performed by: PATHOLOGY

## 2019-08-31 PROCEDURE — 88305 TISSUE SPECIMEN TO PATHOLOGY - SURGERY: ICD-10-PCS | Mod: 26,,, | Performed by: PATHOLOGY

## 2019-08-31 PROCEDURE — 63600175 PHARM REV CODE 636 W HCPCS

## 2019-08-31 PROCEDURE — 71000033 HC RECOVERY, INTIAL HOUR: Performed by: PODIATRIST

## 2019-08-31 PROCEDURE — 27201423 OPTIME MED/SURG SUP & DEVICES STERILE SUPPLY: Performed by: PODIATRIST

## 2019-08-31 PROCEDURE — 82962 GLUCOSE BLOOD TEST: CPT | Performed by: PODIATRIST

## 2019-08-31 PROCEDURE — 36000706: Performed by: PODIATRIST

## 2019-08-31 PROCEDURE — D9220A PRA ANESTHESIA: Mod: CRNA,,, | Performed by: NURSE ANESTHETIST, CERTIFIED REGISTERED

## 2019-08-31 PROCEDURE — D9220A PRA ANESTHESIA: Mod: ANES,,, | Performed by: ANESTHESIOLOGY

## 2019-08-31 PROCEDURE — D9220A PRA ANESTHESIA: ICD-10-PCS | Mod: ANES,,, | Performed by: ANESTHESIOLOGY

## 2019-08-31 PROCEDURE — 85025 COMPLETE CBC W/AUTO DIFF WBC: CPT

## 2019-08-31 PROCEDURE — S0020 INJECTION, BUPIVICAINE HYDRO: HCPCS | Performed by: PODIATRIST

## 2019-08-31 PROCEDURE — 28810 AMPUTATION TOE & METATARSAL: CPT | Mod: T9,,, | Performed by: PODIATRIST

## 2019-08-31 PROCEDURE — 25000003 PHARM REV CODE 250: Performed by: PODIATRIST

## 2019-08-31 PROCEDURE — 63600175 PHARM REV CODE 636 W HCPCS: Performed by: PHYSICIAN ASSISTANT

## 2019-08-31 RX ORDER — BUPIVACAINE HYDROCHLORIDE 5 MG/ML
INJECTION, SOLUTION EPIDURAL; INTRACAUDAL
Status: DISCONTINUED | OUTPATIENT
Start: 2019-08-31 | End: 2019-08-31 | Stop reason: HOSPADM

## 2019-08-31 RX ORDER — SODIUM CHLORIDE 9 MG/ML
INJECTION, SOLUTION INTRAVENOUS CONTINUOUS PRN
Status: DISCONTINUED | OUTPATIENT
Start: 2019-08-31 | End: 2019-08-31

## 2019-08-31 RX ORDER — SODIUM CHLORIDE 0.9 % (FLUSH) 0.9 %
3 SYRINGE (ML) INJECTION
Status: DISCONTINUED | OUTPATIENT
Start: 2019-08-31 | End: 2019-09-01 | Stop reason: HOSPADM

## 2019-08-31 RX ORDER — PROPOFOL 10 MG/ML
VIAL (ML) INTRAVENOUS
Status: DISCONTINUED | OUTPATIENT
Start: 2019-08-31 | End: 2019-08-31

## 2019-08-31 RX ORDER — HYDROCODONE BITARTRATE AND ACETAMINOPHEN 5; 325 MG/1; MG/1
1 TABLET ORAL EVERY 4 HOURS PRN
Status: DISCONTINUED | OUTPATIENT
Start: 2019-08-31 | End: 2019-09-01 | Stop reason: HOSPADM

## 2019-08-31 RX ORDER — MIDAZOLAM HYDROCHLORIDE 1 MG/ML
INJECTION, SOLUTION INTRAMUSCULAR; INTRAVENOUS
Status: DISCONTINUED | OUTPATIENT
Start: 2019-08-31 | End: 2019-08-31

## 2019-08-31 RX ORDER — LIDOCAINE HYDROCHLORIDE 10 MG/ML
INJECTION, SOLUTION EPIDURAL; INFILTRATION; INTRACAUDAL; PERINEURAL
Status: DISCONTINUED | OUTPATIENT
Start: 2019-08-31 | End: 2019-08-31 | Stop reason: HOSPADM

## 2019-08-31 RX ORDER — FENTANYL CITRATE 50 UG/ML
INJECTION, SOLUTION INTRAMUSCULAR; INTRAVENOUS
Status: DISCONTINUED | OUTPATIENT
Start: 2019-08-31 | End: 2019-08-31

## 2019-08-31 RX ADMIN — PROPOFOL 30 MG: 10 INJECTION, EMULSION INTRAVENOUS at 10:08

## 2019-08-31 RX ADMIN — VANCOMYCIN HYDROCHLORIDE 1500 MG: 1.5 INJECTION, POWDER, LYOPHILIZED, FOR SOLUTION INTRAVENOUS at 02:08

## 2019-08-31 RX ADMIN — AMIODARONE HYDROCHLORIDE 200 MG: 200 TABLET ORAL at 08:08

## 2019-08-31 RX ADMIN — SODIUM CHLORIDE: 0.9 INJECTION, SOLUTION INTRAVENOUS at 10:08

## 2019-08-31 RX ADMIN — CARVEDILOL 6.25 MG: 6.25 TABLET, FILM COATED ORAL at 08:08

## 2019-08-31 RX ADMIN — MIDAZOLAM HYDROCHLORIDE 1 MG: 1 INJECTION, SOLUTION INTRAMUSCULAR; INTRAVENOUS at 10:08

## 2019-08-31 RX ADMIN — LEVOTHYROXINE SODIUM 50 MCG: 50 TABLET ORAL at 05:08

## 2019-08-31 RX ADMIN — CEFTRIAXONE 2 G: 2 INJECTION, SOLUTION INTRAVENOUS at 12:08

## 2019-08-31 RX ADMIN — FENTANYL CITRATE 50 MCG: 50 INJECTION, SOLUTION INTRAMUSCULAR; INTRAVENOUS at 10:08

## 2019-08-31 NOTE — PROGRESS NOTES
Pharmacokinetic Follow-up Assessment: IV Vancomycin    Assessment/Plan:    Vancomycin was initiated with loading dose of 2250 mg once followed by a maintenance dose of vancomycin 1250mg IV every 24 hours on 8/28.  Desired empiric serum trough concentration is 15 to 20 mcg/mL  Vancomycin trough was 13.4 with 3rd dose.   Increased vancomycin to 1500mg q24h with repeat tr with 3rd dose on 9/1  Pharmacy will continue to follow and monitor vancomycin.      Please contact pharmacy at extension [phone] with any questions regarding this assessment.     Thank you for the consult,   Radha Malik       Patient brief summary:  Randy Camejo is a 79 y.o. male initiated on antimicrobial therapy with IV Vancomycin for treatment of suspected bone/joint infection    Drug Allergies:   Review of patient's allergies indicates:  No Known Allergies    Actual Body Weight:   79.45kg    Renal Function:   Estimated Creatinine Clearance: 46.3 mL/min (based on SCr of 1.4 mg/dL).,     Dialysis Method (if applicable):  N/A    CBC (last 72 hours):  Recent Labs   Lab Result Units 08/28/19  1623 08/29/19  0543 08/30/19  0323   WBC K/uL 5.73 4.39 5.16   Hemoglobin g/dL 11.6* 10.7* 10.7*   Hematocrit % 36.7* 34.2* 34.3*   Platelets K/uL 129* 123* 126*   Gran% % 71.9 61.3 62.7   Lymph% % 11.3* 20.0 19.6   Mono% % 14.7 15.3* 12.0   Eosinophil% % 1.2 2.5 4.7   Basophil% % 0.7 0.7 0.8   Differential Method  Automated Automated Automated       Metabolic Panel (last 72 hours):  Recent Labs   Lab Result Units 08/28/19  1623 08/28/19  1743 08/29/19  0543 08/30/19  0324   Sodium mmol/L 137  --  138 140   Potassium mmol/L 5.6* 4.4 4.1 3.9   Chloride mmol/L 104  --  105 106   CO2 mmol/L 24  --  26 25   Glucose mg/dL 108  --  124* 99   BUN, Bld mg/dL 27*  --  26* 25*   Creatinine mg/dL 1.4  --  1.3 1.4   Albumin g/dL 3.6  --  3.1* 3.1*   Total Bilirubin mg/dL 0.9  --  0.9 0.9   Alkaline Phosphatase U/L 79  --  78 73   AST U/L 36  --  24 23   ALT U/L 34  --  31  29   Magnesium mg/dL 2.3  --  2.3 2.2   Phosphorus mg/dL  --   --  3.0  --        Drug levels (last 3 results):  Recent Labs   Lab Result Units 08/30/19  2108   Vancomycin-Trough ug/mL 13.4       Microbiologic Results:  Microbiology Results (last 7 days)       Procedure Component Value Units Date/Time    Blood culture #1 **CANNOT BE ORDERED STAT** [[id number]] Collected:  08/28/19 1623    Order Status:  Completed Specimen:  Blood from Peripheral, Wrist, Right Updated:  08/30/19 1812     Blood Culture, Routine No Growth to date      No Growth to date      No Growth to date    Blood culture #2 **CANNOT BE ORDERED STAT** [[id number]] Collected:  08/28/19 1628    Order Status:  Completed Specimen:  Blood from Peripheral, Hand, Left Updated:  08/30/19 1812     Blood Culture, Routine No Growth to date      No Growth to date      No Growth to date

## 2019-08-31 NOTE — PROGRESS NOTES
[facility] Hospital Medicine  Progress Note    Patient Name: Randy Camejo  MRN: [id number]  Patient Class: IP- Inpatient   Admission Date: 8/28/2019  Length of Stay: 3 days  Attending Physician: Edd Keita MD  Primary Care Provider: Susie Lazo MD    [facility] Medicine Team: [facility] MED 5 Henrique Joy MD    Subjective:     Principal Problem:Toe osteomyelitis, right    HPI:  Mr Camejo is a 78 yo male with Afib, DM2, CAD s/p stent (2011) and CHF that presented from podiatry clinic due to an ulcer on his 5th toe of R foot for the past 6 weeks. Reports that pain in R heel is sharp in nature and intermittent and occurs at both rest and with pressure but does not radiate. He denies any inciting event prior to occurrence. He has been seen by outpatient podiatry over this time and has been treated with course of ABx (amoxacillin and bactrim) with no improvement of symptoms. He developed new erythema of his R shin this morning , at which point his podiatrist referred him to the ED for workup. He is not currently on antibiotics. Denies fever, claudication, CP, SOB, chills, N/V, HA, LH.     Overview/Hospital Course:  Admitted for recurrent right 5th toe infection despite oral antibiotics now MRI showing osteomyelitis of the 5th toe on Vancomycin and Ceftriaxone. Toe amputation today, will decide on Abx regimen if no clear margins/depending on cultures. Stable.     Interval History: Patient going to the OR for right 5th toes amputation today. Continue Abx for now. Likely d/c tomorrow depending on ID recommendation for Abx.     Review of Systems  Objective:     Vital Signs (Most Recent):  Temp: 98.1 °F (36.7 °C) (08/31/19 1115)  Pulse: (!) 55 (08/31/19 1115)  Resp: 20 (08/31/19 1115)  BP: 134/78 (08/31/19 1115)  SpO2: 95 % (08/31/19 1115) Vital Signs (24h Range):  Temp:  [96.1 °F (35.6 °C)-98.6 °F (37 °C)] 98.1 °F (36.7 °C)  Pulse:  [54-64] 55  Resp:  [14-20] 20  SpO2:  [93 %-96 %] 95 %  BP:  (122-144)/(65-78) 134/78     Weight: 79.4 kg (175 lb)  Body mass index is 24.41 kg/m².    Intake/Output Summary (Last 24 hours) at 8/31/2019 1120  Last data filed at 8/31/2019 0800  Gross per 24 hour   Intake 440 ml   Output 800 ml   Net -360 ml      Physical Exam   Constitutional: He is oriented to person, place, and time. He appears well-developed and well-nourished. No distress.   HENT:   Head: Normocephalic and atraumatic.   Eyes: Pupils are equal, round, and reactive to light. EOM are normal. No scleral icterus.   Neck: Normal range of motion. Neck supple. No JVD present.   Cardiovascular: Normal rate, regular rhythm, normal heart sounds and intact distal pulses.   No murmur heard.  Pulmonary/Chest: Effort normal and breath sounds normal. No stridor. No respiratory distress. He has no wheezes.   Musculoskeletal: Normal range of motion.   Neurological: He is alert and oriented to person, place, and time.   Skin: Skin is warm and dry. He is not diaphoretic.   Ulcer on lateral aspect of 5th toe of r foot. It is not draining or purulent. There is some mild erythema of R shin that is not indurated   Psychiatric: He has a normal mood and affect. His behavior is normal. Judgment and thought content normal.       Significant Labs:   CBC:   Recent Labs   Lab 08/30/19  0323 08/31/19  0839   WBC 5.16 5.20   HGB 10.7* 11.8*   HCT 34.3* 37.0*   * 144*     CMP:   Recent Labs   Lab 08/30/19  0324 08/31/19  0839    139   K 3.9 4.4    107   CO2 25 26   GLU 99 112*   BUN 25* 19   CREATININE 1.4 1.3   CALCIUM 8.4* 8.6*   PROT 6.7  --    ALBUMIN 3.1*  --    BILITOT 0.9  --    ALKPHOS 73  --    AST 23  --    ALT 29  --    ANIONGAP 9 6*   EGFRNONAA 47.8* 52.3*       Significant Imaging: I have reviewed all pertinent imaging results/findings within the past 24 hours.      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 despite PO Abx out patient    Cultures from previous debridement negative thus far  Continue Ceftriaxone/Vancomycin for now  Toe amputation today - Podiatry will attempt for clear margins if not likely need prolonged therapy  ID following, appreciate help     Cellulitis of right lower extremity  Improving  Continue ceftriaxone/vancomycin   No complications     Essential hypertension  BP optimally controlled, goal , 130/80  Continue ARBs and BB    Coronary artery disease  History of CAD s/p stent 2011    Continuing Atorvastatin  Continue Coreg  Managing BP and DM II     Heart failure, systolic, chronic  EF of 20%  Continue goal directed therapy with BB and ARBs  No acute exacerbations     Type 2 diabetes mellitus with neurologic complication, with long-term current use of insulin  -Well controlled, recent A1c 6.3  15 units of Detemir, and Aspart 3U TIDWM  Detemir 7U qhs for now, with low dose SSI  Glucose with in goal 140's  Adjust as needed  Diabetic diet     Atrial fibrillation  A.fib on Eliquis - holding for procedure today  Continue Coreg and amiodarone     VTE Risk Mitigation (From admission, onward)        Ordered     IP VTE HIGH RISK PATIENT  Once      08/31/19 1117     Place DREAD hose  Until discontinued      08/28/19 2100     Place sequential compression device  Until discontinued      08/28/19 1815          Henrique Joy MD  Department of Hospital Medicine   [facility]

## 2019-08-31 NOTE — PROGRESS NOTES
Pharmacokinetic Follow-up Assessment: IV Vancomycin    Assessment/Plan:    · Vancomycin was initiated with loading dose of 2250 mg once followed by a maintenance dose of vancomycin 1250mg IV every 24 hours on 8/28.  · Desired empiric serum trough concentration is 15 to 20 mcg/mL  · Vancomycin trough was 13.4 with 3rd dose and was then increased to 1500mg q24h and was ordered to start 8/30 at 2200   · Vancomycin was never administered on 8/30   · Vancomycin has been reordered to start now with repeat tr with 3rd dose on 9/2  · Pharmacy will continue to follow and monitor vancomycin.      Please contact pharmacy at extension [phone] with any questions regarding this assessment.     Thank you for the consult,   Solis Roman       Patient brief summary:  Randy Camejo is a 79 y.o. male initiated on antimicrobial therapy with IV Vancomycin for treatment of suspected bone/joint infection    Drug Allergies:   Review of patient's allergies indicates:  No Known Allergies    Actual Body Weight:   79.4 kg    Renal Function:   Estimated Creatinine Clearance: 49.9 mL/min (based on SCr of 1.3 mg/dL).,     Dialysis Method (if applicable):  N/A    CBC (last 72 hours):  Recent Labs   Lab Result Units 08/28/19  1623 08/29/19  0543 08/30/19  0323 08/31/19  0839   WBC K/uL 5.73 4.39 5.16 5.20   Hemoglobin g/dL 11.6* 10.7* 10.7* 11.8*   Hematocrit % 36.7* 34.2* 34.3* 37.0*   Platelets K/uL 129* 123* 126* 144*   Gran% % 71.9 61.3 62.7 70.0   Lymph% % 11.3* 20.0 19.6 13.8*   Mono% % 14.7 15.3* 12.0 11.5   Eosinophil% % 1.2 2.5 4.7 3.5   Basophil% % 0.7 0.7 0.8 0.8   Differential Method  Automated Automated Automated Automated       Metabolic Panel (last 72 hours):  Recent Labs   Lab Result Units 08/28/19  1623 08/28/19  1743 08/29/19  0543 08/30/19  0324 08/31/19  0839   Sodium mmol/L 137  --  138 140 139   Potassium mmol/L 5.6* 4.4 4.1 3.9 4.4   Chloride mmol/L 104  --  105 106 107   CO2 mmol/L 24  --  26 25 26   Glucose mg/dL 108  --   124* 99 112*   BUN, Bld mg/dL 27*  --  26* 25* 19   Creatinine mg/dL 1.4  --  1.3 1.4 1.3   Albumin g/dL 3.6  --  3.1* 3.1*  --    Total Bilirubin mg/dL 0.9  --  0.9 0.9  --    Alkaline Phosphatase U/L 79  --  78 73  --    AST U/L 36  --  24 23  --    ALT U/L 34  --  31 29  --    Magnesium mg/dL 2.3  --  2.3 2.2  --    Phosphorus mg/dL  --   --  3.0  --   --        Drug levels (last 3 results):  Recent Labs   Lab Result Units 08/30/19  2108   Vancomycin-Trough ug/mL 13.4       Microbiologic Results:  Microbiology Results (last 7 days)     Procedure Component Value Units Date/Time    Blood culture #1 **CANNOT BE ORDERED STAT** [[id number]] Collected:  08/28/19 1623    Order Status:  Completed Specimen:  Blood from Peripheral, Wrist, Right Updated:  08/30/19 1812     Blood Culture, Routine No Growth to date      No Growth to date      No Growth to date    Blood culture #2 **CANNOT BE ORDERED STAT** [[id number]] Collected:  08/28/19 1628    Order Status:  Completed Specimen:  Blood from Peripheral, Hand, Left Updated:  08/30/19 1812     Blood Culture, Routine No Growth to date      No Growth to date      No Growth to date

## 2019-08-31 NOTE — SUBJECTIVE & OBJECTIVE
Interval History: Patient going to the OR for right 5th toes amputation today. Continue Abx for now. Likely d/c tomorrow depending on ID recommendation for Abx.     Review of Systems  Objective:     Vital Signs (Most Recent):  Temp: 98.1 °F (36.7 °C) (08/31/19 1115)  Pulse: (!) 55 (08/31/19 1115)  Resp: 20 (08/31/19 1115)  BP: 134/78 (08/31/19 1115)  SpO2: 95 % (08/31/19 1115) Vital Signs (24h Range):  Temp:  [96.1 °F (35.6 °C)-98.6 °F (37 °C)] 98.1 °F (36.7 °C)  Pulse:  [54-64] 55  Resp:  [14-20] 20  SpO2:  [93 %-96 %] 95 %  BP: (122-144)/(65-78) 134/78     Weight: 79.4 kg (175 lb)  Body mass index is 24.41 kg/m².    Intake/Output Summary (Last 24 hours) at 8/31/2019 1120  Last data filed at 8/31/2019 0800  Gross per 24 hour   Intake 440 ml   Output 800 ml   Net -360 ml      Physical Exam   Constitutional: He is oriented to person, place, and time. He appears well-developed and well-nourished. No distress.   HENT:   Head: Normocephalic and atraumatic.   Eyes: Pupils are equal, round, and reactive to light. EOM are normal. No scleral icterus.   Neck: Normal range of motion. Neck supple. No JVD present.   Cardiovascular: Normal rate, regular rhythm, normal heart sounds and intact distal pulses.   No murmur heard.  Pulmonary/Chest: Effort normal and breath sounds normal. No stridor. No respiratory distress. He has no wheezes.   Musculoskeletal: Normal range of motion.   Neurological: He is alert and oriented to person, place, and time.   Skin: Skin is warm and dry. He is not diaphoretic.   Ulcer on lateral aspect of 5th toe of r foot. It is not draining or purulent. There is some mild erythema of R shin that is not indurated   Psychiatric: He has a normal mood and affect. His behavior is normal. Judgment and thought content normal.       Significant Labs:   CBC:   Recent Labs   Lab 08/30/19  0323 08/31/19  0839   WBC 5.16 5.20   HGB 10.7* 11.8*   HCT 34.3* 37.0*   * 144*     CMP:   Recent Labs   Lab  08/30/19  0324 08/31/19  0839    139   K 3.9 4.4    107   CO2 25 26   GLU 99 112*   BUN 25* 19   CREATININE 1.4 1.3   CALCIUM 8.4* 8.6*   PROT 6.7  --    ALBUMIN 3.1*  --    BILITOT 0.9  --    ALKPHOS 73  --    AST 23  --    ALT 29  --    ANIONGAP 9 6*   EGFRNONAA 47.8* 52.3*       Significant Imaging: I have reviewed all pertinent imaging results/findings within the past 24 hours.

## 2019-08-31 NOTE — PLAN OF CARE
Problem: Fall Injury Risk  Goal: Absence of Fall and Fall-Related Injury    Intervention: Promote Injury-Free Environment  Meds given and tolerated as ordered. Patient denies pain and discomfort. Foot elevated and ice applied. Safety precautions taken will continue to monitor.

## 2019-08-31 NOTE — NURSING TRANSFER
Nursing Transfer Note      8/31/2019     Transfer to room 604 from PACU    Transfer via stretcher    Transfer with  to O2    Transported by PACU to room 604    Medicines sent:     Chart send with patient: Yes    Notified: daughter

## 2019-08-31 NOTE — PROGRESS NOTES
Spoke with central supply staff regarding black boot. Boot will be tubed to 6th floor per [facility] .

## 2019-08-31 NOTE — ASSESSMENT & PLAN NOTE
Right 5th toe osteomyelitis on MRI- chronic/recurrent infection despite PO Abx out patient   Cultures from previous debridement negative thus far  Continue Ceftriaxone/Vancomycin for now  Toe amputation today - Podiatry will attempt for clear margins if not likely need prolonged therapy  ID following, appreciate help

## 2019-08-31 NOTE — OP NOTE
Operative Note       Surgery Date: 8/31/2019     Surgeon(s) and Role:     * Kathy Talley DPM - Primary     * JANE Snow - Resident - Assisting    Pre-op Diagnosis:  Toe ulcer, right [L97.519]  Toe osteomyelitis, right [M86.9]    Post-op Diagnosis: Post-Op Diagnosis Codes:     * Toe ulcer, right [L97.519]     * Toe osteomyelitis, right [M86.9]    Procedure(s) (LRB):  Partial 5th ray amputation (Right)- 42305    Anesthesia: Local MAC    Procedure in Detail/Findings:  The patient was brought to the operating room on a stretcher and placed on the operating table in a supine position. A timeout was performed verifying the patient's identity, surgical site, allergies, and medications.  Following the successful induction of MAC anesthesia, a local reverse rebolledo block on the right foot was achieved with of 20 cc of 1% lidocaine plain, and 0.5% bupivacaine plain. The  foot was then prepped and draped in a sterile manner.     Attention was then directed to the right 5th toe where a fishmouth incision was made going from dorsal to the plantar aspect of the toe.  Using sharp and blunt dissection the incision was deepened severing all neuro-vascular structures. The dissection was taken proximally along the proximal phalanx until the MTP joint was visualized.  The entire capsule was then released and all soft tissue was released, and the entire toe at the level of the 5th MTP joint was then removed and procured to be sent to pathology. All small vessels were cauterized as necessary and soft tissue edges were trimmed so as to give good closure of the wound. The flexor and extensor tendons were grasped and excised as far proximally as possible.     Next, approximately head of 5th metatarsal was resected at an angle to avoid excessive pressure at the stump of the metatarsal. This was sent for pathology. A clean rongeur was used to resect a small portion of the proximal 5th metatarsal stump to be sent for patholgoy as a  clean margin to rule out further infection in the bone. The wound was then flushed with copious amounts of sterile saline solution.  No further signs of tracking, abscess or infection were noted.  Following this, the deep subcutaneous tissues were reapproximated using 3-0 Vicryl and the skin edges were then closed using 3-0 nyon suture material.  No tourniquet was used throughout the procedure to assess blood flow. Good perfusion was noted throughout the case.      Incision site was dressed with adaptic and covered with a sterile dressing consisting of 4 X 4 gauze, kerlix, and ACE bandage . The patient tolerated the procedure and anesthesia well. The patient was transported to recovery with vital signs stable and vascular status intact as well as the following post op instructions.   1. Keep dressing dry and intact until podiatry followup  2. Nonweightbearing right foot   3. Medical management to continue while inpatient.   4. Contact Podiatry for all post op follow up care and if any problems arise.    Estimated Blood Loss: 60 mL           Specimens (From admission, onward)    Start     Ordered    08/31/19 1044  Specimen to Pathology - Surgery  Once     Start Status     08/31/19 1044 Collected (08/31/19 1052) Order ID: [id number]       08/31/19 1044        Implants: * No implants in log *           Disposition: PACU - hemodynamically stable.           Condition: Good    Attestation:  I was present and scrubbed for the entire procedure.

## 2019-08-31 NOTE — PROGRESS NOTES
[facility]  Podiatry  Progress Note    Patient Name: Randy Camejo  MRN: [id number]  Admission Date: 8/28/2019  Hospital Length of Stay: 3 days  Attending Physician: Edd Keita MD  Primary Care Provider: Susie Lazo MD   Interval Hx:  Patient Seen at bedside for dressing change.  Patient denies F/C/N/V.  Dressings clean, dry, and intact. After speaking with patient yesterday, we planned for bone biopsy. His daughter came to clinic and discussed his treatment options and expressed desire for amputation. We advised the family to discussed amongst themselves and decide on a plan of care.    8/31: Patient was taken to OR today for right foot 5th partial ray amputation. Patient tolerated the procedure well.     Scheduled Meds:   amiodarone  200 mg Oral Daily    atorvastatin  20 mg Oral Daily    carvedilol  6.25 mg Oral BID    cefTRIAXone (ROCEPHIN) IVPB  2 g Intravenous Q24H    insulin detemir U-100  7 Units Subcutaneous QHS    levothyroxine  50 mcg Oral Before breakfast    vancomycin (VANCOCIN) IVPB  1,500 mg Intravenous Q24H     Continuous Infusions:    PRN Meds:acetaminophen, Dextrose 10% Bolus, Dextrose 10% Bolus, glucagon (human recombinant), glucose, glucose, HYDROcodone-acetaminophen, insulin aspart U-100, ondansetron, sodium chloride 0.9%, sodium chloride 0.9%, sodium chloride 0.9%    Review of patient's allergies indicates:  No Known Allergies     Past Medical History:   Diagnosis Date    Asthma     childhood    Atrial fibrillation     CHF (congestive heart failure)     Chronic anticoagulation 2/25/2019    Coronary artery disease     Coronary artery disease 2/25/2019    Diabetes mellitus, type 2     Heart attack     stent    High risk medication use 3/22/2019    2/19/2019: Began amiodarone.    History of coronary artery stent placement 3/22/2019    2000: Concepcion: MI and stent.    History of myocardial infarction 3/22/2019    2000: Concepcion: MI and stent.   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 Beavers    CATARACT EXTRACTION W/  INTRAOCULAR LENS IMPLANT Left 01/08/2018    Dr. Beavers    ECHOCARDIOGRAM,TRANSESOPHAGEAL N/A 2/25/2019    Performed by [facility] CATH LAB    ECHOCARDIOGRAM,TRANSESOPHAGEAL N/A 2/21/2019    Performed by [facility] CATH LAB    INSERTION-INTRAOCULAR LENS (IOL) Left 1/8/2018    Performed by Milo Beavers [facility] at [facility] OR    INSERTION-INTRAOCULAR LENS (IOL) Right 12/18/2017    [facility] at [facility] OR    PHACOEMULSIFICATION-ASPIRATION-CATARACT Left 1/8/2018    [facility] OR    PHACOEMULSIFICATION-ASPIRATION-CATARACT Right 12/18/2017    [facility] at [facility] OR    TONSILLECTOMY         Family History     Problem Relation (Age of Onset)    Cancer Father    Cataracts Father    Diabetes Mother    Stroke Mother        Tobacco Use    Smoking status: Never Smoker    Smokeless tobacco: Never Used   Substance and Sexual Activity    Alcohol use: No    Drug use: No    Sexual activity: Not on file     Review of Systems   Constitutional: Negative for chills and fever.   Gastrointestinal: Negative for nausea and vomiting.   Skin: Positive for wound.   Neurological: Positive for syncope and headaches.     Objective:     Vital Signs (Most Recent):  Temp: 98.1 °F (36.7 °C) (08/31/19 1115)  Pulse: (!) 52 (08/31/19 1205)  Resp: 20 (08/31/19 1205)  BP: 123/65 (08/31/19 1205)  SpO2: 98 % (08/31/19 1205) Vital Signs (24h Range):  Temp:  [97.3 °F (36.3 °C)-98.6 °F  (37 °C)] 98.1 °F (36.7 °C)  Pulse:  [52-64] 52  Resp:  [14-20] 20  SpO2:  [93 %-98 %] 98 %  BP: (122-144)/(65-78) 123/65     Weight: 79.4 kg (175 lb)  Body mass index is 24.41 kg/m².    Foot Exam    Right Foot/Ankle     Inspection and Palpation  Ecchymosis: none  Tenderness: none   Swelling: (Minor LE edema)  Skin Exam: ulcer;     Neurovascular  Saphenous nerve sensation: diminished  Tibial nerve sensation: diminished  Superficial peroneal nerve sensation: diminished  Deep peroneal nerve sensation: diminished  Sural nerve sensation: diminished      Left Foot/Ankle      Inspection and Palpation  Ecchymosis: none  Tenderness: none   Swelling: (Minor LE edema)    Neurovascular  Saphenous nerve sensation: diminished  Tibial nerve sensation: diminished  Superficial peroneal nerve sensation: diminished  Deep peroneal nerve sensation: diminished  Sural nerve sensation: diminished            Laboratory:  CBC:   Recent Labs   Lab 08/31/19  0839   WBC 5.20   RBC 3.73*   HGB 11.8*   HCT 37.0*   *   MCV 99*   MCH 31.6*   MCHC 31.9*     CMP:   Recent Labs   Lab 08/30/19  0324 08/31/19  0839   GLU 99 112*   CALCIUM 8.4* 8.6*   ALBUMIN 3.1*  --    PROT 6.7  --     139   K 3.9 4.4   CO2 25 26    107   BUN 25* 19   CREATININE 1.4 1.3   ALKPHOS 73  --    ALT 29  --    AST 23  --    BILITOT 0.9  --        Diagnostic Results:        Clinical Findings:  S/p partial 4th ray amputation with primary closure (DOS: 8/31/2019)          8/30                  Assessment/Plan:     * Toe osteomyelitis, right  79 y.o male with multiple comorbidities presents with right 5th toe osteomyelitis    Plan:  -Patient was taken to the OR today (8/31) for partial 5th ray amputation with primary closure right foot.   -Patient to remain nonweightbearing right foot in post op shoe until follow up with podiatry  -Clean margins from the 5th metatarsal were obtained in the OR today and sent to path  -Recommend Vascular consult to evaluate blood  flow.  -IV abx per ID reccs  -From podiatry standpoint, patient is stable for discharge home.     DC Instructions:  Patient is to follow up with podiatry within 10 days of discharge.  Call [phone]  to make an appointment.  Keep dressing to right foot clean, dry, and intact until follow up visit. Remain NWB right foot.         Essential hypertension  Managed per hospital medicine    Coronary artery disease  Managed per hospital medicine    Heart failure, systolic, chronic  Managed per hospital medicine    Type 2 diabetes mellitus with neurologic complication, with long-term current use of insulin  Managed per hospital medicine    Atrial fibrillation  Managed per hospital medicine      Liz Paulette Garcia MD  Podiatry  [facility]

## 2019-08-31 NOTE — ASSESSMENT & PLAN NOTE
-Well controlled, recent A1c 6.3  15 units of Detemir, and Aspart 3U TIDWM  Detemir 7U qhs for now, with low dose SSI  Glucose with in goal 140's  Adjust as needed  Diabetic diet

## 2019-08-31 NOTE — ASSESSMENT & PLAN NOTE
79 y.o male with multiple comorbidities presents with right 5th toe osteomyelitis    Plan:  -Patient was taken to the OR today (8/31) for partial 5th ray amputation with primary closure right foot.   -Patient to remain nonweightbearing right foot in post op shoe until follow up with podiatry  -Clean margins from the 5th metatarsal were obtained in the OR today and sent to path  -Recommend Vascular consult to evaluate blood flow.  -IV abx per ID reccs  -From podiatry standpoint, patient is stable for discharge home.     DC Instructions:  Patient is to follow up with podiatry within 10 days of discharge.  Call [phone]  to make an appointment.  Keep dressing to right foot clean, dry, and intact until follow up visit. Remain NWB right foot.

## 2019-08-31 NOTE — SUBJECTIVE & OBJECTIVE
Interval Hx:  Patient Seen at bedside for dressing change.  Patient denies F/C/N/V.  Dressings clean, dry, and intact. After speaking with patient yesterday, we planned for bone biopsy. His daughter came to clinic and discussed his treatment options and expressed desire for amputation. We advised the family to discussed amongst themselves and decide on a plan of care.    8/31: Patient was taken to OR today for right foot 4th partial ray amputation. Patient tolerated the procedure well.     Scheduled Meds:   amiodarone  200 mg Oral Daily    atorvastatin  20 mg Oral Daily    carvedilol  6.25 mg Oral BID    cefTRIAXone (ROCEPHIN) IVPB  2 g Intravenous Q24H    insulin detemir U-100  7 Units Subcutaneous QHS    levothyroxine  50 mcg Oral Before breakfast    vancomycin (VANCOCIN) IVPB  1,500 mg Intravenous Q24H     Continuous Infusions:    PRN Meds:acetaminophen, Dextrose 10% Bolus, Dextrose 10% Bolus, glucagon (human recombinant), glucose, glucose, HYDROcodone-acetaminophen, insulin aspart U-100, ondansetron, sodium chloride 0.9%, sodium chloride 0.9%, sodium chloride 0.9%    Review of patient's allergies indicates:  No Known Allergies     Past Medical History:   Diagnosis Date    Asthma     childhood    Atrial fibrillation     CHF (congestive heart failure)     Chronic anticoagulation 2/25/2019    Coronary artery disease     Coronary artery disease 2/25/2019    Diabetes mellitus, type 2     Heart attack     stent    High risk medication use 3/22/2019    2/19/2019: Began amiodarone.    History of coronary artery stent placement 3/22/2019    2000: Concepcion: MI and stent.    History of myocardial infarction 3/22/2019    2000: Concepcion: MI and stent.   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 Beavers    CATARACT EXTRACTION W/  INTRAOCULAR LENS IMPLANT Left 01/08/2018    Dr. Beavers    ECHOCARDIOGRAM,TRANSESOPHAGEAL N/A 2/25/2019    Performed by [facility] CATH LAB    ECHOCARDIOGRAM,TRANSESOPHAGEAL N/A 2/21/2019    Performed by [facility] CATH LAB    INSERTION-INTRAOCULAR LENS (IOL) Left 1/8/2018    Performed by Milo Beavers [facility] at [facility] OR    INSERTION-INTRAOCULAR LENS (IOL) Right 12/18/2017    Performed by [facility] at [facility] OR    PHACOEMULSIFICATION-ASPIRATION-CATARACT Left 1/8/2018    Performed by [facility]vers [facility] at [facility] OR    PHACOEMULSIFICATION-ASPIRATION-CATARACT Right 12/18/2017    Performed by [facility] at [facility] OR    TONSILLECTOMY         Family History     Problem Relation (Age of Onset)    Cancer Father    Cataracts Father    Diabetes Mother    Stroke Mother        Tobacco Use    Smoking status: Never Smoker    Smokeless tobacco: Never Used   Substance and Sexual Activity    Alcohol use: No    Drug use: No    Sexual activity: Not on file     Review of Systems   Constitutional: Negative for chills and fever.   Gastrointestinal: Negative for nausea and vomiting.   Skin: Positive for wound.   Neurological: Positive for syncope and headaches.     Objective:     Vital Signs (Most Recent):  Temp: 98.1 °F (36.7 °C) (08/31/19 1115)  Pulse: (!) 52 (08/31/19 1205)  Resp: 20 (08/31/19 1205)  BP: 123/65 (08/31/19 1205)  SpO2: 98 % (08/31/19 1205) Vital Signs (24h Range):  Temp:  [97.3 °F (36.3 °C)-98.6 °F (37 °C)] 98.1 °F (36.7 °C)  Pulse:  [52-64] 52  Resp:  [14-20] 20  SpO2:  [93 %-98 %] 98 %  BP: (122-144)/(65-78) 123/65     Weight: 79.4 kg (175 lb)  Body mass index is 24.41 kg/m².    Foot Exam    Right Foot/Ankle     Inspection and Palpation  Ecchymosis:  none  Tenderness: none   Swelling: (Minor LE edema)  Skin Exam: ulcer;     Neurovascular  Saphenous nerve sensation: diminished  Tibial nerve sensation: diminished  Superficial peroneal nerve sensation: diminished  Deep peroneal nerve sensation: diminished  Sural nerve sensation: diminished      Left Foot/Ankle      Inspection and Palpation  Ecchymosis: none  Tenderness: none   Swelling: (Minor LE edema)    Neurovascular  Saphenous nerve sensation: diminished  Tibial nerve sensation: diminished  Superficial peroneal nerve sensation: diminished  Deep peroneal nerve sensation: diminished  Sural nerve sensation: diminished            Laboratory:  CBC:   Recent Labs   Lab 08/31/19  0839   WBC 5.20   RBC 3.73*   HGB 11.8*   HCT 37.0*   *   MCV 99*   MCH 31.6*   MCHC 31.9*     CMP:   Recent Labs   Lab 08/30/19  0324 08/31/19  0839   GLU 99 112*   CALCIUM 8.4* 8.6*   ALBUMIN 3.1*  --    PROT 6.7  --     139   K 3.9 4.4   CO2 25 26    107   BUN 25* 19   CREATININE 1.4 1.3   ALKPHOS 73  --    ALT 29  --    AST 23  --    BILITOT 0.9  --        Diagnostic Results:        Clinical Findings:  S/p partial 4th ray amputation with primary closure (DOS: 8/31/2019)          8/30

## 2019-08-31 NOTE — BRIEF OP NOTE
[facility]  Brief Operative Note    SUMMARY     Surgery Date: 8/31/2019     Surgeon(s) and Role:     * Kathy Talley DPM - Primary     * Liz Garcia PGY1 - Resident - Assisting        Pre-op Diagnosis:  Toe ulcer, right [L97.519]  Toe osteomyelitis, right [M86.9]    Post-op Diagnosis:  Post-Op Diagnosis Codes:     * Toe ulcer, right [L97.519]     * Toe osteomyelitis, right [M86.9]    Procedure(s) (LRB):  Partial 5th ray amputation (Right)    Anesthesia: Local MAC    Description of Procedure:   Partial 5th ray amputation of right foot. Specimen sent to pathology. Clean margins obtained from proximal 5th metatarsal obtained and sent off to pathology. Copious irrigation achieved with normal sterile saline solution and primary closure performed. SubQ closure achieved with 3-0 Vicryl. Skin closure with 3-0 Nylon.     Description of the findings of the procedure: as above     Estimated Blood Loss: 60 mL         Specimens:   Specimen (12h ago, onward)    Start     Ordered    08/31/19 1044  Specimen to Pathology - Surgery  Once     Comments:  1. Right 5th toe dirty margin - Permanent2. Right 5th toe clean margin - Permanent     Start Status     08/31/19 1044 Collected (08/31/19 1052) Order ID: [id number]       08/31/19 1040

## 2019-09-01 VITALS
SYSTOLIC BLOOD PRESSURE: 125 MMHG | HEIGHT: 71 IN | BODY MASS INDEX: 24.5 KG/M2 | TEMPERATURE: 98 F | HEART RATE: 57 BPM | DIASTOLIC BLOOD PRESSURE: 72 MMHG | OXYGEN SATURATION: 94 % | WEIGHT: 175 LBS | RESPIRATION RATE: 18 BRPM

## 2019-09-01 LAB
POCT GLUCOSE: 106 MG/DL (ref 70–110)
POCT GLUCOSE: 123 MG/DL (ref 70–110)
POCT GLUCOSE: 182 MG/DL (ref 70–110)

## 2019-09-01 PROCEDURE — 25000003 PHARM REV CODE 250: Performed by: STUDENT IN AN ORGANIZED HEALTH CARE EDUCATION/TRAINING PROGRAM

## 2019-09-01 PROCEDURE — 99233 SBSQ HOSP IP/OBS HIGH 50: CPT | Mod: ,,, | Performed by: PHYSICIAN ASSISTANT

## 2019-09-01 PROCEDURE — 99239 HOSP IP/OBS DSCHRG MGMT >30: CPT | Mod: ,,, | Performed by: INTERNAL MEDICINE

## 2019-09-01 PROCEDURE — 99233 PR SUBSEQUENT HOSPITAL CARE,LEVL III: ICD-10-PCS | Mod: ,,, | Performed by: PHYSICIAN ASSISTANT

## 2019-09-01 PROCEDURE — 99239 PR HOSPITAL DISCHARGE DAY,>30 MIN: ICD-10-PCS | Mod: ,,, | Performed by: INTERNAL MEDICINE

## 2019-09-01 PROCEDURE — 25000003 PHARM REV CODE 250: Performed by: PHYSICIAN ASSISTANT

## 2019-09-01 RX ORDER — DOXYCYCLINE HYCLATE 100 MG
100 TABLET ORAL EVERY 12 HOURS
Qty: 14 TABLET | Refills: 0 | Status: SHIPPED | OUTPATIENT
Start: 2019-09-01 | End: 2019-09-11 | Stop reason: ALTCHOICE

## 2019-09-01 RX ORDER — DOXYCYCLINE HYCLATE 100 MG
100 TABLET ORAL EVERY 12 HOURS
Status: DISCONTINUED | OUTPATIENT
Start: 2019-09-01 | End: 2019-09-01 | Stop reason: HOSPADM

## 2019-09-01 RX ORDER — DOXYCYCLINE HYCLATE 100 MG
100 TABLET ORAL 2 TIMES DAILY
Qty: 14 TABLET | Refills: 0 | Status: SHIPPED | OUTPATIENT
Start: 2019-09-01 | End: 2019-09-08

## 2019-09-01 RX ORDER — ACETAMINOPHEN 325 MG/1
650 TABLET ORAL EVERY 4 HOURS PRN
Refills: 0 | COMMUNITY
Start: 2019-09-01

## 2019-09-01 RX ADMIN — DOXYCYCLINE HYCLATE 100 MG: 100 TABLET, COATED ORAL at 11:09

## 2019-09-01 RX ADMIN — CARVEDILOL 6.25 MG: 6.25 TABLET, FILM COATED ORAL at 08:09

## 2019-09-01 RX ADMIN — AMIODARONE HYDROCHLORIDE 200 MG: 200 TABLET ORAL at 08:09

## 2019-09-01 RX ADMIN — LEVOTHYROXINE SODIUM 50 MCG: 50 TABLET ORAL at 05:09

## 2019-09-01 RX ADMIN — ATORVASTATIN CALCIUM 20 MG: 20 TABLET, FILM COATED ORAL at 08:09

## 2019-09-01 NOTE — ASSESSMENT & PLAN NOTE
6 week history of toe ulcer prior to admission with intermittent pain. Treated with Abx outpatient with admission to hospital after development of possible cellulitis of shin.    MRI forefoot 8/28  Findings of osteomyelitis of the 5th proximal, middle and distal phalanges.  Diffuse skin edema could be seen with cellulitis.    Xray foot 8/28  Site of the patient's pain is not included in the history or indicated on the images provided.    Cultures from previous debridement negative thus far  Treated initially with Ceftriaxone/Vancomycin for now  Toe amputation with clear margins on 8/31  Discharged with po doxycycline per ID recs

## 2019-09-01 NOTE — ASSESSMENT & PLAN NOTE
Pt with right 5th digit osteomyelitis; now s/p amputation of 5th digit and partial 5th ray. Clean margins sent for pathology, no cultures. Prior bone culture negative. Pt has been on empiric vanc and rocephin. Anticipating discharge today. Pt is afebrile without leukocytosis. Pain controlled.    Plan:  1. D/c vancomycin and rocephin  2. Start doxycycline 100 mg PO BID  3. Recommend a minimum of 7 days from date of surgery  4. Will need to f/u on pathology of clean margins as outpatient (no clean margin cultures sent)  5. Okay to d/c home from ID standpoint; will make ID f/u as outpatient within 1 week

## 2019-09-01 NOTE — PROGRESS NOTES
[facility]  Infectious Disease  Progress Note    Patient Name: Randy Camejo  MRN: [id number]  Admission Date: 8/28/2019  Length of Stay: 4 days  Attending Physician: Ruddy Hsieh MD  Primary Care Provider: Susie Lazo MD    Isolation Status: No active isolations  Assessment/Plan:      * Toe osteomyelitis, right  Pt with right 5th digit osteomyelitis; now s/p amputation of 5th digit and partial 5th ray. Clean margins sent for pathology, no cultures. Prior bone culture negative. Pt has been on empiric vanc and rocephin. Anticipating discharge today. Pt is afebrile without leukocytosis. Pain controlled.    Plan:  1. D/c vancomycin and rocephin  2. Start doxycycline 100 mg PO BID  3. Recommend a minimum of 7 days from date of surgery  4. Will need to f/u on pathology of clean margins as outpatient (no clean margin cultures sent)  5. Okay to d/c home from ID standpoint; will make ID f/u as outpatient within 1 week    Anticipated Disposition: oral abx  Thank you for your consult. I will sign off. Please contact us if you have any additional questions.  ZULEMA Figueredo Pager: [phone]    Infectious Disease  [facility]    Subjective:     Principal Problem:Toe osteomyelitis, right    HPI: This is a 79 year old male with PMH of A. fib, DM2, CAD s/p stent (2011) and CHF that presented from podiatry clinic due to an ulcer on his 5th toe of R foot for the past 6 weeks. He has been seen by outpatient podiatry over this time and has been treated with course of oral antibiotics (amoxicillin and bactrim) with no improvement of symptoms. He developed new erythema of his R shin this morning , at which point his podiatrist referred him to the ED for workup. He is not currently on antibiotics. Pt denies fevers or chills. MRI was obtained which showed osteomyelitis of the 5th digit with edema associated with cellulitis. Pt was offered bone biopsy with IV antibiotics vs amputation  of the digit and chose to save the toe at this time. Bone biopsy was obtained on 8/23 in clinic and cultures are negative. Pt currently on IV vanc and zosyn.    He is afebrile without leukocytosis. ID is consulted for antibiotic management.      Interval History: pt s/p 5th toe and partial 5th ray amputation. Doing well. Clean margins sent for path. Pt anticipating discharge home today.     Review of Systems   Constitutional: Negative for chills and fever.   Respiratory: Negative for cough and shortness of breath.    Cardiovascular: Negative for chest pain and leg swelling.   Gastrointestinal: Negative for abdominal pain, diarrhea, nausea and vomiting.   Skin: Positive for wound. Negative for rash.   Psychiatric/Behavioral: Negative for behavioral problems. The patient is not nervous/anxious.      Objective:     Vital Signs (Most Recent):  Temp: 97.5 °F (36.4 °C) (09/01/19 0719)  Pulse: (!) 58 (09/01/19 0719)  Resp: 18 (09/01/19 0719)  BP: 139/79 (09/01/19 0719)  SpO2: (!) 92 % (09/01/19 0719) Vital Signs (24h Range):  Temp:  [97.5 °F (36.4 °C)-98 °F (36.7 °C)] 97.5 °F (36.4 °C)  Pulse:  [52-61] 58  Resp:  [12-20] 18  SpO2:  [92 %-98 %] 92 %  BP: (123-143)/(65-79) 139/79     Weight: 79.4 kg (175 lb)  Body mass index is 24.41 kg/m².    Estimated Creatinine Clearance: 49.9 mL/min (based on SCr of 1.3 mg/dL).    Physical Exam   Constitutional: He is oriented to person, place, and time. He appears well-developed and well-nourished. No distress.   Cardiovascular: Normal rate and regular rhythm.   No murmur heard.  Pulmonary/Chest: Effort normal and breath sounds normal. No respiratory distress.   Abdominal: Soft. There is no tenderness.   Musculoskeletal: Normal range of motion. He exhibits no edema.   Post op dressing in place   Neurological: He is alert and oriented to person, place, and time.   Skin: Skin is warm and dry.   Psychiatric: He has a normal mood and affect. His behavior is normal.       Significant Labs:    Blood Culture:   Recent Labs   Lab 08/28/19  1623 08/28/19  1628   LABBLOO No Growth to date  No Growth to date  No Growth to date  No Growth to date No Growth to date  No Growth to date  No Growth to date  No Growth to date     CBC:   Recent Labs   Lab 08/31/19  0839   WBC 5.20   HGB 11.8*   HCT 37.0*   *     CMP:   Recent Labs   Lab 08/31/19  0839      K 4.4      CO2 26   *   BUN 19   CREATININE 1.3   CALCIUM 8.6*   ANIONGAP 6*   EGFRNONAA 52.3*     Wound Culture:   Recent Labs   Lab 08/07/19  1449 08/23/19  0804   LABAERO No significant isolate No growth       Significant Imaging: I have reviewed all pertinent imaging results/findings within the past 24 hours.

## 2019-09-01 NOTE — DISCHARGE SUMMARY
[facility] Hospital Medicine  Discharge Summary      Patient Name: Randy Camejo  MRN: [id number]  Admission Date: 8/28/2019  Hospital Length of Stay: 4 days  Discharge Date and Time:  09/01/2019 4:30 PM  Attending Physician: No att. providers found   Discharging Provider: Rodrigue Bhakta MD  Primary Care Provider: Susie Lazo MD  [facility] Medicine Team: [facility] 5 Rodrigue Bhakta MD    HPI:   Mr Camejo is a 80 yo male with Afib, DM2, CAD s/p stent (2011) and CHF that presented from podiatry clinic due to an ulcer on his 5th toe of R foot for the past 6 weeks. Reports that pain in R heel is sharp in nature and intermittent and occurs at both rest and with pressure but does not radiate. He denies any inciting event prior to occurrence. He has been seen by outpatient podiatry over this time and has been treated with course of ABx (amoxacillin and bactrim) with no improvement of symptoms. He developed new erythema of his R shin this morning , at which point his podiatrist referred him to the ED for workup. He is not currently on antibiotics. Denies fever, claudication, CP, SOB, chills, N/V, HA, LH.     Procedure(s) (LRB):  AMPUTATION, 5th TOE possible partial 5th ray amputation (Right)      Hospital Course:   Admitted for recurrent right 5th toe infection despite oral antibiotics now MRI showing osteomyelitis of the 5th toe on Vancomycin and Ceftriaxone. Toe amputation today, will decide on Abx regimen if no clear margins/depending on cultures. Stable on discharge with Abx regimen of po Doxycyline for 7 days per ID rec.      Interval History: No acute events overnight. Denies any SOB, fever, chills, dyspnea, sweating or pain at surgical site. Denies any claudication or  Abdominal/chest pain. States he feels well and is ready to go home.    Review of Systems   Constitutional: Negative for chills, diaphoresis and fever.   Respiratory: Negative for cough, shortness of breath and wheezing.     Cardiovascular: Negative for chest pain and palpitations.   Gastrointestinal: Negative for abdominal distention, abdominal pain, diarrhea, nausea and vomiting.   Genitourinary: Negative for dysuria.   Skin: Positive for wound.        Clean incision site from toe amputation   Neurological: Negative for dizziness, light-headedness and headaches.   Psychiatric/Behavioral: Negative for confusion and decreased concentration.     Objective:     Vital Signs (Most Recent):  Temp: 97.6 °F (36.4 °C) (09/01/19 1218)  Pulse: (!) 57 (09/01/19 1218)  Resp: 18 (09/01/19 1218)  BP: 125/72 (09/01/19 1218)  SpO2: (!) 94 % (09/01/19 1218) Vital Signs (24h Range):  Temp:  [97.5 °F (36.4 °C)-98 °F (36.7 °C)] 97.6 °F (36.4 °C)  Pulse:  [52-61] 57  Resp:  [12-20] 18  SpO2:  [92 %-97 %] 94 %  BP: (125-143)/(67-79) 125/72     Weight: 79.4 kg (175 lb)  Body mass index is 24.41 kg/m².  No intake or output data in the 24 hours ending 09/01/19 1637   Physical Exam   Constitutional: He is oriented to person, place, and time. He appears well-developed and well-nourished. No distress.   HENT:   Head: Normocephalic and atraumatic.   Eyes: Conjunctivae and EOM are normal. No scleral icterus.   Neck: Normal range of motion. Neck supple. No JVD present.   Cardiovascular: Normal rate, regular rhythm, normal heart sounds and intact distal pulses. Exam reveals no friction rub.   No murmur heard.  Pulmonary/Chest: Effort normal and breath sounds normal. No stridor. No respiratory distress. He has no wheezes.   Abdominal: Soft. Bowel sounds are normal. He exhibits no distension. There is no tenderness. There is no guarding.   Musculoskeletal: Normal range of motion.   Neurological: He is alert and oriented to person, place, and time. No cranial nerve deficit.   Skin: Skin is warm and dry. He is not diaphoretic.   Some minor erythema of r shin and clean incison site at amputation site of R 5th toe   Psychiatric: He has a normal mood and affect. His  behavior is normal. Judgment and thought content normal.       Significant Labs:   A1C:   Recent Labs   Lab 04/10/19  1003 06/05/19  1217   HGBA1C 8.4* 6.3*     Blood Culture: No results for input(s): LABBLOO in the last 48 hours.  BMP:   Recent Labs   Lab 08/31/19  0839   *      K 4.4      CO2 26   BUN 19   CREATININE 1.3   CALCIUM 8.6*     CBC:   Recent Labs   Lab 08/31/19  0839   WBC 5.20   HGB 11.8*   HCT 37.0*   *     CMP:   Recent Labs   Lab 08/31/19  0839      K 4.4      CO2 26   *   BUN 19   CREATININE 1.3   CALCIUM 8.6*   ANIONGAP 6*   EGFRNONAA 52.3*     Lactic Acid: No results for input(s): LACTATE in the last 48 hours.  Magnesium: No results for input(s): MG in the last 48 hours.  Pathology Results  (Last 10 years)    None        POCT Glucose:   Recent Labs   Lab 09/01/19  0523 09/01/19  0735 09/01/19  1115   POCTGLUCOSE 123* 106 182*     TSH:   Recent Labs   Lab 06/05/19  1217   TSH 2.914     Urine Culture: No results for input(s): LABURIN in the last 48 hours.  Recent Lab Results       09/01/19  1115   09/01/19  0735   09/01/19  0523   08/31/19  2039        POCT Glucose 182 106 123 128         All pertinent labs within the past 24 hours have been reviewed.    Significant Imaging:   Xray foot 8/31  Status post interval partial resection of the 5th ray distal to the mid shaft of the 5th metatarsal.  Several small soft tissue calcifications within the surgical bed at the level of the distal 4th metatarsal, nonspecific.  Few scattered subcutaneous gas foci in the surgical bed, likely related to recent surgery.  No radiodense retained metallic foreign body seen.    Remaining osseous structures appear stable without new displaced fracture or dislocation seen.  Please refer to operative/procedure report for further details    VAS JUAN  Rt JUAN (2.26) Segment/Brachial Index is unreliable due to suspected medial calcinosis, however; PVR waveforms are not suggestive of  significant peripheral arterial occlusive disease.    Lt JUAN (2.26) Segment/Brachial Index is unreliable due to suspected medial calcinosis, however; PVR waveforms are not suggestive of significant peripheral arterial occlusive disease.    MRI forefoot 8/28  Findings of osteomyelitis of the 5th proximal, middle and distal phalanges.    Diffuse skin edema could be seen with cellulitis.    Xray 8/28  Site of the patient's pain is not included in the history or indicated on the images provided.    As seen on the recent study of 08/22/2019 there is deformity of the distal aspect of the 5th metatarsal bone and the proximal phalanx of the little toe.  This may represent subacute or prior trauma.  However infection is not excluded in light of the swelling of overlying soft tissue.    There is mild hallux valgus, diffuse osteopenia, but preservation of Lisfranc articulation and talar dome.  No new abnormality detected.    There is abundant calcific atherosclerosis in arteries of the distal leg, not unusual in light of the patient's age of 79 years.            Consults:   Consults (From admission, onward)        Status Ordering Provider     Inpatient consult to Infectious Diseases  Once     Provider:  (Not yet assigned)    Completed BILL PALACIOS     Inpatient consult to Podiatry  Once     Provider:  (Not yet assigned)    Completed XI ALMAGUER          * Toe osteomyelitis, right  6 week history of toe ulcer prior to admission with intermittent pain. Treated with Abx outpatient with admission to hospital after development of possible cellulitis of shin.    MRI forefoot 8/28  Findings of osteomyelitis of the 5th proximal, middle and distal phalanges.  Diffuse skin edema could be seen with cellulitis.    Xray foot 8/28  Site of the patient's pain is not included in the history or indicated on the images provided.    Cultures from previous debridement negative thus far  Treated initially with Ceftriaxone/Vancomycin for  now  Toe amputation with clear margins on 8/31  Discharged with po doxycycline per ID recs    Cellulitis of right lower extremity  Improving  Discharge on po doxy per ID recs    Chronic anticoagulation  Continue home Eloquis dose on discharge    Heart failure, systolic, chronic  EF of 20%  Continue goal directed therapy with BB and ARBs      Type 2 diabetes mellitus with neurologic complication, with long-term current use of insulin  -Well controlled, recent A1c 6.3  15 units of Detemir, and Aspart 3U TIDWM  Detemir 7U qhs for now, with low dose SSI  Glucose with in goal 140's      Discharge patient with home regimen      Atrial fibrillation    Continue Coreg and amiodarone and eloquis after discharge        Final Active Diagnoses:    Diagnosis Date Noted POA    PRINCIPAL PROBLEM:  Toe osteomyelitis, right [M86.9] 08/28/2019 Yes    Cellulitis of right lower extremity [L03.115]  Yes    Essential hypertension [I10] 03/22/2019 Yes    Chronic anticoagulation [Z79.01] 02/25/2019 Not Applicable    Coronary artery disease [I25.10] 02/25/2019 Yes    Heart failure, systolic, chronic [I50.22] 02/21/2019 Yes    Atrial fibrillation [I48.91] 02/19/2019 Yes    Type 2 diabetes mellitus with neurologic complication, with long-term current use of insulin [E11.49, Z79.4] 02/19/2019 Not Applicable      Problems Resolved During this Admission:       Discharged Condition: good    Disposition: Home or Self Care    Follow Up:  Follow-up Information     [facility]    Specialty:  Infusion Provider  Contact information:  [address] [place] [id number] [phone]                 Patient Instructions:   No discharge procedures on file.    Significant Diagnostic Studies:   Labs:   BMP:   Recent Labs   Lab 08/31/19  0839   *      K 4.4      CO2 26   BUN 19   CREATININE 1.3   CALCIUM 8.6*   , CMP   Recent Labs   Lab 08/31/19  0839      K 4.4      CO2 26   *   BUN 19    CREATININE 1.3   CALCIUM 8.6*   ANIONGAP 6*   ESTGFRAFRICA >60.0   EGFRNONAA 52.3*   , CBC   Recent Labs   Lab 08/31/19  0839   WBC 5.20   HGB 11.8*   HCT 37.0*   *   , INR   Lab Results   Component Value Date    INR 1.3 (H) 08/29/2019    INR 1.2 02/20/2019   , A1C:   Recent Labs   Lab 04/10/19  1003 06/05/19  1217   HGBA1C 8.4* 6.3*    and All labs within the past 24 hours have been reviewed  Microbiology:   Blood Culture   Lab Results   Component Value Date    LABBLOO No Growth to date 08/28/2019    LABBLOO No Growth to date 08/28/2019    LABBLOO No Growth to date 08/28/2019    LABBLOO No Growth to date 08/28/2019   , Sputum Culture No results found for: GSRESP, RESPIRATORYC   Specimen (12h ago, onward)    None          Pending Diagnostic Studies:     None         Medications:  Reconciled Home Medications:      Medication List      START taking these medications    acetaminophen 325 MG tablet  Commonly known as:  TYLENOL  Take 2 tablets (650 mg total) by mouth every 4 (four) hours as needed.     * doxycycline 100 MG tablet  Commonly known as:  VIBRA-TABS  Take 1 tablet (100 mg total) by mouth 2 (two) times daily. for 7 days     * doxycycline 100 MG tablet  Commonly known as:  VIBRA-TABS  Take 1 tablet (100 mg total) by mouth every 12 (twelve) hours.         * This list has 2 medication(s) that are the same as other medications prescribed for you. Read the directions carefully, and ask your doctor or other care provider to review them with you.            CHANGE how you take these medications    apixaban 5 mg Tab  Commonly known as:  ELIQUIS  Take 2 tablets (10 mg total) by mouth 2 (two) times daily.  What changed:  how much to take        CONTINUE taking these medications    amiodarone 200 MG Tab  Commonly known as:  PACERONE  Take 1 tablet (200 mg total) by mouth once daily.     atorvastatin 20 MG tablet  Commonly known as:  LIPITOR  Take 1 tablet (20 mg total) by mouth once daily.     blood sugar  "diagnostic Strp  Use to check blood glucose four times daily, to use with insurance preferred meter     carvedilol 6.25 MG tablet  Commonly known as:  COREG  Take 1 tablet (6.25 mg total) by mouth 2 (two) times daily.     furosemide 20 MG tablet  Commonly known as:  LASIX  Take 1 tablet (20 mg total) by mouth once daily.     lancets 30 gauge Misc  TEST FOUR TIMES DAILY     LEVEMIR FLEXTOUCH U-100 INSULN 100 unit/mL (3 mL) Inpn pen  Generic drug:  insulin detemir U-100  Inject 15 Units into the skin every evening.     levothyroxine 50 MCG tablet  Commonly known as:  SYNTHROID  Take 1 tablet (50 mcg total) by mouth once daily.     losartan 50 MG tablet  Commonly known as:  COZAAR  Take 1 tablet (50 mg total) by mouth once daily.     NovoLOG Flexpen U-100 Insulin 100 unit/mL (3 mL) Inpn pen  Generic drug:  insulin aspart U-100  Inject 3 Units into the skin 3 (three) times daily.     pen needle, diabetic 31 gauge x 5/16" Ndle  1 Device by Misc.(Non-Drug; Combo Route) route 4 (four) times daily. Use with insulin        STOP taking these medications    amoxicillin-clavulanate 875-125mg 875-125 mg per tablet  Commonly known as:  AUGMENTIN            Indwelling Lines/Drains at time of discharge:   Lines/Drains/Airways          None          Time spent on the discharge of patient: 35 minutes  Patient was seen and examined on the date of discharge and determined to be suitable for discharge.         Rodrigue Bhakta MD  Department of Hospital Medicine  [facility]  "

## 2019-09-01 NOTE — SUBJECTIVE & OBJECTIVE
Interval Hx:  Patient Seen at bedside for dressing change.  Patient denies F/C/N/V.  Dressings clean, dry, and intact. After speaking with patient yesterday, we planned for bone biopsy. His daughter came to clinic and discussed his treatment options and expressed desire for amputation. We advised the family to discussed amongst themselves and decide on a plan of care.    8/31: Patient was taken to OR today for right foot 5th partial ray amputation. Patient tolerated the procedure well.     9/1: Patient seen at bedside today POD#1 s/p right foot 5th partial ray amputation (DOS:8/31/2019). Patient in no acute distress. Reports no pain and not needing any pain medication last night. Dressings to right foot remain c/d/i. No new complaints. Denies N/V/F/C.     Scheduled Meds:   amiodarone  200 mg Oral Daily    atorvastatin  20 mg Oral Daily    carvedilol  6.25 mg Oral BID    cefTRIAXone (ROCEPHIN) IVPB  2 g Intravenous Q24H    insulin detemir U-100  7 Units Subcutaneous QHS    levothyroxine  50 mcg Oral Before breakfast    vancomycin (VANCOCIN) IVPB  1,500 mg Intravenous Q24H     Continuous Infusions:    PRN Meds:acetaminophen, Dextrose 10% Bolus, Dextrose 10% Bolus, glucagon (human recombinant), glucose, glucose, HYDROcodone-acetaminophen, insulin aspart U-100, ondansetron, sodium chloride 0.9%, sodium chloride 0.9%, sodium chloride 0.9%    Review of patient's allergies indicates:  No Known Allergies     Past Medical History:   Diagnosis Date    Asthma     childhood    Atrial fibrillation     CHF (congestive heart failure)     Chronic anticoagulation 2/25/2019    Coronary artery disease     Coronary artery disease 2/25/2019    Diabetes mellitus, type 2     Heart attack     stent    High risk medication use 3/22/2019    2/19/2019: Began amiodarone.    History of coronary artery stent placement 3/22/2019    2000: Concepcion: MI and stent.    History of myocardial infarction 3/22/2019    Marisol: Concepcion: MI and  stent.   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 Beavers    CATARACT EXTRACTION W/  INTRAOCULAR LENS IMPLANT Left 01/08/2018    Dr. Beavers    ECHOCARDIOGRAM,TRANSESOPHAGEAL N/A 2/25/2019    Performed by [facility] CATH LAB    ECHOCARDIOGRAM,TRANSESOPHAGEAL N/A 2/21/2019    Performed by P[facility] CATH LAB    INSERTION-INTRAOCULAR LENS (IOL) Left 1/8/2018    Performed by Milo Beavers [facility] at [facility] OR    INSERTION-INTRAOCULAR LENS (IOL) Right 12/18/2017    [facility] OR    PHACOEMULSIFICATION-ASPIRATION-CATARACT Left 1/8/2018    Performed [facility] OR    PHACOEMULSIFICATION-ASPIRATION-CATARACT Right 12/18/2017    Per[facility] by [facility] OR    TONSILLECTOMY         Family History     Problem Relation (Age of Onset)    Cancer Father    Cataracts Father    Diabetes Mother    Stroke Mother        Tobacco Use    Smoking status: Never Smoker    Smokeless tobacco: Never Used   Substance and Sexual Activity    Alcohol use: No    Drug use: No    Sexual activity: Not on file     Review of Systems   Constitutional: Negative for chills and fever.   Gastrointestinal: Negative for nausea and vomiting.   Skin: Positive for wound.   Neurological: Positive for syncope and headaches.     Objective:     Vital Signs (Most Recent):  Temp: 97.5 °F (36.4 °C) (09/01/19 0719)  Pulse: (!) 58 (09/01/19 0719)  Resp: 18 (09/01/19 0719)  BP: 139/79 (09/01/19 0719)  SpO2: (!) 92 % (09/01/19 0719) Vital Signs (24h Range):  Temp:  [97.5 °F  (36.4 °C)-98.6 °F (37 °C)] 97.5 °F (36.4 °C)  Pulse:  [52-64] 58  Resp:  [12-20] 18  SpO2:  [92 %-98 %] 92 %  BP: (123-143)/(65-79) 139/79     Weight: 79.4 kg (175 lb)  Body mass index is 24.41 kg/m².    Foot Exam    Right Foot/Ankle     Inspection and Palpation  Ecchymosis: none  Tenderness: none   Swelling: (Minor LE edema)  Skin Exam: ulcer;     Neurovascular  Dorsalis pedis: 2+  Posterior tibial: 2+  Saphenous nerve sensation: diminished  Tibial nerve sensation: diminished  Superficial peroneal nerve sensation: diminished  Deep peroneal nerve sensation: diminished  Sural nerve sensation: diminished      Left Foot/Ankle      Inspection and Palpation  Ecchymosis: none  Tenderness: none   Swelling: (Minor LE edema)    Neurovascular  Dorsalis pedis: 2+  Posterior tibial: 2+  Saphenous nerve sensation: diminished  Tibial nerve sensation: diminished  Superficial peroneal nerve sensation: diminished  Deep peroneal nerve sensation: diminished  Sural nerve sensation: diminished            Laboratory:  CBC:   Recent Labs   Lab 08/31/19  0839   WBC 5.20   RBC 3.73*   HGB 11.8*   HCT 37.0*   *   MCV 99*   MCH 31.6*   MCHC 31.9*     CMP:   Recent Labs   Lab 08/30/19  0324 08/31/19  0839   GLU 99 112*   CALCIUM 8.4* 8.6*   ALBUMIN 3.1*  --    PROT 6.7  --     139   K 3.9 4.4   CO2 25 26    107   BUN 25* 19   CREATININE 1.4 1.3   ALKPHOS 73  --    ALT 29  --    AST 23  --    BILITOT 0.9  --        Diagnostic Results:        Clinical Findings:  S/p partial 5th ray amputation with primary closure (DOS: 8/31/2019)          8/30

## 2019-09-01 NOTE — ASSESSMENT & PLAN NOTE
-Well controlled, recent A1c 6.3  15 units of Detemir, and Aspart 3U TIDWM  Detemir 7U qhs for now, with low dose SSI  Glucose with in goal 140's      Discharge patient with home regimen

## 2019-09-01 NOTE — PROGRESS NOTES
[facility]  Podiatry  Progress Note    Patient Name: Randy Camejo  MRN: [id number]  Admission Date: 8/28/2019  Hospital Length of Stay: 4 days  Attending Physician: Ruddy Hsieh MD  Primary Care Provider: Susie Lazo MD   Interval Hx:  Patient Seen at bedside for dressing change.  Patient denies F/C/N/V.  Dressings clean, dry, and intact. After speaking with patient yesterday, we planned for bone biopsy. His daughter came to clinic and discussed his treatment options and expressed desire for amputation. We advised the family to discussed amongst themselves and decide on a plan of care.    8/31: Patient was taken to OR today for right foot 5th partial ray amputation. Patient tolerated the procedure well.     9/1: Patient seen at bedside today POD#1 s/p right foot 5th partial ray amputation (DOS:8/31/2019). Patient in no acute distress. Reports no pain and not needing any pain medication last night. Dressings to right foot remain c/d/i. No new complaints. Denies N/V/F/C.     Scheduled Meds:   amiodarone  200 mg Oral Daily    atorvastatin  20 mg Oral Daily    carvedilol  6.25 mg Oral BID    cefTRIAXone (ROCEPHIN) IVPB  2 g Intravenous Q24H    insulin detemir U-100  7 Units Subcutaneous QHS    levothyroxine  50 mcg Oral Before breakfast    vancomycin (VANCOCIN) IVPB  1,500 mg Intravenous Q24H     Continuous Infusions:    PRN Meds:acetaminophen, Dextrose 10% Bolus, Dextrose 10% Bolus, glucagon (human recombinant), glucose, glucose, HYDROcodone-acetaminophen, insulin aspart U-100, ondansetron, sodium chloride 0.9%, sodium chloride 0.9%, sodium chloride 0.9%    Review of patient's allergies indicates:  No Known Allergies     Past Medical History:   Diagnosis Date    Asthma     childhood    Atrial fibrillation     CHF (congestive heart failure)     Chronic anticoagulation 2/25/2019    Coronary artery disease     Coronary artery disease 2/25/2019    Diabetes mellitus, type 2      Heart attack     stent    High risk medication use 3/22/2019    2/19/2019: Began amiodarone.    History of coronary artery stent placement 3/22/2019    2000: Con[facility]ion: MI and stent.    History of myocardial infarction 3/22/2019    2000: Con[facility]pcion: MI and stent.   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 Beavers    CATARACT EXTRACTION W/  INTRAOCULAR LENS IMPLANT Left 01/08/2018    Dr. Beavers    ECHOCARDIOGRAM,TRANSESOPHAGEAL N/A 2/25/2019    Performed by Panchito Joseph [facility] at [facility] CATH LAB    ECHOCARDIOGRAM,TRANSESOPHAGEAL N/A 2/21/2019    Performed by Panchito Joseph [facility] at [facility] CATH LAB    INSERTION-INTRAOCULAR LENS (IOL) Left 1/8/2018    Performed by Milo Beavers MD at [facility] OR    INSERTION-INTRAOCULAR LENS (IOL) Right 12/18/2017    Performed by [facility]o Beavers MD at [facility] OR    PHACOEMULSIFICATION-ASPIRATION-CATARACT Left 1/8/2018    Performed by [facility]o Beavers [facility] at [facility] OR    PHACOEMULSIFICATION-ASPIRATION-CATARACT Right 12/18/2017    Performed by [facility]o Beavers [facility] at [facility] OR    TONSILLECTOMY         Family History     Problem Relation (Age of Onset)    Cancer Father    Cataracts Father    Diabetes Mother    Stroke Mother        Tobacco Use    Smoking status: Never Smoker    Smokeless tobacco: Never Used   Substance and Sexual Activity    Alcohol use: No    Drug use: No    Sexual activity: Not on file     Review of Systems   Constitutional: Negative for chills and fever.   Gastrointestinal: Negative for nausea and vomiting.   Skin: Positive for wound.   Neurological: Positive for syncope and  headaches.     Objective:     Vital Signs (Most Recent):  Temp: 97.5 °F (36.4 °C) (09/01/19 0719)  Pulse: (!) 58 (09/01/19 0719)  Resp: 18 (09/01/19 0719)  BP: 139/79 (09/01/19 0719)  SpO2: (!) 92 % (09/01/19 0719) Vital Signs (24h Range):  Temp:  [97.5 °F (36.4 °C)-98.6 °F (37 °C)] 97.5 °F (36.4 °C)  Pulse:  [52-64] 58  Resp:  [12-20] 18  SpO2:  [92 %-98 %] 92 %  BP: (123-143)/(65-79) 139/79     Weight: 79.4 kg (175 lb)  Body mass index is 24.41 kg/m².      Laboratory:  CBC:   Recent Labs   Lab 08/31/19  0839   WBC 5.20   RBC 3.73*   HGB 11.8*   HCT 37.0*   *   MCV 99*   MCH 31.6*   MCHC 31.9*     CMP:   Recent Labs   Lab 08/30/19  0324 08/31/19  0839   GLU 99 112*   CALCIUM 8.4* 8.6*   ALBUMIN 3.1*  --    PROT 6.7  --     139   K 3.9 4.4   CO2 25 26    107   BUN 25* 19   CREATININE 1.4 1.3   ALKPHOS 73  --    ALT 29  --    AST 23  --    BILITOT 0.9  --        Diagnostic Results:        Clinical Findings:  S/p partial 5th ray amputation with primary closure (DOS: 8/31/2019)          8/30                  Assessment/Plan:     * Toe osteomyelitis, right  79 y.o male with multiple comorbidities presents with right 5th toe osteomyelitis. Patient was taken to the OR (8/31/2019) for partial 5th ray amputation with primary closure right foot. Clean margins from the 5th metatarsal were obtained in the OR 8/31 and sent to path    Plan:  -POD#1  -Patient to remain partial weight bearing to heel only for short transfers right foot in post op shoe until follow up with podiatry  -Recommend Vascular consult to evaluate blood flow in the lower extremities either as inpatient or outpatient.  -IV abx per ID reccs  -From podiatry standpoint, patient is stable for discharge home.     DC Instructions:  Patient is to follow up with podiatry within 10 days of discharge.  Call [phone]  to make an appointment.  Keep dressing to right foot clean, dry, and intact until follow up visit. Remain partial weight bearing  to heel only for short transfers right foot.         Essential hypertension  Managed per hospital medicine    Coronary artery disease  Managed per hospital medicine    Heart failure, systolic, chronic  Managed per hospital medicine    Type 2 diabetes mellitus with neurologic complication, with long-term current use of insulin  Managed per hospital medicine    Atrial fibrillation  Managed per hospital medicine      Liz Garcia MD  Podiatry  [facility]

## 2019-09-01 NOTE — SUBJECTIVE & OBJECTIVE
Interval History: No acute events overnight. Patient denies any pain at surgical site from toe amputation. States he feels well and wants to go home. He denies any fever, chills, N/V, cough, SOB, abdominal pain, swelling or claudication.     Review of Systems   Constitutional: Negative for chills, diaphoresis and fever.   Respiratory: Negative for cough, shortness of breath and wheezing.    Cardiovascular: Negative for chest pain and palpitations.   Gastrointestinal: Negative for abdominal distention, abdominal pain, constipation, diarrhea, nausea and vomiting.   Genitourinary: Negative for dysuria, flank pain and hematuria.   Skin: Negative for pallor and rash.        S/p R (5th) toe amputation with clean surgical incision and closure    Neurological: Negative for dizziness, light-headedness and headaches.   Psychiatric/Behavioral: Negative for confusion and decreased concentration.     Objective:     Vital Signs (Most Recent):  Temp: 97.6 °F (36.4 °C) (09/01/19 1218)  Pulse: (!) 57 (09/01/19 1218)  Resp: 18 (09/01/19 1218)  BP: 125/72 (09/01/19 1218)  SpO2: (!) 94 % (09/01/19 1218) Vital Signs (24h Range):  Temp:  [97.5 °F (36.4 °C)-98 °F (36.7 °C)] 97.6 °F (36.4 °C)  Pulse:  [52-61] 57  Resp:  [12-20] 18  SpO2:  [92 %-97 %] 94 %  BP: (125-143)/(67-79) 125/72     Weight: 79.4 kg (175 lb)  Body mass index is 24.41 kg/m².  No intake or output data in the 24 hours ending 09/01/19 1605   Physical Exam   Constitutional: He is oriented to person, place, and time. He appears well-developed and well-nourished. No distress.   HENT:   Head: Normocephalic and atraumatic.   Eyes: Conjunctivae and EOM are normal. No scleral icterus.   Neck: Normal range of motion. Neck supple. No JVD present.   Cardiovascular: Normal rate, regular rhythm, normal heart sounds and intact distal pulses.   No murmur heard.  Pulmonary/Chest: Effort normal and breath sounds normal. No stridor. He has no wheezes.   Abdominal: Soft. Bowel sounds are  normal. He exhibits no distension. There is no tenderness. There is no guarding.   Musculoskeletal: Normal range of motion.   Neurological: He is alert and oriented to person, place, and time. No cranial nerve deficit.   Skin: Skin is warm and dry. He is not diaphoretic.   Clean surgical incision site from R 5th toe amputation   Psychiatric: He has a normal mood and affect. His behavior is normal. Judgment and thought content normal.       Significant Labs:   A1C:   Recent Labs   Lab 04/10/19  1003 06/05/19  1217   HGBA1C 8.4* 6.3*     BMP:   Recent Labs   Lab 08/31/19  0839   *      K 4.4      CO2 26   BUN 19   CREATININE 1.3   CALCIUM 8.6*     CBC:   Recent Labs   Lab 08/31/19  0839   WBC 5.20   HGB 11.8*   HCT 37.0*   *     CMP:   Recent Labs   Lab 08/31/19  0839      K 4.4      CO2 26   *   BUN 19   CREATININE 1.3   CALCIUM 8.6*   ANIONGAP 6*   EGFRNONAA 52.3*     Lactic Acid: No results for input(s): LACTATE in the last 48 hours.  Pathology Results  (Last 10 years)    None        Urine Culture: No results for input(s): LABURIN in the last 48 hours.  Urine Studies: No results for input(s): COLORU, APPEARANCEUA, PHUR, SPECGRAV, PROTEINUA, GLUCUA, KETONESU, BILIRUBINUA, OCCULTUA, NITRITE, UROBILINOGEN, LEUKOCYTESUR, RBCUA, WBCUA, BACTERIA, SQUAMEPITHEL, HYALINECASTS in the last 48 hours.    Invalid input(s): WRIGHTSUR  Recent Lab Results       09/01/19  1115   09/01/19  0735   09/01/19  0523   08/31/19  2039        POCT Glucose 182 106 123 128         All pertinent labs within the past 24 hours have been reviewed.    Significant Imaging:   Xray foot 8/31  Status post interval partial resection of the 5th ray distal to the mid shaft of the 5th metatarsal.  Several small soft tissue calcifications within the surgical bed at the level of the distal 4th metatarsal, nonspecific.  Few scattered subcutaneous gas foci in the surgical bed, likely related to recent surgery.  No  radiodense retained metallic foreign body seen.    Remaining osseous structures appear stable without new displaced fracture or dislocation seen.  Please refer to operative/procedure report for further details    VAS JUAN 8/29  Rt JUAN (2.26) Segment/Brachial Index is unreliable due to suspected medial calcinosis, however; PVR waveforms are not suggestive of significant peripheral arterial occlusive disease.    Lt JUAN (2.26) Segment/Brachial Index is unreliable due to suspected medial calcinosis, however; PVR waveforms are not suggestive of significant peripheral arterial occlusive disease.    MRI forefoot 8/28  Findings of osteomyelitis of the 5th proximal, middle and distal phalanges.  Diffuse skin edema could be seen with cellulitis.    Xray foot 8/28  Site of the patient's pain is not included in the history or indicated on the images provided.    As seen on the recent study of 08/22/2019 there is deformity of the distal aspect of the 5th metatarsal bone and the proximal phalanx of the little toe.  This may represent subacute or prior trauma.  However infection is not excluded in light of the swelling of overlying soft tissue.    There is mild hallux valgus, diffuse osteopenia, but preservation of Lisfranc articulation and talar dome.  No new abnormality detected.    There is abundant calcific atherosclerosis in arteries of the distal leg, not unusual in light of the patient's age of 79 years.

## 2019-09-01 NOTE — PROGRESS NOTES
Eloquis dosing was adjusted for discharge. Patient and pharmacy was notified that Eloquis should be taken as 5mg po BID for 10mg total every day.

## 2019-09-01 NOTE — ASSESSMENT & PLAN NOTE
79 y.o male with multiple comorbidities presents with right 5th toe osteomyelitis. Patient was taken to the OR (8/31/2019) for partial 5th ray amputation with primary closure right foot. Clean margins from the 5th metatarsal were obtained in the OR 8/31 and sent to path    Plan:  -POD#1  -Patient to remain partial weight bearing to heel only for short transfers right foot in post op shoe until follow up with podiatry  -Recommend Vascular consult to evaluate blood flow in the lower extremities either as inpatient or outpatient.  -IV abx per ID reccs  -From podiatry standpoint, patient is stable for discharge home.     DC Instructions:  Patient is to follow up with podiatry within 10 days of discharge.  Call [phone]  to make an appointment.  Keep dressing to right foot clean, dry, and intact until follow up visit. Remain partial weight bearing to heel only for short transfers right foot.

## 2019-09-01 NOTE — SUBJECTIVE & OBJECTIVE
Interval History: No acute events overnight. Denies any SOB, fever, chills, dyspnea, sweating or pain at surgical site. Denies any claudication or  Abdominal/chest pain. States he feels well and is ready to go home.    Review of Systems   Constitutional: Negative for chills, diaphoresis and fever.   Respiratory: Negative for cough, shortness of breath and wheezing.    Cardiovascular: Negative for chest pain and palpitations.   Gastrointestinal: Negative for abdominal distention, abdominal pain, diarrhea, nausea and vomiting.   Genitourinary: Negative for dysuria.   Skin: Positive for wound.        Clean incision site from toe amputation   Neurological: Negative for dizziness, light-headedness and headaches.   Psychiatric/Behavioral: Negative for confusion and decreased concentration.     Objective:     Vital Signs (Most Recent):  Temp: 97.6 °F (36.4 °C) (09/01/19 1218)  Pulse: (!) 57 (09/01/19 1218)  Resp: 18 (09/01/19 1218)  BP: 125/72 (09/01/19 1218)  SpO2: (!) 94 % (09/01/19 1218) Vital Signs (24h Range):  Temp:  [97.5 °F (36.4 °C)-98 °F (36.7 °C)] 97.6 °F (36.4 °C)  Pulse:  [52-61] 57  Resp:  [12-20] 18  SpO2:  [92 %-97 %] 94 %  BP: (125-143)/(67-79) 125/72     Weight: 79.4 kg (175 lb)  Body mass index is 24.41 kg/m².  No intake or output data in the 24 hours ending 09/01/19 1637   Physical Exam   Constitutional: He is oriented to person, place, and time. He appears well-developed and well-nourished. No distress.   HENT:   Head: Normocephalic and atraumatic.   Eyes: Conjunctivae and EOM are normal. No scleral icterus.   Neck: Normal range of motion. Neck supple. No JVD present.   Cardiovascular: Normal rate, regular rhythm, normal heart sounds and intact distal pulses. Exam reveals no friction rub.   No murmur heard.  Pulmonary/Chest: Effort normal and breath sounds normal. No stridor. No respiratory distress. He has no wheezes.   Abdominal: Soft. Bowel sounds are normal. He exhibits no distension. There is no  tenderness. There is no guarding.   Musculoskeletal: Normal range of motion.   Neurological: He is alert and oriented to person, place, and time. No cranial nerve deficit.   Skin: Skin is warm and dry. He is not diaphoretic.   Some minor erythema of r shin and clean incison site at amputation site of R 5th toe   Psychiatric: He has a normal mood and affect. His behavior is normal. Judgment and thought content normal.       Significant Labs:   A1C:   Recent Labs   Lab 04/10/19  1003 06/05/19  1217   HGBA1C 8.4* 6.3*     Blood Culture: No results for input(s): LABBLOO in the last 48 hours.  BMP:   Recent Labs   Lab 08/31/19  0839   *      K 4.4      CO2 26   BUN 19   CREATININE 1.3   CALCIUM 8.6*     CBC:   Recent Labs   Lab 08/31/19  0839   WBC 5.20   HGB 11.8*   HCT 37.0*   *     CMP:   Recent Labs   Lab 08/31/19  0839      K 4.4      CO2 26   *   BUN 19   CREATININE 1.3   CALCIUM 8.6*   ANIONGAP 6*   EGFRNONAA 52.3*     Lactic Acid: No results for input(s): LACTATE in the last 48 hours.  Magnesium: No results for input(s): MG in the last 48 hours.  Pathology Results  (Last 10 years)    None        POCT Glucose:   Recent Labs   Lab 09/01/19  0523 09/01/19  0735 09/01/19  1115   POCTGLUCOSE 123* 106 182*     TSH:   Recent Labs   Lab 06/05/19  1217   TSH 2.914     Urine Culture: No results for input(s): LABURIN in the last 48 hours.  Recent Lab Results       09/01/19  1115   09/01/19  0735   09/01/19  0523   08/31/19  2039        POCT Glucose 182 106 123 128         All pertinent labs within the past 24 hours have been reviewed.    Significant Imaging:   Xray foot 8/31  Status post interval partial resection of the 5th ray distal to the mid shaft of the 5th metatarsal.  Several small soft tissue calcifications within the surgical bed at the level of the distal 4th metatarsal, nonspecific.  Few scattered subcutaneous gas foci in the surgical bed, likely related to recent  surgery.  No radiodense retained metallic foreign body seen.    Remaining osseous structures appear stable without new displaced fracture or dislocation seen.  Please refer to operative/procedure report for further details    VAS JUAN  Rt JUAN (2.26) Segment/Brachial Index is unreliable due to suspected medial calcinosis, however; PVR waveforms are not suggestive of significant peripheral arterial occlusive disease.    Lt JUAN (2.26) Segment/Brachial Index is unreliable due to suspected medial calcinosis, however; PVR waveforms are not suggestive of significant peripheral arterial occlusive disease.    MRI forefoot 8/28  Findings of osteomyelitis of the 5th proximal, middle and distal phalanges.    Diffuse skin edema could be seen with cellulitis.    Xray 8/28  Site of the patient's pain is not included in the history or indicated on the images provided.    As seen on the recent study of 08/22/2019 there is deformity of the distal aspect of the 5th metatarsal bone and the proximal phalanx of the little toe.  This may represent subacute or prior trauma.  However infection is not excluded in light of the swelling of overlying soft tissue.    There is mild hallux valgus, diffuse osteopenia, but preservation of Lisfranc articulation and talar dome.  No new abnormality detected.    There is abundant calcific atherosclerosis in arteries of the distal leg, not unusual in light of the patient's age of 79 years.

## 2019-09-02 LAB
BACTERIA BLD CULT: NORMAL
BACTERIA BLD CULT: NORMAL

## 2019-09-03 ENCOUNTER — TELEPHONE (OUTPATIENT)
Dept: PODIATRY | Facility: CLINIC | Age: 79
End: 2019-09-03

## 2019-09-03 ENCOUNTER — TELEPHONE (OUTPATIENT)
Dept: INFECTIOUS DISEASES | Facility: CLINIC | Age: 79
End: 2019-09-03

## 2019-09-03 NOTE — ANESTHESIA POSTPROCEDURE EVALUATION
Anesthesia Post Evaluation    Patient: Randy Camejo    Procedure(s) Performed: Procedure(s) (LRB):  AMPUTATION, 5th TOE possible partial 5th ray amputation (Right)    Final Anesthesia Type: general  Patient location during evaluation: PACU  Patient participation: Yes- Able to Participate  Level of consciousness: awake and alert  Post-procedure vital signs: reviewed and stable  Pain management: adequate  Airway patency: patent  PONV status at discharge: No PONV  Anesthetic complications: no      Cardiovascular status: blood pressure returned to baseline  Respiratory status: unassisted  Hydration status: euvolemic  Follow-up not needed.          Vitals Value Taken Time   /72 9/1/2019 12:18 PM   Temp 36.4 °C (97.6 °F) 9/1/2019 12:18 PM   Pulse 57 9/1/2019 12:18 PM   Resp 18 9/1/2019 12:18 PM   SpO2 94 % 9/1/2019 12:18 PM         Event Time     Out of Recovery 08/31/2019 11:45:00          Pain/Kaylin Score: No data recorded

## 2019-09-03 NOTE — TELEPHONE ENCOUNTER
Nurse spoke with daughter and made post op appt for Monday with Dr. Winter who will address all further issues or concerns. Per MD Bernice is instructed to keep pt off of foot as much as possible.      ----- Message from Ifrah Jonas sent at 9/3/2019 12:29 PM CDT -----  Contact: Bernice- Daughter  To schedule post-op and has questions.   [phone]

## 2019-09-03 NOTE — TELEPHONE ENCOUNTER
----- Message from Hunter Hull MD sent at 9/3/2019  1:08 PM CDT -----  Please make an appointment for the above patient with Dr. Talley within the next 10 days for post op amputaiton.    Patient was seen as inpatient by podiatry and was recently discharged.     Thanks!    Hunter Hull

## 2019-09-04 NOTE — PLAN OF CARE
Patient discharged home on 9/1/19 to care of family with [facility].     09/04/19 0732   Final Note   Assessment Type Final Discharge Note   Anticipated Discharge Disposition Home   What phone number can be called within the next 1-3 days to see how you are doing after discharge?   ([phone])   Hospital Follow Up  Appt(s) scheduled? Yes   Discharge plans and expectations educations in teach back method with documentation complete? Yes   Right Care Referral Info   Post Acute Recommendation Other   Referral Type Home Infusion   Facility Name [facility]

## 2019-09-05 NOTE — TELEPHONE ENCOUNTER
----- Message from Caitlin Parra sent at 9/5/2019 11:21 AM CDT -----  Contact: Pts wife Jade Cell# [phone]  Patient is returning a phone call.  Who left a message for the patient: Avril Tamayo MD   Does patient know what this is regarding:  Avril Tamayo MD   Comments: Patient's wife Mrs. Hansen said that her  received a call on today and he would like a call back please.

## 2019-09-05 NOTE — TELEPHONE ENCOUNTER
----- Message from Whit Flaherty sent at 9/5/2019  9:34 AM CDT -----  Contact: wife/ Jade [phone]  Patient is scheduled to see you next week and has run out of Levamir but still has Novalog pens. You mentioned the possibility of taking patient off of insulin and starting him on pills instead. Patient doesn't want to invest in an entire box of levamir pens if you will be discontinuing them. What should he do ? Blood sugar levels have been good. Wife said that he was recently in the hospital if you want to check his lab results .

## 2019-09-05 NOTE — TELEPHONE ENCOUNTER
Patient is in agreement to continue Levemir.  Please send refill to [facility].

## 2019-09-05 NOTE — TELEPHONE ENCOUNTER
Her last note states that she would consider stopping novolog but continuing levemir so I recommend he continue the levemir. I can send prescription if he agrees.

## 2019-09-06 ENCOUNTER — TELEPHONE (OUTPATIENT)
Dept: PODIATRY | Facility: CLINIC | Age: 79
End: 2019-09-06

## 2019-09-06 ENCOUNTER — HOSPITAL ENCOUNTER (OUTPATIENT)
Dept: VASCULAR SURGERY | Facility: CLINIC | Age: 79
Discharge: HOME | End: 2019-09-06
Attending: NURSE PRACTITIONER
Payer: MEDICARE

## 2019-09-06 DIAGNOSIS — I73.9 PERIPHERAL VASCULAR DISEASE: ICD-10-CM

## 2019-09-06 NOTE — TELEPHONE ENCOUNTER
Nurse spoke with daughter regarding vascular appt. Daughter advised to keep vascular appt.      ----- Message from Mohsen Spear sent at 9/6/2019  9:38 AM CDT -----  Contact: pt daughter/Bernice Austin    Please call pt daughter at [phone]    Patient daughter would like to know if it is necessary for the patient to have the vascular test done at 11 am today?    Thank you

## 2019-09-06 NOTE — TELEPHONE ENCOUNTER
Nurse returned phone call. Message left does not state daughters name but voicemail left with clinic contact information.      ----- Message from Jasbir Stevens sent at 9/6/2019  8:02 AM CDT -----  Contact: Self/ [phone]  Patient daughter would like a call back to speak with a nurse or MA to ask questions about the patient appt for today.

## 2019-09-09 ENCOUNTER — OFFICE VISIT (OUTPATIENT)
Dept: PODIATRY | Facility: CLINIC | Age: 79
End: 2019-09-09
Payer: MEDICARE

## 2019-09-09 VITALS
BODY MASS INDEX: 33.04 KG/M2 | WEIGHT: 175 LBS | DIASTOLIC BLOOD PRESSURE: 61 MMHG | HEART RATE: 52 BPM | HEIGHT: 61 IN | SYSTOLIC BLOOD PRESSURE: 103 MMHG

## 2019-09-09 DIAGNOSIS — Z98.890 POST-OPERATIVE STATE: Primary | ICD-10-CM

## 2019-09-09 PROCEDURE — 99999 PR PBB SHADOW E&M-EST. PATIENT-LVL III: ICD-10-PCS | Mod: PBBFAC,,, | Performed by: PODIATRIST

## 2019-09-09 PROCEDURE — 99999 PR PBB SHADOW E&M-EST. PATIENT-LVL III: CPT | Mod: PBBFAC,,, | Performed by: PODIATRIST

## 2019-09-09 PROCEDURE — 99024 POSTOP FOLLOW-UP VISIT: CPT | Mod: S$GLB,,, | Performed by: PODIATRIST

## 2019-09-09 PROCEDURE — 99024 PR POST-OP FOLLOW-UP VISIT: ICD-10-PCS | Mod: S$GLB,,, | Performed by: PODIATRIST

## 2019-09-09 NOTE — LETTER
September 9, 2019      [facility] - Podiatry  [address]  [facility] [address]  Phone: [phone]       Patient: Randy Cameoj   YOB: 1940  Date of Visit: 09/09/2019    To Whom It May Concern:    Lorenzo Cmaejo  was at [facility] on 09/09/2019. He may return to work/school on 9/15/2019 with restrictions. He must wear darco shoe on left foot.  If you have any questions or concerns, or if I can be of further assistance, please do not hesitate to contact me.    Sincerely,            Pattie Corbett DPM

## 2019-09-11 ENCOUNTER — OFFICE VISIT (OUTPATIENT)
Dept: INTERNAL MEDICINE | Facility: CLINIC | Age: 79
End: 2019-09-11
Payer: MEDICARE

## 2019-09-11 VITALS
HEIGHT: 71 IN | DIASTOLIC BLOOD PRESSURE: 52 MMHG | TEMPERATURE: 99 F | HEART RATE: 52 BPM | BODY MASS INDEX: 24.19 KG/M2 | SYSTOLIC BLOOD PRESSURE: 102 MMHG | RESPIRATION RATE: 18 BRPM | WEIGHT: 172.81 LBS

## 2019-09-11 DIAGNOSIS — I10 ESSENTIAL HYPERTENSION: ICD-10-CM

## 2019-09-11 DIAGNOSIS — Z09 HOSPITAL DISCHARGE FOLLOW-UP: Primary | ICD-10-CM

## 2019-09-11 DIAGNOSIS — E11.40 TYPE 2 DIABETES MELLITUS WITH DIABETIC NEUROPATHY, WITH LONG-TERM CURRENT USE OF INSULIN: ICD-10-CM

## 2019-09-11 DIAGNOSIS — I48.19 PERSISTENT ATRIAL FIBRILLATION: ICD-10-CM

## 2019-09-11 DIAGNOSIS — N18.30 STAGE 3 CHRONIC KIDNEY DISEASE: ICD-10-CM

## 2019-09-11 DIAGNOSIS — M86.9 TOE OSTEOMYELITIS, RIGHT: ICD-10-CM

## 2019-09-11 DIAGNOSIS — Z79.4 TYPE 2 DIABETES MELLITUS WITH DIABETIC NEUROPATHY, WITH LONG-TERM CURRENT USE OF INSULIN: ICD-10-CM

## 2019-09-11 DIAGNOSIS — I25.10 CORONARY ARTERY DISEASE INVOLVING NATIVE CORONARY ARTERY OF NATIVE HEART WITHOUT ANGINA PECTORIS: ICD-10-CM

## 2019-09-11 DIAGNOSIS — Z79.01 CHRONIC ANTICOAGULATION: ICD-10-CM

## 2019-09-11 PROCEDURE — 99214 PR OFFICE/OUTPT VISIT, EST, LEVL IV, 30-39 MIN: ICD-10-PCS | Mod: S$GLB,,, | Performed by: HOSPITALIST

## 2019-09-11 PROCEDURE — 99214 OFFICE O/P EST MOD 30 MIN: CPT | Mod: S$GLB,,, | Performed by: HOSPITALIST

## 2019-09-11 PROCEDURE — 99999 PR PBB SHADOW E&M-EST. PATIENT-LVL III: ICD-10-PCS | Mod: PBBFAC,,, | Performed by: HOSPITALIST

## 2019-09-11 PROCEDURE — 99999 PR PBB SHADOW E&M-EST. PATIENT-LVL III: CPT | Mod: PBBFAC,,, | Performed by: HOSPITALIST

## 2019-09-11 NOTE — PROGRESS NOTES
Transitional Care Note  Subjective:       Patient ID: Randy Camejo is a 79 y.o. male.  Chief Complaint: No chief complaint on file.     Family and/or Caretaker present at visit?  Yes.  Diagnostic tests reviewed/disposition: No diagnosic tests pending after this hospitalization.  Disease/illness education:   Home health/community services discussion/referrals: Patient has home health established at home.   Establishment or re-establishment of referral orders for community resources: No other necessary community resources.   Discussion with other health care providers: No discussion with other health care providers necessary.   HPI     78 yo male w/ DM2, HTN, Afib, HLD recently hospitalized at [facility]  for right 5th toe osteomyelitis. Underwent R partial 5th ray amputation with podiatry. Followed by ID during stay.  Given IV abx during stay. D/cd home with doxycycline for 7 days. Has upcoming appt with podiatry and ID. Pathology from surgery still pending.  Patient is accompanied today with his wife.  He reports he is doing well.  Had recent follow-up with Podiatry. Reports compliance with his insulin       Review of Systems   Constitutional: Negative for chills and fever.   HENT: Negative for congestion and sore throat.    Eyes: Negative for pain and visual disturbance.   Respiratory: Negative for cough and shortness of breath.    Cardiovascular: Negative for chest pain and leg swelling.   Gastrointestinal: Negative for abdominal pain, nausea and vomiting.   Endocrine: Negative for polydipsia and polyuria.   Genitourinary: Negative for difficulty urinating and dysuria.   Musculoskeletal: Negative for arthralgias and back pain.   Skin: Positive for wound. Negative for rash.   Neurological: Positive for weakness. Negative for headaches.   Psychiatric/Behavioral: Negative for agitation and confusion.       Objective:       Vitals:    09/11/19 1413   BP: (!) 102/52   BP Location: Right arm   Patient Position: Sitting  "  BP Method: Medium (Manual)   Pulse: (!) 52   Resp: 18   Temp: 98.6 °F (37 °C)   TempSrc: Oral   Weight: 78.4 kg (172 lb 13.5 oz)   Height: 5' 11" (1.803 m)     Physical Exam   Constitutional: He is oriented to person, place, and time. He appears well-developed and well-nourished. No distress.   Sitting in wheelchair   HENT:   Head: Normocephalic and atraumatic.   Mouth/Throat: Oropharynx is clear and moist. No oropharyngeal exudate.   Eyes: Conjunctivae are normal. Right eye exhibits no discharge. Left eye exhibits no discharge.   Neck: Normal range of motion. Neck supple.   Cardiovascular: Normal rate and normal heart sounds. Exam reveals no friction rub.   No murmur heard.  Pulmonary/Chest: Effort normal and breath sounds normal.   Abdominal: Soft. Bowel sounds are normal. He exhibits no distension. There is no tenderness.   Musculoskeletal: He exhibits edema.   Pitting edema of RLE   R foot in boot    Neurological: He is alert and oriented to person, place, and time.   Skin: Skin is warm and dry.   Psychiatric: He has a normal mood and affect. His behavior is normal.   Vitals reviewed.      Assessment:       1. Hospital discharge follow-up    2. Toe osteomyelitis, right    3. Essential hypertension    4. Type 2 diabetes mellitus with diabetic neuropathy, with long-term current use of insulin    5. Coronary artery disease involving native coronary artery of native heart without angina pectoris    6. Persistent atrial fibrillation    7. Chronic anticoagulation    8. Stage 3 chronic kidney disease        Plan:         Randy was seen today for follow-up.    Diagnoses and all orders for this visit:    Hospital discharge follow-up  - patient appears to be stable from hospitalization.  He has completed course of antibiotics.  He will also have follow-up with Infectious Disease and Podiatry.    Toe osteomyelitis, right  - s/p R partial 5th toe amputation    Essential hypertension  - BP controlled    Type 2 diabetes " mellitus with diabetic neuropathy, with long-term current use of insulin  - will remain on insulin for now. Will hold off on oral diabetic meds due to ckd    Coronary artery disease involving native coronary artery of native heart without angina pectoris  - home meds. Continue f/u with cardiology    Persistent atrial fibrillation  Chronic anticoagulation  - Rate controlled. Continue coreg and eliquis     Stage 3 chronic kidney disease  - Stable during hospitalization. Will continue to monitor           RTC 3 months. Will check a1c

## 2019-09-13 ENCOUNTER — TELEPHONE (OUTPATIENT)
Dept: INTERNAL MEDICINE | Facility: CLINIC | Age: 79
End: 2019-09-13

## 2019-09-13 NOTE — TELEPHONE ENCOUNTER
Pt reports no b.m. 4 days. Hasn't tried anything yet. Counseled ok to try milk of magnesia or stool softener or miralax. Pt in agreement

## 2019-09-13 NOTE — TELEPHONE ENCOUNTER
----- Message from Betito Palmer sent at 9/13/2019  2:23 PM CDT -----  Contact: Pt [phone]  Patient would like to get medical advice.  Symptoms (please be specific):  Stomache cramps level 1 or 2, Constipation, hasn't gone in 4 or 5 days  How long has patient had these symptoms:   2 days  Pharmacy name and phone # [id number] - [facility] [address] AT [facility] [address] [phone] (Phone)  [phone] (Fax)    Comments:  He wants to know what's medicine to take that's compatible with the meds he take

## 2019-09-17 ENCOUNTER — OFFICE VISIT (OUTPATIENT)
Dept: PODIATRY | Facility: CLINIC | Age: 79
End: 2019-09-17
Payer: MEDICARE

## 2019-09-17 VITALS
SYSTOLIC BLOOD PRESSURE: 115 MMHG | WEIGHT: 172 LBS | BODY MASS INDEX: 32.47 KG/M2 | DIASTOLIC BLOOD PRESSURE: 70 MMHG | HEIGHT: 61 IN | HEART RATE: 53 BPM

## 2019-09-17 DIAGNOSIS — Z98.890 POST-OPERATIVE STATE: Primary | ICD-10-CM

## 2019-09-17 PROCEDURE — 99024 PR POST-OP FOLLOW-UP VISIT: ICD-10-PCS | Mod: S$GLB,,, | Performed by: PODIATRIST

## 2019-09-17 PROCEDURE — 99024 POSTOP FOLLOW-UP VISIT: CPT | Mod: S$GLB,,, | Performed by: PODIATRIST

## 2019-09-17 PROCEDURE — 99999 PR PBB SHADOW E&M-EST. PATIENT-LVL IV: ICD-10-PCS | Mod: PBBFAC,,, | Performed by: PODIATRIST

## 2019-09-17 PROCEDURE — 99999 PR PBB SHADOW E&M-EST. PATIENT-LVL IV: CPT | Mod: PBBFAC,,, | Performed by: PODIATRIST

## 2019-09-17 NOTE — PROGRESS NOTES
Subjective:      Patient ID: Randy Camejo is a 79 y.o. male.    Chief Complaint: Diabetes Mellitus (06/05/2019 kdr dustin lazo) and Diabetic Foot Exam    Randy is a 79 y.o. male who presents to the clinic for evaluation and treatment of high risk feet. 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 The patient's chief complaint is s/p right 5th ray amp. Pt denies any NVFCSOB.    This patient has documented high risk feet requiring routine maintenance secondary to peripheral vascular disease.      PCP: Dustin Lazo MD    Date Last Seen by PCP:     Current shoe gear:  Affected Foot: Football and Darco shoe on the affected foot     Unaffected Foot: Slip-on shoes    History of Trauma: negative  Sign of Infection: none    Hemoglobin A1C   Date Value Ref Range Status   06/05/2019 6.3 (H) 4.0 - 5.6 % Final     Comment:     ADA Screening Guidelines:  5.7-6.4%  Consistent with prediabetes  >or=6.5%  Consistent with diabetes  High levels of fetal hemoglobin interfere with the HbA1C  assay. Heterozygous hemoglobin variants (HbS, HgC, etc)do  not significantly interfere with this assay.   However, presence of multiple variants may affect accuracy.     04/10/2019 8.4 (H) 4.0 - 5.6 % Final     Comment:     ADA Screening Guidelines:  5.7-6.4%  Consistent with prediabetes  >or=6.5%  Consistent with diabetes  High levels of fetal hemoglobin interfere with the HbA1C  assay. Heterozygous hemoglobin variants (HbS, HgC, etc)do  not significantly interfere with this assay.   However, presence  of multiple variants may affect accuracy.     02/19/2019 12.6 (H) 4.0 - 5.6 % Final     Comment:     ADA Screening Guidelines:  5.7-6.4%  Consistent with prediabetes  >or=6.5%  Consistent with diabetes  High levels of fetal hemoglobin interfere with the HbA1C  assay. Heterozygous hemoglobin variants (HbS, HgC, etc)do  not significantly interfere with this assay.   However, presence of multiple variants may affect accuracy.         Review of Systems   Constitution: Negative for chills, fever and malaise/fatigue.   HENT: Negative for hearing loss.    Cardiovascular: Negative for claudication.   Respiratory: Negative for shortness of breath.    Skin: Positive for color change, dry skin, nail changes, poor wound healing and unusual hair distribution. Negative for flushing and rash.   Musculoskeletal: Negative for joint pain and myalgias.   Neurological: Negative for loss of balance, numbness, paresthesias and sensory change.   Psychiatric/Behavioral: Negative for altered mental status.               Objective:      Physical Exam   Constitutional: He appears well-developed and well-nourished. He is cooperative.   Cardiovascular:   Pulses:       Dorsalis pedis pulses are 0 on the right side, and 0 on the left side.        Posterior tibial pulses are 0 on the right side, and 1+ on the left side.   Bi-phasic pulses noted with doppler, right   Musculoskeletal:        Right ankle: He exhibits decreased range of motion and abnormal pulse. Achilles tendon exhibits normal Barajas's test results.        Left ankle: He exhibits decreased range of motion and abnormal pulse. Achilles tendon exhibits normal Barajas's test results.        Right foot: There is decreased range of motion.        Left foot: There is decreased range of motion.   Feet:   Right Foot:   Protective Sensation: 5 sites tested. 2 sites sensed.   Left Foot:   Protective Sensation: 5 sites tested. 2 sites sensed.   Neurological: He is alert.   diminished  sensation noted to b/L lower extremities   Skin: Skin is dry. Capillary refill takes more than 3 seconds.   Right dorsal 5th met amp site  Sutures intact, no increase in temp noted.      Psychiatric: His behavior is normal.   Vitals reviewed.                    Assessment:       Encounter Diagnosis   Name Primary?    Post-operative state - Right Foot Yes         Plan:       Randy was seen today for diabetes mellitus and diabetic foot exam.    Diagnoses and all orders for this visit:    Post-operative state - Right Foot      I counseled the patient on his conditions, their implications and medical management.      Sutures left intact  Incision swabbed with betadine, followed by a football dressing  Pt advised to stay off of feet as much as possible.   RTC in 1 week or sooner if any new pedal problems arise, any signs of infection occur, or if condition worsens.

## 2019-09-23 NOTE — PROGRESS NOTES
Subjective:      Patient ID: Randy Camejo is a 79 y.o. male.    Chief Complaint: Diabetes Mellitus (09/11/2019 dr dustin lazo) and Diabetic Foot Exam    Randy is a 79 y.o. male who presents to the clinic for evaluation and treatment of high risk feet. 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 The patient's chief complaint is s/p right 5th ray amp. Pt denies any NVFCSOB.    This patient has documented high risk feet requiring routine maintenance secondary to peripheral vascular disease.      PCP: Dustin Lazo MD    Date Last Seen by PCP:     Current shoe gear:  Affected Foot: Football and Darco shoe on the affected foot     Unaffected Foot: Slip-on shoes    History of Trauma: negative  Sign of Infection: none    Hemoglobin A1C   Date Value Ref Range Status   06/05/2019 6.3 (H) 4.0 - 5.6 % Final     Comment:     ADA Screening Guidelines:  5.7-6.4%  Consistent with prediabetes  >or=6.5%  Consistent with diabetes  High levels of fetal hemoglobin interfere with the HbA1C  assay. Heterozygous hemoglobin variants (HbS, HgC, etc)do  not significantly interfere with this assay.   However, presence of multiple variants may affect accuracy.     04/10/2019 8.4 (H) 4.0 - 5.6 % Final     Comment:     ADA Screening Guidelines:  5.7-6.4%  Consistent with prediabetes  >or=6.5%  Consistent with diabetes  High levels of fetal hemoglobin interfere with the HbA1C  assay. Heterozygous hemoglobin variants (HbS, HgC, etc)do  not significantly interfere with this assay.   However, presence  of multiple variants may affect accuracy.     02/19/2019 12.6 (H) 4.0 - 5.6 % Final     Comment:     ADA Screening Guidelines:  5.7-6.4%  Consistent with prediabetes  >or=6.5%  Consistent with diabetes  High levels of fetal hemoglobin interfere with the HbA1C  assay. Heterozygous hemoglobin variants (HbS, HgC, etc)do  not significantly interfere with this assay.   However, presence of multiple variants may affect accuracy.         Review of Systems   Constitution: Negative for chills, fever and malaise/fatigue.   HENT: Negative for hearing loss.    Cardiovascular: Negative for claudication.   Respiratory: Negative for shortness of breath.    Skin: Positive for color change, dry skin, nail changes, poor wound healing and unusual hair distribution. Negative for flushing and rash.   Musculoskeletal: Negative for joint pain and myalgias.   Neurological: Negative for loss of balance, numbness, paresthesias and sensory change.   Psychiatric/Behavioral: Negative for altered mental status.               Objective:      Physical Exam   Constitutional: He appears well-developed and well-nourished. He is cooperative.   Cardiovascular:   Pulses:       Dorsalis pedis pulses are 0 on the right side, and 0 on the left side.        Posterior tibial pulses are 0 on the right side, and 1+ on the left side.   Bi-phasic pulses noted with doppler, right   Musculoskeletal:        Right ankle: He exhibits decreased range of motion and abnormal pulse. Achilles tendon exhibits normal Barajas's test results.        Left ankle: He exhibits decreased range of motion and abnormal pulse. Achilles tendon exhibits normal Barajas's test results.        Right foot: There is decreased range of motion.        Left foot: There is decreased range of motion.   Feet:   Right Foot:   Protective Sensation: 5 sites tested. 2 sites sensed.   Left Foot:   Protective Sensation: 5 sites tested. 2 sites sensed.   Neurological: He is alert.   diminished  sensation noted to b/L lower extremities   Skin: Skin is dry. Capillary refill takes more than 3 seconds.   Right dorsal 5th met amp site  Sutures intact, no increase in temp noted.      Psychiatric: His behavior is normal.   Vitals reviewed.                    Assessment:       Encounter Diagnosis   Name Primary?    Post-operative state - Right Foot Yes         Plan:       Randy was seen today for diabetes mellitus and diabetic foot exam.    Diagnoses and all orders for this visit:    Post-operative state - Right Foot      I counseled the patient on his conditions, their implications and medical management.      A few sutures proximally removed, the remaining left intact.  Incision swabbed with betadine, followed by a football dressing  Pt advised to stay off of feet as much as possible.   RTC in 1 week or sooner if any new pedal problems arise, any signs of infection occur, or if condition worsens.

## 2019-09-24 LAB — FUNGUS SPEC CULT: NORMAL

## 2019-09-25 ENCOUNTER — OFFICE VISIT (OUTPATIENT)
Dept: PODIATRY | Facility: CLINIC | Age: 79
End: 2019-09-25
Payer: MEDICARE

## 2019-09-25 VITALS
HEIGHT: 61 IN | WEIGHT: 172 LBS | HEART RATE: 51 BPM | BODY MASS INDEX: 32.47 KG/M2 | SYSTOLIC BLOOD PRESSURE: 102 MMHG | DIASTOLIC BLOOD PRESSURE: 62 MMHG

## 2019-09-25 DIAGNOSIS — Z98.890 POST-OPERATIVE STATE: Primary | ICD-10-CM

## 2019-09-25 PROCEDURE — 99024 PR POST-OP FOLLOW-UP VISIT: ICD-10-PCS | Mod: S$GLB,,, | Performed by: PODIATRIST

## 2019-09-25 PROCEDURE — 99999 PR PBB SHADOW E&M-EST. PATIENT-LVL IV: CPT | Mod: PBBFAC,,, | Performed by: PODIATRIST

## 2019-09-25 PROCEDURE — 99999 PR PBB SHADOW E&M-EST. PATIENT-LVL IV: ICD-10-PCS | Mod: PBBFAC,,, | Performed by: PODIATRIST

## 2019-09-25 PROCEDURE — 99024 POSTOP FOLLOW-UP VISIT: CPT | Mod: S$GLB,,, | Performed by: PODIATRIST

## 2019-10-01 NOTE — PROGRESS NOTES
Subjective:      Patient ID: Randy Camejo is a 79 y.o. male.    Chief Complaint: Diabetes Mellitus (09/11/2019 dr dustin lazo) and Diabetic Foot Exam    Randy is a 79 y.o. male who presents to the clinic for evaluation and treatment of high risk feet. 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 The patient's chief complaint is s/p right 5th ray amp. Pt denies any NVFCSOB.    This patient has documented high risk feet requiring routine maintenance secondary to peripheral vascular disease.      PCP: Dustin Lazo MD    Date Last Seen by PCP:     Current shoe gear:  Affected Foot: Football and Darco shoe on the affected foot     Unaffected Foot: Slip-on shoes    History of Trauma: negative  Sign of Infection: none    Hemoglobin A1C   Date Value Ref Range Status   06/05/2019 6.3 (H) 4.0 - 5.6 % Final     Comment:     ADA Screening Guidelines:  5.7-6.4%  Consistent with prediabetes  >or=6.5%  Consistent with diabetes  High levels of fetal hemoglobin interfere with the HbA1C  assay. Heterozygous hemoglobin variants (HbS, HgC, etc)do  not significantly interfere with this assay.   However, presence of multiple variants may affect accuracy.     04/10/2019 8.4 (H) 4.0 - 5.6 % Final     Comment:     ADA Screening Guidelines:  5.7-6.4%  Consistent with prediabetes  >or=6.5%  Consistent with diabetes  High levels of fetal hemoglobin interfere with the HbA1C  assay. Heterozygous hemoglobin variants (HbS, HgC, etc)do  not significantly interfere with this assay.   However, presence  of multiple variants may affect accuracy.     02/19/2019 12.6 (H) 4.0 - 5.6 % Final     Comment:     ADA Screening Guidelines:  5.7-6.4%  Consistent with prediabetes  >or=6.5%  Consistent with diabetes  High levels of fetal hemoglobin interfere with the HbA1C  assay. Heterozygous hemoglobin variants (HbS, HgC, etc)do  not significantly interfere with this assay.   However, presence of multiple variants may affect accuracy.         Review of Systems   Constitution: Negative for chills, fever and malaise/fatigue.   HENT: Negative for hearing loss.    Cardiovascular: Negative for claudication.   Respiratory: Negative for shortness of breath.    Skin: Positive for color change, dry skin, nail changes, poor wound healing and unusual hair distribution. Negative for flushing and rash.   Musculoskeletal: Negative for joint pain and myalgias.   Neurological: Negative for loss of balance, numbness, paresthesias and sensory change.   Psychiatric/Behavioral: Negative for altered mental status.               Objective:      Physical Exam   Constitutional: He appears well-developed and well-nourished. He is cooperative.   Cardiovascular:   Pulses:       Dorsalis pedis pulses are 0 on the right side, and 0 on the left side.        Posterior tibial pulses are 0 on the right side, and 1+ on the left side.   Bi-phasic pulses noted with doppler, right   Musculoskeletal:        Right ankle: He exhibits decreased range of motion and abnormal pulse. Achilles tendon exhibits normal Barajas's test results.        Left ankle: He exhibits decreased range of motion and abnormal pulse. Achilles tendon exhibits normal Barajas's test results.        Right foot: There is decreased range of motion.        Left foot: There is decreased range of motion.   Feet:   Right Foot:   Protective Sensation: 5 sites tested. 2 sites sensed.   Left Foot:   Protective Sensation: 5 sites tested. 2 sites sensed.   Neurological: He is alert.   diminished  sensation noted to b/L lower extremities   Skin: Skin is dry. Capillary refill takes more than 3 seconds.   Right dorsal 5th met amp site  Sutures intact, no increase in temp noted.      Psychiatric: His behavior is normal.   Vitals reviewed.                    Assessment:       Encounter Diagnosis   Name Primary?    Post-operative state - Right Foot Yes         Plan:       Randy was seen today for diabetes mellitus and diabetic foot exam.    Diagnoses and all orders for this visit:    Post-operative state - Right Foot      I counseled the patient on his conditions, their implications and medical management.      Remaining sutures removed, the remaining left intact.  Incision swabbed with betadine, followed by a football dressing  Pt advised to stay off of feet as much as possible.   RTC in 1 week or sooner if any new pedal problems arise, any signs of infection occur, or if condition worsens.

## 2019-10-03 ENCOUNTER — OFFICE VISIT (OUTPATIENT)
Dept: PODIATRY | Facility: CLINIC | Age: 79
End: 2019-10-03
Payer: MEDICARE

## 2019-10-03 VITALS
WEIGHT: 172 LBS | SYSTOLIC BLOOD PRESSURE: 101 MMHG | HEIGHT: 61 IN | BODY MASS INDEX: 32.47 KG/M2 | DIASTOLIC BLOOD PRESSURE: 62 MMHG | HEART RATE: 52 BPM

## 2019-10-03 DIAGNOSIS — Z98.890 POST-OPERATIVE STATE: Primary | ICD-10-CM

## 2019-10-03 PROCEDURE — 99999 PR PBB SHADOW E&M-EST. PATIENT-LVL IV: CPT | Mod: PBBFAC,,, | Performed by: PODIATRIST

## 2019-10-03 PROCEDURE — 99024 POSTOP FOLLOW-UP VISIT: CPT | Mod: S$GLB,,, | Performed by: PODIATRIST

## 2019-10-03 PROCEDURE — 99024 PR POST-OP FOLLOW-UP VISIT: ICD-10-PCS | Mod: S$GLB,,, | Performed by: PODIATRIST

## 2019-10-03 PROCEDURE — 99999 PR PBB SHADOW E&M-EST. PATIENT-LVL IV: ICD-10-PCS | Mod: PBBFAC,,, | Performed by: PODIATRIST

## 2019-10-09 ENCOUNTER — TELEPHONE (OUTPATIENT)
Dept: PODIATRY | Facility: CLINIC | Age: 79
End: 2019-10-09

## 2019-10-09 ENCOUNTER — OFFICE VISIT (OUTPATIENT)
Dept: PODIATRY | Facility: CLINIC | Age: 79
End: 2019-10-09
Payer: MEDICARE

## 2019-10-09 VITALS
WEIGHT: 172 LBS | HEART RATE: 55 BPM | HEIGHT: 61 IN | BODY MASS INDEX: 32.47 KG/M2 | SYSTOLIC BLOOD PRESSURE: 99 MMHG | DIASTOLIC BLOOD PRESSURE: 61 MMHG

## 2019-10-09 DIAGNOSIS — S91.301D OPEN WOUND OF RIGHT FOOT, SUBSEQUENT ENCOUNTER: ICD-10-CM

## 2019-10-09 DIAGNOSIS — Z98.890 POST-OPERATIVE STATE: Primary | ICD-10-CM

## 2019-10-09 PROCEDURE — 99024 PR POST-OP FOLLOW-UP VISIT: ICD-10-PCS | Mod: S$GLB,,, | Performed by: PODIATRIST

## 2019-10-09 PROCEDURE — 99999 PR PBB SHADOW E&M-EST. PATIENT-LVL III: CPT | Mod: PBBFAC,,, | Performed by: PODIATRIST

## 2019-10-09 PROCEDURE — 99999 PR PBB SHADOW E&M-EST. PATIENT-LVL III: ICD-10-PCS | Mod: PBBFAC,,, | Performed by: PODIATRIST

## 2019-10-09 PROCEDURE — 99024 POSTOP FOLLOW-UP VISIT: CPT | Mod: S$GLB,,, | Performed by: PODIATRIST

## 2019-10-09 PROCEDURE — 99499 NO LOS: ICD-10-PCS | Mod: S$GLB,,, | Performed by: PODIATRIST

## 2019-10-09 PROCEDURE — 99499 UNLISTED E&M SERVICE: CPT | Mod: S$GLB,,, | Performed by: PODIATRIST

## 2019-10-09 NOTE — PROGRESS NOTES
Subjective:      Patient ID: Randy Camejo is a 79 y.o. male.    Chief Complaint: Post-op Evaluation    Randy is a 79 y.o. male who presents to the clinic for evaluation and treatment of high risk feet. 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 The patient's chief complaint is s/p right 5th ray amp. Pt denies any NVFCSOB.    This patient has documented high risk feet requiring routine maintenance secondary to peripheral vascular disease.      PCP: Susie Lazo MD    Date Last Seen by PCP:     Current shoe gear:  Affected Foot: Football and Darco shoe on the affected foot     Unaffected Foot: Slip-on shoes    History of Trauma: negative  Sign of Infection: none    Hemoglobin A1C   Date Value Ref Range Status   06/05/2019 6.3 (H) 4.0 - 5.6 % Final     Comment:     ADA Screening Guidelines:  5.7-6.4%  Consistent with prediabetes  >or=6.5%  Consistent with diabetes  High levels of fetal hemoglobin interfere with the HbA1C  assay. Heterozygous hemoglobin variants (HbS, HgC, etc)do  not significantly interfere with this assay.   However, presence of multiple variants may affect accuracy.     04/10/2019 8.4 (H) 4.0 - 5.6 % Final     Comment:     ADA Screening Guidelines:  5.7-6.4%  Consistent with prediabetes  >or=6.5%  Consistent with diabetes  High levels of fetal hemoglobin interfere with the HbA1C  assay. Heterozygous hemoglobin variants (HbS, HgC, etc)do  not significantly interfere with this assay.   However, presence of multiple variants may affect accuracy.      02/19/2019 12.6 (H) 4.0 - 5.6 % Final     Comment:     ADA Screening Guidelines:  5.7-6.4%  Consistent with prediabetes  >or=6.5%  Consistent with diabetes  High levels of fetal hemoglobin interfere with the HbA1C  assay. Heterozygous hemoglobin variants (HbS, HgC, etc)do  not significantly interfere with this assay.   However, presence of multiple variants may affect accuracy.         Review of Systems   Constitution: Negative for chills, fever and malaise/fatigue.   HENT: Negative for hearing loss.    Cardiovascular: Negative for claudication.   Respiratory: Negative for shortness of breath.    Skin: Positive for color change, dry skin, nail changes, poor wound healing and unusual hair distribution. Negative for flushing and rash.   Musculoskeletal: Negative for joint pain and myalgias.   Neurological: Negative for loss of balance, numbness, paresthesias and sensory change.   Psychiatric/Behavioral: Negative for altered mental status.               Objective:      Physical Exam   Constitutional: He appears well-developed and well-nourished. He is cooperative.   Cardiovascular:   Pulses:       Dorsalis pedis pulses are 0 on the right side, and 0 on the left side.        Posterior tibial pulses are 0 on the right side, and 1+ on the left side.   Bi-phasic pulses noted with doppler, right   Musculoskeletal:        Right ankle: He exhibits decreased range of motion and abnormal pulse. Achilles tendon exhibits normal Barajas's test results.        Left ankle: He exhibits decreased range of motion and abnormal pulse. Achilles tendon exhibits normal Barajas's test results.        Right foot: There is decreased range of motion.        Left foot: There is decreased range of motion.   Feet:   Right Foot:   Protective Sensation: 5 sites tested. 2 sites sensed.   Left Foot:   Protective Sensation: 5 sites tested. 2 sites sensed.   Neurological: He is alert.   diminished sensation noted to b/L lower extremities   Skin:  Skin is dry. Capillary refill takes more than 3 seconds.   Right dorsal 5th met amp site  dehiscense noted proximally to incision   Psychiatric: His behavior is normal.   Vitals reviewed.                    Assessment:       Encounter Diagnosis   Name Primary?    Post-operative state - Right Foot Yes         Plan:       Randy was seen today for post-op evaluation.    Diagnoses and all orders for this visit:    Post-operative state - Right Foot      I counseled the patient on his conditions, their implications and medical management.        Incision swabbed with betadine, followed by a football dressing  Pt advised to stay off of feet as much as possible and elevate  RTC in 1 week or sooner if any new pedal problems arise, any signs of infection occur, or if condition worsens.

## 2019-10-09 NOTE — TELEPHONE ENCOUNTER
Nurse called pt to reschedule todays appointment or offer the patient the option of seeing another provider today. No answer Nurse LVM with direct contact to call facility back.

## 2019-10-09 NOTE — PROCEDURES
"Wound Debridement  Date/Time: 10/9/2019 5:19 PM  Performed by: Kingston Warren DPM  Authorized by: Kingston Warren DPM     Time out: Immediately prior to procedure a "time out" was called to verify the correct patient, procedure, equipment, support staff and site/side marked as required.    Consent Done?:  Yes (Verbal)  Local anesthesia used?: No      Wound Details:    Location:  Right foot    Location:  Right 5th Metatarsal Head    Type of Debridement:  Excisional       Length (cm):  4.8       Area (sq cm):  5.76       Width (cm):  1.2       Percent Debrided (%):  100       Depth (cm):  0.3       Total Area Debrided (sq cm):  5.76    Depth of debridement:  Subcutaneous tissue    Tissue debrided:  Dermis, Epidermis and Subcutaneous    Devitalized tissue debrided:  Biofilm, Callus and Fibrin    Instruments:  Nippers    Bleeding:  Minimal  Hemostasis Achieved: Yes    Method Used:  Pressure  Patient tolerance:  Patient tolerated the procedure well with no immediate complications      "

## 2019-10-09 NOTE — PROGRESS NOTES
Subjective:      Patient ID: Randy Camejo is a 79 y.o. male.    Chief Complaint: Post-op Evaluation (rt foot)    About 6 weeks s/p right 5th ray resection.  Foot ball dressing CDI.  Ambulating ;minimally in sx shoe right DM shoe left.  Denies signs infection.    Review of Systems   Constitution: Negative for chills, diaphoresis, fever, malaise/fatigue and night sweats.   Cardiovascular: Negative for claudication, cyanosis, leg swelling and syncope.   Skin: Positive for poor wound healing. Negative for color change, dry skin, nail changes, rash, suspicious lesions and unusual hair distribution.   Musculoskeletal: Negative for falls, joint pain, joint swelling, muscle cramps, muscle weakness and stiffness.   Gastrointestinal: Negative for constipation, diarrhea, nausea and vomiting.   Neurological: Positive for numbness and sensory change. Negative for brief paralysis, disturbances in coordination, focal weakness, paresthesias and tremors.           Objective:      Physical Exam   Constitutional: He is oriented to person, place, and time. He appears well-developed and well-nourished. He is cooperative. No distress.   Cardiovascular:   Pulses:       Popliteal pulses are 2+ on the right side, and 2+ on the left side.        Dorsalis pedis pulses are 1+ on the right side, and 1+ on the left side.        Posterior tibial pulses are 1+ on the right side, and 1+ on the left side.   Capillary refill 3 seconds all toes/distal feet, all toes/both feet warm to touch.      Negative lymphadenopathy bilateral popliteal fossa and tarsal tunnel.      Negavie lower extremity edema bilateral.     Musculoskeletal:        Right ankle: He exhibits normal range of motion, no swelling, no ecchymosis, no deformity, no laceration and normal pulse. Achilles tendon normal. Achilles tendon exhibits no pain, no defect and normal Barajas's test results.   5th ray resected right.   Lymphadenopathy: No inguinal adenopathy noted on the right or  left side.   Negative lymphadenopathy bilateral popliteal fossa and tarsal tunnel.    Negative lymphangitic streaking bilateral feet/ankles/legs.   Neurological: He is alert and oriented to person, place, and time. He has normal strength. He displays no atrophy and no tremor. A sensory deficit is present. He exhibits normal muscle tone. Gait normal.   Reflex Scores:       Patellar reflexes are 2+ on the right side and 2+ on the left side.       Achilles reflexes are 2+ on the right side and 2+ on the left side.  Diminished/loss of protective sensation all toes bilateral to 10 gram monofilament.     Skin: Skin is warm, dry and intact. Capillary refill takes 2 to 3 seconds. No abrasion, no bruising, no burn, no ecchymosis, no laceration, no lesion and no rash noted. He is not diaphoretic. No cyanosis or erythema. No pallor. Nails show no clubbing.     Wound:  Dorsal latearl right foot    Size:    Pre:[id number]  Post: [id number]    Base:  Granular, pink with moderate serous/serosanaguinous drainage only.  No pus, tracking, fluctuance, malodor, or cardinal signs infection.    Borders:  Hyperkeratotic, debriding to flat, pink, blanchable skin edges without undermining.    Otherwise, Skin is normal age and health appropriate color, turgor, texture, and temperature bilateral lower extremities without ulceration, hyperpigmentation, discoloration, masses nodules or cords palpated.  No ecchymosis, erythema, edema, or cardinal signs of infection bilateral lower extremities.       Psychiatric: He has a normal mood and affect.             Assessment:       Encounter Diagnoses   Name Primary?    Post-operative state - Right Foot Yes    Open wound of right foot, subsequent encounter          Plan:       Randy was seen today for post-op evaluation.    Diagnoses and all orders for this visit:    Post-operative state - Right Foot    Open wound of right foot, subsequent encounter      I counseled the patient on his conditions, their  implications and medical management.        Debride wound right foot    Dressed same with iodosorb, wound foam, kerlix, football - do not change , wet.    Continue minmal ambulation in sx shoe right DM shoe left.    Adequate vitamin supplementation, protein intake, and hydration - discussed with patient.              Follow up in about 1 week (around 10/16/2019).

## 2019-10-18 ENCOUNTER — OFFICE VISIT (OUTPATIENT)
Dept: PODIATRY | Facility: CLINIC | Age: 79
End: 2019-10-18
Payer: MEDICARE

## 2019-10-18 VITALS
WEIGHT: 172 LBS | HEIGHT: 61 IN | DIASTOLIC BLOOD PRESSURE: 62 MMHG | BODY MASS INDEX: 32.47 KG/M2 | HEART RATE: 52 BPM | SYSTOLIC BLOOD PRESSURE: 101 MMHG

## 2019-10-18 DIAGNOSIS — Z98.890 POST-OPERATIVE STATE: Primary | ICD-10-CM

## 2019-10-18 PROCEDURE — 99024 PR POST-OP FOLLOW-UP VISIT: ICD-10-PCS | Mod: S$GLB,,, | Performed by: PODIATRIST

## 2019-10-18 PROCEDURE — 99999 PR PBB SHADOW E&M-EST. PATIENT-LVL IV: ICD-10-PCS | Mod: PBBFAC,,, | Performed by: PODIATRIST

## 2019-10-18 PROCEDURE — 99999 PR PBB SHADOW E&M-EST. PATIENT-LVL IV: CPT | Mod: PBBFAC,,, | Performed by: PODIATRIST

## 2019-10-18 PROCEDURE — 99024 POSTOP FOLLOW-UP VISIT: CPT | Mod: S$GLB,,, | Performed by: PODIATRIST

## 2019-10-23 ENCOUNTER — OFFICE VISIT (OUTPATIENT)
Dept: PODIATRY | Facility: CLINIC | Age: 79
End: 2019-10-23
Payer: MEDICARE

## 2019-10-23 VITALS
HEART RATE: 51 BPM | DIASTOLIC BLOOD PRESSURE: 60 MMHG | TEMPERATURE: 98 F | WEIGHT: 172 LBS | HEIGHT: 61 IN | SYSTOLIC BLOOD PRESSURE: 104 MMHG | BODY MASS INDEX: 32.47 KG/M2

## 2019-10-23 DIAGNOSIS — Z98.890 POST-OPERATIVE STATE: Primary | ICD-10-CM

## 2019-10-23 PROCEDURE — 99999 PR PBB SHADOW E&M-EST. PATIENT-LVL IV: ICD-10-PCS | Mod: PBBFAC,,, | Performed by: PODIATRIST

## 2019-10-23 PROCEDURE — 99024 PR POST-OP FOLLOW-UP VISIT: ICD-10-PCS | Mod: S$GLB,,, | Performed by: PODIATRIST

## 2019-10-23 PROCEDURE — 99999 PR PBB SHADOW E&M-EST. PATIENT-LVL IV: CPT | Mod: PBBFAC,,, | Performed by: PODIATRIST

## 2019-10-23 PROCEDURE — 99024 POSTOP FOLLOW-UP VISIT: CPT | Mod: S$GLB,,, | Performed by: PODIATRIST

## 2019-10-25 LAB
ACID FAST MOD KINY STN SPEC: NORMAL
MYCOBACTERIUM SPEC QL CULT: NORMAL

## 2019-10-26 ENCOUNTER — OFFICE VISIT (OUTPATIENT)
Dept: URGENT CARE | Facility: CLINIC | Age: 79
End: 2019-10-26
Payer: MEDICARE

## 2019-10-26 VITALS
OXYGEN SATURATION: 97 % | BODY MASS INDEX: 24.08 KG/M2 | WEIGHT: 172 LBS | DIASTOLIC BLOOD PRESSURE: 65 MMHG | RESPIRATION RATE: 16 BRPM | HEART RATE: 51 BPM | SYSTOLIC BLOOD PRESSURE: 117 MMHG | TEMPERATURE: 98 F | HEIGHT: 71 IN

## 2019-10-26 DIAGNOSIS — Z48.00 CHANGE OF DRESSING: Primary | ICD-10-CM

## 2019-10-26 PROCEDURE — 1101F PT FALLS ASSESS-DOCD LE1/YR: CPT | Mod: CPTII,S$GLB,, | Performed by: NURSE PRACTITIONER

## 2019-10-26 PROCEDURE — 99214 PR OFFICE/OUTPT VISIT, EST, LEVL IV, 30-39 MIN: ICD-10-PCS | Mod: S$GLB,,, | Performed by: NURSE PRACTITIONER

## 2019-10-26 PROCEDURE — 1101F PR PT FALLS ASSESS DOC 0-1 FALLS W/OUT INJ PAST YR: ICD-10-PCS | Mod: CPTII,S$GLB,, | Performed by: NURSE PRACTITIONER

## 2019-10-26 PROCEDURE — 3074F SYST BP LT 130 MM HG: CPT | Mod: CPTII,S$GLB,, | Performed by: NURSE PRACTITIONER

## 2019-10-26 PROCEDURE — 3078F DIAST BP <80 MM HG: CPT | Mod: CPTII,S$GLB,, | Performed by: NURSE PRACTITIONER

## 2019-10-26 PROCEDURE — 3074F PR MOST RECENT SYSTOLIC BLOOD PRESSURE < 130 MM HG: ICD-10-PCS | Mod: CPTII,S$GLB,, | Performed by: NURSE PRACTITIONER

## 2019-10-26 PROCEDURE — 99214 OFFICE O/P EST MOD 30 MIN: CPT | Mod: S$GLB,,, | Performed by: NURSE PRACTITIONER

## 2019-10-26 PROCEDURE — 3078F PR MOST RECENT DIASTOLIC BLOOD PRESSURE < 80 MM HG: ICD-10-PCS | Mod: CPTII,S$GLB,, | Performed by: NURSE PRACTITIONER

## 2019-10-26 NOTE — PATIENT INSTRUCTIONS
Follow up with podiatry as scheduled on Tuesday.     You must understand that you've received an Urgent Care treatment only and that you may be released before all your medical problems are known or treated. You, the patient, will arrange for follow up care as instructed.  If your condition worsens we recommend that you receive another evaluation at the emergency room immediately or contact your primary medical clinics after hours call service to discuss your concerns.  Please return here or go to the Emergency Department for any concerns or worsening of condition.      Wound Care  Taking proper care of your wound will help it heal. Your healthcare provider may show you how to clean and dress the wound. He or she will also explain how to tell if the wound is healing normally. If you are unsure of how to take care of the wound, be sure to clarify what dressing to use and how often you should change the bandages. Here are the basic steps.     A wound that's not healing normally may be dark in color or have white streaks.   Wash your hands  Tips for washing your hands include:  · Use liquid soap and lather for 2 minutes. Scrub between your fingers and under your nails.  · Rinse with warm water, keeping your fingers pointing down.  · Use a paper towel to dry your hands and to turn off the faucet.  Remove the used dressing  Here are suggestions for removing the dressing:  · If dressing changes cause you pain, be sure to take your pain medicine as prescribed by your healthcare provider 30 minutes before dressing changes.  · Set up your supplies.  · Put on disposable gloves if youre dressing a wound for someone else or your wound is infected.  · Loosen the tape by pulling gently toward the wound.  · Gently take off the old dressing. If the dressing is stuck to the wound, moisten it with saline (if available) or clean water.  · If you have a drain or tube in the wound, be careful not to pull on it.  · Remove the dressing 1  layer at a time and put it in a plastic bag.  · Remove your gloves.  Inspect and dress the wound  Check the wound carefully:  · Each time you change the dressing, inspect the wound carefully to be sure its healing normally by making sure your wound appears to be pink and moist, and is free of infection.    · Wash your hands again. Put on a new pair of gloves.  · Clean and dress the wound as directed by your healthcare provider or nurse. Do not put anything in the wound that is not prescribed or directed by your healthcare provider. If you have a drain or tube, be careful not to pull on it. Make sure to secure the drain or tube as well.  · Put all supplies in a plastic bag. Seal the bag and put it in the trash.  · Be sure to wash your hands again.  Call your healthcare provider  Call your healthcare provider if you see any of the following signs of a problem:  · Bleeding that soaks the dressing  · Pink fluid weeping from the wound  · Increased drainage or drainage that is yellow, yellow-green, or foul-smelling  · Increased swelling or pain, or redness or swelling in the skin around the wound  · A change in the color of the wound, or if streaks develop in a direction away from the wound  · The area between any stitches opens up  · An increase in the size of the wound  · A fever of 100.4°F (38°C) or higher, or as directed by your healthcare provider  · Chills, increased fatigue, or a loss of appetite      Date Last Reviewed: 7/30/2015  © 1414-4404 [employer]. [address] [place] [address], [place] [address]. All rights reserved. This information is not intended as a substitute for professional medical care. Always follow your healthcare professional's instructions.

## 2019-10-26 NOTE — PROGRESS NOTES
"Subjective:       Patient ID: Randy Camejo is a 79 y.o. male.    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 He sees podiatry weekly and his next appt is Tuesday.     Review of Systems   Constitution: Negative for chills and fever.   HENT: Negative for sore throat.    Eyes: Negative for blurred vision.   Cardiovascular: Negative for chest pain.   Respiratory: Negative for shortness of breath.    Skin: Negative for rash.   Musculoskeletal: Negative for back pain and joint pain.   Gastrointestinal: Negative for abdominal pain, diarrhea, nausea and vomiting.   Neurological: Negative for headaches.   Psychiatric/Behavioral: The patient is not nervous/anxious.        Objective:      Physical Exam   Constitutional: He is oriented to person, place, and time. He appears well-developed and well-nourished.   HENT:   Head: Normocephalic and atraumatic. Head is without abrasion, without contusion and without laceration.   Right Ear: External ear normal.   Left Ear: External ear normal.   Nose: Nose normal.   Mouth/Throat: Oropharynx is clear and moist.   Eyes: Pupils are equal, round, and reactive to light. Conjunctivae, EOM and lids are normal.   Neck: Trachea normal, full passive range of motion without pain and phonation normal. Neck supple.   Cardiovascular: Regular rhythm and normal heart sounds. Bradycardia present.   Pulmonary/Chest: Effort normal and breath sounds normal. No stridor. No respiratory distress.   Musculoskeletal: Normal range of motion.        Right foot: There is deformity (amputated). There is normal range of motion, no tenderness, no bony tenderness, " no swelling, normal capillary refill, no crepitus and no laceration.        Feet:    Neurological: He is alert and oriented to person, place, and time.   Skin: Skin is warm, dry and intact. Capillary refill takes less than 2 seconds. No abrasion, no bruising, no burn, no ecchymosis, no laceration, no lesion and no rash noted. No erythema.   Psychiatric: He has a normal mood and affect. His speech is normal and behavior is normal. Judgment and thought content normal. Cognition and memory are normal.   Nursing note and vitals reviewed.        Soiled dressed removed  Re-dressed.   Only to top of bandage.   Patient will follow up as scheduled Tuesday.   Assessment:       1. 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    Diagnoses and all orders for this visit:    Change of dressing            Patient Instructions     Follow up with podiatry as scheduled on Tuesday.     You must understand that you've received an Urgent Care treatment only and that you may be released before all your medical problems are known or treated. You, the patient, will arrange for follow up care as instructed.  If your condition worsens we recommend that you receive another evaluation at the emergency room immediately or contact your primary medical clinics after hours call service to discuss your concerns.  Please return here or go to the Emergency Department for any concerns or worsening of condition.      Wound Care  Taking proper care of your wound will help it heal. Your healthcare provider may show you how to clean and dress the wound. He or she will also explain how to tell if the wound is healing normally. If you are unsure of how to take care of the wound, be sure to clarify what dressing to use and how often you should change the bandages. Here are the basic steps.     A wound that's not healing normally may be dark in color or have white streaks.   Wash your  hands  Tips for washing your hands include:  · Use liquid soap and lather for 2 minutes. Scrub between your fingers and under your nails.  · Rinse with warm water, keeping your fingers pointing down.  · Use a paper towel to dry your hands and to turn off the faucet.  Remove the used dressing  Here are suggestions for removing the dressing:  · If dressing changes cause you pain, be sure to take your pain medicine as prescribed by your healthcare provider 30 minutes before dressing changes.  · Set up your supplies.  · Put on disposable gloves if youre dressing a wound for someone else or your wound is infected.  · Loosen the tape by pulling gently toward the wound.  · Gently take off the old dressing. If the dressing is stuck to the wound, moisten it with saline (if available) or clean water.  · If you have a drain or tube in the wound, be careful not to pull on it.  · Remove the dressing 1 layer at a time and put it in a plastic bag.  · Remove your gloves.  Inspect and dress the wound  Check the wound carefully:  · Each time you change the dressing, inspect the wound carefully to be sure its healing normally by making sure your wound appears to be pink and moist, and is free of infection.    · Wash your hands again. Put on a new pair of gloves.  · Clean and dress the wound as directed by your healthcare provider or nurse. Do not put anything in the wound that is not prescribed or directed by your healthcare provider. If you have a drain or tube, be careful not to pull on it. Make sure to secure the drain or tube as well.  · Put all supplies in a plastic bag. Seal the bag and put it in the trash.  · Be sure to wash your hands again.  Call your healthcare provider  Call your healthcare provider if you see any of the following signs of a problem:  · Bleeding that soaks the dressing  · Pink fluid weeping from the wound  · Increased drainage or drainage that is yellow, yellow-green, or foul-smelling  · Increased  swelling or pain, or redness or swelling in the skin around the wound  · A change in the color of the wound, or if streaks develop in a direction away from the wound  · The area between any stitches opens up  · An increase in the size of the wound  · A fever of 100.4°F (38°C) or higher, or as directed by your healthcare provider  · Chills, increased fatigue, or a loss of appetite      Date Last Reviewed: 7/30/2015  © 5896-1703 [employer]. [address] [place] [address], [place] [id number]. All rights reserved. This information is not intended as a substitute for professional medical care. Always follow your healthcare professional's instructions.

## 2019-10-29 ENCOUNTER — OFFICE VISIT (OUTPATIENT)
Dept: PODIATRY | Facility: CLINIC | Age: 79
End: 2019-10-29
Payer: MEDICARE

## 2019-10-29 VITALS
SYSTOLIC BLOOD PRESSURE: 114 MMHG | HEART RATE: 51 BPM | BODY MASS INDEX: 32.47 KG/M2 | WEIGHT: 172 LBS | DIASTOLIC BLOOD PRESSURE: 61 MMHG | HEIGHT: 61 IN

## 2019-10-29 DIAGNOSIS — Z98.890 POST-OPERATIVE STATE: Primary | ICD-10-CM

## 2019-10-29 PROCEDURE — 99024 PR POST-OP FOLLOW-UP VISIT: ICD-10-PCS | Mod: S$GLB,,, | Performed by: PODIATRIST

## 2019-10-29 PROCEDURE — 99024 POSTOP FOLLOW-UP VISIT: CPT | Mod: S$GLB,,, | Performed by: PODIATRIST

## 2019-10-29 PROCEDURE — 99999 PR PBB SHADOW E&M-EST. PATIENT-LVL IV: CPT | Mod: PBBFAC,,, | Performed by: PODIATRIST

## 2019-10-29 PROCEDURE — 99999 PR PBB SHADOW E&M-EST. PATIENT-LVL IV: ICD-10-PCS | Mod: PBBFAC,,, | Performed by: PODIATRIST

## 2019-10-29 NOTE — PROGRESS NOTES
Subjective:      Patient ID: Randy Camejo is a 79 y.o. male.    Chief Complaint: Diabetic Foot Exam (09/11/2019 dr dustin lazo) and Diabetes Mellitus    Randy is a 79 y.o. male who presents to the clinic for evaluation and treatment of high risk feet. 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 The patient's chief complaint is s/p right 5th ray amp. Pt denies any NVFCSOB.    This patient has documented high risk feet requiring routine maintenance secondary to peripheral vascular disease.      PCP: Dustin Lazo MD    Date Last Seen by PCP:     Current shoe gear:  Affected Foot: Football and Darco shoe on the affected foot     Unaffected Foot: Slip-on shoes    History of Trauma: negative  Sign of Infection: none    Hemoglobin A1C   Date Value Ref Range Status   06/05/2019 6.3 (H) 4.0 - 5.6 % Final     Comment:     ADA Screening Guidelines:  5.7-6.4%  Consistent with prediabetes  >or=6.5%  Consistent with diabetes  High levels of fetal hemoglobin interfere with the HbA1C  assay. Heterozygous hemoglobin variants (HbS, HgC, etc)do  not significantly interfere with this assay.   However, presence of multiple variants may affect accuracy.     04/10/2019 8.4 (H) 4.0 - 5.6 % Final     Comment:     ADA Screening Guidelines:  5.7-6.4%  Consistent with prediabetes  >or=6.5%  Consistent with diabetes  High levels of fetal hemoglobin interfere with the HbA1C  assay. Heterozygous hemoglobin variants (HbS, HgC, etc)do  not significantly interfere with this assay.   However, presence  of multiple variants may affect accuracy.     02/19/2019 12.6 (H) 4.0 - 5.6 % Final     Comment:     ADA Screening Guidelines:  5.7-6.4%  Consistent with prediabetes  >or=6.5%  Consistent with diabetes  High levels of fetal hemoglobin interfere with the HbA1C  assay. Heterozygous hemoglobin variants (HbS, HgC, etc)do  not significantly interfere with this assay.   However, presence of multiple variants may affect accuracy.         Review of Systems   Constitution: Negative for chills, fever and malaise/fatigue.   HENT: Negative for hearing loss.    Cardiovascular: Negative for claudication.   Respiratory: Negative for shortness of breath.    Skin: Positive for color change, dry skin, nail changes, poor wound healing and unusual hair distribution. Negative for flushing and rash.   Musculoskeletal: Negative for joint pain and myalgias.   Neurological: Negative for loss of balance, numbness, paresthesias and sensory change.   Psychiatric/Behavioral: Negative for altered mental status.               Objective:      Physical Exam   Constitutional: He appears well-developed and well-nourished. He is cooperative.   Cardiovascular:   Pulses:       Dorsalis pedis pulses are 0 on the right side, and 0 on the left side.        Posterior tibial pulses are 0 on the right side, and 1+ on the left side.   Bi-phasic pulses noted with doppler, right   Musculoskeletal:        Right ankle: He exhibits decreased range of motion and abnormal pulse. Achilles tendon exhibits normal Barajas's test results.        Left ankle: He exhibits decreased range of motion and abnormal pulse. Achilles tendon exhibits normal Barajas's test results.        Right foot: There is decreased range of motion.        Left foot: There is decreased range of motion.   Feet:   Right Foot:   Protective Sensation: 5 sites tested. 2 sites sensed.   Left Foot:   Protective Sensation: 5 sites tested. 2 sites sensed.   Neurological: He is alert.   diminished  sensation noted to b/L lower extremities   Skin: Skin is dry. Capillary refill takes more than 3 seconds.   Right dorsal 5th met amp site  dehiscense noted proximally to incision, probing 0.3 cm deep  Fibrotic tissue noted  No SOI   Psychiatric: His behavior is normal.   Vitals reviewed.                    Assessment:       Encounter Diagnosis   Name Primary?    Post-operative state Yes         Plan:       Randy was seen today for diabetic foot exam and diabetes mellitus.    Diagnoses and all orders for this visit:    Post-operative state      I counseled the patient on his conditions, their implications and medical management.        Incision swabbed with betadine, followed by a football dressing  Pt advised to stay off of feet as much as possible and elevate  RTC in 1 week or sooner if any new pedal problems arise, any signs of infection occur, or if condition worsens.

## 2019-11-01 NOTE — PROGRESS NOTES
Subjective:      Patient ID: Randy Camejo is a 79 y.o. male.    Chief Complaint: Post-op Evaluation    Randy is a 79 y.o. male who presents to the clinic for evaluation and treatment of high risk feet. 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 The patient's chief complaint is s/p right 5th ray amp. Pt denies any NVFCSOB.    This patient has documented high risk feet requiring routine maintenance secondary to peripheral vascular disease.      PCP: Susie Lazo MD    Date Last Seen by PCP:     Current shoe gear:  Affected Foot: Football and Darco shoe on the affected foot     Unaffected Foot: Slip-on shoes    History of Trauma: negative  Sign of Infection: none    Hemoglobin A1C   Date Value Ref Range Status   06/05/2019 6.3 (H) 4.0 - 5.6 % Final     Comment:     ADA Screening Guidelines:  5.7-6.4%  Consistent with prediabetes  >or=6.5%  Consistent with diabetes  High levels of fetal hemoglobin interfere with the HbA1C  assay. Heterozygous hemoglobin variants (HbS, HgC, etc)do  not significantly interfere with this assay.   However, presence of multiple variants may affect accuracy.     04/10/2019 8.4 (H) 4.0 - 5.6 % Final     Comment:     ADA Screening Guidelines:  5.7-6.4%  Consistent with prediabetes  >or=6.5%  Consistent with diabetes  High levels of fetal hemoglobin interfere with the HbA1C  assay. Heterozygous hemoglobin variants (HbS, HgC, etc)do  not significantly interfere with this assay.   However, presence of multiple variants may affect accuracy.      02/19/2019 12.6 (H) 4.0 - 5.6 % Final     Comment:     ADA Screening Guidelines:  5.7-6.4%  Consistent with prediabetes  >or=6.5%  Consistent with diabetes  High levels of fetal hemoglobin interfere with the HbA1C  assay. Heterozygous hemoglobin variants (HbS, HgC, etc)do  not significantly interfere with this assay.   However, presence of multiple variants may affect accuracy.         Review of Systems   Constitution: Negative for chills, fever and malaise/fatigue.   HENT: Negative for hearing loss.    Cardiovascular: Negative for claudication.   Respiratory: Negative for shortness of breath.    Skin: Positive for color change, dry skin, nail changes, poor wound healing and unusual hair distribution. Negative for flushing and rash.   Musculoskeletal: Negative for joint pain and myalgias.   Neurological: Negative for loss of balance, numbness, paresthesias and sensory change.   Psychiatric/Behavioral: Negative for altered mental status.               Objective:      Physical Exam   Constitutional: He appears well-developed and well-nourished. He is cooperative.   Cardiovascular:   Pulses:       Dorsalis pedis pulses are 0 on the right side, and 0 on the left side.        Posterior tibial pulses are 0 on the right side, and 1+ on the left side.   Bi-phasic pulses noted with doppler, right   Musculoskeletal:        Right ankle: He exhibits decreased range of motion and abnormal pulse. Achilles tendon exhibits normal Barajas's test results.        Left ankle: He exhibits decreased range of motion and abnormal pulse. Achilles tendon exhibits normal Barajas's test results.        Right foot: There is decreased range of motion.        Left foot: There is decreased range of motion.   Feet:   Right Foot:   Protective Sensation: 5 sites tested. 2 sites sensed.   Left Foot:   Protective Sensation: 5 sites tested. 2 sites sensed.   Neurological: He is alert.   diminished sensation noted to b/L lower extremities   Skin:  Skin is dry. Capillary refill takes more than 3 seconds.   Right dorsal 5th met amp site  dehiscense noted proximally to incision, probing 0.3 cm deep  Fibrotic tissue noted  No SOI   Psychiatric: His behavior is normal.   Vitals reviewed.                    Assessment:       Encounter Diagnosis   Name Primary?    Post-operative state Yes         Plan:       Randy was seen today for post-op evaluation.    Diagnoses and all orders for this visit:    Post-operative state      I counseled the patient on his conditions, their implications and medical management.        Incision swabbed with betadine, iodosorb applied followed by a football dressing  Pt advised to stay off of feet as much as possible and elevate  RTC in 1 week or sooner if any new pedal problems arise, any signs of infection occur, or if condition worsens.

## 2019-11-06 ENCOUNTER — OFFICE VISIT (OUTPATIENT)
Dept: PODIATRY | Facility: CLINIC | Age: 79
End: 2019-11-06
Payer: MEDICARE

## 2019-11-06 VITALS
HEIGHT: 61 IN | SYSTOLIC BLOOD PRESSURE: 107 MMHG | HEART RATE: 52 BPM | DIASTOLIC BLOOD PRESSURE: 70 MMHG | WEIGHT: 172 LBS | BODY MASS INDEX: 32.47 KG/M2

## 2019-11-06 DIAGNOSIS — T81.31XS POSTOPERATIVE WOUND DEHISCENCE, SEQUELA: Primary | ICD-10-CM

## 2019-11-06 PROCEDURE — 99024 POSTOP FOLLOW-UP VISIT: CPT | Mod: S$GLB,,, | Performed by: PODIATRIST

## 2019-11-06 PROCEDURE — 99999 PR PBB SHADOW E&M-EST. PATIENT-LVL IV: ICD-10-PCS | Mod: PBBFAC,,, | Performed by: PODIATRIST

## 2019-11-06 PROCEDURE — 99024 PR POST-OP FOLLOW-UP VISIT: ICD-10-PCS | Mod: S$GLB,,, | Performed by: PODIATRIST

## 2019-11-06 PROCEDURE — 99999 PR PBB SHADOW E&M-EST. PATIENT-LVL IV: CPT | Mod: PBBFAC,,, | Performed by: PODIATRIST

## 2019-11-07 NOTE — PROGRESS NOTES
Subjective:      Patient ID: Randy Camejo is a 79 y.o. male.    Chief Complaint: Post-op Evaluation    Randy is a 79 y.o. male who presents to the clinic for evaluation and treatment of high risk feet. 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 The patient's chief complaint is s/p right 5th ray amp. Pt denies any NVFCSOB.    This patient has documented high risk feet requiring routine maintenance secondary to peripheral vascular disease.      PCP: Susie Lazo MD    Date Last Seen by PCP:     Current shoe gear:  Affected Foot: Football and Darco shoe on the affected foot     Unaffected Foot: Slip-on shoes    History of Trauma: negative  Sign of Infection: none    Hemoglobin A1C   Date Value Ref Range Status   06/05/2019 6.3 (H) 4.0 - 5.6 % Final     Comment:     ADA Screening Guidelines:  5.7-6.4%  Consistent with prediabetes  >or=6.5%  Consistent with diabetes  High levels of fetal hemoglobin interfere with the HbA1C  assay. Heterozygous hemoglobin variants (HbS, HgC, etc)do  not significantly interfere with this assay.   However, presence of multiple variants may affect accuracy.     04/10/2019 8.4 (H) 4.0 - 5.6 % Final     Comment:     ADA Screening Guidelines:  5.7-6.4%  Consistent with prediabetes  >or=6.5%  Consistent with diabetes  High levels of fetal hemoglobin interfere with the HbA1C  assay. Heterozygous hemoglobin variants (HbS, HgC, etc)do  not significantly interfere with this assay.   However, presence of multiple variants may affect accuracy.      02/19/2019 12.6 (H) 4.0 - 5.6 % Final     Comment:     ADA Screening Guidelines:  5.7-6.4%  Consistent with prediabetes  >or=6.5%  Consistent with diabetes  High levels of fetal hemoglobin interfere with the HbA1C  assay. Heterozygous hemoglobin variants (HbS, HgC, etc)do  not significantly interfere with this assay.   However, presence of multiple variants may affect accuracy.         Review of Systems   Constitution: Negative for chills, fever and malaise/fatigue.   HENT: Negative for hearing loss.    Cardiovascular: Negative for claudication.   Respiratory: Negative for shortness of breath.    Skin: Positive for color change, dry skin, nail changes, poor wound healing and unusual hair distribution. Negative for flushing and rash.   Musculoskeletal: Negative for joint pain and myalgias.   Neurological: Negative for loss of balance, numbness, paresthesias and sensory change.   Psychiatric/Behavioral: Negative for altered mental status.               Objective:      Physical Exam   Constitutional: He appears well-developed and well-nourished. He is cooperative.   Cardiovascular:   Pulses:       Dorsalis pedis pulses are 0 on the right side, and 0 on the left side.        Posterior tibial pulses are 0 on the right side, and 1+ on the left side.   Bi-phasic pulses noted with doppler, right   Musculoskeletal:        Right ankle: He exhibits decreased range of motion and abnormal pulse. Achilles tendon exhibits normal Barajas's test results.        Left ankle: He exhibits decreased range of motion and abnormal pulse. Achilles tendon exhibits normal Barajas's test results.        Right foot: There is decreased range of motion.        Left foot: There is decreased range of motion.   Feet:   Right Foot:   Protective Sensation: 5 sites tested. 2 sites sensed.   Left Foot:   Protective Sensation: 5 sites tested. 2 sites sensed.   Neurological: He is alert.   diminished sensation noted to b/L lower extremities   Skin:  Skin is dry. Capillary refill takes more than 3 seconds.   Right dorsal 5th met amp site  dehiscense noted proximally to incision, probing 0.3 cm deep  Fibrotic tissue noted, same  No SOI   Psychiatric: His behavior is normal.   Vitals reviewed.                    Assessment:       Encounter Diagnosis   Name Primary?    Post-operative state - Right Foot Yes         Plan:       Randy was seen today for post-op evaluation.    Diagnoses and all orders for this visit:    Post-operative state - Right Foot      I counseled the patient on his conditions, their implications and medical management.        Incision swabbed with betadine, iodosorb applied followed by a football dressing  Pt advised to stay off of feet as much as possible and elevate  RTC in 1 week or sooner if any new pedal problems arise, any signs of infection occur, or if condition worsens.

## 2019-11-12 NOTE — PROGRESS NOTES
Subjective:      Patient ID: Randy Camejo is a 79 y.o. male.    Chief Complaint: Post-op Evaluation    Randy is a 79 y.o. male who presents to the clinic for evaluation and treatment of high risk feet. 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 The patient's chief complaint is s/p right 5th ray amp. Pt denies any NVFCSOB.    This patient has documented high risk feet requiring routine maintenance secondary to peripheral vascular disease.      PCP: Susie Lazo MD    Date Last Seen by PCP:     Current shoe gear:  Affected Foot: Football and Darco shoe on the affected foot     Unaffected Foot: Slip-on shoes    History of Trauma: negative  Sign of Infection: none    Hemoglobin A1C   Date Value Ref Range Status   06/05/2019 6.3 (H) 4.0 - 5.6 % Final     Comment:     ADA Screening Guidelines:  5.7-6.4%  Consistent with prediabetes  >or=6.5%  Consistent with diabetes  High levels of fetal hemoglobin interfere with the HbA1C  assay. Heterozygous hemoglobin variants (HbS, HgC, etc)do  not significantly interfere with this assay.   However, presence of multiple variants may affect accuracy.     04/10/2019 8.4 (H) 4.0 - 5.6 % Final     Comment:     ADA Screening Guidelines:  5.7-6.4%  Consistent with prediabetes  >or=6.5%  Consistent with diabetes  High levels of fetal hemoglobin interfere with the HbA1C  assay. Heterozygous hemoglobin variants (HbS, HgC, etc)do  not significantly interfere with this assay.   However, presence of multiple variants may affect accuracy.      02/19/2019 12.6 (H) 4.0 - 5.6 % Final     Comment:     ADA Screening Guidelines:  5.7-6.4%  Consistent with prediabetes  >or=6.5%  Consistent with diabetes  High levels of fetal hemoglobin interfere with the HbA1C  assay. Heterozygous hemoglobin variants (HbS, HgC, etc)do  not significantly interfere with this assay.   However, presence of multiple variants may affect accuracy.         Review of Systems   Constitution: Negative for chills, fever and malaise/fatigue.   HENT: Negative for hearing loss.    Cardiovascular: Negative for claudication.   Respiratory: Negative for shortness of breath.    Skin: Positive for color change, dry skin, nail changes, poor wound healing and unusual hair distribution. Negative for flushing and rash.   Musculoskeletal: Negative for joint pain and myalgias.   Neurological: Negative for loss of balance, numbness, paresthesias and sensory change.   Psychiatric/Behavioral: Negative for altered mental status.               Objective:      Physical Exam   Constitutional: He appears well-developed and well-nourished. He is cooperative.   Cardiovascular:   Pulses:       Dorsalis pedis pulses are 0 on the right side, and 0 on the left side.        Posterior tibial pulses are 0 on the right side, and 1+ on the left side.   Bi-phasic pulses noted with doppler, right   Musculoskeletal:        Right ankle: He exhibits decreased range of motion and abnormal pulse. Achilles tendon exhibits normal Barajas's test results.        Left ankle: He exhibits decreased range of motion and abnormal pulse. Achilles tendon exhibits normal Barajas's test results.        Right foot: There is decreased range of motion.        Left foot: There is decreased range of motion.   Feet:   Right Foot:   Protective Sensation: 5 sites tested. 2 sites sensed.   Left Foot:   Protective Sensation: 5 sites tested. 2 sites sensed.   Neurological: He is alert.   diminished sensation noted to b/L lower extremities   Skin:  Skin is dry. Capillary refill takes more than 3 seconds.   Right dorsal 5th met amp site  dehiscense noted proximally to incision,   2.0x 0.8x 0.3cm  fibrogranular wound base   Psychiatric: His behavior is normal.   Vitals reviewed.                    Assessment:       Encounter Diagnosis   Name Primary?    Postoperative wound dehiscence, sequela Yes         Plan:       Randy was seen today for post-op evaluation.    Diagnoses and all orders for this visit:    Postoperative wound dehiscence, sequela      I counseled the patient on his conditions, their implications and medical management.        Incision swabbed with betadine, iodosorb applied followed by a football dressing  Pt advised to stay off of feet as much as possible and elevate  RTC in 1 week or sooner if any new pedal problems arise, any signs of infection occur, or if condition worsens.

## 2019-11-15 ENCOUNTER — OFFICE VISIT (OUTPATIENT)
Dept: PODIATRY | Facility: CLINIC | Age: 79
End: 2019-11-15
Payer: MEDICARE

## 2019-11-15 VITALS
SYSTOLIC BLOOD PRESSURE: 110 MMHG | HEART RATE: 52 BPM | HEIGHT: 61 IN | BODY MASS INDEX: 32.26 KG/M2 | DIASTOLIC BLOOD PRESSURE: 59 MMHG

## 2019-11-15 DIAGNOSIS — L97.412 DIABETIC ULCER OF RIGHT MIDFOOT ASSOCIATED WITH TYPE 2 DIABETES MELLITUS, WITH FAT LAYER EXPOSED: ICD-10-CM

## 2019-11-15 DIAGNOSIS — E11.40 TYPE 2 DIABETES MELLITUS WITH DIABETIC NEUROPATHY, WITH LONG-TERM CURRENT USE OF INSULIN: ICD-10-CM

## 2019-11-15 DIAGNOSIS — E11.621 DIABETIC ULCER OF RIGHT MIDFOOT ASSOCIATED WITH TYPE 2 DIABETES MELLITUS, WITH FAT LAYER EXPOSED: ICD-10-CM

## 2019-11-15 DIAGNOSIS — Z79.4 TYPE 2 DIABETES MELLITUS WITH DIABETIC NEUROPATHY, WITH LONG-TERM CURRENT USE OF INSULIN: ICD-10-CM

## 2019-11-15 PROCEDURE — 99999 PR PBB SHADOW E&M-EST. PATIENT-LVL IV: CPT | Mod: PBBFAC,,, | Performed by: PODIATRIST

## 2019-11-15 PROCEDURE — 99024 POSTOP FOLLOW-UP VISIT: CPT | Mod: S$GLB,,, | Performed by: PODIATRIST

## 2019-11-15 PROCEDURE — 99024 PR POST-OP FOLLOW-UP VISIT: ICD-10-PCS | Mod: S$GLB,,, | Performed by: PODIATRIST

## 2019-11-15 PROCEDURE — 99999 PR PBB SHADOW E&M-EST. PATIENT-LVL IV: ICD-10-PCS | Mod: PBBFAC,,, | Performed by: PODIATRIST

## 2019-11-15 NOTE — PROGRESS NOTES
Subjective:      Patient ID: Randy Camejo is a 79 y.o. male.    Chief Complaint: Post-op Evaluation and Foot Problem (right ft.)    Randy is a 79 y.o. male who presents to the clinic for evaluation and treatment of high risk feet. 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 The patient's chief complaint is s/p right 5th ray amp. Pt denies any NVFCSOB.    This patient has documented high risk feet requiring routine maintenance secondary to peripheral vascular disease.      PCP: Susie Lazo MD    Date Last Seen by PCP:     Current shoe gear:  Affected Foot: Football and Darco shoe on the affected foot     Unaffected Foot: Slip-on shoes    History of Trauma: negative  Sign of Infection: none    Hemoglobin A1C   Date Value Ref Range Status   06/05/2019 6.3 (H) 4.0 - 5.6 % Final     Comment:     ADA Screening Guidelines:  5.7-6.4%  Consistent with prediabetes  >or=6.5%  Consistent with diabetes  High levels of fetal hemoglobin interfere with the HbA1C  assay. Heterozygous hemoglobin variants (HbS, HgC, etc)do  not significantly interfere with this assay.   However, presence of multiple variants may affect accuracy.     04/10/2019 8.4 (H) 4.0 - 5.6 % Final     Comment:     ADA Screening Guidelines:  5.7-6.4%  Consistent with prediabetes  >or=6.5%  Consistent with diabetes  High levels of fetal hemoglobin interfere with the HbA1C  assay. Heterozygous hemoglobin variants (HbS, HgC, etc)do  not significantly interfere with this assay.   However, presence of multiple variants may  affect accuracy.     02/19/2019 12.6 (H) 4.0 - 5.6 % Final     Comment:     ADA Screening Guidelines:  5.7-6.4%  Consistent with prediabetes  >or=6.5%  Consistent with diabetes  High levels of fetal hemoglobin interfere with the HbA1C  assay. Heterozygous hemoglobin variants (HbS, HgC, etc)do  not significantly interfere with this assay.   However, presence of multiple variants may affect accuracy.         Review of Systems   Constitution: Negative for chills, fever and malaise/fatigue.   HENT: Negative for hearing loss.    Cardiovascular: Negative for claudication.   Respiratory: Negative for shortness of breath.    Skin: Positive for color change, dry skin, nail changes, poor wound healing and unusual hair distribution. Negative for flushing and rash.   Musculoskeletal: Negative for joint pain and myalgias.   Neurological: Negative for loss of balance, numbness, paresthesias and sensory change.   Psychiatric/Behavioral: Negative for altered mental status.               Objective:      Physical Exam   Constitutional: He appears well-developed and well-nourished. He is cooperative.   Cardiovascular:   Pulses:       Dorsalis pedis pulses are 0 on the right side, and 0 on the left side.        Posterior tibial pulses are 0 on the right side, and 1+ on the left side.   Bi-phasic pulses noted with doppler, right   Musculoskeletal:        Right ankle: He exhibits decreased range of motion and abnormal pulse. Achilles tendon exhibits normal Barajas's test results.        Left ankle: He exhibits decreased range of motion and abnormal pulse. Achilles tendon exhibits normal Barajas's test results.        Right foot: There is decreased range of motion.        Left foot: There is decreased range of motion.   Feet:   Right Foot:   Protective Sensation: 5 sites tested. 2 sites sensed.   Left Foot:   Protective Sensation: 5 sites tested. 2 sites sensed.   Neurological: He is alert.   diminished sensation noted to b/L lower  extremities   Skin: Skin is dry. Capillary refill takes more than 3 seconds.   Right dorsal 5th met amp site  dehiscense noted proximally to incision,   2.0x 0.7x 0.3cm  fibrogranular wound base   Psychiatric: His behavior is normal.   Vitals reviewed.                    Assessment:       Encounter Diagnoses   Name Primary?    Type 2 diabetes mellitus with diabetic neuropathy, with long-term current use of insulin     Diabetic ulcer of right midfoot associated with type 2 diabetes mellitus, with fat layer exposed          Plan:       Randy was seen today for post-op evaluation and foot problem.    Diagnoses and all orders for this visit:    Type 2 diabetes mellitus with diabetic neuropathy, with long-term current use of insulin    Diabetic ulcer of right midfoot associated with type 2 diabetes mellitus, with fat layer exposed      I counseled the patient on his conditions, their implications and medical management.    With patients verbal permission  wound was debrided excisionally of biofilm and adipose with curette.  Post debridement measurements were 2.0x0.7x0.3cm.      Applied medihoney and foot ball dressing.  Pt advised to stay off of feet as much as possible and elevate  RTC in 1 week or sooner if any new pedal problems arise, any signs of infection occur, or if condition worsens.     Assisted by Hunter Hull, PGY3

## 2019-11-20 ENCOUNTER — OFFICE VISIT (OUTPATIENT)
Dept: PODIATRY | Facility: CLINIC | Age: 79
End: 2019-11-20
Payer: MEDICARE

## 2019-11-20 VITALS
HEIGHT: 61 IN | SYSTOLIC BLOOD PRESSURE: 98 MMHG | HEART RATE: 57 BPM | BODY MASS INDEX: 32.47 KG/M2 | WEIGHT: 172 LBS | DIASTOLIC BLOOD PRESSURE: 56 MMHG

## 2019-11-20 DIAGNOSIS — T81.31XS POSTOPERATIVE WOUND DEHISCENCE, SEQUELA: Primary | ICD-10-CM

## 2019-11-20 PROCEDURE — 99024 POSTOP FOLLOW-UP VISIT: CPT | Mod: S$GLB,,, | Performed by: PODIATRIST

## 2019-11-20 PROCEDURE — 99999 PR PBB SHADOW E&M-EST. PATIENT-LVL III: ICD-10-PCS | Mod: PBBFAC,,, | Performed by: PODIATRIST

## 2019-11-20 PROCEDURE — 99999 PR PBB SHADOW E&M-EST. PATIENT-LVL III: CPT | Mod: PBBFAC,,, | Performed by: PODIATRIST

## 2019-11-20 PROCEDURE — 99024 PR POST-OP FOLLOW-UP VISIT: ICD-10-PCS | Mod: S$GLB,,, | Performed by: PODIATRIST

## 2019-11-26 ENCOUNTER — TELEPHONE (OUTPATIENT)
Dept: PODIATRY | Facility: CLINIC | Age: 79
End: 2019-11-26

## 2019-11-26 NOTE — TELEPHONE ENCOUNTER
----- Message from Josephine Bejarano sent at 11/26/2019 10:22 AM CST -----  Contact: pt   Pt is calling to speak with the nurse pt needs to reschedule his post op appt to 12/6/2019 pt has an appt on 12/4/2019. Can you please call pt at [phone] jc

## 2019-12-02 NOTE — PROGRESS NOTES
Subjective:      Patient ID: Randy Camejo is a 79 y.o. male.    Chief Complaint: Diabetes Mellitus (09/11/2019 dr Susie Lazo) and Diabetic Foot Exam    Randy is a 79 y.o. male who presents to the clinic for evaluation and treatment of high risk feet. 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 The patient's chief complaint is s/p right 5th ray amp. Pt denies any NVFCSOB.    This patient has documented high risk feet requiring routine maintenance secondary to peripheral vascular disease.      PCP: Susie Lazo MD    Date Last Seen by PCP:     Current shoe gear:  Affected Foot: Football and Darco shoe on the affected foot     Unaffected Foot: Slip-on shoes    History of Trauma: negative  Sign of Infection: none    Hemoglobin A1C   Date Value Ref Range Status   06/05/2019 6.3 (H) 4.0 - 5.6 % Final     Comment:     ADA Screening Guidelines:  5.7-6.4%  Consistent with prediabetes  >or=6.5%  Consistent with diabetes  High levels of fetal hemoglobin interfere with the HbA1C  assay. Heterozygous hemoglobin variants (HbS, HgC, etc)do  not significantly interfere with this assay.   However, presence of multiple variants may affect accuracy.     04/10/2019 8.4 (H) 4.0 - 5.6 % Final     Comment:     ADA Screening Guidelines:  5.7-6.4%  Consistent with prediabetes  >or=6.5%  Consistent with diabetes  High levels of fetal hemoglobin interfere with the HbA1C  assay. Heterozygous hemoglobin variants (HbS, HgC, etc)do  not significantly interfere with this assay.   However, presence  of multiple variants may affect accuracy.     02/19/2019 12.6 (H) 4.0 - 5.6 % Final     Comment:     ADA Screening Guidelines:  5.7-6.4%  Consistent with prediabetes  >or=6.5%  Consistent with diabetes  High levels of fetal hemoglobin interfere with the HbA1C  assay. Heterozygous hemoglobin variants (HbS, HgC, etc)do  not significantly interfere with this assay.   However, presence of multiple variants may affect accuracy.         Review of Systems   Constitution: Negative for chills, fever and malaise/fatigue.   HENT: Negative for hearing loss.    Cardiovascular: Negative for claudication.   Respiratory: Negative for shortness of breath.    Skin: Positive for color change, dry skin, nail changes, poor wound healing and unusual hair distribution. Negative for flushing and rash.   Musculoskeletal: Negative for joint pain and myalgias.   Neurological: Negative for loss of balance, numbness, paresthesias and sensory change.   Psychiatric/Behavioral: Negative for altered mental status.               Objective:      Physical Exam   Constitutional: He appears well-developed and well-nourished. He is cooperative.   Cardiovascular:   Pulses:       Dorsalis pedis pulses are 0 on the right side, and 0 on the left side.        Posterior tibial pulses are 0 on the right side, and 1+ on the left side.   Bi-phasic pulses noted with doppler, right   Musculoskeletal:        Right ankle: He exhibits decreased range of motion and abnormal pulse. Achilles tendon exhibits normal Barajas's test results.        Left ankle: He exhibits decreased range of motion and abnormal pulse. Achilles tendon exhibits normal Barajas's test results.        Right foot: There is decreased range of motion.        Left foot: There is decreased range of motion.   Feet:   Right Foot:   Protective Sensation: 5 sites tested. 2 sites sensed.   Left Foot:   Protective Sensation: 5 sites tested. 2 sites sensed.   Neurological: He is alert.   diminished  sensation noted to b/L lower extremities   Skin: Skin is dry. Capillary refill takes more than 3 seconds.   Right dorsal 5th met amp site  dehiscense noted proximally to incision,   2.0x 0.5x 0.2cm  fibrogranular wound base   Psychiatric: His behavior is normal.   Vitals reviewed.                    Assessment:       Encounter Diagnosis   Name Primary?    Postoperative wound dehiscence, sequela Yes         Plan:       Randy was seen today for diabetes mellitus and diabetic foot exam.    Diagnoses and all orders for this visit:    Postoperative wound dehiscence, sequela      I counseled the patient on his conditions, their implications and medical management.    With patients verbal permission  wound was debrided excisionally of biofilm and adipose with curette.        Applied medihoney and foot ball dressing.  Pt advised to stay off of feet as much as possible and elevate  RTC in 1 week or sooner if any new pedal problems arise, any signs of infection occur, or if condition worsens.

## 2019-12-06 ENCOUNTER — OFFICE VISIT (OUTPATIENT)
Dept: PODIATRY | Facility: CLINIC | Age: 79
End: 2019-12-06
Payer: MEDICARE

## 2019-12-06 VITALS — TEMPERATURE: 98 F | HEART RATE: 52 BPM | DIASTOLIC BLOOD PRESSURE: 73 MMHG | SYSTOLIC BLOOD PRESSURE: 118 MMHG

## 2019-12-06 DIAGNOSIS — L97.512 SKIN ULCER OF RIGHT FOOT WITH FAT LAYER EXPOSED: Primary | ICD-10-CM

## 2019-12-06 PROCEDURE — 99499 UNLISTED E&M SERVICE: CPT | Mod: S$GLB,,, | Performed by: PODIATRIST

## 2019-12-06 PROCEDURE — 99999 PR PBB SHADOW E&M-EST. PATIENT-LVL III: CPT | Mod: PBBFAC,,, | Performed by: PODIATRIST

## 2019-12-06 PROCEDURE — 11042 PR DEBRIDEMENT, SKIN, SUB-Q TISSUE,=<20 SQ CM: ICD-10-PCS | Mod: S$GLB,,, | Performed by: PODIATRIST

## 2019-12-06 PROCEDURE — 11042 DBRDMT SUBQ TIS 1ST 20SQCM/<: CPT | Mod: S$GLB,,, | Performed by: PODIATRIST

## 2019-12-06 PROCEDURE — 99999 PR PBB SHADOW E&M-EST. PATIENT-LVL III: ICD-10-PCS | Mod: PBBFAC,,, | Performed by: PODIATRIST

## 2019-12-06 PROCEDURE — 99499 NO LOS: ICD-10-PCS | Mod: S$GLB,,, | Performed by: PODIATRIST

## 2019-12-09 NOTE — PROGRESS NOTES
Subjective:      Patient ID: Randy Camejo is a 79 y.o. male.    Chief Complaint: Wound Check (post op)    Randy is a 79 y.o. male who presents to the clinic for evaluation and treatment of high risk feet. 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 The patient's chief complaint is s/p right 5th ray amp. Pt denies any NVFCSOB.    This patient has documented high risk feet requiring routine maintenance secondary to peripheral vascular disease.      PCP: Susie Lazo MD    Date Last Seen by PCP:     Current shoe gear:  Affected Foot: Football and Darco shoe on the affected foot     Unaffected Foot: Slip-on shoes    History of Trauma: negative  Sign of Infection: none    Hemoglobin A1C   Date Value Ref Range Status   06/05/2019 6.3 (H) 4.0 - 5.6 % Final     Comment:     ADA Screening Guidelines:  5.7-6.4%  Consistent with prediabetes  >or=6.5%  Consistent with diabetes  High levels of fetal hemoglobin interfere with the HbA1C  assay. Heterozygous hemoglobin variants (HbS, HgC, etc)do  not significantly interfere with this assay.   However, presence of multiple variants may affect accuracy.     04/10/2019 8.4 (H) 4.0 - 5.6 % Final     Comment:     ADA Screening Guidelines:  5.7-6.4%  Consistent with prediabetes  >or=6.5%  Consistent with diabetes  High levels of fetal hemoglobin interfere with the HbA1C  assay. Heterozygous hemoglobin variants (HbS, HgC, etc)do  not significantly interfere with this assay.   However, presence of multiple variants may affect accuracy.      02/19/2019 12.6 (H) 4.0 - 5.6 % Final     Comment:     ADA Screening Guidelines:  5.7-6.4%  Consistent with prediabetes  >or=6.5%  Consistent with diabetes  High levels of fetal hemoglobin interfere with the HbA1C  assay. Heterozygous hemoglobin variants (HbS, HgC, etc)do  not significantly interfere with this assay.   However, presence of multiple variants may affect accuracy.         Review of Systems   Constitution: Negative for chills, fever and malaise/fatigue.   HENT: Negative for hearing loss.    Cardiovascular: Negative for claudication.   Respiratory: Negative for shortness of breath.    Skin: Positive for color change, dry skin, nail changes, poor wound healing and unusual hair distribution. Negative for flushing and rash.   Musculoskeletal: Negative for joint pain and myalgias.   Neurological: Negative for loss of balance, numbness, paresthesias and sensory change.   Psychiatric/Behavioral: Negative for altered mental status.               Objective:      Physical Exam   Constitutional: He appears well-developed and well-nourished. He is cooperative.   Cardiovascular:   Pulses:       Dorsalis pedis pulses are 0 on the right side, and 0 on the left side.        Posterior tibial pulses are 0 on the right side, and 1+ on the left side.   Bi-phasic pulses noted with doppler, right   Musculoskeletal:        Right ankle: He exhibits decreased range of motion and abnormal pulse. Achilles tendon exhibits normal Barajas's test results.        Left ankle: He exhibits decreased range of motion and abnormal pulse. Achilles tendon exhibits normal Barajas's test results.        Right foot: There is decreased range of motion.        Left foot: There is decreased range of motion.   Feet:   Right Foot:   Protective Sensation: 5 sites tested. 2 sites sensed.   Left Foot:   Protective Sensation: 5 sites tested. 2 sites sensed.   Neurological: He is alert.   diminished sensation noted to b/L lower extremities   Skin:  Skin is dry. Capillary refill takes more than 3 seconds.   Right dorsal 5th met amp site  dehiscense noted proximally to incision,   2.0x 0.4x 0.2cm  fibrogranular wound base   Psychiatric: His behavior is normal.   Vitals reviewed.                    Assessment:       Encounter Diagnosis   Name Primary?    Skin ulcer of right foot with fat layer exposed Yes         Plan:       Randy was seen today for wound check.    Diagnoses and all orders for this visit:    Skin ulcer of right foot with fat layer exposed      I counseled the patient on his conditions, their implications and medical management.    Ulceration on right foot debrided through sub-q tissue using an tissue nipper. Ulceration debrided down to healthy tissue. Pt tolerated debridement well.   Football dressing applied to pts right foot by Wanda Pérez MA under my direct supervision. Pt tolerated dressing well.   RTC in 1 week or sooner if any new pedal problems arise, any signs of infection occur, or if condition worsens.

## 2019-12-13 ENCOUNTER — OFFICE VISIT (OUTPATIENT)
Dept: PODIATRY | Facility: CLINIC | Age: 79
End: 2019-12-13
Payer: MEDICARE

## 2019-12-13 VITALS
SYSTOLIC BLOOD PRESSURE: 124 MMHG | BODY MASS INDEX: 32.47 KG/M2 | HEART RATE: 51 BPM | HEIGHT: 61 IN | WEIGHT: 172 LBS | DIASTOLIC BLOOD PRESSURE: 74 MMHG

## 2019-12-13 DIAGNOSIS — L97.512 SKIN ULCER OF RIGHT FOOT WITH FAT LAYER EXPOSED: Primary | ICD-10-CM

## 2019-12-13 PROCEDURE — 99499 UNLISTED E&M SERVICE: CPT | Mod: S$GLB,,, | Performed by: PODIATRIST

## 2019-12-13 PROCEDURE — 11042 DBRDMT SUBQ TIS 1ST 20SQCM/<: CPT | Mod: S$GLB,,, | Performed by: PODIATRIST

## 2019-12-13 PROCEDURE — 99999 PR PBB SHADOW E&M-EST. PATIENT-LVL III: CPT | Mod: PBBFAC,,, | Performed by: PODIATRIST

## 2019-12-13 PROCEDURE — 99999 PR PBB SHADOW E&M-EST. PATIENT-LVL III: ICD-10-PCS | Mod: PBBFAC,,, | Performed by: PODIATRIST

## 2019-12-13 PROCEDURE — 11042 PR DEBRIDEMENT, SKIN, SUB-Q TISSUE,=<20 SQ CM: ICD-10-PCS | Mod: S$GLB,,, | Performed by: PODIATRIST

## 2019-12-13 PROCEDURE — 99499 NO LOS: ICD-10-PCS | Mod: S$GLB,,, | Performed by: PODIATRIST

## 2019-12-13 NOTE — TELEPHONE ENCOUNTER
----- Message from Esha Abdi sent at 12/13/2019  2:19 PM CST -----  Contact: Spouse/Jade [phone]  Prescription Request:     Name of medication: insulin detemir U-100 (LEVEMIR FLEXTOUCH) 100 unit/mL (3 mL) SubQ InPn pen    Reason for request: Refill    Pharmacy: [facility]    Please advise.    Thank You

## 2019-12-13 NOTE — PROGRESS NOTES
Subjective:      Patient ID: Randy Camejo is a 79 y.o. male.    Chief Complaint: Diabetes Mellitus (09/11/2019 dr dustin lazo); Diabetic Foot Exam; and Post-op Evaluation    Randy is a 79 y.o. male who presents to the clinic for evaluation and treatment of high risk feet. 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 The patient's chief complaint is s/p right 5th ray amp. Pt denies any NVFCSOB.    This patient has documented high risk feet requiring routine maintenance secondary to peripheral vascular disease.      PCP: Dustin Lazo MD    Date Last Seen by PCP:     Current shoe gear:  Affected Foot: Football and Darco shoe on the affected foot     Unaffected Foot: Slip-on shoes    History of Trauma: negative  Sign of Infection: none    Hemoglobin A1C   Date Value Ref Range Status   06/05/2019 6.3 (H) 4.0 - 5.6 % Final     Comment:     ADA Screening Guidelines:  5.7-6.4%  Consistent with prediabetes  >or=6.5%  Consistent with diabetes  High levels of fetal hemoglobin interfere with the HbA1C  assay. Heterozygous hemoglobin variants (HbS, HgC, etc)do  not significantly interfere with this assay.   However, presence of multiple variants may affect accuracy.     04/10/2019 8.4 (H) 4.0 - 5.6 % Final     Comment:     ADA Screening Guidelines:  5.7-6.4%  Consistent with prediabetes  >or=6.5%  Consistent with diabetes  High levels of fetal hemoglobin interfere with the HbA1C  assay. Heterozygous hemoglobin variants (HbS, HgC, etc)do  not significantly interfere with this assay.    However, presence of multiple variants may affect accuracy.     02/19/2019 12.6 (H) 4.0 - 5.6 % Final     Comment:     ADA Screening Guidelines:  5.7-6.4%  Consistent with prediabetes  >or=6.5%  Consistent with diabetes  High levels of fetal hemoglobin interfere with the HbA1C  assay. Heterozygous hemoglobin variants (HbS, HgC, etc)do  not significantly interfere with this assay.   However, presence of multiple variants may affect accuracy.         Review of Systems   Constitution: Negative for chills, fever and malaise/fatigue.   HENT: Negative for hearing loss.    Cardiovascular: Negative for claudication.   Respiratory: Negative for shortness of breath.    Skin: Positive for color change, dry skin, nail changes, poor wound healing and unusual hair distribution. Negative for flushing and rash.   Musculoskeletal: Negative for joint pain and myalgias.   Neurological: Negative for loss of balance, numbness, paresthesias and sensory change.   Psychiatric/Behavioral: Negative for altered mental status.               Objective:      Physical Exam   Constitutional: He appears well-developed and well-nourished. He is cooperative.   Cardiovascular:   Pulses:       Dorsalis pedis pulses are 0 on the right side, and 0 on the left side.        Posterior tibial pulses are 0 on the right side, and 1+ on the left side.   Bi-phasic pulses noted with doppler, right   Musculoskeletal:        Right ankle: He exhibits decreased range of motion and abnormal pulse. Achilles tendon exhibits normal Barajas's test results.        Left ankle: He exhibits decreased range of motion and abnormal pulse. Achilles tendon exhibits normal Barajas's test results.        Right foot: There is decreased range of motion.        Left foot: There is decreased range of motion.   Feet:   Right Foot:   Protective Sensation: 5 sites tested. 2 sites sensed.   Left Foot:   Protective Sensation: 5 sites tested. 2 sites sensed.   Neurological: He is alert.    diminished sensation noted to b/L lower extremities   Skin: Skin is dry. Capillary refill takes more than 3 seconds.   Right dorsal 5th met amp site  dehiscense noted proximally to incision,   0.9x 0.4x 0.2cm  granular wound base   Psychiatric: His behavior is normal.   Vitals reviewed.                    Assessment:       Encounter Diagnosis   Name Primary?    Skin ulcer of right foot with fat layer exposed Yes         Plan:       Randy was seen today for diabetes mellitus, diabetic foot exam and post-op evaluation.    Diagnoses and all orders for this visit:    Skin ulcer of right foot with fat layer exposed      I counseled the patient on his conditions, their implications and medical management.    Ulceration on right foot debrided through sub-q tissue using an tissue nipper. Ulceration debrided down to healthy tissue. Pt tolerated debridement well.   DSD applied, pt advised to change every 3 days and to continue to keep dry.   RTC in 2 weeks or sooner if any new pedal problems arise, any signs of infection occur, or if condition worsens.

## 2020-01-03 ENCOUNTER — OFFICE VISIT (OUTPATIENT)
Dept: PODIATRY | Facility: CLINIC | Age: 80
End: 2020-01-03
Payer: MEDICARE

## 2020-01-03 VITALS
WEIGHT: 172 LBS | DIASTOLIC BLOOD PRESSURE: 62 MMHG | BODY MASS INDEX: 32.47 KG/M2 | HEART RATE: 49 BPM | HEIGHT: 61 IN | SYSTOLIC BLOOD PRESSURE: 98 MMHG

## 2020-01-03 DIAGNOSIS — L97.512 SKIN ULCER OF RIGHT FOOT WITH FAT LAYER EXPOSED: Primary | ICD-10-CM

## 2020-01-03 PROCEDURE — 99499 UNLISTED E&M SERVICE: CPT | Mod: S$GLB,,, | Performed by: PODIATRIST

## 2020-01-03 PROCEDURE — 99999 PR PBB SHADOW E&M-EST. PATIENT-LVL III: CPT | Mod: PBBFAC,,, | Performed by: PODIATRIST

## 2020-01-03 PROCEDURE — 99499 NO LOS: ICD-10-PCS | Mod: S$GLB,,, | Performed by: PODIATRIST

## 2020-01-03 PROCEDURE — 11042 DBRDMT SUBQ TIS 1ST 20SQCM/<: CPT | Mod: S$GLB,,, | Performed by: PODIATRIST

## 2020-01-03 PROCEDURE — 11042 PR DEBRIDEMENT, SKIN, SUB-Q TISSUE,=<20 SQ CM: ICD-10-PCS | Mod: S$GLB,,, | Performed by: PODIATRIST

## 2020-01-03 PROCEDURE — 99999 PR PBB SHADOW E&M-EST. PATIENT-LVL III: ICD-10-PCS | Mod: PBBFAC,,, | Performed by: PODIATRIST

## 2020-01-08 NOTE — PROGRESS NOTES
Subjective:      Patient ID: Randy Camejo is a 79 y.o. male.    Chief Complaint: Foot Ulcer (right foot)    Randy is a 79 y.o. male who presents to the clinic for evaluation and treatment of high risk feet. 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 The patient's chief complaint is s/p right 5th ray amp. Pt denies any NVFCSOB.    This patient has documented high risk feet requiring routine maintenance secondary to peripheral vascular disease.      PCP: Susie Lazo MD    Date Last Seen by PCP:     Current shoe gear:  Affected Foot: Football and Darco shoe on the affected foot     Unaffected Foot: Slip-on shoes    History of Trauma: negative  Sign of Infection: none    Hemoglobin A1C   Date Value Ref Range Status   06/05/2019 6.3 (H) 4.0 - 5.6 % Final     Comment:     ADA Screening Guidelines:  5.7-6.4%  Consistent with prediabetes  >or=6.5%  Consistent with diabetes  High levels of fetal hemoglobin interfere with the HbA1C  assay. Heterozygous hemoglobin variants (HbS, HgC, etc)do  not significantly interfere with this assay.   However, presence of multiple variants may affect accuracy.     04/10/2019 8.4 (H) 4.0 - 5.6 % Final     Comment:     ADA Screening Guidelines:  5.7-6.4%  Consistent with prediabetes  >or=6.5%  Consistent with diabetes  High levels of fetal hemoglobin interfere with the HbA1C  assay. Heterozygous hemoglobin variants (HbS, HgC, etc)do  not significantly interfere with this assay.   However, presence of multiple variants may affect accuracy.      02/19/2019 12.6 (H) 4.0 - 5.6 % Final     Comment:     ADA Screening Guidelines:  5.7-6.4%  Consistent with prediabetes  >or=6.5%  Consistent with diabetes  High levels of fetal hemoglobin interfere with the HbA1C  assay. Heterozygous hemoglobin variants (HbS, HgC, etc)do  not significantly interfere with this assay.   However, presence of multiple variants may affect accuracy.         Review of Systems   Constitution: Negative for chills, fever and malaise/fatigue.   HENT: Negative for hearing loss.    Cardiovascular: Negative for claudication.   Respiratory: Negative for shortness of breath.    Skin: Positive for color change, dry skin, nail changes, poor wound healing and unusual hair distribution. Negative for flushing and rash.   Musculoskeletal: Negative for joint pain and myalgias.   Neurological: Negative for loss of balance, numbness, paresthesias and sensory change.   Psychiatric/Behavioral: Negative for altered mental status.               Objective:      Physical Exam   Constitutional: He appears well-developed and well-nourished. He is cooperative.   Cardiovascular:   Pulses:       Dorsalis pedis pulses are 0 on the right side, and 0 on the left side.        Posterior tibial pulses are 0 on the right side, and 1+ on the left side.   Bi-phasic pulses noted with doppler, right   Musculoskeletal:        Right ankle: He exhibits decreased range of motion and abnormal pulse. Achilles tendon exhibits normal Barajas's test results.        Left ankle: He exhibits decreased range of motion and abnormal pulse. Achilles tendon exhibits normal Barajas's test results.        Right foot: There is decreased range of motion.        Left foot: There is decreased range of motion.   Feet:   Right Foot:   Protective Sensation: 5 sites tested. 2 sites sensed.   Left Foot:   Protective Sensation: 5 sites tested. 2 sites sensed.   Neurological: He is alert.   diminished sensation noted to b/L lower extremities   Skin:  Skin is dry. Capillary refill takes more than 3 seconds.   Right dorsal 5th met amp site  dehiscense noted proximally to incision,   0.5x 0.4x 0.2cm  granular wound base   Psychiatric: His behavior is normal.   Vitals reviewed.                    Assessment:       Encounter Diagnosis   Name Primary?    Skin ulcer of right foot with fat layer exposed Yes         Plan:       Randy was seen today for foot ulcer.    Diagnoses and all orders for this visit:    Skin ulcer of right foot with fat layer exposed      I counseled the patient on his conditions, their implications and medical management.    Ulceration on right foot debrided through sub-q tissue using an tissue nipper. Ulceration debrided down to healthy tissue. Pt tolerated debridement well.   DSD applied, pt advised to change every 3 days and to continue to keep dry.   RTC in 2 weeks or sooner if any new pedal problems arise, any signs of infection occur, or if condition worsens.

## 2020-01-10 ENCOUNTER — TELEPHONE (OUTPATIENT)
Dept: INTERNAL MEDICINE | Facility: CLINIC | Age: 80
End: 2020-01-10

## 2020-01-10 NOTE — TELEPHONE ENCOUNTER
----- Message from Josephine Childs sent at 1/10/2020  2:45 PM CST -----  Contact: pt-- [phone]  Type:  Sooner Apoointment Request    Caller is requesting a sooner appointment.  Caller declined first available appointment listed below.  Caller will not accept being placed on the waitlist and is requesting a message be sent to doctor.  Name of Caller: pt    When is the first available appointment? 2/4    Symptoms: physical/ follow up    Would the patient rather a call back or a [facility]? Call    Best Call Back Number: [phone]    Additional Information:  Pt called to schedule his physical/ follow up for as soon as possible. He is requesting a call back.

## 2020-01-13 ENCOUNTER — TELEPHONE (OUTPATIENT)
Dept: INTERNAL MEDICINE | Facility: CLINIC | Age: 80
End: 2020-01-13

## 2020-01-13 DIAGNOSIS — Z12.5 PROSTATE CANCER SCREENING: ICD-10-CM

## 2020-01-13 DIAGNOSIS — N18.30 STAGE 3 CHRONIC KIDNEY DISEASE: ICD-10-CM

## 2020-01-13 DIAGNOSIS — I10 ESSENTIAL HYPERTENSION: Primary | ICD-10-CM

## 2020-01-13 DIAGNOSIS — E11.40 TYPE 2 DIABETES MELLITUS WITH DIABETIC NEUROPATHY, WITH LONG-TERM CURRENT USE OF INSULIN: ICD-10-CM

## 2020-01-13 DIAGNOSIS — Z79.4 TYPE 2 DIABETES MELLITUS WITH DIABETIC NEUROPATHY, WITH LONG-TERM CURRENT USE OF INSULIN: ICD-10-CM

## 2020-01-13 NOTE — TELEPHONE ENCOUNTER
----- Message from Fredy Pérez sent at 1/13/2020  9:21 AM CST -----  Contact: Jade (wife) [phone]  Doctor appointment and lab have been scheduled.  Please link lab orders to the lab appointment.  Date of doctor appointment:  01/17  Date of lab appointment:  01/15  Physical or EP: follow up  Comments:

## 2020-01-15 ENCOUNTER — LAB VISIT (OUTPATIENT)
Dept: LAB | Facility: HOSPITAL | Age: 80
End: 2020-01-15
Attending: HOSPITALIST
Payer: MEDICARE

## 2020-01-15 DIAGNOSIS — E11.40 TYPE 2 DIABETES MELLITUS WITH DIABETIC NEUROPATHY, WITH LONG-TERM CURRENT USE OF INSULIN: ICD-10-CM

## 2020-01-15 DIAGNOSIS — Z79.4 TYPE 2 DIABETES MELLITUS WITH DIABETIC NEUROPATHY, WITH LONG-TERM CURRENT USE OF INSULIN: ICD-10-CM

## 2020-01-15 DIAGNOSIS — I10 ESSENTIAL HYPERTENSION: ICD-10-CM

## 2020-01-15 DIAGNOSIS — Z12.5 PROSTATE CANCER SCREENING: ICD-10-CM

## 2020-01-15 LAB
ALBUMIN SERPL BCP-MCNC: 3.5 G/DL (ref 3.5–5.2)
ALP SERPL-CCNC: 65 U/L (ref 55–135)
ALT SERPL W/O P-5'-P-CCNC: 14 U/L (ref 10–44)
ANION GAP SERPL CALC-SCNC: 7 MMOL/L (ref 8–16)
AST SERPL-CCNC: 18 U/L (ref 10–40)
BASOPHILS # BLD AUTO: 0.05 K/UL (ref 0–0.2)
BASOPHILS NFR BLD: 0.7 % (ref 0–1.9)
BILIRUB SERPL-MCNC: 1 MG/DL (ref 0.1–1)
BUN SERPL-MCNC: 31 MG/DL (ref 8–23)
CALCIUM SERPL-MCNC: 8.7 MG/DL (ref 8.7–10.5)
CHLORIDE SERPL-SCNC: 108 MMOL/L (ref 95–110)
CHOLEST SERPL-MCNC: 80 MG/DL (ref 120–199)
CHOLEST/HDLC SERPL: 2.4 {RATIO} (ref 2–5)
CO2 SERPL-SCNC: 28 MMOL/L (ref 23–29)
CREAT SERPL-MCNC: 1.5 MG/DL (ref 0.5–1.4)
DIFFERENTIAL METHOD: ABNORMAL
EOSINOPHIL # BLD AUTO: 0.2 K/UL (ref 0–0.5)
EOSINOPHIL NFR BLD: 2.6 % (ref 0–8)
ERYTHROCYTE [DISTWIDTH] IN BLOOD BY AUTOMATED COUNT: 14.1 % (ref 11.5–14.5)
EST. GFR  (AFRICAN AMERICAN): 50.8 ML/MIN/1.73 M^2
EST. GFR  (NON AFRICAN AMERICAN): 44 ML/MIN/1.73 M^2
GLUCOSE SERPL-MCNC: 67 MG/DL (ref 70–110)
HCT VFR BLD AUTO: 40 % (ref 40–54)
HDLC SERPL-MCNC: 34 MG/DL (ref 40–75)
HDLC SERPL: 42.5 % (ref 20–50)
HGB BLD-MCNC: 12.1 G/DL (ref 14–18)
IMM GRANULOCYTES # BLD AUTO: 0.01 K/UL (ref 0–0.04)
IMM GRANULOCYTES NFR BLD AUTO: 0.1 % (ref 0–0.5)
LDLC SERPL CALC-MCNC: 39.2 MG/DL (ref 63–159)
LYMPHOCYTES # BLD AUTO: 0.7 K/UL (ref 1–4.8)
LYMPHOCYTES NFR BLD: 10.5 % (ref 18–48)
MCH RBC QN AUTO: 31.8 PG (ref 27–31)
MCHC RBC AUTO-ENTMCNC: 30.3 G/DL (ref 32–36)
MCV RBC AUTO: 105 FL (ref 82–98)
MONOCYTES # BLD AUTO: 0.7 K/UL (ref 0.3–1)
MONOCYTES NFR BLD: 10.3 % (ref 4–15)
NEUTROPHILS # BLD AUTO: 5.2 K/UL (ref 1.8–7.7)
NEUTROPHILS NFR BLD: 75.8 % (ref 38–73)
NONHDLC SERPL-MCNC: 46 MG/DL
NRBC BLD-RTO: 0 /100 WBC
PLATELET # BLD AUTO: 115 K/UL (ref 150–350)
PMV BLD AUTO: 11.5 FL (ref 9.2–12.9)
POTASSIUM SERPL-SCNC: 4.4 MMOL/L (ref 3.5–5.1)
PROT SERPL-MCNC: 7.4 G/DL (ref 6–8.4)
RBC # BLD AUTO: 3.81 M/UL (ref 4.6–6.2)
SODIUM SERPL-SCNC: 143 MMOL/L (ref 136–145)
TRIGL SERPL-MCNC: 34 MG/DL (ref 30–150)
TSH SERPL DL<=0.005 MIU/L-ACNC: 4.17 UIU/ML (ref 0.4–4)
WBC # BLD AUTO: 6.86 K/UL (ref 3.9–12.7)

## 2020-01-15 PROCEDURE — 84443 ASSAY THYROID STIM HORMONE: CPT

## 2020-01-15 PROCEDURE — 36415 COLL VENOUS BLD VENIPUNCTURE: CPT | Mod: PN

## 2020-01-15 PROCEDURE — 80053 COMPREHEN METABOLIC PANEL: CPT

## 2020-01-15 PROCEDURE — 80061 LIPID PANEL: CPT

## 2020-01-15 PROCEDURE — 85025 COMPLETE CBC W/AUTO DIFF WBC: CPT

## 2020-01-15 PROCEDURE — 84153 ASSAY OF PSA TOTAL: CPT

## 2020-01-15 PROCEDURE — 83036 HEMOGLOBIN GLYCOSYLATED A1C: CPT

## 2020-01-15 PROCEDURE — 84439 ASSAY OF FREE THYROXINE: CPT

## 2020-01-16 LAB
COMPLEXED PSA SERPL-MCNC: 1.2 NG/ML (ref 0–4)
ESTIMATED AVG GLUCOSE: 120 MG/DL (ref 68–131)
HBA1C MFR BLD HPLC: 5.8 % (ref 4–5.6)
T4 FREE SERPL-MCNC: 1.1 NG/DL (ref 0.71–1.51)

## 2020-01-17 ENCOUNTER — OFFICE VISIT (OUTPATIENT)
Dept: INTERNAL MEDICINE | Facility: CLINIC | Age: 80
End: 2020-01-17
Payer: MEDICARE

## 2020-01-17 VITALS
DIASTOLIC BLOOD PRESSURE: 70 MMHG | BODY MASS INDEX: 34.96 KG/M2 | WEIGHT: 185.19 LBS | HEART RATE: 60 BPM | TEMPERATURE: 98 F | SYSTOLIC BLOOD PRESSURE: 116 MMHG | HEIGHT: 61 IN | RESPIRATION RATE: 18 BRPM

## 2020-01-17 DIAGNOSIS — I10 ESSENTIAL HYPERTENSION: ICD-10-CM

## 2020-01-17 DIAGNOSIS — E11.40 TYPE 2 DIABETES MELLITUS WITH DIABETIC NEUROPATHY, WITH LONG-TERM CURRENT USE OF INSULIN: Primary | ICD-10-CM

## 2020-01-17 DIAGNOSIS — I25.10 CORONARY ARTERY DISEASE INVOLVING NATIVE CORONARY ARTERY OF NATIVE HEART WITHOUT ANGINA PECTORIS: ICD-10-CM

## 2020-01-17 DIAGNOSIS — N18.30 STAGE 3 CHRONIC KIDNEY DISEASE: ICD-10-CM

## 2020-01-17 DIAGNOSIS — Z79.4 TYPE 2 DIABETES MELLITUS WITH DIABETIC NEUROPATHY, WITH LONG-TERM CURRENT USE OF INSULIN: Primary | ICD-10-CM

## 2020-01-17 PROCEDURE — 1100F PR PT FALLS ASSESS DOC 2+ FALLS/FALL W/INJURY/YR: ICD-10-PCS | Mod: CPTII,S$GLB,, | Performed by: HOSPITALIST

## 2020-01-17 PROCEDURE — 3078F DIAST BP <80 MM HG: CPT | Mod: CPTII,S$GLB,, | Performed by: HOSPITALIST

## 2020-01-17 PROCEDURE — 99999 PR PBB SHADOW E&M-EST. PATIENT-LVL III: CPT | Mod: PBBFAC,,, | Performed by: HOSPITALIST

## 2020-01-17 PROCEDURE — 1100F PTFALLS ASSESS-DOCD GE2>/YR: CPT | Mod: CPTII,S$GLB,, | Performed by: HOSPITALIST

## 2020-01-17 PROCEDURE — 99499 RISK ADDL DX/OHS AUDIT: ICD-10-PCS | Mod: HCNC,S$GLB,, | Performed by: HOSPITALIST

## 2020-01-17 PROCEDURE — 3288F PR FALLS RISK ASSESSMENT DOCUMENTED: ICD-10-PCS | Mod: CPTII,S$GLB,, | Performed by: HOSPITALIST

## 2020-01-17 PROCEDURE — 3074F PR MOST RECENT SYSTOLIC BLOOD PRESSURE < 130 MM HG: ICD-10-PCS | Mod: CPTII,S$GLB,, | Performed by: HOSPITALIST

## 2020-01-17 PROCEDURE — 1126F PR PAIN SEVERITY QUANTIFIED, NO PAIN PRESENT: ICD-10-PCS | Mod: S$GLB,,, | Performed by: HOSPITALIST

## 2020-01-17 PROCEDURE — 3288F FALL RISK ASSESSMENT DOCD: CPT | Mod: CPTII,S$GLB,, | Performed by: HOSPITALIST

## 2020-01-17 PROCEDURE — 1159F PR MEDICATION LIST DOCUMENTED IN MEDICAL RECORD: ICD-10-PCS | Mod: S$GLB,,, | Performed by: HOSPITALIST

## 2020-01-17 PROCEDURE — 3078F PR MOST RECENT DIASTOLIC BLOOD PRESSURE < 80 MM HG: ICD-10-PCS | Mod: CPTII,S$GLB,, | Performed by: HOSPITALIST

## 2020-01-17 PROCEDURE — 99499 UNLISTED E&M SERVICE: CPT | Mod: HCNC,S$GLB,, | Performed by: HOSPITALIST

## 2020-01-17 PROCEDURE — 3074F SYST BP LT 130 MM HG: CPT | Mod: CPTII,S$GLB,, | Performed by: HOSPITALIST

## 2020-01-17 PROCEDURE — 99213 OFFICE O/P EST LOW 20 MIN: CPT | Mod: S$GLB,,, | Performed by: HOSPITALIST

## 2020-01-17 PROCEDURE — 1159F MED LIST DOCD IN RCRD: CPT | Mod: S$GLB,,, | Performed by: HOSPITALIST

## 2020-01-17 PROCEDURE — 99999 PR PBB SHADOW E&M-EST. PATIENT-LVL III: ICD-10-PCS | Mod: PBBFAC,,, | Performed by: HOSPITALIST

## 2020-01-17 PROCEDURE — 1126F AMNT PAIN NOTED NONE PRSNT: CPT | Mod: S$GLB,,, | Performed by: HOSPITALIST

## 2020-01-17 PROCEDURE — 99213 PR OFFICE/OUTPT VISIT, EST, LEVL III, 20-29 MIN: ICD-10-PCS | Mod: S$GLB,,, | Performed by: HOSPITALIST

## 2020-01-17 NOTE — PROGRESS NOTES
"Subjective:     @Patient ID: Randy Camejo is a 79 y.o. male.    Chief Complaint: Follow-up (3 month)    HPI    80 yo Male 80 yo male w/ DM2, HTN, Afib, HLD presents for f/u. pt reports he is doing well. BG remain controlled. Is interested in decreasing insulin requirements since BG have remained controlled. Reports his foot wound is healing. Has f/u with podiatry. He is accompanied by his wife today.     Review of Systems   Constitutional: Negative for chills and fever.   HENT: Negative for congestion and sore throat.    Eyes: Negative for pain and visual disturbance.   Respiratory: Negative for cough and shortness of breath.    Cardiovascular: Negative for chest pain and leg swelling.   Gastrointestinal: Negative for abdominal pain, nausea and vomiting.   Endocrine: Negative for polydipsia and polyuria.   Genitourinary: Negative for difficulty urinating and dysuria.   Musculoskeletal: Negative for arthralgias and back pain.   Skin: Negative for color change and rash.   Neurological: Negative for weakness and headaches.   Psychiatric/Behavioral: Negative for agitation and confusion.     Past medical history, surgical history, and family medical history reviewed and updated as appropriate.    Medications and allergies reviewed.     Objective:     Vitals:    01/17/20 1005   BP: 116/70   BP Location: Left arm   Patient Position: Sitting   BP Method: Large (Manual)   Pulse: 60   Resp: 18   Temp: 97.6 °F (36.4 °C)   TempSrc: Oral   Weight: 84 kg (185 lb 3 oz)   Height: 5' 1" (1.549 m)     Body mass index is 34.99 kg/m².  Physical Exam   Constitutional: He is oriented to person, place, and time. He appears well-developed and well-nourished. No distress.   HENT:   Head: Normocephalic and atraumatic.   Mouth/Throat: Oropharynx is clear and moist. No oropharyngeal exudate.   Eyes: Conjunctivae are normal. Right eye exhibits no discharge. Left eye exhibits no discharge.   Neck: Normal range of motion. Neck supple. "   Cardiovascular: Normal rate and normal heart sounds. Exam reveals no friction rub.   No murmur heard.  Pulmonary/Chest: Effort normal and breath sounds normal.   Abdominal: Soft. Bowel sounds are normal. He exhibits no distension. There is no tenderness.   Musculoskeletal:   R foot in boot    Neurological: He is alert and oriented to person, place, and time.   Skin: Skin is warm and dry.   Psychiatric: He has a normal mood and affect. His behavior is normal.   Vitals reviewed.      Lab Results   Component Value Date    WBC 6.86 01/15/2020    HGB 12.1 (L) 01/15/2020    HCT 40.0 01/15/2020     (L) 01/15/2020    CHOL 80 (L) 01/15/2020    TRIG 34 01/15/2020    HDL 34 (L) 01/15/2020    ALT 14 01/15/2020    AST 18 01/15/2020     01/15/2020    K 4.4 01/15/2020     01/15/2020    CREATININE 1.5 (H) 01/15/2020    BUN 31 (H) 01/15/2020    CO2 28 01/15/2020    TSH 4.173 (H) 01/15/2020    PSA 1.2 01/15/2020    INR 1.3 (H) 08/29/2019    HGBA1C 5.8 (H) 01/15/2020       Assessment:     1. Type 2 diabetes mellitus with diabetic neuropathy, with long-term current use of insulin    2. Essential hypertension    3. Stage 3 chronic kidney disease    4. Coronary artery disease involving native coronary artery of native heart without angina pectoris      Plan:   Randy was seen today for follow-up.    Diagnoses and all orders for this visit:    Type 2 diabetes mellitus with diabetic neuropathy, with long-term current use of insulin  - Will d/c mealtime insulin as last A1c have been well controlled. Will continue levemir qhs. Will notify MD if BG increase   -     HEMOGLOBIN A1C; Future  -     CBC auto differential; Future  -     Basic metabolic panel; Future    Essential hypertension        - bp controlled. Continue home meds    Stage 3 chronic kidney disease        - Bmp scheduled    Coronary artery disease involving native coronary artery of native heart without angina pectoris        - Stable. Continue home meds      Other orders  -     apixaban (ELIQUIS) 5 mg Tab; Take 1 tablet (5 mg total) by mouth 2 (two) times daily.          Follow up in about 6 months (around 7/17/2020), or if symptoms worsen or fail to improve.    Susie Lazo MD  Internal Medicine    1/17/2020

## 2020-01-22 ENCOUNTER — OFFICE VISIT (OUTPATIENT)
Dept: PODIATRY | Facility: CLINIC | Age: 80
End: 2020-01-22
Payer: MEDICARE

## 2020-01-22 VITALS
HEART RATE: 48 BPM | BODY MASS INDEX: 34.93 KG/M2 | SYSTOLIC BLOOD PRESSURE: 96 MMHG | WEIGHT: 185 LBS | HEIGHT: 61 IN | DIASTOLIC BLOOD PRESSURE: 59 MMHG

## 2020-01-22 DIAGNOSIS — L97.512 SKIN ULCER OF RIGHT FOOT WITH FAT LAYER EXPOSED: Primary | ICD-10-CM

## 2020-01-22 PROCEDURE — 99999 PR PBB SHADOW E&M-EST. PATIENT-LVL III: ICD-10-PCS | Mod: PBBFAC,,, | Performed by: PODIATRIST

## 2020-01-22 PROCEDURE — 11042 DBRDMT SUBQ TIS 1ST 20SQCM/<: CPT | Mod: S$GLB,,, | Performed by: PODIATRIST

## 2020-01-22 PROCEDURE — 99999 PR PBB SHADOW E&M-EST. PATIENT-LVL III: CPT | Mod: PBBFAC,,, | Performed by: PODIATRIST

## 2020-01-22 PROCEDURE — 11042 PR DEBRIDEMENT, SKIN, SUB-Q TISSUE,=<20 SQ CM: ICD-10-PCS | Mod: S$GLB,,, | Performed by: PODIATRIST

## 2020-01-22 PROCEDURE — 99499 NO LOS: ICD-10-PCS | Mod: S$GLB,,, | Performed by: PODIATRIST

## 2020-01-22 PROCEDURE — 99499 UNLISTED E&M SERVICE: CPT | Mod: S$GLB,,, | Performed by: PODIATRIST

## 2020-01-27 NOTE — ED TRIAGE NOTES
"Pt presents with c/o abdominal pain with intermittent n/v/d and decreased appetite. Pt denies bloody stools or any in his vomit. Pt mucus membranes dry, pt reports "nothing appeals to me, I take a sip or two and I'm done. I tried coke, sprite, Pedialyte, and gatorade." Pt tachy on arrival, placed on cardiac and EKG obtained. Pt on Q30 bp's and continuous pulse ox monitoring. Pt skin warm dry and intact, tenting noted. Pt reports he hasn't eaten in a couple days. Pt AAOx4, RR even and unlabored, NAD noted. Bed locked and in lowest position, side rails up x2, and call light within reach.   "
Notify MD.
Notify MD.
administer Hydralazine as ordered

## 2020-01-28 NOTE — PROGRESS NOTES
Subjective:      Patient ID: Randy Camejo is a 79 y.o. male.    Chief Complaint: Diabetes Mellitus (01/17/2020 dr dustin lazo); Diabetic Foot Exam; and Post-op Evaluation    Randy is a 79 y.o. male who presents to the clinic for evaluation and treatment of high risk feet. 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 The patient's chief complaint is s/p right 5th ray amp. Pt denies any NVFCSOB.    This patient has documented high risk feet requiring routine maintenance secondary to peripheral vascular disease.      PCP: Dustin Lazo MD    Date Last Seen by PCP:     Current shoe gear:  Affected Foot: Football and Darco shoe on the affected foot     Unaffected Foot: Slip-on shoes    History of Trauma: negative  Sign of Infection: none    Hemoglobin A1C   Date Value Ref Range Status   01/15/2020 5.8 (H) 4.0 - 5.6 % Final     Comment:     ADA Screening Guidelines:  5.7-6.4%  Consistent with prediabetes  >or=6.5%  Consistent with diabetes  High levels of fetal hemoglobin interfere with the HbA1C  assay. Heterozygous hemoglobin variants (HbS, HgC, etc)do  not significantly interfere with this assay.   However, presence of multiple variants may affect accuracy.     06/05/2019 6.3 (H) 4.0 - 5.6 % Final     Comment:     ADA Screening Guidelines:  5.7-6.4%  Consistent with prediabetes  >or=6.5%  Consistent with diabetes  High levels of fetal hemoglobin interfere with the HbA1C  assay. Heterozygous hemoglobin variants (HbS, HgC, etc)do  not significantly interfere with this assay.    However, presence of multiple variants may affect accuracy.     04/10/2019 8.4 (H) 4.0 - 5.6 % Final     Comment:     ADA Screening Guidelines:  5.7-6.4%  Consistent with prediabetes  >or=6.5%  Consistent with diabetes  High levels of fetal hemoglobin interfere with the HbA1C  assay. Heterozygous hemoglobin variants (HbS, HgC, etc)do  not significantly interfere with this assay.   However, presence of multiple variants may affect accuracy.         Review of Systems   Constitution: Negative for chills, fever and malaise/fatigue.   HENT: Negative for hearing loss.    Cardiovascular: Negative for claudication.   Respiratory: Negative for shortness of breath.    Skin: Positive for color change, dry skin, nail changes, poor wound healing and unusual hair distribution. Negative for flushing and rash.   Musculoskeletal: Negative for joint pain and myalgias.   Neurological: Negative for loss of balance, numbness, paresthesias and sensory change.   Psychiatric/Behavioral: Negative for altered mental status.               Objective:      Physical Exam   Constitutional: He appears well-developed and well-nourished. He is cooperative.   Cardiovascular:   Pulses:       Dorsalis pedis pulses are 0 on the right side, and 0 on the left side.        Posterior tibial pulses are 0 on the right side, and 1+ on the left side.   Bi-phasic pulses noted with doppler, right   Musculoskeletal:        Right ankle: He exhibits decreased range of motion and abnormal pulse. Achilles tendon exhibits normal Barajas's test results.        Left ankle: He exhibits decreased range of motion and abnormal pulse. Achilles tendon exhibits normal Barajas's test results.        Right foot: There is decreased range of motion.        Left foot: There is decreased range of motion.   Feet:   Right Foot:   Protective Sensation: 5 sites tested. 2 sites sensed.   Left Foot:   Protective Sensation: 5 sites tested. 2 sites sensed.   Neurological: He is alert.    diminished sensation noted to b/L lower extremities   Skin: Skin is dry. Capillary refill takes more than 3 seconds.   Right dorsal 5th met amp site  dehiscense noted proximally to incision,   0.4x 0.4x 0.2cm  granular wound base   Psychiatric: His behavior is normal.   Vitals reviewed.                    Assessment:       Encounter Diagnosis   Name Primary?    Skin ulcer of right foot with fat layer exposed Yes         Plan:       Randy was seen today for diabetes mellitus, diabetic foot exam and post-op evaluation.    Diagnoses and all orders for this visit:    Skin ulcer of right foot with fat layer exposed      I counseled the patient on his conditions, their implications and medical management.    Ulceration on right foot debrided through sub-q tissue using an tissue nipper. Ulceration debrided down to healthy tissue. Pt tolerated debridement well.   DSD applied, pt advised to change every 3 days and to continue to keep dry.   RTC in 2 weeks or sooner if any new pedal problems arise, any signs of infection occur, or if condition worsens.

## 2020-01-31 NOTE — PROGRESS NOTES
Patient, Randy Camejo (MRN #[id number]), presented with a recent Platelet count less than 150 K/uL consistent with the definition of thrombocytopenia (ICD10 - D69.6).    Platelets   Date Value Ref Range Status   01/15/2020 115 (L) 150 - 350 K/uL Final     The patient's thrombocytopenia was monitored. This addendum to the medical record is made on 01/31/2020.

## 2020-02-03 ENCOUNTER — PATIENT OUTREACH (OUTPATIENT)
Dept: ADMINISTRATIVE | Facility: OTHER | Age: 80
End: 2020-02-03

## 2020-02-04 ENCOUNTER — OFFICE VISIT (OUTPATIENT)
Dept: PODIATRY | Facility: CLINIC | Age: 80
End: 2020-02-04
Payer: MEDICARE

## 2020-02-04 VITALS
DIASTOLIC BLOOD PRESSURE: 53 MMHG | WEIGHT: 185 LBS | BODY MASS INDEX: 34.93 KG/M2 | SYSTOLIC BLOOD PRESSURE: 103 MMHG | HEIGHT: 61 IN | HEART RATE: 56 BPM

## 2020-02-04 DIAGNOSIS — E11.40 TYPE 2 DIABETES MELLITUS WITH DIABETIC NEUROPATHY, WITH LONG-TERM CURRENT USE OF INSULIN: ICD-10-CM

## 2020-02-04 DIAGNOSIS — L97.512 SKIN ULCER OF RIGHT FOOT WITH FAT LAYER EXPOSED: Primary | ICD-10-CM

## 2020-02-04 DIAGNOSIS — Z79.4 TYPE 2 DIABETES MELLITUS WITH DIABETIC NEUROPATHY, WITH LONG-TERM CURRENT USE OF INSULIN: ICD-10-CM

## 2020-02-04 DIAGNOSIS — L97.512 SKIN ULCER OF RIGHT GREAT TOE WITH FAT LAYER EXPOSED: ICD-10-CM

## 2020-02-04 DIAGNOSIS — L08.9 TOE INFECTION: ICD-10-CM

## 2020-02-04 PROCEDURE — 1159F PR MEDICATION LIST DOCUMENTED IN MEDICAL RECORD: ICD-10-PCS | Mod: HCNC,S$GLB,, | Performed by: PODIATRIST

## 2020-02-04 PROCEDURE — 3078F DIAST BP <80 MM HG: CPT | Mod: HCNC,CPTII,S$GLB, | Performed by: PODIATRIST

## 2020-02-04 PROCEDURE — 11042 DBRDMT SUBQ TIS 1ST 20SQCM/<: CPT | Mod: HCNC,S$GLB,, | Performed by: PODIATRIST

## 2020-02-04 PROCEDURE — 11042 WOUND DEBRIDEMENT: ICD-10-PCS | Mod: HCNC,S$GLB,, | Performed by: PODIATRIST

## 2020-02-04 PROCEDURE — 99999 PR PBB SHADOW E&M-EST. PATIENT-LVL III: ICD-10-PCS | Mod: PBBFAC,HCNC,, | Performed by: PODIATRIST

## 2020-02-04 PROCEDURE — 3074F PR MOST RECENT SYSTOLIC BLOOD PRESSURE < 130 MM HG: ICD-10-PCS | Mod: HCNC,CPTII,S$GLB, | Performed by: PODIATRIST

## 2020-02-04 PROCEDURE — 99213 PR OFFICE/OUTPT VISIT, EST, LEVL III, 20-29 MIN: ICD-10-PCS | Mod: 25,HCNC,S$GLB, | Performed by: PODIATRIST

## 2020-02-04 PROCEDURE — 1101F PR PT FALLS ASSESS DOC 0-1 FALLS W/OUT INJ PAST YR: ICD-10-PCS | Mod: HCNC,CPTII,S$GLB, | Performed by: PODIATRIST

## 2020-02-04 PROCEDURE — 1126F AMNT PAIN NOTED NONE PRSNT: CPT | Mod: HCNC,S$GLB,, | Performed by: PODIATRIST

## 2020-02-04 PROCEDURE — 1101F PT FALLS ASSESS-DOCD LE1/YR: CPT | Mod: HCNC,CPTII,S$GLB, | Performed by: PODIATRIST

## 2020-02-04 PROCEDURE — 3078F PR MOST RECENT DIASTOLIC BLOOD PRESSURE < 80 MM HG: ICD-10-PCS | Mod: HCNC,CPTII,S$GLB, | Performed by: PODIATRIST

## 2020-02-04 PROCEDURE — 99213 OFFICE O/P EST LOW 20 MIN: CPT | Mod: 25,HCNC,S$GLB, | Performed by: PODIATRIST

## 2020-02-04 PROCEDURE — 3074F SYST BP LT 130 MM HG: CPT | Mod: HCNC,CPTII,S$GLB, | Performed by: PODIATRIST

## 2020-02-04 PROCEDURE — 99499 RISK ADDL DX/OHS AUDIT: ICD-10-PCS | Mod: HCNC,S$GLB,, | Performed by: PODIATRIST

## 2020-02-04 PROCEDURE — 99499 UNLISTED E&M SERVICE: CPT | Mod: HCNC,S$GLB,, | Performed by: PODIATRIST

## 2020-02-04 PROCEDURE — 1126F PR PAIN SEVERITY QUANTIFIED, NO PAIN PRESENT: ICD-10-PCS | Mod: HCNC,S$GLB,, | Performed by: PODIATRIST

## 2020-02-04 PROCEDURE — 99999 PR PBB SHADOW E&M-EST. PATIENT-LVL III: CPT | Mod: PBBFAC,HCNC,, | Performed by: PODIATRIST

## 2020-02-04 PROCEDURE — 1159F MED LIST DOCD IN RCRD: CPT | Mod: HCNC,S$GLB,, | Performed by: PODIATRIST

## 2020-02-04 RX ORDER — CEPHALEXIN 500 MG/1
500 CAPSULE ORAL EVERY 12 HOURS
Qty: 20 CAPSULE | Refills: 0 | Status: SHIPPED | OUTPATIENT
Start: 2020-02-04 | End: 2020-02-14

## 2020-02-04 NOTE — PROGRESS NOTES
Chief Complaint: Foot Ulcer (Right Foot)    HPI:  Randy is a 79 y.o. male who presents to the clinic for evaluation and treatment of high risk feet. 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     The patient's chief complaint is follow up ulcer right foot, lateral aspect.    Also new initial complaint of possible infection right great toe.  He does not recall trauma to the area.    Pt denies any NVFCSOB.      This patient has documented high risk feet requiring routine maintenance secondary to peripheral vascular disease.      PCP: Susie Lazo MD    Date Last Seen by PCP: Foot Ulcer (Right Foot)         Current shoe gear:  Affected Foot: Football and Darco shoe on the affected foot     Unaffected Foot: Slip-on shoes        Hemoglobin A1C   Date Value Ref Range Status   01/15/2020 5.8 (H) 4.0 - 5.6 % Final     Comment:     ADA Screening Guidelines:  5.7-6.4%  Consistent with prediabetes  >or=6.5%  Consistent with diabetes  High levels of fetal hemoglobin interfere with the HbA1C  assay. Heterozygous hemoglobin variants (HbS, HgC, etc)do  not significantly interfere with this assay.   However, presence of multiple variants may affect accuracy.     06/05/2019 6.3 (H) 4.0 - 5.6 % Final     Comment:     ADA Screening Guidelines:  5.7-6.4%  Consistent with prediabetes  >or=6.5%  Consistent with diabetes  High levels of fetal hemoglobin interfere with the HbA1C  assay. Heterozygous hemoglobin variants (HbS, HgC, etc)do  not significantly interfere with this assay.   However,  "presence of multiple variants may affect accuracy.     04/10/2019 8.4 (H) 4.0 - 5.6 % Final     Comment:     ADA Screening Guidelines:  5.7-6.4%  Consistent with prediabetes  >or=6.5%  Consistent with diabetes  High levels of fetal hemoglobin interfere with the HbA1C  assay. Heterozygous hemoglobin variants (HbS, HgC, etc)do  not significantly interfere with this assay.   However, presence of multiple variants may affect accuracy.         Review of Systems   Constitution: Negative for chills, fever and malaise/fatigue.   HENT: Negative for hearing loss.    Cardiovascular: Negative for claudication.   Respiratory: Negative for shortness of breath.    Skin: Positive for color change, dry skin, nail changes, poor wound healing and unusual hair distribution. Negative for flushing and rash.   Musculoskeletal: Negative for joint pain and myalgias.   Neurological: Negative for loss of balance, numbness, paresthesias and sensory change.   Psychiatric/Behavioral: Negative for altered mental status.               Objective:        Vitals:    02/04/20 0803   BP: (!) 103/53   Pulse: (!) 56   Weight: 83.9 kg (185 lb)   Height: 5' 1" (1.549 m)           Physical Exam   Constitutional: He appears well-developed and well-nourished. He is cooperative.   Cardiovascular:   Pulses:       Dorsalis pedis pulses are 0 on the right side, and 0 on the left side.        Posterior tibial pulses are 0 on the right side, and 1+ on the left side.   Bi-phasic pulses noted with doppler, right   Musculoskeletal:        Right ankle: He exhibits decreased range of motion and abnormal pulse. Achilles tendon exhibits normal Barajas's test results.        Left ankle: He exhibits decreased range of motion and abnormal pulse. Achilles tendon exhibits normal Barajas's test results.        Right foot: There is decreased range of motion.        Left foot: There is decreased range of motion.   Feet:   Right Foot:   Protective Sensation: 5 sites tested. 2 " sites sensed.   Left Foot:   Protective Sensation: 5 sites tested. 2 sites sensed.   Neurological: He is alert.   diminished sensation noted to b/L lower extremities   Skin: Skin is dry. Capillary refill takes more than 3 seconds.       Ulcer Location: right lateral foot  Measurements:   1.4cm x 0.9cm x 0.1cm  Periwound: (--) hyperkeratosis;  (--) necrosis  Drainage:  Scant serous;   Pus: Absent  Malodor:  Absent  Base:  100% granular. 0% fibrin.  Signs of infection:   None      Ulcer Location: right great toe, medial distal border  Measurements:   Blister pre debridement;   1.5cm x 0.8cm x 0.1cm  Periwound: (--) hyperkeratosis;  (--) necrosis  Drainage: (+) serous;   Pus: Absent  Malodor:  Absent  Base:  80% granular. 20% fibrin.  Signs of infection:   Localized redness and swelling.  No tenderness to palpation        Psychiatric: His behavior is normal.   Vitals reviewed.                    Assessment:       Encounter Diagnoses   Name Primary?    Skin ulcer of right foot with fat layer exposed Yes    Toe infection - Right Foot     Type 2 diabetes mellitus with diabetic neuropathy, with long-term current use of insulin     Skin ulcer of right great toe with fat layer exposed          Plan:       Randy was seen today for foot ulcer.    Diagnoses and all orders for this visit:    Skin ulcer of right foot with fat layer exposed  -     cephALEXin (KEFLEX) 500 MG capsule; Take 1 capsule (500 mg total) by mouth every 12 (twelve) hours. for 10 days  -     Wound Debridement    Toe infection - Right Foot  -     cephALEXin (KEFLEX) 500 MG capsule; Take 1 capsule (500 mg total) by mouth every 12 (twelve) hours. for 10 days  -     Wound Debridement    Type 2 diabetes mellitus with diabetic neuropathy, with long-term current use of insulin    Skin ulcer of right great toe with fat layer exposed      I counseled the patient on his conditions, their implications and medical management.    I cleaned the right foot lateral  "ulcer with sterile normal saline and applied Meglisorb dressing    There is a new wound noted this visit on the right great toe.  Procedure report below.   I cleaned the wound with sterile normal saline and applied Meglisorb.  My MA applied a football dressing without immediate complications.  Patient will leave clean, dry, and intact and follow up as scheduled.   Patient is amenable to plan.     Call if any questions or concerned.       Wound Debridement  Date/Time: 2/4/2020 8:00 AM  Performed by: Jess Rosa DPM  Authorized by: Jess Rosa DPM     Time out: Immediately prior to procedure a "time out" was called to verify the correct patient, procedure, equipment, support staff and site/side marked as required.    Consent Done?:  Yes (Verbal)    Preparation: Patient was prepped and draped in usual sterile fashion    Local anesthesia used?: No      Wound Details:    Location:  Right foot    Location:  Right 1st Toe    Type of Debridement:  Excisional       Length (cm):  1.5       Area (sq cm):  1.2       Width (cm):  0.8       Percent Debrided (%):  100       Depth (cm):  0.1       Total Area Debrided (sq cm):  1.2    Depth of debridement:  Subcutaneous tissue    Tissue debrided:  Epidermis, Dermis and Subcutaneous    Devitalized tissue debrided:  Slough, Fibrin, Callus and Biofilm    Instruments:  Nippers and Curette    Bleeding:  Minimal  Hemostasis Achieved: Yes    Method Used:  Pressure  Patient tolerance:  Patient tolerated the procedure well with no immediate complications        "

## 2020-02-07 ENCOUNTER — TELEPHONE (OUTPATIENT)
Dept: PODIATRY | Facility: CLINIC | Age: 80
End: 2020-02-07

## 2020-02-07 NOTE — TELEPHONE ENCOUNTER
Left voice message for pt to give the office a call back at [phone]. Called pt to inform him to go Urgent care per Dr. Warren.

## 2020-02-08 ENCOUNTER — OFFICE VISIT (OUTPATIENT)
Dept: URGENT CARE | Facility: CLINIC | Age: 80
End: 2020-02-08
Payer: MEDICARE

## 2020-02-08 VITALS
TEMPERATURE: 98 F | HEART RATE: 56 BPM | WEIGHT: 185 LBS | SYSTOLIC BLOOD PRESSURE: 112 MMHG | DIASTOLIC BLOOD PRESSURE: 60 MMHG | OXYGEN SATURATION: 97 % | BODY MASS INDEX: 25.9 KG/M2 | HEIGHT: 71 IN | RESPIRATION RATE: 18 BRPM

## 2020-02-08 DIAGNOSIS — Z51.89 VISIT FOR WOUND CHECK: Primary | ICD-10-CM

## 2020-02-08 PROCEDURE — 99214 OFFICE O/P EST MOD 30 MIN: CPT | Mod: S$GLB,,, | Performed by: PHYSICIAN ASSISTANT

## 2020-02-08 PROCEDURE — 99214 PR OFFICE/OUTPT VISIT, EST, LEVL IV, 30-39 MIN: ICD-10-PCS | Mod: S$GLB,,, | Performed by: PHYSICIAN ASSISTANT

## 2020-02-08 NOTE — PROGRESS NOTES
"Subjective:       Patient ID: Randy Camejo is a 79 y.o. male.    Vitals:  height is 5' 11" (1.803 m) and weight is 83.9 kg (185 lb). His tympanic temperature is 98.2 °F (36.8 °C). His blood pressure is 112/60 and his pulse is 56 (abnormal). His respiration is 18 and oxygen saturation is 97%.     Chief Complaint: Wound Check    This is a 79-year-old male with history of type 2 diabetes who presents to urgent care requesting bandage change.  States that he was seen by Podiatry 4 days ago and had dressing placed on right foot for ulcer on great toe.  He presents today because the bandage has drainage on it, he is wondering if he needs the bandage changed.  He denies any pain or fevers.  He states that the wound drained a few days ago, but has stopped now.    Wound Check   He was originally treated 3 to 5 days ago. Previous treatment included wound cleansing or irrigation.       Constitution: Negative for chills, fatigue and fever.   HENT: Negative for congestion and sore throat.    Neck: Negative for painful lymph nodes.   Cardiovascular: Negative for chest pain and leg swelling.   Eyes: Negative for double vision and blurred vision.   Respiratory: Negative for cough and shortness of breath.    Gastrointestinal: Negative for nausea, vomiting and diarrhea.   Genitourinary: Negative for dysuria, frequency and urgency.   Musculoskeletal: Negative for joint pain, joint swelling, muscle cramps and muscle ache.   Skin: Positive for wound. Negative for color change, pale, rash and erythema.   Allergic/Immunologic: Negative for seasonal allergies.   Neurological: Negative for dizziness, history of vertigo, light-headedness, passing out and headaches.   Hematologic/Lymphatic: Negative for swollen lymph nodes, easy bruising/bleeding and history of blood clots. Does not bruise/bleed easily.   Psychiatric/Behavioral: Negative for nervous/anxious, sleep disturbance and depression. The patient is not nervous/anxious.      "   Objective:      Physical Exam   Constitutional: He is oriented to person, place, and time. He appears well-developed and well-nourished.   HENT:   Head: Normocephalic and atraumatic. Head is without abrasion, without contusion and without laceration.   Right Ear: External ear normal.   Left Ear: External ear normal.   Nose: Nose normal.   Mouth/Throat: Oropharynx is clear and moist and mucous membranes are normal.   Eyes: Pupils are equal, round, and reactive to light. Conjunctivae, EOM and lids are normal.   Neck: Trachea normal, full passive range of motion without pain and phonation normal. Neck supple.   Cardiovascular: Normal rate, regular rhythm and normal heart sounds.   Pulmonary/Chest: Effort normal and breath sounds normal. No stridor. No respiratory distress.   Musculoskeletal: Normal range of motion.   There is a football dressing on the patient's right foot. There is a small amount of yellow drainage that has dried on the distal aspect of great toe. Dressing is dry and intact. No blood.    Neurological: He is alert and oriented to person, place, and time.   Skin: Skin is warm, dry, intact and no rash. Capillary refill takes less than 2 seconds. abrasion, burn, bruising, erythema and ecchymosis  Psychiatric: He has a normal mood and affect. His speech is normal and behavior is normal. Judgment and thought content normal. Cognition and memory are normal.   Nursing note and vitals reviewed.            Assessment:       1. Visit for wound check        Plan:       Very small amt of dried drainage on the patient's football dressing over great toe. Placed by podiatry with recommendation to leave in place until visit this week. Pt denies pain or further drainage.   I gave patient the option of us changing dressing in clinic today or pt can follow up with podiatry this week.   Due to small amt of drainage, I think it is reasonable to leave dressing in place and f/u with podiatry. Continue Keflex as prescribed.      Pt is happy with this plan. All questions answered. He should return or go to ED for worsening or new, changing symptoms or concerns.  Labs reviewed, pertinent imaging reviewed, previous medical records, medical history, surgical history, social history, family history reviewed.      Visit for wound check         Patient Instructions   Follow up with podiatry on Monday  Leave dressing in place  Continue antibiotics  Return for any worsening or fevers    Please return here or go to the Emergency Department for any concerns or worsening of condition.  If you were prescribed antibiotics, please take them to completion.  If you were prescribed a narcotic medication, do not drive or operate heavy equipment or machinery while taking these medications.  Please follow up with your primary care doctor or specialist as needed.    If you  smoke, please stop smoking.

## 2020-02-08 NOTE — PATIENT INSTRUCTIONS
Follow up with podiatry on Monday  Leave dressing in place  Continue antibiotics  Return for any worsening or fevers    Please return here or go to the Emergency Department for any concerns or worsening of condition.  If you were prescribed antibiotics, please take them to completion.  If you were prescribed a narcotic medication, do not drive or operate heavy equipment or machinery while taking these medications.  Please follow up with your primary care doctor or specialist as needed.    If you  smoke, please stop smoking.

## 2020-02-13 ENCOUNTER — OFFICE VISIT (OUTPATIENT)
Dept: PODIATRY | Facility: CLINIC | Age: 80
End: 2020-02-13
Payer: MEDICARE

## 2020-02-13 VITALS
BODY MASS INDEX: 34.93 KG/M2 | WEIGHT: 185 LBS | SYSTOLIC BLOOD PRESSURE: 128 MMHG | DIASTOLIC BLOOD PRESSURE: 73 MMHG | HEART RATE: 54 BPM | HEIGHT: 61 IN

## 2020-02-13 DIAGNOSIS — Z79.4 TYPE 2 DIABETES MELLITUS WITH DIABETIC NEUROPATHY, WITH LONG-TERM CURRENT USE OF INSULIN: ICD-10-CM

## 2020-02-13 DIAGNOSIS — L97.512 SKIN ULCER OF RIGHT FOOT WITH FAT LAYER EXPOSED: Primary | ICD-10-CM

## 2020-02-13 DIAGNOSIS — E11.40 TYPE 2 DIABETES MELLITUS WITH DIABETIC NEUROPATHY, WITH LONG-TERM CURRENT USE OF INSULIN: ICD-10-CM

## 2020-02-13 PROCEDURE — 99499 UNLISTED E&M SERVICE: CPT | Mod: HCNC,S$GLB,, | Performed by: PODIATRIST

## 2020-02-13 PROCEDURE — 11042 PR DEBRIDEMENT, SKIN, SUB-Q TISSUE,=<20 SQ CM: ICD-10-PCS | Mod: HCNC,S$GLB,, | Performed by: PODIATRIST

## 2020-02-13 PROCEDURE — 99999 PR PBB SHADOW E&M-EST. PATIENT-LVL III: ICD-10-PCS | Mod: PBBFAC,HCNC,, | Performed by: PODIATRIST

## 2020-02-13 PROCEDURE — 99999 PR PBB SHADOW E&M-EST. PATIENT-LVL III: CPT | Mod: PBBFAC,HCNC,, | Performed by: PODIATRIST

## 2020-02-13 PROCEDURE — 11042 DBRDMT SUBQ TIS 1ST 20SQCM/<: CPT | Mod: HCNC,S$GLB,, | Performed by: PODIATRIST

## 2020-02-13 PROCEDURE — 99499 RISK ADDL DX/OHS AUDIT: ICD-10-PCS | Mod: HCNC,S$GLB,, | Performed by: PODIATRIST

## 2020-02-17 NOTE — PROGRESS NOTES
Chief Complaint: Post-op Evaluation    HPI:  Randy is a 79 y.o. male who presents to the clinic for evaluation and treatment of high risk feet. 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     The patient's chief complaint is follow up ulcer right foot, lateral aspect.    Also new initial complaint of possible infection right great toe.  He does not recall trauma to the area.    Pt denies any NVFCSOB.      This patient has documented high risk feet requiring routine maintenance secondary to peripheral vascular disease.      2/13/20:Patient has been in football dressing, ambulating in Darco shoe x 1 week with out issues       PCP: Susie Lazo MD    Date Last Seen by PCP: Post-op Evaluation         Current shoe gear:  Affected Foot: Football and Darco shoe on the affected foot     Unaffected Foot: Slip-on shoes        Hemoglobin A1C   Date Value Ref Range Status   01/15/2020 5.8 (H) 4.0 - 5.6 % Final     Comment:     ADA Screening Guidelines:  5.7-6.4%  Consistent with prediabetes  >or=6.5%  Consistent with diabetes  High levels of fetal hemoglobin interfere with the HbA1C  assay. Heterozygous hemoglobin variants (HbS, HgC, etc)do  not significantly interfere with this assay.   However, presence of multiple variants may affect accuracy.     06/05/2019 6.3 (H) 4.0 - 5.6 % Final     Comment:     ADA Screening Guidelines:  5.7-6.4%  Consistent with prediabetes  >or=6.5%  Consistent with diabetes  High levels of fetal hemoglobin interfere with the HbA1C  assay. Heterozygous  "hemoglobin variants (HbS, HgC, etc)do  not significantly interfere with this assay.   However, presence of multiple variants may affect accuracy.     04/10/2019 8.4 (H) 4.0 - 5.6 % Final     Comment:     ADA Screening Guidelines:  5.7-6.4%  Consistent with prediabetes  >or=6.5%  Consistent with diabetes  High levels of fetal hemoglobin interfere with the HbA1C  assay. Heterozygous hemoglobin variants (HbS, HgC, etc)do  not significantly interfere with this assay.   However, presence of multiple variants may affect accuracy.         Review of Systems   Constitution: Negative for chills, fever and malaise/fatigue.   HENT: Negative for hearing loss.    Cardiovascular: Negative for claudication.   Respiratory: Negative for shortness of breath.    Skin: Positive for color change, nail changes, poor wound healing and unusual hair distribution. Negative for flushing and rash.   Musculoskeletal: Negative for joint pain and myalgias.   Neurological: Negative for loss of balance, numbness, paresthesias and sensory change.   Psychiatric/Behavioral: Negative for altered mental status.               Objective:        Vitals:    02/13/20 1410   BP: 128/73   Pulse: (!) 54   Weight: 83.9 kg (185 lb)   Height: 5' 1.32" (1.558 m)           Physical Exam   Constitutional: He appears well-developed and well-nourished. He is cooperative.   Cardiovascular:   Pulses:       Dorsalis pedis pulses are 0 on the right side, and 0 on the left side.        Posterior tibial pulses are 0 on the right side, and 1+ on the left side.   Bi-phasic pulses noted with doppler, right   Musculoskeletal:        Right ankle: He exhibits decreased range of motion and abnormal pulse. Achilles tendon exhibits normal Barajas's test results.        Left ankle: He exhibits decreased range of motion and abnormal pulse. Achilles tendon exhibits normal Barajas's test results.        Right foot: There is decreased range of motion.        Left foot: There is decreased " range of motion.   Feet:   Right Foot:   Protective Sensation: 5 sites tested. 2 sites sensed.   Left Foot:   Protective Sensation: 5 sites tested. 2 sites sensed.   Neurological: He is alert.   diminished sensation noted to b/L lower extremities   Skin: Skin is warm and dry. Capillary refill takes more than 3 seconds. He is not diaphoretic.       Ulcer Location: right lateral foot  Measurements:   0.7cm x 0.4cm x 0.1cm  Periwound: (--) hyperkeratosis;  (--) necrosis  Drainage:  Scant serous;   Pus: Absent  Malodor:  Absent  Base:  100% granular. 0% fibrin.  Signs of infection:   None      Ulcer Location: right great toe, medial distal border healed      Psychiatric: His behavior is normal.   Vitals reviewed.                    Assessment:       Encounter Diagnoses   Name Primary?    Skin ulcer of right foot with fat layer exposed Yes    Type 2 diabetes mellitus with diabetic neuropathy, with long-term current use of insulin          Plan:       Randy was seen today for post-op evaluation.    Diagnoses and all orders for this visit:    Skin ulcer of right foot with fat layer exposed    Type 2 diabetes mellitus with diabetic neuropathy, with long-term current use of insulin      I counseled the patient on his conditions, their implications and medical management.      Debridement: With verbal consent, nonviable tissues on the right foot were debrided beyond sub q utilizing a  sterile No. 3 scalpel and forceps. Minimal bleeding controlled with direct pressure  The patient tolerated this well.     Dressings: Meghan  Offloading:Foam    Follow-up:Patient is to return to the clinic in 1 week  for follow-up but should [facility] immediately if any signs of infection, such as fever, chills, sweats, increased redness or pain.    Short-term goals include maintaining good offloading and minimizing bioburden, promoting granulation and epithelialization to healing.  Long-term goals include keeping the wound healed by good  offloading and medical management under the direction of internist.

## 2020-02-26 ENCOUNTER — PATIENT OUTREACH (OUTPATIENT)
Dept: ADMINISTRATIVE | Facility: OTHER | Age: 80
End: 2020-02-26

## 2020-02-26 ENCOUNTER — OFFICE VISIT (OUTPATIENT)
Dept: PODIATRY | Facility: CLINIC | Age: 80
End: 2020-02-26
Payer: MEDICARE

## 2020-02-26 VITALS — SYSTOLIC BLOOD PRESSURE: 109 MMHG | HEART RATE: 53 BPM | DIASTOLIC BLOOD PRESSURE: 55 MMHG | TEMPERATURE: 98 F

## 2020-02-26 DIAGNOSIS — Z89.421 STATUS POST AMPUTATION OF TOE OF RIGHT FOOT: ICD-10-CM

## 2020-02-26 DIAGNOSIS — B35.1 DERMATOPHYTOSIS OF NAIL: ICD-10-CM

## 2020-02-26 DIAGNOSIS — E11.40 TYPE 2 DIABETES MELLITUS WITH DIABETIC NEUROPATHY, WITH LONG-TERM CURRENT USE OF INSULIN: ICD-10-CM

## 2020-02-26 DIAGNOSIS — L97.512 SKIN ULCER OF RIGHT FOOT WITH FAT LAYER EXPOSED: Primary | ICD-10-CM

## 2020-02-26 DIAGNOSIS — Z79.4 TYPE 2 DIABETES MELLITUS WITH DIABETIC NEUROPATHY, WITH LONG-TERM CURRENT USE OF INSULIN: ICD-10-CM

## 2020-02-26 PROBLEM — M86.9 TOE OSTEOMYELITIS, RIGHT: Status: RESOLVED | Noted: 2019-08-28 | Resolved: 2020-02-26

## 2020-02-26 PROCEDURE — 99499 UNLISTED E&M SERVICE: CPT | Mod: HCNC,S$GLB,, | Performed by: PODIATRIST

## 2020-02-26 PROCEDURE — 99999 PR PBB SHADOW E&M-EST. PATIENT-LVL III: ICD-10-PCS | Mod: PBBFAC,HCNC,, | Performed by: PODIATRIST

## 2020-02-26 PROCEDURE — 99999 PR PBB SHADOW E&M-EST. PATIENT-LVL III: CPT | Mod: PBBFAC,HCNC,, | Performed by: PODIATRIST

## 2020-02-26 PROCEDURE — 11042 WOUND DEBRIDEMENT: ICD-10-PCS | Mod: HCNC,S$GLB,, | Performed by: PODIATRIST

## 2020-02-26 PROCEDURE — 99499 NO LOS: ICD-10-PCS | Mod: HCNC,S$GLB,, | Performed by: PODIATRIST

## 2020-02-26 PROCEDURE — 11721 DEBRIDE NAIL 6 OR MORE: CPT | Mod: Q7,59,HCNC,S$GLB | Performed by: PODIATRIST

## 2020-02-26 PROCEDURE — 11042 DBRDMT SUBQ TIS 1ST 20SQCM/<: CPT | Mod: HCNC,S$GLB,, | Performed by: PODIATRIST

## 2020-02-26 PROCEDURE — 11721 ROUTINE FOOT CARE: ICD-10-PCS | Mod: Q7,59,HCNC,S$GLB | Performed by: PODIATRIST

## 2020-02-26 NOTE — PROGRESS NOTES
Chief Complaint: Foot Ulcer (right foot)    HPI:  Randy is a 79 y.o. male who presents to the clinic for evaluation and treatment of high risk feet. 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     The patient's chief complaint is follow up ulcer right foot, lateral aspect.   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       Pt denies any NVFCSOB.      This patient has documented high risk feet requiring routine maintenance secondary to peripheral vascular disease.      PCP: Susie Lazo MD    Date Last Seen by PCP: Foot Ulcer (right foot)         Current shoe gear:  Affected Foot: Football and Darco shoe on the affected foot     Unaffected Foot: Slip-on shoes        Hemoglobin A1C   Date Value Ref Range Status   01/15/2020 5.8 (H) 4.0 - 5.6 % Final     Comment:     ADA Screening Guidelines:  5.7-6.4%  Consistent with prediabetes  >or=6.5%  Consistent with diabetes  High levels of fetal hemoglobin interfere with the HbA1C  assay. Heterozygous hemoglobin variants (HbS, HgC, etc)do  not significantly interfere with this assay.   However, presence of multiple variants may affect accuracy.     06/05/2019 6.3 (H) 4.0 - 5.6 % Final     Comment:     ADA Screening Guidelines:  5.7-6.4%  Consistent with prediabetes  >or=6.5%  Consistent with diabetes  High levels of fetal hemoglobin interfere with the HbA1C  assay. Heterozygous hemoglobin variants (HbS, HgC, etc)do  not significantly interfere with this assay.   However, presence of multiple variants may affect  accuracy.     04/10/2019 8.4 (H) 4.0 - 5.6 % Final     Comment:     ADA Screening Guidelines:  5.7-6.4%  Consistent with prediabetes  >or=6.5%  Consistent with diabetes  High levels of fetal hemoglobin interfere with the HbA1C  assay. Heterozygous hemoglobin variants (HbS, HgC, etc)do  not significantly interfere with this assay.   However, presence of multiple variants may affect accuracy.         Review of Systems   Constitution: Negative for chills, fever and malaise/fatigue.   HENT: Negative for hearing loss.    Cardiovascular: Negative for claudication.   Respiratory: Negative for shortness of breath.    Skin: Positive for color change, dry skin, nail changes, poor wound healing and unusual hair distribution. Negative for flushing and rash.   Musculoskeletal: Negative for joint pain and myalgias.   Neurological: Negative for loss of balance, numbness, paresthesias and sensory change.   Psychiatric/Behavioral: Negative for altered mental status.               Objective:        Vitals:    02/26/20 1151   BP: (!) 109/55   BP Location: Left arm   Patient Position: Sitting   BP Method: Medium (Automatic)   Pulse: (!) 53   Temp: 97.6 °F (36.4 °C)           Physical Exam   Constitutional: He appears well-developed and well-nourished. He is cooperative.   Cardiovascular:   Pulses:       Dorsalis pedis pulses are 0 on the right side, and 0 on the left side.        Posterior tibial pulses are 0 on the right side, and 1+ on the left side.   Bi-phasic pulses noted with doppler, right   Musculoskeletal:        Right ankle: He exhibits decreased range of motion and abnormal pulse. Achilles tendon exhibits normal Barajas's test results.        Left ankle: He exhibits decreased range of motion and abnormal pulse. Achilles tendon exhibits normal Barajas's test results.        Right foot: There is decreased range of motion.        Left foot: There is decreased range of motion.   Feet:   Right Foot:   Protective Sensation: 5  "sites tested. 2 sites sensed.   Left Foot:   Protective Sensation: 5 sites tested. 2 sites sensed.   Neurological: He is alert.   diminished sensation noted to b/L lower extremities   Skin: Skin is dry. Capillary refill takes more than 3 seconds.       Ulcer Location: right lateral foot  Measurements:  Pre debridement:  0.6cm x 0.2cm x 0.1cm   Periwound: (+ hyperkeratosis;  (--) necrosis  Drainage:  None  Pus: Absent  Malodor:  Absent  Base:  100% granular. 0% fibrin.  Signs of infection:   None       Psychiatric: His behavior is normal.   Vitals reviewed.                    Assessment:       Encounter Diagnoses   Name Primary?    Skin ulcer of right foot with fat layer exposed Yes    Type 2 diabetes mellitus with diabetic neuropathy, with long-term current use of insulin     Dermatophytosis of nail     Status post amputation of toe of right foot          Plan:       Randy was seen today for foot ulcer.    Diagnoses and all orders for this visit:    Skin ulcer of right foot with fat layer exposed    Type 2 diabetes mellitus with diabetic neuropathy, with long-term current use of insulin    Dermatophytosis of nail    Status post amputation of toe of right foot    Other orders  -     Wound Debridement  -     Routine Foot Care      I counseled the patient on his conditions, their implications and medical management.        Wound Debridement  Date/Time: 2/26/2020 12:00 PM  Performed by: Jess Rosa DPM  Authorized by: Jess Rosa DPM     Time out: Immediately prior to procedure a "time out" was called to verify the correct patient, procedure, equipment, support staff and site/side marked as required.    Consent Done?:  Yes (Verbal)    Preparation: Patient was prepped and draped in usual sterile fashion    Local anesthesia used?: No      Wound Details:    Location:  Right foot    Location:  Right Midfoot    Type of Debridement:  Excisional       Length (cm):  0.7       Area (sq cm):  0.21       Width (cm):  " 0.3       Percent Debrided (%):  100       Depth (cm):  0.1       Total Area Debrided (sq cm):  0.21    Depth of debridement:  Subcutaneous tissue    Tissue debrided:  Subcutaneous, Epidermis and Dermis    Devitalized tissue debrided:  Slough, Fibrin, Callus and Biofilm    Instruments:  Curette and Blade    Bleeding:  Minimal  Hemostasis Achieved: Yes    Method Used:  Pressure  Patient tolerance:  Patient tolerated the procedure well with no immediate complications     Meghan and padded dressing applied.    Leave clean, dry, and intact                       Routine Foot Care  Date/Time: 2/26/2020 12:00 PM  Performed by: Jess Rosa DPM  Authorized by: Jess Rosa DPM     Consent Done?:  Yes (Verbal)    Nail Care Type:  Debride(Left 1st Toe, Left 3rd Toe, Left 2nd Toe, Left 4th Toe, Left 5th Toe, Right 1st Toe, Right 2nd Toe, Right 3rd Toe and Right 4th Toe)  Patient tolerance:  Patient tolerated the procedure well with no immediate complications     With patient's permission, the toenails mentioned above were aggressively reduced and debrided using a nail nipper, removing all offending nail and debris. The patient will continue to monitor the areas daily, inspect the feet, wear protective shoe gear when ambulatory, and moisturizer to maintain skin integrity.           Follow up in a week or sooner if concerned.

## 2020-02-26 NOTE — PROGRESS NOTES
Chart reviewed.   Immunizations: LINKS failed  Orders placed: n/a  Upcoming appts to satisfy OLIVERIO topics: n/a

## 2020-02-26 NOTE — PATIENT INSTRUCTIONS
How to Check Your Feet    Below are tips to help you look for foot problems. Try to check your feet at the same time each day, such as when you get out of bed in the morning.    · Check the top of each foot. The tops of toes, back of the heel, and outer edge of the foot can get a lot of rubbing from poor-fitting shoes.    · Check the bottom of each foot. Daily wear and tear often leads to problems at pressure spots.    · Check the toes and nails. Fungal infections often occur between toes. Toenail problems can also be a sign of fungal infections or lead to breaks in the skin.    · Check your shoes, too. Loose objects inside a shoe can injure the foot. Use your hand to feel inside your shoes for things like eze, loose stitching, or rough areas that could irritate your skin.        Diabetic Foot Care    Diabetes can lead to a number of different foot complications. Fortunately, most of these complications can be prevented with a little extra foot care. If diabetes is not well controlled, the high blood sugar can cause damage to blood vessels and result in poor circulation to the foot. When the skin does not get enough blood flow, it becomes prone to pressure sores and ulcers, which heal slowly.  High blood sugar can also damage nerves, interfering with the ability to feel pain and pressure. When you cant feel your foot normally, it is easy to injure your skin, bones and joints without knowing it. For these reasons diabetes increases the risk of fungal infections, bunions and ulcers. Deep ulcers can lead to bone infection. Gangrene is the most serious foot complication of diabetes. It usually occurs on the tips of the toes as blacked areas of skin. The black area is dead tissue. In severe cases, gangrene spreads to involve the entire toe, other toes and the entire foot. Foot or toe amputation may be required. Good foot care and blood sugar control can prevent this.    Home Care  1. Wear comfortable, proper fitting  shoes.  2. Wash your feet daily with warm water and mild soap.  3. After drying, apply a moisturizing cream or lotion.  4. Check your feet daily for skin breaks, blisters, swelling, or redness. Look between your toes also.  5. Wear cotton socks and change them every day.  6. Trim toe nails carefully and do not cut your cuticles.  7. Strive to keep your blood sugar under control with a combination of medicines, diet and activity.  8. If you smoke and have diabetes, it is very important that you stop. Smoking reduces blood flow to your foot.  9. Avoid activities that increase your risk of foot injury:  · Do not walk barefoot.  · Do not use heating pads or hot water bottles on your feet.  · Do not put your foot in a hot tub without first checking the temperature with your hand.  10) Schedule yearly foot exams.    Follow Up  with your doctor or as advised by our staff. Report any cut, puncture, scrape, other injury, blister, ingrown toenail or ulcer on your foot.    Get Prompt Medical Attention  if any of the following occur:  -- Open ulcer with pus draining from the wound  -- Increasing foot or leg pain  -- New areas of redness or swelling or tender areas of the foot    © 5514-1035 [employer]. [address] [place] [address], [place] [address]. All rights reserved. This information is not intended as a substitute for professional medical care. Always follow your healthcare professional's instructions.

## 2020-03-03 ENCOUNTER — PATIENT OUTREACH (OUTPATIENT)
Dept: ADMINISTRATIVE | Facility: OTHER | Age: 80
End: 2020-03-03

## 2020-03-04 ENCOUNTER — OFFICE VISIT (OUTPATIENT)
Dept: PODIATRY | Facility: CLINIC | Age: 80
End: 2020-03-04
Payer: MEDICARE

## 2020-03-04 VITALS — WEIGHT: 185 LBS | BODY MASS INDEX: 34.93 KG/M2 | HEIGHT: 61 IN

## 2020-03-04 DIAGNOSIS — E11.40 TYPE 2 DIABETES MELLITUS WITH DIABETIC NEUROPATHY, WITH LONG-TERM CURRENT USE OF INSULIN: ICD-10-CM

## 2020-03-04 DIAGNOSIS — Z79.4 TYPE 2 DIABETES MELLITUS WITH DIABETIC NEUROPATHY, WITH LONG-TERM CURRENT USE OF INSULIN: ICD-10-CM

## 2020-03-04 DIAGNOSIS — Z89.431 PARTIAL NONTRAUMATIC AMPUTATION OF FOOT, RIGHT: ICD-10-CM

## 2020-03-04 DIAGNOSIS — L97.512 SKIN ULCER OF RIGHT FOOT WITH FAT LAYER EXPOSED: Primary | ICD-10-CM

## 2020-03-04 PROCEDURE — 99999 PR PBB SHADOW E&M-EST. PATIENT-LVL III: ICD-10-PCS | Mod: PBBFAC,HCNC,, | Performed by: PODIATRIST

## 2020-03-04 PROCEDURE — 99212 OFFICE O/P EST SF 10 MIN: CPT | Mod: HCNC,S$GLB,, | Performed by: PODIATRIST

## 2020-03-04 PROCEDURE — 1126F AMNT PAIN NOTED NONE PRSNT: CPT | Mod: HCNC,S$GLB,, | Performed by: PODIATRIST

## 2020-03-04 PROCEDURE — 1159F PR MEDICATION LIST DOCUMENTED IN MEDICAL RECORD: ICD-10-PCS | Mod: HCNC,S$GLB,, | Performed by: PODIATRIST

## 2020-03-04 PROCEDURE — 1101F PT FALLS ASSESS-DOCD LE1/YR: CPT | Mod: HCNC,CPTII,S$GLB, | Performed by: PODIATRIST

## 2020-03-04 PROCEDURE — 1126F PR PAIN SEVERITY QUANTIFIED, NO PAIN PRESENT: ICD-10-PCS | Mod: HCNC,S$GLB,, | Performed by: PODIATRIST

## 2020-03-04 PROCEDURE — 99212 PR OFFICE/OUTPT VISIT, EST, LEVL II, 10-19 MIN: ICD-10-PCS | Mod: HCNC,S$GLB,, | Performed by: PODIATRIST

## 2020-03-04 PROCEDURE — 99999 PR PBB SHADOW E&M-EST. PATIENT-LVL III: CPT | Mod: PBBFAC,HCNC,, | Performed by: PODIATRIST

## 2020-03-04 PROCEDURE — 99499 RISK ADDL DX/OHS AUDIT: ICD-10-PCS | Mod: HCNC,S$GLB,, | Performed by: PODIATRIST

## 2020-03-04 PROCEDURE — 1159F MED LIST DOCD IN RCRD: CPT | Mod: HCNC,S$GLB,, | Performed by: PODIATRIST

## 2020-03-04 PROCEDURE — 1101F PR PT FALLS ASSESS DOC 0-1 FALLS W/OUT INJ PAST YR: ICD-10-PCS | Mod: HCNC,CPTII,S$GLB, | Performed by: PODIATRIST

## 2020-03-04 PROCEDURE — 99499 UNLISTED E&M SERVICE: CPT | Mod: HCNC,S$GLB,, | Performed by: PODIATRIST

## 2020-03-04 NOTE — PROGRESS NOTES
Subjective:      Patient ID: Randy Camejo is a 79 y.o. male.    Chief Complaint: Follow-up (Skin Ulcer, Right foot, Redness on 2nd right toe)    Diabetic foot wound right foot postop dehiscence of incision 6 months ago. Weekly wound care is stabilizing and improving the wound gradually over time.  Patient relates no signs of infection.  Patient is ambulating minimally in surgical shoe right, casual shoe left.  Dressing from last visit is clean dry and intact right foot.    Review of Systems   Constitution: Negative for chills, diaphoresis, fever, malaise/fatigue and night sweats.   Cardiovascular: Negative for claudication, cyanosis, leg swelling and syncope.   Skin: Positive for poor wound healing and unusual hair distribution. Negative for color change, dry skin, nail changes, rash and suspicious lesions.   Musculoskeletal: Negative for falls, joint pain, joint swelling, muscle cramps, muscle weakness and stiffness.   Gastrointestinal: Negative for constipation, diarrhea, nausea and vomiting.   Neurological: Positive for numbness, paresthesias and sensory change. Negative for tremors.           Objective:      Physical Exam   Constitutional: He appears well-developed and well-nourished. He is cooperative. No distress.   Cardiovascular:   Pulses:       Dorsalis pedis pulses are 1+ on the right side, and 1+ on the left side.        Posterior tibial pulses are 1+ on the right side, and 1+ on the left side.   Toes warm, pink, cap fill <5 seconds.   Musculoskeletal:   Partial 5th ray resection right foot without symptoms.   Lymphadenopathy:   Negative lymphadenopathy bilateral popliteal fossa and tarsal tunnel.     Neurological: He has normal strength. A sensory deficit is present.   Diminished/loss of protective sensation all toes bilateral to 10 gram monofilament.       Skin: Skin is warm and dry. No abrasion, no bruising, no burn, no ecchymosis, no laceration, no lesion and no rash noted. He is not diaphoretic.  No erythema. No pallor.       Wound: dorsal lateral proximal right 5th ray    Size:    Pre:4x2x1      Base:  Granular, pink with minimal serous/serosanaguinous drainage only.  No pus, tracking, fluctuance, malodor, or cardinal signs infection.    Borders:   flat, pink, blanchable skin edges without undermining.      Otherwise, Skin thin, shiny, atrophic, with decreased density and distribution of pedal hair bilateral, but without hyperpigmentation, maggie discoloration,  ulcers, masses, nodules or cords palpated bilateral feet and legs.               Assessment:       Encounter Diagnoses   Name Primary?    Skin ulcer of right foot with fat layer exposed Yes    Type 2 diabetes mellitus with diabetic neuropathy, with long-term current use of insulin     Partial nontraumatic amputation of foot, right          Plan:       Randy was seen today for follow-up.    Diagnoses and all orders for this visit:    Skin ulcer of right foot with fat layer exposed  -     DIABETIC SHOES FOR HOME USE    Type 2 diabetes mellitus with diabetic neuropathy, with long-term current use of insulin  -     DIABETIC SHOES FOR HOME USE    Partial nontraumatic amputation of foot, right  -     DIABETIC SHOES FOR HOME USE      I counseled the patient on his conditions, their implications and medical management.        Dressed superficial wound right foot with wound foam wound net.    Continue minimal ambulation in surgical shoe right casual shoe left.    Prescribed diabetic shoes with custom inserts and filler right foot as needed.    At patient's request a cushion to the tip of the 2nd toe of the left foot with a Band-Aid.  They will continue to do the same daily.    Adequate vitamin supplementation, protein intake, and hydration - discussed with patient.    Inspect feet multiple times daily for signs of occurrence/recurrence ulceration.           Follow up in about 1 week (around 3/11/2020).

## 2020-03-11 ENCOUNTER — PATIENT OUTREACH (OUTPATIENT)
Dept: ADMINISTRATIVE | Facility: OTHER | Age: 80
End: 2020-03-11

## 2020-03-12 ENCOUNTER — OFFICE VISIT (OUTPATIENT)
Dept: PODIATRY | Facility: CLINIC | Age: 80
End: 2020-03-12
Payer: MEDICARE

## 2020-03-12 ENCOUNTER — TELEPHONE (OUTPATIENT)
Dept: PODIATRY | Facility: CLINIC | Age: 80
End: 2020-03-12

## 2020-03-12 VITALS
WEIGHT: 185 LBS | TEMPERATURE: 98 F | SYSTOLIC BLOOD PRESSURE: 117 MMHG | HEIGHT: 61 IN | BODY MASS INDEX: 34.93 KG/M2 | DIASTOLIC BLOOD PRESSURE: 59 MMHG | HEART RATE: 56 BPM

## 2020-03-12 DIAGNOSIS — E11.40 TYPE 2 DIABETES MELLITUS WITH DIABETIC NEUROPATHY, WITH LONG-TERM CURRENT USE OF INSULIN: ICD-10-CM

## 2020-03-12 DIAGNOSIS — Z79.4 TYPE 2 DIABETES MELLITUS WITH DIABETIC NEUROPATHY, WITH LONG-TERM CURRENT USE OF INSULIN: ICD-10-CM

## 2020-03-12 DIAGNOSIS — L97.512 SKIN ULCER OF RIGHT FOOT WITH FAT LAYER EXPOSED: Primary | ICD-10-CM

## 2020-03-12 PROCEDURE — 99499 UNLISTED E&M SERVICE: CPT | Mod: HCNC,S$GLB,, | Performed by: PODIATRIST

## 2020-03-12 PROCEDURE — 99999 PR PBB SHADOW E&M-EST. PATIENT-LVL III: CPT | Mod: PBBFAC,HCNC,, | Performed by: PODIATRIST

## 2020-03-12 PROCEDURE — 99999 PR PBB SHADOW E&M-EST. PATIENT-LVL III: ICD-10-PCS | Mod: PBBFAC,HCNC,, | Performed by: PODIATRIST

## 2020-03-12 PROCEDURE — 99499 NO LOS: ICD-10-PCS | Mod: HCNC,S$GLB,, | Performed by: PODIATRIST

## 2020-03-12 PROCEDURE — 11042 DBRDMT SUBQ TIS 1ST 20SQCM/<: CPT | Mod: HCNC,S$GLB,, | Performed by: PODIATRIST

## 2020-03-12 PROCEDURE — 11042 WOUND DEBRIDEMENT: ICD-10-PCS | Mod: HCNC,S$GLB,, | Performed by: PODIATRIST

## 2020-03-12 NOTE — PROGRESS NOTES
Chief Complaint: Foot Ulcer (f/u)    HPI:  Randy is a 79 y.o. male who presents to the clinic for evaluation and treatment of high risk feet. 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       Pt denies any NVFCSOB.      This patient has documented high risk feet requiring routine maintenance secondary to peripheral vascular disease.      PCP: Susie Lazo MD    Date Last Seen by PCP: Foot Ulcer (f/u)         Current shoe gear:  Affected Foot: Football and Darco shoe on the affected foot     Unaffected Foot: Slip-on shoes        Hemoglobin A1C   Date Value Ref Range Status   01/15/2020 5.8 (H) 4.0 - 5.6 % Final     Comment:     ADA Screening Guidelines:  5.7-6.4%  Consistent with prediabetes  >or=6.5%  Consistent with diabetes  High levels of fetal hemoglobin interfere with the HbA1C  assay. Heterozygous hemoglobin variants (HbS, HgC, etc)do  not significantly interfere with this assay.   However, presence of multiple variants may affect accuracy.     06/05/2019 6.3 (H) 4.0 - 5.6 % Final     Comment:     ADA Screening Guidelines:  5.7-6.4%  Consistent with prediabetes  >or=6.5%  Consistent with diabetes  High levels of fetal hemoglobin interfere with the HbA1C  assay. Heterozygous hemoglobin variants (HbS, HgC, etc)do  not significantly interfere with this assay.   However, presence of multiple variants may affect  "accuracy.     04/10/2019 8.4 (H) 4.0 - 5.6 % Final     Comment:     ADA Screening Guidelines:  5.7-6.4%  Consistent with prediabetes  >or=6.5%  Consistent with diabetes  High levels of fetal hemoglobin interfere with the HbA1C  assay. Heterozygous hemoglobin variants (HbS, HgC, etc)do  not significantly interfere with this assay.   However, presence of multiple variants may affect accuracy.         Review of Systems   Constitution: Negative for chills, fever and malaise/fatigue.   HENT: Negative for hearing loss.    Cardiovascular: Negative for claudication.   Respiratory: Negative for shortness of breath.    Skin: Positive for color change, dry skin, nail changes, poor wound healing and unusual hair distribution. Negative for flushing and rash.   Musculoskeletal: Negative for joint pain and myalgias.   Neurological: Negative for loss of balance, numbness, paresthesias and sensory change.   Psychiatric/Behavioral: Negative for altered mental status.               Objective:        Vitals:    03/12/20 1222   BP: (!) 117/59   Pulse: (!) 56   Temp: 97.9 °F (36.6 °C)   Weight: 83.9 kg (185 lb)   Height: 5' 1" (1.549 m)           Physical Exam   Constitutional: He appears well-developed and well-nourished. He is cooperative.   Cardiovascular:   Pulses:       Dorsalis pedis pulses are 0 on the right side, and 0 on the left side.        Posterior tibial pulses are 0 on the right side, and 1+ on the left side.   Bi-phasic pulses noted with doppler, right   Musculoskeletal:        Right ankle: He exhibits decreased range of motion and abnormal pulse. Achilles tendon exhibits normal Barajas's test results.        Left ankle: He exhibits decreased range of motion and abnormal pulse. Achilles tendon exhibits normal Barajas's test results.        Right foot: There is decreased range of motion.        Left foot: There is decreased range of motion.   Feet:   Right Foot:   Protective Sensation: 5 sites tested. 2 sites sensed. " "  Left Foot:   Protective Sensation: 5 sites tested. 2 sites sensed.   Neurological: He is alert.   diminished sensation noted to b/L lower extremities   Skin: Skin is dry. Capillary refill takes more than 3 seconds.       Ulcer Location: right lateral foot  Measurements:  Pre debridement:  0.4cm x 0.1cm x 0.1cm   Periwound: (+ hyperkeratosis;  (--) necrosis  Drainage:  None  Pus: Absent  Malodor:  Absent  Base:  90% granular. 10% fibrin.  Signs of infection:   None       Psychiatric: His behavior is normal.   Vitals reviewed.                    Assessment:       Encounter Diagnoses   Name Primary?    Skin ulcer of right foot with fat layer exposed Yes    Type 2 diabetes mellitus with diabetic neuropathy, with long-term current use of insulin          Plan:       Randy was seen today for foot ulcer.    Diagnoses and all orders for this visit:    Skin ulcer of right foot with fat layer exposed    Type 2 diabetes mellitus with diabetic neuropathy, with long-term current use of insulin      I counseled the patient on his conditions, their implications and medical management.    Meghan applied to the wound. Padded dressing over the area.  Leave clean, dry, and intact     He is trying to get diabetes shoes today... Okay to remove dressings to be fitted but need to re-bandage immediately.  Dressing supplies provided.         Wound Debridement  Date/Time: 3/12/2020 12:00 PM  Performed by: Jess Rosa DPM  Authorized by: Jess Rosa DPM     Time out: Immediately prior to procedure a "time out" was called to verify the correct patient, procedure, equipment, support staff and site/side marked as required.    Consent Done?:  Yes (Verbal)    Preparation: Patient was prepped and draped in usual sterile fashion    Local anesthesia used?: No      Wound Details:    Location:  Right foot    Location:  Right Midfoot    Type of Debridement:  Excisional       Length (cm):  0.6       Area (sq cm):  0.12       Width (cm):  0.2     "   Percent Debrided (%):  100       Depth (cm):  0.1       Total Area Debrided (sq cm):  0.12    Depth of debridement:  Subcutaneous tissue    Tissue debrided:  Subcutaneous, Epidermis and Dermis    Devitalized tissue debrided:  Slough, Fibrin, Callus and Biofilm    Instruments:  Curette    Bleeding:  Minimal  Hemostasis Achieved: Yes    Method Used:  Pressure  Patient tolerance:  Patient tolerated the procedure well with no immediate complications          Follow up in a week or sooner if concerned.

## 2020-04-09 RX ORDER — APIXABAN 5 MG/1
TABLET, FILM COATED ORAL
Qty: 180 TABLET | Refills: 3 | Status: SHIPPED | OUTPATIENT
Start: 2020-04-09 | End: 2021-04-26

## 2020-04-12 DIAGNOSIS — I50.22 HEART FAILURE, SYSTOLIC, CHRONIC: ICD-10-CM

## 2020-04-13 RX ORDER — LOSARTAN POTASSIUM 50 MG/1
TABLET ORAL
Qty: 90 TABLET | Refills: 3 | Status: SHIPPED | OUTPATIENT
Start: 2020-04-13 | End: 2020-07-08

## 2020-04-16 DIAGNOSIS — I50.22 HEART FAILURE, SYSTOLIC, CHRONIC: ICD-10-CM

## 2020-04-16 RX ORDER — CARVEDILOL 6.25 MG/1
TABLET ORAL
Qty: 180 TABLET | Refills: 3 | Status: ON HOLD | OUTPATIENT
Start: 2020-04-16 | End: 2021-01-06 | Stop reason: HOSPADM

## 2020-04-20 ENCOUNTER — TELEPHONE (OUTPATIENT)
Dept: INTERNAL MEDICINE | Facility: CLINIC | Age: 80
End: 2020-04-20

## 2020-04-20 NOTE — TELEPHONE ENCOUNTER
----- Message from Lorrie Sullivan sent at 4/20/2020  3:14 PM CDT -----  Contact: Self  Pt is calling to speak with Staff regarding being schedule for an appt for a follow up.    He can be reached at [phone].    Thank you.

## 2020-04-24 ENCOUNTER — TELEPHONE (OUTPATIENT)
Dept: PODIATRY | Facility: CLINIC | Age: 80
End: 2020-04-24

## 2020-04-24 NOTE — TELEPHONE ENCOUNTER
----- Message from Emi Gallegos sent at 4/24/2020  2:43 PM CDT -----  Contact: DARIANA GUTIÉRREZ [[id number]]  Name of Who is Calling: DARIANA GUTIÉRREZ [[id number]]    What is the request in detail:DARIANA GUTIÉRREZ [[id number]] calling in regards to follow up appointment ...  Please contact to further discuss and advise      Can the clinic reply by [facility]: yes     What Number to Call Back if not in [facility]:  [phone] (home)

## 2020-04-24 NOTE — TELEPHONE ENCOUNTER
Pt would like orders for HH. Pt declined an appointment in the office before mid May. I informed the pt that Dr. Rosa is not in the office at the time however, I'd send a message. Pt voiced understanding and call ended.

## 2020-04-24 NOTE — TELEPHONE ENCOUNTER
----- Message from Jess Rosa DPM sent at 4/24/2020  4:55 PM CDT -----  Regarding: RE:    I recommend a virtual visit if patient cant come in. I havent seen the wound in over a month so I cant put in the appropriate HH order.  ----- Message -----  From: Marcelo Gallegos LPN  Sent: 4/24/2020   3:57 PM CDT  To: Jess Rosa DPM    Pt would like orders for HH. Pt declined an appointment in the office before mid May. I informed the pt that Dr. Rosa is not in the office at the time however, I'd send a message. Pt voiced understanding and call ended.

## 2020-04-27 ENCOUNTER — OFFICE VISIT (OUTPATIENT)
Dept: PODIATRY | Facility: CLINIC | Age: 80
End: 2020-04-27
Payer: MEDICARE

## 2020-04-27 ENCOUNTER — PATIENT OUTREACH (OUTPATIENT)
Dept: ADMINISTRATIVE | Facility: OTHER | Age: 80
End: 2020-04-27

## 2020-04-27 DIAGNOSIS — E11.40 TYPE 2 DIABETES MELLITUS WITH DIABETIC NEUROPATHY, WITH LONG-TERM CURRENT USE OF INSULIN: ICD-10-CM

## 2020-04-27 DIAGNOSIS — Z79.4 TYPE 2 DIABETES MELLITUS WITH DIABETIC NEUROPATHY, WITH LONG-TERM CURRENT USE OF INSULIN: ICD-10-CM

## 2020-04-27 DIAGNOSIS — L97.512 SKIN ULCER OF RIGHT FOOT WITH FAT LAYER EXPOSED: Primary | ICD-10-CM

## 2020-04-27 PROCEDURE — 99212 PR OFFICE/OUTPT VISIT, EST, LEVL II, 10-19 MIN: ICD-10-PCS | Mod: HCNC,95,, | Performed by: PODIATRIST

## 2020-04-27 PROCEDURE — 99212 OFFICE O/P EST SF 10 MIN: CPT | Mod: HCNC,95,, | Performed by: PODIATRIST

## 2020-04-27 PROCEDURE — 1101F PR PT FALLS ASSESS DOC 0-1 FALLS W/OUT INJ PAST YR: ICD-10-PCS | Mod: HCNC,CPTII,95, | Performed by: PODIATRIST

## 2020-04-27 PROCEDURE — 1159F PR MEDICATION LIST DOCUMENTED IN MEDICAL RECORD: ICD-10-PCS | Mod: HCNC,95,, | Performed by: PODIATRIST

## 2020-04-27 PROCEDURE — 1159F MED LIST DOCD IN RCRD: CPT | Mod: HCNC,95,, | Performed by: PODIATRIST

## 2020-04-27 PROCEDURE — 1101F PT FALLS ASSESS-DOCD LE1/YR: CPT | Mod: HCNC,CPTII,95, | Performed by: PODIATRIST

## 2020-04-27 NOTE — PROGRESS NOTES
PODIATRY VIRTUAL VISIT      The patient location is: Home  The chief complaint leading to consultation is:  right foot wound.   Visit type: audiovisual  Total time spent with patient: 2:12PM - 2:22PM  (10 minutes)  Each patient to whom he or she provides medical services by telemedicine is:  (1) informed of the relationship between the physician and patient and the respective role of any other health care provider with respect to management of the patient; and (2) notified that he or she may decline to receive medical services by telemedicine and may withdraw from such care at any time.        Chief Complaint: No chief complaint on file.      HPI:  Randy is a 79 y.o. male who has a right lateral foot wound. 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         The patient's last visit with me was on March 12, 2020.  Patient has not been receiving home health.   his wife has been changing his bandage is using Iodosorb to the wound.  patient is running out of supplies and would like to get home health for bandage changes as his wife feels uncomfortable changing the dressings.  Patient has an appointment with me in clinic on May 15, 2020.    Pt denies any NVFCSOB.      This patient has documented high risk feet requiring routine maintenance secondary to peripheral vascular disease.      PCP: Susie Lazo MD    Date Last Seen  by PCP: No chief complaint on file.         Current shoe gear:  Affected Foot: Football and Darco shoe on the affected foot     Unaffected Foot: Slip-on shoes        Hemoglobin A1C   Date Value Ref Range Status   01/15/2020 5.8 (H) 4.0 - 5.6 % Final     Comment:     ADA Screening Guidelines:  5.7-6.4%  Consistent with prediabetes  >or=6.5%  Consistent with diabetes  High levels of fetal hemoglobin interfere with the HbA1C  assay. Heterozygous hemoglobin variants (HbS, HgC, etc)do  not significantly interfere with this assay.   However, presence of multiple variants may affect accuracy.     06/05/2019 6.3 (H) 4.0 - 5.6 % Final     Comment:     ADA Screening Guidelines:  5.7-6.4%  Consistent with prediabetes  >or=6.5%  Consistent with diabetes  High levels of fetal hemoglobin interfere with the HbA1C  assay. Heterozygous hemoglobin variants (HbS, HgC, etc)do  not significantly interfere with this assay.   However, presence of multiple variants may affect accuracy.     04/10/2019 8.4 (H) 4.0 - 5.6 % Final     Comment:     ADA Screening Guidelines:  5.7-6.4%  Consistent with prediabetes  >or=6.5%  Consistent with diabetes  High levels of fetal hemoglobin interfere with the HbA1C  assay. Heterozygous hemoglobin variants (HbS, HgC, etc)do  not significantly interfere with this assay.   However, presence of multiple variants may affect accuracy.         Review of Systems   Constitution: Negative for chills, fever and malaise/fatigue.   HENT: Negative for hearing loss.    Cardiovascular: Negative for claudication.   Respiratory: Negative for shortness of breath.    Skin: Positive for color change, dry skin, nail changes, poor wound healing and unusual hair distribution. Negative for flushing and rash.   Musculoskeletal: Negative for joint pain and myalgias.   Neurological: Negative for loss of balance, numbness, paresthesias and sensory change.   Psychiatric/Behavioral: Negative for altered mental status.                Objective:            FROM MOST RECENT EXAM on 3/12/2020:   Physical Exam   Constitutional: He appears well-developed and well-nourished. He is cooperative.   Cardiovascular:   Pulses:       Dorsalis pedis pulses are 0 on the right side, and 0 on the left side.        Posterior tibial pulses are 0 on the right side, and 1+ on the left side.   Bi-phasic pulses noted with doppler, right   Musculoskeletal:        Right ankle: He exhibits decreased range of motion and abnormal pulse. Achilles tendon exhibits normal Barajas's test results.        Left ankle: He exhibits decreased range of motion and abnormal pulse. Achilles tendon exhibits normal Barajas's test results.        Right foot: There is decreased range of motion.        Left foot: There is decreased range of motion.   Feet:   Right Foot:   Protective Sensation: 5 sites tested. 2 sites sensed.   Left Foot:   Protective Sensation: 5 sites tested. 2 sites sensed.   Neurological: He is alert.   diminished sensation noted to b/L lower extremities   Skin: Skin is dry. Capillary refill takes more than 3 seconds.       Ulcer Location: right lateral foot  Measurements:  Pre debridement:  0.4cm x 0.1cm x 0.1cm   Periwound: (+ hyperkeratosis;  (--) necrosis  Drainage:  None  Pus: Absent  Malodor:  Absent  Base:  90% granular. 10% fibrin.  Signs of infection:   None       Psychiatric: His behavior is normal.   Vitals reviewed.                    Assessment:       Encounter Diagnoses   Name Primary?    Skin ulcer of right foot with fat layer exposed Yes    Type 2 diabetes mellitus with diabetic neuropathy, with long-term current use of insulin          Plan:       Diagnoses and all orders for this visit:    Skin ulcer of right foot with fat layer exposed  -     cadexomer iodine (IODOSORB) 0.9 % gel; Apply topically daily as needed for Wound Care.  -     Ambulatory referral/consult to Home Health; Future    Type 2 diabetes mellitus with diabetic neuropathy, with  long-term current use of insulin  -     Ambulatory referral/consult to Home Health; Future      Total time spent face to face with the patient:  10 minutes    More than 50% of the time was spent counseling/coordinating care:     I counseled the patient on the patient's conditions, their implications and medical management.   Wound visualized on video.  Will order Home health wound care until clinic follow up visit on May 15th.  In the meantime the patient will receive home health wound care as instructed.  He was advised that it was okay to wear soft slippers if he is uncomfortable in the stiff-soled Darco shoe.  The patient was advised to monitor for any worsening signs and symptoms for example increasing redness, any drainage, increased temperature, pain, and/or fever.  Patient is amenable to plan.

## 2020-05-01 PROCEDURE — G0180 PR HOME HEALTH MD CERTIFICATION: ICD-10-PCS | Mod: ,,, | Performed by: PODIATRIST

## 2020-05-01 PROCEDURE — G0180 MD CERTIFICATION HHA PATIENT: HCPCS | Mod: ,,, | Performed by: PODIATRIST

## 2020-05-13 ENCOUNTER — PATIENT OUTREACH (OUTPATIENT)
Dept: ADMINISTRATIVE | Facility: OTHER | Age: 80
End: 2020-05-13

## 2020-05-13 DIAGNOSIS — I50.22 HEART FAILURE, SYSTOLIC, CHRONIC: ICD-10-CM

## 2020-05-13 RX ORDER — FUROSEMIDE 20 MG/1
TABLET ORAL
Qty: 90 TABLET | Refills: 3 | Status: SHIPPED | OUTPATIENT
Start: 2020-05-13 | End: 2020-07-08 | Stop reason: SDUPTHER

## 2020-05-15 ENCOUNTER — OFFICE VISIT (OUTPATIENT)
Dept: PODIATRY | Facility: CLINIC | Age: 80
End: 2020-05-15
Payer: MEDICARE

## 2020-05-15 VITALS
BODY MASS INDEX: 34.93 KG/M2 | HEIGHT: 61 IN | HEART RATE: 56 BPM | DIASTOLIC BLOOD PRESSURE: 57 MMHG | WEIGHT: 185 LBS | SYSTOLIC BLOOD PRESSURE: 105 MMHG | TEMPERATURE: 99 F

## 2020-05-15 DIAGNOSIS — L84 PRE-ULCERATIVE CORN OR CALLOUS: ICD-10-CM

## 2020-05-15 DIAGNOSIS — Z79.4 TYPE 2 DIABETES MELLITUS WITH DIABETIC NEUROPATHY, WITH LONG-TERM CURRENT USE OF INSULIN: ICD-10-CM

## 2020-05-15 DIAGNOSIS — B35.1 DERMATOPHYTOSIS OF NAIL: ICD-10-CM

## 2020-05-15 DIAGNOSIS — L85.3 DRY SKIN: ICD-10-CM

## 2020-05-15 DIAGNOSIS — E11.40 TYPE 2 DIABETES MELLITUS WITH DIABETIC NEUROPATHY, WITH LONG-TERM CURRENT USE OF INSULIN: ICD-10-CM

## 2020-05-15 DIAGNOSIS — L97.512 SKIN ULCER OF RIGHT FOOT WITH FAT LAYER EXPOSED: Primary | ICD-10-CM

## 2020-05-15 PROCEDURE — 99999 PR PBB SHADOW E&M-EST. PATIENT-LVL III: ICD-10-PCS | Mod: PBBFAC,HCNC,, | Performed by: PODIATRIST

## 2020-05-15 PROCEDURE — 1126F PR PAIN SEVERITY QUANTIFIED, NO PAIN PRESENT: ICD-10-PCS | Mod: HCNC,S$GLB,, | Performed by: PODIATRIST

## 2020-05-15 PROCEDURE — 1159F PR MEDICATION LIST DOCUMENTED IN MEDICAL RECORD: ICD-10-PCS | Mod: HCNC,S$GLB,, | Performed by: PODIATRIST

## 2020-05-15 PROCEDURE — 1101F PT FALLS ASSESS-DOCD LE1/YR: CPT | Mod: HCNC,CPTII,S$GLB, | Performed by: PODIATRIST

## 2020-05-15 PROCEDURE — 11042 WOUND DEBRIDEMENT: ICD-10-PCS | Mod: HCNC,S$GLB,, | Performed by: PODIATRIST

## 2020-05-15 PROCEDURE — 99212 PR OFFICE/OUTPT VISIT, EST, LEVL II, 10-19 MIN: ICD-10-PCS | Mod: 25,HCNC,S$GLB, | Performed by: PODIATRIST

## 2020-05-15 PROCEDURE — 3078F PR MOST RECENT DIASTOLIC BLOOD PRESSURE < 80 MM HG: ICD-10-PCS | Mod: HCNC,CPTII,S$GLB, | Performed by: PODIATRIST

## 2020-05-15 PROCEDURE — 1101F PR PT FALLS ASSESS DOC 0-1 FALLS W/OUT INJ PAST YR: ICD-10-PCS | Mod: HCNC,CPTII,S$GLB, | Performed by: PODIATRIST

## 2020-05-15 PROCEDURE — 11721 ROUTINE FOOT CARE: ICD-10-PCS | Mod: Q7,59,HCNC,S$GLB | Performed by: PODIATRIST

## 2020-05-15 PROCEDURE — 99212 OFFICE O/P EST SF 10 MIN: CPT | Mod: 25,HCNC,S$GLB, | Performed by: PODIATRIST

## 2020-05-15 PROCEDURE — 3074F PR MOST RECENT SYSTOLIC BLOOD PRESSURE < 130 MM HG: ICD-10-PCS | Mod: HCNC,CPTII,S$GLB, | Performed by: PODIATRIST

## 2020-05-15 PROCEDURE — 99999 PR PBB SHADOW E&M-EST. PATIENT-LVL III: CPT | Mod: PBBFAC,HCNC,, | Performed by: PODIATRIST

## 2020-05-15 PROCEDURE — 11042 DBRDMT SUBQ TIS 1ST 20SQCM/<: CPT | Mod: HCNC,S$GLB,, | Performed by: PODIATRIST

## 2020-05-15 PROCEDURE — 1126F AMNT PAIN NOTED NONE PRSNT: CPT | Mod: HCNC,S$GLB,, | Performed by: PODIATRIST

## 2020-05-15 PROCEDURE — 3078F DIAST BP <80 MM HG: CPT | Mod: HCNC,CPTII,S$GLB, | Performed by: PODIATRIST

## 2020-05-15 PROCEDURE — 3074F SYST BP LT 130 MM HG: CPT | Mod: HCNC,CPTII,S$GLB, | Performed by: PODIATRIST

## 2020-05-15 PROCEDURE — 1159F MED LIST DOCD IN RCRD: CPT | Mod: HCNC,S$GLB,, | Performed by: PODIATRIST

## 2020-05-15 PROCEDURE — 11721 DEBRIDE NAIL 6 OR MORE: CPT | Mod: Q7,59,HCNC,S$GLB | Performed by: PODIATRIST

## 2020-05-15 RX ORDER — CEPHALEXIN 500 MG/1
500 CAPSULE ORAL EVERY 12 HOURS
Qty: 20 CAPSULE | Refills: 0 | Status: SHIPPED | OUTPATIENT
Start: 2020-05-15 | End: 2020-05-25

## 2020-05-15 RX ORDER — UREA 40 %
CREAM (GRAM) TOPICAL DAILY
Qty: 85 G | Refills: 10 | Status: SHIPPED | OUTPATIENT
Start: 2020-05-15 | End: 2020-05-15

## 2020-05-15 RX ORDER — UREA 40 %
CREAM (GRAM) TOPICAL DAILY
Qty: 85 G | Refills: 10 | Status: SHIPPED | OUTPATIENT
Start: 2020-05-15 | End: 2020-06-04

## 2020-05-15 NOTE — PROGRESS NOTES
Chief Complaint: Foot Ulcer (Skin Ulcer, Right side of Right foot) and Diabetes Mellitus (PCP: Susie Lazo 01/ 17 /2020  A1C: 01/15/2020 / 5.8)      HPI:  Randy is a 79 y.o. male who has a right lateral foot wound. 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        May 15, 2020:  The patient's last visit with me was on April 27, 2020.  It was a virtual video visit.  He had requested for home health wound care.  His wound continues to remain open.  Today he complains of increasing redness to the area.  Otherwise no acute trauma reported area.    He also has an additional complaint of dry skin on both feet.  He is not sure what lotion to use.      This patient has documented high risk feet requiring routine maintenance secondary to peripheral vascular disease.          PCP: Susie Lazo MD    Date Last Seen by PCP: Foot Ulcer (Skin Ulcer, Right side of Right foot) and Diabetes Mellitus (PCP: Susie Lazo 01/ 17 /2020  A1C: 01/15/2020 / 5.8)         Current shoe gear:  Affected Foot: Football and Darco shoe on the affected foot     Unaffected Foot: Slip-on shoes        Hemoglobin A1C   Date Value Ref Range Status   01/15/2020 5.8 (H) 4.0 - 5.6 % Final     Comment:     ADA Screening Guidelines:  5.7-6.4%  Consistent with prediabetes  >or=6.5%  Consistent with diabetes  High levels of fetal hemoglobin interfere with the  "HbA1C  assay. Heterozygous hemoglobin variants (HbS, HgC, etc)do  not significantly interfere with this assay.   However, presence of multiple variants may affect accuracy.     06/05/2019 6.3 (H) 4.0 - 5.6 % Final     Comment:     ADA Screening Guidelines:  5.7-6.4%  Consistent with prediabetes  >or=6.5%  Consistent with diabetes  High levels of fetal hemoglobin interfere with the HbA1C  assay. Heterozygous hemoglobin variants (HbS, HgC, etc)do  not significantly interfere with this assay.   However, presence of multiple variants may affect accuracy.     04/10/2019 8.4 (H) 4.0 - 5.6 % Final     Comment:     ADA Screening Guidelines:  5.7-6.4%  Consistent with prediabetes  >or=6.5%  Consistent with diabetes  High levels of fetal hemoglobin interfere with the HbA1C  assay. Heterozygous hemoglobin variants (HbS, HgC, etc)do  not significantly interfere with this assay.   However, presence of multiple variants may affect accuracy.         Review of Systems   Constitution: Negative for chills, fever and malaise/fatigue.   HENT: Negative for hearing loss.    Cardiovascular: Negative for claudication.   Respiratory: Negative for shortness of breath.    Skin: Positive for color change, dry skin, nail changes, poor wound healing and unusual hair distribution. Negative for flushing and rash.   Musculoskeletal: Negative for joint pain and myalgias.   Neurological: Negative for loss of balance, numbness, paresthesias and sensory change.   Psychiatric/Behavioral: Negative for altered mental status.               Objective:        Vitals:    05/15/20 1023   BP: (!) 105/57   Pulse: (!) 56   Temp: 98.8 °F (37.1 °C)   Weight: 83.9 kg (185 lb)   Height: 5' 1" (1.549 m)         Physical Exam   Constitutional: He appears well-developed and well-nourished. He is cooperative.   Cardiovascular:   Pulses:       Dorsalis pedis pulses are 0 on the right side, and 0 on the left side.        Posterior tibial pulses are 0 on the right side, and " 1+ on the left side.   Bi-phasic pulses noted with doppler, right   Musculoskeletal:        Right ankle: He exhibits decreased range of motion and abnormal pulse. Achilles tendon exhibits normal Barajas's test results.        Left ankle: He exhibits decreased range of motion and abnormal pulse. Achilles tendon exhibits normal Barajas's test results.        Right foot: There is decreased range of motion.        Left foot: There is decreased range of motion.   Feet:   Right Foot:   Protective Sensation: 5 sites tested. 2 sites sensed.   Left Foot:   Protective Sensation: 5 sites tested. 2 sites sensed.   Neurological: He is alert.   diminished sensation noted to b/L lower extremities   Skin: Skin is dry. Capillary refill takes more than 3 seconds.   Skin is atrophic and dry.  There is xerosis.  No wounds on the left foot.    Toenails times  9 are yellow mycotic and thickened with subungual debris.    Ulcer Location: right lateral foot  Measurements:  Pre debridement:  0.4cm x 0.1cm x 0.1cm; post debridement size  in debridement report   Periwound:   (--) necrosis  Drainage:  Scant serous  Pus: Absent  Malodor:  Absent  Base:  50% granular. 50% fibrin.  Signs of infection:   None       Psychiatric: His behavior is normal.   Vitals reviewed.                    Assessment:       Encounter Diagnoses   Name Primary?    Skin ulcer of right foot with fat layer exposed Yes    Type 2 diabetes mellitus with diabetic neuropathy, with long-term current use of insulin     Dry skin     Dermatophytosis of nail     Pre-ulcerative corn or callous          Plan:       Randy was seen today for foot ulcer and diabetes mellitus.    Diagnoses and all orders for this visit:    Skin ulcer of right foot with fat layer exposed  -     cephALEXin (KEFLEX) 500 MG capsule; Take 1 capsule (500 mg total) by mouth every 12 (twelve) hours. for 10 days    Type 2 diabetes mellitus with diabetic neuropathy, with long-term current use of  "insulin    Dry skin  -     Discontinue: urea (CARMOL) 40 % Crea; Apply topically once daily. To dry skin on the feet.  -     urea (CARMOL) 40 % Crea; Apply topically once daily. To dry skin on the feet.    Dermatophytosis of nail    Pre-ulcerative corn or callous    Other orders  -     urea (CARMOL) 40 % Crea; Apply topically once daily. To dry skin on the feet.          I counseled the patient on the patient's conditions, their implications and medical management.    The medications as prescribed.    Continue home health wound care as ordered.    Today the wound was debrided.  In addition toenails are debrided per patient request.      Wound Debridement  Date/Time: 5/15/2020 10:00 AM  Performed by: Jess Rosa DPM  Authorized by: Jess Rosa DPM     Time out: Immediately prior to procedure a "time out" was called to verify the correct patient, procedure, equipment, support staff and site/side marked as required.    Consent Done?:  Yes (Verbal)    Preparation: Patient was prepped and draped in usual sterile fashion    Local anesthesia used?: No      Wound Details:    Location:  Right foot    Location:  Right Midfoot    Type of Debridement:  Excisional       Length (cm):  1.7       Area (sq cm):  1.19       Width (cm):  0.7       Percent Debrided (%):  100       Depth (cm):  0.1       Total Area Debrided (sq cm):  1.19    Depth of debridement:  Subcutaneous tissue    Tissue debrided:  Subcutaneous, Dermis and Epidermis    Devitalized tissue debrided:  Slough, Fibrin, Callus and Biofilm    Instruments:  Curette    Bleeding:  Minimal  Hemostasis Achieved: Yes    Method Used:  Pressure  Patient tolerance:  Patient tolerated the procedure well with no immediate complications           Routine Foot Care  Date/Time: 5/15/2020 10:00 AM  Performed by: Jess Rosa DPM  Authorized by: Jess Rosa DPM     Consent Done?:  Yes (Verbal)    Nail Care Type:  Debride(Left 1st Toe, Left 3rd Toe, Left 2nd Toe, Left 4th Toe, " Left 5th Toe, Right 1st Toe, Right 2nd Toe, Right 3rd Toe and Right 4th Toe)  Patient tolerance:  Patient tolerated the procedure well with no immediate complications     With patient's permission, the toenails mentioned above were aggressively reduced and debrided using a nail nipper, removing all offending nail and debris. The patient will continue to monitor the areas daily, inspect the feet, wear protective shoe gear when ambulatory, and moisturizer to maintain skin integrity.

## 2020-05-18 ENCOUNTER — EXTERNAL HOME HEALTH (OUTPATIENT)
Dept: HOME HEALTH SERVICES | Facility: HOSPITAL | Age: 80
End: 2020-05-18
Payer: MEDICARE

## 2020-05-21 ENCOUNTER — LAB VISIT (OUTPATIENT)
Dept: LAB | Facility: HOSPITAL | Age: 80
End: 2020-05-21
Attending: HOSPITALIST
Payer: MEDICARE

## 2020-05-21 DIAGNOSIS — E11.40 TYPE 2 DIABETES MELLITUS WITH DIABETIC NEUROPATHY, WITH LONG-TERM CURRENT USE OF INSULIN: ICD-10-CM

## 2020-05-21 DIAGNOSIS — Z79.4 TYPE 2 DIABETES MELLITUS WITH DIABETIC NEUROPATHY, WITH LONG-TERM CURRENT USE OF INSULIN: ICD-10-CM

## 2020-05-21 LAB
BASOPHILS # BLD AUTO: 0.04 K/UL (ref 0–0.2)
BASOPHILS NFR BLD: 0.8 % (ref 0–1.9)
DIFFERENTIAL METHOD: ABNORMAL
EOSINOPHIL # BLD AUTO: 0.2 K/UL (ref 0–0.5)
EOSINOPHIL NFR BLD: 4 % (ref 0–8)
ERYTHROCYTE [DISTWIDTH] IN BLOOD BY AUTOMATED COUNT: 15.1 % (ref 11.5–14.5)
HCT VFR BLD AUTO: 41.3 % (ref 40–54)
HGB BLD-MCNC: 12.3 G/DL (ref 14–18)
IMM GRANULOCYTES # BLD AUTO: 0.01 K/UL (ref 0–0.04)
IMM GRANULOCYTES NFR BLD AUTO: 0.2 % (ref 0–0.5)
LYMPHOCYTES # BLD AUTO: 0.8 K/UL (ref 1–4.8)
LYMPHOCYTES NFR BLD: 16.9 % (ref 18–48)
MCH RBC QN AUTO: 31.6 PG (ref 27–31)
MCHC RBC AUTO-ENTMCNC: 29.8 G/DL (ref 32–36)
MCV RBC AUTO: 106 FL (ref 82–98)
MONOCYTES # BLD AUTO: 0.6 K/UL (ref 0.3–1)
MONOCYTES NFR BLD: 12.1 % (ref 4–15)
NEUTROPHILS # BLD AUTO: 3.3 K/UL (ref 1.8–7.7)
NEUTROPHILS NFR BLD: 66 % (ref 38–73)
NRBC BLD-RTO: 0 /100 WBC
PLATELET # BLD AUTO: 130 K/UL (ref 150–350)
PMV BLD AUTO: 11.7 FL (ref 9.2–12.9)
RBC # BLD AUTO: 3.89 M/UL (ref 4.6–6.2)
WBC # BLD AUTO: 4.97 K/UL (ref 3.9–12.7)

## 2020-05-21 PROCEDURE — 80048 BASIC METABOLIC PNL TOTAL CA: CPT | Mod: HCNC

## 2020-05-21 PROCEDURE — 83036 HEMOGLOBIN GLYCOSYLATED A1C: CPT | Mod: HCNC

## 2020-05-21 PROCEDURE — 36415 COLL VENOUS BLD VENIPUNCTURE: CPT | Mod: HCNC,PN

## 2020-05-21 PROCEDURE — 85025 COMPLETE CBC W/AUTO DIFF WBC: CPT | Mod: HCNC

## 2020-05-22 ENCOUNTER — TELEPHONE (OUTPATIENT)
Dept: INTERNAL MEDICINE | Facility: CLINIC | Age: 80
End: 2020-05-22

## 2020-05-22 LAB
ANION GAP SERPL CALC-SCNC: 8 MMOL/L (ref 8–16)
BUN SERPL-MCNC: 30 MG/DL (ref 8–23)
CALCIUM SERPL-MCNC: 8.4 MG/DL (ref 8.7–10.5)
CHLORIDE SERPL-SCNC: 110 MMOL/L (ref 95–110)
CO2 SERPL-SCNC: 26 MMOL/L (ref 23–29)
CREAT SERPL-MCNC: 1.3 MG/DL (ref 0.5–1.4)
EST. GFR  (AFRICAN AMERICAN): >60 ML/MIN/1.73 M^2
EST. GFR  (NON AFRICAN AMERICAN): 52.3 ML/MIN/1.73 M^2
ESTIMATED AVG GLUCOSE: 117 MG/DL (ref 68–131)
GLUCOSE SERPL-MCNC: 57 MG/DL (ref 70–110)
HBA1C MFR BLD HPLC: 5.7 % (ref 4–5.6)
POTASSIUM SERPL-SCNC: 4.8 MMOL/L (ref 3.5–5.1)
SODIUM SERPL-SCNC: 144 MMOL/L (ref 136–145)

## 2020-05-22 NOTE — TELEPHONE ENCOUNTER
I spoke to pt and he states he is feeling fine today. He hasn't taken his glucose bc her does it at night. He was 152 last night.  Pt states his numbers normally run 115-120 and he has only had one low reading at 82.  He states he did fast for the test yesterday and didn't have anything to eat until 11am  I told him to continue to monitor and if numbers start dropping he needs to decrease his insuline to 10 units at night.  He expressed understanding

## 2020-05-22 NOTE — TELEPHONE ENCOUNTER
----- Message from Susie Lazo MD sent at 5/22/2020 12:50 PM CDT -----  Please find out from patient how he is feeling and what his blood sugar is today. His glucose was low at 57 yesterday on labwork.  If his glucose has been running low at home. I recommend that he decrease his insulin to 10 units nightly.

## 2020-05-25 ENCOUNTER — OFFICE VISIT (OUTPATIENT)
Dept: INTERNAL MEDICINE | Facility: CLINIC | Age: 80
End: 2020-05-25
Payer: MEDICARE

## 2020-05-25 ENCOUNTER — LAB VISIT (OUTPATIENT)
Dept: LAB | Facility: HOSPITAL | Age: 80
End: 2020-05-25
Attending: HOSPITALIST
Payer: MEDICARE

## 2020-05-25 VITALS
HEART RATE: 54 BPM | DIASTOLIC BLOOD PRESSURE: 58 MMHG | WEIGHT: 198 LBS | RESPIRATION RATE: 16 BRPM | TEMPERATURE: 98 F | SYSTOLIC BLOOD PRESSURE: 106 MMHG | BODY MASS INDEX: 27.72 KG/M2 | HEIGHT: 71 IN

## 2020-05-25 DIAGNOSIS — K21.9 GASTROESOPHAGEAL REFLUX DISEASE, ESOPHAGITIS PRESENCE NOT SPECIFIED: ICD-10-CM

## 2020-05-25 DIAGNOSIS — I10 ESSENTIAL HYPERTENSION: ICD-10-CM

## 2020-05-25 DIAGNOSIS — R60.0 BILATERAL LEG EDEMA: ICD-10-CM

## 2020-05-25 DIAGNOSIS — Z79.4 TYPE 2 DIABETES MELLITUS WITH DIABETIC NEUROPATHY, WITH LONG-TERM CURRENT USE OF INSULIN: ICD-10-CM

## 2020-05-25 DIAGNOSIS — R30.0 DYSURIA: ICD-10-CM

## 2020-05-25 DIAGNOSIS — E78.5 HYPERLIPIDEMIA, UNSPECIFIED HYPERLIPIDEMIA TYPE: ICD-10-CM

## 2020-05-25 DIAGNOSIS — R13.10 DYSPHAGIA, UNSPECIFIED TYPE: Primary | ICD-10-CM

## 2020-05-25 DIAGNOSIS — I50.22 CHRONIC SYSTOLIC HEART FAILURE: ICD-10-CM

## 2020-05-25 DIAGNOSIS — R25.2 MUSCLE CRAMPING: ICD-10-CM

## 2020-05-25 DIAGNOSIS — E11.40 TYPE 2 DIABETES MELLITUS WITH DIABETIC NEUROPATHY, WITH LONG-TERM CURRENT USE OF INSULIN: ICD-10-CM

## 2020-05-25 DIAGNOSIS — I25.10 CORONARY ARTERY DISEASE INVOLVING NATIVE CORONARY ARTERY OF NATIVE HEART WITHOUT ANGINA PECTORIS: ICD-10-CM

## 2020-05-25 DIAGNOSIS — N18.30 STAGE 3 CHRONIC KIDNEY DISEASE: ICD-10-CM

## 2020-05-25 LAB — CK SERPL-CCNC: 89 U/L (ref 20–200)

## 2020-05-25 PROCEDURE — 3074F SYST BP LT 130 MM HG: CPT | Mod: HCNC,CPTII,S$GLB, | Performed by: HOSPITALIST

## 2020-05-25 PROCEDURE — 99499 UNLISTED E&M SERVICE: CPT | Mod: HCNC,S$GLB,, | Performed by: HOSPITALIST

## 2020-05-25 PROCEDURE — 1101F PT FALLS ASSESS-DOCD LE1/YR: CPT | Mod: HCNC,CPTII,S$GLB, | Performed by: HOSPITALIST

## 2020-05-25 PROCEDURE — 1126F AMNT PAIN NOTED NONE PRSNT: CPT | Mod: HCNC,S$GLB,, | Performed by: HOSPITALIST

## 2020-05-25 PROCEDURE — 1126F PR PAIN SEVERITY QUANTIFIED, NO PAIN PRESENT: ICD-10-PCS | Mod: HCNC,S$GLB,, | Performed by: HOSPITALIST

## 2020-05-25 PROCEDURE — 99499 RISK ADDL DX/OHS AUDIT: ICD-10-PCS | Mod: HCNC,S$GLB,, | Performed by: HOSPITALIST

## 2020-05-25 PROCEDURE — 1101F PR PT FALLS ASSESS DOC 0-1 FALLS W/OUT INJ PAST YR: ICD-10-PCS | Mod: HCNC,CPTII,S$GLB, | Performed by: HOSPITALIST

## 2020-05-25 PROCEDURE — 99999 PR PBB SHADOW E&M-EST. PATIENT-LVL IV: CPT | Mod: PBBFAC,HCNC,, | Performed by: HOSPITALIST

## 2020-05-25 PROCEDURE — 99214 PR OFFICE/OUTPT VISIT, EST, LEVL IV, 30-39 MIN: ICD-10-PCS | Mod: HCNC,S$GLB,, | Performed by: HOSPITALIST

## 2020-05-25 PROCEDURE — 99214 OFFICE O/P EST MOD 30 MIN: CPT | Mod: HCNC,S$GLB,, | Performed by: HOSPITALIST

## 2020-05-25 PROCEDURE — 82550 ASSAY OF CK (CPK): CPT | Mod: HCNC

## 2020-05-25 PROCEDURE — 1159F MED LIST DOCD IN RCRD: CPT | Mod: HCNC,S$GLB,, | Performed by: HOSPITALIST

## 2020-05-25 PROCEDURE — 3074F PR MOST RECENT SYSTOLIC BLOOD PRESSURE < 130 MM HG: ICD-10-PCS | Mod: HCNC,CPTII,S$GLB, | Performed by: HOSPITALIST

## 2020-05-25 PROCEDURE — 1159F PR MEDICATION LIST DOCUMENTED IN MEDICAL RECORD: ICD-10-PCS | Mod: HCNC,S$GLB,, | Performed by: HOSPITALIST

## 2020-05-25 PROCEDURE — 99999 PR PBB SHADOW E&M-EST. PATIENT-LVL IV: ICD-10-PCS | Mod: PBBFAC,HCNC,, | Performed by: HOSPITALIST

## 2020-05-25 PROCEDURE — 36415 COLL VENOUS BLD VENIPUNCTURE: CPT | Mod: HCNC,PO

## 2020-05-25 PROCEDURE — 3078F PR MOST RECENT DIASTOLIC BLOOD PRESSURE < 80 MM HG: ICD-10-PCS | Mod: HCNC,CPTII,S$GLB, | Performed by: HOSPITALIST

## 2020-05-25 PROCEDURE — 3078F DIAST BP <80 MM HG: CPT | Mod: HCNC,CPTII,S$GLB, | Performed by: HOSPITALIST

## 2020-05-25 RX ORDER — FAMOTIDINE 20 MG/1
20 TABLET, FILM COATED ORAL DAILY
Qty: 30 TABLET | Refills: 5 | Status: SHIPPED | OUTPATIENT
Start: 2020-05-25 | End: 2020-11-24 | Stop reason: SDUPTHER

## 2020-05-25 RX ORDER — ATORVASTATIN CALCIUM 10 MG/1
10 TABLET, FILM COATED ORAL DAILY
Qty: 90 TABLET | Refills: 3 | Status: SHIPPED | OUTPATIENT
Start: 2020-05-25 | End: 2021-07-09

## 2020-05-25 NOTE — PROGRESS NOTES
"Subjective:     @Patient ID: Randy Camejo is a 79 y.o. male.    Chief Complaint: Follow-up    HPI  79-year-old male presents for multiple issues.  Patient is accompanied by his daughter today.  1. Dysphagia:  Patient reports over the past few weeks he has been having trouble swallowing certain foods.  Does report history of heartburn.  2. Bilateral leg swelling: pt notes increased swelling and weight gain since last appt. 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   3. Leg weakness: pt and daughter reports leg pain, weakness. Has not been as active due to being home more. Pt does also report having cramping of legs  4. CHF: pt reports he has not f/u with cardiology since hospital stay last year. Pt reports he is not interested in going at this time.    Review of Systems   Constitutional: Negative for chills and fever.   HENT: Negative for congestion and sore throat.    Eyes: Negative for pain and visual disturbance.   Respiratory: Negative for cough and shortness of breath.    Cardiovascular: Negative for chest pain and leg swelling.   Gastrointestinal: Negative for abdominal pain, nausea and vomiting.   Endocrine: Negative for polydipsia and polyuria.   Genitourinary: Negative for difficulty urinating and dysuria.   Musculoskeletal: Positive for myalgias. Negative for arthralgias and back pain.   Neurological: Negative for weakness and headaches.   Psychiatric/Behavioral: Negative for agitation and confusion.     Past medical history, surgical history, and family medical history reviewed and updated as appropriate.    Medications and allergies reviewed.     Objective:     Vitals:    05/25/20 1039   BP: (!) 106/58   Pulse: (!) 54   Resp: 16   Temp: 97.9 °F (36.6 °C)   TempSrc: Oral   Weight: 89.8 kg (197 lb 15.6 oz)   Height: 5' 11" (1.803 m)     Body mass index is 27.61 kg/m².  Physical Exam   Constitutional: He is oriented to person, place, and time. He " appears well-developed and well-nourished. No distress.   HENT:   Head: Normocephalic and atraumatic.   Mouth/Throat: No oropharyngeal exudate.   Eyes: Conjunctivae are normal. Right eye exhibits no discharge. Left eye exhibits no discharge.   Neck: Normal range of motion. Neck supple.   Cardiovascular: Normal rate and regular rhythm. Exam reveals no friction rub.   No murmur heard.  Pulmonary/Chest: Effort normal and breath sounds normal.   Abdominal: Soft. Bowel sounds are normal. He exhibits no distension. There is no tenderness.   Musculoskeletal: He exhibits edema.   +2 pitting edema up to knees    Neurological: He is alert and oriented to person, place, and time.   Skin: Skin is warm and dry.   Psychiatric: He has a normal mood and affect. His behavior is normal.   Vitals reviewed.      Lab Results   Component Value Date    WBC 4.97 05/21/2020    HGB 12.3 (L) 05/21/2020    HCT 41.3 05/21/2020     (L) 05/21/2020    CHOL 80 (L) 01/15/2020    TRIG 34 01/15/2020    HDL 34 (L) 01/15/2020    ALT 14 01/15/2020    AST 18 01/15/2020     05/21/2020    K 4.8 05/21/2020     05/21/2020    CREATININE 1.3 05/21/2020    BUN 30 (H) 05/21/2020    CO2 26 05/21/2020    TSH 4.173 (H) 01/15/2020    PSA 1.2 01/15/2020    INR 1.3 (H) 08/29/2019    HGBA1C 5.7 (H) 05/21/2020       Assessment:     1. Dysphagia, unspecified type    2. Chronic systolic heart failure    3. Bilateral leg edema    4. Type 2 diabetes mellitus with diabetic neuropathy, with long-term current use of insulin    5. Coronary artery disease involving native coronary artery of native heart without angina pectoris    6. Essential hypertension    7. Stage 3 chronic kidney disease    8. Hyperlipidemia, unspecified hyperlipidemia type    9. Dysuria    10. Muscle cramping    11. Gastroesophageal reflux disease, esophagitis presence not specified      Plan:   Randy was seen today for follow-up.    Diagnoses and all orders for this visit:    Dysphagia,  unspecified type  - Start pepcid and refer to GI  -     Ambulatory referral/consult to Gastroenterology; Future    Chronic systolic heart failure  - Pt has not seen o/s cardiology since hospitalization last year. Pt counseled on compliance with f/u. Pt states he will consider it.   -     Echo Color Flow Doppler? Yes; Future  -     Ambulatory referral/consult to Cardiology; Future    Bilateral leg edema  - Take 1 additional tablet of lasix for weight gain 2-3 lbs.   -     Ambulatory referral/consult to Cardiology; Future    Type 2 diabetes mellitus with diabetic neuropathy, with long-term current use of insulin        - A1c shows diabetes is stable at 5.7. However well controlled. Recommend to decreased to 10 units nightly. F/u in 3 months     Coronary artery disease involving native coronary artery of native heart without angina pectoris  -     Ambulatory referral/consult to Cardiology; Future     Essential hypertension        - BP controlled. Continue coreg 6.25 mg bid     Stage 3 chronic kidney disease        - Renal function stable     Hyperlipidemia, unspecified hyperlipidemia type  - Decrease statin. Check CPK.   -     atorvastatin (LIPITOR) 10 MG tablet; Take 1 tablet (10 mg total) by mouth once daily.     Dysuria  -     Urine culture; Future  -     Urinalysis; Future     Muscle cramping  - Will decrease lipitor. Not sure if the cause of weakness and cramping.   -     CK; Future    Gastroesophageal reflux disease, esophagitis presence not specified        - Will start pepcid daily.     Other orders  -     famotidine (PEPCID) 20 MG tablet; Take 1 tablet (20 mg total) by mouth once daily.  -     insulin detemir U-100 (LEVEMIR FLEXTOUCH U-100 INSULN) 100 unit/mL (3 mL) SubQ InPn pen; Inject 10 Units into the skin every evening. If Blood Glucose is less than 100 mg/dL at BEDTIME, give 16 grams (four glucose tablets) X 1 dose for patients who can take PO. Recheck BG in 30 minutes and call MD for insulin dose  adjustments prior to administering insulin detemir (Levemir).        Follow up in about 3 months (around 8/25/2020).    Susie Lazo MD  Internal Medicine    5/25/2020

## 2020-05-25 NOTE — PATIENT INSTRUCTIONS
1. Recommend to take lasix 20 mg twice daily for the next 3 days. Also monitor weight daily. For 2-3 lb weight gain, Ok to take 1 additional tablet of lasix.     2. GI specialists to consider:, Dr Tomas Azul (does come to [facility] once a week),  Dr Gavin Najera, or Dr Francois Parmar ([facility]).  However, anyone in GI department would be fine also     3. Will order physical therapy thru [facility]     4. Cardiologist at [facility] to consider: Dr Candie Weiss, Dr MARIANA Salazar, Dr Lj Mack

## 2020-05-26 DIAGNOSIS — I48.0 PAROXYSMAL ATRIAL FIBRILLATION: ICD-10-CM

## 2020-05-26 DIAGNOSIS — Z79.899 HIGH RISK MEDICATION USE: ICD-10-CM

## 2020-05-26 RX ORDER — AMIODARONE HYDROCHLORIDE 200 MG/1
TABLET ORAL
Qty: 90 TABLET | Refills: 3 | Status: ON HOLD | OUTPATIENT
Start: 2020-05-26 | End: 2021-01-06 | Stop reason: HOSPADM

## 2020-05-28 ENCOUNTER — TELEPHONE (OUTPATIENT)
Dept: INTERNAL MEDICINE | Facility: CLINIC | Age: 80
End: 2020-05-28

## 2020-05-28 RX ORDER — DEXTROSE 4 G
1 TABLET,CHEWABLE ORAL 2 TIMES DAILY
Qty: 1 EACH | Refills: 0 | Status: ON HOLD | OUTPATIENT
Start: 2020-05-28 | End: 2021-01-06 | Stop reason: HOSPADM

## 2020-05-28 RX ORDER — LANCETS 33 GAUGE
EACH MISCELLANEOUS
Qty: 200 EACH | Refills: 5 | Status: SHIPPED | OUTPATIENT
Start: 2020-05-28 | End: 2021-11-23 | Stop reason: SDUPTHER

## 2020-05-28 RX ORDER — ISOPROPYL ALCOHOL 70 ML/100ML
1 SWAB TOPICAL 2 TIMES DAILY
Qty: 200 EACH | Refills: 5 | Status: SHIPPED | OUTPATIENT
Start: 2020-05-28

## 2020-05-29 ENCOUNTER — DOCUMENT SCAN (OUTPATIENT)
Dept: HOME HEALTH SERVICES | Facility: HOSPITAL | Age: 80
End: 2020-05-29
Payer: MEDICARE

## 2020-06-01 ENCOUNTER — TELEPHONE (OUTPATIENT)
Dept: INTERNAL MEDICINE | Facility: CLINIC | Age: 80
End: 2020-06-01

## 2020-06-01 DIAGNOSIS — I50.22 HEART FAILURE, SYSTOLIC, CHRONIC: ICD-10-CM

## 2020-06-01 NOTE — TELEPHONE ENCOUNTER
Please advise........   Pt wants to know if he needs to continue taking an extra dose of Lasix. He states he lost 3 pounds.

## 2020-06-01 NOTE — TELEPHONE ENCOUNTER
----- Message from Josephine Childs sent at 6/1/2020  1:24 PM CDT -----  Contact: Pt-- [phone]  Type:  Needs Medical Advice    Who Called:  Pt    Symptoms (please be specific): furosemide (LASIX) 20 MG tablet    Would the patient rather a call back or a response via [facility]? Call    Best Call Back Number:  [phone]    Additional Information: Pt wants to know if he needs to continue taking an extra dose of the above medication. He states he lost 3 pounds. Pt is requesting a call back.

## 2020-06-02 ENCOUNTER — CLINICAL SUPPORT (OUTPATIENT)
Dept: CARDIOLOGY | Facility: CLINIC | Age: 80
End: 2020-06-02
Attending: HOSPITALIST
Payer: MEDICARE

## 2020-06-02 VITALS
DIASTOLIC BLOOD PRESSURE: 60 MMHG | HEART RATE: 52 BPM | BODY MASS INDEX: 27.58 KG/M2 | HEIGHT: 71 IN | SYSTOLIC BLOOD PRESSURE: 120 MMHG | WEIGHT: 197 LBS

## 2020-06-02 DIAGNOSIS — I50.22 CHRONIC SYSTOLIC HEART FAILURE: ICD-10-CM

## 2020-06-02 LAB
ASCENDING AORTA: 3.71 CM
AV INDEX (PROSTH): 0.72
AV MEAN GRADIENT: 2 MMHG
AV PEAK GRADIENT: 3 MMHG
AV VALVE AREA: 2.21 CM2
AV VELOCITY RATIO: 0.82
BSA FOR ECHO PROCEDURE: 2.12 M2
CV ECHO LV RWT: 0.24 CM
DOP CALC AO PEAK VEL: 0.82 M/S
DOP CALC AO VTI: 21.51 CM
DOP CALC LVOT AREA: 3 CM2
DOP CALC LVOT DIAMETER: 1.97 CM
DOP CALC LVOT PEAK VEL: 0.67 M/S
DOP CALC LVOT STROKE VOLUME: 47.5 CM3
DOP CALCLVOT PEAK VEL VTI: 15.59 CM
E WAVE DECELERATION TIME: 148.38 MSEC
E/A RATIO: 2.91
E/E' RATIO: 24 M/S
ECHO LV POSTERIOR WALL: 0.73 CM (ref 0.6–1.1)
FRACTIONAL SHORTENING: 14 % (ref 28–44)
INTERVENTRICULAR SEPTUM: 0.66 CM (ref 0.6–1.1)
IVRT: 99.9 MSEC
LA MAJOR: 6.09 CM
LA MINOR: 6.15 CM
LA WIDTH: 4.5 CM
LEFT ATRIUM SIZE: 4.69 CM
LEFT ATRIUM VOLUME INDEX: 52.4 ML/M2
LEFT ATRIUM VOLUME: 109.79 CM3
LEFT INTERNAL DIMENSION IN SYSTOLE: 5.24 CM (ref 2.1–4)
LEFT VENTRICLE DIASTOLIC VOLUME INDEX: 88.61 ML/M2
LEFT VENTRICLE DIASTOLIC VOLUME: 185.68 ML
LEFT VENTRICLE MASS INDEX: 77 G/M2
LEFT VENTRICLE SYSTOLIC VOLUME INDEX: 62.9 ML/M2
LEFT VENTRICLE SYSTOLIC VOLUME: 131.87 ML
LEFT VENTRICULAR INTERNAL DIMENSION IN DIASTOLE: 6.08 CM (ref 3.5–6)
LEFT VENTRICULAR MASS: 160.41 G
LV LATERAL E/E' RATIO: 16 M/S
LV SEPTAL E/E' RATIO: 48 M/S
MV PEAK A VEL: 0.33 M/S
MV PEAK E VEL: 0.96 M/S
PISA TR MAX VEL: 3.67 M/S
PULM VEIN S/D RATIO: 0.38
PV PEAK D VEL: 0.76 M/S
PV PEAK S VEL: 0.29 M/S
PV PEAK VELOCITY: 0.45 CM/S
RA MAJOR: 6.09 CM
RA PRESSURE: 15 MMHG
RA WIDTH: 3.95 CM
RIGHT VENTRICULAR END-DIASTOLIC DIMENSION: 4.4 CM
SINUS: 4.2 CM
STJ: 3.38 CM
TDI LATERAL: 0.06 M/S
TDI SEPTAL: 0.02 M/S
TDI: 0.04 M/S
TR MAX PG: 54 MMHG
TRICUSPID ANNULAR PLANE SYSTOLIC EXCURSION: 1.21 CM
TV REST PULMONARY ARTERY PRESSURE: 69 MMHG

## 2020-06-02 PROCEDURE — 99999 PR PBB SHADOW E&M-EST. PATIENT-LVL II: CPT | Mod: PBBFAC,HCNC,,

## 2020-06-02 PROCEDURE — 93306 ECHO (CUPID ONLY): ICD-10-PCS | Mod: HCNC,S$GLB,, | Performed by: INTERNAL MEDICINE

## 2020-06-02 PROCEDURE — 99999 PR PBB SHADOW E&M-EST. PATIENT-LVL II: ICD-10-PCS | Mod: PBBFAC,HCNC,,

## 2020-06-02 PROCEDURE — 93306 TTE W/DOPPLER COMPLETE: CPT | Mod: HCNC,S$GLB,, | Performed by: INTERNAL MEDICINE

## 2020-06-03 ENCOUNTER — PATIENT OUTREACH (OUTPATIENT)
Dept: ADMINISTRATIVE | Facility: OTHER | Age: 80
End: 2020-06-03

## 2020-06-03 ENCOUNTER — TELEPHONE (OUTPATIENT)
Dept: INTERNAL MEDICINE | Facility: CLINIC | Age: 80
End: 2020-06-03

## 2020-06-03 DIAGNOSIS — J90 BILATERAL PLEURAL EFFUSION: Primary | ICD-10-CM

## 2020-06-03 NOTE — TELEPHONE ENCOUNTER
----- Message from Susie Lazo MD sent at 6/3/2020 12:58 PM CDT -----  Please notify pt that echo shows heart failure with EF 20%. Recommend to schedule f/u visit with cardiology.   Also echo shows bilateral pleural effusion. I would like to have chest xray done

## 2020-06-03 NOTE — PROGRESS NOTES
Chart reviewed.   Immunizations: Triggered Imm Registry     Orders placed: n/a  Upcoming appts to satisfy OLIVERIO topics: n/a

## 2020-06-04 ENCOUNTER — OFFICE VISIT (OUTPATIENT)
Dept: PODIATRY | Facility: CLINIC | Age: 80
End: 2020-06-04
Payer: MEDICARE

## 2020-06-04 VITALS
SYSTOLIC BLOOD PRESSURE: 107 MMHG | HEART RATE: 52 BPM | DIASTOLIC BLOOD PRESSURE: 57 MMHG | TEMPERATURE: 98 F | HEIGHT: 71 IN | WEIGHT: 197 LBS | BODY MASS INDEX: 27.58 KG/M2

## 2020-06-04 DIAGNOSIS — L97.512 SKIN ULCER OF RIGHT FOOT WITH FAT LAYER EXPOSED: Primary | ICD-10-CM

## 2020-06-04 DIAGNOSIS — E11.40 TYPE 2 DIABETES MELLITUS WITH DIABETIC NEUROPATHY, WITH LONG-TERM CURRENT USE OF INSULIN: ICD-10-CM

## 2020-06-04 DIAGNOSIS — Z79.4 TYPE 2 DIABETES MELLITUS WITH DIABETIC NEUROPATHY, WITH LONG-TERM CURRENT USE OF INSULIN: ICD-10-CM

## 2020-06-04 PROCEDURE — 11042 WOUND DEBRIDEMENT: ICD-10-PCS | Mod: HCNC,S$GLB,, | Performed by: PODIATRIST

## 2020-06-04 PROCEDURE — 99499 UNLISTED E&M SERVICE: CPT | Mod: HCNC,S$GLB,, | Performed by: PODIATRIST

## 2020-06-04 PROCEDURE — 99999 PR PBB SHADOW E&M-EST. PATIENT-LVL III: CPT | Mod: PBBFAC,HCNC,, | Performed by: PODIATRIST

## 2020-06-04 PROCEDURE — 99999 PR PBB SHADOW E&M-EST. PATIENT-LVL III: ICD-10-PCS | Mod: PBBFAC,HCNC,, | Performed by: PODIATRIST

## 2020-06-04 PROCEDURE — 11042 DBRDMT SUBQ TIS 1ST 20SQCM/<: CPT | Mod: HCNC,S$GLB,, | Performed by: PODIATRIST

## 2020-06-04 PROCEDURE — 99499 RISK ADDL DX/OHS AUDIT: ICD-10-PCS | Mod: HCNC,S$GLB,, | Performed by: PODIATRIST

## 2020-06-04 NOTE — PATIENT INSTRUCTIONS
How to Check Your Feet    Below are tips to help you look for foot problems. Try to check your feet at the same time each day, such as when you get out of bed in the morning.    · Check the top of each foot. The tops of toes, back of the heel, and outer edge of the foot can get a lot of rubbing from poor-fitting shoes.    · Check the bottom of each foot. Daily wear and tear often leads to problems at pressure spots.    · Check the toes and nails. Fungal infections often occur between toes. Toenail problems can also be a sign of fungal infections or lead to breaks in the skin.    · Check your shoes, too. Loose objects inside a shoe can injure the foot. Use your hand to feel inside your shoes for things like eze, loose stitching, or rough areas that could irritate your skin.        Diabetic Foot Care    Diabetes can lead to a number of different foot complications. Fortunately, most of these complications can be prevented with a little extra foot care. If diabetes is not well controlled, the high blood sugar can cause damage to blood vessels and result in poor circulation to the foot. When the skin does not get enough blood flow, it becomes prone to pressure sores and ulcers, which heal slowly.  High blood sugar can also damage nerves, interfering with the ability to feel pain and pressure. When you cant feel your foot normally, it is easy to injure your skin, bones and joints without knowing it. For these reasons diabetes increases the risk of fungal infections, bunions and ulcers. Deep ulcers can lead to bone infection. Gangrene is the most serious foot complication of diabetes. It usually occurs on the tips of the toes as blacked areas of skin. The black area is dead tissue. In severe cases, gangrene spreads to involve the entire toe, other toes and the entire foot. Foot or toe amputation may be required. Good foot care and blood sugar control can prevent this.    Home Care  1. Wear comfortable, proper fitting  shoes.  2. Wash your feet daily with warm water and mild soap.  3. After drying, apply a moisturizing cream or lotion.  4. Check your feet daily for skin breaks, blisters, swelling, or redness. Look between your toes also.  5. Wear cotton socks and change them every day.  6. Trim toe nails carefully and do not cut your cuticles.  7. Strive to keep your blood sugar under control with a combination of medicines, diet and activity.  8. If you smoke and have diabetes, it is very important that you stop. Smoking reduces blood flow to your foot.  9. Avoid activities that increase your risk of foot injury:  · Do not walk barefoot.  · Do not use heating pads or hot water bottles on your feet.  · Do not put your foot in a hot tub without first checking the temperature with your hand.  10) Schedule yearly foot exams.    Follow Up  with your doctor or as advised by our staff. Report any cut, puncture, scrape, other injury, blister, ingrown toenail or ulcer on your foot.    Get Prompt Medical Attention  if any of the following occur:  -- Open ulcer with pus draining from the wound  -- Increasing foot or leg pain  -- New areas of redness or swelling or tender areas of the foot    © 6104-6542 "[employer]. [address] [place] [address], [place] [address]. All rights reserved. This information is not intended as a substitute for professional medical care. Always follow your healthcare professional's instructions.      General nail care measures for abnormal nails include:    ? Keeping nails trimmed short    ? Avoiding trauma     ? Avoiding contact irritants     ? Keeping nails dry (avoiding wet work)    ? Avoiding all nail cosmetics

## 2020-06-04 NOTE — PROGRESS NOTES
Chief Complaint: Follow-up (Skin Ulcer, Right foot)      HPI:  Randy is a 79 y.o. male who has a right lateral foot wound. 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        May 15, 2020:  The patient's last visit with me was on April 27, 2020.  It was a virtual video visit.  He had requested for home health wound care.  His wound continues to remain open.  Today he complains of increasing redness to the area.  Otherwise no acute trauma reported area.      June 4, 2020:  follow-up right lateral foot wound.  He is receiving home health wound care on Monday and Fridays.  No adverse changes to the area since his last visit.  He is using Gold Bond diabetic foot lotion to his feet.  No other issues.        This patient has documented high risk feet requiring routine maintenance secondary to peripheral vascular disease.          PCP: Susie Lazo MD    Date Last Seen by PCP: Follow-up (Skin Ulcer, Right foot)         Current shoe gear:  Affected Foot: Football and Darco shoe on the affected foot     Unaffected Foot: Slip-on shoes        Hemoglobin A1C   Date Value Ref Range Status   05/21/2020 5.7 (H) 4.0 - 5.6 % Final     Comment:     ADA Screening Guidelines:  5.7-6.4%  Consistent with prediabetes  >or=6.5%  Consistent with diabetes  High levels of fetal hemoglobin interfere with the HbA1C  assay. Heterozygous hemoglobin  "variants (HbS, HgC, etc)do  not significantly interfere with this assay.   However, presence of multiple variants may affect accuracy.     01/15/2020 5.8 (H) 4.0 - 5.6 % Final     Comment:     ADA Screening Guidelines:  5.7-6.4%  Consistent with prediabetes  >or=6.5%  Consistent with diabetes  High levels of fetal hemoglobin interfere with the HbA1C  assay. Heterozygous hemoglobin variants (HbS, HgC, etc)do  not significantly interfere with this assay.   However, presence of multiple variants may affect accuracy.     06/05/2019 6.3 (H) 4.0 - 5.6 % Final     Comment:     ADA Screening Guidelines:  5.7-6.4%  Consistent with prediabetes  >or=6.5%  Consistent with diabetes  High levels of fetal hemoglobin interfere with the HbA1C  assay. Heterozygous hemoglobin variants (HbS, HgC, etc)do  not significantly interfere with this assay.   However, presence of multiple variants may affect accuracy.         Review of Systems   Constitution: Negative for chills, fever and malaise/fatigue.   HENT: Negative for hearing loss.    Cardiovascular: Negative for claudication.   Respiratory: Negative for shortness of breath.    Skin: Positive for color change, dry skin, nail changes, poor wound healing and unusual hair distribution. Negative for flushing and rash.   Musculoskeletal: Negative for joint pain and myalgias.   Neurological: Negative for loss of balance, numbness, paresthesias and sensory change.   Psychiatric/Behavioral: Negative for altered mental status.               Objective:        Vitals:    06/04/20 1405   BP: (!) 107/57   Pulse: (!) 52   Temp: 98.1 °F (36.7 °C)   Weight: 89.4 kg (197 lb)   Height: 5' 11" (1.803 m)         Physical Exam   Constitutional: He appears well-developed and well-nourished. He is cooperative.   Cardiovascular:   Pulses:       Dorsalis pedis pulses are 0 on the right side, and 0 on the left side.        Posterior tibial pulses are 0 on the right side, and 1+ on the left side.   Bi-phasic " pulses noted with doppler, right   Musculoskeletal:        Right ankle: He exhibits decreased range of motion and abnormal pulse. Achilles tendon exhibits normal Barajas's test results.        Left ankle: He exhibits decreased range of motion and abnormal pulse. Achilles tendon exhibits normal Barajas's test results.        Right foot: There is decreased range of motion.        Left foot: There is decreased range of motion.   Feet:   Right Foot:   Protective Sensation: 5 sites tested. 2 sites sensed.   Left Foot:   Protective Sensation: 5 sites tested. 2 sites sensed.   Neurological: He is alert.   diminished sensation noted to b/L lower extremities   Skin: Skin is dry. Capillary refill takes more than 3 seconds.   Skin is atrophic and dry.  There is xerosis.  No wounds on the left foot.    Toenails times  9 are yellow mycotic and thickened with subungual debris.    Ulcer Location: right lateral foot  Measurements:  Pre debridement:  0.4cm x 0.1cm x 0.1cm; post debridement size  in debridement report   Periwound:   (--) necrosis  Drainage:  Scant serous  Pus: Absent  Malodor:  Absent  Base:  50% granular. 50% fibrin.  Signs of infection:   None       Psychiatric: His behavior is normal.   Vitals reviewed.                    Assessment:       Encounter Diagnoses   Name Primary?    Skin ulcer of right foot with fat layer exposed Yes    Type 2 diabetes mellitus with diabetic neuropathy, with long-term current use of insulin          Plan:       Randy was seen today for follow-up.    Diagnoses and all orders for this visit:    Skin ulcer of right foot with fat layer exposed  -     Wound Debridement    Type 2 diabetes mellitus with diabetic neuropathy, with long-term current use of insulin  -     Wound Debridement          I counseled the patient on the patient's conditions, their implications and medical management.    Continue home health wound care as ordered.  Meghan to be applied to the wound       Wound  "Debridement  Date/Time: 6/4/2020 1:45 PM  Performed by: Jess Rosa DPM  Authorized by: Jess Rosa DPM     Time out: Immediately prior to procedure a "time out" was called to verify the correct patient, procedure, equipment, support staff and site/side marked as required.    Consent Done?:  Yes (Verbal)    Preparation: Patient was prepped and draped in usual sterile fashion    Local anesthesia used?: No      Wound Details:    Location:  Right foot    Location:  Right Midfoot    Type of Debridement:  Excisional       Length (cm):  0.5       Area (sq cm):  0.1       Width (cm):  0.2       Percent Debrided (%):  100       Depth (cm):  0.1       Total Area Debrided (sq cm):  0.1    Depth of debridement:  Subcutaneous tissue    Tissue debrided:  Subcutaneous, Epidermis and Dermis    Devitalized tissue debrided:  Slough, Fibrin, Callus and Biofilm    Instruments:  Curette    Bleeding:  Minimal  Hemostasis Achieved: Yes    Method Used:  Pressure  Patient tolerance:  Patient tolerated the procedure well with no immediate complications      "

## 2020-06-09 ENCOUNTER — HOSPITAL ENCOUNTER (OUTPATIENT)
Dept: RADIOLOGY | Facility: HOSPITAL | Age: 80
Discharge: HOME | End: 2020-06-09
Attending: HOSPITALIST
Payer: MEDICARE

## 2020-06-09 ENCOUNTER — TELEPHONE (OUTPATIENT)
Dept: PODIATRY | Facility: CLINIC | Age: 80
End: 2020-06-09

## 2020-06-09 DIAGNOSIS — J90 BILATERAL PLEURAL EFFUSION: ICD-10-CM

## 2020-06-09 PROCEDURE — 71046 X-RAY EXAM CHEST 2 VIEWS: CPT | Mod: TC,HCNC,PN

## 2020-06-09 PROCEDURE — 71046 XR CHEST PA AND LATERAL: ICD-10-PCS | Mod: 26,HCNC,, | Performed by: RADIOLOGY

## 2020-06-09 PROCEDURE — 71046 X-RAY EXAM CHEST 2 VIEWS: CPT | Mod: 26,HCNC,, | Performed by: RADIOLOGY

## 2020-06-09 NOTE — TELEPHONE ENCOUNTER
I have attempted to contact  by phone.           ----- Message from Perla Fountain sent at 6/9/2020 10:05 AM CDT -----  Contact: [facility]  Calling to verify if you received order it was faxed on 5/4    pls sign and fax back    Contact info [phone] ref [id number]

## 2020-06-11 ENCOUNTER — TELEPHONE (OUTPATIENT)
Dept: INTERNAL MEDICINE | Facility: CLINIC | Age: 80
End: 2020-06-11

## 2020-06-11 DIAGNOSIS — I50.22 HEART FAILURE, SYSTOLIC, CHRONIC: ICD-10-CM

## 2020-06-11 NOTE — TELEPHONE ENCOUNTER
----- Message from Ian Pérez sent at 6/11/2020 11:51 AM CDT -----  Contact: self    Patient called in regards to having fluid in his lungs and he wanted to know can he take a double dose of medication furosemide (LASIX) 20 MG tablet please advse

## 2020-06-11 NOTE — TELEPHONE ENCOUNTER
The patient states that he is becoming short winded.  He would like to know if he can double up on the Lasix if needed. I sent the referral for Cardiology to our appointment coordinators to assist with scheduling.

## 2020-06-12 ENCOUNTER — TELEPHONE (OUTPATIENT)
Dept: INTERNAL MEDICINE | Facility: CLINIC | Age: 80
End: 2020-06-12

## 2020-06-12 NOTE — TELEPHONE ENCOUNTER
----- Message from Corina Drummond sent at 6/12/2020  8:35 AM CDT -----  Contact: [phone] Jade (spose)  Patient's spouse called to follow up on yesterday's message about the lasix doeses. Please call and advise

## 2020-06-12 NOTE — TELEPHONE ENCOUNTER
The patient informed of the medication, Lasix, increase.  Also informed to go to the ER if sob worsens. The patient encouraged to schedule a Cardiology appointment.

## 2020-06-22 ENCOUNTER — PATIENT OUTREACH (OUTPATIENT)
Dept: ADMINISTRATIVE | Facility: OTHER | Age: 80
End: 2020-06-22

## 2020-06-22 DIAGNOSIS — E11.40 TYPE 2 DIABETES MELLITUS WITH DIABETIC NEUROPATHY, WITH LONG-TERM CURRENT USE OF INSULIN: Primary | ICD-10-CM

## 2020-06-22 DIAGNOSIS — Z79.4 TYPE 2 DIABETES MELLITUS WITH DIABETIC NEUROPATHY, WITH LONG-TERM CURRENT USE OF INSULIN: Primary | ICD-10-CM

## 2020-06-23 ENCOUNTER — OFFICE VISIT (OUTPATIENT)
Dept: PODIATRY | Facility: CLINIC | Age: 80
End: 2020-06-23
Payer: MEDICARE

## 2020-06-23 VITALS
BODY MASS INDEX: 27.58 KG/M2 | SYSTOLIC BLOOD PRESSURE: 95 MMHG | WEIGHT: 197 LBS | HEIGHT: 71 IN | TEMPERATURE: 97 F | DIASTOLIC BLOOD PRESSURE: 55 MMHG | HEART RATE: 51 BPM

## 2020-06-23 DIAGNOSIS — Z79.4 TYPE 2 DIABETES MELLITUS WITH DIABETIC NEUROPATHY, WITH LONG-TERM CURRENT USE OF INSULIN: ICD-10-CM

## 2020-06-23 DIAGNOSIS — S90.822A BLISTER (NONTHERMAL), LEFT FOOT, INITIAL ENCOUNTER: ICD-10-CM

## 2020-06-23 DIAGNOSIS — E11.40 TYPE 2 DIABETES MELLITUS WITH DIABETIC NEUROPATHY, WITH LONG-TERM CURRENT USE OF INSULIN: ICD-10-CM

## 2020-06-23 DIAGNOSIS — L97.512 SKIN ULCER OF RIGHT FOOT WITH FAT LAYER EXPOSED: Primary | ICD-10-CM

## 2020-06-23 PROCEDURE — 99999 PR PBB SHADOW E&M-EST. PATIENT-LVL IV: ICD-10-PCS | Mod: PBBFAC,HCNC,, | Performed by: PODIATRIST

## 2020-06-23 PROCEDURE — 11042 DBRDMT SUBQ TIS 1ST 20SQCM/<: CPT | Mod: HCNC,S$GLB,, | Performed by: PODIATRIST

## 2020-06-23 PROCEDURE — 11042 WOUND DEBRIDEMENT: ICD-10-PCS | Mod: HCNC,S$GLB,, | Performed by: PODIATRIST

## 2020-06-23 PROCEDURE — 99999 PR PBB SHADOW E&M-EST. PATIENT-LVL IV: CPT | Mod: PBBFAC,HCNC,, | Performed by: PODIATRIST

## 2020-06-23 PROCEDURE — 99499 NO LOS: ICD-10-PCS | Mod: HCNC,S$GLB,, | Performed by: PODIATRIST

## 2020-06-23 PROCEDURE — 99499 UNLISTED E&M SERVICE: CPT | Mod: HCNC,S$GLB,, | Performed by: PODIATRIST

## 2020-06-23 NOTE — PATIENT INSTRUCTIONS
How to Check Your Feet    Below are tips to help you look for foot problems. Try to check your feet at the same time each day, such as when you get out of bed in the morning.    · Check the top of each foot. The tops of toes, back of the heel, and outer edge of the foot can get a lot of rubbing from poor-fitting shoes.    · Check the bottom of each foot. Daily wear and tear often leads to problems at pressure spots.    · Check the toes and nails. Fungal infections often occur between toes. Toenail problems can also be a sign of fungal infections or lead to breaks in the skin.    · Check your shoes, too. Loose objects inside a shoe can injure the foot. Use your hand to feel inside your shoes for things like eze, loose stitching, or rough areas that could irritate your skin.        Diabetic Foot Care    Diabetes can lead to a number of different foot complications. Fortunately, most of these complications can be prevented with a little extra foot care. If diabetes is not well controlled, the high blood sugar can cause damage to blood vessels and result in poor circulation to the foot. When the skin does not get enough blood flow, it becomes prone to pressure sores and ulcers, which heal slowly.  High blood sugar can also damage nerves, interfering with the ability to feel pain and pressure. When you cant feel your foot normally, it is easy to injure your skin, bones and joints without knowing it. For these reasons diabetes increases the risk of fungal infections, bunions and ulcers. Deep ulcers can lead to bone infection. Gangrene is the most serious foot complication of diabetes. It usually occurs on the tips of the toes as blacked areas of skin. The black area is dead tissue. In severe cases, gangrene spreads to involve the entire toe, other toes and the entire foot. Foot or toe amputation may be required. Good foot care and blood sugar control can prevent this.    Home Care  1. Wear comfortable, proper fitting  shoes.  2. Wash your feet daily with warm water and mild soap.  3. After drying, apply a moisturizing cream or lotion.  4. Check your feet daily for skin breaks, blisters, swelling, or redness. Look between your toes also.  5. Wear cotton socks and change them every day.  6. Trim toe nails carefully and do not cut your cuticles.  7. Strive to keep your blood sugar under control with a combination of medicines, diet and activity.  8. If you smoke and have diabetes, it is very important that you stop. Smoking reduces blood flow to your foot.  9. Avoid activities that increase your risk of foot injury:  · Do not walk barefoot.  · Do not use heating pads or hot water bottles on your feet.  · Do not put your foot in a hot tub without first checking the temperature with your hand.  10) Schedule yearly foot exams.    Follow Up  with your doctor or as advised by our staff. Report any cut, puncture, scrape, other injury, blister, ingrown toenail or ulcer on your foot.    Get Prompt Medical Attention  if any of the following occur:  -- Open ulcer with pus draining from the wound  -- Increasing foot or leg pain  -- New areas of redness or swelling or tender areas of the foot    © 5502-0864 [employer]. [address] [place] [address], [place] [address]. All rights reserved. This information is not intended as a substitute for professional medical care. Always follow your healthcare professional's instructions.

## 2020-06-23 NOTE — PROGRESS NOTES
Chief Complaint: Foot Ulcer (Skin ulcer, Right foot) and Blister (Left foot)      HPI:  Randy is a 79 y.o. male who has a right lateral foot wound. 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        May 15, 2020:  The patient's last visit with me was on April 27, 2020.  It was a virtual video visit.  He had requested for home health wound care.  His wound continues to remain open.  Today he complains of increasing redness to the area.  Otherwise no acute trauma reported area.      June 4, 2020:  follow-up right lateral foot wound.  He is receiving home health wound care on Monday and Fridays.  No adverse changes to the area since his last visit.  He is using Gold Bond diabetic foot lotion to his feet.  No other issues.        This patient has documented high risk feet requiring routine maintenance secondary to peripheral vascular disease.          PCP: Susie Lazo MD    Date Last Seen by PCP: Foot Ulcer (Skin ulcer, Right foot) and Blister (Left foot)         Current shoe gear:  Affected Foot: Football and Darco shoe on the affected foot     Unaffected Foot: Slip-on shoes        Hemoglobin A1C   Date Value Ref Range Status   05/21/2020 5.7 (H) 4.0 - 5.6 % Final     Comment:     ADA Screening Guidelines:  5.7-6.4%  Consistent with prediabetes  >or=6.5%  Consistent with diabetes  High levels of fetal hemoglobin interfere  "with the HbA1C  assay. Heterozygous hemoglobin variants (HbS, HgC, etc)do  not significantly interfere with this assay.   However, presence of multiple variants may affect accuracy.     01/15/2020 5.8 (H) 4.0 - 5.6 % Final     Comment:     ADA Screening Guidelines:  5.7-6.4%  Consistent with prediabetes  >or=6.5%  Consistent with diabetes  High levels of fetal hemoglobin interfere with the HbA1C  assay. Heterozygous hemoglobin variants (HbS, HgC, etc)do  not significantly interfere with this assay.   However, presence of multiple variants may affect accuracy.     06/05/2019 6.3 (H) 4.0 - 5.6 % Final     Comment:     ADA Screening Guidelines:  5.7-6.4%  Consistent with prediabetes  >or=6.5%  Consistent with diabetes  High levels of fetal hemoglobin interfere with the HbA1C  assay. Heterozygous hemoglobin variants (HbS, HgC, etc)do  not significantly interfere with this assay.   However, presence of multiple variants may affect accuracy.         Review of Systems   Constitution: Negative for chills, fever and malaise/fatigue.   HENT: Negative for hearing loss.    Cardiovascular: Negative for claudication.   Respiratory: Negative for shortness of breath.    Skin: Positive for color change, dry skin, nail changes, poor wound healing and unusual hair distribution. Negative for flushing and rash.   Musculoskeletal: Negative for joint pain and myalgias.   Neurological: Negative for loss of balance, numbness, paresthesias and sensory change.   Psychiatric/Behavioral: Negative for altered mental status.               Objective:        Vitals:    06/23/20 1437   BP: (!) 95/55   Pulse: (!) 51   Temp: 97.2 °F (36.2 °C)   Weight: 89.4 kg (197 lb)   Height: 5' 11" (1.803 m)         Physical Exam  Vitals signs reviewed.   Constitutional:       Appearance: He is well-developed.   Cardiovascular:      Pulses:           Dorsalis pedis pulses are 0 on the right side and 0 on the left side.        Posterior tibial pulses are 0 on the " right side and 1+ on the left side.      Comments: Bi-phasic pulses noted with doppler, right  Musculoskeletal:      Right ankle: He exhibits decreased range of motion and abnormal pulse. Achilles tendon exhibits normal Barajas's test results.      Left ankle: He exhibits decreased range of motion and abnormal pulse. Achilles tendon exhibits normal Barajas's test results.      Right foot: Decreased range of motion.      Left foot: Decreased range of motion.   Feet:      Right foot:      Protective Sensation: 5 sites tested. 2 sites sensed.      Left foot:      Protective Sensation: 5 sites tested. 2 sites sensed.   Skin:     General: Skin is dry.      Capillary Refill: Capillary refill takes more than 3 seconds.      Comments: Skin is atrophic and dry.  There is xerosis.  No wounds on the left foot.    Toenails x 9 are yellow mycotic and thickened with subungual debris.      Ulcer Location: right lateral foot  Measurements:  Pre debridement:  0.3cm x 0.1cm x 0.1cm; post debridement size  in debridement report   Periwound:   (--) necrosis  Drainage:  Scant serous  Pus: Absent  Malodor:  Absent  Base:  50% granular. 50% fibrin.  Signs of infection:   None        Ulcer Location: left lateral foto  Measurements:   1.6cm x 1.0cm x 0.1cm  Periwound: (--) hyperkeratosis;  (--) necrosis  Drainage: (--) serous;   Pus: Absent  Malodor:  Absent  Base:  100% granular. 0% fibrin.  Signs of infection:   none     Neurological:      Mental Status: He is alert.      Comments: diminished sensation noted to b/L lower extremities   Psychiatric:         Behavior: Behavior normal. Behavior is cooperative.                       Assessment:       Encounter Diagnoses   Name Primary?    Skin ulcer of right foot with fat layer exposed Yes    Type 2 diabetes mellitus with diabetic neuropathy, with long-term current use of insulin     Blister (nonthermal), left foot, initial encounter          Plan:       Randy was seen today for foot  "ulcer and blister.    Diagnoses and all orders for this visit:    Skin ulcer of right foot with fat layer exposed    Type 2 diabetes mellitus with diabetic neuropathy, with long-term current use of insulin    Blister (nonthermal), left foot, initial encounter          I counseled the patient on the patient's conditions, their implications and medical management.    Continue home health wound care as ordered.  Endoform applied to the wound     Wound Debridement    Date/Time: 6/23/2020 2:30 PM  Performed by: Jess Rosa DPM  Authorized by: Jess Rosa DPM     Time out: Immediately prior to procedure a "time out" was called to verify the correct patient, procedure, equipment, support staff and site/side marked as required.    Consent Done?:  Yes (Verbal)    Preparation: Patient was prepped and draped in usual sterile fashion    Local anesthesia used?: No      Wound Details:    Location:  Right foot    Location:  Right Midfoot    Type of Debridement:  Excisional       Length (cm):  0.8       Area (sq cm):  0.24       Width (cm):  0.3       Percent Debrided (%):  100       Depth (cm):  0.1       Total Area Debrided (sq cm):  0.24    Depth of debridement:  Subcutaneous tissue    Tissue debrided:  Subcutaneous, Epidermis and Dermis    Devitalized tissue debrided:  Fibrin, Slough, Callus and Biofilm    Instruments:  Curette    Bleeding:  Minimal  Hemostasis Achieved: Yes    Method Used:  Pressure      "

## 2020-06-23 NOTE — PROGRESS NOTES
Chart reviewed.   Immunizations: Triggered Imm Registry     Orders placed: eye exam   Upcoming appts to satisfy OLIVERIO topics: n/a

## 2020-06-29 ENCOUNTER — TELEPHONE (OUTPATIENT)
Dept: INTERNAL MEDICINE | Facility: CLINIC | Age: 80
End: 2020-06-29

## 2020-06-29 NOTE — TELEPHONE ENCOUNTER
----- Message from Zabrina Márquez sent at 6/29/2020  3:09 PM CDT -----  Contact: [facility] [phone]  Patient had a fall on Friday and landed on right elbow, he has an skin tear with bruise and swelling, it was clean with saline, apply foam dressing and he did not hit his head, the cause of the fall was he trip over his shoes, please advise

## 2020-06-29 NOTE — TELEPHONE ENCOUNTER
I called the patient to check on him and no answer. Message left on his voicemail to call the office back.

## 2020-06-30 PROCEDURE — G0179 MD RECERTIFICATION HHA PT: HCPCS | Mod: ,,, | Performed by: PODIATRIST

## 2020-06-30 PROCEDURE — G0179 PR HOME HEALTH MD RECERTIFICATION: ICD-10-PCS | Mod: ,,, | Performed by: PODIATRIST

## 2020-07-01 ENCOUNTER — OFFICE VISIT (OUTPATIENT)
Dept: PODIATRY | Facility: CLINIC | Age: 80
End: 2020-07-01
Payer: MEDICARE

## 2020-07-01 VITALS
WEIGHT: 197.06 LBS | HEART RATE: 50 BPM | SYSTOLIC BLOOD PRESSURE: 102 MMHG | BODY MASS INDEX: 27.59 KG/M2 | TEMPERATURE: 99 F | DIASTOLIC BLOOD PRESSURE: 58 MMHG | HEIGHT: 71 IN

## 2020-07-01 DIAGNOSIS — L97.521 ULCER OF LEFT FOOT, LIMITED TO BREAKDOWN OF SKIN: ICD-10-CM

## 2020-07-01 DIAGNOSIS — Z79.4 TYPE 2 DIABETES MELLITUS WITH DIABETIC NEUROPATHY, WITH LONG-TERM CURRENT USE OF INSULIN: ICD-10-CM

## 2020-07-01 DIAGNOSIS — L97.512 SKIN ULCER OF RIGHT FOOT WITH FAT LAYER EXPOSED: Primary | ICD-10-CM

## 2020-07-01 DIAGNOSIS — E11.40 TYPE 2 DIABETES MELLITUS WITH DIABETIC NEUROPATHY, WITH LONG-TERM CURRENT USE OF INSULIN: ICD-10-CM

## 2020-07-01 PROCEDURE — 99999 PR PBB SHADOW E&M-EST. PATIENT-LVL IV: CPT | Mod: PBBFAC,HCNC,, | Performed by: PODIATRIST

## 2020-07-01 PROCEDURE — 99999 PR PBB SHADOW E&M-EST. PATIENT-LVL IV: ICD-10-PCS | Mod: PBBFAC,HCNC,, | Performed by: PODIATRIST

## 2020-07-01 PROCEDURE — 3074F PR MOST RECENT SYSTOLIC BLOOD PRESSURE < 130 MM HG: ICD-10-PCS | Mod: HCNC,CPTII,S$GLB, | Performed by: PODIATRIST

## 2020-07-01 PROCEDURE — 1100F PR PT FALLS ASSESS DOC 2+ FALLS/FALL W/INJURY/YR: ICD-10-PCS | Mod: HCNC,CPTII,S$GLB, | Performed by: PODIATRIST

## 2020-07-01 PROCEDURE — 99212 PR OFFICE/OUTPT VISIT, EST, LEVL II, 10-19 MIN: ICD-10-PCS | Mod: 25,HCNC,S$GLB, | Performed by: PODIATRIST

## 2020-07-01 PROCEDURE — 3078F PR MOST RECENT DIASTOLIC BLOOD PRESSURE < 80 MM HG: ICD-10-PCS | Mod: HCNC,CPTII,S$GLB, | Performed by: PODIATRIST

## 2020-07-01 PROCEDURE — 11042 DBRDMT SUBQ TIS 1ST 20SQCM/<: CPT | Mod: HCNC,S$GLB,, | Performed by: PODIATRIST

## 2020-07-01 PROCEDURE — 99212 OFFICE O/P EST SF 10 MIN: CPT | Mod: 25,HCNC,S$GLB, | Performed by: PODIATRIST

## 2020-07-01 PROCEDURE — 1126F PR PAIN SEVERITY QUANTIFIED, NO PAIN PRESENT: ICD-10-PCS | Mod: HCNC,S$GLB,, | Performed by: PODIATRIST

## 2020-07-01 PROCEDURE — 3288F FALL RISK ASSESSMENT DOCD: CPT | Mod: HCNC,CPTII,S$GLB, | Performed by: PODIATRIST

## 2020-07-01 PROCEDURE — 3074F SYST BP LT 130 MM HG: CPT | Mod: HCNC,CPTII,S$GLB, | Performed by: PODIATRIST

## 2020-07-01 PROCEDURE — 1159F MED LIST DOCD IN RCRD: CPT | Mod: HCNC,S$GLB,, | Performed by: PODIATRIST

## 2020-07-01 PROCEDURE — 3078F DIAST BP <80 MM HG: CPT | Mod: HCNC,CPTII,S$GLB, | Performed by: PODIATRIST

## 2020-07-01 PROCEDURE — 1159F PR MEDICATION LIST DOCUMENTED IN MEDICAL RECORD: ICD-10-PCS | Mod: HCNC,S$GLB,, | Performed by: PODIATRIST

## 2020-07-01 PROCEDURE — 11042 WOUND DEBRIDEMENT: ICD-10-PCS | Mod: HCNC,S$GLB,, | Performed by: PODIATRIST

## 2020-07-01 PROCEDURE — 3288F PR FALLS RISK ASSESSMENT DOCUMENTED: ICD-10-PCS | Mod: HCNC,CPTII,S$GLB, | Performed by: PODIATRIST

## 2020-07-01 PROCEDURE — 1126F AMNT PAIN NOTED NONE PRSNT: CPT | Mod: HCNC,S$GLB,, | Performed by: PODIATRIST

## 2020-07-01 PROCEDURE — 1100F PTFALLS ASSESS-DOCD GE2>/YR: CPT | Mod: HCNC,CPTII,S$GLB, | Performed by: PODIATRIST

## 2020-07-01 NOTE — PROGRESS NOTES
Subjective:      Patient ID: Randy Camejo is a 79 y.o. male.    Chief Complaint: Foot Ulcer (bilateral foot wounds)    Diabetic foot wound right foot postop dehiscence of incision 6 months ago. Weekly wound care is stabilizing and improving the wound gradually over time.  Patient relates no signs of infection.  Patient is ambulating minimally in surgical shoe right, casual shoe left.  Dressing from last visit is clean dry and intact right foot.    Review of Systems   Constitution: Negative for chills, diaphoresis, fever, malaise/fatigue and night sweats.   Cardiovascular: Negative for claudication, cyanosis, leg swelling and syncope.   Skin: Positive for poor wound healing and unusual hair distribution. Negative for color change, dry skin, nail changes, rash and suspicious lesions.   Musculoskeletal: Negative for falls, joint pain, joint swelling, muscle cramps, muscle weakness and stiffness.   Gastrointestinal: Negative for constipation, diarrhea, nausea and vomiting.   Neurological: Positive for numbness, paresthesias and sensory change. Negative for tremors.           Objective:      Physical Exam  Constitutional:       General: He is not in acute distress.     Appearance: He is well-developed. He is not diaphoretic.   Cardiovascular:      Pulses:           Dorsalis pedis pulses are 1+ on the right side and 1+ on the left side.        Posterior tibial pulses are 1+ on the right side and 1+ on the left side.      Comments: Toes warm, pink, cap fill <5 seconds.  Musculoskeletal:      Comments: Partial 5th ray resection right foot without symptoms.   Lymphadenopathy:      Comments: Negative lymphadenopathy bilateral popliteal fossa and tarsal tunnel.     Skin:     General: Skin is warm and dry.      Coloration: Skin is not pale.      Findings: No abrasion, bruising, burn, ecchymosis, erythema, laceration, lesion or rash.      Comments:     Wound: dorsal lateral proximal right 5th ray    Size:     Pre:[id number]  Post:  [id number]    Base:  Granular, pink with minimal serous/serosanaguinous drainage only.  No pus, tracking, fluctuance, malodor, or cardinal signs infection.    Borders:   flat, pink, blanchable skin edges without undermining.    Wound:  Dorsal left 5th mtpj    Size:    Pre:[id number]      Base:  Granular, pink with moderate serous/serosanaguinous drainage only.  No pus, tracking, fluctuance, malodor, or cardinal signs infection.    Borders:  Hyperkeratotic, debriding to flat, pink, blanchable skin edges without undermining.        Otherwise, Skin thin, shiny, atrophic, with decreased density and distribution of pedal hair bilateral, but without hyperpigmentation, maggie discoloration,  ulcers, masses, nodules or cords palpated bilateral feet and legs.     Neurological:      Sensory: Sensory deficit present.      Comments: Diminished/loss of protective sensation all toes bilateral to 10 gram monofilament.       Psychiatric:         Behavior: Behavior is cooperative.               Assessment:       Encounter Diagnoses   Name Primary?    Skin ulcer of right foot with fat layer exposed Yes    Type 2 diabetes mellitus with diabetic neuropathy, with long-term current use of insulin     Ulcer of left foot, limited to breakdown of skin          Plan:       Randy was seen today for foot ulcer.    Diagnoses and all orders for this visit:    Skin ulcer of right foot with fat layer exposed    Type 2 diabetes mellitus with diabetic neuropathy, with long-term current use of insulin    Ulcer of left foot, limited to breakdown of skin      I counseled the patient on his conditions, their implications and medical management.    Dressed superficial wound right and left foot with wound foam wound net.    Continue minimal ambulation in surgical shoe right and left.    Prescribed diabetic shoes with custom inserts and filler right foot as needed - implement on wound closure.    sispensed smaller sx shoe left -  daughter request - attributes falls to larger shoe size.    Adequate vitamin supplementation, protein intake, and hydration - discussed with patient.    Inspect feet multiple times daily for signs of occurrence/recurrence ulceration.           Follow up in about 1 week (around 7/8/2020), or if symptoms worsen or fail to improve, for both feet wounds .

## 2020-07-01 NOTE — PROCEDURES
"Wound Debridement    Date/Time: 7/1/2020 3:19 PM  Performed by: Kingston Warren DPM  Authorized by: Kingston Warern DPM     Time out: Immediately prior to procedure a "time out" was called to verify the correct patient, procedure, equipment, support staff and site/side marked as required.    Consent Done?:  Yes (Verbal)  Local anesthesia used?: No      Wound Details:    Location:  Right foot    Location:  Right Midfoot    Type of Debridement:  Excisional       Length (cm):  0.3       Area (sq cm):  0.06       Width (cm):  0.2       Percent Debrided (%):  100       Depth (cm):  0.2       Total Area Debrided (sq cm):  0.06    Depth of debridement:  Subcutaneous tissue    Tissue debrided:  Dermis, Epidermis and Subcutaneous    Devitalized tissue debrided:  Biofilm and Callus    Instruments:  Curette    Bleeding:  Minimal  Hemostasis Achieved: Yes    Method Used:  Pressure  Patient tolerance:  Patient tolerated the procedure well with no immediate complications      "

## 2020-07-08 ENCOUNTER — PATIENT OUTREACH (OUTPATIENT)
Dept: ADMINISTRATIVE | Facility: OTHER | Age: 80
End: 2020-07-08

## 2020-07-08 ENCOUNTER — OFFICE VISIT (OUTPATIENT)
Dept: CARDIOLOGY | Facility: CLINIC | Age: 80
End: 2020-07-08
Payer: MEDICARE

## 2020-07-08 VITALS
WEIGHT: 191 LBS | SYSTOLIC BLOOD PRESSURE: 116 MMHG | HEART RATE: 53 BPM | HEIGHT: 71 IN | DIASTOLIC BLOOD PRESSURE: 56 MMHG | BODY MASS INDEX: 26.74 KG/M2

## 2020-07-08 DIAGNOSIS — I10 ESSENTIAL HYPERTENSION: ICD-10-CM

## 2020-07-08 DIAGNOSIS — E78.00 HYPERCHOLESTEROLEMIA: ICD-10-CM

## 2020-07-08 DIAGNOSIS — Z79.899 HIGH RISK MEDICATION USE: ICD-10-CM

## 2020-07-08 DIAGNOSIS — N18.30 STAGE 3 CHRONIC KIDNEY DISEASE: ICD-10-CM

## 2020-07-08 DIAGNOSIS — I50.22 HEART FAILURE, SYSTOLIC, CHRONIC: Primary | ICD-10-CM

## 2020-07-08 DIAGNOSIS — I25.2 HISTORY OF MYOCARDIAL INFARCTION: ICD-10-CM

## 2020-07-08 DIAGNOSIS — Z79.4 TYPE 2 DIABETES MELLITUS WITH OTHER NEUROLOGIC COMPLICATION, WITH LONG-TERM CURRENT USE OF INSULIN: ICD-10-CM

## 2020-07-08 DIAGNOSIS — I50.22 CHRONIC SYSTOLIC HEART FAILURE: ICD-10-CM

## 2020-07-08 DIAGNOSIS — Z91.89 AT RISK FOR AMIODARONE TOXICITY WITH LONG TERM USE: ICD-10-CM

## 2020-07-08 DIAGNOSIS — Z79.899 AT RISK FOR AMIODARONE TOXICITY WITH LONG TERM USE: ICD-10-CM

## 2020-07-08 DIAGNOSIS — Z95.5 HISTORY OF CORONARY ARTERY STENT PLACEMENT: ICD-10-CM

## 2020-07-08 DIAGNOSIS — R60.0 BILATERAL LEG EDEMA: ICD-10-CM

## 2020-07-08 DIAGNOSIS — E11.49 TYPE 2 DIABETES MELLITUS WITH OTHER NEUROLOGIC COMPLICATION, WITH LONG-TERM CURRENT USE OF INSULIN: ICD-10-CM

## 2020-07-08 DIAGNOSIS — I48.91 ATRIAL FIBRILLATION, UNSPECIFIED TYPE: ICD-10-CM

## 2020-07-08 DIAGNOSIS — Z79.01 CHRONIC ANTICOAGULATION: ICD-10-CM

## 2020-07-08 DIAGNOSIS — I25.10 CORONARY ARTERY DISEASE INVOLVING NATIVE CORONARY ARTERY OF NATIVE HEART WITHOUT ANGINA PECTORIS: ICD-10-CM

## 2020-07-08 PROCEDURE — 99215 OFFICE O/P EST HI 40 MIN: CPT | Mod: HCNC,S$GLB,, | Performed by: INTERNAL MEDICINE

## 2020-07-08 PROCEDURE — 1159F PR MEDICATION LIST DOCUMENTED IN MEDICAL RECORD: ICD-10-PCS | Mod: HCNC,S$GLB,, | Performed by: INTERNAL MEDICINE

## 2020-07-08 PROCEDURE — 99499 RISK ADDL DX/OHS AUDIT: ICD-10-PCS | Mod: HCNC,S$GLB,, | Performed by: INTERNAL MEDICINE

## 2020-07-08 PROCEDURE — 1101F PR PT FALLS ASSESS DOC 0-1 FALLS W/OUT INJ PAST YR: ICD-10-PCS | Mod: HCNC,CPTII,S$GLB, | Performed by: INTERNAL MEDICINE

## 2020-07-08 PROCEDURE — 1101F PT FALLS ASSESS-DOCD LE1/YR: CPT | Mod: HCNC,CPTII,S$GLB, | Performed by: INTERNAL MEDICINE

## 2020-07-08 PROCEDURE — 99999 PR PBB SHADOW E&M-EST. PATIENT-LVL IV: ICD-10-PCS | Mod: PBBFAC,HCNC,, | Performed by: INTERNAL MEDICINE

## 2020-07-08 PROCEDURE — 3078F PR MOST RECENT DIASTOLIC BLOOD PRESSURE < 80 MM HG: ICD-10-PCS | Mod: HCNC,CPTII,S$GLB, | Performed by: INTERNAL MEDICINE

## 2020-07-08 PROCEDURE — 1159F MED LIST DOCD IN RCRD: CPT | Mod: HCNC,S$GLB,, | Performed by: INTERNAL MEDICINE

## 2020-07-08 PROCEDURE — 99215 PR OFFICE/OUTPT VISIT, EST, LEVL V, 40-54 MIN: ICD-10-PCS | Mod: HCNC,S$GLB,, | Performed by: INTERNAL MEDICINE

## 2020-07-08 PROCEDURE — 1126F AMNT PAIN NOTED NONE PRSNT: CPT | Mod: HCNC,S$GLB,, | Performed by: INTERNAL MEDICINE

## 2020-07-08 PROCEDURE — 1126F PR PAIN SEVERITY QUANTIFIED, NO PAIN PRESENT: ICD-10-PCS | Mod: HCNC,S$GLB,, | Performed by: INTERNAL MEDICINE

## 2020-07-08 PROCEDURE — 3074F PR MOST RECENT SYSTOLIC BLOOD PRESSURE < 130 MM HG: ICD-10-PCS | Mod: HCNC,CPTII,S$GLB, | Performed by: INTERNAL MEDICINE

## 2020-07-08 PROCEDURE — 3078F DIAST BP <80 MM HG: CPT | Mod: HCNC,CPTII,S$GLB, | Performed by: INTERNAL MEDICINE

## 2020-07-08 PROCEDURE — 3074F SYST BP LT 130 MM HG: CPT | Mod: HCNC,CPTII,S$GLB, | Performed by: INTERNAL MEDICINE

## 2020-07-08 PROCEDURE — 99999 PR PBB SHADOW E&M-EST. PATIENT-LVL IV: CPT | Mod: PBBFAC,HCNC,, | Performed by: INTERNAL MEDICINE

## 2020-07-08 PROCEDURE — 99499 UNLISTED E&M SERVICE: CPT | Mod: HCNC,S$GLB,, | Performed by: INTERNAL MEDICINE

## 2020-07-08 RX ORDER — FUROSEMIDE 40 MG/1
40 TABLET ORAL DAILY
Qty: 60 TABLET | Refills: 11 | Status: SHIPPED | OUTPATIENT
Start: 2020-07-08 | End: 2021-10-18 | Stop reason: SDUPTHER

## 2020-07-08 RX ORDER — SACUBITRIL AND VALSARTAN 49; 51 MG/1; MG/1
1 TABLET, FILM COATED ORAL 2 TIMES DAILY
Qty: 60 TABLET | Refills: 11 | Status: SHIPPED | OUTPATIENT
Start: 2020-07-08 | End: 2021-08-02 | Stop reason: SDUPTHER

## 2020-07-08 NOTE — LETTER
July 8, 2020      Susie Lazo MD  [address] [facility]  [place] [address]           [place] [facility]  [address] [facility].  [place] [address]  Phone: [phone]          Patient: Randy Camejo   MR Number: [id number]   YOB: 1940   Date of Visit: 7/8/2020       Dear Dr. Susie Lazo:    Thank you for referring Randy Camejo to me for evaluation. Attached you will find relevant portions of my assessment and plan of care.    If you have questions, please do not hesitate to call me. I look forward to following Randy Camejo along with you.    Sincerely,    Lj Mack MD    Enclosure  CC:  No Recipients    If you would like to receive this communication electronically, please contact [email or web address] or [phone] to request more information on [facility] [employer] access.    For providers and/or their staff who would like to refer a patient to [facility], please contact us through our one-stop-shop provider referral line, Irwin Danielle, at [phone].    If you feel you have received this communication in error or would no longer like to receive these types of communications, please e-mail [email or web address]

## 2020-07-08 NOTE — PROGRESS NOTES
Subjective:   Patient ID:  Randy Camejo is a 79 y.o. male who presents for evaluation of chronic systolic heart failure and Coronary Artery Disease      HPI: Very pleasant man who previously saw Dr. Joseph for chronic systolic heart failure, AF, and CAD s/p stenting presents to establish care with [facility].  He had an echo about 5 weeks and it showed a CVP of 15, bilateral pleural effusions, a small pericardial effusion, and an EF of 20% with a PASP of 69mm Hg.    He was as high as 198 with his weight in early June but is now down to 191 on an increased dose of lasix (20 --> 20 bid).    He denies chest discomfort, palpitations, PND/orthopnea, lightheadedness and syncope.  He does get out of breath with climbing stairs or with other exertion that is more than minimal.    No bleeding, hematochezia, or melena.  No TIA/stroke symtoms.    No F/C/cough/diarrhea/anosmia.      Dr. Joseph's May 2019 HPI Randy Camejo is a 78 y.o. male with hypertension and diabetes mellitus type 2. He suffered a myocardial infarction in 2000 when he presented to [facility] and had a stent placed. He was treated for DM2 for a few years with metformin but then stopped it. He did not had any regular medical follow up for several years. He began feeling weak and have diarrhea on 2/15/2019. He diarrhea continued and he became weaker over the next several weak. He had nausea and vomited. On 2/19/2019 he went to  and was referred to the ER. He was noted to be in atrial fibrillation and admitted. 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 He was given amiodarone, digoxin and metoprolol. He was anticoagulation with heparin iv and later apixiban. On 2/25/2019 he underwent a DILLAN guided CV. There was severe left ventricular dysfunction with an EF of 15%. The TERESO had spontaneous echo contrast ut no thrombus. He was cardioverted with a 100 J shock to  sinus rhythm. On 2/26/2019 he became hyperkalemic with a K of 6.0 after given enalapril. On 4/5/2019 he had a follow up Echo that revealed a moderately dilated left ventricle with moderate systolic dysfunction. The EF was in the 35-40% range. There was an anteroseptal as well as an inferior WMA. There was moderate diastolic dysfunction, a moderately dilated LA, mild aortic valve sclerosis with apeak velocity of 1.1 m/s, mild AR and moderate MR. He has slowly gained strength. No exertional chest pain or exertional dyspnea. No palpitations or weak spells. He however has some back pain and is sometimes a bit unsteady on his feet. Feeling well overall.     June 2, 2020 Echo (YG)  · Severely decreased left ventricular systolic function. The estimated ejection fraction is 20%.  · Global hypokinetic wall motion.  · Indeterminate left ventricular diastolic function.  · Mildly reduced right ventricular systolic function.  · Mild right ventricular enlargement.  · Severe biatrial enlargement.  · Mild mitral regurgitation.  · Mild tricuspid regurgitation.  · The estimated PA systolic pressure is 69 mmHg.  · Pulmonary hypertension present.  · Elevated central venous pressure (15 mmHg).  · Small circumferential pericardial effusion.  · The sinuses of Valsalva is mildly dilated.  · The aortic root is mildly dilated.  · There is a bilateral pleural effusion.    Past Medical History:   Diagnosis Date    Asthma     childhood    Atrial fibrillation     CHF (congestive heart failure)     Chronic anticoagulation 2/25/2019    Coronary artery disease     Coronary artery disease 2/25/2019    Diabetes mellitus, type 2     Heart attack     stent    High risk medication use 3/22/2019    2/19/2019: Began amiodarone.    History of coronary artery stent placement 3/22/2019    2000: [facility]: MI and stent.    History of myocardial infarction 3/22/2019    2000: [facility]: MI and stent.   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 Beavers    CATARACT EXTRACTION W/  INTRAOCULAR LENS IMPLANT Left 01/08/2018    Dr. Beavers    TOE AMPUTATION Right 8/31/2019    Procedure: AMPUTATION, 5th TOE possible partial 5th ray amputation;  Surgeon: Kathy Talley DPM;  Location: [facility] OR [address] [place] [address];  Service: Podiatry;  Laterality: Right;    TONSILLECTOMY      TREATMENT OF CARDIAC ARRHYTHMIA N/A 2/25/2019    Procedure: CARDIOVERSION OR DEFIBRILLATION;  Surgeon: Panchito Joseph MD;  Location: [facility] CATH LAB;  Service: Cardiology;  Laterality: N/A;       Social History     Tobacco Use    Smoking status: Never Smoker    Smokeless tobacco: Never Used   Substance Use Topics    Alcohol use: No    Drug use: No       Family History   Problem Relation Age of Onset    Cataracts Father     Cancer Father         stomach    Stroke Mother     Diabetes Mother     Amblyopia Neg Hx     Blindness Neg Hx     Glaucoma Neg Hx     Macular degeneration Neg Hx     Retinal detachment Neg Hx     Strabismus Neg Hx        Current Outpatient Medications   Medication Sig    acetaminophen (TYLENOL) 325 MG tablet Take 2 tablets (650 mg total) by mouth every 4 (four) hours as needed.    alcohol swabs PadM Apply 1 each topically 2 (two) times a day. E11.49    amiodarone (PACERONE) 200 MG Tab TAKE 1 TABLET(200 MG) BY MOUTH EVERY DAY    atorvastatin (LIPITOR) 10 MG tablet Take 1 tablet (10 mg total) by mouth once daily.    blood glucose control, low Soln Use 1 each to check glucose level of glucometer weekly    blood sugar diagnostic (TRUE METRIX GLUCOSE TEST STRIP) Strp 1 strip by Misc.(Non-Drug; Combo  "Route) route 2 (two) times a day. To check blood sugar with True Metrix Air Glucose meter. ICD10: E11.49    blood-glucose meter (TRUE METRIX AIR GLUCOSE METER) Misc 1 Device by Misc.(Non-Drug; Combo Route) route 2 (two) times a day. To check blood sugar. ICD10: E11.49    carvediloL (COREG) 6.25 MG tablet TAKE 1 TABLET(6.25 MG) BY MOUTH TWICE DAILY    ELIQUIS 5 mg Tab TAKE 1 TABLET(5 MG) BY MOUTH TWICE DAILY    famotidine (PEPCID) 20 MG tablet Take 1 tablet (20 mg total) by mouth once daily.    furosemide (LASIX) 20 MG tablet TAKE 1 TABLET(20 MG) BY MOUTH EVERY DAY (Patient taking differently: Take 40 mg by mouth once daily. )    insulin detemir U-100 (LEVEMIR FLEXTOUCH U-100 INSULN) 100 unit/mL (3 mL) SubQ InPn pen Inject 10 Units into the skin every evening. If Blood Glucose is less than 100 mg/dL at BEDTIME, give 16 grams (four glucose tablets) X 1 dose for patients who can take PO. Recheck BG in 30 minutes and call MD for insulin dose adjustments prior to administering insulin detemir (Levemir).    lancets (TRUEPLUS LANCETS) 33 gauge Misc To check blood sugar with True Metrix Air Glucose meter. ICD10: E11.49    levothyroxine (SYNTHROID) 50 MCG tablet Take 1 tablet (50 mcg total) by mouth once daily.    losartan (COZAAR) 50 MG tablet TAKE 1 TABLET(50 MG) BY MOUTH EVERY DAY    pen needle, diabetic 31 gauge x 5/16" Ndle 1 Device by Misc.(Non-Drug; Combo Route) route 4 (four) times daily. Use with insulin     No current facility-administered medications for this visit.        Review of patient's allergies indicates:  No Known Allergies    Review of Systems   Constitution: Negative.   HENT: Negative.    Eyes: Negative.    Cardiovascular: Positive for dyspnea on exertion. Negative for chest pain, near-syncope, orthopnea and palpitations.   Respiratory: Negative.  Negative for cough and shortness of breath.    Endocrine: Negative.    Hematologic/Lymphatic: Negative.    Skin: Negative.    Musculoskeletal: " Negative.    Gastrointestinal: Negative.    Genitourinary: Negative.    Neurological: Negative.    Psychiatric/Behavioral: Negative.      Objective:   Physical Exam   Constitutional: He is oriented to person, place, and time. He appears well-developed and well-nourished.   HENT:   Head: Normocephalic and atraumatic.   Mouth/Throat: Oropharynx is clear and moist.   Eyes: Conjunctivae and EOM are normal. No scleral icterus.   Neck: Normal range of motion. Neck supple. No JVD present.   Cardiovascular: Normal rate, regular rhythm, normal heart sounds and intact distal pulses. Exam reveals no gallop and no friction rub.   No murmur heard.  Pulmonary/Chest: Effort normal and breath sounds normal. He has no wheezes. He has no rales.   Abdominal: Soft. Bowel sounds are normal. He exhibits no distension. There is no abdominal tenderness.   Musculoskeletal: Normal range of motion.         General: Edema (3+ edema into the thighs; also presacral edema) present.   Neurological: He is alert and oriented to person, place, and time.   Skin: Skin is warm and dry. No rash noted. No erythema.   Psychiatric: He has a normal mood and affect. His behavior is normal. Judgment and thought content normal.   Vitals reviewed.      Lab Results   Component Value Date    WBC 4.97 05/21/2020    HGB 12.3 (L) 05/21/2020    HCT 41.3 05/21/2020     (H) 05/21/2020     (L) 05/21/2020         Chemistry        Component Value Date/Time     05/21/2020 1106    K 4.8 05/21/2020 1106     05/21/2020 1106    CO2 26 05/21/2020 1106    BUN 30 (H) 05/21/2020 1106    CREATININE 1.3 05/21/2020 1106    GLU 57 (L) 05/21/2020 1106        Component Value Date/Time    CALCIUM 8.4 (L) 05/21/2020 1106    ALKPHOS 65 01/15/2020 0953    AST 18 01/15/2020 0953    ALT 14 01/15/2020 0953    BILITOT 1.0 01/15/2020 0953    ESTGFRAFRICA >60.0 05/21/2020 1106    EGFRNONAA 52.3 (A) 05/21/2020 1106            Lab Results   Component Value Date    CHOL 80  (L) 01/15/2020    CHOL 95 (L) 04/10/2019    CHOL 137 02/20/2019     Lab Results   Component Value Date    HDL 34 (L) 01/15/2020    HDL 28 (L) 04/10/2019    HDL 26 (L) 02/20/2019     Lab Results   Component Value Date    LDLCALC 39.2 (L) 01/15/2020    LDLCALC 57.0 (L) 04/10/2019    LDLCALC 93.6 02/20/2019     Lab Results   Component Value Date    TRIG 34 01/15/2020    TRIG 50 04/10/2019    TRIG 87 02/20/2019     Lab Results   Component Value Date    CHOLHDL 42.5 01/15/2020    CHOLHDL 29.5 04/10/2019    CHOLHDL 19.0 (L) 02/20/2019       Lab Results   Component Value Date    TSH 4.173 (H) 01/15/2020       Lab Results   Component Value Date    HGBA1C 5.7 (H) 05/21/2020         Assessment:     1. Heart failure, systolic, chronic    2. Chronic systolic heart failure    3. Bilateral leg edema    4. Coronary artery disease involving native coronary artery of native heart without angina pectoris    5. Essential hypertension    6. Atrial fibrillation, unspecified type    7. Hypercholesterolemia    8. Chronic anticoagulation    9. History of coronary artery stent placement    10. History of myocardial infarction    11. Stage 3 chronic kidney disease    12. Type 2 diabetes mellitus with other neurologic complication, with long-term current use of insulin        Plan:     Increase Lasix to 40 bid.  He still has 10-15# of fluid, most likely, to give.    Switch losartan 50 to Entresto 49/51.    Amiodarone monitoring: needs eye exam and PFTs    BMP in 3 weeks.    F/U 6 weeks.

## 2020-07-09 ENCOUNTER — OFFICE VISIT (OUTPATIENT)
Dept: PODIATRY | Facility: CLINIC | Age: 80
End: 2020-07-09
Payer: MEDICARE

## 2020-07-09 VITALS
WEIGHT: 187 LBS | DIASTOLIC BLOOD PRESSURE: 54 MMHG | HEART RATE: 51 BPM | BODY MASS INDEX: 26.18 KG/M2 | SYSTOLIC BLOOD PRESSURE: 91 MMHG | HEIGHT: 71 IN

## 2020-07-09 DIAGNOSIS — Z87.898 HISTORY OF ULCERATION: ICD-10-CM

## 2020-07-09 DIAGNOSIS — Z79.4 TYPE 2 DIABETES MELLITUS WITH DIABETIC NEUROPATHY, WITH LONG-TERM CURRENT USE OF INSULIN: Primary | ICD-10-CM

## 2020-07-09 DIAGNOSIS — E11.40 TYPE 2 DIABETES MELLITUS WITH DIABETIC NEUROPATHY, WITH LONG-TERM CURRENT USE OF INSULIN: Primary | ICD-10-CM

## 2020-07-09 PROCEDURE — 1101F PT FALLS ASSESS-DOCD LE1/YR: CPT | Mod: HCNC,CPTII,S$GLB, | Performed by: PODIATRIST

## 2020-07-09 PROCEDURE — 1126F PR PAIN SEVERITY QUANTIFIED, NO PAIN PRESENT: ICD-10-PCS | Mod: HCNC,S$GLB,, | Performed by: PODIATRIST

## 2020-07-09 PROCEDURE — 99212 OFFICE O/P EST SF 10 MIN: CPT | Mod: HCNC,S$GLB,, | Performed by: PODIATRIST

## 2020-07-09 PROCEDURE — 99999 PR PBB SHADOW E&M-EST. PATIENT-LVL IV: ICD-10-PCS | Mod: PBBFAC,HCNC,, | Performed by: PODIATRIST

## 2020-07-09 PROCEDURE — 99999 PR PBB SHADOW E&M-EST. PATIENT-LVL IV: CPT | Mod: PBBFAC,HCNC,, | Performed by: PODIATRIST

## 2020-07-09 PROCEDURE — 1126F AMNT PAIN NOTED NONE PRSNT: CPT | Mod: HCNC,S$GLB,, | Performed by: PODIATRIST

## 2020-07-09 PROCEDURE — 3078F PR MOST RECENT DIASTOLIC BLOOD PRESSURE < 80 MM HG: ICD-10-PCS | Mod: HCNC,CPTII,S$GLB, | Performed by: PODIATRIST

## 2020-07-09 PROCEDURE — 3078F DIAST BP <80 MM HG: CPT | Mod: HCNC,CPTII,S$GLB, | Performed by: PODIATRIST

## 2020-07-09 PROCEDURE — 1159F PR MEDICATION LIST DOCUMENTED IN MEDICAL RECORD: ICD-10-PCS | Mod: HCNC,S$GLB,, | Performed by: PODIATRIST

## 2020-07-09 PROCEDURE — 3074F SYST BP LT 130 MM HG: CPT | Mod: HCNC,CPTII,S$GLB, | Performed by: PODIATRIST

## 2020-07-09 PROCEDURE — 1159F MED LIST DOCD IN RCRD: CPT | Mod: HCNC,S$GLB,, | Performed by: PODIATRIST

## 2020-07-09 PROCEDURE — 3074F PR MOST RECENT SYSTOLIC BLOOD PRESSURE < 130 MM HG: ICD-10-PCS | Mod: HCNC,CPTII,S$GLB, | Performed by: PODIATRIST

## 2020-07-09 PROCEDURE — 99212 PR OFFICE/OUTPT VISIT, EST, LEVL II, 10-19 MIN: ICD-10-PCS | Mod: HCNC,S$GLB,, | Performed by: PODIATRIST

## 2020-07-09 PROCEDURE — 1101F PR PT FALLS ASSESS DOC 0-1 FALLS W/OUT INJ PAST YR: ICD-10-PCS | Mod: HCNC,CPTII,S$GLB, | Performed by: PODIATRIST

## 2020-07-09 NOTE — PROGRESS NOTES
Subjective:      Patient ID: Randy Camejo is a 79 y.o. male.    Chief Complaint: Foot Ulcer (Bilateral) and Wound Care    Diabetic foot wound right foot postop dehiscence of incision 6 months ago. Weekly wound care is stabilizing and improving the wound gradually over time.  Patient relates no signs of infection.  Patient is ambulating minimally in surgical shoe right, casual shoe left.  Dressing from last visit is clean dry and intact right and left foot.    Review of Systems   Constitution: Negative for chills, diaphoresis, fever, malaise/fatigue and night sweats.   Cardiovascular: Negative for claudication, cyanosis, leg swelling and syncope.   Skin: Positive for poor wound healing and unusual hair distribution. Negative for color change, dry skin, nail changes, rash and suspicious lesions.   Musculoskeletal: Negative for falls, joint pain, joint swelling, muscle cramps, muscle weakness and stiffness.   Gastrointestinal: Negative for constipation, diarrhea, nausea and vomiting.   Neurological: Positive for numbness, paresthesias and sensory change. Negative for tremors.           Objective:      Physical Exam  Constitutional:       General: He is not in acute distress.     Appearance: He is well-developed. He is not diaphoretic.   Cardiovascular:      Pulses:           Dorsalis pedis pulses are 1+ on the right side and 1+ on the left side.        Posterior tibial pulses are 1+ on the right side and 1+ on the left side.      Comments: Toes warm, pink, cap fill <5 seconds.  Musculoskeletal:      Comments: Partial 5th ray resection right foot without symptoms.   Lymphadenopathy:      Comments: Negative lymphadenopathy bilateral popliteal fossa and tarsal tunnel.     Skin:     General: Skin is warm and dry.      Coloration: Skin is not pale.      Findings: No abrasion, bruising, burn, ecchymosis, erythema, laceration, lesion or rash.      Comments: Wound dorsal lateral proximal right 5th ray and dorsal lateral  left 5th toe base both closed today, epithelialized  without ulceration, drainage, pus, tracking, fluctuance, malodor, or cardinal signs infection.    Skin thin, shiny, atrophic, with decreased density and distribution of pedal hair bilateral, but without hyperpigmentation, maggie discoloration,  ulcers, masses, nodules or cords palpated bilateral feet and legs.     Neurological:      Sensory: Sensory deficit present.      Comments: Diminished/loss of protective sensation all toes bilateral to 10 gram monofilament.       Psychiatric:         Behavior: Behavior is cooperative.               Assessment:       Encounter Diagnoses   Name Primary?    Type 2 diabetes mellitus with diabetic neuropathy, with long-term current use of insulin Yes    History of ulceration          Plan:       Randy was seen today for foot ulcer and wound care.    Diagnoses and all orders for this visit:    Type 2 diabetes mellitus with diabetic neuropathy, with long-term current use of insulin    History of ulceration      I counseled the patient on his conditions, their implications and medical management.    Dressed wound sites right and left foot with wound foam wound net.    Continue minimal ambulation in surgical shoe right and left.    Fill Rx diabetic shoes with custom inserts and filler right foot as needed - implement on wound closure.    Adequate vitamin supplementation, protein intake, and hydration - discussed with patient.    Inspect feet multiple times daily for signs of occurrence/recurrence ulceration.           Follow up for closed wounds.

## 2020-07-10 ENCOUNTER — TELEPHONE (OUTPATIENT)
Dept: INTERNAL MEDICINE | Facility: CLINIC | Age: 80
End: 2020-07-10

## 2020-07-10 NOTE — TELEPHONE ENCOUNTER
I finally spoke to the patient and he stated that he tripped. The podiatrist placed him in two boots.  Home health has been coming to the house and checkinng on him.

## 2020-07-10 NOTE — TELEPHONE ENCOUNTER
----- Message from Josephine Childs sent at 7/10/2020  3:25 PM CDT -----  Contact: Pt-- [phone]  Type:  Needs Medical Advice    Who Called:  Pt    Symptoms (please be specific): diarrhea    Would the patient rather a call back or a response via [email or web address]? Call    Best Call Back Number:  [phone]    Additional Information:  Pt called to speak with nurse regarding his symptoms. He is requesting a call back for advice.

## 2020-07-10 NOTE — TELEPHONE ENCOUNTER
Mr. Camejo said that for the past 2 days his stool has been soft.  He is not eating much, only crackers. He is going to the bathroom maybe 12- 14 hrs. Denies abdominal pains. The patient encouraged to eat bland foods and still well hydrated.  Please advise if he should be taking anything?

## 2020-07-14 ENCOUNTER — OFFICE VISIT (OUTPATIENT)
Dept: PODIATRY | Facility: CLINIC | Age: 80
End: 2020-07-14
Payer: MEDICARE

## 2020-07-14 VITALS
SYSTOLIC BLOOD PRESSURE: 91 MMHG | WEIGHT: 187.38 LBS | DIASTOLIC BLOOD PRESSURE: 54 MMHG | HEIGHT: 71 IN | BODY MASS INDEX: 26.23 KG/M2 | HEART RATE: 53 BPM

## 2020-07-14 DIAGNOSIS — Z79.4 TYPE 2 DIABETES MELLITUS WITH DIABETIC NEUROPATHY, WITH LONG-TERM CURRENT USE OF INSULIN: Primary | ICD-10-CM

## 2020-07-14 DIAGNOSIS — L97.512 SKIN ULCER OF RIGHT FOOT WITH FAT LAYER EXPOSED: ICD-10-CM

## 2020-07-14 DIAGNOSIS — E11.40 TYPE 2 DIABETES MELLITUS WITH DIABETIC NEUROPATHY, WITH LONG-TERM CURRENT USE OF INSULIN: Primary | ICD-10-CM

## 2020-07-14 PROCEDURE — 99499 NO LOS: ICD-10-PCS | Mod: HCNC,S$GLB,, | Performed by: PODIATRIST

## 2020-07-14 PROCEDURE — 11042 WOUND DEBRIDEMENT: ICD-10-PCS | Mod: HCNC,S$GLB,, | Performed by: PODIATRIST

## 2020-07-14 PROCEDURE — 11042 DBRDMT SUBQ TIS 1ST 20SQCM/<: CPT | Mod: HCNC,S$GLB,, | Performed by: PODIATRIST

## 2020-07-14 PROCEDURE — 99999 PR PBB SHADOW E&M-EST. PATIENT-LVL IV: ICD-10-PCS | Mod: PBBFAC,HCNC,, | Performed by: PODIATRIST

## 2020-07-14 PROCEDURE — 99999 PR PBB SHADOW E&M-EST. PATIENT-LVL IV: CPT | Mod: PBBFAC,HCNC,, | Performed by: PODIATRIST

## 2020-07-14 PROCEDURE — 99499 UNLISTED E&M SERVICE: CPT | Mod: HCNC,S$GLB,, | Performed by: PODIATRIST

## 2020-07-14 NOTE — TELEPHONE ENCOUNTER
I spoke to the patient and the diarrhea stopped 2 days ago. He has been able to tolerate solid foods since then.

## 2020-07-14 NOTE — PROGRESS NOTES
Subjective:      Patient ID: Randy Camejo is a 79 y.o. male.    Chief Complaint: Foot Ulcer (bilateral foot wound)    Diabetic foot wound right foot postop dehiscence of incision 6 months ago. Weekly wound care is stabilizing and improving the wound gradually over time.  Patient relates no signs of infection.  Patient is ambulating minimally in surgical shoe right, casual shoe left.  Dressing from last visit is clean dry and intact right and left foot.    Patient Active Problem List   Diagnosis    Nuclear sclerosis, bilateral    Nuclear sclerotic cataract of left eye    Atrial fibrillation    Type 2 diabetes mellitus with neurologic complication, with long-term current use of insulin    Heart failure, systolic, chronic    Pre-ulcerative corn or callous    Peripheral vascular disease    Tachycardia induced cardiomyopathy    Coronary artery disease    Chronic anticoagulation    History of myocardial infarction    History of coronary artery stent placement    Essential hypertension    Hypercholesterolemia    High risk medication use    Subclinical hypothyroidism    Stage 3 chronic kidney disease    Skin ulcer of toe with fat layer exposed    Diabetic ulcer of right midfoot associated with type 2 diabetes mellitus, with fat layer exposed         Current Outpatient Medications on File Prior to Visit   Medication Sig Dispense Refill    acetaminophen (TYLENOL) 325 MG tablet Take 2 tablets (650 mg total) by mouth every 4 (four) hours as needed.  0    alcohol swabs PadM Apply 1 each topically 2 (two) times a day. E11.49 200 each 5    amiodarone (PACERONE) 200 MG Tab TAKE 1 TABLET(200 MG) BY MOUTH EVERY DAY 90 tablet 3    atorvastatin (LIPITOR) 10 MG tablet Take 1 tablet (10 mg total) by mouth once daily. 90 tablet 3    blood glucose control, low Soln Use 1 each to check glucose level of glucometer weekly 4 each 11    blood sugar diagnostic (TRUE METRIX GLUCOSE TEST STRIP) Strp 1 strip by  "Misc.(Non-Drug; Combo Route) route 2 (two) times a day. To check blood sugar with True Metrix Air Glucose meter. ICD10: E11.49 200 strip 5    blood-glucose meter (TRUE METRIX AIR GLUCOSE METER) Misc 1 Device by Misc.(Non-Drug; Combo Route) route 2 (two) times a day. To check blood sugar. ICD10: E11.49 1 each 0    carvediloL (COREG) 6.25 MG tablet TAKE 1 TABLET(6.25 MG) BY MOUTH TWICE DAILY 180 tablet 3    ELIQUIS 5 mg Tab TAKE 1 TABLET(5 MG) BY MOUTH TWICE DAILY 180 tablet 3    famotidine (PEPCID) 20 MG tablet Take 1 tablet (20 mg total) by mouth once daily. 30 tablet 5    furosemide (LASIX) 40 MG tablet Take 1 tablet (40 mg total) by mouth once daily. 60 tablet 11    insulin detemir U-100 (LEVEMIR FLEXTOUCH U-100 INSULN) 100 unit/mL (3 mL) SubQ InPn pen Inject 10 Units into the skin every evening. If Blood Glucose is less than 100 mg/dL at BEDTIME, give 16 grams (four glucose tablets) X 1 dose for patients who can take PO. Recheck BG in 30 minutes and call MD for insulin dose adjustments prior to administering insulin detemir (Levemir). 15 mL 3    lancets (TRUEPLUS LANCETS) 33 gauge Misc To check blood sugar with True Metrix Air Glucose meter. ICD10: E11.49 200 each 5    pen needle, diabetic 31 gauge x 5/16" Ndle 1 Device by Misc.(Non-Drug; Combo Route) route 4 (four) times daily. Use with insulin 200 each 11    sacubitriL-valsartan (ENTRESTO) 49-51 mg per tablet Take 1 tablet by mouth 2 (two) times daily. 60 tablet 11    levothyroxine (SYNTHROID) 50 MCG tablet Take 1 tablet (50 mcg total) by mouth once daily. 90 tablet 3     No current facility-administered medications on file prior to visit.            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   Social Needs    Financial resource strain: Not on file    Food insecurity     Worry: Not on file     Inability: Not on file    Transportation needs     Medical: Not on file     Non-medical: Not on file   Tobacco Use    Smoking status: Never Smoker    Smokeless tobacco: Never Used   Substance and Sexual Activity    Alcohol use: No    Drug use: No    Sexual activity: Not Currently   Lifestyle    Physical activity     Days per week: Not on file     Minutes per session: Not on file    Stress: Not on file   Relationships    Social connections     Talks on phone: Not on file     Gets together: Not on file     Attends Scientologist service: Not on file     Active member of club or organization: Not on file     Attends meetings of clubs or organizations: Not on file     Relationship status: Not on file   Other Topics Concern    Not on file   Social History Narrative    Not on file           Review of Systems   Constitution: Negative for chills, diaphoresis, fever, malaise/fatigue and night sweats.   Cardiovascular: Negative for claudication, cyanosis, leg swelling and syncope.   Skin: Positive for poor wound healing and unusual hair distribution. Negative for color change, dry skin, nail changes, rash and suspicious lesions.   Musculoskeletal: Negative for falls, joint pain, joint swelling, muscle cramps, muscle weakness and stiffness.   Gastrointestinal: Negative for constipation, diarrhea, nausea and vomiting.   Neurological: Positive for numbness, paresthesias and sensory change. Negative for tremors.           Objective:        Physical Exam  Constitutional:       General: He is not in acute distress.     Appearance: He is well-developed. He is not diaphoretic.   Cardiovascular:      Pulses:           Dorsalis pedis pulses are 1+ on the right side and 1+ on the left side.        Posterior tibial pulses are 1+ on the right side and 1+ on the left side.       Comments: Toes warm, pink, cap fill <5 seconds.  Musculoskeletal:      Comments: Partial 5th ray resection right foot without symptoms.   Lymphadenopathy:      Comments: Negative lymphadenopathy bilateral popliteal fossa and tarsal tunnel.     Skin:     General: Skin is warm and dry.      Coloration: Skin is not pale.      Findings: No abrasion, bruising, burn, ecchymosis, erythema, laceration, lesion or rash.      Comments: Location:  Wound dorsal lateral proximal right 5th ray  Pre debridement:  Callus/scab.  Post debridement:  0.1 cm x 0.2 cm x 0.1 cm  Tiny scant amount serous drainage.  No cellulitis no ascending redness.  No tenderness to palpation.  No necrosis or eschar noted.    There is also 2 cm x 1.8 cm superficial skin wound on the posterior distal left leg.  It is limited to superficial skin breakdown.  No tenderness to palpation.  No drainage.      Skin thin, shiny, atrophic, with decreased density and distribution of pedal hair bilateral, but without hyperpigmentation, maggie discoloration,  ulcers, masses, nodules or cords palpated bilateral feet and legs.         Neurological:      Sensory: Sensory deficit present.      Comments: Diminished/loss of protective sensation all toes bilateral to 10 gram monofilament.       Psychiatric:         Behavior: Behavior is cooperative.               Assessment:       Encounter Diagnoses   Name Primary?    Type 2 diabetes mellitus with diabetic neuropathy, with long-term current use of insulin Yes    Skin ulcer of right foot with fat layer exposed          Plan:       Randy was seen today for foot ulcer.    Diagnoses and all orders for this visit:    Type 2 diabetes mellitus with diabetic neuropathy, with long-term current use of insulin    Skin ulcer of right foot with fat layer exposed      I counseled the patient on his conditions, their implications and medical management.    Dressed wound sites right and left foot with Meghan.  Padded bandages to  "wounds.    Continue home health wound care.      Fill Rx diabetic shoes with custom inserts and filler right foot as needed - upon wound closure.    Adequate vitamin supplementation, protein intake, and hydration - discussed with patient.    Inspect feet multiple times daily for signs of occurrence/recurrence ulceration.           Follow-up in 2-3 weeks.      Wound Debridement    Date/Time: 7/14/2020 2:00 PM  Performed by: Jess Rosa DPM  Authorized by: Jess Rosa DPM     Time out: Immediately prior to procedure a "time out" was called to verify the correct patient, procedure, equipment, support staff and site/side marked as required.    Consent Done?:  Yes (Verbal)    Preparation: Patient was prepped and draped in usual sterile fashion    Local anesthesia used?: No      Wound Details:    Location:  Right foot    Location:  Right Midfoot    Type of Debridement:  Excisional       Length (cm):  0.1       Area (sq cm):  0.02       Width (cm):  0.2       Percent Debrided (%):  100       Depth (cm):  0.1       Total Area Debrided (sq cm):  0.02    Depth of debridement:  Subcutaneous tissue    Tissue debrided:  Subcutaneous, Epidermis and Dermis    Devitalized tissue debrided:  Slough, Fibrin, Callus and Biofilm    Instruments:  Curette    Bleeding:  Minimal  Hemostasis Achieved: Yes    Method Used:  Pressure  Patient tolerance:  Patient tolerated the procedure well with no immediate complications        "

## 2020-07-17 ENCOUNTER — EXTERNAL HOME HEALTH (OUTPATIENT)
Dept: HOME HEALTH SERVICES | Facility: HOSPITAL | Age: 80
End: 2020-07-17
Payer: MEDICARE

## 2020-07-17 NOTE — PROGRESS NOTES
60 Day Recert Order # [id number]  New Cert Period: 06/30/2020 to 08/28/2020 with [facility] - Dr. Jess Rosa

## 2020-07-27 ENCOUNTER — DOCUMENT SCAN (OUTPATIENT)
Dept: HOME HEALTH SERVICES | Facility: HOSPITAL | Age: 80
End: 2020-07-27
Payer: MEDICARE

## 2020-07-27 ENCOUNTER — PATIENT OUTREACH (OUTPATIENT)
Dept: ADMINISTRATIVE | Facility: OTHER | Age: 80
End: 2020-07-27

## 2020-07-28 ENCOUNTER — OFFICE VISIT (OUTPATIENT)
Dept: PODIATRY | Facility: CLINIC | Age: 80
End: 2020-07-28
Payer: MEDICARE

## 2020-07-28 VITALS
HEIGHT: 71 IN | BODY MASS INDEX: 25.93 KG/M2 | WEIGHT: 185.19 LBS | HEART RATE: 50 BPM | SYSTOLIC BLOOD PRESSURE: 107 MMHG | DIASTOLIC BLOOD PRESSURE: 59 MMHG

## 2020-07-28 DIAGNOSIS — Z79.4 TYPE 2 DIABETES MELLITUS WITH DIABETIC NEUROPATHY, WITH LONG-TERM CURRENT USE OF INSULIN: Primary | ICD-10-CM

## 2020-07-28 DIAGNOSIS — L97.521 ULCER OF LEFT FOOT, LIMITED TO BREAKDOWN OF SKIN: ICD-10-CM

## 2020-07-28 DIAGNOSIS — Z89.421 HISTORY OF PARTIAL RAY AMPUTATION OF FIFTH TOE OF RIGHT FOOT: ICD-10-CM

## 2020-07-28 DIAGNOSIS — E11.40 TYPE 2 DIABETES MELLITUS WITH DIABETIC NEUROPATHY, WITH LONG-TERM CURRENT USE OF INSULIN: Primary | ICD-10-CM

## 2020-07-28 PROCEDURE — 99499 NO LOS: ICD-10-PCS | Mod: HCNC,S$GLB,, | Performed by: PODIATRIST

## 2020-07-28 PROCEDURE — 99499 UNLISTED E&M SERVICE: CPT | Mod: HCNC,S$GLB,, | Performed by: PODIATRIST

## 2020-07-28 PROCEDURE — 97597 DBRDMT OPN WND 1ST 20 CM/<: CPT | Mod: HCNC,S$GLB,, | Performed by: PODIATRIST

## 2020-07-28 PROCEDURE — 99999 PR PBB SHADOW E&M-EST. PATIENT-LVL IV: ICD-10-PCS | Mod: PBBFAC,HCNC,, | Performed by: PODIATRIST

## 2020-07-28 PROCEDURE — 99999 PR PBB SHADOW E&M-EST. PATIENT-LVL IV: CPT | Mod: PBBFAC,HCNC,, | Performed by: PODIATRIST

## 2020-07-28 PROCEDURE — 97597 WOUND DEBRIDEMENT: ICD-10-PCS | Mod: HCNC,S$GLB,, | Performed by: PODIATRIST

## 2020-07-28 RX ORDER — GABAPENTIN 300 MG/1
300 CAPSULE ORAL NIGHTLY
Qty: 30 CAPSULE | Refills: 11 | Status: ON HOLD | OUTPATIENT
Start: 2020-07-28 | End: 2021-01-06 | Stop reason: SDUPTHER

## 2020-07-28 NOTE — PROGRESS NOTES
Chief Complaint: Foot Ulcer and Diabetes Mellitus (Susie Lazo 5/25/20. a1c 5.7 5/21/2020)      HPI:  Diabetic foot wound right foot postop dehiscence of incision over six months ago. Weekly wound care is stabilizing and improving the wound gradually over time.  Patient relates no signs of infection.  Patient is ambulating minimally in surgical shoe right, casual shoe left.  He is receiving Home health wound care.           Patient Active Problem List   Diagnosis    Nuclear sclerosis, bilateral    Nuclear sclerotic cataract of left eye    Atrial fibrillation    Type 2 diabetes mellitus with neurologic complication, with long-term current use of insulin    Heart failure, systolic, chronic    Pre-ulcerative corn or callous    Peripheral vascular disease    Tachycardia induced cardiomyopathy    Coronary artery disease    Chronic anticoagulation    History of myocardial infarction    History of coronary artery stent placement    Essential hypertension    Hypercholesterolemia    High risk medication use    Subclinical hypothyroidism    Stage 3 chronic kidney disease    Skin ulcer of toe with fat layer exposed    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  0    alcohol swabs PadM Apply 1 each topically 2 (two) times a day. E11.49 200 each 5    amiodarone (PACERONE) 200 MG Tab TAKE 1 TABLET(200 MG) BY MOUTH EVERY DAY 90 tablet 3    atorvastatin (LIPITOR) 10 MG tablet Take 1 tablet (10 mg total) by mouth once daily. 90 tablet 3    blood glucose control, low Soln Use 1 each to check glucose level of glucometer weekly 4 each 11    blood sugar diagnostic (TRUE METRIX GLUCOSE TEST STRIP) Strp 1 strip by Misc.(Non-Drug; Combo Route) route 2 (two) times a day. To check blood  "sugar with True Metrix Air Glucose meter. ICD10: E11.49 200 strip 5    blood-glucose meter (TRUE METRIX AIR GLUCOSE METER) Misc 1 Device by Misc.(Non-Drug; Combo Route) route 2 (two) times a day. To check blood sugar. ICD10: E11.49 1 each 0    carvediloL (COREG) 6.25 MG tablet TAKE 1 TABLET(6.25 MG) BY MOUTH TWICE DAILY 180 tablet 3    ELIQUIS 5 mg Tab TAKE 1 TABLET(5 MG) BY MOUTH TWICE DAILY 180 tablet 3    famotidine (PEPCID) 20 MG tablet Take 1 tablet (20 mg total) by mouth once daily. 30 tablet 5    furosemide (LASIX) 40 MG tablet Take 1 tablet (40 mg total) by mouth once daily. 60 tablet 11    insulin detemir U-100 (LEVEMIR FLEXTOUCH U-100 INSULN) 100 unit/mL (3 mL) SubQ InPn pen Inject 10 Units into the skin every evening. If Blood Glucose is less than 100 mg/dL at BEDTIME, give 16 grams (four glucose tablets) X 1 dose for patients who can take PO. Recheck BG in 30 minutes and call MD for insulin dose adjustments prior to administering insulin detemir (Levemir). 15 mL 3    lancets (TRUEPLUS LANCETS) 33 gauge Misc To check blood sugar with True Metrix Air Glucose meter. ICD10: E11.49 200 each 5    levothyroxine (SYNTHROID) 50 MCG tablet TAKE 1 TABLET(50 MCG) BY MOUTH EVERY DAY 90 tablet 3    pen needle, diabetic 31 gauge x 5/16" Ndle 1 Device by Misc.(Non-Drug; Combo Route) route 4 (four) times daily. Use with insulin 200 each 11    sacubitriL-valsartan (ENTRESTO) 49-51 mg per tablet Take 1 tablet by mouth 2 (two) times daily. 60 tablet 11     No current facility-administered medications on file prior to visit.            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   Social Needs    Financial " "resource strain: Not on file    Food insecurity     Worry: Not on file     Inability: Not on file    Transportation needs     Medical: Not on file     Non-medical: Not on file   Tobacco Use    Smoking status: Never Smoker    Smokeless tobacco: Never Used   Substance and Sexual Activity    Alcohol use: No    Drug use: No    Sexual activity: Not Currently   Lifestyle    Physical activity     Days per week: Not on file     Minutes per session: Not on file    Stress: Not on file   Relationships    Social connections     Talks on phone: Not on file     Gets together: Not on file     Attends Congregational service: Not on file     Active member of club or organization: Not on file     Attends meetings of clubs or organizations: Not on file     Relationship status: Not on file   Other Topics Concern    Not on file   Social History Narrative    Not on file           Review of Systems   Constitution: Negative for chills, diaphoresis, fever, malaise/fatigue and night sweats.   Cardiovascular: Negative for claudication, cyanosis, leg swelling and syncope.   Skin: Positive for poor wound healing and unusual hair distribution. Negative for color change, dry skin, nail changes, rash and suspicious lesions.   Musculoskeletal: Negative for falls, joint pain, joint swelling, muscle cramps, muscle weakness and stiffness.   Gastrointestinal: Negative for constipation, diarrhea, nausea and vomiting.   Neurological: Positive for numbness, paresthesias and sensory change. Negative for tremors.               Objective:      Vitals:    07/28/20 1348   BP: (!) 107/59   BP Location: Left arm   Patient Position: Sitting   BP Method: Medium (Automatic)   Pulse: (!) 50   Weight: 84 kg (185 lb 3 oz)   Height: 5' 11" (1.803 m)         Physical Exam  Constitutional:       General: He is not in acute distress.     Appearance: He is well-developed. He is not diaphoretic.   Cardiovascular:      Pulses:           Dorsalis pedis pulses are 1+ on " the right side and 1+ on the left side.        Posterior tibial pulses are 1+ on the right side and 1+ on the left side.      Comments: Toes warm, pink, cap fill <5 seconds.  Musculoskeletal:      Comments: Partial 5th ray resection right foot without symptoms.   Lymphadenopathy:      Comments: Negative lymphadenopathy bilateral popliteal fossa and tarsal tunnel.     Skin:     General: Skin is warm and dry.      Coloration: Skin is not pale.      Findings: No abrasion, bruising, burn, ecchymosis, erythema, laceration, lesion or rash.      Comments: Location:  Wound dorsal lateral proximal right 5th ray  Pre debridement:  Callus/scab.  Post debridement:  0.1 cm x 0.2 cm x 0.1 cm  Tiny scant amount serous drainage.  No cellulitis no ascending redness.  No tenderness to palpation.  No necrosis or eschar noted.       Skin thin, shiny, atrophic, with decreased density and distribution of pedal hair bilateral, but without hyperpigmentation, maggie discoloration,  ulcers, masses, nodules or cords palpated bilateral feet and legs.         Neurological:      Sensory: Sensory deficit present.      Comments: Diminished/loss of protective sensation all toes bilateral to 10 gram monofilament.       Psychiatric:         Behavior: Behavior is cooperative.               Assessment:       Encounter Diagnoses   Name Primary?   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    Diagnoses and all orders for this visit:    Type 2 diabetes mellitus with diabetic neuropathy, with long-term current use of insulin  -     gabapentin (NEURONTIN) 300 MG capsule; Take 1 capsule (300 mg total) by mouth every evening.    Ulcer of left foot, limited to breakdown of skin    History of partial ray  "amputation of fifth toe of right foot        I counseled the patient on his conditions, their implications and medical management.    Dressed wound sites right and left foot with Meghan.  Padded bandages to wounds.    Continue home health wound care.      Fill Rx diabetic shoes with custom inserts and filler right foot as needed - upon wound closure.    Adequate vitamin supplementation, protein intake, and hydration - discussed with patient.    Inspect feet multiple times daily for signs of occurrence/recurrence ulceration.           Follow-up in 2-3 weeks.      Wound Debridement    Date/Time: 7/28/2020 2:00 PM  Performed by: Jess Rosa DPM  Authorized by: Jess Rosa DPM     Time out: Immediately prior to procedure a "time out" was called to verify the correct patient, procedure, equipment, support staff and site/side marked as required.    Consent Done?:  Yes (Verbal)    Preparation: Patient was prepped and draped in usual sterile fashion    Local anesthesia used?: No      Wound Details:    Location:  Right foot    Location:  Right Midfoot       Length (cm):  0.1       Area (sq cm):  0.02       Width (cm):  0.2       Percent Debrided (%):  100       Depth (cm):  0.1       Total Area Debrided (sq cm):  0.02    Depth of debridement:  Epidermis/Dermis    Tissue debrided:  Epidermis and Dermis    Devitalized tissue debrided:  Slough, Fibrin, Callus and Biofilm    Instruments:  Curette    Bleeding:  Minimal  Hemostasis Achieved: Yes    Method Used:  Pressure  Patient tolerance:  Patient tolerated the procedure well with no immediate complications          "

## 2020-07-29 ENCOUNTER — LAB VISIT (OUTPATIENT)
Dept: LAB | Facility: HOSPITAL | Age: 80
End: 2020-07-29
Attending: INTERNAL MEDICINE
Payer: MEDICARE

## 2020-07-29 DIAGNOSIS — I50.22 CHRONIC SYSTOLIC HEART FAILURE: ICD-10-CM

## 2020-07-29 LAB
ANION GAP SERPL CALC-SCNC: 6 MMOL/L (ref 8–16)
BUN SERPL-MCNC: 30 MG/DL (ref 8–23)
CALCIUM SERPL-MCNC: 8.2 MG/DL (ref 8.7–10.5)
CHLORIDE SERPL-SCNC: 106 MMOL/L (ref 95–110)
CO2 SERPL-SCNC: 31 MMOL/L (ref 23–29)
CREAT SERPL-MCNC: 1.5 MG/DL (ref 0.5–1.4)
EST. GFR  (AFRICAN AMERICAN): 50.5 ML/MIN/1.73 M^2
EST. GFR  (NON AFRICAN AMERICAN): 43.7 ML/MIN/1.73 M^2
GLUCOSE SERPL-MCNC: 91 MG/DL (ref 70–110)
POTASSIUM SERPL-SCNC: 4.4 MMOL/L (ref 3.5–5.1)
SODIUM SERPL-SCNC: 143 MMOL/L (ref 136–145)

## 2020-07-29 PROCEDURE — 36415 COLL VENOUS BLD VENIPUNCTURE: CPT | Mod: HCNC,PN

## 2020-07-29 PROCEDURE — 80048 BASIC METABOLIC PNL TOTAL CA: CPT | Mod: HCNC

## 2020-07-30 PROBLEM — Z89.421 HISTORY OF PARTIAL RAY AMPUTATION OF FIFTH TOE OF RIGHT FOOT: Status: ACTIVE | Noted: 2020-07-30

## 2020-07-30 PROBLEM — L97.412 DIABETIC ULCER OF RIGHT MIDFOOT ASSOCIATED WITH TYPE 2 DIABETES MELLITUS, WITH FAT LAYER EXPOSED: Status: RESOLVED | Noted: 2019-11-15 | Resolved: 2020-07-30

## 2020-07-30 PROBLEM — E11.621 DIABETIC ULCER OF RIGHT MIDFOOT ASSOCIATED WITH TYPE 2 DIABETES MELLITUS, WITH FAT LAYER EXPOSED: Status: RESOLVED | Noted: 2019-11-15 | Resolved: 2020-07-30

## 2020-07-31 ENCOUNTER — TELEPHONE (OUTPATIENT)
Dept: PODIATRY | Facility: CLINIC | Age: 80
End: 2020-07-31

## 2020-07-31 NOTE — TELEPHONE ENCOUNTER
----- Message from Aga Travis sent at 7/31/2020  2:41 PM CDT -----  Contact: karla- [facility]  Karla is asking for a call back in regards to a verbal order for the pt for wound care      Contact info- [phone]

## 2020-08-03 ENCOUNTER — DOCUMENT SCAN (OUTPATIENT)
Dept: HOME HEALTH SERVICES | Facility: HOSPITAL | Age: 80
End: 2020-08-03
Payer: MEDICARE

## 2020-08-14 ENCOUNTER — TELEPHONE (OUTPATIENT)
Dept: PODIATRY | Facility: CLINIC | Age: 80
End: 2020-08-14

## 2020-08-14 NOTE — TELEPHONE ENCOUNTER
----- Message from Oscar Abdi sent at 8/14/2020  1:15 PM CDT -----  Contact: [facility]    Pt has 2 new wounds need wound care orders for pt     Contact [phone]

## 2020-08-15 ENCOUNTER — PATIENT OUTREACH (OUTPATIENT)
Dept: ADMINISTRATIVE | Facility: OTHER | Age: 80
End: 2020-08-15

## 2020-08-17 ENCOUNTER — OFFICE VISIT (OUTPATIENT)
Dept: PODIATRY | Facility: CLINIC | Age: 80
End: 2020-08-17
Payer: MEDICARE

## 2020-08-17 VITALS
WEIGHT: 181.88 LBS | DIASTOLIC BLOOD PRESSURE: 55 MMHG | BODY MASS INDEX: 25.46 KG/M2 | HEIGHT: 71 IN | SYSTOLIC BLOOD PRESSURE: 112 MMHG | HEART RATE: 50 BPM

## 2020-08-17 DIAGNOSIS — L97.512 SKIN ULCER OF RIGHT FOOT WITH FAT LAYER EXPOSED: ICD-10-CM

## 2020-08-17 DIAGNOSIS — Z79.4 TYPE 2 DIABETES MELLITUS WITH DIABETIC NEUROPATHY, WITH LONG-TERM CURRENT USE OF INSULIN: Primary | ICD-10-CM

## 2020-08-17 DIAGNOSIS — Z89.421 HISTORY OF PARTIAL RAY AMPUTATION OF FIFTH TOE OF RIGHT FOOT: ICD-10-CM

## 2020-08-17 DIAGNOSIS — E11.40 TYPE 2 DIABETES MELLITUS WITH DIABETIC NEUROPATHY, WITH LONG-TERM CURRENT USE OF INSULIN: Primary | ICD-10-CM

## 2020-08-17 DIAGNOSIS — L97.922 SKIN ULCER OF LEFT LOWER LEG WITH FAT LAYER EXPOSED: ICD-10-CM

## 2020-08-17 PROCEDURE — 99999 PR PBB SHADOW E&M-EST. PATIENT-LVL IV: CPT | Mod: PBBFAC,HCNC,, | Performed by: PODIATRIST

## 2020-08-17 PROCEDURE — 11042 DBRDMT SUBQ TIS 1ST 20SQCM/<: CPT | Mod: HCNC,S$GLB,, | Performed by: PODIATRIST

## 2020-08-17 PROCEDURE — 99499 UNLISTED E&M SERVICE: CPT | Mod: HCNC,S$GLB,, | Performed by: PODIATRIST

## 2020-08-17 PROCEDURE — 99999 PR PBB SHADOW E&M-EST. PATIENT-LVL IV: ICD-10-PCS | Mod: PBBFAC,HCNC,, | Performed by: PODIATRIST

## 2020-08-17 PROCEDURE — 99499 NO LOS: ICD-10-PCS | Mod: HCNC,S$GLB,, | Performed by: PODIATRIST

## 2020-08-17 PROCEDURE — 11042 WOUND DEBRIDEMENT: ICD-10-PCS | Mod: HCNC,S$GLB,, | Performed by: PODIATRIST

## 2020-08-17 NOTE — PATIENT INSTRUCTIONS
How to Check Your Feet    Below are tips to help you look for foot problems. Try to check your feet at the same time each day, such as when you get out of bed in the morning.    · Check the top of each foot. The tops of toes, back of the heel, and outer edge of the foot can get a lot of rubbing from poor-fitting shoes.    · Check the bottom of each foot. Daily wear and tear often leads to problems at pressure spots.    · Check the toes and nails. Fungal infections often occur between toes. Toenail problems can also be a sign of fungal infections or lead to breaks in the skin.    · Check your shoes, too. Loose objects inside a shoe can injure the foot. Use your hand to feel inside your shoes for things like eze, loose stitching, or rough areas that could irritate your skin.        Diabetic Foot Care    Diabetes can lead to a number of different foot complications. Fortunately, most of these complications can be prevented with a little extra foot care. If diabetes is not well controlled, the high blood sugar can cause damage to blood vessels and result in poor circulation to the foot. When the skin does not get enough blood flow, it becomes prone to pressure sores and ulcers, which heal slowly.  High blood sugar can also damage nerves, interfering with the ability to feel pain and pressure. When you cant feel your foot normally, it is easy to injure your skin, bones and joints without knowing it. For these reasons diabetes increases the risk of fungal infections, bunions and ulcers. Deep ulcers can lead to bone infection. Gangrene is the most serious foot complication of diabetes. It usually occurs on the tips of the toes as blacked areas of skin. The black area is dead tissue. In severe cases, gangrene spreads to involve the entire toe, other toes and the entire foot. Foot or toe amputation may be required. Good foot care and blood sugar control can prevent this.    Home Care  1. Wear comfortable, proper fitting  shoes.  2. Wash your feet daily with warm water and mild soap.  3. After drying, apply a moisturizing cream or lotion.  4. Check your feet daily for skin breaks, blisters, swelling, or redness. Look between your toes also.  5. Wear cotton socks and change them every day.  6. Trim toe nails carefully and do not cut your cuticles.  7. Strive to keep your blood sugar under control with a combination of medicines, diet and activity.  8. If you smoke and have diabetes, it is very important that you stop. Smoking reduces blood flow to your foot.  9. Avoid activities that increase your risk of foot injury:  · Do not walk barefoot.  · Do not use heating pads or hot water bottles on your feet.  · Do not put your foot in a hot tub without first checking the temperature with your hand.  10) Schedule yearly foot exams.    Follow Up  with your doctor or as advised by our staff. Report any cut, puncture, scrape, other injury, blister, ingrown toenail or ulcer on your foot.    Get Prompt Medical Attention  if any of the following occur:  -- Open ulcer with pus draining from the wound  -- Increasing foot or leg pain  -- New areas of redness or swelling or tender areas of the foot    © 1083-6586 [employer]. [address] [place] [address], [place] [address]. All rights reserved. This information is not intended as a substitute for professional medical care. Always follow your healthcare professional's instructions.

## 2020-08-17 NOTE — PROGRESS NOTES
Chief Complaint: Foot Ulcer      HPI:  Randy Camejo is a 80 y.o. male presents for follow up of right lateral foot ulcer, wound dehiscence from partial 5th ray in 8/31/2019.    Home health wound care is seeing patient on Monday and Friday.  Nursing noted two new wounds on the left lower leg.   Patient does not recall trauma to the area.   He reports the area is not tender.  He does have pitting edema bilateral lower extremities.             Patient Active Problem List   Diagnosis    Nuclear sclerosis, bilateral    Nuclear sclerotic cataract of left eye    Atrial fibrillation    Type 2 diabetes mellitus with neurologic complication, with long-term current use of insulin    Heart failure, systolic, chronic    Pre-ulcerative corn or callous    Peripheral vascular disease    Tachycardia induced cardiomyopathy    Coronary artery disease    Chronic anticoagulation    History of myocardial infarction    History of coronary artery stent placement    Essential hypertension    Hypercholesterolemia    High risk medication use    Subclinical hypothyroidism    Stage 3 chronic kidney disease    Skin ulcer of toe with fat layer exposed    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  0    alcohol swabs PadM Apply 1 each topically 2 (two) times a day. E11.49 200 each 5    amiodarone (PACERONE) 200 MG Tab TAKE 1 TABLET(200 MG) BY MOUTH EVERY DAY 90 tablet 3    atorvastatin (LIPITOR) 10 MG tablet Take 1 tablet (10 mg total) by mouth once daily. 90 tablet 3    blood glucose control, low Soln Use 1 each to check glucose level of glucometer weekly 4 each 11    blood sugar diagnostic (TRUE METRIX GLUCOSE TEST STRIP) Strp 1 strip by Misc.(Non-Drug; Combo Route) route 2 (two) times a day. To  "check blood sugar with True Metrix Air Glucose meter. ICD10: E11.49 200 strip 5    blood-glucose meter (TRUE METRIX AIR GLUCOSE METER) Misc 1 Device by Misc.(Non-Drug; Combo Route) route 2 (two) times a day. To check blood sugar. ICD10: E11.49 1 each 0    carvediloL (COREG) 6.25 MG tablet TAKE 1 TABLET(6.25 MG) BY MOUTH TWICE DAILY 180 tablet 3    ELIQUIS 5 mg Tab TAKE 1 TABLET(5 MG) BY MOUTH TWICE DAILY 180 tablet 3    famotidine (PEPCID) 20 MG tablet Take 1 tablet (20 mg total) by mouth once daily. 30 tablet 5    furosemide (LASIX) 40 MG tablet Take 1 tablet (40 mg total) by mouth once daily. 60 tablet 11    gabapentin (NEURONTIN) 300 MG capsule Take 1 capsule (300 mg total) by mouth every evening. 30 capsule 11    insulin detemir U-100 (LEVEMIR FLEXTOUCH U-100 INSULN) 100 unit/mL (3 mL) SubQ InPn pen Inject 10 Units into the skin every evening. If Blood Glucose is less than 100 mg/dL at BEDTIME, give 16 grams (four glucose tablets) X 1 dose for patients who can take PO. Recheck BG in 30 minutes and call MD for insulin dose adjustments prior to administering insulin detemir (Levemir). 15 mL 3    lancets (TRUEPLUS LANCETS) 33 gauge Misc To check blood sugar with True Metrix Air Glucose meter. ICD10: E11.49 200 each 5    levothyroxine (SYNTHROID) 50 MCG tablet TAKE 1 TABLET(50 MCG) BY MOUTH EVERY DAY 90 tablet 3    pen needle, diabetic 31 gauge x 5/16" Ndle 1 Device by Misc.(Non-Drug; Combo Route) route 4 (four) times daily. Use with insulin 200 each 11    sacubitriL-valsartan (ENTRESTO) 49-51 mg per tablet Take 1 tablet by mouth 2 (two) times daily. 60 tablet 11     No current facility-administered medications on file prior to visit.            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   Social Needs    Financial resource strain: Not on file    Food insecurity     Worry: Not on file     Inability: Not on file    Transportation needs     Medical: Not on file     Non-medical: Not on file   Tobacco Use    Smoking status: Never Smoker    Smokeless tobacco: Never Used   Substance and Sexual Activity    Alcohol use: No    Drug use: No    Sexual activity: Not Currently   Lifestyle    Physical activity     Days per week: Not on file     Minutes per session: Not on file    Stress: Not on file   Relationships    Social connections     Talks on phone: Not on file     Gets together: Not on file     Attends Oriental orthodox service: Not on file     Active member of club or organization: Not on file     Attends meetings of clubs or organizations: Not on file     Relationship status: Not on file   Other Topics Concern    Not on file   Social History Narrative    Not on file           Review of Systems   Constitution: Negative for chills, diaphoresis, fever, malaise/fatigue and night sweats.   Cardiovascular: Negative for claudication, cyanosis, leg swelling and syncope.   Skin: Positive for poor wound healing and unusual hair distribution. Negative for color change, dry skin, nail changes, rash and suspicious lesions.   Musculoskeletal: Negative for falls, joint pain, joint swelling, muscle cramps, muscle weakness and stiffness.   Gastrointestinal: Negative for constipation, diarrhea, nausea and vomiting.   Neurological: Positive for numbness, paresthesias and sensory change. Negative for tremors.               Objective:      Vitals:    08/17/20 1409   BP: (!) 112/55   BP Location: Left arm   Patient Position: Sitting   BP Method: Medium (Automatic)   Pulse: (!) 50   Weight: 82.5 kg (181 lb 14.1 oz)   Height: 5' 11" (1.803 m)         Physical Exam  Constitutional:       General: He is not in acute distress.     " Appearance: He is well-developed. He is not diaphoretic.   Cardiovascular:      Pulses:           Dorsalis pedis pulses are 1+ on the right side and 1+ on the left side.        Posterior tibial pulses are 1+ on the right side and 1+ on the left side.      Comments: Toes warm, pink, cap fill <5 seconds.  Musculoskeletal:      Comments: Partial 5th ray resection right foot without symptoms.   Lymphadenopathy:      Comments: Negative lymphadenopathy bilateral popliteal fossa and tarsal tunnel.     Skin:     General: Skin is warm and dry.      Coloration: Skin is not pale.      Findings: No abrasion, bruising, burn, ecchymosis, erythema, laceration, lesion or rash.      Comments: Location:  Wound dorsal lateral proximal right 5th ray  Pre debridement:  Callus/scab.  Post debridement:  0.1 cm x 0.1 cm x 0.1 cm  No drainage.  No cellulitis no ascending redness.  No tenderness to palpation.  No necrosis or eschar noted.       Ulcer Location: left posterior lower leg  Measurements:   2.7cm x 2.2cm x 0.1cm  Periwound: (--) hyperkeratosis;  (--) necrosis  Drainage: (--) serous;   Pus: Absent  Malodor:  Absent  Base:  100% granular. 0% fibrin.  Signs of infection:   Localize redness; no red streaking      Ulcer Location: left anterior lower leg  Measurements:   1.1cm x 0.4cm x 0.1cm  Periwound: (--) hyperkeratosis;  (--) necrosis  Drainage: (--) serous;   Pus: Absent  Malodor:  Absent  Base:  100% granular. 0% fibrin.  Signs of infection:   Minimal localized redness;       Skin thin, shiny, atrophic, with decreased density and distribution of pedal hair bilateral, but without hyperpigmentation, maggie discoloration,  ulcers, masses, nodules or cords palpated bilateral feet and legs.             Neurological:      Sensory: Sensory deficit present.      Comments: Diminished/loss of protective sensation all toes bilateral to 10 gram monofilament.       Psychiatric:         Behavior: Behavior is cooperative.              "  Assessment:       Encounter Diagnoses   Name Primary?   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    Dressed wound sites right foot and left leg with Endoform after debridement.  Padded bandages to wounds.    Tubi  size C bilateral.   Remove when sleeping.     Continue home health wound care (Monday and Friday).     Fill Rx diabetic shoes with custom inserts and filler right foot.     Adequate vitamin supplementation, protein intake, and hydration - discussed with patient.    Inspect feet multiple times daily for signs of occurrence/recurrence ulceration.           Follow-up in 2 weeks.      Wound Debridement    Date/Time: 8/17/2020 2:15 PM  Performed by: Jess Rosa DPM  Authorized by: Jess Rosa DPM     Time out: Immediately prior to procedure a "time out" was called to verify the correct patient, procedure, equipment, support staff and site/side marked as required.    Consent Done?:  Yes (Verbal)    Preparation: Patient was prepped and draped in usual sterile fashion    Local anesthesia used?: No      Wound Details:    Location:  Right foot    Location:  Right Midfoot    Type of Debridement:  Excisional       Length (cm):  0.1       Area (sq cm):  0.01       Width (cm):  0.1       Percent Debrided (%):  100       Depth " (cm):  0.1       Total Area Debrided (sq cm):  0.01    Depth of debridement:  Subcutaneous tissue    Tissue debrided:  Epidermis, Dermis and Subcutaneous    Devitalized tissue debrided:  Slough, Fibrin, Callus and Biofilm    Instruments:  Curette    Additional wounds:  1    2nd Wound Details:     Location:  Left leg (left posterior leg)    Type of Debridement:  Excisional       Length (cm):  2.7       Area (sq cm):  5.94       Width (cm):  2.2       Percent Debrided (%):  100       Depth (cm):  0.1       Total Area Debrided (sq cm):  5.94    Depth of debridement:  Subcutaneous tissue    Tissue debrided:  Epidermis, Dermis and Subcutaneous    Devitalized tissue debrided:  Slough, Callus and Biofilm    Instruments:  Blade    3rd Wound Details:     Location:  Left leg (left anterior leg)    Type of Debridement:  Excisional       Length (cm):  1.1       Area (sq cm):  0.44       Width (cm):  0.4       Percent Debrided (%):  100       Depth (cm):  0.1       Total Area Debrided (sq cm):  0.44    Depth of debridement:  Subcutaneous tissue    Tissue debrided:  Epidermis, Dermis and Subcutaneous    Devitalized tissue debrided:  Callus, Biofilm, Fibrin and Slough    Instruments:  Blade    Bleeding:  Minimal  Hemostasis Achieved: Yes    Method Used:  Pressure  Patient tolerance:  Patient tolerated the procedure well with no immediate complications

## 2020-08-25 ENCOUNTER — OFFICE VISIT (OUTPATIENT)
Dept: INTERNAL MEDICINE | Facility: CLINIC | Age: 80
End: 2020-08-25
Payer: MEDICARE

## 2020-08-25 ENCOUNTER — LAB VISIT (OUTPATIENT)
Dept: LAB | Facility: HOSPITAL | Age: 80
End: 2020-08-25
Attending: HOSPITALIST
Payer: MEDICARE

## 2020-08-25 VITALS
HEART RATE: 60 BPM | TEMPERATURE: 97 F | RESPIRATION RATE: 16 BRPM | SYSTOLIC BLOOD PRESSURE: 106 MMHG | DIASTOLIC BLOOD PRESSURE: 60 MMHG | HEIGHT: 71 IN | WEIGHT: 175.5 LBS | BODY MASS INDEX: 24.57 KG/M2

## 2020-08-25 DIAGNOSIS — Z79.01 CHRONIC ANTICOAGULATION: ICD-10-CM

## 2020-08-25 DIAGNOSIS — I50.22 HEART FAILURE, SYSTOLIC, CHRONIC: ICD-10-CM

## 2020-08-25 DIAGNOSIS — I25.10 CORONARY ARTERY DISEASE INVOLVING NATIVE CORONARY ARTERY OF NATIVE HEART WITHOUT ANGINA PECTORIS: ICD-10-CM

## 2020-08-25 DIAGNOSIS — E11.42 TYPE 2 DIABETES MELLITUS WITH DIABETIC POLYNEUROPATHY, WITH LONG-TERM CURRENT USE OF INSULIN: ICD-10-CM

## 2020-08-25 DIAGNOSIS — Z79.4 TYPE 2 DIABETES MELLITUS WITH DIABETIC POLYNEUROPATHY, WITH LONG-TERM CURRENT USE OF INSULIN: Primary | ICD-10-CM

## 2020-08-25 DIAGNOSIS — E11.42 TYPE 2 DIABETES MELLITUS WITH DIABETIC POLYNEUROPATHY, WITH LONG-TERM CURRENT USE OF INSULIN: Primary | ICD-10-CM

## 2020-08-25 DIAGNOSIS — I48.0 PAROXYSMAL ATRIAL FIBRILLATION: ICD-10-CM

## 2020-08-25 DIAGNOSIS — E03.8 SUBCLINICAL HYPOTHYROIDISM: ICD-10-CM

## 2020-08-25 DIAGNOSIS — Z79.4 TYPE 2 DIABETES MELLITUS WITH DIABETIC POLYNEUROPATHY, WITH LONG-TERM CURRENT USE OF INSULIN: ICD-10-CM

## 2020-08-25 DIAGNOSIS — N18.30 STAGE 3 CHRONIC KIDNEY DISEASE: ICD-10-CM

## 2020-08-25 DIAGNOSIS — I10 ESSENTIAL HYPERTENSION: ICD-10-CM

## 2020-08-25 LAB
ALBUMIN SERPL BCP-MCNC: 3.8 G/DL (ref 3.5–5.2)
ALP SERPL-CCNC: 89 U/L (ref 55–135)
ALT SERPL W/O P-5'-P-CCNC: 16 U/L (ref 10–44)
ANION GAP SERPL CALC-SCNC: 7 MMOL/L (ref 8–16)
AST SERPL-CCNC: 20 U/L (ref 10–40)
BASOPHILS # BLD AUTO: 0.06 K/UL (ref 0–0.2)
BASOPHILS NFR BLD: 1.1 % (ref 0–1.9)
BILIRUB SERPL-MCNC: 1.1 MG/DL (ref 0.1–1)
BUN SERPL-MCNC: 36 MG/DL (ref 8–23)
CALCIUM SERPL-MCNC: 8.5 MG/DL (ref 8.7–10.5)
CHLORIDE SERPL-SCNC: 105 MMOL/L (ref 95–110)
CO2 SERPL-SCNC: 30 MMOL/L (ref 23–29)
CREAT SERPL-MCNC: 1.5 MG/DL (ref 0.5–1.4)
DIFFERENTIAL METHOD: ABNORMAL
EOSINOPHIL # BLD AUTO: 0.2 K/UL (ref 0–0.5)
EOSINOPHIL NFR BLD: 4.1 % (ref 0–8)
ERYTHROCYTE [DISTWIDTH] IN BLOOD BY AUTOMATED COUNT: 14.6 % (ref 11.5–14.5)
EST. GFR  (AFRICAN AMERICAN): 50.5 ML/MIN/1.73 M^2
EST. GFR  (NON AFRICAN AMERICAN): 43.7 ML/MIN/1.73 M^2
ESTIMATED AVG GLUCOSE: 123 MG/DL (ref 68–131)
GLUCOSE SERPL-MCNC: 117 MG/DL (ref 70–110)
HBA1C MFR BLD HPLC: 5.9 % (ref 4–5.6)
HCT VFR BLD AUTO: 40.9 % (ref 40–54)
HGB BLD-MCNC: 12.3 G/DL (ref 14–18)
IMM GRANULOCYTES # BLD AUTO: 0.01 K/UL (ref 0–0.04)
IMM GRANULOCYTES NFR BLD AUTO: 0.2 % (ref 0–0.5)
LYMPHOCYTES # BLD AUTO: 0.9 K/UL (ref 1–4.8)
LYMPHOCYTES NFR BLD: 15.8 % (ref 18–48)
MCH RBC QN AUTO: 32.6 PG (ref 27–31)
MCHC RBC AUTO-ENTMCNC: 30.1 G/DL (ref 32–36)
MCV RBC AUTO: 109 FL (ref 82–98)
MONOCYTES # BLD AUTO: 0.6 K/UL (ref 0.3–1)
MONOCYTES NFR BLD: 11.2 % (ref 4–15)
NEUTROPHILS # BLD AUTO: 3.6 K/UL (ref 1.8–7.7)
NEUTROPHILS NFR BLD: 67.6 % (ref 38–73)
NRBC BLD-RTO: 0 /100 WBC
PLATELET # BLD AUTO: 107 K/UL (ref 150–350)
PMV BLD AUTO: 11.4 FL (ref 9.2–12.9)
POTASSIUM SERPL-SCNC: 4.4 MMOL/L (ref 3.5–5.1)
PROT SERPL-MCNC: 8.1 G/DL (ref 6–8.4)
RBC # BLD AUTO: 3.77 M/UL (ref 4.6–6.2)
SODIUM SERPL-SCNC: 142 MMOL/L (ref 136–145)
T4 FREE SERPL-MCNC: 1.1 NG/DL (ref 0.71–1.51)
TSH SERPL DL<=0.005 MIU/L-ACNC: 10.03 UIU/ML (ref 0.4–4)
WBC # BLD AUTO: 5.38 K/UL (ref 3.9–12.7)

## 2020-08-25 PROCEDURE — 83036 HEMOGLOBIN GLYCOSYLATED A1C: CPT | Mod: HCNC

## 2020-08-25 PROCEDURE — 80053 COMPREHEN METABOLIC PANEL: CPT | Mod: HCNC

## 2020-08-25 PROCEDURE — 36415 COLL VENOUS BLD VENIPUNCTURE: CPT | Mod: HCNC,PO

## 2020-08-25 PROCEDURE — 1126F PR PAIN SEVERITY QUANTIFIED, NO PAIN PRESENT: ICD-10-PCS | Mod: HCNC,S$GLB,, | Performed by: HOSPITALIST

## 2020-08-25 PROCEDURE — 1101F PT FALLS ASSESS-DOCD LE1/YR: CPT | Mod: HCNC,CPTII,S$GLB, | Performed by: HOSPITALIST

## 2020-08-25 PROCEDURE — 1126F AMNT PAIN NOTED NONE PRSNT: CPT | Mod: HCNC,S$GLB,, | Performed by: HOSPITALIST

## 2020-08-25 PROCEDURE — 3078F DIAST BP <80 MM HG: CPT | Mod: HCNC,CPTII,S$GLB, | Performed by: HOSPITALIST

## 2020-08-25 PROCEDURE — 84439 ASSAY OF FREE THYROXINE: CPT | Mod: HCNC

## 2020-08-25 PROCEDURE — 99999 PR PBB SHADOW E&M-EST. PATIENT-LVL V: CPT | Mod: PBBFAC,HCNC,, | Performed by: HOSPITALIST

## 2020-08-25 PROCEDURE — 99214 OFFICE O/P EST MOD 30 MIN: CPT | Mod: HCNC,S$GLB,, | Performed by: HOSPITALIST

## 2020-08-25 PROCEDURE — 85025 COMPLETE CBC W/AUTO DIFF WBC: CPT | Mod: HCNC

## 2020-08-25 PROCEDURE — 99214 PR OFFICE/OUTPT VISIT, EST, LEVL IV, 30-39 MIN: ICD-10-PCS | Mod: HCNC,S$GLB,, | Performed by: HOSPITALIST

## 2020-08-25 PROCEDURE — 3074F PR MOST RECENT SYSTOLIC BLOOD PRESSURE < 130 MM HG: ICD-10-PCS | Mod: HCNC,CPTII,S$GLB, | Performed by: HOSPITALIST

## 2020-08-25 PROCEDURE — 3074F SYST BP LT 130 MM HG: CPT | Mod: HCNC,CPTII,S$GLB, | Performed by: HOSPITALIST

## 2020-08-25 PROCEDURE — 84443 ASSAY THYROID STIM HORMONE: CPT | Mod: HCNC

## 2020-08-25 PROCEDURE — 3078F PR MOST RECENT DIASTOLIC BLOOD PRESSURE < 80 MM HG: ICD-10-PCS | Mod: HCNC,CPTII,S$GLB, | Performed by: HOSPITALIST

## 2020-08-25 PROCEDURE — 1101F PR PT FALLS ASSESS DOC 0-1 FALLS W/OUT INJ PAST YR: ICD-10-PCS | Mod: HCNC,CPTII,S$GLB, | Performed by: HOSPITALIST

## 2020-08-25 PROCEDURE — 1159F PR MEDICATION LIST DOCUMENTED IN MEDICAL RECORD: ICD-10-PCS | Mod: HCNC,S$GLB,, | Performed by: HOSPITALIST

## 2020-08-25 PROCEDURE — 99999 PR PBB SHADOW E&M-EST. PATIENT-LVL V: ICD-10-PCS | Mod: PBBFAC,HCNC,, | Performed by: HOSPITALIST

## 2020-08-25 PROCEDURE — 1159F MED LIST DOCD IN RCRD: CPT | Mod: HCNC,S$GLB,, | Performed by: HOSPITALIST

## 2020-08-25 NOTE — PROGRESS NOTES
Subjective:     @Patient ID: Randy Camejo is a 80 y.o. male.    Chief Complaint: Follow-up    HPI  79 yo male presents for f/u of chronic conditions. Pt is accompanied today by his daughter. He reports he is doing better. Daughter feels he is improved since his lasix was increased and he was placed on entresto. No further falls since leg edema has improved.     1. DM2 with neuropathy: Last A1c 5.7 May 2020. Pt was insulin was decreased to levemir 10 units qhs (was on 15 units) since A1c well controlled and some concern for low BG. DUE for eye exam and foot exam. He follows closely with podiatry since recovering from R diabetic foot wound w/ postop dehiscence of incision. Pt to schedule eye appt. Reports BG at home sometimes in 70s but noted he goes to bed late (midnight) and sleeps in to 11 AM. When he would check it, it would be that low. However now not sleeping as long and BG ranges 90-130s on average. Highest has been 160.   2. CHF: systolic. Last 2d echo 6/2/20 showed EF 20%, severe biatrial enlargement, pulm htn and cvp 15.  F/u with cardiology recently. His lasix was increased to 40 mg bid and he was placed on Entresto (losartan d/cd).   3. HTN: on coreg  4. Paroxysmal Afib:  on amiodarone per cardiology and eliquis 5 mg bid   5. CAD w/ stent: on coreg 6.25 mg bid, lipitor 10 mg, (non on asa due to being on eliquis).   6. Leg weakness:   7. Dysphagia:  Patient reports over the past few weeks he has been having trouble swallowing certain foods.  Does report history of heartburn. Was referred to GI on last visit. Pt reports pepcid has helped. He declines f/u with GI at this time   8. CKD3: last Cr 1.5 in July     Review of Systems   Constitutional: Negative for chills and fever.   HENT: Negative for congestion and sore throat.    Eyes: Negative for pain and visual disturbance.   Respiratory: Negative for cough and shortness of breath.    Cardiovascular: Positive for leg swelling. Negative for chest pain.  "  Gastrointestinal: Negative for abdominal pain, nausea and vomiting.   Endocrine: Negative for polydipsia and polyuria.   Genitourinary: Negative for difficulty urinating and dysuria.   Musculoskeletal: Negative for arthralgias and back pain.   Skin: Negative for rash and wound.   Neurological: Negative for weakness and headaches.   Psychiatric/Behavioral: Negative for agitation and confusion.     Past medical history, surgical history, and family medical history reviewed and updated as appropriate.    Medications and allergies reviewed.     Objective:     Vitals:    08/25/20 1333   BP: 106/60   BP Location: Left arm   Patient Position: Sitting   BP Method: Medium (Manual)   Pulse: 60   Resp: 16   Temp: 97.3 °F (36.3 °C)   TempSrc: Temporal   Weight: 79.6 kg (175 lb 7.8 oz)   Height: 5' 11" (1.803 m)     Body mass index is 24.48 kg/m².  Physical Exam  Vitals signs reviewed.   Constitutional:       General: He is not in acute distress.     Appearance: He is well-developed.   HENT:      Head: Normocephalic and atraumatic.      Mouth/Throat:      Mouth: Mucous membranes are moist.      Pharynx: No oropharyngeal exudate.   Eyes:      General:         Right eye: No discharge.         Left eye: No discharge.      Conjunctiva/sclera: Conjunctivae normal.   Neck:      Musculoskeletal: Normal range of motion and neck supple.   Cardiovascular:      Rate and Rhythm: Normal rate and regular rhythm.      Heart sounds: Normal heart sounds. No friction rub.   Pulmonary:      Effort: Pulmonary effort is normal.      Breath sounds: Normal breath sounds.   Abdominal:      General: Bowel sounds are normal. There is no distension.      Palpations: Abdomen is soft.      Tenderness: There is no abdominal tenderness.   Musculoskeletal:      Right lower leg: Edema present.      Left lower leg: Edema present.      Comments: Mild BLE edema, noted wound dressing of lower extremities   Skin:     General: Skin is warm and dry.   Neurological: "      Mental Status: He is alert and oriented to person, place, and time.   Psychiatric:         Mood and Affect: Mood normal.         Behavior: Behavior normal.         Lab Results   Component Value Date    WBC 4.97 05/21/2020    HGB 12.3 (L) 05/21/2020    HCT 41.3 05/21/2020     (L) 05/21/2020    CHOL 80 (L) 01/15/2020    TRIG 34 01/15/2020    HDL 34 (L) 01/15/2020    ALT 14 01/15/2020    AST 18 01/15/2020     07/29/2020    K 4.4 07/29/2020     07/29/2020    CREATININE 1.5 (H) 07/29/2020    BUN 30 (H) 07/29/2020    CO2 31 (H) 07/29/2020    TSH 4.173 (H) 01/15/2020    PSA 1.2 01/15/2020    INR 1.3 (H) 08/29/2019    HGBA1C 5.7 (H) 05/21/2020       Assessment:     1. Type 2 diabetes mellitus with diabetic polyneuropathy, with long-term current use of insulin    2. Essential hypertension    3. Coronary artery disease involving native coronary artery of native heart without angina pectoris    4. Stage 3 chronic kidney disease    5. Paroxysmal atrial fibrillation    6. Subclinical hypothyroidism    7. Heart failure, systolic, chronic    8. Chronic anticoagulation      Plan:   Randy was seen today for follow-up.    Diagnoses and all orders for this visit:    Type 2 diabetes mellitus with diabetic polyneuropathy, with long-term current use of insulin  - Will check a1c and see if need to decrease insulin further   -     Comprehensive metabolic panel; Future  -     CBC W/ AUTO DIFFERENTIAL; Future  -     HEMOGLOBIN A1C; Future  -     TSH; Future    Essential hypertension        - bp controlled. Continue coreg.     Coronary artery disease involving native coronary artery of native heart without angina pectoris         - Stable. Continue home meds    Stage 3 chronic kidney disease         - Check renal fx    Paroxysmal atrial fibrillation          - Stable. Continue coreg and eliquis    Subclinical hypothyroidism          - Check tsh    Heart failure, systolic, chronic           - EF 20%. Stable. Continue  f/u with cardiology. On entresto and lasix    Chronic anticoagulation           - Continue eliquis          RTC 4 months     Susie Lazo MD  Internal Medicine    8/25/2020

## 2020-08-26 ENCOUNTER — TELEPHONE (OUTPATIENT)
Dept: INTERNAL MEDICINE | Facility: CLINIC | Age: 80
End: 2020-08-26

## 2020-08-26 NOTE — TELEPHONE ENCOUNTER
----- Message from Susie Lazo MD sent at 8/25/2020  5:34 PM CDT -----  Please call [facility] to send physical therapy PT to eval and treat

## 2020-08-27 ENCOUNTER — PATIENT MESSAGE (OUTPATIENT)
Dept: INTERNAL MEDICINE | Facility: CLINIC | Age: 80
End: 2020-08-27

## 2020-08-27 DIAGNOSIS — E03.8 SUBCLINICAL HYPOTHYROIDISM: ICD-10-CM

## 2020-08-27 DIAGNOSIS — D69.6 THROMBOCYTOPENIA: ICD-10-CM

## 2020-08-27 DIAGNOSIS — D53.9 MACROCYTIC ANEMIA: Primary | ICD-10-CM

## 2020-08-28 ENCOUNTER — DOCUMENT SCAN (OUTPATIENT)
Dept: HOME HEALTH SERVICES | Facility: HOSPITAL | Age: 80
End: 2020-08-28
Payer: MEDICARE

## 2020-08-29 PROCEDURE — G0179 MD RECERTIFICATION HHA PT: HCPCS | Mod: ,,, | Performed by: HOSPITALIST

## 2020-08-29 PROCEDURE — G0179 PR HOME HEALTH MD RECERTIFICATION: ICD-10-PCS | Mod: ,,, | Performed by: HOSPITALIST

## 2020-08-31 ENCOUNTER — TELEPHONE (OUTPATIENT)
Dept: INTERNAL MEDICINE | Facility: CLINIC | Age: 80
End: 2020-08-31

## 2020-08-31 RX ORDER — LEVOTHYROXINE SODIUM 75 UG/1
75 TABLET ORAL
Qty: 90 TABLET | Refills: 0 | Status: SHIPPED | OUTPATIENT
Start: 2020-08-31 | End: 2020-11-04

## 2020-08-31 NOTE — TELEPHONE ENCOUNTER
The patient was called and a message left to call the office regarding test results and appointments.

## 2020-08-31 NOTE — TELEPHONE ENCOUNTER
The patient informed of his results and verbalized understanding.   Labs scheduled. Orders sent to our  for scheduling referral.

## 2020-08-31 NOTE — TELEPHONE ENCOUNTER
----- Message from Rose Alvarez sent at 8/31/2020  1:22 PM CDT -----  Type:  Patient Returning Call    Who Called:MARLA    Who Left Message for Patient:NURSE    Does the patient know what this is regarding?:NA    Would the patient rather a call back or a response via [facility]? CALL BACK     Best Call Back Number:[phone]     Additional Information: RETURNING MISSED CALL.

## 2020-09-01 ENCOUNTER — TELEPHONE (OUTPATIENT)
Dept: HEMATOLOGY/ONCOLOGY | Facility: CLINIC | Age: 80
End: 2020-09-01

## 2020-09-01 NOTE — TELEPHONE ENCOUNTER
----- Message from Yesica Peterson RN sent at 9/1/2020  1:32 PM CDT -----  Regarding: FW: Hematology referral  Victor Hugo,    Please schedule new patient for benign hem.  Thanks    -Yesica     ----- Message -----  From: Cora Ness  Sent: 9/1/2020   1:22 PM CDT  To: [facility]  Subject: Hematology referral                              Good afternoon,     Pt has a referral for Hematology by order of Dr. Susie Lazo. Please call pt to [facility].   Thanks.     Macrocytic anemia [D53.9]  Thrombocytopenia [D69.6]

## 2020-09-02 ENCOUNTER — TELEPHONE (OUTPATIENT)
Dept: HEMATOLOGY/ONCOLOGY | Facility: CLINIC | Age: 80
End: 2020-09-02

## 2020-09-02 ENCOUNTER — OFFICE VISIT (OUTPATIENT)
Dept: PODIATRY | Facility: CLINIC | Age: 80
End: 2020-09-02
Payer: MEDICARE

## 2020-09-02 ENCOUNTER — TELEPHONE (OUTPATIENT)
Dept: PODIATRY | Facility: CLINIC | Age: 80
End: 2020-09-02

## 2020-09-02 VITALS
SYSTOLIC BLOOD PRESSURE: 91 MMHG | HEART RATE: 50 BPM | DIASTOLIC BLOOD PRESSURE: 55 MMHG | HEIGHT: 71 IN | BODY MASS INDEX: 24.23 KG/M2 | WEIGHT: 173.06 LBS

## 2020-09-02 DIAGNOSIS — L84 PRE-ULCERATIVE CORN OR CALLOUS: ICD-10-CM

## 2020-09-02 DIAGNOSIS — E11.40 TYPE 2 DIABETES MELLITUS WITH DIABETIC NEUROPATHY, WITH LONG-TERM CURRENT USE OF INSULIN: Primary | ICD-10-CM

## 2020-09-02 DIAGNOSIS — Z89.421 HISTORY OF PARTIAL RAY AMPUTATION OF FIFTH TOE OF RIGHT FOOT: ICD-10-CM

## 2020-09-02 DIAGNOSIS — B35.1 DERMATOPHYTOSIS OF NAIL: ICD-10-CM

## 2020-09-02 DIAGNOSIS — Z79.4 TYPE 2 DIABETES MELLITUS WITH DIABETIC NEUROPATHY, WITH LONG-TERM CURRENT USE OF INSULIN: Primary | ICD-10-CM

## 2020-09-02 PROCEDURE — 11056 PARNG/CUTG B9 HYPRKR LES 2-4: CPT | Mod: Q7,HCNC,S$GLB, | Performed by: PODIATRIST

## 2020-09-02 PROCEDURE — 11056 ROUTINE FOOT CARE: ICD-10-PCS | Mod: Q7,HCNC,S$GLB, | Performed by: PODIATRIST

## 2020-09-02 PROCEDURE — 99999 PR PBB SHADOW E&M-EST. PATIENT-LVL IV: ICD-10-PCS | Mod: PBBFAC,HCNC,, | Performed by: PODIATRIST

## 2020-09-02 PROCEDURE — 99999 PR PBB SHADOW E&M-EST. PATIENT-LVL IV: CPT | Mod: PBBFAC,HCNC,, | Performed by: PODIATRIST

## 2020-09-02 PROCEDURE — 99499 UNLISTED E&M SERVICE: CPT | Mod: HCNC,S$GLB,, | Performed by: PODIATRIST

## 2020-09-02 PROCEDURE — 99499 NO LOS: ICD-10-PCS | Mod: HCNC,S$GLB,, | Performed by: PODIATRIST

## 2020-09-02 PROCEDURE — 11721 ROUTINE FOOT CARE: ICD-10-PCS | Mod: Q7,59,HCNC,S$GLB | Performed by: PODIATRIST

## 2020-09-02 PROCEDURE — 11721 DEBRIDE NAIL 6 OR MORE: CPT | Mod: Q7,59,HCNC,S$GLB | Performed by: PODIATRIST

## 2020-09-02 NOTE — PROGRESS NOTES
Chief Complaint: Foot Ulcer      HPI:  Randy Camejo is a 80 y.o. male presents for follow up of right lateral foot ulcer, wound dehiscence from partial 5th ray in 8/31/2019.    Home health wound care is seeing patient on Monday and Friday.  Doing well.    Otherwise no adverse interval trauma.           Patient Active Problem List   Diagnosis    Nuclear sclerosis, bilateral    Nuclear sclerotic cataract of left eye    Atrial fibrillation    Type 2 diabetes mellitus with neurologic complication, with long-term current use of insulin    Heart failure, systolic, chronic    Pre-ulcerative corn or callous    Peripheral vascular disease    Tachycardia induced cardiomyopathy    Coronary artery disease    Chronic anticoagulation    History of myocardial infarction    History of coronary artery stent placement    Essential hypertension    Hypercholesterolemia    High risk medication use    Subclinical hypothyroidism    Stage 3 chronic kidney disease    Skin ulcer of toe with fat layer exposed    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  0    alcohol swabs PadM Apply 1 each topically 2 (two) times a day. E11.49 200 each 5    amiodarone (PACERONE) 200 MG Tab TAKE 1 TABLET(200 MG) BY MOUTH EVERY DAY 90 tablet 3    atorvastatin (LIPITOR) 10 MG tablet Take 1 tablet (10 mg total) by mouth once daily. 90 tablet 3    blood glucose control, low Soln Use 1 each to check glucose level of glucometer weekly 4 each 11    blood sugar diagnostic (TRUE METRIX GLUCOSE TEST STRIP) Strp 1 strip by Misc.(Non-Drug; Combo Route) route 2 (two) times a day. To check blood sugar with True Metrix Air Glucose meter. ICD10: E11.49 200 strip 5    blood-glucose meter (TRUE METRIX AIR GLUCOSE METER)  "Misc 1 Device by Misc.(Non-Drug; Combo Route) route 2 (two) times a day. To check blood sugar. ICD10: E11.49 1 each 0    carvediloL (COREG) 6.25 MG tablet TAKE 1 TABLET(6.25 MG) BY MOUTH TWICE DAILY 180 tablet 3    ELIQUIS 5 mg Tab TAKE 1 TABLET(5 MG) BY MOUTH TWICE DAILY 180 tablet 3    famotidine (PEPCID) 20 MG tablet Take 1 tablet (20 mg total) by mouth once daily. 30 tablet 5    furosemide (LASIX) 40 MG tablet Take 1 tablet (40 mg total) by mouth once daily. (Patient taking differently: Take 80 mg by mouth once daily. ) 60 tablet 11    gabapentin (NEURONTIN) 300 MG capsule Take 1 capsule (300 mg total) by mouth every evening. 30 capsule 11    insulin detemir U-100 (LEVEMIR FLEXTOUCH U-100 INSULN) 100 unit/mL (3 mL) SubQ InPn pen Inject 10 Units into the skin every evening. If Blood Glucose is less than 100 mg/dL at BEDTIME, give 16 grams (four glucose tablets) X 1 dose for patients who can take PO. Recheck BG in 30 minutes and call MD for insulin dose adjustments prior to administering insulin detemir (Levemir). 15 mL 3    lancets (TRUEPLUS LANCETS) 33 gauge Misc To check blood sugar with True Metrix Air Glucose meter. ICD10: E11.49 200 each 5    levothyroxine (SYNTHROID) 75 MCG tablet Take 1 tablet (75 mcg total) by mouth before breakfast. 90 tablet 0    pen needle, diabetic 31 gauge x 5/16" Ndle 1 Device by Misc.(Non-Drug; Combo Route) route 4 (four) times daily. Use with insulin 200 each 11    sacubitriL-valsartan (ENTRESTO) 49-51 mg per tablet Take 1 tablet by mouth 2 (two) times daily. 60 tablet 11     No current facility-administered medications on file prior to visit.            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   Social Needs    Financial resource strain: Not on file    Food insecurity     Worry: Not on file     Inability: Not on file    Transportation needs     Medical: Not on file     Non-medical: Not on file   Tobacco Use    Smoking status: Never Smoker    Smokeless tobacco: Never Used   Substance and Sexual Activity    Alcohol use: No    Drug use: No    Sexual activity: Not Currently   Lifestyle    Physical activity     Days per week: Not on file     Minutes per session: Not on file    Stress: Not on file   Relationships    Social connections     Talks on phone: Not on file     Gets together: Not on file     Attends Gnosticism service: Not on file     Active member of club or organization: Not on file     Attends meetings of clubs or organizations: Not on file     Relationship status: Not on file   Other Topics Concern    Not on file   Social History Narrative    Not on file           Review of Systems   Constitution: Negative for chills, diaphoresis, fever, malaise/fatigue and night sweats.   Cardiovascular: Negative for claudication, cyanosis, leg swelling and syncope.   Skin: Positive for poor wound healing and unusual hair distribution. Negative for color change, dry skin, nail changes, rash and suspicious lesions.   Musculoskeletal: Negative for falls, joint pain, joint swelling, muscle cramps, muscle weakness and stiffness.   Gastrointestinal: Negative for constipation, diarrhea, nausea and vomiting.   Neurological: Positive for numbness, paresthesias and sensory change. Negative for tremors.             Objective:      Vitals:    09/02/20 1137   BP: (!) 91/55   BP Location: Left arm   Patient Position: Sitting   BP Method: Medium (Automatic)   Pulse: (!) 50   Weight: 78.5 kg (173 lb 1 oz)   Height: 5' 11" (1.803 m)         Physical Exam  Constitutional:       General: He is not in acute distress.     Appearance: He is well-developed. He is not diaphoretic. "   Cardiovascular:      Pulses:           Dorsalis pedis pulses are 1+ on the right side and 1+ on the left side.        Posterior tibial pulses are 1+ on the right side and 1+ on the left side.      Comments: Toes warm, pink, cap fill <5 seconds.  Musculoskeletal:      Comments: Partial 5th ray resection right foot without symptoms.   Lymphadenopathy:      Comments: Negative lymphadenopathy bilateral popliteal fossa and tarsal tunnel.     Skin:     General: Skin is warm and dry.      Coloration: Skin is not pale.      Findings: No abrasion, bruising, burn, ecchymosis, erythema, laceration, lesion or rash.      Comments: Location:  Wound dorsal lateral proximal right 5th ray  Pre debridement:  Callus/scab.  Post debridement:  Epithelialized.       Ulcer Location: left posterior lower leg  Measurements:   epithelialized      Ulcer Location: left anterior lower leg  Measurements:  Epithelialized.       Skin thin, shiny, atrophic, with decreased density and distribution of pedal hair bilateral, but without hyperpigmentation, maggie discoloration,  ulcers, masses, nodules or cords palpated bilateral feet and legs.      Toenails x 9 are mycotic, thickened, brittle, crumbly, yellow with subungual debris.      Pre ulcerative calluses bilateral hallux     Neurological:      Sensory: Sensory deficit present.      Comments: Diminished/loss of protective sensation all toes bilateral to 10 gram monofilament.       Psychiatric:         Behavior: Behavior is cooperative.               Assessment:       Encounter Diagnoses   Name Primary?   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    All wounds healed.   Tubi  size C bilateral.   Remove when sleeping.     Patient is awaiting diabetes shoes.     Shoe inspection. Diabetic Foot Education. Patient reminded of the importance of good nutrition and blood sugar control to help prevent podiatric complications of diabetes. Patient instructed on proper foot hygiene. We discussed wearing proper shoe gear, daily foot inspections, never walking without protective shoe gear, never putting sharp instruments to feet.    Adequate vitamin supplementation, protein intake, and hydration - discussed with patient.    Inspect feet multiple times daily for signs of occurrence/recurrence ulceration.     Follow-up in 2 months or sooner if concerned.       Routine Foot Care    Date/Time: 9/2/2020 11:15 AM  Performed by: Jess Rosa DPM  Authorized by: Jess Rosa DPM     Consent Done?:  Yes (Verbal)  Hyperkeratotic Skin Lesions?: Yes    Number of trimmed lesions:  2  Location(s):  Left 1st Toe and Right 1st Toe    Nail Care Type:  Debride(Left 1st Toe, Left 3rd Toe, Left 2nd Toe, Left 4th Toe, Left 5th Toe, Right 1st Toe, Right 2nd Toe, Right 3rd Toe and Right 4th Toe)  Patient tolerance:  Patient tolerated the procedure well with no immediate complications     With patient's permission, the toenails mentioned above were aggressively reduced and debrided using a nail nipper, removing all offending nail and debris. Utilizing a #15 scalpel, I trimmed the corns and calluses at the above mentioned location.      The patient will continue to monitor the areas daily, inspect the feet, wear protective shoe gear when  ambulatory, and moisturizer to maintain skin integrity.

## 2020-09-02 NOTE — PATIENT INSTRUCTIONS
How to Check Your Feet    Below are tips to help you look for foot problems. Try to check your feet at the same time each day, such as when you get out of bed in the morning.    · Check the top of each foot. The tops of toes, back of the heel, and outer edge of the foot can get a lot of rubbing from poor-fitting shoes.    · Check the bottom of each foot. Daily wear and tear often leads to problems at pressure spots.    · Check the toes and nails. Fungal infections often occur between toes. Toenail problems can also be a sign of fungal infections or lead to breaks in the skin.    · Check your shoes, too. Loose objects inside a shoe can injure the foot. Use your hand to feel inside your shoes for things like eze, loose stitching, or rough areas that could irritate your skin.        Diabetic Foot Care    Diabetes can lead to a number of different foot complications. Fortunately, most of these complications can be prevented with a little extra foot care. If diabetes is not well controlled, the high blood sugar can cause damage to blood vessels and result in poor circulation to the foot. When the skin does not get enough blood flow, it becomes prone to pressure sores and ulcers, which heal slowly.  High blood sugar can also damage nerves, interfering with the ability to feel pain and pressure. When you cant feel your foot normally, it is easy to injure your skin, bones and joints without knowing it. For these reasons diabetes increases the risk of fungal infections, bunions and ulcers. Deep ulcers can lead to bone infection. Gangrene is the most serious foot complication of diabetes. It usually occurs on the tips of the toes as blacked areas of skin. The black area is dead tissue. In severe cases, gangrene spreads to involve the entire toe, other toes and the entire foot. Foot or toe amputation may be required. Good foot care and blood sugar control can prevent this.    Home Care  1. Wear comfortable, proper fitting  shoes.  2. Wash your feet daily with warm water and mild soap.  3. After drying, apply a moisturizing cream or lotion.  4. Check your feet daily for skin breaks, blisters, swelling, or redness. Look between your toes also.  5. Wear cotton socks and change them every day.  6. Trim toe nails carefully and do not cut your cuticles.  7. Strive to keep your blood sugar under control with a combination of medicines, diet and activity.  8. If you smoke and have diabetes, it is very important that you stop. Smoking reduces blood flow to your foot.  9. Avoid activities that increase your risk of foot injury:  · Do not walk barefoot.  · Do not use heating pads or hot water bottles on your feet.  · Do not put your foot in a hot tub without first checking the temperature with your hand.  10) Schedule yearly foot exams.    Follow Up  with your doctor or as advised by our staff. Report any cut, puncture, scrape, other injury, blister, ingrown toenail or ulcer on your foot.    Get Prompt Medical Attention  if any of the following occur:  -- Open ulcer with pus draining from the wound  -- Increasing foot or leg pain  -- New areas of redness or swelling or tender areas of the foot    © 9731-3104 [employer]. [address] [place] [address], [place] [address]. All rights reserved. This information is not intended as a substitute for professional medical care. Always follow your healthcare professional's instructions.      General nail care measures for abnormal nails include:    ? Keeping nails trimmed short    ? Avoiding trauma     ? Avoiding contact irritants     ? Keeping nails dry (avoiding wet work)    ? Avoiding all nail cosmetics

## 2020-09-02 NOTE — TELEPHONE ENCOUNTER
----- Message from Josephine Hill sent at 9/2/2020 11:07 AM CDT -----  Type: Patient Call Back    Who called: Wife    What is the request in detail: Patient is requesting a call back. She states that patient will be a little late. His ride was late picking him up. Please call if patient needs to reschedule.   Please advise.    Can the clinic reply by [facility]? No    Best call back number: [phone]    Additional Information: N/A

## 2020-09-03 ENCOUNTER — TELEPHONE (OUTPATIENT)
Dept: HEMATOLOGY/ONCOLOGY | Facility: CLINIC | Age: 80
End: 2020-09-03

## 2020-09-06 NOTE — LETTER
May 16, 2019      Susie Lazo MD  [address] [facility] [address]           [facility]  [address] [address]  Phone: [phone]  Fax: [phone]          Patient: Randy Camejo   MR Number: [id number]   YOB: 1940   Date of Visit: 5/16/2019       Dear Dr. Susie Lazo:    Thank you for referring Randy Camejo to me for evaluation. Attached you will find relevant portions of my assessment and plan of care.    If you have questions, please do not hesitate to call me. I look forward to following Randy Camejo along with you.    Sincerely,    Jorge Newman, DPM    Enclosure  CC:  No Recipients    If you would like to receive this communication electronically, please contact [email or web address] or [phone] to request more information on [employer] access.    For providers and/or their staff who would like to refer a patient to [facility], please contact us through our one-stop-shop provider referral line, [facility] [place], at [phone].    If you feel you have received this communication in error or would no longer like to receive these types of communications, please e-mail [email or web address]         
Pupils equal, round and reactive to light, Extra-ocular movement intact, eyes are clear b/l

## 2020-09-08 ENCOUNTER — LAB VISIT (OUTPATIENT)
Dept: LAB | Facility: HOSPITAL | Age: 80
End: 2020-09-08
Attending: HOSPITALIST
Payer: MEDICARE

## 2020-09-08 DIAGNOSIS — D53.9 MACROCYTIC ANEMIA: ICD-10-CM

## 2020-09-08 LAB
FOLATE SERPL-MCNC: 7.4 NG/ML (ref 4–24)
VIT B12 SERPL-MCNC: 415 PG/ML (ref 210–950)

## 2020-09-08 PROCEDURE — 82746 ASSAY OF FOLIC ACID SERUM: CPT | Mod: HCNC

## 2020-09-08 PROCEDURE — 82607 VITAMIN B-12: CPT | Mod: HCNC

## 2020-09-08 PROCEDURE — 36415 COLL VENOUS BLD VENIPUNCTURE: CPT | Mod: HCNC,PN

## 2020-09-11 ENCOUNTER — TELEPHONE (OUTPATIENT)
Dept: INTERNAL MEDICINE | Facility: CLINIC | Age: 80
End: 2020-09-11

## 2020-09-11 NOTE — TELEPHONE ENCOUNTER
----- Message from Susie Lazo MD sent at 9/10/2020  7:57 AM CDT -----  Please notify pt that b12 and folate are normal

## 2020-09-15 ENCOUNTER — EXTERNAL HOME HEALTH (OUTPATIENT)
Dept: HOME HEALTH SERVICES | Facility: HOSPITAL | Age: 80
End: 2020-09-15
Payer: MEDICARE

## 2020-09-21 ENCOUNTER — TELEPHONE (OUTPATIENT)
Dept: PODIATRY | Facility: CLINIC | Age: 80
End: 2020-09-21

## 2020-09-21 NOTE — TELEPHONE ENCOUNTER
----- Message from Delphine Pina sent at 9/21/2020  9:39 AM CDT -----  [facility] would like to advise the Dr that the pt would only like home health to come once a week instead of twice. Please contact Lj to advise.    Contact info  Lj

## 2020-09-25 ENCOUNTER — TELEPHONE (OUTPATIENT)
Dept: PODIATRY | Facility: CLINIC | Age: 80
End: 2020-09-25

## 2020-09-25 NOTE — TELEPHONE ENCOUNTER
Spoke with [facility] message send in Error.          ----- Message from Delphine Pina sent at 9/25/2020 10:30 AM CDT -----  [facility] would like to receive a call back regarding medical supplies. Please contact [facility] to advise.    Contact info   Ref [phone]

## 2020-09-25 NOTE — TELEPHONE ENCOUNTER
Spoke with Barbara with home care  Would like to reduce pt frequency to once a week. Let her know follow up with Dr Rosa.              ----- Message from Perla Fountain sent at 9/25/2020  2:15 PM CDT -----  Regarding: [facility] home [facility]  Nurse calling wants to reduce pt frequency to once a week. Asking for a call back.    Contact info [phone]

## 2020-09-28 ENCOUNTER — PATIENT OUTREACH (OUTPATIENT)
Dept: ADMINISTRATIVE | Facility: OTHER | Age: 80
End: 2020-09-28

## 2020-09-28 ENCOUNTER — OFFICE VISIT (OUTPATIENT)
Dept: OPTOMETRY | Facility: CLINIC | Age: 80
End: 2020-09-28
Payer: COMMERCIAL

## 2020-09-28 DIAGNOSIS — Z13.5 GLAUCOMA SCREENING: ICD-10-CM

## 2020-09-28 DIAGNOSIS — H52.4 PRESBYOPIA: ICD-10-CM

## 2020-09-28 DIAGNOSIS — E11.9 TYPE 2 DIABETES MELLITUS WITHOUT RETINOPATHY: Primary | ICD-10-CM

## 2020-09-28 PROCEDURE — 99999 PR PBB SHADOW E&M-EST. PATIENT-LVL III: ICD-10-PCS | Mod: PBBFAC,,, | Performed by: OPTOMETRIST

## 2020-09-28 PROCEDURE — 92015 PR REFRACTION: ICD-10-PCS | Mod: S$GLB,,, | Performed by: OPTOMETRIST

## 2020-09-28 PROCEDURE — 92015 DETERMINE REFRACTIVE STATE: CPT | Mod: S$GLB,,, | Performed by: OPTOMETRIST

## 2020-09-28 PROCEDURE — 92014 COMPRE OPH EXAM EST PT 1/>: CPT | Mod: S$GLB,,, | Performed by: OPTOMETRIST

## 2020-09-28 PROCEDURE — 99999 PR PBB SHADOW E&M-EST. PATIENT-LVL III: CPT | Mod: PBBFAC,,, | Performed by: OPTOMETRIST

## 2020-09-28 PROCEDURE — 92014 PR EYE EXAM, EST PATIENT,COMPREHESV: ICD-10-PCS | Mod: S$GLB,,, | Performed by: OPTOMETRIST

## 2020-09-28 NOTE — PROGRESS NOTES
Care Everywhere: updated   Immunization: no profile in links   Health Maintenance: updated  Media Review:   Legacy Review:   Order placed:   Upcoming appts:optometry 9/28/2020

## 2020-09-28 NOTE — PROGRESS NOTES
HPI     DLS: 6/14/19  Pt states no VA problems, wears OTc reader sometimes but didnt bring with   him states he thinks he wears+1.25  No f/f  No gtts  LBS: 93  Hemoglobin A1C       Date                     Value               Ref Range             Status                08/25/2020               5.9 (H)             4.0 - 5.6 %           Final                      05/21/2020               5.7 (H)             4.0 - 5.6 %           Final                      01/15/2020               5.8 (H)             4.0 - 5.6 %           Final                   Last edited by Terri Posadas MA on 9/28/2020  3:26 PM. (History)        ROS     Positive for: Endocrine (DM), Eyes (cat surgery)    Negative for: Constitutional, Gastrointestinal, Neurological, Skin,   Genitourinary, Musculoskeletal, HENT, Cardiovascular, Respiratory,   Psychiatric, Allergic/Imm, Heme/Lymph    Last edited by Vega Kong, FORREST on 9/28/2020  3:36 PM. (History)        Assessment /Plan     For exam results, see Encounter Report.    Type 2 diabetes mellitus without retinopathy    Glaucoma screening    Presbyopia      1. Mild pco sp pciol OU--pt happy w otc readers  2. DM- WITHOUT RETINOPATHY.  Advised yearly DFE (pt lost his toe to DM)  3. 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 ) with resultant K scarring OU    PLAN:    rtc 1 yr

## 2020-09-29 ENCOUNTER — PATIENT MESSAGE (OUTPATIENT)
Dept: OTHER | Facility: OTHER | Age: 80
End: 2020-09-29

## 2020-09-30 ENCOUNTER — OFFICE VISIT (OUTPATIENT)
Dept: CARDIOLOGY | Facility: CLINIC | Age: 80
End: 2020-09-30
Payer: MEDICARE

## 2020-09-30 VITALS
HEART RATE: 55 BPM | WEIGHT: 183.06 LBS | HEIGHT: 71 IN | BODY MASS INDEX: 25.63 KG/M2 | SYSTOLIC BLOOD PRESSURE: 102 MMHG | DIASTOLIC BLOOD PRESSURE: 54 MMHG

## 2020-09-30 DIAGNOSIS — I10 ESSENTIAL HYPERTENSION: ICD-10-CM

## 2020-09-30 DIAGNOSIS — I48.0 PAROXYSMAL ATRIAL FIBRILLATION: ICD-10-CM

## 2020-09-30 DIAGNOSIS — I25.2 HISTORY OF MYOCARDIAL INFARCTION: ICD-10-CM

## 2020-09-30 DIAGNOSIS — E11.40 TYPE 2 DIABETES MELLITUS WITH DIABETIC NEUROPATHY, WITH LONG-TERM CURRENT USE OF INSULIN: ICD-10-CM

## 2020-09-30 DIAGNOSIS — Z79.4 TYPE 2 DIABETES MELLITUS WITH DIABETIC NEUROPATHY, WITH LONG-TERM CURRENT USE OF INSULIN: ICD-10-CM

## 2020-09-30 DIAGNOSIS — Z95.5 HISTORY OF CORONARY ARTERY STENT PLACEMENT: ICD-10-CM

## 2020-09-30 DIAGNOSIS — I25.10 CORONARY ARTERY DISEASE INVOLVING NATIVE CORONARY ARTERY OF NATIVE HEART WITHOUT ANGINA PECTORIS: ICD-10-CM

## 2020-09-30 DIAGNOSIS — I50.22 HEART FAILURE, SYSTOLIC, CHRONIC: Primary | ICD-10-CM

## 2020-09-30 DIAGNOSIS — Z79.01 CHRONIC ANTICOAGULATION: ICD-10-CM

## 2020-09-30 PROCEDURE — 99499 RISK ADDL DX/OHS AUDIT: ICD-10-PCS | Mod: HCNC,S$GLB,, | Performed by: INTERNAL MEDICINE

## 2020-09-30 PROCEDURE — 99999 PR PBB SHADOW E&M-EST. PATIENT-LVL IV: ICD-10-PCS | Mod: PBBFAC,HCNC,, | Performed by: INTERNAL MEDICINE

## 2020-09-30 PROCEDURE — 3078F DIAST BP <80 MM HG: CPT | Mod: HCNC,CPTII,S$GLB, | Performed by: INTERNAL MEDICINE

## 2020-09-30 PROCEDURE — 1159F MED LIST DOCD IN RCRD: CPT | Mod: HCNC,S$GLB,, | Performed by: INTERNAL MEDICINE

## 2020-09-30 PROCEDURE — 99499 UNLISTED E&M SERVICE: CPT | Mod: HCNC,S$GLB,, | Performed by: INTERNAL MEDICINE

## 2020-09-30 PROCEDURE — 3288F PR FALLS RISK ASSESSMENT DOCUMENTED: ICD-10-PCS | Mod: HCNC,CPTII,S$GLB, | Performed by: INTERNAL MEDICINE

## 2020-09-30 PROCEDURE — 99999 PR PBB SHADOW E&M-EST. PATIENT-LVL IV: CPT | Mod: PBBFAC,HCNC,, | Performed by: INTERNAL MEDICINE

## 2020-09-30 PROCEDURE — 1126F AMNT PAIN NOTED NONE PRSNT: CPT | Mod: HCNC,S$GLB,, | Performed by: INTERNAL MEDICINE

## 2020-09-30 PROCEDURE — 99214 OFFICE O/P EST MOD 30 MIN: CPT | Mod: HCNC,S$GLB,, | Performed by: INTERNAL MEDICINE

## 2020-09-30 PROCEDURE — 3074F PR MOST RECENT SYSTOLIC BLOOD PRESSURE < 130 MM HG: ICD-10-PCS | Mod: HCNC,CPTII,S$GLB, | Performed by: INTERNAL MEDICINE

## 2020-09-30 PROCEDURE — 3074F SYST BP LT 130 MM HG: CPT | Mod: HCNC,CPTII,S$GLB, | Performed by: INTERNAL MEDICINE

## 2020-09-30 PROCEDURE — 1100F PTFALLS ASSESS-DOCD GE2>/YR: CPT | Mod: HCNC,CPTII,S$GLB, | Performed by: INTERNAL MEDICINE

## 2020-09-30 PROCEDURE — 3078F PR MOST RECENT DIASTOLIC BLOOD PRESSURE < 80 MM HG: ICD-10-PCS | Mod: HCNC,CPTII,S$GLB, | Performed by: INTERNAL MEDICINE

## 2020-09-30 PROCEDURE — 1126F PR PAIN SEVERITY QUANTIFIED, NO PAIN PRESENT: ICD-10-PCS | Mod: HCNC,S$GLB,, | Performed by: INTERNAL MEDICINE

## 2020-09-30 PROCEDURE — 1100F PR PT FALLS ASSESS DOC 2+ FALLS/FALL W/INJURY/YR: ICD-10-PCS | Mod: HCNC,CPTII,S$GLB, | Performed by: INTERNAL MEDICINE

## 2020-09-30 PROCEDURE — 99214 PR OFFICE/OUTPT VISIT, EST, LEVL IV, 30-39 MIN: ICD-10-PCS | Mod: HCNC,S$GLB,, | Performed by: INTERNAL MEDICINE

## 2020-09-30 PROCEDURE — 1159F PR MEDICATION LIST DOCUMENTED IN MEDICAL RECORD: ICD-10-PCS | Mod: HCNC,S$GLB,, | Performed by: INTERNAL MEDICINE

## 2020-09-30 PROCEDURE — 3288F FALL RISK ASSESSMENT DOCD: CPT | Mod: HCNC,CPTII,S$GLB, | Performed by: INTERNAL MEDICINE

## 2020-09-30 NOTE — PROGRESS NOTES
Subjective:   Patient ID:  Randy Camejo is a 80 y.o. male who presents for follow up of Chronic Systolic Heart Failure      HPI: Back for f/u after nearly 3 months.  Weight has been down quite a bit since early July with improved diuresis and he's feeling better.  He forgets to take his second 40mg of lasix a lot of the time.    He now is doing well with no new symptoms or cardiovascular complaints and he's had improvement in exercise capacity.  His dyspnea with exertion is minimal and only with significant exertion. He denies chest discomfort, palpitations, PND/orthopnea, lightheadedness and syncope.      7/8/2020 HPI: Very pleasant man who previously saw Dr. Joseph for chronic systolic heart failure, AF, and CAD s/p stenting presents to establish care with Kristasfaisal.  He had an echo about 5 weeks and it showed a CVP of 15, bilateral pleural effusions, a small pericardial effusion, and an EF of 20% with a PASP of 69mm Hg.     He was as high as 198 with his weight in early June but is now down to 191 on an increased dose of lasix (20 --> 20 bid).     He denies chest discomfort, palpitations, PND/orthopnea, lightheadedness and syncope.  He does get out of breath with climbing stairs or with other exertion that is more than minimal.     No bleeding, hematochezia, or melena.  No TIA/stroke symtoms.     No F/C/cough/diarrhea/anosmia.        Dr. Joseph's May 2019 HPI Randy Camejo is a 78 y.o. male with hypertension and diabetes mellitus type 2. He suffered a myocardial infarction in 2000 when he presented to [facility] and had a stent placed. He was treated for DM2 for a few years with metformin but then stopped it. He did not had any regular medical follow up for several years. He began feeling weak and have diarrhea on 2/15/2019. He diarrhea continued and he became weaker over the next several weak. He had nausea and vomited. On 2/19/2019 he went to  and was referred to the ER. He was noted to be in atrial  fibrillation and admitted. 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 He was given amiodarone, digoxin and metoprolol. He was anticoagulation with heparin iv and later apixiban. On 2/25/2019 he underwent a DILLAN guided CV. There was severe left ventricular dysfunction with an EF of 15%. The TERESO had spontaneous echo contrast ut no thrombus. He was cardioverted with a 100 J shock to sinus rhythm. On 2/26/2019 he became hyperkalemic with a K of 6.0 after given enalapril. On 4/5/2019 he had a follow up Echo that revealed a moderately dilated left ventricle with moderate systolic dysfunction. The EF was in the 35-40% range. There was an anteroseptal as well as an inferior WMA. There was moderate diastolic dysfunction, a moderately dilated LA, mild aortic valve sclerosis with apeak velocity of 1.1 m/s, mild AR and moderate MR. He has slowly gained strength. No exertional chest pain or exertional dyspnea. No palpitations or weak spells. He however has some back pain and is sometimes a bit unsteady on his feet. Feeling well overall.     Patient Active Problem List   Diagnosis    Nuclear sclerosis, bilateral    Nuclear sclerotic cataract of left eye    Atrial fibrillation    Type 2 diabetes mellitus with neurologic complication, with long-term current use of insulin    Heart failure, systolic, chronic    Pre-ulcerative corn or callous    Peripheral vascular disease    Tachycardia induced cardiomyopathy    Coronary artery disease    Chronic anticoagulation    History of myocardial infarction    History of coronary artery stent placement    Essential hypertension    Hypercholesterolemia    High risk medication use    Subclinical hypothyroidism    Stage 3 chronic kidney disease    Skin ulcer of toe with fat layer exposed    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    alcohol swabs PadM use 2 (two) times a day. E11.49    amiodarone (PACERONE) 200 MG Tab TAKE 1 TABLET(200 MG) BY MOUTH EVERY DAY    atorvastatin (LIPITOR) 10 MG tablet Take 1 tablet (10 mg total) by mouth once daily.    blood glucose control, low Soln Use 1 each to check glucose level of glucometer weekly    blood sugar diagnostic (TRUE METRIX GLUCOSE TEST STRIP) Strp use 2 (two) times a day. To check blood sugar    blood sugar diagnostic Strp Use to check blood glucose four times daily, to use with insurance preferred meter. ICD10: E11.42    blood-glucose meter (TRUE METRIX AIR GLUCOSE METER) Misc 1 Device by Misc.(Non-Drug; Combo Route) route 2 (two) times a day. To check blood sugar. ICD10: E11.49    carvediloL (COREG) 6.25 MG tablet TAKE 1 TABLET(6.25 MG) BY MOUTH TWICE DAILY    ELIQUIS 5 mg Tab TAKE 1 TABLET(5 MG) BY MOUTH TWICE DAILY    famotidine (PEPCID) 20 MG tablet Take 1 tablet (20 mg total) by mouth once daily.    furosemide (LASIX) 40 MG tablet Take 1 tablet (40 mg total) by mouth once daily. (Patient taking differently: Take 80 mg by mouth once daily. )    gabapentin (NEURONTIN) 300 MG capsule Take 1 capsule (300 mg total) by mouth every evening.    insulin detemir U-100 (LEVEMIR FLEXTOUCH U-100 INSULN) 100 unit/mL (3 mL) SubQ InPn pen Inject 10 Units into the skin every evening. If Blood Glucose is less than 100 mg/dL at BEDTIME, give 16 grams (four glucose tablets) X 1 dose for patients who can take PO. Recheck BG in 30 minutes and call MD for insulin dose adjustments prior to administering insulin detemir (Levemir).    lancets (TRUEPLUS LANCETS) 33 gauge Misc use twice a day as directed    levothyroxine (SYNTHROID) 75 MCG tablet Take 1 tablet (75 mcg total) by mouth before breakfast.    pen needle, diabetic 31 gauge x 5/16" Ndle 1 Device by " Misc.(Non-Drug; Combo Route) route 4 (four) times daily. Use with insulin    sacubitriL-valsartan (ENTRESTO) 49-51 mg per tablet Take 1 tablet by mouth 2 (two) times daily.     No current facility-administered medications for this visit.        Review of Systems   Constitution: Negative.   HENT: Negative.    Eyes: Negative.    Cardiovascular: Negative.  Negative for chest pain, dyspnea on exertion, near-syncope, orthopnea and palpitations.   Respiratory: Negative.  Negative for cough and shortness of breath.    Endocrine: Negative.    Hematologic/Lymphatic: Negative.    Skin: Negative.    Musculoskeletal: Negative.    Gastrointestinal: Negative.    Genitourinary: Negative.    Neurological: Negative.    Psychiatric/Behavioral: Negative.      Objective:   Physical Exam   Constitutional: He is oriented to person, place, and time. He appears well-developed and well-nourished.   HENT:   Head: Normocephalic and atraumatic.   Mouth/Throat: Oropharynx is clear and moist.   Eyes: Conjunctivae and EOM are normal. No scleral icterus.   Neck: Normal range of motion. Neck supple. No JVD present.   Cardiovascular: Normal rate, regular rhythm, normal heart sounds and intact distal pulses. Exam reveals no gallop and no friction rub.   No murmur heard.  Pulmonary/Chest: Effort normal. He has no wheezes. He has no rales.   Decreased breath sounds in left lower fields.   Abdominal: Soft. Bowel sounds are normal. He exhibits no distension. There is no abdominal tenderness.   Musculoskeletal: Normal range of motion.         General: Edema (2+ edema to the knee on the right extending into the thigh; 1+ left pretibial edema) present.   Neurological: He is alert and oriented to person, place, and time.   Skin: Skin is warm and dry. No rash noted. No erythema.   Psychiatric: He has a normal mood and affect. His behavior is normal. Judgment and thought content normal.   Vitals reviewed.      Lab Results   Component Value Date    WBC 5.38  08/25/2020    HGB 12.3 (L) 08/25/2020    HCT 40.9 08/25/2020     (H) 08/25/2020     (L) 08/25/2020         Chemistry        Component Value Date/Time     08/25/2020 1432    K 4.4 08/25/2020 1432     08/25/2020 1432    CO2 30 (H) 08/25/2020 1432    BUN 36 (H) 08/25/2020 1432    CREATININE 1.5 (H) 08/25/2020 1432     (H) 08/25/2020 1432        Component Value Date/Time    CALCIUM 8.5 (L) 08/25/2020 1432    ALKPHOS 89 08/25/2020 1432    AST 20 08/25/2020 1432    ALT 16 08/25/2020 1432    BILITOT 1.1 (H) 08/25/2020 1432    ESTGFRAFRICA 50.5 (A) 08/25/2020 1432    EGFRNONAA 43.7 (A) 08/25/2020 1432            Lab Results   Component Value Date    CHOL 80 (L) 01/15/2020    CHOL 95 (L) 04/10/2019    CHOL 137 02/20/2019     Lab Results   Component Value Date    HDL 34 (L) 01/15/2020    HDL 28 (L) 04/10/2019    HDL 26 (L) 02/20/2019     Lab Results   Component Value Date    LDLCALC 39.2 (L) 01/15/2020    LDLCALC 57.0 (L) 04/10/2019    LDLCALC 93.6 02/20/2019     Lab Results   Component Value Date    TRIG 34 01/15/2020    TRIG 50 04/10/2019    TRIG 87 02/20/2019     Lab Results   Component Value Date    CHOLHDL 42.5 01/15/2020    CHOLHDL 29.5 04/10/2019    CHOLHDL 19.0 (L) 02/20/2019       Lab Results   Component Value Date    TSH 10.027 (H) 08/25/2020       Lab Results   Component Value Date    HGBA1C 5.9 (H) 08/25/2020       Assessment:     1. Heart failure, systolic, chronic    2. Coronary artery disease involving native coronary artery of native heart without angina pectoris    3. Essential hypertension    4. Paroxysmal atrial fibrillation    5. Chronic anticoagulation    6. History of coronary artery stent placement    7. History of myocardial infarction    8. Type 2 diabetes mellitus with diabetic neuropathy, with long-term current use of insulin        Plan:       Continue current medicines.  Would like to increase Entresto to 97/103 bid but his pressure is already on the low end  (102/54).  Stressed that he should take it bid for another week, then daily with PRN second dose for weight gain.    His L-thyroxine dose has been adjusted since his August TSH of 10.    Diet/exercise goals reinforced.    Hand washing and social distancing stressed.    F/U 6 months

## 2020-10-12 ENCOUNTER — TELEPHONE (OUTPATIENT)
Dept: PODIATRY | Facility: CLINIC | Age: 80
End: 2020-10-12

## 2020-10-12 NOTE — TELEPHONE ENCOUNTER
----- Message from Maggy Posadas sent at 10/12/2020  1:43 PM CDT -----  Regarding: home health  Name of Who is Calling: DARIANA GUTIÉRREZ [[id number]]      What is the request in detail: Patient is requesting a call back he would like to see if he can change home  health to come once a week or once every other week because he states they are only coming to check the bandage the wound is already healed     Can the clinic reply by [facility]: no      What Number to Call Back if not in [place]: [phone]

## 2020-10-15 ENCOUNTER — DOCUMENT SCAN (OUTPATIENT)
Dept: HOME HEALTH SERVICES | Facility: HOSPITAL | Age: 80
End: 2020-10-15
Payer: MEDICARE

## 2020-10-20 ENCOUNTER — TELEPHONE (OUTPATIENT)
Dept: PODIATRY | Facility: CLINIC | Age: 80
End: 2020-10-20

## 2020-10-20 NOTE — TELEPHONE ENCOUNTER
I left a message for the patient to return my call.          ----- Message from Mohsen Spear sent at 10/20/2020 11:02 AM CDT -----  Contact: [facility]  Please call [facility] at [phone]    Fax# [phone]    Ref# [id number]    Calling regarding a standard written order for medical supplies    The form was faxed to clinic on 08/12/2020    Thank you

## 2020-11-03 ENCOUNTER — PATIENT OUTREACH (OUTPATIENT)
Dept: ADMINISTRATIVE | Facility: OTHER | Age: 80
End: 2020-11-03

## 2020-11-03 ENCOUNTER — LAB VISIT (OUTPATIENT)
Dept: LAB | Facility: HOSPITAL | Age: 80
End: 2020-11-03
Attending: HOSPITALIST
Payer: MEDICARE

## 2020-11-03 ENCOUNTER — IMMUNIZATION (OUTPATIENT)
Dept: PHARMACY | Facility: CLINIC | Age: 80
End: 2020-11-03
Payer: MEDICARE

## 2020-11-03 DIAGNOSIS — E03.8 SUBCLINICAL HYPOTHYROIDISM: ICD-10-CM

## 2020-11-03 LAB
T4 FREE SERPL-MCNC: 1.2 NG/DL (ref 0.71–1.51)
TSH SERPL DL<=0.005 MIU/L-ACNC: 8.61 UIU/ML (ref 0.4–4)

## 2020-11-03 PROCEDURE — 36415 COLL VENOUS BLD VENIPUNCTURE: CPT | Mod: HCNC,PN

## 2020-11-03 PROCEDURE — 84443 ASSAY THYROID STIM HORMONE: CPT | Mod: HCNC

## 2020-11-03 PROCEDURE — 84439 ASSAY OF FREE THYROXINE: CPT | Mod: HCNC

## 2020-11-04 ENCOUNTER — PATIENT MESSAGE (OUTPATIENT)
Dept: INTERNAL MEDICINE | Facility: CLINIC | Age: 80
End: 2020-11-04

## 2020-11-04 DIAGNOSIS — E03.8 SUBCLINICAL HYPOTHYROIDISM: Primary | ICD-10-CM

## 2020-11-04 RX ORDER — LEVOTHYROXINE SODIUM 100 UG/1
100 TABLET ORAL
Qty: 90 TABLET | Refills: 0 | Status: ON HOLD | OUTPATIENT
Start: 2020-11-04 | End: 2021-01-06 | Stop reason: HOSPADM

## 2020-11-04 NOTE — PROGRESS NOTES
Health Maintenance Due   Topic Date Due    Hepatitis C Screening  1940    TETANUS VACCINE  07/16/1958    Shingles Vaccine (1 of 2) 07/16/1991    Pneumococcal Vaccine (65+ Low/Medium Risk) (1 of 2 - PCV13) 07/16/2006    Influenza Vaccine (1) 08/01/2020     Updates were requested from care everywhere.  Chart was reviewed for overdue Proactive [facility] Encounters ([facility]) topics (CRS, Breast Cancer Screening, Eye exam)  Health Maintenance has been updated.  [facility] immunization registry triggered.  Immunizations were reconciled.

## 2020-11-05 ENCOUNTER — OFFICE VISIT (OUTPATIENT)
Dept: PODIATRY | Facility: CLINIC | Age: 80
End: 2020-11-05
Payer: MEDICARE

## 2020-11-05 VITALS
HEIGHT: 71 IN | BODY MASS INDEX: 25.62 KG/M2 | HEART RATE: 55 BPM | SYSTOLIC BLOOD PRESSURE: 111 MMHG | DIASTOLIC BLOOD PRESSURE: 56 MMHG | WEIGHT: 183 LBS

## 2020-11-05 DIAGNOSIS — E11.40 TYPE 2 DIABETES MELLITUS WITH DIABETIC NEUROPATHY, WITH LONG-TERM CURRENT USE OF INSULIN: ICD-10-CM

## 2020-11-05 DIAGNOSIS — L84 PRE-ULCERATIVE CORN OR CALLOUS: Primary | ICD-10-CM

## 2020-11-05 DIAGNOSIS — Z89.421 HISTORY OF PARTIAL RAY AMPUTATION OF FIFTH TOE OF RIGHT FOOT: ICD-10-CM

## 2020-11-05 DIAGNOSIS — Z79.4 TYPE 2 DIABETES MELLITUS WITH DIABETIC NEUROPATHY, WITH LONG-TERM CURRENT USE OF INSULIN: ICD-10-CM

## 2020-11-05 DIAGNOSIS — B35.1 DERMATOPHYTOSIS OF NAIL: ICD-10-CM

## 2020-11-05 DIAGNOSIS — Z79.01 CHRONIC ANTICOAGULATION: ICD-10-CM

## 2020-11-05 PROBLEM — L97.502 SKIN ULCER OF TOE WITH FAT LAYER EXPOSED: Status: RESOLVED | Noted: 2019-07-10 | Resolved: 2020-11-05

## 2020-11-05 PROCEDURE — 11721 ROUTINE FOOT CARE: ICD-10-PCS | Mod: 59,Q7,HCNC,S$GLB | Performed by: PODIATRIST

## 2020-11-05 PROCEDURE — 99999 PR PBB SHADOW E&M-EST. PATIENT-LVL V: CPT | Mod: PBBFAC,HCNC,, | Performed by: PODIATRIST

## 2020-11-05 PROCEDURE — 11056 ROUTINE FOOT CARE: ICD-10-PCS | Mod: Q7,HCNC,S$GLB, | Performed by: PODIATRIST

## 2020-11-05 PROCEDURE — 11721 DEBRIDE NAIL 6 OR MORE: CPT | Mod: 59,Q7,HCNC,S$GLB | Performed by: PODIATRIST

## 2020-11-05 PROCEDURE — 11056 PARNG/CUTG B9 HYPRKR LES 2-4: CPT | Mod: Q7,HCNC,S$GLB, | Performed by: PODIATRIST

## 2020-11-05 PROCEDURE — 99999 PR PBB SHADOW E&M-EST. PATIENT-LVL V: ICD-10-PCS | Mod: PBBFAC,HCNC,, | Performed by: PODIATRIST

## 2020-11-05 PROCEDURE — 99499 UNLISTED E&M SERVICE: CPT | Mod: HCNC,S$GLB,, | Performed by: PODIATRIST

## 2020-11-05 PROCEDURE — 99499 NO LOS: ICD-10-PCS | Mod: HCNC,S$GLB,, | Performed by: PODIATRIST

## 2020-11-05 NOTE — PATIENT INSTRUCTIONS
How to Check Your Feet    Below are tips to help you look for foot problems. Try to check your feet at the same time each day, such as when you get out of bed in the morning.    · Check the top of each foot. The tops of toes, back of the heel, and outer edge of the foot can get a lot of rubbing from poor-fitting shoes.    · Check the bottom of each foot. Daily wear and tear often leads to problems at pressure spots.    · Check the toes and nails. Fungal infections often occur between toes. Toenail problems can also be a sign of fungal infections or lead to breaks in the skin.    · Check your shoes, too. Loose objects inside a shoe can injure the foot. Use your hand to feel inside your shoes for things like eze, loose stitching, or rough areas that could irritate your skin.        Diabetic Foot Care    Diabetes can lead to a number of different foot complications. Fortunately, most of these complications can be prevented with a little extra foot care. If diabetes is not well controlled, the high blood sugar can cause damage to blood vessels and result in poor circulation to the foot. When the skin does not get enough blood flow, it becomes prone to pressure sores and ulcers, which heal slowly.  High blood sugar can also damage nerves, interfering with the ability to feel pain and pressure. When you cant feel your foot normally, it is easy to injure your skin, bones and joints without knowing it. For these reasons diabetes increases the risk of fungal infections, bunions and ulcers. Deep ulcers can lead to bone infection. Gangrene is the most serious foot complication of diabetes. It usually occurs on the tips of the toes as blacked areas of skin. The black area is dead tissue. In severe cases, gangrene spreads to involve the entire toe, other toes and the entire foot. Foot or toe amputation may be required. Good foot care and blood sugar control can prevent this.    Home Care  1. Wear comfortable, proper fitting  shoes.  2. Wash your feet daily with warm water and mild soap.  3. After drying, apply a moisturizing cream or lotion.  4. Check your feet daily for skin breaks, blisters, swelling, or redness. Look between your toes also.  5. Wear cotton socks and change them every day.  6. Trim toe nails carefully and do not cut your cuticles.  7. Strive to keep your blood sugar under control with a combination of medicines, diet and activity.  8. If you smoke and have diabetes, it is very important that you stop. Smoking reduces blood flow to your foot.  9. Avoid activities that increase your risk of foot injury:  · Do not walk barefoot.  · Do not use heating pads or hot water bottles on your feet.  · Do not put your foot in a hot tub without first checking the temperature with your hand.  10) Schedule yearly foot exams.    Follow Up  with your doctor or as advised by our staff. Report any cut, puncture, scrape, other injury, blister, ingrown toenail or ulcer on your foot.    Get Prompt Medical Attention  if any of the following occur:  -- Open ulcer with pus draining from the wound  -- Increasing foot or leg pain  -- New areas of redness or swelling or tender areas of the foot    © 5400-7679 [employer]. [address] [place] [address], [place] [address]. All rights reserved. This information is not intended as a substitute for professional medical care. Always follow your healthcare professional's instructions.      General nail care measures for abnormal nails include:    ? Keeping nails trimmed short    ? Avoiding trauma     ? Avoiding contact irritants     ? Keeping nails dry (avoiding wet work)    ? Avoiding all nail cosmetics

## 2020-11-05 NOTE — PROGRESS NOTES
Chief Complaint: Foot Ulcer, Wound Care, and Dressing Change      HPI:  Randy Camejo is a 80 y.o. male presents for foot exam.   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    Doing well.  Hasn't really been washing his feet.  He had been in a football dressings for a while due to the wound dehiscence from the partial ray amputation.   Otherwise no adverse interval trauma.       The patient is under the active care of his PCP Dr. Susie Lazo MD for chronic conditions, including diabetes.    He last saw his PCP on 8/25/2020.     He is also on Eliquis.       Patient Active Problem List   Diagnosis    Nuclear sclerosis, bilateral    Nuclear sclerotic cataract of left eye    Atrial fibrillation    Type 2 diabetes mellitus with neurologic complication, with long-term current use of insulin    Heart failure, systolic, chronic    Pre-ulcerative corn or callous    Peripheral vascular disease    Tachycardia induced cardiomyopathy    Coronary artery disease    Chronic anticoagulation    History of myocardial infarction    History of coronary artery stent placement    Essential hypertension    Hypercholesterolemia    High risk medication use    Subclinical hypothyroidism    Stage 3 chronic kidney disease    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  0    alcohol swabs PadM use 2 (two) times a day. E11.49 200 each 5    amiodarone (PACERONE) 200 MG Tab TAKE 1 TABLET(200 MG) BY MOUTH EVERY DAY 90 tablet 3    atorvastatin (LIPITOR) 10 MG tablet Take 1 tablet (10 mg total) by mouth once daily. 90 tablet 3    blood glucose control, low Soln Use 1 each to check glucose level of glucometer weekly 4 each 11    blood sugar diagnostic (TRUE METRIX GLUCOSE TEST STRIP)  "Strp use 2 (two) times a day. To check blood sugar 200 strip 5    blood sugar diagnostic Strp Use to check blood glucose four times daily, to use with insurance preferred meter. ICD10: E11.42 200 strip 4    blood-glucose meter (TRUE METRIX AIR GLUCOSE METER) Misc 1 Device by Misc.(Non-Drug; Combo Route) route 2 (two) times a day. To check blood sugar. ICD10: E11.49 1 each 0    carvediloL (COREG) 6.25 MG tablet TAKE 1 TABLET(6.25 MG) BY MOUTH TWICE DAILY 180 tablet 3    ELIQUIS 5 mg Tab TAKE 1 TABLET(5 MG) BY MOUTH TWICE DAILY 180 tablet 3    famotidine (PEPCID) 20 MG tablet Take 1 tablet (20 mg total) by mouth once daily. 30 tablet 5    furosemide (LASIX) 40 MG tablet Take 1 tablet (40 mg total) by mouth once daily. (Patient taking differently: Take 80 mg by mouth once daily. ) 60 tablet 11    gabapentin (NEURONTIN) 300 MG capsule Take 1 capsule (300 mg total) by mouth every evening. (Patient taking differently: Take 300 mg by mouth as needed. ) 30 capsule 11    insulin detemir U-100 (LEVEMIR FLEXTOUCH U-100 INSULN) 100 unit/mL (3 mL) SubQ InPn pen Inject 10 Units into the skin every evening. If Blood Glucose is less than 100 mg/dL at BEDTIME, give 16 grams (four glucose tablets) X 1 dose for patients who can take PO. Recheck BG in 30 minutes and call MD for insulin dose adjustments prior to administering insulin detemir (Levemir). 15 mL 3    lancets (TRUEPLUS LANCETS) 33 gauge Misc use twice a day as directed 200 each 5    levothyroxine (SYNTHROID) 100 MCG tablet Take 1 tablet (100 mcg total) by mouth before breakfast. 90 tablet 0    pen needle, diabetic 31 gauge x 5/16" Ndle 1 Device by Misc.(Non-Drug; Combo Route) route 4 (four) times daily. Use with insulin 200 each 11    sacubitriL-valsartan (ENTRESTO) 49-51 mg per tablet Take 1 tablet by mouth 2 (two) times daily. 60 tablet 11    [] flu vac 2020 [id number]-[id number],PF, (FLUAD QUAD 2020-21,65Y UP,,PF,) 60 mcg (15 mcg x 4)/0.5 mL Syrg Inject 0.5 " mLs into the muscle once. for 1 dose 0.5 mL 0     No current facility-administered medications on file prior to visit.            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   Social Needs    Financial resource strain: Not on file    Food insecurity     Worry: Not on file     Inability: Not on file    Transportation needs     Medical: Not on file     Non-medical: Not on file   Tobacco Use    Smoking status: Never Smoker    Smokeless tobacco: Never Used   Substance and Sexual Activity    Alcohol use: No    Drug use: No    Sexual activity: Not Currently   Lifestyle    Physical activity     Days per week: Not on file     Minutes per session: Not on file    Stress: Not on file   Relationships    Social connections     Talks on phone: Not on file     Gets together: Not on file     Attends Voodoo service: Not on file     Active member of club or organization: Not on file     Attends meetings of clubs or organizations: Not on file     Relationship status: Not on file   Other Topics Concern    Not on file   Social History Narrative    Not on file           Review of Systems   Constitution: Negative for chills, diaphoresis, fever, malaise/fatigue and night sweats.   Cardiovascular: Negative for claudication, cyanosis, leg swelling and syncope.   Skin: Positive for poor wound healing and unusual hair distribution. Negative for color change, dry skin, nail changes, rash and suspicious lesions.   Musculoskeletal: Negative for falls, joint pain, joint swelling, muscle cramps, muscle weakness and stiffness.   Gastrointestinal: Negative for constipation, diarrhea, nausea and vomiting.   Neurological: Positive  "for numbness, paresthesias and sensory change. Negative for tremors.             Objective:      Vitals:    11/05/20 1422   BP: (!) 111/56   Pulse: (!) 55   Weight: 83 kg (183 lb)   Height: 5' 11" (1.803 m)         Physical Exam  Constitutional:       General: He is not in acute distress.     Appearance: He is well-developed. He is not diaphoretic.   Cardiovascular:      Pulses:           Dorsalis pedis pulses are 1+ on the right side and 1+ on the left side.        Posterior tibial pulses are 1+ on the right side and 1+ on the left side.      Comments: Toes warm, pink, cap fill <5 seconds.  Musculoskeletal:      Comments: Partial 5th ray resection right foot without symptoms.   Lymphadenopathy:      Comments: Negative lymphadenopathy bilateral popliteal fossa and tarsal tunnel.     Skin:     General: Skin is warm and dry.      Coloration: Skin is not pale.      Findings: No abrasion, bruising, burn, ecchymosis, erythema, laceration, lesion or rash.      Comments: Location:  Wound dorsal lateral proximal right 5th ray  Pre debridement:  Callus/scab.  Post debridement:  Epithelialized.       Ulcer Location: left posterior lower leg  Measurements:   epithelialized      Ulcer Location: left anterior lower leg  Measurements:  Epithelialized.       Skin thin, shiny, atrophic, with decreased density and distribution of pedal hair bilateral, but without hyperpigmentation, maggie discoloration,  ulcers, masses, nodules or cords palpated bilateral feet and legs.      Toenails x 9 are mycotic, thickened, brittle, crumbly, yellow with subungual debris.      Pre ulcerative calluses bilateral hallux     Neurological:      Sensory: Sensory deficit present.      Comments: Diminished/loss of protective sensation all toes bilateral to 10 gram monofilament.       Psychiatric:         Behavior: Behavior is cooperative.               Assessment:       Encounter Diagnoses   Name Primary?   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    All wounds healed.   Patient is awaiting diabetes shoes.     Shoe inspection. Diabetic Foot Education. Patient reminded of the importance of good nutrition and blood sugar control to help prevent podiatric complications of diabetes. Patient instructed on proper foot hygiene. We discussed wearing proper shoe gear, daily foot inspections, never walking without protective shoe gear, never putting sharp instruments to feet.    Adequate vitamin supplementation, protein intake, and hydration - discussed with patient.    Inspect feet multiple times daily for signs of occurrence/recurrence ulceration.     Follow-up in 2 months or sooner if concerned.       Routine Foot Care    Date/Time: 11/5/2020 2:15 PM  Performed by: Jess Rosa DPM  Authorized by: Jess Rosa DPM     Consent Done?:  Yes (Verbal)  Hyperkeratotic Skin Lesions?: Yes    Number of trimmed lesions:  2  Location(s):  Left 1st Toe and Right 1st Toe    Nail Care Type:  Debride(Left 1st Toe, Left 3rd Toe, Left 2nd Toe, Left 4th Toe, Left 5th Toe, Right 1st Toe, Right 2nd Toe, Right 3rd Toe and Right 4th Toe)  Patient tolerance:  Patient tolerated the procedure well with no immediate complications     With patient's permission, the toenails  mentioned above were aggressively reduced and debrided using a nail nipper, removing all offending nail and debris. Utilizing a #15 scalpel, I trimmed the corns and calluses at the above mentioned location.      The patient will continue to monitor the areas daily, inspect the feet, wear protective shoe gear when ambulatory, and moisturizer to maintain skin integrity.

## 2020-11-20 ENCOUNTER — PATIENT MESSAGE (OUTPATIENT)
Dept: INTERNAL MEDICINE | Facility: CLINIC | Age: 80
End: 2020-11-20

## 2020-11-20 ENCOUNTER — TELEPHONE (OUTPATIENT)
Dept: INTERNAL MEDICINE | Facility: CLINIC | Age: 80
End: 2020-11-20

## 2020-11-20 DIAGNOSIS — L30.9 ECZEMA, UNSPECIFIED TYPE: Primary | ICD-10-CM

## 2020-11-20 NOTE — TELEPHONE ENCOUNTER
----- Message from Corina Drummond sent at 11/20/2020 11:29 AM CST -----  Contact: [phone]  Patient states he still have eczema, he is feeling better but it still itching at night specially. Patient states he is using the 1% cream  at night. Please call and advise

## 2020-11-23 DIAGNOSIS — I50.22 HEART FAILURE, SYSTOLIC, CHRONIC: ICD-10-CM

## 2020-11-23 RX ORDER — FUROSEMIDE 40 MG/1
40 TABLET ORAL DAILY
Qty: 60 TABLET | Refills: 11 | Status: CANCELLED | OUTPATIENT
Start: 2020-11-23

## 2020-11-24 ENCOUNTER — PATIENT MESSAGE (OUTPATIENT)
Dept: INTERNAL MEDICINE | Facility: CLINIC | Age: 80
End: 2020-11-24

## 2020-11-24 RX ORDER — FAMOTIDINE 20 MG/1
20 TABLET, FILM COATED ORAL DAILY
Qty: 30 TABLET | Refills: 11 | Status: SHIPPED | OUTPATIENT
Start: 2020-11-24 | End: 2021-11-15 | Stop reason: SDUPTHER

## 2020-11-30 ENCOUNTER — PATIENT MESSAGE (OUTPATIENT)
Dept: DERMATOLOGY | Facility: CLINIC | Age: 80
End: 2020-11-30

## 2020-12-01 ENCOUNTER — OFFICE VISIT (OUTPATIENT)
Dept: DERMATOLOGY | Facility: CLINIC | Age: 80
End: 2020-12-01
Payer: MEDICARE

## 2020-12-01 VITALS — TEMPERATURE: 99 F

## 2020-12-01 DIAGNOSIS — L82.1 SEBORRHEIC KERATOSIS: ICD-10-CM

## 2020-12-01 DIAGNOSIS — L29.9 PRURITUS: Primary | ICD-10-CM

## 2020-12-01 DIAGNOSIS — L72.0 EIC (EPIDERMAL INCLUSION CYST): ICD-10-CM

## 2020-12-01 DIAGNOSIS — D22.62 MELANOCYTIC NEVUS OF LEFT UPPER EXTREMITY: ICD-10-CM

## 2020-12-01 DIAGNOSIS — D18.01 ANGIOMA OF SKIN: ICD-10-CM

## 2020-12-01 DIAGNOSIS — D22.5 MELANOCYTIC NEVUS OF TRUNK: ICD-10-CM

## 2020-12-01 DIAGNOSIS — L85.3 XEROSIS CUTIS: ICD-10-CM

## 2020-12-01 PROCEDURE — 3288F FALL RISK ASSESSMENT DOCD: CPT | Mod: CPTII,S$GLB,, | Performed by: DERMATOLOGY

## 2020-12-01 PROCEDURE — 1126F AMNT PAIN NOTED NONE PRSNT: CPT | Mod: S$GLB,,, | Performed by: DERMATOLOGY

## 2020-12-01 PROCEDURE — 1126F PR PAIN SEVERITY QUANTIFIED, NO PAIN PRESENT: ICD-10-PCS | Mod: S$GLB,,, | Performed by: DERMATOLOGY

## 2020-12-01 PROCEDURE — 3288F PR FALLS RISK ASSESSMENT DOCUMENTED: ICD-10-PCS | Mod: CPTII,S$GLB,, | Performed by: DERMATOLOGY

## 2020-12-01 PROCEDURE — 1159F PR MEDICATION LIST DOCUMENTED IN MEDICAL RECORD: ICD-10-PCS | Mod: S$GLB,,, | Performed by: DERMATOLOGY

## 2020-12-01 PROCEDURE — 99203 PR OFFICE/OUTPT VISIT, NEW, LEVL III, 30-44 MIN: ICD-10-PCS | Mod: S$GLB,,, | Performed by: DERMATOLOGY

## 2020-12-01 PROCEDURE — 1159F MED LIST DOCD IN RCRD: CPT | Mod: S$GLB,,, | Performed by: DERMATOLOGY

## 2020-12-01 PROCEDURE — 3072F PR LOW RISK FOR RETINOPATHY: ICD-10-PCS | Mod: S$GLB,,, | Performed by: DERMATOLOGY

## 2020-12-01 PROCEDURE — 1101F PR PT FALLS ASSESS DOC 0-1 FALLS W/OUT INJ PAST YR: ICD-10-PCS | Mod: CPTII,S$GLB,, | Performed by: DERMATOLOGY

## 2020-12-01 PROCEDURE — 99203 OFFICE O/P NEW LOW 30 MIN: CPT | Mod: S$GLB,,, | Performed by: DERMATOLOGY

## 2020-12-01 PROCEDURE — 3072F LOW RISK FOR RETINOPATHY: CPT | Mod: S$GLB,,, | Performed by: DERMATOLOGY

## 2020-12-01 PROCEDURE — 1101F PT FALLS ASSESS-DOCD LE1/YR: CPT | Mod: CPTII,S$GLB,, | Performed by: DERMATOLOGY

## 2020-12-01 RX ORDER — FUROSEMIDE 20 MG/1
TABLET ORAL
Status: ON HOLD | COMMUNITY
Start: 2020-11-27 | End: 2021-01-06 | Stop reason: HOSPADM

## 2020-12-01 NOTE — PATIENT INSTRUCTIONS
Recommend CeraVe Itch Relief cream or Sarna sensitive lotion. Can store these in the refrigerator for an added cooling effect.    Recommended CeraVe healing ointment to affected areas daily-BID.

## 2020-12-01 NOTE — LETTER
December 1, 2020      Susie Lazo MD  [address]  [place] [address]           [facility]  [address] [place] [address]  Phone: [phone]  Fax: [phone]          Patient: Randy Camejo   MR Number: [id number]   YOB: 1940   Date of Visit: 12/1/2020       Dear Dr. Susie Lazo:    Thank you for referring Randy Camejo to me for evaluation. Attached you will find relevant portions of my assessment and plan of care.    If you have questions, please do not hesitate to call me. I look forward to following Randy Camejo along with you.    Sincerely,    Aminah Brown MD    Enclosure  CC:  No Recipients    If you would like to receive this communication electronically, please contact [email or web address] or [phone] to request more information on [employer] access.    For providers and/or their staff who would like to refer a patient to [facility], please contact us through our one-stop-shop provider referral line, [facility], [place], at [phone].    If you feel you have received this communication in error or would no longer like to receive these types of communications, please e-mail [email or web address]

## 2020-12-01 NOTE — PROGRESS NOTES
Subjective:       Patient ID:  Randy Camejo is a 80 y.o. male who presents for   Chief Complaint   Patient presents with    Rash     Rash - Initial  Affected locations: chest (sometimes on the back)  Duration: 6 months  Signs / symptoms: dryness, itching, redness, irritated and tender  Severity: mild to moderate  Timing: intermittent (worse at night)  Relieving factors/Treatments tried: OTC hydrocortisone and OTC moisturizer (uses Cetaphil soap and Eucerin cream)  Improvement on treatment: mild      Review of Systems   Constitutional: Negative for fever.   Respiratory: Positive for shortness of breath (CHF). Negative for cough.    Musculoskeletal: Negative for joint swelling and arthralgias.   Skin: Positive for itching, rash and dry skin.   Hematologic/Lymphatic: Bruises/bleeds easily (due to Eliquis).        Objective:    Physical Exam   Constitutional: He appears well-developed and well-nourished. No distress.   Eyes: Lids are normal.  No conjunctival no injection.   Neurological: He is alert and oriented to person, place, and time. He is not disoriented.   Psychiatric: He has a normal mood and affect.   Skin:   Areas Examined (abnormalities noted in diagram):   Head / Face Inspection Performed  Neck Inspection Performed  Chest / Axilla Inspection Performed  Abdomen Inspection Performed  Back Inspection Performed  RUE Inspected  LUE Inspection Performed  Nails and Digits Inspection Performed              Diagram Legend     Erythematous scaling macule/papule c/w actinic keratosis       Vascular papule c/w angioma      Pigmented verrucoid papule/plaque c/w seborrheic keratosis      Yellow umbilicated papule c/w sebaceous hyperplasia      Irregularly shaped tan macule c/w lentigo     1-2 mm smooth white papules consistent with Milia      Movable subcutaneous cyst with punctum c/w epidermal inclusion cyst      Subcutaneous movable cyst c/w pilar cyst      Firm pink to brown papule c/w dermatofibroma       Pedunculated fleshy papule(s) c/w skin tag(s)      Evenly pigmented macule c/w junctional nevus     Mildly variegated pigmented, slightly irregular-bordered macule c/w mildly atypical nevus      Flesh colored to evenly pigmented papule c/w intradermal nevus       Pink pearly papule/plaque c/w basal cell carcinoma      Erythematous hyperkeratotic cursted plaque c/w SCC      Surgical scar with no sign of skin cancer recurrence      Open and closed comedones      Inflammatory papules and pustules      Verrucoid papule consistent consistent with wart     Erythematous eczematous patches and plaques     Dystrophic onycholytic nail with subungual debris c/w onychomycosis     Umbilicated papule    Erythematous-base heme-crusted tan verrucoid plaque consistent with inflamed seborrheic keratosis     Erythematous Silvery Scaling Plaque c/w Psoriasis     See annotation      Assessment / Plan:        Pruritus  Recommended CeraVe Itch Relief cream/lotion or Sarna sensitive lotion. Can store these in the refrigerator for an added cooling effect.    Xerosis cutis  Good skin care regimen was discussed, including using lukewarm water with mild soap, and applying a moisturizing cream such as CeraVe healing ointment to skin 1-2 times per day.     Melanocytic nevus of trunk  Melanocytic nevus of left upper extremity  Benign-appearing nevi on exam today. Counseled patient to periodically examine moles and return to clinic if any changes in size, shape, or color are noted or if it becomes symptomatic (bleeding, itching, pain, etc).    Seborrheic keratosis  These are benign, inherited growths without a malignant potential. Reassurance given to patient. No treatment is necessary.    EIC (epidermal inclusion cyst)  This is a benign cyst of the hair follicle. Reassurance provided. No treatment is necessary unless it is symptomatic.     Angioma of skin  This is a benign vascular lesion. Reassurance given. No treatment required.      Follow up  if symptoms worsen or fail to improve.

## 2020-12-03 ENCOUNTER — OFFICE VISIT (OUTPATIENT)
Dept: INTERNAL MEDICINE | Facility: CLINIC | Age: 80
End: 2020-12-03
Payer: MEDICARE

## 2020-12-03 VITALS
DIASTOLIC BLOOD PRESSURE: 62 MMHG | HEART RATE: 52 BPM | BODY MASS INDEX: 24.88 KG/M2 | TEMPERATURE: 98 F | OXYGEN SATURATION: 97 % | HEIGHT: 71 IN | WEIGHT: 177.69 LBS | SYSTOLIC BLOOD PRESSURE: 104 MMHG | RESPIRATION RATE: 16 BRPM

## 2020-12-03 DIAGNOSIS — E03.8 SUBCLINICAL HYPOTHYROIDISM: ICD-10-CM

## 2020-12-03 DIAGNOSIS — N18.32 STAGE 3B CHRONIC KIDNEY DISEASE: ICD-10-CM

## 2020-12-03 DIAGNOSIS — L84 PRE-ULCERATIVE CORN OR CALLOUS: ICD-10-CM

## 2020-12-03 DIAGNOSIS — Z89.421 HISTORY OF PARTIAL RAY AMPUTATION OF FIFTH TOE OF RIGHT FOOT: ICD-10-CM

## 2020-12-03 DIAGNOSIS — Z79.01 CHRONIC ANTICOAGULATION: ICD-10-CM

## 2020-12-03 DIAGNOSIS — I50.22 HEART FAILURE, SYSTOLIC, CHRONIC: ICD-10-CM

## 2020-12-03 DIAGNOSIS — I25.10 CORONARY ARTERY DISEASE INVOLVING NATIVE CORONARY ARTERY OF NATIVE HEART WITHOUT ANGINA PECTORIS: ICD-10-CM

## 2020-12-03 DIAGNOSIS — I48.0 PAROXYSMAL ATRIAL FIBRILLATION: ICD-10-CM

## 2020-12-03 DIAGNOSIS — E11.40 TYPE 2 DIABETES MELLITUS WITH DIABETIC NEUROPATHY, WITH LONG-TERM CURRENT USE OF INSULIN: Primary | ICD-10-CM

## 2020-12-03 DIAGNOSIS — Z79.4 TYPE 2 DIABETES MELLITUS WITH DIABETIC NEUROPATHY, WITH LONG-TERM CURRENT USE OF INSULIN: Primary | ICD-10-CM

## 2020-12-03 DIAGNOSIS — R60.0 BILATERAL LEG EDEMA: ICD-10-CM

## 2020-12-03 DIAGNOSIS — I10 ESSENTIAL HYPERTENSION: ICD-10-CM

## 2020-12-03 DIAGNOSIS — I73.9 PERIPHERAL VASCULAR DISEASE: ICD-10-CM

## 2020-12-03 PROCEDURE — 3074F SYST BP LT 130 MM HG: CPT | Mod: HCNC,CPTII,S$GLB, | Performed by: HOSPITALIST

## 2020-12-03 PROCEDURE — 3074F PR MOST RECENT SYSTOLIC BLOOD PRESSURE < 130 MM HG: ICD-10-PCS | Mod: HCNC,CPTII,S$GLB, | Performed by: HOSPITALIST

## 2020-12-03 PROCEDURE — 1126F AMNT PAIN NOTED NONE PRSNT: CPT | Mod: HCNC,S$GLB,, | Performed by: HOSPITALIST

## 2020-12-03 PROCEDURE — 3072F PR LOW RISK FOR RETINOPATHY: ICD-10-PCS | Mod: HCNC,S$GLB,, | Performed by: HOSPITALIST

## 2020-12-03 PROCEDURE — 3078F DIAST BP <80 MM HG: CPT | Mod: HCNC,CPTII,S$GLB, | Performed by: HOSPITALIST

## 2020-12-03 PROCEDURE — 99999 PR PBB SHADOW E&M-EST. PATIENT-LVL IV: ICD-10-PCS | Mod: PBBFAC,HCNC,, | Performed by: HOSPITALIST

## 2020-12-03 PROCEDURE — 99214 OFFICE O/P EST MOD 30 MIN: CPT | Mod: HCNC,S$GLB,, | Performed by: HOSPITALIST

## 2020-12-03 PROCEDURE — 1100F PR PT FALLS ASSESS DOC 2+ FALLS/FALL W/INJURY/YR: ICD-10-PCS | Mod: HCNC,CPTII,S$GLB, | Performed by: HOSPITALIST

## 2020-12-03 PROCEDURE — 3072F LOW RISK FOR RETINOPATHY: CPT | Mod: HCNC,S$GLB,, | Performed by: HOSPITALIST

## 2020-12-03 PROCEDURE — 99214 PR OFFICE/OUTPT VISIT, EST, LEVL IV, 30-39 MIN: ICD-10-PCS | Mod: HCNC,S$GLB,, | Performed by: HOSPITALIST

## 2020-12-03 PROCEDURE — 1159F MED LIST DOCD IN RCRD: CPT | Mod: HCNC,S$GLB,, | Performed by: HOSPITALIST

## 2020-12-03 PROCEDURE — 1100F PTFALLS ASSESS-DOCD GE2>/YR: CPT | Mod: HCNC,CPTII,S$GLB, | Performed by: HOSPITALIST

## 2020-12-03 PROCEDURE — 3288F FALL RISK ASSESSMENT DOCD: CPT | Mod: HCNC,CPTII,S$GLB, | Performed by: HOSPITALIST

## 2020-12-03 PROCEDURE — 3078F PR MOST RECENT DIASTOLIC BLOOD PRESSURE < 80 MM HG: ICD-10-PCS | Mod: HCNC,CPTII,S$GLB, | Performed by: HOSPITALIST

## 2020-12-03 PROCEDURE — 1159F PR MEDICATION LIST DOCUMENTED IN MEDICAL RECORD: ICD-10-PCS | Mod: HCNC,S$GLB,, | Performed by: HOSPITALIST

## 2020-12-03 PROCEDURE — 1126F PR PAIN SEVERITY QUANTIFIED, NO PAIN PRESENT: ICD-10-PCS | Mod: HCNC,S$GLB,, | Performed by: HOSPITALIST

## 2020-12-03 PROCEDURE — 99999 PR PBB SHADOW E&M-EST. PATIENT-LVL IV: CPT | Mod: PBBFAC,HCNC,, | Performed by: HOSPITALIST

## 2020-12-03 PROCEDURE — 3288F PR FALLS RISK ASSESSMENT DOCUMENTED: ICD-10-PCS | Mod: HCNC,CPTII,S$GLB, | Performed by: HOSPITALIST

## 2020-12-03 RX ORDER — INSULIN DETEMIR 100 [IU]/ML
8 INJECTION, SOLUTION SUBCUTANEOUS NIGHTLY
Refills: 3
Start: 2020-12-03 | End: 2021-04-30

## 2020-12-03 NOTE — PROGRESS NOTES
Subjective:     @Patient ID: Randy Camejo is a 80 y.o. male.    Chief Complaint: No chief complaint on file.    HPI  79 yo male presents for f/u      1. DM2 with neuropathy:  : Last A1c 5.9 8/25/20. Reports sometimes having low BG in AM (80s?) and may feel a little tired.   2. CHF: systolic. Last 2d echo 6/2/20 showed EF 20%, severe biatrial enlargement, pulm htn and cvp 15.  F/u with cardiology 9/2020. He is currently on Entresto and lasix is once daily    3. HTN: on coreg  4. Paroxysmal Afib:  on amiodarone per cardiology and eliquis 5 mg bid   5. CAD w/ stent: on coreg 6.25 mg bid, lipitor 10 mg, (non on asa due to being on eliquis).   6. Leg weakness: stable  7. Dysphagia:  Patient reports over the past few weeks he has been having trouble swallowing certain foods.  Does report history of heartburn. Was referred to GI on last visit. Pt reports pepcid has helped. He declines f/u with GI at this time   8. CKD3: last Cr 1.5 in August    Review of Systems   Constitutional: Negative for chills and fever.   HENT: Negative for congestion and sore throat.    Eyes: Negative for pain and visual disturbance.   Respiratory: Negative for cough and shortness of breath.    Cardiovascular: Positive for leg swelling. Negative for chest pain.   Gastrointestinal: Negative for abdominal pain, nausea and vomiting.   Endocrine: Negative for polydipsia and polyuria.   Genitourinary: Negative for difficulty urinating and dysuria.   Musculoskeletal: Negative for arthralgias and back pain.   Skin: Negative for rash and wound.   Neurological: Negative for weakness and headaches.   Psychiatric/Behavioral: Negative for agitation and confusion.     Past medical history, surgical history, and family medical history reviewed and updated as appropriate.    Medications and allergies reviewed.     Objective:     There were no vitals filed for this visit.  There is no height or weight on file to calculate BMI.  Physical Exam  Vitals signs  reviewed.   Constitutional:       General: He is not in acute distress.     Appearance: He is well-developed.   HENT:      Head: Normocephalic and atraumatic.      Mouth/Throat:      Mouth: Mucous membranes are moist.      Pharynx: No oropharyngeal exudate.   Eyes:      General:         Right eye: No discharge.         Left eye: No discharge.      Conjunctiva/sclera: Conjunctivae normal.   Neck:      Musculoskeletal: Normal range of motion and neck supple.   Cardiovascular:      Rate and Rhythm: Normal rate and regular rhythm.      Heart sounds: Normal heart sounds. No friction rub.   Pulmonary:      Effort: Pulmonary effort is normal.      Breath sounds: Normal breath sounds.   Abdominal:      General: Bowel sounds are normal. There is no distension.      Palpations: Abdomen is soft.      Tenderness: There is no abdominal tenderness.   Musculoskeletal:      Right lower leg: Edema present.      Left lower leg: Edema present.      Comments: Mild BLE edema, noted wound dressing of lower extremities   Skin:     General: Skin is warm and dry.   Neurological:      Mental Status: He is alert and oriented to person, place, and time.   Psychiatric:         Mood and Affect: Mood normal.         Behavior: Behavior normal.         Lab Results   Component Value Date    WBC 5.38 08/25/2020    HGB 12.3 (L) 08/25/2020    HCT 40.9 08/25/2020     (L) 08/25/2020    CHOL 80 (L) 01/15/2020    TRIG 34 01/15/2020    HDL 34 (L) 01/15/2020    ALT 16 08/25/2020    AST 20 08/25/2020     08/25/2020    K 4.4 08/25/2020     08/25/2020    CREATININE 1.5 (H) 08/25/2020    BUN 36 (H) 08/25/2020    CO2 30 (H) 08/25/2020    TSH 8.614 (H) 11/03/2020    PSA 1.2 01/15/2020    INR 1.3 (H) 08/29/2019    HGBA1C 5.9 (H) 08/25/2020       Assessment:     1. Type 2 diabetes mellitus with diabetic neuropathy, with long-term current use of insulin    2. Stage 3b chronic kidney disease    3. Heart failure, systolic, chronic    4. Coronary  artery disease involving native coronary artery of native heart without angina pectoris    5. Essential hypertension    6. Subclinical hypothyroidism    7. Chronic anticoagulation    8. Paroxysmal atrial fibrillation    9. Bilateral leg edema    10. Pre-ulcerative corn or callous    11. Peripheral vascular disease    12. 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    Diagnoses and all orders for this visit:    Type 2 diabetes mellitus with diabetic neuropathy, with long-term current use of insulin  - Stable. Will reduce insulin further to 8 units qhs due to reports of low BG. A1c has been well controlled. F/u on next visit. Continue gabapentin for neuropathy    Stage 3b chronic kidney disease  - Stable. Monitor at next visit    Heart failure, systolic, chronic  - Stable. Continue home meds per cardiology    Coronary artery disease involving native coronary artery of native heart without angina pectoris  - Stable. Continue home meds per cardiology    Essential hypertension  - BP controlled. Continue home meds.     Subclinical hypothyroidism  - adjusting thyroid medication. tSH high but slowly improving. Will have repeat lab next month    Chronic anticoagulation  - on eliquis long term    Paroxysmal atrial fibrillation  - Rate controlled. Continue eliquis.     Bilateral leg edema  -     CV US Lower Extremity Veins Bilateral Insufficiency; Future    Pre-ulcerative corn or callous  - managed by podiatry    Peripheral vascular disease  - Stable. 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 Managed by podiatry    Other orders  -     insulin detemir U-100 (LEVEMIR FLEXTOUCH U-100 INSULN) 100 unit/mL (3 mL) InPn pen; Inject 8 Units into the skin every evening. If Blood Glucose is less than 100 mg/dL at BEDTIME, give 16 grams (four glucose tablets) X 1 dose for patients who can take PO. Recheck BG in 30  minutes and call MD for insulin dose adjustments prior to administering insulin detemir (Levemir).        RTC 4 months    Susie Lazo MD  Internal Medicine    12/3/2020

## 2020-12-09 RX ORDER — PEN NEEDLE, DIABETIC 30 GX3/16"
1 NEEDLE, DISPOSABLE MISCELLANEOUS DAILY
Qty: 100 EACH | Refills: 3 | Status: SHIPPED | OUTPATIENT
Start: 2020-12-09 | End: 2022-01-31 | Stop reason: SDUPTHER

## 2020-12-10 ENCOUNTER — TELEPHONE (OUTPATIENT)
Dept: INTERNAL MEDICINE | Facility: CLINIC | Age: 80
End: 2020-12-10

## 2020-12-10 NOTE — TELEPHONE ENCOUNTER
----- Message from Esha Abdi sent at 12/10/2020 10:52 AM CST -----  Contact: Patient [phone]  Calling to check the status of the request for diabetic shoes from [employer].    Please call and advise.    Thank You

## 2020-12-11 ENCOUNTER — TELEPHONE (OUTPATIENT)
Dept: INTERNAL MEDICINE | Facility: CLINIC | Age: 80
End: 2020-12-11

## 2020-12-11 ENCOUNTER — PATIENT MESSAGE (OUTPATIENT)
Dept: OTHER | Facility: OTHER | Age: 80
End: 2020-12-11

## 2020-12-11 NOTE — TELEPHONE ENCOUNTER
----- Message from Doris Brown sent at 12/11/2020  3:17 PM CST -----  Contact: Vanessa Reyna  ext [phone]  Vanessa Reyna has been faxing over a statement to certify physician for diabetic foot wear to  since November and has not received a response.   Please call and advise.

## 2021-01-05 ENCOUNTER — HOSPITAL ENCOUNTER (OUTPATIENT)
Facility: OTHER | Age: 81
Discharge: HOME | End: 2021-01-06
Attending: EMERGENCY MEDICINE | Admitting: INTERNAL MEDICINE
Payer: MEDICARE

## 2021-01-05 DIAGNOSIS — E11.40 TYPE 2 DIABETES MELLITUS WITH DIABETIC NEUROPATHY, WITH LONG-TERM CURRENT USE OF INSULIN: ICD-10-CM

## 2021-01-05 DIAGNOSIS — Z79.4 TYPE 2 DIABETES MELLITUS WITH DIABETIC NEUROPATHY, WITH LONG-TERM CURRENT USE OF INSULIN: ICD-10-CM

## 2021-01-05 DIAGNOSIS — R00.1 BRADYCARDIA: ICD-10-CM

## 2021-01-05 DIAGNOSIS — E03.8 SUBCLINICAL HYPOTHYROIDISM: ICD-10-CM

## 2021-01-05 DIAGNOSIS — R55 SYNCOPE: Primary | ICD-10-CM

## 2021-01-05 DIAGNOSIS — R55 SYNCOPE, UNSPECIFIED SYNCOPE TYPE: Primary | ICD-10-CM

## 2021-01-05 LAB
ALBUMIN SERPL BCP-MCNC: 4 G/DL (ref 3.5–5.2)
ALP SERPL-CCNC: 87 U/L (ref 55–135)
ALT SERPL W/O P-5'-P-CCNC: 15 U/L (ref 10–44)
ANION GAP SERPL CALC-SCNC: 10 MMOL/L (ref 8–16)
ANION GAP SERPL CALC-SCNC: 9 MMOL/L (ref 8–16)
AST SERPL-CCNC: 20 U/L (ref 10–40)
BASOPHILS # BLD AUTO: 0.04 K/UL (ref 0–0.2)
BASOPHILS NFR BLD: 0.7 % (ref 0–1.9)
BILIRUB SERPL-MCNC: 1.4 MG/DL (ref 0.1–1)
BILIRUB UR QL STRIP: NEGATIVE
BUN SERPL-MCNC: 29 MG/DL (ref 8–23)
BUN SERPL-MCNC: 30 MG/DL (ref 8–23)
CALCIUM SERPL-MCNC: 8.6 MG/DL (ref 8.7–10.5)
CALCIUM SERPL-MCNC: 8.6 MG/DL (ref 8.7–10.5)
CHLORIDE SERPL-SCNC: 105 MMOL/L (ref 95–110)
CHLORIDE SERPL-SCNC: 106 MMOL/L (ref 95–110)
CLARITY UR: CLEAR
CO2 SERPL-SCNC: 26 MMOL/L (ref 23–29)
CO2 SERPL-SCNC: 27 MMOL/L (ref 23–29)
COLOR UR: YELLOW
CREAT SERPL-MCNC: 1.3 MG/DL (ref 0.5–1.4)
CREAT SERPL-MCNC: 1.3 MG/DL (ref 0.5–1.4)
CTP QC/QA: YES
DIFFERENTIAL METHOD: ABNORMAL
EOSINOPHIL # BLD AUTO: 0.3 K/UL (ref 0–0.5)
EOSINOPHIL NFR BLD: 4.7 % (ref 0–8)
ERYTHROCYTE [DISTWIDTH] IN BLOOD BY AUTOMATED COUNT: 13.7 % (ref 11.5–14.5)
EST. GFR  (AFRICAN AMERICAN): 60 ML/MIN/1.73 M^2
EST. GFR  (AFRICAN AMERICAN): 60 ML/MIN/1.73 M^2
EST. GFR  (NON AFRICAN AMERICAN): 52 ML/MIN/1.73 M^2
EST. GFR  (NON AFRICAN AMERICAN): 52 ML/MIN/1.73 M^2
GLUCOSE SERPL-MCNC: 111 MG/DL (ref 70–110)
GLUCOSE SERPL-MCNC: 115 MG/DL (ref 70–110)
GLUCOSE UR QL STRIP: NEGATIVE
HCT VFR BLD AUTO: 39.4 % (ref 40–54)
HGB BLD-MCNC: 12.5 G/DL (ref 14–18)
HGB UR QL STRIP: ABNORMAL
IMM GRANULOCYTES # BLD AUTO: 0.02 K/UL (ref 0–0.04)
IMM GRANULOCYTES NFR BLD AUTO: 0.4 % (ref 0–0.5)
KETONES UR QL STRIP: NEGATIVE
LACTATE SERPL-SCNC: 0.9 MMOL/L (ref 0.5–2.2)
LEUKOCYTE ESTERASE UR QL STRIP: NEGATIVE
LYMPHOCYTES # BLD AUTO: 0.7 K/UL (ref 1–4.8)
LYMPHOCYTES NFR BLD: 12.9 % (ref 18–48)
MCH RBC QN AUTO: 33.1 PG (ref 27–31)
MCHC RBC AUTO-ENTMCNC: 31.7 G/DL (ref 32–36)
MCV RBC AUTO: 104 FL (ref 82–98)
MONOCYTES # BLD AUTO: 0.4 K/UL (ref 0.3–1)
MONOCYTES NFR BLD: 8.2 % (ref 4–15)
NEUTROPHILS # BLD AUTO: 3.9 K/UL (ref 1.8–7.7)
NEUTROPHILS NFR BLD: 73.1 % (ref 38–73)
NITRITE UR QL STRIP: NEGATIVE
NRBC BLD-RTO: 0 /100 WBC
PH UR STRIP: 5 [PH] (ref 5–8)
PLATELET # BLD AUTO: 103 K/UL (ref 150–350)
PMV BLD AUTO: 10.9 FL (ref 9.2–12.9)
POCT GLUCOSE: 112 MG/DL (ref 70–110)
POCT GLUCOSE: 113 MG/DL (ref 70–110)
POCT GLUCOSE: 127 MG/DL (ref 70–110)
POTASSIUM SERPL-SCNC: 4.4 MMOL/L (ref 3.5–5.1)
POTASSIUM SERPL-SCNC: 4.4 MMOL/L (ref 3.5–5.1)
PROT SERPL-MCNC: 8.3 G/DL (ref 6–8.4)
PROT UR QL STRIP: NEGATIVE
RBC # BLD AUTO: 3.78 M/UL (ref 4.6–6.2)
SARS-COV-2 RDRP RESP QL NAA+PROBE: NEGATIVE
SODIUM SERPL-SCNC: 141 MMOL/L (ref 136–145)
SODIUM SERPL-SCNC: 142 MMOL/L (ref 136–145)
SP GR UR STRIP: 1.01 (ref 1–1.03)
T4 FREE SERPL-MCNC: 1.29 NG/DL (ref 0.71–1.51)
TROPONIN I SERPL DL<=0.01 NG/ML-MCNC: <0.006 NG/ML (ref 0–0.03)
TSH SERPL DL<=0.005 MIU/L-ACNC: 8.73 UIU/ML (ref 0.4–4)
URN SPEC COLLECT METH UR: ABNORMAL
UROBILINOGEN UR STRIP-ACNC: NEGATIVE EU/DL
WBC # BLD AUTO: 5.35 K/UL (ref 3.9–12.7)

## 2021-01-05 PROCEDURE — 99215 OFFICE O/P EST HI 40 MIN: CPT | Mod: HCNC,,, | Performed by: INTERNAL MEDICINE

## 2021-01-05 PROCEDURE — 99285 EMERGENCY DEPT VISIT HI MDM: CPT | Mod: 25,HCNC

## 2021-01-05 PROCEDURE — 80053 COMPREHEN METABOLIC PANEL: CPT | Mod: HCNC

## 2021-01-05 PROCEDURE — 85025 COMPLETE CBC W/AUTO DIFF WBC: CPT | Mod: HCNC

## 2021-01-05 PROCEDURE — 25000003 PHARM REV CODE 250: Mod: HCNC | Performed by: INTERNAL MEDICINE

## 2021-01-05 PROCEDURE — G0378 HOSPITAL OBSERVATION PER HR: HCPCS | Mod: HCNC

## 2021-01-05 PROCEDURE — 84484 ASSAY OF TROPONIN QUANT: CPT | Mod: HCNC

## 2021-01-05 PROCEDURE — 93005 ELECTROCARDIOGRAM TRACING: CPT | Mod: HCNC

## 2021-01-05 PROCEDURE — 93010 EKG 12-LEAD: ICD-10-PCS | Mod: HCNC,,, | Performed by: INTERNAL MEDICINE

## 2021-01-05 PROCEDURE — 93010 ELECTROCARDIOGRAM REPORT: CPT | Mod: HCNC,,, | Performed by: INTERNAL MEDICINE

## 2021-01-05 PROCEDURE — 94761 N-INVAS EAR/PLS OXIMETRY MLT: CPT | Mod: HCNC

## 2021-01-05 PROCEDURE — 99220 PR INITIAL OBSERVATION CARE,LEVL III: CPT | Mod: HCNC,,, | Performed by: INTERNAL MEDICINE

## 2021-01-05 PROCEDURE — 84439 ASSAY OF FREE THYROXINE: CPT | Mod: HCNC

## 2021-01-05 PROCEDURE — 84443 ASSAY THYROID STIM HORMONE: CPT | Mod: HCNC

## 2021-01-05 PROCEDURE — 99220 PR INITIAL OBSERVATION CARE,LEVL III: ICD-10-PCS | Mod: HCNC,,, | Performed by: INTERNAL MEDICINE

## 2021-01-05 PROCEDURE — 99215 PR OFFICE/OUTPT VISIT, EST, LEVL V, 40-54 MIN: ICD-10-PCS | Mod: HCNC,,, | Performed by: INTERNAL MEDICINE

## 2021-01-05 PROCEDURE — U0002 COVID-19 LAB TEST NON-CDC: HCPCS | Mod: HCNC | Performed by: EMERGENCY MEDICINE

## 2021-01-05 PROCEDURE — C9399 UNCLASSIFIED DRUGS OR BIOLOG: HCPCS | Mod: HCNC | Performed by: INTERNAL MEDICINE

## 2021-01-05 PROCEDURE — 80048 BASIC METABOLIC PNL TOTAL CA: CPT | Mod: HCNC

## 2021-01-05 PROCEDURE — 81003 URINALYSIS AUTO W/O SCOPE: CPT | Mod: HCNC

## 2021-01-05 PROCEDURE — 96372 THER/PROPH/DIAG INJ SC/IM: CPT

## 2021-01-05 PROCEDURE — 83605 ASSAY OF LACTIC ACID: CPT | Mod: HCNC

## 2021-01-05 PROCEDURE — 82962 GLUCOSE BLOOD TEST: CPT | Mod: HCNC

## 2021-01-05 PROCEDURE — 93010 ELECTROCARDIOGRAM REPORT: CPT | Mod: HCNC,76,, | Performed by: INTERNAL MEDICINE

## 2021-01-05 RX ORDER — FUROSEMIDE 40 MG/1
40 TABLET ORAL DAILY
Status: DISCONTINUED | OUTPATIENT
Start: 2021-01-06 | End: 2021-01-06 | Stop reason: HOSPADM

## 2021-01-05 RX ORDER — CARVEDILOL 3.12 MG/1
3.12 TABLET ORAL 2 TIMES DAILY WITH MEALS
Status: DISCONTINUED | OUTPATIENT
Start: 2021-01-05 | End: 2021-01-06 | Stop reason: HOSPADM

## 2021-01-05 RX ORDER — INSULIN ASPART 100 [IU]/ML
0-5 INJECTION, SOLUTION INTRAVENOUS; SUBCUTANEOUS
Status: DISCONTINUED | OUTPATIENT
Start: 2021-01-05 | End: 2021-01-06 | Stop reason: HOSPADM

## 2021-01-05 RX ORDER — FAMOTIDINE 20 MG/1
20 TABLET, FILM COATED ORAL DAILY
Status: DISCONTINUED | OUTPATIENT
Start: 2021-01-06 | End: 2021-01-06 | Stop reason: HOSPADM

## 2021-01-05 RX ORDER — GLUCAGON 1 MG
1 KIT INJECTION
Status: DISCONTINUED | OUTPATIENT
Start: 2021-01-05 | End: 2021-01-06 | Stop reason: HOSPADM

## 2021-01-05 RX ORDER — TALC
6 POWDER (GRAM) TOPICAL NIGHTLY PRN
Status: DISCONTINUED | OUTPATIENT
Start: 2021-01-05 | End: 2021-01-06 | Stop reason: HOSPADM

## 2021-01-05 RX ORDER — IBUPROFEN 200 MG
16 TABLET ORAL
Status: DISCONTINUED | OUTPATIENT
Start: 2021-01-05 | End: 2021-01-06 | Stop reason: HOSPADM

## 2021-01-05 RX ORDER — AMIODARONE HYDROCHLORIDE 100 MG/1
100 TABLET ORAL DAILY
Status: DISCONTINUED | OUTPATIENT
Start: 2021-01-06 | End: 2021-01-06 | Stop reason: HOSPADM

## 2021-01-05 RX ORDER — ACETAMINOPHEN 325 MG/1
650 TABLET ORAL EVERY 6 HOURS PRN
Status: DISCONTINUED | OUTPATIENT
Start: 2021-01-05 | End: 2021-01-06 | Stop reason: HOSPADM

## 2021-01-05 RX ORDER — SODIUM CHLORIDE 0.9 % (FLUSH) 0.9 %
10 SYRINGE (ML) INJECTION
Status: DISCONTINUED | OUTPATIENT
Start: 2021-01-05 | End: 2021-01-06 | Stop reason: HOSPADM

## 2021-01-05 RX ORDER — IBUPROFEN 200 MG
24 TABLET ORAL
Status: DISCONTINUED | OUTPATIENT
Start: 2021-01-05 | End: 2021-01-06 | Stop reason: HOSPADM

## 2021-01-05 RX ORDER — LEVOTHYROXINE SODIUM 112 UG/1
112 TABLET ORAL
Status: DISCONTINUED | OUTPATIENT
Start: 2021-01-06 | End: 2021-01-06 | Stop reason: HOSPADM

## 2021-01-05 RX ORDER — ATORVASTATIN CALCIUM 10 MG/1
10 TABLET, FILM COATED ORAL DAILY
Status: DISCONTINUED | OUTPATIENT
Start: 2021-01-06 | End: 2021-01-06 | Stop reason: HOSPADM

## 2021-01-05 RX ADMIN — CARVEDILOL 3.12 MG: 3.12 TABLET, FILM COATED ORAL at 09:01

## 2021-01-05 RX ADMIN — INSULIN DETEMIR 5 UNITS: 100 INJECTION, SOLUTION SUBCUTANEOUS at 09:01

## 2021-01-05 RX ADMIN — APIXABAN 5 MG: 2.5 TABLET, FILM COATED ORAL at 09:01

## 2021-01-06 VITALS
OXYGEN SATURATION: 95 % | DIASTOLIC BLOOD PRESSURE: 63 MMHG | WEIGHT: 170 LBS | HEART RATE: 58 BPM | SYSTOLIC BLOOD PRESSURE: 120 MMHG | TEMPERATURE: 99 F | HEIGHT: 71 IN | RESPIRATION RATE: 16 BRPM | BODY MASS INDEX: 23.8 KG/M2

## 2021-01-06 LAB
ANION GAP SERPL CALC-SCNC: 12 MMOL/L (ref 8–16)
BASOPHILS # BLD AUTO: 0.04 K/UL (ref 0–0.2)
BASOPHILS NFR BLD: 0.7 % (ref 0–1.9)
BUN SERPL-MCNC: 31 MG/DL (ref 8–23)
CALCIUM SERPL-MCNC: 8.2 MG/DL (ref 8.7–10.5)
CHLORIDE SERPL-SCNC: 106 MMOL/L (ref 95–110)
CO2 SERPL-SCNC: 24 MMOL/L (ref 23–29)
CREAT SERPL-MCNC: 1.2 MG/DL (ref 0.5–1.4)
DIFFERENTIAL METHOD: ABNORMAL
EOSINOPHIL # BLD AUTO: 0.2 K/UL (ref 0–0.5)
EOSINOPHIL NFR BLD: 2.8 % (ref 0–8)
ERYTHROCYTE [DISTWIDTH] IN BLOOD BY AUTOMATED COUNT: 13.7 % (ref 11.5–14.5)
EST. GFR  (AFRICAN AMERICAN): >60 ML/MIN/1.73 M^2
EST. GFR  (NON AFRICAN AMERICAN): 57 ML/MIN/1.73 M^2
GLUCOSE SERPL-MCNC: 119 MG/DL (ref 70–110)
HCT VFR BLD AUTO: 36 % (ref 40–54)
HGB BLD-MCNC: 11.2 G/DL (ref 14–18)
IMM GRANULOCYTES # BLD AUTO: 0.03 K/UL (ref 0–0.04)
IMM GRANULOCYTES NFR BLD AUTO: 0.6 % (ref 0–0.5)
LYMPHOCYTES # BLD AUTO: 0.9 K/UL (ref 1–4.8)
LYMPHOCYTES NFR BLD: 16.3 % (ref 18–48)
MAGNESIUM SERPL-MCNC: 2.1 MG/DL (ref 1.6–2.6)
MCH RBC QN AUTO: 32.1 PG (ref 27–31)
MCHC RBC AUTO-ENTMCNC: 31.1 G/DL (ref 32–36)
MCV RBC AUTO: 103 FL (ref 82–98)
MONOCYTES # BLD AUTO: 0.6 K/UL (ref 0.3–1)
MONOCYTES NFR BLD: 10.9 % (ref 4–15)
NEUTROPHILS # BLD AUTO: 3.7 K/UL (ref 1.8–7.7)
NEUTROPHILS NFR BLD: 68.7 % (ref 38–73)
NRBC BLD-RTO: 0 /100 WBC
PHOSPHATE SERPL-MCNC: 3.1 MG/DL (ref 2.7–4.5)
PLATELET # BLD AUTO: 98 K/UL (ref 150–350)
PMV BLD AUTO: 11 FL (ref 9.2–12.9)
POCT GLUCOSE: 97 MG/DL (ref 70–110)
POTASSIUM SERPL-SCNC: 3.8 MMOL/L (ref 3.5–5.1)
RBC # BLD AUTO: 3.49 M/UL (ref 4.6–6.2)
SODIUM SERPL-SCNC: 142 MMOL/L (ref 136–145)
WBC # BLD AUTO: 5.39 K/UL (ref 3.9–12.7)

## 2021-01-06 PROCEDURE — 99213 OFFICE O/P EST LOW 20 MIN: CPT | Mod: HCNC,,, | Performed by: INTERNAL MEDICINE

## 2021-01-06 PROCEDURE — 83735 ASSAY OF MAGNESIUM: CPT | Mod: HCNC

## 2021-01-06 PROCEDURE — 99213 PR OFFICE/OUTPT VISIT, EST, LEVL III, 20-29 MIN: ICD-10-PCS | Mod: HCNC,,, | Performed by: INTERNAL MEDICINE

## 2021-01-06 PROCEDURE — 94761 N-INVAS EAR/PLS OXIMETRY MLT: CPT | Mod: HCNC

## 2021-01-06 PROCEDURE — 99217 PR OBSERVATION CARE DISCHARGE: CPT | Mod: HCNC,,, | Performed by: INTERNAL MEDICINE

## 2021-01-06 PROCEDURE — 85025 COMPLETE CBC W/AUTO DIFF WBC: CPT | Mod: HCNC

## 2021-01-06 PROCEDURE — 99217 PR OBSERVATION CARE DISCHARGE: ICD-10-PCS | Mod: HCNC,,, | Performed by: INTERNAL MEDICINE

## 2021-01-06 PROCEDURE — G0378 HOSPITAL OBSERVATION PER HR: HCPCS | Mod: HCNC

## 2021-01-06 PROCEDURE — 36415 COLL VENOUS BLD VENIPUNCTURE: CPT | Mod: HCNC

## 2021-01-06 PROCEDURE — 80048 BASIC METABOLIC PNL TOTAL CA: CPT | Mod: HCNC

## 2021-01-06 PROCEDURE — 84100 ASSAY OF PHOSPHORUS: CPT | Mod: HCNC

## 2021-01-06 PROCEDURE — 25000003 PHARM REV CODE 250: Mod: HCNC | Performed by: INTERNAL MEDICINE

## 2021-01-06 RX ORDER — LEVOTHYROXINE SODIUM 112 UG/1
112 TABLET ORAL
Qty: 30 TABLET | Refills: 1 | Status: SHIPPED | OUTPATIENT
Start: 2021-01-07 | End: 2021-02-07

## 2021-01-06 RX ORDER — GABAPENTIN 300 MG/1
300 CAPSULE ORAL
Start: 2021-01-06 | End: 2021-06-09

## 2021-01-06 RX ORDER — CARVEDILOL 3.12 MG/1
3.12 TABLET ORAL 2 TIMES DAILY WITH MEALS
Qty: 60 TABLET | Refills: 1 | Status: SHIPPED | OUTPATIENT
Start: 2021-01-06 | End: 2021-02-07

## 2021-01-06 RX ORDER — AMIODARONE HYDROCHLORIDE 100 MG/1
100 TABLET ORAL DAILY
Qty: 30 TABLET | Refills: 1 | Status: SHIPPED | OUTPATIENT
Start: 2021-01-07 | End: 2021-03-12 | Stop reason: SDUPTHER

## 2021-01-06 RX ADMIN — FAMOTIDINE 20 MG: 20 TABLET ORAL at 09:01

## 2021-01-06 RX ADMIN — CARVEDILOL 3.12 MG: 3.12 TABLET, FILM COATED ORAL at 09:01

## 2021-01-06 RX ADMIN — FUROSEMIDE 40 MG: 40 TABLET ORAL at 09:01

## 2021-01-06 RX ADMIN — APIXABAN 5 MG: 2.5 TABLET, FILM COATED ORAL at 09:01

## 2021-01-06 RX ADMIN — SACUBITRIL AND VALSARTAN 1 TABLET: 49; 51 TABLET, FILM COATED ORAL at 02:01

## 2021-01-06 RX ADMIN — ATORVASTATIN CALCIUM 10 MG: 10 TABLET, FILM COATED ORAL at 09:01

## 2021-01-06 RX ADMIN — AMIODARONE HYDROCHLORIDE 100 MG: 100 TABLET ORAL at 09:01

## 2021-01-06 RX ADMIN — LEVOTHYROXINE SODIUM 112 MCG: 0.11 TABLET ORAL at 06:01

## 2021-01-08 ENCOUNTER — PATIENT OUTREACH (OUTPATIENT)
Dept: ADMINISTRATIVE | Facility: OTHER | Age: 81
End: 2021-01-08

## 2021-01-12 ENCOUNTER — OFFICE VISIT (OUTPATIENT)
Dept: PODIATRY | Facility: CLINIC | Age: 81
End: 2021-01-12
Payer: MEDICARE

## 2021-01-12 VITALS
BODY MASS INDEX: 24.34 KG/M2 | HEART RATE: 53 BPM | WEIGHT: 170 LBS | DIASTOLIC BLOOD PRESSURE: 57 MMHG | HEIGHT: 70 IN | SYSTOLIC BLOOD PRESSURE: 104 MMHG

## 2021-01-12 DIAGNOSIS — Z79.01 CHRONIC ANTICOAGULATION: Primary | ICD-10-CM

## 2021-01-12 DIAGNOSIS — B35.1 DERMATOPHYTOSIS OF NAIL: ICD-10-CM

## 2021-01-12 DIAGNOSIS — L84 PRE-ULCERATIVE CORN OR CALLOUS: ICD-10-CM

## 2021-01-12 PROCEDURE — 11721 ROUTINE FOOT CARE: ICD-10-PCS | Mod: 59,Q7,HCNC,S$GLB | Performed by: PODIATRIST

## 2021-01-12 PROCEDURE — 11056 PARNG/CUTG B9 HYPRKR LES 2-4: CPT | Mod: Q7,HCNC,S$GLB, | Performed by: PODIATRIST

## 2021-01-12 PROCEDURE — 99499 UNLISTED E&M SERVICE: CPT | Mod: HCNC,S$GLB,, | Performed by: PODIATRIST

## 2021-01-12 PROCEDURE — 3072F PR LOW RISK FOR RETINOPATHY: ICD-10-PCS | Mod: HCNC,S$GLB,, | Performed by: PODIATRIST

## 2021-01-12 PROCEDURE — 99999 PR PBB SHADOW E&M-EST. PATIENT-LVL IV: ICD-10-PCS | Mod: PBBFAC,HCNC,, | Performed by: PODIATRIST

## 2021-01-12 PROCEDURE — 3072F LOW RISK FOR RETINOPATHY: CPT | Mod: HCNC,S$GLB,, | Performed by: PODIATRIST

## 2021-01-12 PROCEDURE — 99499 UNLISTED E&M SERVICE: CPT | Mod: S$GLB,,, | Performed by: PODIATRIST

## 2021-01-12 PROCEDURE — 1126F AMNT PAIN NOTED NONE PRSNT: CPT | Mod: HCNC,S$GLB,, | Performed by: PODIATRIST

## 2021-01-12 PROCEDURE — 11721 DEBRIDE NAIL 6 OR MORE: CPT | Mod: 59,Q7,HCNC,S$GLB | Performed by: PODIATRIST

## 2021-01-12 PROCEDURE — 1101F PR PT FALLS ASSESS DOC 0-1 FALLS W/OUT INJ PAST YR: ICD-10-PCS | Mod: HCNC,CPTII,S$GLB, | Performed by: PODIATRIST

## 2021-01-12 PROCEDURE — 99999 PR PBB SHADOW E&M-EST. PATIENT-LVL IV: CPT | Mod: PBBFAC,HCNC,, | Performed by: PODIATRIST

## 2021-01-12 PROCEDURE — 99499 RISK ADDL DX/OHS AUDIT: ICD-10-PCS | Mod: S$GLB,,, | Performed by: PODIATRIST

## 2021-01-12 PROCEDURE — 3288F PR FALLS RISK ASSESSMENT DOCUMENTED: ICD-10-PCS | Mod: HCNC,CPTII,S$GLB, | Performed by: PODIATRIST

## 2021-01-12 PROCEDURE — 1126F PR PAIN SEVERITY QUANTIFIED, NO PAIN PRESENT: ICD-10-PCS | Mod: HCNC,S$GLB,, | Performed by: PODIATRIST

## 2021-01-12 PROCEDURE — 3288F FALL RISK ASSESSMENT DOCD: CPT | Mod: HCNC,CPTII,S$GLB, | Performed by: PODIATRIST

## 2021-01-12 PROCEDURE — 11056 ROUTINE FOOT CARE: ICD-10-PCS | Mod: Q7,HCNC,S$GLB, | Performed by: PODIATRIST

## 2021-01-12 PROCEDURE — 1101F PT FALLS ASSESS-DOCD LE1/YR: CPT | Mod: HCNC,CPTII,S$GLB, | Performed by: PODIATRIST

## 2021-01-15 ENCOUNTER — IMMUNIZATION (OUTPATIENT)
Dept: INTERNAL MEDICINE | Facility: CLINIC | Age: 81
End: 2021-01-15
Payer: MEDICARE

## 2021-01-15 DIAGNOSIS — Z23 NEED FOR VACCINATION: Primary | ICD-10-CM

## 2021-01-15 PROCEDURE — 0001A COVID-19, MRNA, LNP-S, PF, 30 MCG/0.3 ML DOSE VACCINE: CPT | Mod: HCNC,CV19,, | Performed by: INTERNAL MEDICINE

## 2021-01-15 PROCEDURE — 91300 COVID-19, MRNA, LNP-S, PF, 30 MCG/0.3 ML DOSE VACCINE: CPT | Mod: HCNC,,, | Performed by: INTERNAL MEDICINE

## 2021-01-15 PROCEDURE — 91300 COVID-19, MRNA, LNP-S, PF, 30 MCG/0.3 ML DOSE VACCINE: ICD-10-PCS | Mod: HCNC,,, | Performed by: INTERNAL MEDICINE

## 2021-01-15 PROCEDURE — 0001A COVID-19, MRNA, LNP-S, PF, 30 MCG/0.3 ML DOSE VACCINE: ICD-10-PCS | Mod: HCNC,CV19,, | Performed by: INTERNAL MEDICINE

## 2021-02-05 ENCOUNTER — IMMUNIZATION (OUTPATIENT)
Dept: INTERNAL MEDICINE | Facility: CLINIC | Age: 81
End: 2021-02-05
Payer: MEDICARE

## 2021-02-05 DIAGNOSIS — Z23 NEED FOR VACCINATION: Primary | ICD-10-CM

## 2021-02-05 PROCEDURE — 0002A COVID-19, MRNA, LNP-S, PF, 30 MCG/0.3 ML DOSE VACCINE: CPT | Mod: PBBFAC | Performed by: INTERNAL MEDICINE

## 2021-02-05 PROCEDURE — 91300 COVID-19, MRNA, LNP-S, PF, 30 MCG/0.3 ML DOSE VACCINE: CPT | Mod: PBBFAC | Performed by: INTERNAL MEDICINE

## 2021-03-08 DIAGNOSIS — E03.8 SUBCLINICAL HYPOTHYROIDISM: Primary | ICD-10-CM

## 2021-03-08 RX ORDER — LEVOTHYROXINE SODIUM 112 UG/1
112 TABLET ORAL DAILY
Qty: 90 TABLET | Refills: 0 | Status: CANCELLED | OUTPATIENT
Start: 2021-03-08

## 2021-03-09 ENCOUNTER — LAB VISIT (OUTPATIENT)
Dept: LAB | Facility: HOSPITAL | Age: 81
End: 2021-03-09
Attending: HOSPITALIST
Payer: MEDICARE

## 2021-03-09 ENCOUNTER — PES CALL (OUTPATIENT)
Dept: ADMINISTRATIVE | Facility: CLINIC | Age: 81
End: 2021-03-09

## 2021-03-09 DIAGNOSIS — E03.8 SUBCLINICAL HYPOTHYROIDISM: ICD-10-CM

## 2021-03-09 PROCEDURE — 36415 COLL VENOUS BLD VENIPUNCTURE: CPT | Mod: PN | Performed by: HOSPITALIST

## 2021-03-09 PROCEDURE — 84443 ASSAY THYROID STIM HORMONE: CPT | Performed by: HOSPITALIST

## 2021-03-10 ENCOUNTER — PATIENT MESSAGE (OUTPATIENT)
Dept: INTERNAL MEDICINE | Facility: CLINIC | Age: 81
End: 2021-03-10

## 2021-03-10 LAB — TSH SERPL DL<=0.005 MIU/L-ACNC: 3.69 UIU/ML (ref 0.4–4)

## 2021-03-10 RX ORDER — LEVOTHYROXINE SODIUM 112 UG/1
112 TABLET ORAL DAILY
Qty: 90 TABLET | Refills: 3 | Status: SHIPPED | OUTPATIENT
Start: 2021-03-10 | End: 2022-03-07 | Stop reason: SDUPTHER

## 2021-03-12 ENCOUNTER — PATIENT MESSAGE (OUTPATIENT)
Dept: CARDIOLOGY | Facility: CLINIC | Age: 81
End: 2021-03-12

## 2021-03-12 RX ORDER — AMIODARONE HYDROCHLORIDE 100 MG/1
100 TABLET ORAL DAILY
Qty: 30 TABLET | Refills: 6 | Status: SHIPPED | OUTPATIENT
Start: 2021-03-12 | End: 2021-09-29 | Stop reason: SDUPTHER

## 2021-03-22 ENCOUNTER — PATIENT OUTREACH (OUTPATIENT)
Dept: ADMINISTRATIVE | Facility: OTHER | Age: 81
End: 2021-03-22

## 2021-03-22 DIAGNOSIS — Z79.4 TYPE 2 DIABETES MELLITUS WITH DIABETIC NEUROPATHY, WITH LONG-TERM CURRENT USE OF INSULIN: Primary | ICD-10-CM

## 2021-03-22 DIAGNOSIS — E11.40 TYPE 2 DIABETES MELLITUS WITH DIABETIC NEUROPATHY, WITH LONG-TERM CURRENT USE OF INSULIN: Primary | ICD-10-CM

## 2021-03-25 ENCOUNTER — OFFICE VISIT (OUTPATIENT)
Dept: PODIATRY | Facility: CLINIC | Age: 81
End: 2021-03-25
Payer: MEDICARE

## 2021-03-25 VITALS
DIASTOLIC BLOOD PRESSURE: 55 MMHG | WEIGHT: 169 LBS | HEART RATE: 51 BPM | HEIGHT: 70 IN | SYSTOLIC BLOOD PRESSURE: 103 MMHG | BODY MASS INDEX: 24.2 KG/M2

## 2021-03-25 DIAGNOSIS — Z79.01 CHRONIC ANTICOAGULATION: Primary | ICD-10-CM

## 2021-03-25 DIAGNOSIS — B35.1 DERMATOPHYTOSIS OF NAIL: ICD-10-CM

## 2021-03-25 PROCEDURE — 99999 PR PBB SHADOW E&M-EST. PATIENT-LVL IV: CPT | Mod: PBBFAC,,, | Performed by: PODIATRIST

## 2021-03-25 PROCEDURE — 1126F PR PAIN SEVERITY QUANTIFIED, NO PAIN PRESENT: ICD-10-PCS | Mod: S$GLB,,, | Performed by: PODIATRIST

## 2021-03-25 PROCEDURE — 11721 DEBRIDE NAIL 6 OR MORE: CPT | Mod: Q7,S$GLB,, | Performed by: PODIATRIST

## 2021-03-25 PROCEDURE — 3072F PR LOW RISK FOR RETINOPATHY: ICD-10-PCS | Mod: S$GLB,,, | Performed by: PODIATRIST

## 2021-03-25 PROCEDURE — 99999 PR PBB SHADOW E&M-EST. PATIENT-LVL IV: ICD-10-PCS | Mod: PBBFAC,,, | Performed by: PODIATRIST

## 2021-03-25 PROCEDURE — 1101F PT FALLS ASSESS-DOCD LE1/YR: CPT | Mod: CPTII,S$GLB,, | Performed by: PODIATRIST

## 2021-03-25 PROCEDURE — 3072F LOW RISK FOR RETINOPATHY: CPT | Mod: S$GLB,,, | Performed by: PODIATRIST

## 2021-03-25 PROCEDURE — 3288F PR FALLS RISK ASSESSMENT DOCUMENTED: ICD-10-PCS | Mod: CPTII,S$GLB,, | Performed by: PODIATRIST

## 2021-03-25 PROCEDURE — 3288F FALL RISK ASSESSMENT DOCD: CPT | Mod: CPTII,S$GLB,, | Performed by: PODIATRIST

## 2021-03-25 PROCEDURE — 99499 RISK ADDL DX/OHS AUDIT: ICD-10-PCS | Mod: S$GLB,,, | Performed by: PODIATRIST

## 2021-03-25 PROCEDURE — 99499 UNLISTED E&M SERVICE: CPT | Mod: S$GLB,,, | Performed by: PODIATRIST

## 2021-03-25 PROCEDURE — 1101F PR PT FALLS ASSESS DOC 0-1 FALLS W/OUT INJ PAST YR: ICD-10-PCS | Mod: CPTII,S$GLB,, | Performed by: PODIATRIST

## 2021-03-25 PROCEDURE — 1126F AMNT PAIN NOTED NONE PRSNT: CPT | Mod: S$GLB,,, | Performed by: PODIATRIST

## 2021-03-25 PROCEDURE — 11721 ROUTINE FOOT CARE: ICD-10-PCS | Mod: Q7,S$GLB,, | Performed by: PODIATRIST

## 2021-04-19 ENCOUNTER — OFFICE VISIT (OUTPATIENT)
Dept: INTERNAL MEDICINE | Facility: CLINIC | Age: 81
End: 2021-04-19
Payer: MEDICARE

## 2021-04-19 VITALS
BODY MASS INDEX: 24.41 KG/M2 | TEMPERATURE: 97 F | DIASTOLIC BLOOD PRESSURE: 60 MMHG | SYSTOLIC BLOOD PRESSURE: 100 MMHG | HEIGHT: 71 IN | OXYGEN SATURATION: 98 % | HEART RATE: 50 BPM | WEIGHT: 174.38 LBS

## 2021-04-19 DIAGNOSIS — Z89.421 HISTORY OF PARTIAL RAY AMPUTATION OF FIFTH TOE OF RIGHT FOOT: ICD-10-CM

## 2021-04-19 DIAGNOSIS — I10 ESSENTIAL HYPERTENSION: ICD-10-CM

## 2021-04-19 DIAGNOSIS — I25.10 CORONARY ARTERY DISEASE INVOLVING NATIVE CORONARY ARTERY OF NATIVE HEART WITHOUT ANGINA PECTORIS: ICD-10-CM

## 2021-04-19 DIAGNOSIS — E11.40 TYPE 2 DIABETES MELLITUS WITH DIABETIC NEUROPATHY, WITH LONG-TERM CURRENT USE OF INSULIN: Primary | ICD-10-CM

## 2021-04-19 DIAGNOSIS — I50.22 HEART FAILURE, SYSTOLIC, CHRONIC: ICD-10-CM

## 2021-04-19 DIAGNOSIS — R00.1 BRADYCARDIA: ICD-10-CM

## 2021-04-19 DIAGNOSIS — Z23 NEED FOR VACCINATION AGAINST STREPTOCOCCUS PNEUMONIAE USING PNEUMOCOCCAL CONJUGATE VACCINE 13: ICD-10-CM

## 2021-04-19 DIAGNOSIS — Z79.4 TYPE 2 DIABETES MELLITUS WITH DIABETIC NEUROPATHY, WITH LONG-TERM CURRENT USE OF INSULIN: Primary | ICD-10-CM

## 2021-04-19 DIAGNOSIS — I73.9 PERIPHERAL VASCULAR DISEASE: ICD-10-CM

## 2021-04-19 DIAGNOSIS — N18.32 STAGE 3B CHRONIC KIDNEY DISEASE: ICD-10-CM

## 2021-04-19 PROCEDURE — 90670 PCV13 VACCINE IM: CPT | Mod: S$GLB,,, | Performed by: HOSPITALIST

## 2021-04-19 PROCEDURE — G0009 PNEUMOCOCCAL CONJUGATE VACCINE 13-VALENT LESS THAN 5YO & GREATER THAN: ICD-10-PCS | Mod: S$GLB,,, | Performed by: HOSPITALIST

## 2021-04-19 PROCEDURE — 1101F PR PT FALLS ASSESS DOC 0-1 FALLS W/OUT INJ PAST YR: ICD-10-PCS | Mod: CPTII,S$GLB,, | Performed by: HOSPITALIST

## 2021-04-19 PROCEDURE — 1101F PT FALLS ASSESS-DOCD LE1/YR: CPT | Mod: CPTII,S$GLB,, | Performed by: HOSPITALIST

## 2021-04-19 PROCEDURE — 3288F PR FALLS RISK ASSESSMENT DOCUMENTED: ICD-10-PCS | Mod: CPTII,S$GLB,, | Performed by: HOSPITALIST

## 2021-04-19 PROCEDURE — 99999 PR PBB SHADOW E&M-EST. PATIENT-LVL V: ICD-10-PCS | Mod: PBBFAC,,, | Performed by: HOSPITALIST

## 2021-04-19 PROCEDURE — 99214 PR OFFICE/OUTPT VISIT, EST, LEVL IV, 30-39 MIN: ICD-10-PCS | Mod: 25,S$GLB,, | Performed by: HOSPITALIST

## 2021-04-19 PROCEDURE — 1126F PR PAIN SEVERITY QUANTIFIED, NO PAIN PRESENT: ICD-10-PCS | Mod: S$GLB,,, | Performed by: HOSPITALIST

## 2021-04-19 PROCEDURE — 90670 PNEUMOCOCCAL CONJUGATE VACCINE 13-VALENT LESS THAN 5YO & GREATER THAN: ICD-10-PCS | Mod: S$GLB,,, | Performed by: HOSPITALIST

## 2021-04-19 PROCEDURE — 3072F LOW RISK FOR RETINOPATHY: CPT | Mod: S$GLB,,, | Performed by: HOSPITALIST

## 2021-04-19 PROCEDURE — 1159F PR MEDICATION LIST DOCUMENTED IN MEDICAL RECORD: ICD-10-PCS | Mod: S$GLB,,, | Performed by: HOSPITALIST

## 2021-04-19 PROCEDURE — 99214 OFFICE O/P EST MOD 30 MIN: CPT | Mod: 25,S$GLB,, | Performed by: HOSPITALIST

## 2021-04-19 PROCEDURE — 1126F AMNT PAIN NOTED NONE PRSNT: CPT | Mod: S$GLB,,, | Performed by: HOSPITALIST

## 2021-04-19 PROCEDURE — 3072F PR LOW RISK FOR RETINOPATHY: ICD-10-PCS | Mod: S$GLB,,, | Performed by: HOSPITALIST

## 2021-04-19 PROCEDURE — 99999 PR PBB SHADOW E&M-EST. PATIENT-LVL V: CPT | Mod: PBBFAC,,, | Performed by: HOSPITALIST

## 2021-04-19 PROCEDURE — 1159F MED LIST DOCD IN RCRD: CPT | Mod: S$GLB,,, | Performed by: HOSPITALIST

## 2021-04-19 PROCEDURE — 3288F FALL RISK ASSESSMENT DOCD: CPT | Mod: CPTII,S$GLB,, | Performed by: HOSPITALIST

## 2021-04-19 PROCEDURE — G0009 ADMIN PNEUMOCOCCAL VACCINE: HCPCS | Mod: S$GLB,,, | Performed by: HOSPITALIST

## 2021-04-20 ENCOUNTER — LAB VISIT (OUTPATIENT)
Dept: LAB | Facility: HOSPITAL | Age: 81
End: 2021-04-20
Attending: HOSPITALIST
Payer: MEDICARE

## 2021-04-20 ENCOUNTER — TELEPHONE (OUTPATIENT)
Dept: INTERNAL MEDICINE | Facility: CLINIC | Age: 81
End: 2021-04-20

## 2021-04-20 DIAGNOSIS — Z79.4 TYPE 2 DIABETES MELLITUS WITH DIABETIC NEUROPATHY, WITH LONG-TERM CURRENT USE OF INSULIN: ICD-10-CM

## 2021-04-20 DIAGNOSIS — E11.40 TYPE 2 DIABETES MELLITUS WITH DIABETIC NEUROPATHY, WITH LONG-TERM CURRENT USE OF INSULIN: ICD-10-CM

## 2021-04-20 LAB
ESTIMATED AVG GLUCOSE: 123 MG/DL (ref 68–131)
HBA1C MFR BLD: 5.9 % (ref 4–5.6)

## 2021-04-20 PROCEDURE — 36415 COLL VENOUS BLD VENIPUNCTURE: CPT | Mod: PN | Performed by: HOSPITALIST

## 2021-04-20 PROCEDURE — 83036 HEMOGLOBIN GLYCOSYLATED A1C: CPT | Performed by: HOSPITALIST

## 2021-04-21 ENCOUNTER — PATIENT MESSAGE (OUTPATIENT)
Dept: INTERNAL MEDICINE | Facility: CLINIC | Age: 81
End: 2021-04-21

## 2021-04-21 DIAGNOSIS — Z79.4 TYPE 2 DIABETES MELLITUS WITH DIABETIC NEUROPATHY, WITH LONG-TERM CURRENT USE OF INSULIN: Primary | ICD-10-CM

## 2021-04-21 DIAGNOSIS — E11.40 TYPE 2 DIABETES MELLITUS WITH DIABETIC NEUROPATHY, WITH LONG-TERM CURRENT USE OF INSULIN: Primary | ICD-10-CM

## 2021-04-27 ENCOUNTER — TELEPHONE (OUTPATIENT)
Dept: INTERNAL MEDICINE | Facility: CLINIC | Age: 81
End: 2021-04-27

## 2021-04-28 ENCOUNTER — HOSPITAL ENCOUNTER (OUTPATIENT)
Dept: CARDIOLOGY | Facility: HOSPITAL | Age: 81
Discharge: HOME | End: 2021-04-28
Attending: HOSPITALIST
Payer: MEDICARE

## 2021-04-28 DIAGNOSIS — R60.0 BILATERAL LEG EDEMA: ICD-10-CM

## 2021-04-28 LAB
LEFT GIAC DIA: 0.19 MM
LEFT GREAT SAPHENOUS DISTAL THIGH DIA: 0.29 CM
LEFT GREAT SAPHENOUS JUNCTION DIA: 0.37 CM
LEFT GREAT SAPHENOUS KNEE DIA: 0.29 CM
LEFT GREAT SAPHENOUS MIDDLE THIGH DIA: 0.34 CM
LEFT GREAT SAPHENOUS PROXIMAL CALF DIA: 0.18 CM
RIGHT GIAC DIA: 0.23 MM
RIGHT GREAT SAPHENOUS DISTAL THIGH DIA: 0.31 CM
RIGHT GREAT SAPHENOUS JUNCTION DIA: 0.6 CM
RIGHT GREAT SAPHENOUS KNEE DIA: 0.2 CM
RIGHT GREAT SAPHENOUS MIDDLE THIGH DIA: 0.34 CM
RIGHT GREAT SAPHENOUS PROXIMAL CALF DIA: 0.17 CM

## 2021-04-28 PROCEDURE — 93970 EXTREMITY STUDY: CPT | Mod: TC

## 2021-04-28 PROCEDURE — 93970 EXTREMITY STUDY: CPT | Mod: 26,,, | Performed by: INTERNAL MEDICINE

## 2021-04-28 PROCEDURE — 93970 CV US LOWER VENOUS INSUFFICIENCY BILATERAL (CUPID ONLY): ICD-10-PCS | Mod: 26,,, | Performed by: INTERNAL MEDICINE

## 2021-04-30 RX ORDER — ORAL SEMAGLUTIDE 3 MG/1
3 TABLET ORAL DAILY
Qty: 30 TABLET | Refills: 0 | Status: SHIPPED | OUTPATIENT
Start: 2021-04-30 | End: 2021-07-01

## 2021-04-30 RX ORDER — ORAL SEMAGLUTIDE 7 MG/1
7 TABLET ORAL DAILY
Qty: 30 TABLET | Refills: 11 | Status: SHIPPED | OUTPATIENT
Start: 2021-05-30 | End: 2021-07-01

## 2021-05-12 ENCOUNTER — PATIENT MESSAGE (OUTPATIENT)
Dept: INTERNAL MEDICINE | Facility: CLINIC | Age: 81
End: 2021-05-12

## 2021-05-17 ENCOUNTER — PATIENT MESSAGE (OUTPATIENT)
Dept: PODIATRY | Facility: CLINIC | Age: 81
End: 2021-05-17

## 2021-05-17 RX ORDER — CEPHALEXIN 500 MG/1
500 CAPSULE ORAL EVERY 12 HOURS
Qty: 20 CAPSULE | Refills: 0 | Status: SHIPPED | OUTPATIENT
Start: 2021-05-17 | End: 2021-05-27

## 2021-05-18 ENCOUNTER — PATIENT OUTREACH (OUTPATIENT)
Dept: ADMINISTRATIVE | Facility: OTHER | Age: 81
End: 2021-05-18

## 2021-05-18 ENCOUNTER — OFFICE VISIT (OUTPATIENT)
Dept: PODIATRY | Facility: CLINIC | Age: 81
End: 2021-05-18
Payer: MEDICARE

## 2021-05-18 VITALS
BODY MASS INDEX: 24.36 KG/M2 | HEIGHT: 71 IN | HEART RATE: 50 BPM | WEIGHT: 174 LBS | SYSTOLIC BLOOD PRESSURE: 112 MMHG | DIASTOLIC BLOOD PRESSURE: 56 MMHG

## 2021-05-18 DIAGNOSIS — M79.89 LEG SWELLING: Primary | ICD-10-CM

## 2021-05-18 DIAGNOSIS — I87.8 VENOUS STASIS: ICD-10-CM

## 2021-05-18 DIAGNOSIS — Z79.01 CHRONIC ANTICOAGULATION: ICD-10-CM

## 2021-05-18 PROCEDURE — 1126F PR PAIN SEVERITY QUANTIFIED, NO PAIN PRESENT: ICD-10-PCS | Mod: S$GLB,,, | Performed by: PODIATRIST

## 2021-05-18 PROCEDURE — 1159F MED LIST DOCD IN RCRD: CPT | Mod: S$GLB,,, | Performed by: PODIATRIST

## 2021-05-18 PROCEDURE — 3072F LOW RISK FOR RETINOPATHY: CPT | Mod: S$GLB,,, | Performed by: PODIATRIST

## 2021-05-18 PROCEDURE — 29580 PR STRAPPING UNNA BOOT: ICD-10-PCS | Mod: RT,S$GLB,, | Performed by: PODIATRIST

## 2021-05-18 PROCEDURE — 99213 PR OFFICE/OUTPT VISIT, EST, LEVL III, 20-29 MIN: ICD-10-PCS | Mod: 25,S$GLB,, | Performed by: PODIATRIST

## 2021-05-18 PROCEDURE — 3072F PR LOW RISK FOR RETINOPATHY: ICD-10-PCS | Mod: S$GLB,,, | Performed by: PODIATRIST

## 2021-05-18 PROCEDURE — 99499 RISK ADDL DX/OHS AUDIT: ICD-10-PCS | Mod: S$GLB,,, | Performed by: PODIATRIST

## 2021-05-18 PROCEDURE — 1101F PR PT FALLS ASSESS DOC 0-1 FALLS W/OUT INJ PAST YR: ICD-10-PCS | Mod: CPTII,S$GLB,, | Performed by: PODIATRIST

## 2021-05-18 PROCEDURE — 3288F FALL RISK ASSESSMENT DOCD: CPT | Mod: CPTII,S$GLB,, | Performed by: PODIATRIST

## 2021-05-18 PROCEDURE — 29580 STRAPPING UNNA BOOT: CPT | Mod: RT,S$GLB,, | Performed by: PODIATRIST

## 2021-05-18 PROCEDURE — 1159F PR MEDICATION LIST DOCUMENTED IN MEDICAL RECORD: ICD-10-PCS | Mod: S$GLB,,, | Performed by: PODIATRIST

## 2021-05-18 PROCEDURE — 99499 UNLISTED E&M SERVICE: CPT | Mod: S$GLB,,, | Performed by: PODIATRIST

## 2021-05-18 PROCEDURE — 1101F PT FALLS ASSESS-DOCD LE1/YR: CPT | Mod: CPTII,S$GLB,, | Performed by: PODIATRIST

## 2021-05-18 PROCEDURE — 3288F PR FALLS RISK ASSESSMENT DOCUMENTED: ICD-10-PCS | Mod: CPTII,S$GLB,, | Performed by: PODIATRIST

## 2021-05-18 PROCEDURE — 99999 PR PBB SHADOW E&M-EST. PATIENT-LVL III: ICD-10-PCS | Mod: PBBFAC,,, | Performed by: PODIATRIST

## 2021-05-18 PROCEDURE — 99999 PR PBB SHADOW E&M-EST. PATIENT-LVL III: CPT | Mod: PBBFAC,,, | Performed by: PODIATRIST

## 2021-05-18 PROCEDURE — 99213 OFFICE O/P EST LOW 20 MIN: CPT | Mod: 25,S$GLB,, | Performed by: PODIATRIST

## 2021-05-18 PROCEDURE — 1126F AMNT PAIN NOTED NONE PRSNT: CPT | Mod: S$GLB,,, | Performed by: PODIATRIST

## 2021-05-25 ENCOUNTER — OFFICE VISIT (OUTPATIENT)
Dept: PODIATRY | Facility: CLINIC | Age: 81
End: 2021-05-25
Payer: MEDICARE

## 2021-05-25 VITALS
HEART RATE: 51 BPM | DIASTOLIC BLOOD PRESSURE: 57 MMHG | HEIGHT: 71 IN | BODY MASS INDEX: 24.36 KG/M2 | WEIGHT: 174 LBS | SYSTOLIC BLOOD PRESSURE: 102 MMHG

## 2021-05-25 DIAGNOSIS — Z89.421 HISTORY OF PARTIAL RAY AMPUTATION OF FIFTH TOE OF RIGHT FOOT: ICD-10-CM

## 2021-05-25 DIAGNOSIS — Z79.01 CHRONIC ANTICOAGULATION: Primary | ICD-10-CM

## 2021-05-25 DIAGNOSIS — M79.89 LEG SWELLING: ICD-10-CM

## 2021-05-25 PROCEDURE — 3072F PR LOW RISK FOR RETINOPATHY: ICD-10-PCS | Mod: S$GLB,,, | Performed by: PODIATRIST

## 2021-05-25 PROCEDURE — 3288F PR FALLS RISK ASSESSMENT DOCUMENTED: ICD-10-PCS | Mod: CPTII,S$GLB,, | Performed by: PODIATRIST

## 2021-05-25 PROCEDURE — 1101F PR PT FALLS ASSESS DOC 0-1 FALLS W/OUT INJ PAST YR: ICD-10-PCS | Mod: CPTII,S$GLB,, | Performed by: PODIATRIST

## 2021-05-25 PROCEDURE — 1126F AMNT PAIN NOTED NONE PRSNT: CPT | Mod: S$GLB,,, | Performed by: PODIATRIST

## 2021-05-25 PROCEDURE — 1159F PR MEDICATION LIST DOCUMENTED IN MEDICAL RECORD: ICD-10-PCS | Mod: S$GLB,,, | Performed by: PODIATRIST

## 2021-05-25 PROCEDURE — 1159F MED LIST DOCD IN RCRD: CPT | Mod: S$GLB,,, | Performed by: PODIATRIST

## 2021-05-25 PROCEDURE — 1101F PT FALLS ASSESS-DOCD LE1/YR: CPT | Mod: CPTII,S$GLB,, | Performed by: PODIATRIST

## 2021-05-25 PROCEDURE — 99213 OFFICE O/P EST LOW 20 MIN: CPT | Mod: S$GLB,,, | Performed by: PODIATRIST

## 2021-05-25 PROCEDURE — 99999 PR PBB SHADOW E&M-EST. PATIENT-LVL IV: CPT | Mod: PBBFAC,,, | Performed by: PODIATRIST

## 2021-05-25 PROCEDURE — 99213 PR OFFICE/OUTPT VISIT, EST, LEVL III, 20-29 MIN: ICD-10-PCS | Mod: S$GLB,,, | Performed by: PODIATRIST

## 2021-05-25 PROCEDURE — 99999 PR PBB SHADOW E&M-EST. PATIENT-LVL IV: ICD-10-PCS | Mod: PBBFAC,,, | Performed by: PODIATRIST

## 2021-05-25 PROCEDURE — 1126F PR PAIN SEVERITY QUANTIFIED, NO PAIN PRESENT: ICD-10-PCS | Mod: S$GLB,,, | Performed by: PODIATRIST

## 2021-05-25 PROCEDURE — 3288F FALL RISK ASSESSMENT DOCD: CPT | Mod: CPTII,S$GLB,, | Performed by: PODIATRIST

## 2021-05-25 PROCEDURE — 3072F LOW RISK FOR RETINOPATHY: CPT | Mod: S$GLB,,, | Performed by: PODIATRIST

## 2021-05-25 RX ORDER — LANCETS 33 GAUGE
EACH MISCELLANEOUS
Qty: 200 EACH | Refills: 5 | Status: SHIPPED | OUTPATIENT
Start: 2021-05-25 | End: 2022-09-29 | Stop reason: SDUPTHER

## 2021-05-25 RX ORDER — DEXTROSE 4 G
1 TABLET,CHEWABLE ORAL 2 TIMES DAILY
Qty: 1 EACH | Refills: 0 | Status: SHIPPED | OUTPATIENT
Start: 2021-05-25 | End: 2022-05-25

## 2021-06-02 ENCOUNTER — PATIENT MESSAGE (OUTPATIENT)
Dept: INTERNAL MEDICINE | Facility: CLINIC | Age: 81
End: 2021-06-02

## 2021-06-02 ENCOUNTER — OFFICE VISIT (OUTPATIENT)
Dept: PODIATRY | Facility: CLINIC | Age: 81
End: 2021-06-02
Payer: MEDICARE

## 2021-06-02 VITALS
HEART RATE: 57 BPM | DIASTOLIC BLOOD PRESSURE: 53 MMHG | HEIGHT: 71 IN | BODY MASS INDEX: 24.36 KG/M2 | SYSTOLIC BLOOD PRESSURE: 109 MMHG | WEIGHT: 174 LBS

## 2021-06-02 DIAGNOSIS — I50.22 HEART FAILURE, SYSTOLIC, CHRONIC: ICD-10-CM

## 2021-06-02 DIAGNOSIS — I48.0 PAROXYSMAL ATRIAL FIBRILLATION: ICD-10-CM

## 2021-06-02 DIAGNOSIS — M79.89 LEG SWELLING: ICD-10-CM

## 2021-06-02 DIAGNOSIS — E11.40 TYPE 2 DIABETES MELLITUS WITH DIABETIC NEUROPATHY, WITH LONG-TERM CURRENT USE OF INSULIN: ICD-10-CM

## 2021-06-02 DIAGNOSIS — Z79.4 TYPE 2 DIABETES MELLITUS WITH DIABETIC NEUROPATHY, WITH LONG-TERM CURRENT USE OF INSULIN: ICD-10-CM

## 2021-06-02 DIAGNOSIS — I87.8 VENOUS STASIS: Primary | ICD-10-CM

## 2021-06-02 PROCEDURE — 99499 UNLISTED E&M SERVICE: CPT | Mod: S$GLB,,, | Performed by: PODIATRIST

## 2021-06-02 PROCEDURE — 1101F PT FALLS ASSESS-DOCD LE1/YR: CPT | Mod: CPTII,S$GLB,, | Performed by: PODIATRIST

## 2021-06-02 PROCEDURE — 99499 RISK ADDL DX/OHS AUDIT: ICD-10-PCS | Mod: S$GLB,,, | Performed by: PODIATRIST

## 2021-06-02 PROCEDURE — 3288F PR FALLS RISK ASSESSMENT DOCUMENTED: ICD-10-PCS | Mod: CPTII,S$GLB,, | Performed by: PODIATRIST

## 2021-06-02 PROCEDURE — 99999 PR PBB SHADOW E&M-EST. PATIENT-LVL V: ICD-10-PCS | Mod: PBBFAC,,, | Performed by: PODIATRIST

## 2021-06-02 PROCEDURE — 1101F PR PT FALLS ASSESS DOC 0-1 FALLS W/OUT INJ PAST YR: ICD-10-PCS | Mod: CPTII,S$GLB,, | Performed by: PODIATRIST

## 2021-06-02 PROCEDURE — 1126F AMNT PAIN NOTED NONE PRSNT: CPT | Mod: S$GLB,,, | Performed by: PODIATRIST

## 2021-06-02 PROCEDURE — 29580 PR STRAPPING UNNA BOOT: ICD-10-PCS | Mod: 50,S$GLB,, | Performed by: PODIATRIST

## 2021-06-02 PROCEDURE — 3072F PR LOW RISK FOR RETINOPATHY: ICD-10-PCS | Mod: S$GLB,,, | Performed by: PODIATRIST

## 2021-06-02 PROCEDURE — 99999 PR PBB SHADOW E&M-EST. PATIENT-LVL V: CPT | Mod: PBBFAC,,, | Performed by: PODIATRIST

## 2021-06-02 PROCEDURE — 1126F PR PAIN SEVERITY QUANTIFIED, NO PAIN PRESENT: ICD-10-PCS | Mod: S$GLB,,, | Performed by: PODIATRIST

## 2021-06-02 PROCEDURE — 3072F LOW RISK FOR RETINOPATHY: CPT | Mod: S$GLB,,, | Performed by: PODIATRIST

## 2021-06-02 PROCEDURE — 3288F FALL RISK ASSESSMENT DOCD: CPT | Mod: CPTII,S$GLB,, | Performed by: PODIATRIST

## 2021-06-02 PROCEDURE — 29580 STRAPPING UNNA BOOT: CPT | Mod: 50,S$GLB,, | Performed by: PODIATRIST

## 2021-06-02 RX ORDER — BLOOD-GLUCOSE METER
EACH MISCELLANEOUS
COMMUNITY
Start: 2021-05-26

## 2021-06-03 ENCOUNTER — OFFICE VISIT (OUTPATIENT)
Dept: DERMATOLOGY | Facility: CLINIC | Age: 81
End: 2021-06-03
Payer: MEDICARE

## 2021-06-03 ENCOUNTER — PATIENT MESSAGE (OUTPATIENT)
Dept: DERMATOLOGY | Facility: CLINIC | Age: 81
End: 2021-06-03

## 2021-06-03 DIAGNOSIS — B02.9 HERPES ZOSTER WITHOUT COMPLICATION: ICD-10-CM

## 2021-06-03 PROCEDURE — 1159F MED LIST DOCD IN RCRD: CPT | Mod: 95,,, | Performed by: STUDENT IN AN ORGANIZED HEALTH CARE EDUCATION/TRAINING PROGRAM

## 2021-06-03 PROCEDURE — 1159F PR MEDICATION LIST DOCUMENTED IN MEDICAL RECORD: ICD-10-PCS | Mod: 95,,, | Performed by: STUDENT IN AN ORGANIZED HEALTH CARE EDUCATION/TRAINING PROGRAM

## 2021-06-03 PROCEDURE — 3072F LOW RISK FOR RETINOPATHY: CPT | Mod: 95,,, | Performed by: STUDENT IN AN ORGANIZED HEALTH CARE EDUCATION/TRAINING PROGRAM

## 2021-06-03 PROCEDURE — 99213 OFFICE O/P EST LOW 20 MIN: CPT | Mod: 95,,, | Performed by: STUDENT IN AN ORGANIZED HEALTH CARE EDUCATION/TRAINING PROGRAM

## 2021-06-03 PROCEDURE — 3072F PR LOW RISK FOR RETINOPATHY: ICD-10-PCS | Mod: 95,,, | Performed by: STUDENT IN AN ORGANIZED HEALTH CARE EDUCATION/TRAINING PROGRAM

## 2021-06-03 PROCEDURE — 99213 PR OFFICE/OUTPT VISIT, EST, LEVL III, 20-29 MIN: ICD-10-PCS | Mod: 95,,, | Performed by: STUDENT IN AN ORGANIZED HEALTH CARE EDUCATION/TRAINING PROGRAM

## 2021-06-03 RX ORDER — VALACYCLOVIR HYDROCHLORIDE 1 G/1
1000 TABLET, FILM COATED ORAL 3 TIMES DAILY
Qty: 21 TABLET | Refills: 0 | Status: SHIPPED | OUTPATIENT
Start: 2021-06-03 | End: 2022-11-09 | Stop reason: ALTCHOICE

## 2021-06-09 ENCOUNTER — OFFICE VISIT (OUTPATIENT)
Dept: CARDIOLOGY | Facility: CLINIC | Age: 81
End: 2021-06-09
Payer: MEDICARE

## 2021-06-09 VITALS
BODY MASS INDEX: 24.57 KG/M2 | HEIGHT: 71 IN | DIASTOLIC BLOOD PRESSURE: 61 MMHG | SYSTOLIC BLOOD PRESSURE: 117 MMHG | WEIGHT: 175.5 LBS | HEART RATE: 55 BPM

## 2021-06-09 DIAGNOSIS — I25.2 HISTORY OF MYOCARDIAL INFARCTION: ICD-10-CM

## 2021-06-09 DIAGNOSIS — Z79.01 CHRONIC ANTICOAGULATION: ICD-10-CM

## 2021-06-09 DIAGNOSIS — I50.22 HEART FAILURE, SYSTOLIC, CHRONIC: ICD-10-CM

## 2021-06-09 DIAGNOSIS — Z95.5 HISTORY OF CORONARY ARTERY STENT PLACEMENT: ICD-10-CM

## 2021-06-09 DIAGNOSIS — I25.10 CORONARY ARTERY DISEASE INVOLVING NATIVE CORONARY ARTERY OF NATIVE HEART WITHOUT ANGINA PECTORIS: Primary | ICD-10-CM

## 2021-06-09 DIAGNOSIS — I48.0 PAROXYSMAL ATRIAL FIBRILLATION: ICD-10-CM

## 2021-06-09 DIAGNOSIS — Z79.4 TYPE 2 DIABETES MELLITUS WITH DIABETIC NEUROPATHY, WITH LONG-TERM CURRENT USE OF INSULIN: ICD-10-CM

## 2021-06-09 DIAGNOSIS — E11.40 TYPE 2 DIABETES MELLITUS WITH DIABETIC NEUROPATHY, WITH LONG-TERM CURRENT USE OF INSULIN: ICD-10-CM

## 2021-06-09 PROBLEM — N18.31 STAGE 3A CHRONIC KIDNEY DISEASE: Status: ACTIVE | Noted: 2019-06-06

## 2021-06-09 PROCEDURE — 99999 PR PBB SHADOW E&M-EST. PATIENT-LVL IV: ICD-10-PCS | Mod: PBBFAC,,, | Performed by: INTERNAL MEDICINE

## 2021-06-09 PROCEDURE — 1125F PR PAIN SEVERITY QUANTIFIED, PAIN PRESENT: ICD-10-PCS | Mod: S$GLB,,, | Performed by: INTERNAL MEDICINE

## 2021-06-09 PROCEDURE — 1159F MED LIST DOCD IN RCRD: CPT | Mod: S$GLB,,, | Performed by: INTERNAL MEDICINE

## 2021-06-09 PROCEDURE — 1125F AMNT PAIN NOTED PAIN PRSNT: CPT | Mod: S$GLB,,, | Performed by: INTERNAL MEDICINE

## 2021-06-09 PROCEDURE — 1159F PR MEDICATION LIST DOCUMENTED IN MEDICAL RECORD: ICD-10-PCS | Mod: S$GLB,,, | Performed by: INTERNAL MEDICINE

## 2021-06-09 PROCEDURE — 3072F LOW RISK FOR RETINOPATHY: CPT | Mod: S$GLB,,, | Performed by: INTERNAL MEDICINE

## 2021-06-09 PROCEDURE — 99999 PR PBB SHADOW E&M-EST. PATIENT-LVL IV: CPT | Mod: PBBFAC,,, | Performed by: INTERNAL MEDICINE

## 2021-06-09 PROCEDURE — 1101F PR PT FALLS ASSESS DOC 0-1 FALLS W/OUT INJ PAST YR: ICD-10-PCS | Mod: CPTII,S$GLB,, | Performed by: INTERNAL MEDICINE

## 2021-06-09 PROCEDURE — 99499 UNLISTED E&M SERVICE: CPT | Mod: S$GLB,,, | Performed by: INTERNAL MEDICINE

## 2021-06-09 PROCEDURE — 3288F PR FALLS RISK ASSESSMENT DOCUMENTED: ICD-10-PCS | Mod: CPTII,S$GLB,, | Performed by: INTERNAL MEDICINE

## 2021-06-09 PROCEDURE — 3072F PR LOW RISK FOR RETINOPATHY: ICD-10-PCS | Mod: S$GLB,,, | Performed by: INTERNAL MEDICINE

## 2021-06-09 PROCEDURE — 1101F PT FALLS ASSESS-DOCD LE1/YR: CPT | Mod: CPTII,S$GLB,, | Performed by: INTERNAL MEDICINE

## 2021-06-09 PROCEDURE — 99214 PR OFFICE/OUTPT VISIT, EST, LEVL IV, 30-39 MIN: ICD-10-PCS | Mod: S$GLB,,, | Performed by: INTERNAL MEDICINE

## 2021-06-09 PROCEDURE — 99214 OFFICE O/P EST MOD 30 MIN: CPT | Mod: S$GLB,,, | Performed by: INTERNAL MEDICINE

## 2021-06-09 PROCEDURE — 99499 RISK ADDL DX/OHS AUDIT: ICD-10-PCS | Mod: S$GLB,,, | Performed by: INTERNAL MEDICINE

## 2021-06-09 PROCEDURE — 3288F FALL RISK ASSESSMENT DOCD: CPT | Mod: CPTII,S$GLB,, | Performed by: INTERNAL MEDICINE

## 2021-06-10 ENCOUNTER — OFFICE VISIT (OUTPATIENT)
Dept: PODIATRY | Facility: CLINIC | Age: 81
End: 2021-06-10
Payer: MEDICARE

## 2021-06-10 VITALS
DIASTOLIC BLOOD PRESSURE: 53 MMHG | HEART RATE: 53 BPM | WEIGHT: 175 LBS | SYSTOLIC BLOOD PRESSURE: 100 MMHG | HEIGHT: 71 IN | BODY MASS INDEX: 24.5 KG/M2

## 2021-06-10 DIAGNOSIS — I87.8 VENOUS STASIS: Primary | ICD-10-CM

## 2021-06-10 DIAGNOSIS — Z79.4 TYPE 2 DIABETES MELLITUS WITH DIABETIC NEUROPATHY, WITH LONG-TERM CURRENT USE OF INSULIN: ICD-10-CM

## 2021-06-10 DIAGNOSIS — Z89.421 HISTORY OF PARTIAL RAY AMPUTATION OF FIFTH TOE OF RIGHT FOOT: ICD-10-CM

## 2021-06-10 DIAGNOSIS — B35.1 DERMATOPHYTOSIS OF NAIL: ICD-10-CM

## 2021-06-10 DIAGNOSIS — E11.40 TYPE 2 DIABETES MELLITUS WITH DIABETIC NEUROPATHY, WITH LONG-TERM CURRENT USE OF INSULIN: ICD-10-CM

## 2021-06-10 PROCEDURE — 99212 PR OFFICE/OUTPT VISIT, EST, LEVL II, 10-19 MIN: ICD-10-PCS | Mod: 25,S$GLB,, | Performed by: PODIATRIST

## 2021-06-10 PROCEDURE — 1126F AMNT PAIN NOTED NONE PRSNT: CPT | Mod: S$GLB,,, | Performed by: PODIATRIST

## 2021-06-10 PROCEDURE — 3288F FALL RISK ASSESSMENT DOCD: CPT | Mod: CPTII,S$GLB,, | Performed by: PODIATRIST

## 2021-06-10 PROCEDURE — 11721 DEBRIDE NAIL 6 OR MORE: CPT | Mod: Q7,S$GLB,, | Performed by: PODIATRIST

## 2021-06-10 PROCEDURE — 11721 ROUTINE FOOT CARE: ICD-10-PCS | Mod: Q7,S$GLB,, | Performed by: PODIATRIST

## 2021-06-10 PROCEDURE — 1101F PR PT FALLS ASSESS DOC 0-1 FALLS W/OUT INJ PAST YR: ICD-10-PCS | Mod: CPTII,S$GLB,, | Performed by: PODIATRIST

## 2021-06-10 PROCEDURE — 3072F PR LOW RISK FOR RETINOPATHY: ICD-10-PCS | Mod: S$GLB,,, | Performed by: PODIATRIST

## 2021-06-10 PROCEDURE — 99212 OFFICE O/P EST SF 10 MIN: CPT | Mod: 25,S$GLB,, | Performed by: PODIATRIST

## 2021-06-10 PROCEDURE — 99999 PR PBB SHADOW E&M-EST. PATIENT-LVL V: CPT | Mod: PBBFAC,,, | Performed by: PODIATRIST

## 2021-06-10 PROCEDURE — 3072F LOW RISK FOR RETINOPATHY: CPT | Mod: S$GLB,,, | Performed by: PODIATRIST

## 2021-06-10 PROCEDURE — 99999 PR PBB SHADOW E&M-EST. PATIENT-LVL V: ICD-10-PCS | Mod: PBBFAC,,, | Performed by: PODIATRIST

## 2021-06-10 PROCEDURE — 3288F PR FALLS RISK ASSESSMENT DOCUMENTED: ICD-10-PCS | Mod: CPTII,S$GLB,, | Performed by: PODIATRIST

## 2021-06-10 PROCEDURE — 1159F MED LIST DOCD IN RCRD: CPT | Mod: S$GLB,,, | Performed by: PODIATRIST

## 2021-06-10 PROCEDURE — 1126F PR PAIN SEVERITY QUANTIFIED, NO PAIN PRESENT: ICD-10-PCS | Mod: S$GLB,,, | Performed by: PODIATRIST

## 2021-06-10 PROCEDURE — 1159F PR MEDICATION LIST DOCUMENTED IN MEDICAL RECORD: ICD-10-PCS | Mod: S$GLB,,, | Performed by: PODIATRIST

## 2021-06-10 PROCEDURE — 1101F PT FALLS ASSESS-DOCD LE1/YR: CPT | Mod: CPTII,S$GLB,, | Performed by: PODIATRIST

## 2021-07-01 ENCOUNTER — PATIENT MESSAGE (OUTPATIENT)
Dept: INTERNAL MEDICINE | Facility: CLINIC | Age: 81
End: 2021-07-01

## 2021-07-01 DIAGNOSIS — I25.10 CORONARY ARTERY DISEASE INVOLVING NATIVE CORONARY ARTERY OF NATIVE HEART WITHOUT ANGINA PECTORIS: ICD-10-CM

## 2021-07-01 DIAGNOSIS — E11.40 TYPE 2 DIABETES MELLITUS WITH DIABETIC NEUROPATHY, WITH LONG-TERM CURRENT USE OF INSULIN: Primary | ICD-10-CM

## 2021-07-01 DIAGNOSIS — Z79.4 TYPE 2 DIABETES MELLITUS WITH DIABETIC NEUROPATHY, WITH LONG-TERM CURRENT USE OF INSULIN: Primary | ICD-10-CM

## 2021-07-01 DIAGNOSIS — E11.69 HYPERLIPIDEMIA ASSOCIATED WITH TYPE 2 DIABETES MELLITUS: ICD-10-CM

## 2021-07-01 DIAGNOSIS — E78.5 HYPERLIPIDEMIA ASSOCIATED WITH TYPE 2 DIABETES MELLITUS: ICD-10-CM

## 2021-07-01 DIAGNOSIS — I10 ESSENTIAL HYPERTENSION: ICD-10-CM

## 2021-07-01 RX ORDER — INSULIN DETEMIR 100 [IU]/ML
8 INJECTION, SOLUTION SUBCUTANEOUS NIGHTLY
Qty: 7.2 ML | Refills: 3 | Status: SHIPPED | OUTPATIENT
Start: 2021-07-01 | End: 2022-03-15 | Stop reason: SDUPTHER

## 2021-07-02 ENCOUNTER — PATIENT MESSAGE (OUTPATIENT)
Dept: INTERNAL MEDICINE | Facility: CLINIC | Age: 81
End: 2021-07-02

## 2021-07-06 ENCOUNTER — PATIENT MESSAGE (OUTPATIENT)
Dept: INTERNAL MEDICINE | Facility: CLINIC | Age: 81
End: 2021-07-06

## 2021-07-08 DIAGNOSIS — E78.5 HYPERLIPIDEMIA, UNSPECIFIED HYPERLIPIDEMIA TYPE: ICD-10-CM

## 2021-07-08 RX ORDER — LANCETS 33 GAUGE
EACH MISCELLANEOUS
Qty: 200 EACH | Refills: 5 | Status: CANCELLED | OUTPATIENT
Start: 2021-07-08

## 2021-07-08 RX ORDER — ATORVASTATIN CALCIUM 10 MG/1
10 TABLET, FILM COATED ORAL DAILY
Qty: 90 TABLET | Refills: 3 | Status: CANCELLED | OUTPATIENT
Start: 2021-07-08 | End: 2022-07-08

## 2021-07-09 RX ORDER — ATORVASTATIN CALCIUM 10 MG/1
TABLET, FILM COATED ORAL
Qty: 90 TABLET | Refills: 3 | Status: SHIPPED | OUTPATIENT
Start: 2021-07-09 | End: 2022-05-17

## 2021-07-11 RX ORDER — INSULIN DETEMIR 100 [IU]/ML
15 INJECTION, SOLUTION SUBCUTANEOUS NIGHTLY
Qty: 15 ML | Refills: 0 | OUTPATIENT
Start: 2021-07-11 | End: 2022-07-11

## 2021-07-22 ENCOUNTER — TELEPHONE (OUTPATIENT)
Dept: INTERNAL MEDICINE | Facility: CLINIC | Age: 81
End: 2021-07-22

## 2021-07-26 ENCOUNTER — PATIENT MESSAGE (OUTPATIENT)
Dept: INTERNAL MEDICINE | Facility: CLINIC | Age: 81
End: 2021-07-26

## 2021-07-26 DIAGNOSIS — E11.59 HYPERTENSION ASSOCIATED WITH DIABETES: ICD-10-CM

## 2021-07-26 DIAGNOSIS — Z79.4 TYPE 2 DIABETES MELLITUS WITH DIABETIC NEUROPATHY, WITH LONG-TERM CURRENT USE OF INSULIN: Primary | ICD-10-CM

## 2021-07-26 DIAGNOSIS — E11.40 TYPE 2 DIABETES MELLITUS WITH DIABETIC NEUROPATHY, WITH LONG-TERM CURRENT USE OF INSULIN: Primary | ICD-10-CM

## 2021-07-26 DIAGNOSIS — I15.2 HYPERTENSION ASSOCIATED WITH DIABETES: ICD-10-CM

## 2021-07-26 DIAGNOSIS — I25.10 CORONARY ARTERY DISEASE INVOLVING NATIVE CORONARY ARTERY OF NATIVE HEART WITHOUT ANGINA PECTORIS: ICD-10-CM

## 2021-07-27 ENCOUNTER — PATIENT MESSAGE (OUTPATIENT)
Dept: INTERNAL MEDICINE | Facility: CLINIC | Age: 81
End: 2021-07-27

## 2021-07-28 ENCOUNTER — PATIENT OUTREACH (OUTPATIENT)
Dept: ADMINISTRATIVE | Facility: OTHER | Age: 81
End: 2021-07-28

## 2021-07-30 ENCOUNTER — LAB VISIT (OUTPATIENT)
Dept: LAB | Facility: HOSPITAL | Age: 81
End: 2021-07-30
Attending: HOSPITALIST
Payer: MEDICARE

## 2021-07-30 DIAGNOSIS — I51.9 MYXEDEMA HEART DISEASE: ICD-10-CM

## 2021-07-30 DIAGNOSIS — I15.2 OBESITY, DIABETES, AND HYPERTENSION SYNDROME: ICD-10-CM

## 2021-07-30 DIAGNOSIS — E11.40 DIABETIC NEUROPATHY: ICD-10-CM

## 2021-07-30 DIAGNOSIS — I25.10 CORONARY ATHEROSCLEROSIS OF NATIVE CORONARY ARTERY: Primary | ICD-10-CM

## 2021-07-30 DIAGNOSIS — E11.59 OBESITY, DIABETES, AND HYPERTENSION SYNDROME: ICD-10-CM

## 2021-07-30 DIAGNOSIS — E66.9 OBESITY, DIABETES, AND HYPERTENSION SYNDROME: ICD-10-CM

## 2021-07-30 DIAGNOSIS — E11.69 OBESITY, DIABETES, AND HYPERTENSION SYNDROME: ICD-10-CM

## 2021-07-30 DIAGNOSIS — E03.9 MYXEDEMA HEART DISEASE: ICD-10-CM

## 2021-07-30 LAB
ALBUMIN SERPL BCP-MCNC: 3.6 G/DL (ref 3.5–5.2)
ALBUMIN/CREAT UR: 28.3 UG/MG (ref 0–30)
ALP SERPL-CCNC: 96 U/L (ref 55–135)
ALT SERPL W/O P-5'-P-CCNC: 34 U/L (ref 10–44)
ANION GAP SERPL CALC-SCNC: 9 MMOL/L (ref 8–16)
AST SERPL-CCNC: 33 U/L (ref 10–40)
BASOPHILS # BLD AUTO: 0.05 K/UL (ref 0–0.2)
BASOPHILS NFR BLD: 0.9 % (ref 0–1.9)
BILIRUB SERPL-MCNC: 0.9 MG/DL (ref 0.1–1)
BILIRUB UR QL STRIP: NEGATIVE
BUN SERPL-MCNC: 27 MG/DL (ref 8–23)
CALCIUM SERPL-MCNC: 9.6 MG/DL (ref 8.7–10.5)
CHLORIDE SERPL-SCNC: 106 MMOL/L (ref 95–110)
CHOLEST SERPL-MCNC: 116 MG/DL (ref 120–199)
CHOLEST/HDLC SERPL: 3.1 {RATIO} (ref 2–5)
CLARITY UR REFRACT.AUTO: CLEAR
CO2 SERPL-SCNC: 26 MMOL/L (ref 23–29)
COLOR UR AUTO: YELLOW
CREAT SERPL-MCNC: 1.2 MG/DL (ref 0.5–1.4)
CREAT UR-MCNC: 60 MG/DL (ref 23–375)
DIFFERENTIAL METHOD: ABNORMAL
EOSINOPHIL # BLD AUTO: 0.6 K/UL (ref 0–0.5)
EOSINOPHIL NFR BLD: 11.5 % (ref 0–8)
ERYTHROCYTE [DISTWIDTH] IN BLOOD BY AUTOMATED COUNT: 13.2 % (ref 11.5–14.5)
EST. GFR  (AFRICAN AMERICAN): >60 ML/MIN/1.73 M^2
EST. GFR  (NON AFRICAN AMERICAN): 56.8 ML/MIN/1.73 M^2
ESTIMATED AVG GLUCOSE: 134 MG/DL (ref 68–131)
GLUCOSE SERPL-MCNC: 92 MG/DL (ref 70–110)
GLUCOSE UR QL STRIP: NEGATIVE
HBA1C MFR BLD: 6.3 % (ref 4–5.6)
HCT VFR BLD AUTO: 40.2 % (ref 40–54)
HDLC SERPL-MCNC: 38 MG/DL (ref 40–75)
HDLC SERPL: 32.8 % (ref 20–50)
HGB BLD-MCNC: 13 G/DL (ref 14–18)
HGB UR QL STRIP: NEGATIVE
IMM GRANULOCYTES # BLD AUTO: 0.01 K/UL (ref 0–0.04)
IMM GRANULOCYTES NFR BLD AUTO: 0.2 % (ref 0–0.5)
KETONES UR QL STRIP: NEGATIVE
LDLC SERPL CALC-MCNC: 60.4 MG/DL (ref 63–159)
LEUKOCYTE ESTERASE UR QL STRIP: NEGATIVE
LYMPHOCYTES # BLD AUTO: 1.3 K/UL (ref 1–4.8)
LYMPHOCYTES NFR BLD: 22.9 % (ref 18–48)
MCH RBC QN AUTO: 32.9 PG (ref 27–31)
MCHC RBC AUTO-ENTMCNC: 32.3 G/DL (ref 32–36)
MCV RBC AUTO: 102 FL (ref 82–98)
MICROALBUMIN UR DL<=1MG/L-MCNC: 17 UG/ML
MONOCYTES # BLD AUTO: 0.7 K/UL (ref 0.3–1)
MONOCYTES NFR BLD: 12.6 % (ref 4–15)
NEUTROPHILS # BLD AUTO: 2.8 K/UL (ref 1.8–7.7)
NEUTROPHILS NFR BLD: 51.9 % (ref 38–73)
NITRITE UR QL STRIP: NEGATIVE
NONHDLC SERPL-MCNC: 78 MG/DL
NRBC BLD-RTO: 0 /100 WBC
PH UR STRIP: 8 [PH] (ref 5–8)
PLATELET # BLD AUTO: 128 K/UL (ref 150–450)
PMV BLD AUTO: 10.8 FL (ref 9.2–12.9)
POTASSIUM SERPL-SCNC: 4.7 MMOL/L (ref 3.5–5.1)
PROT SERPL-MCNC: 8.8 G/DL (ref 6–8.4)
PROT UR QL STRIP: NEGATIVE
RBC # BLD AUTO: 3.95 M/UL (ref 4.6–6.2)
SODIUM SERPL-SCNC: 141 MMOL/L (ref 136–145)
SP GR UR STRIP: 1.01 (ref 1–1.03)
T4 FREE SERPL-MCNC: 1.48 NG/DL (ref 0.71–1.51)
TRIGL SERPL-MCNC: 88 MG/DL (ref 30–150)
TSH SERPL DL<=0.005 MIU/L-ACNC: 4.59 UIU/ML (ref 0.4–4)
URN SPEC COLLECT METH UR: NORMAL
WBC # BLD AUTO: 5.46 K/UL (ref 3.9–12.7)

## 2021-07-30 PROCEDURE — 82570 ASSAY OF URINE CREATININE: CPT | Performed by: HOSPITALIST

## 2021-07-30 PROCEDURE — 84443 ASSAY THYROID STIM HORMONE: CPT | Performed by: HOSPITALIST

## 2021-07-30 PROCEDURE — G0180 MD CERTIFICATION HHA PATIENT: HCPCS | Mod: ,,, | Performed by: HOSPITALIST

## 2021-07-30 PROCEDURE — 82043 UR ALBUMIN QUANTITATIVE: CPT | Performed by: HOSPITALIST

## 2021-07-30 PROCEDURE — 81003 URINALYSIS AUTO W/O SCOPE: CPT | Performed by: HOSPITALIST

## 2021-07-30 PROCEDURE — 85025 COMPLETE CBC W/AUTO DIFF WBC: CPT | Performed by: HOSPITALIST

## 2021-07-30 PROCEDURE — 84439 ASSAY OF FREE THYROXINE: CPT | Performed by: HOSPITALIST

## 2021-07-30 PROCEDURE — 80053 COMPREHEN METABOLIC PANEL: CPT | Performed by: HOSPITALIST

## 2021-07-30 PROCEDURE — G0180 PR HOME HEALTH MD CERTIFICATION: ICD-10-PCS | Mod: ,,, | Performed by: HOSPITALIST

## 2021-07-30 PROCEDURE — 83036 HEMOGLOBIN GLYCOSYLATED A1C: CPT | Performed by: HOSPITALIST

## 2021-07-30 PROCEDURE — 80061 LIPID PANEL: CPT | Performed by: HOSPITALIST

## 2021-08-02 ENCOUNTER — TELEPHONE (OUTPATIENT)
Dept: INTERNAL MEDICINE | Facility: CLINIC | Age: 81
End: 2021-08-02

## 2021-08-02 DIAGNOSIS — I50.22 CHRONIC SYSTOLIC HEART FAILURE: ICD-10-CM

## 2021-08-02 RX ORDER — SACUBITRIL AND VALSARTAN 49; 51 MG/1; MG/1
1 TABLET, FILM COATED ORAL 2 TIMES DAILY
Qty: 60 TABLET | Refills: 11 | Status: SHIPPED | OUTPATIENT
Start: 2021-08-02 | End: 2022-08-17 | Stop reason: SDUPTHER

## 2021-08-04 ENCOUNTER — TELEPHONE (OUTPATIENT)
Dept: INTERNAL MEDICINE | Facility: CLINIC | Age: 81
End: 2021-08-04

## 2021-08-11 ENCOUNTER — PATIENT MESSAGE (OUTPATIENT)
Dept: PODIATRY | Facility: CLINIC | Age: 81
End: 2021-08-11

## 2021-08-11 ENCOUNTER — PATIENT MESSAGE (OUTPATIENT)
Dept: CARDIOLOGY | Facility: CLINIC | Age: 81
End: 2021-08-11

## 2021-08-12 ENCOUNTER — TELEPHONE (OUTPATIENT)
Dept: INTERNAL MEDICINE | Facility: CLINIC | Age: 81
End: 2021-08-12

## 2021-08-12 ENCOUNTER — PATIENT MESSAGE (OUTPATIENT)
Dept: PODIATRY | Facility: CLINIC | Age: 81
End: 2021-08-12

## 2021-08-12 DIAGNOSIS — E11.40 TYPE 2 DIABETES MELLITUS WITH DIABETIC NEUROPATHY, WITH LONG-TERM CURRENT USE OF INSULIN: Primary | ICD-10-CM

## 2021-08-12 DIAGNOSIS — Z79.4 TYPE 2 DIABETES MELLITUS WITH DIABETIC NEUROPATHY, WITH LONG-TERM CURRENT USE OF INSULIN: Primary | ICD-10-CM

## 2021-08-12 RX ORDER — GABAPENTIN 300 MG/1
300 CAPSULE ORAL NIGHTLY
Qty: 90 CAPSULE | Refills: 3 | Status: SHIPPED | OUTPATIENT
Start: 2021-08-12 | End: 2022-11-15

## 2021-08-13 ENCOUNTER — PATIENT MESSAGE (OUTPATIENT)
Dept: INTERNAL MEDICINE | Facility: CLINIC | Age: 81
End: 2021-08-13

## 2021-08-13 ENCOUNTER — TELEPHONE (OUTPATIENT)
Dept: INTERNAL MEDICINE | Facility: CLINIC | Age: 81
End: 2021-08-13

## 2021-08-16 ENCOUNTER — TELEPHONE (OUTPATIENT)
Dept: INTERNAL MEDICINE | Facility: CLINIC | Age: 81
End: 2021-08-16

## 2021-08-16 ENCOUNTER — PATIENT MESSAGE (OUTPATIENT)
Dept: INTERNAL MEDICINE | Facility: CLINIC | Age: 81
End: 2021-08-16

## 2021-08-17 ENCOUNTER — DOCUMENT SCAN (OUTPATIENT)
Dept: HOME HEALTH SERVICES | Facility: HOSPITAL | Age: 81
End: 2021-08-17
Payer: MEDICARE

## 2021-08-20 ENCOUNTER — EXTERNAL HOME HEALTH (OUTPATIENT)
Dept: HOME HEALTH SERVICES | Facility: HOSPITAL | Age: 81
End: 2021-08-20
Payer: MEDICARE

## 2021-09-13 ENCOUNTER — PATIENT MESSAGE (OUTPATIENT)
Dept: INTERNAL MEDICINE | Facility: CLINIC | Age: 81
End: 2021-09-13

## 2021-09-13 ENCOUNTER — PATIENT MESSAGE (OUTPATIENT)
Dept: CARDIOLOGY | Facility: CLINIC | Age: 81
End: 2021-09-13

## 2021-09-21 ENCOUNTER — PATIENT MESSAGE (OUTPATIENT)
Dept: INTERNAL MEDICINE | Facility: CLINIC | Age: 81
End: 2021-09-21

## 2021-09-23 ENCOUNTER — PATIENT MESSAGE (OUTPATIENT)
Dept: PODIATRY | Facility: CLINIC | Age: 81
End: 2021-09-23

## 2021-09-23 ENCOUNTER — TELEPHONE (OUTPATIENT)
Dept: PODIATRY | Facility: CLINIC | Age: 81
End: 2021-09-23

## 2021-09-24 ENCOUNTER — TELEPHONE (OUTPATIENT)
Dept: PODIATRY | Facility: CLINIC | Age: 81
End: 2021-09-24

## 2021-09-28 ENCOUNTER — OFFICE VISIT (OUTPATIENT)
Dept: PODIATRY | Facility: CLINIC | Age: 81
End: 2021-09-28
Payer: MEDICARE

## 2021-09-28 VITALS
DIASTOLIC BLOOD PRESSURE: 52 MMHG | BODY MASS INDEX: 24.5 KG/M2 | SYSTOLIC BLOOD PRESSURE: 94 MMHG | HEIGHT: 71 IN | WEIGHT: 175 LBS | HEART RATE: 55 BPM

## 2021-09-28 DIAGNOSIS — Z79.4 TYPE 2 DIABETES MELLITUS WITH DIABETIC NEUROPATHY, WITH LONG-TERM CURRENT USE OF INSULIN: ICD-10-CM

## 2021-09-28 DIAGNOSIS — E11.40 TYPE 2 DIABETES MELLITUS WITH DIABETIC NEUROPATHY, WITH LONG-TERM CURRENT USE OF INSULIN: ICD-10-CM

## 2021-09-28 DIAGNOSIS — B35.1 DERMATOPHYTOSIS OF NAIL: ICD-10-CM

## 2021-09-28 DIAGNOSIS — Z89.421 HISTORY OF PARTIAL RAY AMPUTATION OF FIFTH TOE OF RIGHT FOOT: ICD-10-CM

## 2021-09-28 DIAGNOSIS — I87.8 VENOUS STASIS: Primary | ICD-10-CM

## 2021-09-28 PROCEDURE — 3074F SYST BP LT 130 MM HG: CPT | Mod: CPTII,S$GLB,, | Performed by: PODIATRIST

## 2021-09-28 PROCEDURE — 11721 ROUTINE FOOT CARE: ICD-10-PCS | Mod: Q7,S$GLB,, | Performed by: PODIATRIST

## 2021-09-28 PROCEDURE — 3074F PR MOST RECENT SYSTOLIC BLOOD PRESSURE < 130 MM HG: ICD-10-PCS | Mod: CPTII,S$GLB,, | Performed by: PODIATRIST

## 2021-09-28 PROCEDURE — 1160F RVW MEDS BY RX/DR IN RCRD: CPT | Mod: CPTII,S$GLB,, | Performed by: PODIATRIST

## 2021-09-28 PROCEDURE — 11721 DEBRIDE NAIL 6 OR MORE: CPT | Mod: Q7,S$GLB,, | Performed by: PODIATRIST

## 2021-09-28 PROCEDURE — 1126F AMNT PAIN NOTED NONE PRSNT: CPT | Mod: CPTII,S$GLB,, | Performed by: PODIATRIST

## 2021-09-28 PROCEDURE — 1160F PR REVIEW ALL MEDS BY PRESCRIBER/CLIN PHARMACIST DOCUMENTED: ICD-10-PCS | Mod: CPTII,S$GLB,, | Performed by: PODIATRIST

## 2021-09-28 PROCEDURE — 3288F PR FALLS RISK ASSESSMENT DOCUMENTED: ICD-10-PCS | Mod: CPTII,S$GLB,, | Performed by: PODIATRIST

## 2021-09-28 PROCEDURE — 1101F PT FALLS ASSESS-DOCD LE1/YR: CPT | Mod: CPTII,S$GLB,, | Performed by: PODIATRIST

## 2021-09-28 PROCEDURE — 99499 RISK ADDL DX/OHS AUDIT: ICD-10-PCS | Mod: HCNC,S$GLB,, | Performed by: PODIATRIST

## 2021-09-28 PROCEDURE — 1101F PR PT FALLS ASSESS DOC 0-1 FALLS W/OUT INJ PAST YR: ICD-10-PCS | Mod: CPTII,S$GLB,, | Performed by: PODIATRIST

## 2021-09-28 PROCEDURE — 99499 UNLISTED E&M SERVICE: CPT | Mod: HCNC,S$GLB,, | Performed by: PODIATRIST

## 2021-09-28 PROCEDURE — 99999 PR PBB SHADOW E&M-EST. PATIENT-LVL IV: CPT | Mod: PBBFAC,,, | Performed by: PODIATRIST

## 2021-09-28 PROCEDURE — 3072F PR LOW RISK FOR RETINOPATHY: ICD-10-PCS | Mod: CPTII,S$GLB,, | Performed by: PODIATRIST

## 2021-09-28 PROCEDURE — 1159F MED LIST DOCD IN RCRD: CPT | Mod: CPTII,S$GLB,, | Performed by: PODIATRIST

## 2021-09-28 PROCEDURE — 1126F PR PAIN SEVERITY QUANTIFIED, NO PAIN PRESENT: ICD-10-PCS | Mod: CPTII,S$GLB,, | Performed by: PODIATRIST

## 2021-09-28 PROCEDURE — 3078F PR MOST RECENT DIASTOLIC BLOOD PRESSURE < 80 MM HG: ICD-10-PCS | Mod: CPTII,S$GLB,, | Performed by: PODIATRIST

## 2021-09-28 PROCEDURE — 99212 PR OFFICE/OUTPT VISIT, EST, LEVL II, 10-19 MIN: ICD-10-PCS | Mod: 25,S$GLB,, | Performed by: PODIATRIST

## 2021-09-28 PROCEDURE — 3288F FALL RISK ASSESSMENT DOCD: CPT | Mod: CPTII,S$GLB,, | Performed by: PODIATRIST

## 2021-09-28 PROCEDURE — 3078F DIAST BP <80 MM HG: CPT | Mod: CPTII,S$GLB,, | Performed by: PODIATRIST

## 2021-09-28 PROCEDURE — 3072F LOW RISK FOR RETINOPATHY: CPT | Mod: CPTII,S$GLB,, | Performed by: PODIATRIST

## 2021-09-28 PROCEDURE — 1159F PR MEDICATION LIST DOCUMENTED IN MEDICAL RECORD: ICD-10-PCS | Mod: CPTII,S$GLB,, | Performed by: PODIATRIST

## 2021-09-28 PROCEDURE — 99212 OFFICE O/P EST SF 10 MIN: CPT | Mod: 25,S$GLB,, | Performed by: PODIATRIST

## 2021-09-28 PROCEDURE — 99999 PR PBB SHADOW E&M-EST. PATIENT-LVL IV: ICD-10-PCS | Mod: PBBFAC,,, | Performed by: PODIATRIST

## 2021-09-28 RX ORDER — PNEUMOCOCCAL 13-VALENT CONJUGATE VACCINE 2.2; 2.2; 2.2; 2.2; 2.2; 4.4; 2.2; 2.2; 2.2; 2.2; 2.2; 2.2; 2.2 UG/.5ML; UG/.5ML; UG/.5ML; UG/.5ML; UG/.5ML; UG/.5ML; UG/.5ML; UG/.5ML; UG/.5ML; UG/.5ML; UG/.5ML; UG/.5ML; UG/.5ML
INJECTION, SUSPENSION INTRAMUSCULAR
COMMUNITY
Start: 2021-04-19 | End: 2023-05-09 | Stop reason: ALTCHOICE

## 2021-09-29 ENCOUNTER — LAB VISIT (OUTPATIENT)
Dept: LAB | Facility: HOSPITAL | Age: 81
End: 2021-09-29
Attending: HOSPITALIST
Payer: MEDICARE

## 2021-09-29 DIAGNOSIS — I10 ESSENTIAL HYPERTENSION: ICD-10-CM

## 2021-09-29 DIAGNOSIS — Z79.4 TYPE 2 DIABETES MELLITUS WITH DIABETIC NEUROPATHY, WITH LONG-TERM CURRENT USE OF INSULIN: ICD-10-CM

## 2021-09-29 DIAGNOSIS — E11.40 TYPE 2 DIABETES MELLITUS WITH DIABETIC NEUROPATHY, WITH LONG-TERM CURRENT USE OF INSULIN: ICD-10-CM

## 2021-09-29 DIAGNOSIS — I25.10 CORONARY ARTERY DISEASE INVOLVING NATIVE CORONARY ARTERY OF NATIVE HEART WITHOUT ANGINA PECTORIS: ICD-10-CM

## 2021-09-29 LAB
ALBUMIN SERPL BCP-MCNC: 3.5 G/DL (ref 3.5–5.2)
ALP SERPL-CCNC: 72 U/L (ref 55–135)
ALT SERPL W/O P-5'-P-CCNC: 25 U/L (ref 10–44)
ANION GAP SERPL CALC-SCNC: 11 MMOL/L (ref 8–16)
AST SERPL-CCNC: 23 U/L (ref 10–40)
BASOPHILS # BLD AUTO: 0.06 K/UL (ref 0–0.2)
BASOPHILS NFR BLD: 1.1 % (ref 0–1.9)
BILIRUB SERPL-MCNC: 0.8 MG/DL (ref 0.1–1)
BUN SERPL-MCNC: 29 MG/DL (ref 8–23)
CALCIUM SERPL-MCNC: 9 MG/DL (ref 8.7–10.5)
CHLORIDE SERPL-SCNC: 105 MMOL/L (ref 95–110)
CHOLEST SERPL-MCNC: 97 MG/DL (ref 120–199)
CHOLEST/HDLC SERPL: 2.6 {RATIO} (ref 2–5)
CO2 SERPL-SCNC: 25 MMOL/L (ref 23–29)
CREAT SERPL-MCNC: 1.2 MG/DL (ref 0.5–1.4)
DIFFERENTIAL METHOD: ABNORMAL
EOSINOPHIL # BLD AUTO: 0.5 K/UL (ref 0–0.5)
EOSINOPHIL NFR BLD: 8.6 % (ref 0–8)
ERYTHROCYTE [DISTWIDTH] IN BLOOD BY AUTOMATED COUNT: 13 % (ref 11.5–14.5)
EST. GFR  (AFRICAN AMERICAN): >60 ML/MIN/1.73 M^2
EST. GFR  (NON AFRICAN AMERICAN): 56.8 ML/MIN/1.73 M^2
ESTIMATED AVG GLUCOSE: 114 MG/DL (ref 68–131)
GLUCOSE SERPL-MCNC: 85 MG/DL (ref 70–110)
HBA1C MFR BLD: 5.6 % (ref 4–5.6)
HCT VFR BLD AUTO: 35.4 % (ref 40–54)
HDLC SERPL-MCNC: 37 MG/DL (ref 40–75)
HDLC SERPL: 38.1 % (ref 20–50)
HGB BLD-MCNC: 11.6 G/DL (ref 14–18)
IMM GRANULOCYTES # BLD AUTO: 0.02 K/UL (ref 0–0.04)
IMM GRANULOCYTES NFR BLD AUTO: 0.4 % (ref 0–0.5)
LDLC SERPL CALC-MCNC: 48.8 MG/DL (ref 63–159)
LYMPHOCYTES # BLD AUTO: 1.4 K/UL (ref 1–4.8)
LYMPHOCYTES NFR BLD: 25 % (ref 18–48)
MCH RBC QN AUTO: 34.4 PG (ref 27–31)
MCHC RBC AUTO-ENTMCNC: 32.8 G/DL (ref 32–36)
MCV RBC AUTO: 105 FL (ref 82–98)
MONOCYTES # BLD AUTO: 0.7 K/UL (ref 0.3–1)
MONOCYTES NFR BLD: 12.1 % (ref 4–15)
NEUTROPHILS # BLD AUTO: 3 K/UL (ref 1.8–7.7)
NEUTROPHILS NFR BLD: 52.8 % (ref 38–73)
NONHDLC SERPL-MCNC: 60 MG/DL
NRBC BLD-RTO: 0 /100 WBC
PLATELET # BLD AUTO: 139 K/UL (ref 150–450)
PMV BLD AUTO: 11 FL (ref 9.2–12.9)
POTASSIUM SERPL-SCNC: 4 MMOL/L (ref 3.5–5.1)
PROT SERPL-MCNC: 7.7 G/DL (ref 6–8.4)
RBC # BLD AUTO: 3.37 M/UL (ref 4.6–6.2)
SODIUM SERPL-SCNC: 141 MMOL/L (ref 136–145)
TRIGL SERPL-MCNC: 56 MG/DL (ref 30–150)
TSH SERPL DL<=0.005 MIU/L-ACNC: 2.79 UIU/ML (ref 0.4–4)
WBC # BLD AUTO: 5.6 K/UL (ref 3.9–12.7)

## 2021-09-29 PROCEDURE — 85025 COMPLETE CBC W/AUTO DIFF WBC: CPT | Performed by: HOSPITALIST

## 2021-09-29 PROCEDURE — 80061 LIPID PANEL: CPT | Performed by: HOSPITALIST

## 2021-09-29 PROCEDURE — 80053 COMPREHEN METABOLIC PANEL: CPT | Performed by: HOSPITALIST

## 2021-09-29 PROCEDURE — 36415 COLL VENOUS BLD VENIPUNCTURE: CPT | Mod: PN | Performed by: HOSPITALIST

## 2021-09-29 PROCEDURE — 83036 HEMOGLOBIN GLYCOSYLATED A1C: CPT | Performed by: HOSPITALIST

## 2021-09-29 PROCEDURE — 84443 ASSAY THYROID STIM HORMONE: CPT | Performed by: HOSPITALIST

## 2021-09-30 RX ORDER — AMIODARONE HYDROCHLORIDE 100 MG/1
100 TABLET ORAL DAILY
Qty: 30 TABLET | Refills: 6 | Status: SHIPPED | OUTPATIENT
Start: 2021-09-30 | End: 2022-04-12 | Stop reason: SDUPTHER

## 2021-10-04 ENCOUNTER — OFFICE VISIT (OUTPATIENT)
Dept: INTERNAL MEDICINE | Facility: CLINIC | Age: 81
End: 2021-10-04
Payer: MEDICARE

## 2021-10-04 VITALS
BODY MASS INDEX: 22.23 KG/M2 | TEMPERATURE: 98 F | WEIGHT: 158.75 LBS | DIASTOLIC BLOOD PRESSURE: 50 MMHG | SYSTOLIC BLOOD PRESSURE: 100 MMHG | HEART RATE: 54 BPM | OXYGEN SATURATION: 97 % | HEIGHT: 71 IN

## 2021-10-04 DIAGNOSIS — Z12.5 ENCOUNTER FOR PROSTATE CANCER SCREENING: ICD-10-CM

## 2021-10-04 DIAGNOSIS — Z79.4 TYPE 2 DIABETES MELLITUS WITH DIABETIC NEUROPATHY, WITH LONG-TERM CURRENT USE OF INSULIN: ICD-10-CM

## 2021-10-04 DIAGNOSIS — E03.8 SUBCLINICAL HYPOTHYROIDISM: ICD-10-CM

## 2021-10-04 DIAGNOSIS — Z89.421 HISTORY OF PARTIAL RAY AMPUTATION OF FIFTH TOE OF RIGHT FOOT: ICD-10-CM

## 2021-10-04 DIAGNOSIS — I50.22 CHRONIC SYSTOLIC HEART FAILURE: ICD-10-CM

## 2021-10-04 DIAGNOSIS — D53.9 MACROCYTIC ANEMIA: ICD-10-CM

## 2021-10-04 DIAGNOSIS — E11.69 HYPERLIPIDEMIA ASSOCIATED WITH TYPE 2 DIABETES MELLITUS: ICD-10-CM

## 2021-10-04 DIAGNOSIS — Z79.01 CHRONIC ANTICOAGULATION: ICD-10-CM

## 2021-10-04 DIAGNOSIS — I15.2 HYPERTENSION ASSOCIATED WITH DIABETES: ICD-10-CM

## 2021-10-04 DIAGNOSIS — N18.32 STAGE 3B CHRONIC KIDNEY DISEASE: ICD-10-CM

## 2021-10-04 DIAGNOSIS — R49.0 HOARSENESS: ICD-10-CM

## 2021-10-04 DIAGNOSIS — E11.40 TYPE 2 DIABETES MELLITUS WITH DIABETIC NEUROPATHY, WITH LONG-TERM CURRENT USE OF INSULIN: ICD-10-CM

## 2021-10-04 DIAGNOSIS — D69.6 THROMBOCYTOPENIA, UNSPECIFIED: ICD-10-CM

## 2021-10-04 DIAGNOSIS — I73.9 PERIPHERAL VASCULAR DISEASE: ICD-10-CM

## 2021-10-04 DIAGNOSIS — I25.10 CORONARY ARTERY DISEASE INVOLVING NATIVE CORONARY ARTERY OF NATIVE HEART WITHOUT ANGINA PECTORIS: ICD-10-CM

## 2021-10-04 DIAGNOSIS — K21.9 GASTROESOPHAGEAL REFLUX DISEASE, UNSPECIFIED WHETHER ESOPHAGITIS PRESENT: ICD-10-CM

## 2021-10-04 DIAGNOSIS — E11.59 HYPERTENSION ASSOCIATED WITH DIABETES: ICD-10-CM

## 2021-10-04 DIAGNOSIS — Z00.00 ENCOUNTER FOR PREVENTIVE HEALTH EXAMINATION: Primary | ICD-10-CM

## 2021-10-04 DIAGNOSIS — I50.22 HEART FAILURE, SYSTOLIC, CHRONIC: ICD-10-CM

## 2021-10-04 DIAGNOSIS — R42 ORTHOSTATIC DIZZINESS: ICD-10-CM

## 2021-10-04 DIAGNOSIS — E78.5 HYPERLIPIDEMIA ASSOCIATED WITH TYPE 2 DIABETES MELLITUS: ICD-10-CM

## 2021-10-04 DIAGNOSIS — I48.0 PAROXYSMAL ATRIAL FIBRILLATION: ICD-10-CM

## 2021-10-04 PROCEDURE — 1101F PR PT FALLS ASSESS DOC 0-1 FALLS W/OUT INJ PAST YR: ICD-10-PCS | Mod: CPTII,S$GLB,, | Performed by: HOSPITALIST

## 2021-10-04 PROCEDURE — 1159F PR MEDICATION LIST DOCUMENTED IN MEDICAL RECORD: ICD-10-PCS | Mod: CPTII,S$GLB,, | Performed by: HOSPITALIST

## 2021-10-04 PROCEDURE — 1159F MED LIST DOCD IN RCRD: CPT | Mod: CPTII,S$GLB,, | Performed by: HOSPITALIST

## 2021-10-04 PROCEDURE — 1160F PR REVIEW ALL MEDS BY PRESCRIBER/CLIN PHARMACIST DOCUMENTED: ICD-10-PCS | Mod: CPTII,S$GLB,, | Performed by: HOSPITALIST

## 2021-10-04 PROCEDURE — 3078F PR MOST RECENT DIASTOLIC BLOOD PRESSURE < 80 MM HG: ICD-10-PCS | Mod: CPTII,S$GLB,, | Performed by: HOSPITALIST

## 2021-10-04 PROCEDURE — 99999 PR PBB SHADOW E&M-EST. PATIENT-LVL V: ICD-10-PCS | Mod: PBBFAC,,, | Performed by: HOSPITALIST

## 2021-10-04 PROCEDURE — 3288F FALL RISK ASSESSMENT DOCD: CPT | Mod: CPTII,S$GLB,, | Performed by: HOSPITALIST

## 2021-10-04 PROCEDURE — 1160F RVW MEDS BY RX/DR IN RCRD: CPT | Mod: CPTII,S$GLB,, | Performed by: HOSPITALIST

## 2021-10-04 PROCEDURE — 1101F PT FALLS ASSESS-DOCD LE1/YR: CPT | Mod: CPTII,S$GLB,, | Performed by: HOSPITALIST

## 2021-10-04 PROCEDURE — 3072F LOW RISK FOR RETINOPATHY: CPT | Mod: CPTII,S$GLB,, | Performed by: HOSPITALIST

## 2021-10-04 PROCEDURE — 99499 RISK ADDL DX/OHS AUDIT: ICD-10-PCS | Mod: HCNC,S$GLB,, | Performed by: HOSPITALIST

## 2021-10-04 PROCEDURE — 1126F AMNT PAIN NOTED NONE PRSNT: CPT | Mod: CPTII,S$GLB,, | Performed by: HOSPITALIST

## 2021-10-04 PROCEDURE — 99499 UNLISTED E&M SERVICE: CPT | Mod: HCNC,S$GLB,, | Performed by: HOSPITALIST

## 2021-10-04 PROCEDURE — 3072F PR LOW RISK FOR RETINOPATHY: ICD-10-PCS | Mod: CPTII,S$GLB,, | Performed by: HOSPITALIST

## 2021-10-04 PROCEDURE — 1126F PR PAIN SEVERITY QUANTIFIED, NO PAIN PRESENT: ICD-10-PCS | Mod: CPTII,S$GLB,, | Performed by: HOSPITALIST

## 2021-10-04 PROCEDURE — 99999 PR PBB SHADOW E&M-EST. PATIENT-LVL V: CPT | Mod: PBBFAC,,, | Performed by: HOSPITALIST

## 2021-10-04 PROCEDURE — 3288F PR FALLS RISK ASSESSMENT DOCUMENTED: ICD-10-PCS | Mod: CPTII,S$GLB,, | Performed by: HOSPITALIST

## 2021-10-04 PROCEDURE — 99397 PER PM REEVAL EST PAT 65+ YR: CPT | Mod: S$GLB,,, | Performed by: HOSPITALIST

## 2021-10-04 PROCEDURE — 3074F SYST BP LT 130 MM HG: CPT | Mod: CPTII,S$GLB,, | Performed by: HOSPITALIST

## 2021-10-04 PROCEDURE — 99397 PR PREVENTIVE VISIT,EST,65 & OVER: ICD-10-PCS | Mod: S$GLB,,, | Performed by: HOSPITALIST

## 2021-10-04 PROCEDURE — 3078F DIAST BP <80 MM HG: CPT | Mod: CPTII,S$GLB,, | Performed by: HOSPITALIST

## 2021-10-04 PROCEDURE — 3074F PR MOST RECENT SYSTOLIC BLOOD PRESSURE < 130 MM HG: ICD-10-PCS | Mod: CPTII,S$GLB,, | Performed by: HOSPITALIST

## 2021-10-04 RX ORDER — CARVEDILOL 3.12 MG/1
3.12 TABLET ORAL 2 TIMES DAILY
Qty: 180 TABLET | Refills: 3 | Status: SHIPPED | OUTPATIENT
Start: 2021-10-04 | End: 2022-11-09 | Stop reason: SDUPTHER

## 2021-10-07 ENCOUNTER — PATIENT MESSAGE (OUTPATIENT)
Dept: OTOLARYNGOLOGY | Facility: CLINIC | Age: 81
End: 2021-10-07

## 2021-10-14 ENCOUNTER — IMMUNIZATION (OUTPATIENT)
Dept: INTERNAL MEDICINE | Facility: CLINIC | Age: 81
End: 2021-10-14
Payer: MEDICARE

## 2021-10-14 DIAGNOSIS — Z23 NEED FOR VACCINATION: Primary | ICD-10-CM

## 2021-10-14 PROCEDURE — 91300 COVID-19, MRNA, LNP-S, PF, 30 MCG/0.3 ML DOSE VACCINE: CPT | Mod: HCNC,PBBFAC | Performed by: INTERNAL MEDICINE

## 2021-10-14 PROCEDURE — 0003A COVID-19, MRNA, LNP-S, PF, 30 MCG/0.3 ML DOSE VACCINE: CPT | Mod: HCNC,CV19,PBBFAC | Performed by: INTERNAL MEDICINE

## 2021-10-18 DIAGNOSIS — I50.22 HEART FAILURE, SYSTOLIC, CHRONIC: ICD-10-CM

## 2021-10-18 RX ORDER — FUROSEMIDE 40 MG/1
40 TABLET ORAL DAILY
Qty: 60 TABLET | Refills: 11 | Status: SHIPPED | OUTPATIENT
Start: 2021-10-18 | End: 2022-11-15 | Stop reason: SDUPTHER

## 2021-10-24 ENCOUNTER — PATIENT MESSAGE (OUTPATIENT)
Dept: CARDIOLOGY | Facility: CLINIC | Age: 81
End: 2021-10-24
Payer: MEDICARE

## 2021-11-08 ENCOUNTER — TELEPHONE (OUTPATIENT)
Dept: CARDIOLOGY | Facility: CLINIC | Age: 81
End: 2021-11-08
Payer: MEDICARE

## 2021-11-14 ENCOUNTER — PATIENT MESSAGE (OUTPATIENT)
Dept: CARDIOLOGY | Facility: CLINIC | Age: 81
End: 2021-11-14
Payer: MEDICARE

## 2021-11-16 RX ORDER — FAMOTIDINE 20 MG/1
20 TABLET, FILM COATED ORAL DAILY
Qty: 30 TABLET | Refills: 11 | Status: SHIPPED | OUTPATIENT
Start: 2021-11-16 | End: 2021-11-17

## 2021-11-23 RX ORDER — LANCETS 33 GAUGE
EACH MISCELLANEOUS
Qty: 200 EACH | Refills: 5 | Status: SHIPPED | OUTPATIENT
Start: 2021-11-23 | End: 2022-09-29 | Stop reason: SDUPTHER

## 2021-12-03 NOTE — TELEPHONE ENCOUNTER
----- Message from Susie Lazo MD sent at 5/27/2020  6:08 PM CDT -----  Please give pt's home health (I believe [facility]) a verbal for physical therapy.  
Verbal order given to Divine with [facility] for physical therapy.  
SUICIDE/SELF-INJURIOUS BEHAVIOR

## 2022-01-04 ENCOUNTER — PATIENT MESSAGE (OUTPATIENT)
Dept: PODIATRY | Facility: CLINIC | Age: 82
End: 2022-01-04
Payer: MEDICARE

## 2022-01-04 ENCOUNTER — PATIENT MESSAGE (OUTPATIENT)
Dept: INTERNAL MEDICINE | Facility: CLINIC | Age: 82
End: 2022-01-04
Payer: MEDICARE

## 2022-01-12 ENCOUNTER — PATIENT MESSAGE (OUTPATIENT)
Dept: INTERNAL MEDICINE | Facility: CLINIC | Age: 82
End: 2022-01-12
Payer: MEDICARE

## 2022-01-13 ENCOUNTER — PATIENT MESSAGE (OUTPATIENT)
Dept: INTERNAL MEDICINE | Facility: CLINIC | Age: 82
End: 2022-01-13
Payer: MEDICARE

## 2022-01-17 NOTE — PROGRESS NOTES
Chief Concern:  Diabetic Foot Exam (Susie Lazo MD 10-4-21/)      HPI:  Randy Camejo is a 81 y.o. male presents for foot exam and concerned about rash anterior left leg.  No trauma.   No new swelling. He does not recall changes in activities.  No weeping lesions.    He is wearing hospital socks.   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           The patient is under the active care of his PCP Dr. Susie Lazo MD for chronic conditions, including diabetes.    He last saw his PCP on 10/4/2021.   He is also on Eliquis for atrial fibrillation           Patient Active Problem List   Diagnosis    Nuclear sclerosis, bilateral    Nuclear sclerotic cataract of left eye    Atrial fibrillation    Type 2 diabetes mellitus with neurologic complication, with long-term current use of insulin    Heart failure, systolic, chronic    Pre-ulcerative corn or callous    Peripheral vascular disease    Tachycardia induced cardiomyopathy    Coronary artery disease    Chronic anticoagulation    History of myocardial infarction    History of coronary artery stent placement    Essential hypertension    Hypercholesterolemia    High risk medication use    Subclinical hypothyroidism    Stage 3a chronic kidney disease    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  0    alcohol swabs PadM use 2 (two) times a day. E11.49 200 each 5    amiodarone (PACERONE) 100 MG Tab Take 1 tablet (100 mg total) by mouth once daily. 30 tablet 6    atorvastatin (LIPITOR) 10 MG tablet TAKE 1 TABLET BY MOUTH DAILY 90 tablet 3    blood glucose control, low Soln Use 1 each to check glucose level of glucometer weekly 4 each 11    blood sugar diagnostic (TRUE METRIX GLUCOSE TEST  "STRIP) Strp use 2 (two) times a day. To check blood sugar 200 strip 5    blood sugar diagnostic (TRUE METRIX GLUCOSE TEST STRIP) Strp use 2 (two) times a day. To check blood sugar 200 strip 5    blood-glucose meter (TRUE METRIX AIR GLUCOSE METER) Misc 1 Device by Misc.(Non-Drug; Combo Route) route 2 (two) times a day. To check blood sugar. ICD10: E11.49 1 each 0    carvediloL (COREG) 3.125 MG tablet Take 1 tablet (3.125 mg total) by mouth 2 (two) times daily. 180 tablet 3    carvediloL (COREG) 6.25 MG tablet TAKE 1 TABLET(6.25 MG) BY MOUTH TWICE DAILY 180 tablet 3    dimethicone 6 % Crea Apply 1 application topically once daily. For legs and feet. 57 g 10    ELIQUIS 5 mg Tab TAKE 1 TABLET BY MOUTH TWICE DAILY 180 tablet 3    famotidine (PEPCID) 20 MG tablet TAKE 1 TABLET(20 MG) BY MOUTH EVERY DAY 30 tablet 11    furosemide (LASIX) 40 MG tablet Take 1 tablet (40 mg total) by mouth once daily. 60 tablet 11    gabapentin (NEURONTIN) 300 MG capsule Take 1 capsule (300 mg total) by mouth every evening. 90 capsule 3    insulin detemir U-100 (LEVEMIR FLEXTOUCH U-100 INSULN) 100 unit/mL (3 mL) InPn pen Inject 8 Units into the skin every evening. 7.2 mL 3    lancets (TRUEPLUS LANCETS) 33 gauge Misc use twice a day as directed 200 each 5    lancets (TRUEPLUS LANCETS) 33 gauge Misc use twice a day as directed (Patient taking differently: use twice a day as directed) 200 each 5    levothyroxine (SYNTHROID) 112 MCG tablet Take 1 tablet (112 mcg total) by mouth once daily. 90 tablet 3    pen needle, diabetic 31 gauge x 5/16" Ndle 1 Device by Misc.(Non-Drug; Combo Route) route once daily. Use with insulin. ICD10: E11.49 100 each 3    PREVNAR 13, PF, 0.5 mL Syrg       sacubitriL-valsartan (ENTRESTO) 49-51 mg per tablet Take 1 tablet by mouth 2 (two) times daily. 60 tablet 11    TRUE METRIX AIR GLUCOSE METER kit       valACYclovir (VALTREX) 1000 MG tablet Take 1 tablet (1,000 mg total) by mouth 3 (three) times " "daily. for 7 days 21 tablet 0     No current facility-administered medications on file prior to visit.           Review of patient's allergies indicates:  No Known Allergies              Objective:      Vitals:    01/18/22 1428   BP: (!) 104/55   Pulse: (!) 59   Weight: 71.7 kg (158 lb)   Height: 5' 11" (1.803 m)         Physical Exam  Constitutional:       General: He is not in acute distress.     Appearance: He is well-developed. He is not diaphoretic.   Cardiovascular:      Pulses:           Dorsalis pedis pulses are 1+ on the right side and 1+ on the left side.        Posterior tibial pulses are 1+ on the right side and 1+ on the left side.      Comments: Toes warm, pink, cap fill <5 seconds.  Musculoskeletal:      Comments: Partial 5th ray resection right foot without symptoms.   Lymphadenopathy:      Comments: Negative lymphadenopathy bilateral popliteal fossa and tarsal tunnel.     Skin:     General: Skin is warm and dry.      Coloration: Skin is not pale.      Findings: No abrasion, bruising, burn, ecchymosis, erythema, laceration, lesion or rash.      Comments:   Skin thin, shiny, atrophic.    Red patches anterior leg worse right than left.  Does not favor cellulitis at this time.  More venous stasis.    Neurological:      Sensory: Sensory deficit present.      Comments: Diminished/loss of protective sensation all toes bilateral to 10 gram monofilament.       Psychiatric:         Behavior: Behavior is cooperative.                       Assessment:       Encounter Diagnoses   Name Primary?   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  Cont compression stocking.  Apply Sween before wearing stockings.   Elevate as much as possible.     Routine Foot Care    Date/Time: 1/18/2022 2:15 PM  Performed by: Jess Rosa DPM  Authorized by: Jess Rosa DPM     Consent Done?:  Yes (Verbal)  Hyperkeratotic Skin Lesions?: Yes    Number of trimmed lesions:  2  Location(s):  Left Plantar and Right Plantar    Nail Care Type:  Debride(Left 1st Toe, Left 3rd Toe, Left 2nd Toe, Left 4th Toe, Left 5th Toe, Right 1st Toe, Right 2nd Toe, Right 3rd Toe and Right 4th Toe)  Patient tolerance:  Patient tolerated the procedure well with no immediate complications     With patient's permission, the toenails mentioned above were reduced and debrided using a nail nipper, removing offending nail and debris.   Utilizing a #15 scalpel, I trimmed the corns and calluses at the above mentioned location.      The patient will continue to monitor the areas daily, inspect the feet, wear protective shoe gear when ambulatory, and moisturizer to maintain skin integrity.          Follow up Four months or sooner if concerned.

## 2022-01-18 ENCOUNTER — OFFICE VISIT (OUTPATIENT)
Dept: PODIATRY | Facility: CLINIC | Age: 82
End: 2022-01-18
Payer: MEDICARE

## 2022-01-18 VITALS
HEIGHT: 71 IN | HEART RATE: 59 BPM | BODY MASS INDEX: 22.12 KG/M2 | WEIGHT: 158 LBS | DIASTOLIC BLOOD PRESSURE: 55 MMHG | SYSTOLIC BLOOD PRESSURE: 104 MMHG

## 2022-01-18 DIAGNOSIS — Z79.4 TYPE 2 DIABETES MELLITUS WITH DIABETIC NEUROPATHY, WITH LONG-TERM CURRENT USE OF INSULIN: ICD-10-CM

## 2022-01-18 DIAGNOSIS — L84 CORN OR CALLUS: ICD-10-CM

## 2022-01-18 DIAGNOSIS — Z89.421 HISTORY OF PARTIAL RAY AMPUTATION OF FIFTH TOE OF RIGHT FOOT: Primary | ICD-10-CM

## 2022-01-18 DIAGNOSIS — B35.1 DERMATOPHYTOSIS OF NAIL: ICD-10-CM

## 2022-01-18 DIAGNOSIS — E11.40 TYPE 2 DIABETES MELLITUS WITH DIABETIC NEUROPATHY, WITH LONG-TERM CURRENT USE OF INSULIN: ICD-10-CM

## 2022-01-18 PROCEDURE — 3078F DIAST BP <80 MM HG: CPT | Mod: HCNC,CPTII,S$GLB, | Performed by: PODIATRIST

## 2022-01-18 PROCEDURE — 3074F PR MOST RECENT SYSTOLIC BLOOD PRESSURE < 130 MM HG: ICD-10-PCS | Mod: HCNC,CPTII,S$GLB, | Performed by: PODIATRIST

## 2022-01-18 PROCEDURE — 11056 PARNG/CUTG B9 HYPRKR LES 2-4: CPT | Mod: Q7,HCNC,S$GLB, | Performed by: PODIATRIST

## 2022-01-18 PROCEDURE — 3288F PR FALLS RISK ASSESSMENT DOCUMENTED: ICD-10-PCS | Mod: HCNC,CPTII,S$GLB, | Performed by: PODIATRIST

## 2022-01-18 PROCEDURE — 99499 UNLISTED E&M SERVICE: CPT | Mod: S$GLB,,, | Performed by: PODIATRIST

## 2022-01-18 PROCEDURE — 3078F PR MOST RECENT DIASTOLIC BLOOD PRESSURE < 80 MM HG: ICD-10-PCS | Mod: HCNC,CPTII,S$GLB, | Performed by: PODIATRIST

## 2022-01-18 PROCEDURE — 11721 DEBRIDE NAIL 6 OR MORE: CPT | Mod: Q7,59,HCNC,S$GLB | Performed by: PODIATRIST

## 2022-01-18 PROCEDURE — 3074F SYST BP LT 130 MM HG: CPT | Mod: HCNC,CPTII,S$GLB, | Performed by: PODIATRIST

## 2022-01-18 PROCEDURE — 1101F PR PT FALLS ASSESS DOC 0-1 FALLS W/OUT INJ PAST YR: ICD-10-PCS | Mod: HCNC,CPTII,S$GLB, | Performed by: PODIATRIST

## 2022-01-18 PROCEDURE — 1159F MED LIST DOCD IN RCRD: CPT | Mod: HCNC,CPTII,S$GLB, | Performed by: PODIATRIST

## 2022-01-18 PROCEDURE — 99999 PR PBB SHADOW E&M-EST. PATIENT-LVL V: CPT | Mod: PBBFAC,HCNC,, | Performed by: PODIATRIST

## 2022-01-18 PROCEDURE — 1126F AMNT PAIN NOTED NONE PRSNT: CPT | Mod: HCNC,CPTII,S$GLB, | Performed by: PODIATRIST

## 2022-01-18 PROCEDURE — 99999 PR PBB SHADOW E&M-EST. PATIENT-LVL V: ICD-10-PCS | Mod: PBBFAC,HCNC,, | Performed by: PODIATRIST

## 2022-01-18 PROCEDURE — 99499 RISK ADDL DX/OHS AUDIT: ICD-10-PCS | Mod: S$GLB,,, | Performed by: PODIATRIST

## 2022-01-18 PROCEDURE — 11721 ROUTINE FOOT CARE: ICD-10-PCS | Mod: Q7,59,HCNC,S$GLB | Performed by: PODIATRIST

## 2022-01-18 PROCEDURE — 11056 ROUTINE FOOT CARE: ICD-10-PCS | Mod: Q7,HCNC,S$GLB, | Performed by: PODIATRIST

## 2022-01-18 PROCEDURE — 3288F FALL RISK ASSESSMENT DOCD: CPT | Mod: HCNC,CPTII,S$GLB, | Performed by: PODIATRIST

## 2022-01-18 PROCEDURE — 99499 UNLISTED E&M SERVICE: CPT | Mod: HCNC,S$GLB,, | Performed by: PODIATRIST

## 2022-01-18 PROCEDURE — 1126F PR PAIN SEVERITY QUANTIFIED, NO PAIN PRESENT: ICD-10-PCS | Mod: HCNC,CPTII,S$GLB, | Performed by: PODIATRIST

## 2022-01-18 PROCEDURE — 1101F PT FALLS ASSESS-DOCD LE1/YR: CPT | Mod: HCNC,CPTII,S$GLB, | Performed by: PODIATRIST

## 2022-01-18 PROCEDURE — 1159F PR MEDICATION LIST DOCUMENTED IN MEDICAL RECORD: ICD-10-PCS | Mod: HCNC,CPTII,S$GLB, | Performed by: PODIATRIST

## 2022-01-18 PROCEDURE — 1160F RVW MEDS BY RX/DR IN RCRD: CPT | Mod: HCNC,CPTII,S$GLB, | Performed by: PODIATRIST

## 2022-01-18 PROCEDURE — 1160F PR REVIEW ALL MEDS BY PRESCRIBER/CLIN PHARMACIST DOCUMENTED: ICD-10-PCS | Mod: HCNC,CPTII,S$GLB, | Performed by: PODIATRIST

## 2022-01-18 NOTE — PATIENT INSTRUCTIONS
How to Check Your Feet    Below are tips to help you look for foot problems. Try to check your feet at the same time each day, such as when you get out of bed in the morning.    · Check the top of each foot. The tops of toes, back of the heel, and outer edge of the foot can get a lot of rubbing from poor-fitting shoes.    · Check the bottom of each foot. Daily wear and tear often leads to problems at pressure spots.    · Check the toes and nails. Fungal infections often occur between toes. Toenail problems can also be a sign of fungal infections or lead to breaks in the skin.    · Check your shoes, too. Loose objects inside a shoe can injure the foot. Use your hand to feel inside your shoes for things like eze, loose stitching, or rough areas that could irritate your skin.        Diabetic Foot Care    Diabetes can lead to a number of different foot complications. Fortunately, most of these complications can be prevented with a little extra foot care. If diabetes is not well controlled, the high blood sugar can cause damage to blood vessels and result in poor circulation to the foot. When the skin does not get enough blood flow, it becomes prone to pressure sores and ulcers, which heal slowly.  High blood sugar can also damage nerves, interfering with the ability to feel pain and pressure. When you cant feel your foot normally, it is easy to injure your skin, bones and joints without knowing it. For these reasons diabetes increases the risk of fungal infections, bunions and ulcers. Deep ulcers can lead to bone infection. Gangrene is the most serious foot complication of diabetes. It usually occurs on the tips of the toes as blacked areas of skin. The black area is dead tissue. In severe cases, gangrene spreads to involve the entire toe, other toes and the entire foot. Foot or toe amputation may be required. Good foot care and blood sugar control can prevent this.    Home Care  1. Wear comfortable, proper fitting  shoes.  2. Wash your feet daily with warm water and mild soap.  3. After drying, apply a moisturizing cream or lotion.  4. Check your feet daily for skin breaks, blisters, swelling, or redness. Look between your toes also.  5. Wear cotton socks and change them every day.  6. Trim toe nails carefully and do not cut your cuticles.  7. Strive to keep your blood sugar under control with a combination of medicines, diet and activity.  8. If you smoke and have diabetes, it is very important that you stop. Smoking reduces blood flow to your foot.  9. Avoid activities that increase your risk of foot injury:  · Do not walk barefoot.  · Do not use heating pads or hot water bottles on your feet.  · Do not put your foot in a hot tub without first checking the temperature with your hand.  10) Schedule yearly foot exams.    Follow Up  with your doctor or as advised by our staff. Report any cut, puncture, scrape, other injury, blister, ingrown toenail or ulcer on your foot.    Get Prompt Medical Attention  if any of the following occur:  -- Open ulcer with pus draining from the wound  -- Increasing foot or leg pain  -- New areas of redness or swelling or tender areas of the foot    © 6958-0714 [employer]. [address] [place] [address], [place] [address]. All rights reserved. This information is not intended as a substitute for professional medical care. Always follow your healthcare professional's instructions.      General nail care measures for abnormal nails include:    ? Keeping nails trimmed short    ? Avoiding trauma     ? Avoiding contact irritants     ? Keeping nails dry (avoiding wet work)    ? Avoiding all nail cosmetics

## 2022-01-20 ENCOUNTER — PATIENT MESSAGE (OUTPATIENT)
Dept: INTERNAL MEDICINE | Facility: CLINIC | Age: 82
End: 2022-01-20
Payer: MEDICARE

## 2022-01-21 ENCOUNTER — PATIENT MESSAGE (OUTPATIENT)
Dept: INTERNAL MEDICINE | Facility: CLINIC | Age: 82
End: 2022-01-21
Payer: MEDICARE

## 2022-01-26 ENCOUNTER — PATIENT MESSAGE (OUTPATIENT)
Dept: INTERNAL MEDICINE | Facility: CLINIC | Age: 82
End: 2022-01-26
Payer: MEDICARE

## 2022-01-30 ENCOUNTER — PATIENT MESSAGE (OUTPATIENT)
Dept: INTERNAL MEDICINE | Facility: CLINIC | Age: 82
End: 2022-01-30
Payer: MEDICARE

## 2022-01-30 RX ORDER — PEN NEEDLE, DIABETIC 31 GX5/16"
NEEDLE, DISPOSABLE MISCELLANEOUS
Qty: 100 EACH | Refills: 3 | Status: CANCELLED | OUTPATIENT
Start: 2022-01-30

## 2022-01-30 NOTE — TELEPHONE ENCOUNTER
Care Due:                  Date            Visit Type   Department     Provider  --------------------------------------------------------------------------------                                             [facility]  Last Visit: 10-      None         MEDICINE       Susie Lazo  Next Visit: None Scheduled  None         None Found                                                            Last  Test          Frequency    Reason                     Performed    Due Date  --------------------------------------------------------------------------------    HBA1C.......  6 months...  insulin..................  09- 03-    Powered by [employer] by [employer]. Reference number: [id number].   1/30/2022 11:27:32 AM CST

## 2022-01-30 NOTE — TELEPHONE ENCOUNTER
No new care gaps identified.  Powered by [employer]. Reference number: [id number].   1/30/2022 1:22:39 PM CST

## 2022-01-31 ENCOUNTER — PATIENT MESSAGE (OUTPATIENT)
Dept: INTERNAL MEDICINE | Facility: CLINIC | Age: 82
End: 2022-01-31
Payer: MEDICARE

## 2022-01-31 RX ORDER — PEN NEEDLE, DIABETIC 30 GX3/16"
1 NEEDLE, DISPOSABLE MISCELLANEOUS DAILY
Qty: 100 EACH | Refills: 3 | Status: SHIPPED | OUTPATIENT
Start: 2022-01-31 | End: 2023-02-28

## 2022-01-31 NOTE — TELEPHONE ENCOUNTER
No new care gaps identified.  Powered by [employer] by [employer]. Reference number: [id number].   1/31/2022 4:29:47 PM CST

## 2022-02-02 RX ORDER — PEN NEEDLE, DIABETIC 31 GX5/16"
NEEDLE, DISPOSABLE MISCELLANEOUS
Qty: 100 EACH | Refills: 3 | OUTPATIENT
Start: 2022-02-02

## 2022-02-02 NOTE — TELEPHONE ENCOUNTER
"Quick DC. Request already responded to by other means (e.g. phone or fax)   Refill Authorization Note   Randy Camejo  is requesting a refill authorization.  Brief Assessment and Rationale for Refill:     Medication Therapy Plan:              Comments:   Pended Medication(s)       Requested Prescriptions     Refused Prescriptions Disp Refills    BD ULTRA-FINE SHORT PEN NEEDLE 31 gauge x 5/16" Ndle [Pharmacy Med Name: B-D PEN NDL SHRT [id number](5/16) [facility]] 100 each 3     Sig: USE WITH INSULIN TO INJECT UNDER THE SKIN ONCE DAILY. USE A NEW PEN NEEDLE WITH EACH USE.     Refused By: JOSE MARIA COLINDRES     Reason for Refusal: Refill not appropriate        Duplicate Pended Encounter(s)/ Last Prescribed Details: (includes pharmacy & prescriber details)   Providers    Authorizing Provider:    Susie Lazo MD   [address] [place] [address]   Phone:  [phone]   Fax:  [phone]   ANAHI #:  [id number]   NPI:  [id number]        Ordering User:  Susie Lazo MD          Pharmacy    [id number] [address] [place] [address] AT [address]   [address] [place] [address]   Phone:  [phone]  Fax:  [phone]   ANAHI #:  [id number]   HARPREET Reason: --       Outpatient Medication Detail     Disp Refills Start End HARPREET   pen needle, diabetic 31 gauge x 5/16" Ndle 100 each 3 1/31/2022  No   Sig - Route: 1 Device by Misc.(Non-Drug; Combo Route) route once daily. Use with insulin. ICD10: E11.49 - Misc.(Non-Drug; Combo Route)   Sent to pharmacy as: pen needle, diabetic 31 gauge x 5/16" Ndle   Class: Normal   Order: [id number]   Date/Time Signed: 1/31/2022 17:43       E-Prescribing Status: Receipt confirmed by pharmacy (1/31/2022  5:43 PM CST)     Ordering Encounter Report    Associated Reports   View Encounter                Note composed:1:55 PM 02/02/2022           "

## 2022-02-11 RX ORDER — PEN NEEDLE, DIABETIC 31 GX5/16"
NEEDLE, DISPOSABLE MISCELLANEOUS
Qty: 100 EACH | Refills: 3 | OUTPATIENT
Start: 2022-02-11

## 2022-02-11 NOTE — TELEPHONE ENCOUNTER
"Quick DC. Request already responded to by other means (e.g. phone or fax)   Refill Authorization Note   Randy Camejo  is requesting a refill authorization.  Brief Assessment and Rationale for Refill:  Quick Discontinue  Medication Therapy Plan:       Medication Reconciliation Completed:  No      Comments:   Pended Medication(s)       Requested Prescriptions     Pending Prescriptions Disp Refills    BD ULTRA-FINE SHORT PEN NEEDLE 31 gauge x 5/16" Ndle [Pharmacy Med Name: B-D PEN NDL SHRT [id number](5/16) [facility]] 100 each 3     Sig: USE WITH INSULIN TO INJECT UNDER THE SKIN ONCE DAILY. USE A NEW PEN NEEDLE WITH EACH USE.        Duplicate Pended Encounter(s)/ Last Prescribed Details: (includes pharmacy & prescriber details)   Ordering Encounter Report    Associated Reports   View Encounter            Note composed:1:50 PM 02/11/2022       Provider Staff:     Action is required for this patient.   Please see care gap opportunities below in Care Due Message.     Thanks!  [facility]     Appointments      Date Provider   Last Visit   10/4/2021 Susie Lazo MD   Next Visit   1/30/2022 Susie Lazo MD     Note composed:1:50 PM 02/11/2022       "

## 2022-02-24 ENCOUNTER — PATIENT MESSAGE (OUTPATIENT)
Dept: INTERNAL MEDICINE | Facility: CLINIC | Age: 82
End: 2022-02-24
Payer: MEDICARE

## 2022-03-07 RX ORDER — LEVOTHYROXINE SODIUM 112 UG/1
112 TABLET ORAL DAILY
Qty: 90 TABLET | Refills: 3 | Status: SHIPPED | OUTPATIENT
Start: 2022-03-07 | End: 2023-02-10

## 2022-03-07 NOTE — TELEPHONE ENCOUNTER
No new care gaps identified.  Powered by [employer] by [employer]. Reference number: [id number].   3/07/2022 11:38:13 AM CST

## 2022-03-07 NOTE — TELEPHONE ENCOUNTER
No new care gaps identified.  Powered by [employer]. Reference number: [id number].   3/07/2022 3:28:31 PM CST

## 2022-03-14 RX ORDER — LEVOTHYROXINE SODIUM 112 UG/1
TABLET ORAL
Qty: 90 TABLET | Refills: 3 | OUTPATIENT
Start: 2022-03-14

## 2022-03-14 NOTE — TELEPHONE ENCOUNTER
No new care gaps identified.  Powered by [employer] by [employer]. Reference number: [id number].   3/14/2022 3:21:14 PM CDT

## 2022-03-15 RX ORDER — INSULIN DETEMIR 100 [IU]/ML
8 INJECTION, SOLUTION SUBCUTANEOUS NIGHTLY
Qty: 7.2 ML | Refills: 3 | Status: SHIPPED | OUTPATIENT
Start: 2022-03-15 | End: 2022-11-29

## 2022-03-15 NOTE — TELEPHONE ENCOUNTER
No new care gaps identified.  Powered by [employer] by [employer]. Reference number: [id number].   3/15/2022 4:06:38 PM CDT

## 2022-03-22 RX ORDER — INSULIN DETEMIR 100 [IU]/ML
INJECTION, SOLUTION SUBCUTANEOUS
Qty: 6 ML | OUTPATIENT
Start: 2022-03-22

## 2022-03-22 NOTE — TELEPHONE ENCOUNTER
Quick DC. Request already responded to by other means (e.g. phone or fax)   Refill Authorization Note   Randy Camejo  is requesting a refill authorization.  Brief Assessment and Rationale for Refill:  Quick Discontinue  Medication Therapy Plan:       Medication Reconciliation Completed:  No      Comments:   Pended Medication(s)       Requested Prescriptions     Pending Prescriptions Disp Refills    LEVEMIR FLEXTOUCH U-100 INSULN 100 unit/mL (3 mL) InPn pen [Pharmacy Med Name: LEVEMIR FLEX TOUCH PEN INJ 3ML] 6 mL      Sig: ADMINISTER 8 UNITS UNDER THE SKIN EVERY EVENING        Duplicate Pended Encounter(s)/ Last Prescribed Details: (includes pharmacy & prescriber details)   insulin detemir U-100 (LEVEMIR FLEXTOUCH U-100 INSULN) 100 unit/mL (3 mL) InPn pen 7.2 mL 3 3/15/2022 3/15/2023 No   Sig - Route: Inject 8 Units into the skin every evening. - Subcutaneous   Sent to pharmacy as: insulin detemir U-100 (LEVEMIR FLEXTOUCH U-100 INSULN) 100 unit/mL (3 mL) InPn pen   Class: Normal   Order: [id number]   Date/Time Signed: 3/15/2022 17:56       E-Prescribing Status: Receipt confirmed by pharmacy (3/15/2022  5:57 PM CDT)       Ordering Encounter Report    Associated Reports   View Encounter              Note composed:1:32 PM 03/22/2022

## 2022-04-06 ENCOUNTER — OFFICE VISIT (OUTPATIENT)
Dept: PODIATRY | Facility: CLINIC | Age: 82
End: 2022-04-06
Payer: MEDICARE

## 2022-04-06 ENCOUNTER — PATIENT OUTREACH (OUTPATIENT)
Dept: ADMINISTRATIVE | Facility: OTHER | Age: 82
End: 2022-04-06
Payer: MEDICARE

## 2022-04-06 VITALS
WEIGHT: 158 LBS | SYSTOLIC BLOOD PRESSURE: 81 MMHG | DIASTOLIC BLOOD PRESSURE: 42 MMHG | HEART RATE: 62 BPM | HEIGHT: 71 IN | BODY MASS INDEX: 22.12 KG/M2

## 2022-04-06 DIAGNOSIS — E11.40 TYPE 2 DIABETES MELLITUS WITH DIABETIC NEUROPATHY, WITH LONG-TERM CURRENT USE OF INSULIN: ICD-10-CM

## 2022-04-06 DIAGNOSIS — L84 CORN OR CALLUS: ICD-10-CM

## 2022-04-06 DIAGNOSIS — B35.1 DERMATOPHYTOSIS OF NAIL: Primary | ICD-10-CM

## 2022-04-06 DIAGNOSIS — Z79.01 CHRONIC ANTICOAGULATION: ICD-10-CM

## 2022-04-06 DIAGNOSIS — Z89.421 HISTORY OF PARTIAL RAY AMPUTATION OF FIFTH TOE OF RIGHT FOOT: ICD-10-CM

## 2022-04-06 DIAGNOSIS — Z79.4 TYPE 2 DIABETES MELLITUS WITH DIABETIC NEUROPATHY, WITH LONG-TERM CURRENT USE OF INSULIN: ICD-10-CM

## 2022-04-06 PROCEDURE — 1160F RVW MEDS BY RX/DR IN RCRD: CPT | Mod: CPTII,S$GLB,, | Performed by: PODIATRIST

## 2022-04-06 PROCEDURE — 99999 PR PBB SHADOW E&M-EST. PATIENT-LVL V: ICD-10-PCS | Mod: PBBFAC,,, | Performed by: PODIATRIST

## 2022-04-06 PROCEDURE — 3074F SYST BP LT 130 MM HG: CPT | Mod: CPTII,S$GLB,, | Performed by: PODIATRIST

## 2022-04-06 PROCEDURE — 11721 ROUTINE FOOT CARE: ICD-10-PCS | Mod: 59,Q7,S$GLB, | Performed by: PODIATRIST

## 2022-04-06 PROCEDURE — 1159F MED LIST DOCD IN RCRD: CPT | Mod: CPTII,S$GLB,, | Performed by: PODIATRIST

## 2022-04-06 PROCEDURE — 3078F DIAST BP <80 MM HG: CPT | Mod: CPTII,S$GLB,, | Performed by: PODIATRIST

## 2022-04-06 PROCEDURE — 3074F PR MOST RECENT SYSTOLIC BLOOD PRESSURE < 130 MM HG: ICD-10-PCS | Mod: CPTII,S$GLB,, | Performed by: PODIATRIST

## 2022-04-06 PROCEDURE — 1159F PR MEDICATION LIST DOCUMENTED IN MEDICAL RECORD: ICD-10-PCS | Mod: CPTII,S$GLB,, | Performed by: PODIATRIST

## 2022-04-06 PROCEDURE — 99499 UNLISTED E&M SERVICE: CPT | Mod: S$GLB,,, | Performed by: PODIATRIST

## 2022-04-06 PROCEDURE — 99999 PR PBB SHADOW E&M-EST. PATIENT-LVL V: CPT | Mod: PBBFAC,,, | Performed by: PODIATRIST

## 2022-04-06 PROCEDURE — 11721 DEBRIDE NAIL 6 OR MORE: CPT | Mod: 59,Q7,S$GLB, | Performed by: PODIATRIST

## 2022-04-06 PROCEDURE — 3078F PR MOST RECENT DIASTOLIC BLOOD PRESSURE < 80 MM HG: ICD-10-PCS | Mod: CPTII,S$GLB,, | Performed by: PODIATRIST

## 2022-04-06 PROCEDURE — 1101F PR PT FALLS ASSESS DOC 0-1 FALLS W/OUT INJ PAST YR: ICD-10-PCS | Mod: CPTII,S$GLB,, | Performed by: PODIATRIST

## 2022-04-06 PROCEDURE — 3288F PR FALLS RISK ASSESSMENT DOCUMENTED: ICD-10-PCS | Mod: CPTII,S$GLB,, | Performed by: PODIATRIST

## 2022-04-06 PROCEDURE — 11056 ROUTINE FOOT CARE: ICD-10-PCS | Mod: Q7,S$GLB,, | Performed by: PODIATRIST

## 2022-04-06 PROCEDURE — 1126F PR PAIN SEVERITY QUANTIFIED, NO PAIN PRESENT: ICD-10-PCS | Mod: CPTII,S$GLB,, | Performed by: PODIATRIST

## 2022-04-06 PROCEDURE — 11056 PARNG/CUTG B9 HYPRKR LES 2-4: CPT | Mod: Q7,S$GLB,, | Performed by: PODIATRIST

## 2022-04-06 PROCEDURE — 1126F AMNT PAIN NOTED NONE PRSNT: CPT | Mod: CPTII,S$GLB,, | Performed by: PODIATRIST

## 2022-04-06 PROCEDURE — 1101F PT FALLS ASSESS-DOCD LE1/YR: CPT | Mod: CPTII,S$GLB,, | Performed by: PODIATRIST

## 2022-04-06 PROCEDURE — 99499 NO LOS: ICD-10-PCS | Mod: S$GLB,,, | Performed by: PODIATRIST

## 2022-04-06 PROCEDURE — 1160F PR REVIEW ALL MEDS BY PRESCRIBER/CLIN PHARMACIST DOCUMENTED: ICD-10-PCS | Mod: CPTII,S$GLB,, | Performed by: PODIATRIST

## 2022-04-06 PROCEDURE — 3288F FALL RISK ASSESSMENT DOCD: CPT | Mod: CPTII,S$GLB,, | Performed by: PODIATRIST

## 2022-04-06 NOTE — PATIENT INSTRUCTIONS
How to Check Your Feet    Below are tips to help you look for foot problems. Try to check your feet at the same time each day, such as when you get out of bed in the morning.    Check the top of each foot. The tops of toes, back of the heel, and outer edge of the foot can get a lot of rubbing from poor-fitting shoes.    Check the bottom of each foot. Daily wear and tear often leads to problems at pressure spots.    Check the toes and nails. Fungal infections often occur between toes. Toenail problems can also be a sign of fungal infections or lead to breaks in the skin.    Check your shoes, too. Loose objects inside a shoe can injure the foot. Use your hand to feel inside your shoes for things like eze, loose stitching, or rough areas that could irritate your skin.        Diabetic Foot Care    Diabetes can lead to a number of different foot complications. Fortunately, most of these complications can be prevented with a little extra foot care. If diabetes is not well controlled, the high blood sugar can cause damage to blood vessels and result in poor circulation to the foot. When the skin does not get enough blood flow, it becomes prone to pressure sores and ulcers, which heal slowly.  High blood sugar can also damage nerves, interfering with the ability to feel pain and pressure. When you cant feel your foot normally, it is easy to injure your skin, bones and joints without knowing it. For these reasons diabetes increases the risk of fungal infections, bunions and ulcers. Deep ulcers can lead to bone infection. Gangrene is the most serious foot complication of diabetes. It usually occurs on the tips of the toes as blacked areas of skin. The black area is dead tissue. In severe cases, gangrene spreads to involve the entire toe, other toes and the entire foot. Foot or toe amputation may be required. Good foot care and blood sugar control can prevent this.    Home Care  Wear comfortable, proper fitting  shoes.  Wash your feet daily with warm water and mild soap.  After drying, apply a moisturizing cream or lotion.  Check your feet daily for skin breaks, blisters, swelling, or redness. Look between your toes also.  Wear cotton socks and change them every day.  Trim toe nails carefully and do not cut your cuticles.  Strive to keep your blood sugar under control with a combination of medicines, diet and activity.  If you smoke and have diabetes, it is very important that you stop. Smoking reduces blood flow to your foot.  Avoid activities that increase your risk of foot injury:  Do not walk barefoot.  Do not use heating pads or hot water bottles on your feet.  Do not put your foot in a hot tub without first checking the temperature with your hand.  10) Schedule yearly foot exams.    Follow Up  with your doctor or as advised by our staff. Report any cut, puncture, scrape, other injury, blister, ingrown toenail or ulcer on your foot.    Get Prompt Medical Attention  if any of the following occur:  -- Open ulcer with pus draining from the wound  -- Increasing foot or leg pain  -- New areas of redness or swelling or tender areas of the foot    © 8258-6965 [employer]. [address] [place] [address], [place] [address]. All rights reserved. This information is not intended as a substitute for professional medical care. Always follow your healthcare professional's instructions.     General nail care measures for abnormal nails include:  Keeping nails trimmed short, but avoid overzealous trimming  Avoiding trauma   Avoiding contact irritants   Keeping nails dry (avoiding wet work)  Avoiding all nail cosmetics  Wearing shoes that fit well at the toe box.  Avoid tight shoes.

## 2022-04-06 NOTE — PROGRESS NOTES
Chief Concern:  Foot Problem (Left Foot)      HPI:  Randy Camejo is a 81 y.o. male presents for foot exam and concerned about rash anterior left leg.  No trauma.   No new swelling.  He is wearing hospital socks.   Just got new Dr. James shoes.   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           The patient is under the active care of his PCP Dr. Susie Lazo MD for chronic conditions, including diabetes.    He last saw his PCP on 10/4/2021.   He is also on Eliquis for atrial fibrillation           Patient Active Problem List   Diagnosis    Nuclear sclerosis, bilateral    Nuclear sclerotic cataract of left eye    Atrial fibrillation    Type 2 diabetes mellitus with neurologic complication, with long-term current use of insulin    Heart failure, systolic, chronic    Pre-ulcerative corn or callous    Peripheral vascular disease    Tachycardia induced cardiomyopathy    Coronary artery disease    Chronic anticoagulation    History of myocardial infarction    History of coronary artery stent placement    Essential hypertension    Hypercholesterolemia    High risk medication use    Subclinical hypothyroidism    Stage 3a chronic kidney disease    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  0    alcohol swabs PadM use 2 (two) times a day. E11.49 200 each 5    amiodarone (PACERONE) 100 MG Tab Take 1 tablet (100 mg total) by mouth once daily. 30 tablet 6    atorvastatin (LIPITOR) 10 MG tablet TAKE 1 TABLET BY MOUTH DAILY 90 tablet 3    blood glucose control, low Soln Use 1 each to check glucose level of glucometer weekly 4 each 11    blood sugar diagnostic (TRUE METRIX GLUCOSE TEST STRIP) Strp use 2 (two) times a day. To check blood sugar  "200 strip 5    blood sugar diagnostic (TRUE METRIX GLUCOSE TEST STRIP) Strp use 2 (two) times a day. To check blood sugar 200 strip 5    blood-glucose meter (TRUE METRIX AIR GLUCOSE METER) Misc 1 Device by Misc.(Non-Drug; Combo Route) route 2 (two) times a day. To check blood sugar. ICD10: E11.49 1 each 0    carvediloL (COREG) 3.125 MG tablet Take 1 tablet (3.125 mg total) by mouth 2 (two) times daily. 180 tablet 3    carvediloL (COREG) 6.25 MG tablet TAKE 1 TABLET(6.25 MG) BY MOUTH TWICE DAILY 180 tablet 3    dimethicone 6 % Crea Apply 1 application topically once daily. For legs and feet. 57 g 10    ELIQUIS 5 mg Tab TAKE 1 TABLET BY MOUTH TWICE DAILY 180 tablet 3    famotidine (PEPCID) 20 MG tablet TAKE 1 TABLET(20 MG) BY MOUTH EVERY DAY 30 tablet 11    furosemide (LASIX) 40 MG tablet Take 1 tablet (40 mg total) by mouth once daily. 60 tablet 11    gabapentin (NEURONTIN) 300 MG capsule Take 1 capsule (300 mg total) by mouth every evening. 90 capsule 3    insulin detemir U-100 (LEVEMIR FLEXTOUCH U-100 INSULN) 100 unit/mL (3 mL) InPn pen Inject 8 Units into the skin every evening. 7.2 mL 3    lancets (TRUEPLUS LANCETS) 33 gauge Misc use twice a day as directed 200 each 5    lancets (TRUEPLUS LANCETS) 33 gauge Misc use twice a day as directed (Patient taking differently: use twice a day as directed) 200 each 5    levothyroxine (SYNTHROID) 112 MCG tablet Take 1 tablet (112 mcg total) by mouth once daily. 90 tablet 3    pen needle, diabetic 31 gauge x 5/16" Ndle 1 Device by Misc.(Non-Drug; Combo Route) route once daily. Use with insulin. ICD10: E11.49 100 each 3    PREVNAR 13, PF, 0.5 mL Syrg       sacubitriL-valsartan (ENTRESTO) 49-51 mg per tablet Take 1 tablet by mouth 2 (two) times daily. 60 tablet 11    TRUE METRIX AIR GLUCOSE METER kit       valACYclovir (VALTREX) 1000 MG tablet Take 1 tablet (1,000 mg total) by mouth 3 (three) times daily. for 7 days 21 tablet 0     No current " "facility-administered medications on file prior to visit.           Review of patient's allergies indicates:  No Known Allergies              Objective:      Vitals:    04/06/22 1405   BP: (!) 81/42   Pulse: 62   Weight: 71.7 kg (158 lb)   Height: 5' 11" (1.803 m)         Physical Exam  Constitutional:       General: He is not in acute distress.     Appearance: He is well-developed. He is not diaphoretic.   Cardiovascular:      Pulses:           Dorsalis pedis pulses are 1+ on the right side and 1+ on the left side.        Posterior tibial pulses are 1+ on the right side and 1+ on the left side.      Comments: Toes warm, pink, cap fill <5 seconds.  Musculoskeletal:      Comments: Partial 5th ray resection right foot without symptoms.   Lymphadenopathy:      Comments: Negative lymphadenopathy bilateral popliteal fossa and tarsal tunnel.     Skin:     General: Skin is warm and dry.      Coloration: Skin is not pale.      Findings: No abrasion, bruising, burn, ecchymosis, erythema, laceration, lesion or rash.      Comments:   Skin thin, shiny, atrophic.     Neurological:      Sensory: Sensory deficit present.      Comments: Diminished/loss of protective sensation all toes bilateral to 10 gram monofilament.       Psychiatric:         Behavior: Behavior is cooperative.                       Assessment:       Encounter Diagnoses   Name Primary?   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    Diagnoses and all orders for this visit:    Dermatophytosis of nail  -     Routine Foot Care    Type 2 diabetes mellitus with diabetic neuropathy, with long-term current use of insulin  -     Routine Foot Care    [place] or callus  -     Routine Foot Care    History of partial ray " amputation of fifth toe of right foot  -     Routine Foot Care    Chronic anticoagulation  -     Routine Foot Care        · I counseled the patient on his conditions, their implications and medical management.   Shoe inspection.      Continue good nutrition and blood sugar control to help prevent podiatric complications of diabetes.    Maintain proper foot hygiene.    Continue wearing proper shoe gear, daily foot inspections, never walking without protective shoe gear, never putting sharp instruments to feet.    Routine Foot Care    Date/Time: 4/6/2022 2:00 PM  Performed by: Jess Rosa DPM  Authorized by: Jess Rosa DPM     Consent Done?:  Yes (Verbal)  Hyperkeratotic Skin Lesions?: Yes    Number of trimmed lesions:  2  Location(s):  Left Plantar and Right Plantar    Nail Care Type:  Debride(Left 1st Toe, Left 3rd Toe, Left 2nd Toe, Left 4th Toe, Left 5th Toe, Right 1st Toe, Right 2nd Toe, Right 3rd Toe and Right 4th Toe)  Patient tolerance:  Patient tolerated the procedure well with no immediate complications     With patient's permission, the toenails mentioned above were reduced and debrided using a nail nipper, removing offending nail and debris.   Utilizing a #15 scalpel, I trimmed the corns and calluses at the above mentioned location.      The patient will continue to monitor the areas daily, inspect the feet, wear protective shoe gear when ambulatory, and moisturizer to maintain skin integrity.          Follow up 3months or sooner if concerned.

## 2022-04-13 RX ORDER — AMIODARONE HYDROCHLORIDE 100 MG/1
100 TABLET ORAL DAILY
Qty: 30 TABLET | Refills: 6 | Status: SHIPPED | OUTPATIENT
Start: 2022-04-13 | End: 2022-11-03 | Stop reason: SDUPTHER

## 2022-05-16 DIAGNOSIS — E78.5 HYPERLIPIDEMIA, UNSPECIFIED HYPERLIPIDEMIA TYPE: ICD-10-CM

## 2022-05-16 NOTE — TELEPHONE ENCOUNTER
Care Due:                  Date            Visit Type   Department     Provider  --------------------------------------------------------------------------------                                EP -                              PRIMARY      MET INTERNAL  Last Visit: 10-      CARE ([facility])   MEDICINE       Susie Lazo  Next Visit: None Scheduled  None         None Found                                                            Last  Test          Frequency    Reason                     Performed    Due Date  --------------------------------------------------------------------------------    HBA1C.......  6 months...  insulin..................  09- 03-    [facility] Embedded Care Gaps. Reference number: [id number]. 5/16/2022   5:18:40 AM CDT

## 2022-05-17 RX ORDER — ATORVASTATIN CALCIUM 10 MG/1
TABLET, FILM COATED ORAL
Qty: 90 TABLET | Refills: 1 | Status: SHIPPED | OUTPATIENT
Start: 2022-05-17 | End: 2022-10-28 | Stop reason: SDUPTHER

## 2022-05-17 NOTE — TELEPHONE ENCOUNTER
Refill Authorization Note   Randy Camejo  is requesting a refill authorization.  Brief Assessment and Rationale for Refill:  Approve    -Medication-Related Problems Identified: Requires labs  Medication Therapy Plan:       Medication Reconciliation Completed: No   Comments:     Provider Staff:     Action is required for this patient.   Please see care gap opportunities below in Care Due Message.     Thanks!  [facility]     Appointments      Date Provider   Last Visit   10/4/2021 Susie Lazo MD   Next Visit   Visit date not found Susie Lazo MD     Note composed:9:34 AM 05/17/2022           Note composed:9:34 AM 05/17/2022

## 2022-06-15 ENCOUNTER — IMMUNIZATION (OUTPATIENT)
Dept: INTERNAL MEDICINE | Facility: CLINIC | Age: 82
End: 2022-06-15
Payer: MEDICARE

## 2022-06-15 DIAGNOSIS — Z23 NEED FOR VACCINATION: Primary | ICD-10-CM

## 2022-06-15 PROCEDURE — 91305 COVID-19, MRNA, LNP-S, PF, 30 MCG/0.3 ML DOSE VACCINE (PFIZER): CPT | Mod: PBBFAC | Performed by: INTERNAL MEDICINE

## 2022-06-29 DIAGNOSIS — E11.9 TYPE 2 DIABETES MELLITUS WITHOUT COMPLICATION, UNSPECIFIED WHETHER LONG TERM INSULIN USE: ICD-10-CM

## 2022-07-11 ENCOUNTER — TELEPHONE (OUTPATIENT)
Dept: INTERNAL MEDICINE | Facility: CLINIC | Age: 82
End: 2022-07-11
Payer: MEDICARE

## 2022-07-11 ENCOUNTER — PATIENT MESSAGE (OUTPATIENT)
Dept: INTERNAL MEDICINE | Facility: CLINIC | Age: 82
End: 2022-07-11
Payer: MEDICARE

## 2022-07-11 ENCOUNTER — NURSE TRIAGE (OUTPATIENT)
Dept: ADMINISTRATIVE | Facility: CLINIC | Age: 82
End: 2022-07-11
Payer: MEDICARE

## 2022-07-11 NOTE — TELEPHONE ENCOUNTER
----- Message from Danya Rodriguez sent at 7/11/2022  2:01 PM CDT -----  Contact: [phone]  Patient call, requested a courtesy call from Dr Lazo's nurse about having some questions about some medications. Thank you

## 2022-07-11 NOTE — TELEPHONE ENCOUNTER
Pt reports loose stools for the past two days. He is calling to see if he can take Pepto Bismol with his other medications.   Home care advice, per protocol. He verbalizes understanding. I have explained that he could follow up with pharmacy for the medication compatibility. He verbalizes understanding. Would like follow up in this regard as he was talking with his nurse today.     Reason for Disposition   MILD-MODERATE diarrhea (e.g., 1-6 times / day more than normal)    Additional Information   Negative: Shock suspected (e.g., cold/pale/clammy skin, too weak to stand, low BP, rapid pulse)   Negative: Difficult to awaken or acting confused (e.g., disoriented, slurred speech)   Negative: Sounds like a life-threatening emergency to the triager   Negative: [1] SEVERE abdominal pain (e.g., excruciating) AND [2] present > 1 hour   Negative: [1] SEVERE abdominal pain AND [2] age > 60 years   Negative: [1] Blood in the stool AND [2] moderate or large amount of blood   Negative: Black or tarry bowel movements (Exception: chronic-unchanged black-grey bowel movements AND is taking iron pills or Pepto-bismol)   Negative: [1] Drinking very little AND [2] dehydration suspected (e.g., no urine > 12 hours, very dry mouth, very lightheaded)   Negative: Patient sounds very sick or weak to the triager   Negative: [1] SEVERE diarrhea (e.g., 7 or more times / day more than normal) AND [2] age > 60 years   Negative: [1] Constant abdominal pain AND [2] present > 2 hours   Negative: [1] Fever > 103 F (39.4 C) AND [2] not able to get the fever down using Fever Care Advice   Negative: [1] SEVERE diarrhea (e.g., 7 or more times / day more than normal) AND [2] present > 24 hours (1 day)   Negative: [1] MODERATE diarrhea (e.g., 4-6 times / day more than normal) AND [2] present > 48 hours (2 days)   Negative: [1] MODERATE diarrhea (e.g., 4-6 times / day more than normal) AND [2] age > 70 years   Negative: Fever > 101 F (38.3  C)   Negative: Fever present > 3 days (72 hours)   Negative: Abdominal pain  (Exception: Pain clears with each passage of diarrhea stool)   Negative: [1] Blood in the stool AND [2] small amount of blood   (Exception: only on toilet paper. Reason: diarrhea can cause rectal irritation with blood on wiping)   Negative: [1] Mucus or pus in stool AND [2] present > 2 days AND [3] diarrhea is more than mild   Negative: [1] Recent antibiotic therapy (i.e., within last 2 months) AND [2] diarrhea present > 3 days since antibiotic was stopped   Negative: [1] Recent hospitalization AND [2] diarrhea present > 3 days   Negative: Weak immune system (e.g., HIV positive, cancer chemo, splenectomy, organ transplant, chronic steroids)   Negative: Tube feedings (e.g., nasogastric, g-tube, j-tube)   Negative: Travel to a foreign country in past month   Negative: [1] MILD diarrhea (e.g., 1-3 or more stools than normal in past 24 hours) without known cause AND [2] present >  7 days   Negative: Diarrhea is a chronic symptom (recurrent or ongoing AND present > 4 weeks)    Protocols used: DIARRHEA-A-

## 2022-07-12 ENCOUNTER — TELEPHONE (OUTPATIENT)
Dept: INTERNAL MEDICINE | Facility: CLINIC | Age: 82
End: 2022-07-12
Payer: MEDICARE

## 2022-07-12 NOTE — TELEPHONE ENCOUNTER
----- Message from Doris Brown sent at 7/12/2022  4:37 PM CDT -----  Contact: pt [phone]  Patient is returning a phone call.  Who left a message for the patient: Africa Yepez LPN   Does patient know what this is regarding:  missed call  Would you like a call back, or a response through your [facility] portal?:   phone  Comments:

## 2022-07-12 NOTE — TELEPHONE ENCOUNTER
I spoke to the patient and is has been having diarrhea for a few days. Wanting to take Pepto Bismol. The patient called and spoke to the triage nurse. She suggested to speak with the pharmacist in which he did and they stated that it shouldn't interfere with his current medications. The started taking the pepto Bismol and will follow up if needed.

## 2022-07-12 NOTE — TELEPHONE ENCOUNTER
Pt reports loose stools for the past two days. He is calling to see if he can take Pepto Bismol with his other medications.   Home care advice, per protocol. He verbalizes understanding. I have explained that he could follow up with pharmacy for the medication compatibility. He verbalizes understanding. Would like follow up in this regard as he was talking with his nurse today.     I tried reaching the patient, but no answer, message left on voicemail.

## 2022-07-18 DIAGNOSIS — E78.5 HYPERLIPIDEMIA, UNSPECIFIED HYPERLIPIDEMIA TYPE: ICD-10-CM

## 2022-07-18 RX ORDER — ATORVASTATIN CALCIUM 10 MG/1
10 TABLET, FILM COATED ORAL DAILY
Qty: 90 TABLET | Refills: 1 | Status: CANCELLED | OUTPATIENT
Start: 2022-07-18

## 2022-07-18 NOTE — TELEPHONE ENCOUNTER
Care Due:                  Date            Visit Type   Department     Provider  --------------------------------------------------------------------------------                                EP -                              PRIMARY      MET INTERNAL  Last Visit: 10-      [facility] ([facility])   MEDICINE       Susie Lazo  Next Visit: None Scheduled  None         None Found                                                            Last  Test          Frequency    Reason                     Performed    Due Date  --------------------------------------------------------------------------------    Office Visit  12 months..  atorvastatin, carvediloL,   10-   09-                             insulin, levothyroxine...    CMP.........  12 months..  atorvastatin, insulin....  09- 09-    HBA1C.......  6 months...  insulin..................  09- 03-    Lipid Panel.  12 months..  atorvastatin.............  09- 09-    TSH.........  12 months..  levothyroxine............  09- 09-    Health [facility] Embedded Care Gaps. Reference number: [id number]. 7/18/2022   5:22:40 PM CDT

## 2022-08-10 ENCOUNTER — OFFICE VISIT (OUTPATIENT)
Dept: PODIATRY | Facility: CLINIC | Age: 82
End: 2022-08-10
Payer: MEDICARE

## 2022-08-10 ENCOUNTER — TELEPHONE (OUTPATIENT)
Dept: PODIATRY | Facility: CLINIC | Age: 82
End: 2022-08-10
Payer: MEDICARE

## 2022-08-10 VITALS
HEART RATE: 59 BPM | WEIGHT: 158.06 LBS | DIASTOLIC BLOOD PRESSURE: 57 MMHG | HEIGHT: 71 IN | BODY MASS INDEX: 22.13 KG/M2 | SYSTOLIC BLOOD PRESSURE: 101 MMHG

## 2022-08-10 DIAGNOSIS — I87.2 VENOUS STASIS DERMATITIS OF BOTH LOWER EXTREMITIES: ICD-10-CM

## 2022-08-10 DIAGNOSIS — Z89.421 HISTORY OF PARTIAL RAY AMPUTATION OF FIFTH TOE OF RIGHT FOOT: ICD-10-CM

## 2022-08-10 DIAGNOSIS — L97.801 VENOUS STASIS ULCER OF OTHER PART OF LOWER LEG LIMITED TO BREAKDOWN OF SKIN WITH VARICOSE VEINS, UNSPECIFIED LATERALITY: ICD-10-CM

## 2022-08-10 DIAGNOSIS — Z79.4 TYPE 2 DIABETES MELLITUS WITH DIABETIC NEUROPATHY, WITH LONG-TERM CURRENT USE OF INSULIN: Primary | ICD-10-CM

## 2022-08-10 DIAGNOSIS — I83.008 VENOUS STASIS ULCER OF OTHER PART OF LOWER LEG LIMITED TO BREAKDOWN OF SKIN WITH VARICOSE VEINS, UNSPECIFIED LATERALITY: ICD-10-CM

## 2022-08-10 DIAGNOSIS — E11.40 TYPE 2 DIABETES MELLITUS WITH DIABETIC NEUROPATHY, WITH LONG-TERM CURRENT USE OF INSULIN: Primary | ICD-10-CM

## 2022-08-10 DIAGNOSIS — Z79.01 CHRONIC ANTICOAGULATION: ICD-10-CM

## 2022-08-10 PROCEDURE — 1159F MED LIST DOCD IN RCRD: CPT | Mod: CPTII,S$GLB,, | Performed by: PODIATRIST

## 2022-08-10 PROCEDURE — 1126F AMNT PAIN NOTED NONE PRSNT: CPT | Mod: CPTII,S$GLB,, | Performed by: PODIATRIST

## 2022-08-10 PROCEDURE — 99214 OFFICE O/P EST MOD 30 MIN: CPT | Mod: 25,S$GLB,, | Performed by: PODIATRIST

## 2022-08-10 PROCEDURE — 3074F SYST BP LT 130 MM HG: CPT | Mod: CPTII,S$GLB,, | Performed by: PODIATRIST

## 2022-08-10 PROCEDURE — 99999 PR PBB SHADOW E&M-EST. PATIENT-LVL V: ICD-10-PCS | Mod: PBBFAC,,, | Performed by: PODIATRIST

## 2022-08-10 PROCEDURE — 3078F DIAST BP <80 MM HG: CPT | Mod: CPTII,S$GLB,, | Performed by: PODIATRIST

## 2022-08-10 PROCEDURE — 1159F PR MEDICATION LIST DOCUMENTED IN MEDICAL RECORD: ICD-10-PCS | Mod: CPTII,S$GLB,, | Performed by: PODIATRIST

## 2022-08-10 PROCEDURE — 29580 STRAPPING UNNA BOOT: CPT | Mod: 50,S$GLB,, | Performed by: PODIATRIST

## 2022-08-10 PROCEDURE — 29580 PR STRAPPING UNNA BOOT: ICD-10-PCS | Mod: 50,S$GLB,, | Performed by: PODIATRIST

## 2022-08-10 PROCEDURE — 99214 PR OFFICE/OUTPT VISIT, EST, LEVL IV, 30-39 MIN: ICD-10-PCS | Mod: 25,S$GLB,, | Performed by: PODIATRIST

## 2022-08-10 PROCEDURE — 3074F PR MOST RECENT SYSTOLIC BLOOD PRESSURE < 130 MM HG: ICD-10-PCS | Mod: CPTII,S$GLB,, | Performed by: PODIATRIST

## 2022-08-10 PROCEDURE — 1126F PR PAIN SEVERITY QUANTIFIED, NO PAIN PRESENT: ICD-10-PCS | Mod: CPTII,S$GLB,, | Performed by: PODIATRIST

## 2022-08-10 PROCEDURE — 3078F PR MOST RECENT DIASTOLIC BLOOD PRESSURE < 80 MM HG: ICD-10-PCS | Mod: CPTII,S$GLB,, | Performed by: PODIATRIST

## 2022-08-10 PROCEDURE — 99999 PR PBB SHADOW E&M-EST. PATIENT-LVL V: CPT | Mod: PBBFAC,,, | Performed by: PODIATRIST

## 2022-08-10 NOTE — PROGRESS NOTES
Subjective:      Patient ID: Randy Camejo is a 82 y.o. male.    Chief Complaint:   Foot Swelling ([place] leg swelling/Pcp-[facility] 10/4/21) and Diabetes Mellitus    Randy is a 82 y.o. male who presents to the clinic for evaluation and treatment of high risk feet. 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 The patient's chief complaint is rash on legs    Patient is a patient of Dr. Rosa.  new to me  Patient presents with his wife.  Complex medical history.  Patient relates the recent flare-up of the rash on his legs he is unsure if it is from the compression stockings he wore that were too tight over night or if it is from working in the yd.  He also relates a recent bout of shingles which is still healing on his stomach and is inquiring if this could of cause the rash on his legs to flare up.    Patient also relates a left leg sciatica for 2 days that left him in bed.  Overall that is better    Legs hurt at night he relates that is from his neuropathy takes gabapentin relates it does not help    Patient denies any significant weeping and relates that his wife has helped him wrap his legs with Neosporin gauze.    Patient relates his children are in healthcare    Patient wears diabetic shoes.  He discusses his heart failure anticoagulant    Patient relates he see dermatologist for rashes on his back he does not think he has seen anyone for his legs    He does have a cardiologist.  He is not aware of the bad blood flow in his legs although he suspects there is some    He is not  having any issues from the area where his toe was amputated    Patient denies fever chills nausea vomiting  PCP: Susie Lazo MD         Current shoe gear:  Affected Foot: Extra depth shoes with custome accommodative insoles     Unaffected Foot: Extra depth shoes with custome accommodative insoles    Hemoglobin A1C   Date Value Ref Range Status   09/29/2021 5.6 4.0 - 5.6 % Final     Comment:     ADA Screening Guidelines:  5.7-6.4%  Consistent with prediabetes  >or=6.5%  Consistent with diabetes    High levels of fetal hemoglobin interfere with the HbA1C  assay. Heterozygous hemoglobin variants (HbS, HgC, etc)do  not significantly interfere with this assay.   However, presence of multiple variants may affect accuracy.     07/30/2021 6.3 (H) 4.0 - 5.6 % Final     Comment:     ADA Screening Guidelines:  5.7-6.4%  Consistent with prediabetes  >or=6.5%  Consistent with diabetes    High levels of fetal hemoglobin interfere with the HbA1C  assay. Heterozygous hemoglobin variants (HbS, HgC, etc)do  not significantly interfere with this assay.   However, presence of multiple variants may affect accuracy.     04/20/2021 5.9 (H) 4.0 - 5.6 % Final     Comment:     ADA Screening Guidelines:  5.7-6.4%  Consistent with prediabetes  >or=6.5%  Consistent with diabetes    High levels of fetal hemoglobin interfere with the HbA1C  assay. Heterozygous hemoglobin variants (HbS, HgC, etc)do  not significantly interfere with this assay.   However, presence of multiple variants may affect accuracy.          Past Medical History:   Diagnosis Date    Asthma     childhood    Atrial fibrillation     CHF (congestive heart failure)     Chronic anticoagulation 2/25/2019    Coronary artery disease     Coronary artery disease 2/25/2019    Diabetes mellitus, type 2     Heart attack     stent    High risk medication use 3/22/2019    2/19/2019: Began amiodarone.    History of coronary artery stent placement 3/22/2019    [facility]: [place]: MI  and stent.    History of myocardial infarction 3/22/2019    2000: [place]: MI and stent.    Hypercholesterolemia 3/22/2019    Hypertension 3/22/2019    Tachycardia induced cardiomyopathy 2/25/2019     Past Surgical History:   Procedure Laterality Date    CARDIAC CATHETERIZATION      CARDIOVERSION N/A 2/21/2019    Procedure: CARDIOVERSION;  Surgeon: Panchito Joseph MD;  Location: [facility] CATH LAB;  Service: Cardiology;  Laterality: N/A;    CATARACT EXTRACTION W/  INTRAOCULAR LENS IMPLANT Right 12/18/2017    Dr. Beavers    CATARACT EXTRACTION W/  INTRAOCULAR LENS IMPLANT Left 01/08/2018    Dr. Beavers    TOE AMPUTATION Right 8/31/2019    Procedure: AMPUTATION, 5th TOE possible partial 5th ray amputation;  Surgeon: Kathy Talley DPM;  Location: [facility] OR [address] [place] [address];  Service: Podiatry;  Laterality: Right;    TONSILLECTOMY      TREATMENT OF CARDIAC ARRHYTHMIA N/A 2/25/2019    Procedure: CARDIOVERSION OR DEFIBRILLATION;  Surgeon: Panchito Joseph MD;  Location: [facility] CATH LAB;  Service: Cardiology;  Laterality: N/A;     Current Outpatient Medications on File Prior to Visit   Medication Sig Dispense Refill    acetaminophen (TYLENOL) 325 MG tablet Take 2 tablets (650 mg total) by mouth every 4 (four) hours as needed.  0    alcohol swabs PadM use 2 (two) times a day. E11.49 200 each 5    amiodarone (PACERONE) 100 MG Tab Take 1 tablet (100 mg total) by mouth once daily. 30 tablet 6    atorvastatin (LIPITOR) 10 MG tablet TAKE 1 TABLET BY MOUTH DAILY 90 tablet 1    blood glucose control, low Soln Use 1 each to check glucose level of glucometer weekly 4 each 11    blood sugar diagnostic (TRUE METRIX GLUCOSE TEST STRIP) Strp use 2 (two) times a day. To check blood sugar 200 strip 5    blood sugar diagnostic (TRUE METRIX GLUCOSE TEST STRIP) Strp use 2 (two) times a day. To check blood sugar 200 strip 5    carvediloL (COREG) 3.125 MG tablet Take 1 tablet (3.125 mg total) by mouth 2 (two) times daily. 180 tablet 3    carvediloL  "(COREG) 6.25 MG tablet TAKE 1 TABLET(6.25 MG) BY MOUTH TWICE DAILY 180 tablet 3    dimethicone 6 % Crea Apply 1 application topically once daily. For legs and feet. 57 g 10    ELIQUIS 5 mg Tab TAKE 1 TABLET BY MOUTH TWICE DAILY 180 tablet 3    famotidine (PEPCID) 20 MG tablet TAKE 1 TABLET(20 MG) BY MOUTH EVERY DAY 30 tablet 11    furosemide (LASIX) 40 MG tablet Take 1 tablet (40 mg total) by mouth once daily. 60 tablet 11    gabapentin (NEURONTIN) 300 MG capsule Take 1 capsule (300 mg total) by mouth every evening. 90 capsule 3    insulin detemir U-100 (LEVEMIR FLEXTOUCH U-100 INSULN) 100 unit/mL (3 mL) InPn pen Inject 8 Units into the skin every evening. 7.2 mL 3    lancets (TRUEPLUS LANCETS) 33 gauge Misc use twice a day as directed 200 each 5    lancets (TRUEPLUS LANCETS) 33 gauge Misc use twice a day as directed (Patient taking differently: use twice a day as directed) 200 each 5    levothyroxine (SYNTHROID) 112 MCG tablet Take 1 tablet (112 mcg total) by mouth once daily. 90 tablet 3    pen needle, diabetic 31 gauge x 5/16" Ndle 1 Device by Misc.(Non-Drug; Combo Route) route once daily. Use with insulin. ICD10: E11.49 100 each 3    PREVNAR 13, PF, 0.5 mL Syrg       sacubitriL-valsartan (ENTRESTO) 49-51 mg per tablet Take 1 tablet by mouth 2 (two) times daily. 60 tablet 11    TRUE METRIX AIR GLUCOSE METER kit       valACYclovir (VALTREX) 1000 MG tablet Take 1 tablet (1,000 mg total) by mouth 3 (three) times daily. for 7 days 21 tablet 0     No current facility-administered medications on file prior to visit.     Review of patient's allergies indicates:  No Known Allergies    Review of Systems   Constitutional: Negative for chills, decreased appetite, fever, malaise/fatigue, night sweats, weight gain and weight loss.   Cardiovascular: Positive for leg swelling. Negative for chest pain, claudication, dyspnea on exertion, palpitations and syncope.   Respiratory: Negative for cough and shortness of " "breath.    Endocrine: Negative for cold intolerance and heat intolerance.   Hematologic/Lymphatic: Negative for bleeding problem. Does not bruise/bleed easily.   Skin: Positive for dry skin, nail changes, rash and suspicious lesions. Negative for color change, flushing, itching, poor wound healing, skin cancer and unusual hair distribution.   Musculoskeletal: Positive for myalgias and stiffness. Negative for arthritis, back pain, falls, gout, joint pain, joint swelling, muscle cramps, muscle weakness and neck pain.   Gastrointestinal: Negative for diarrhea, nausea and vomiting.   Neurological: Positive for numbness and paresthesias. Negative for dizziness, focal weakness, light-headedness, tremors, vertigo and weakness.   Psychiatric/Behavioral: Negative for altered mental status and depression. The patient does not have insomnia.    Allergic/Immunologic: Negative.            Objective:       Vitals:    08/10/22 1453   BP: (!) 101/57   Pulse: (!) 59   Weight: 71.7 kg (158 lb 1.1 oz)   Height: 5' 11" (1.803 m)   PainSc: 0-No pain   71.7 kg (158 lb 1.1 oz)     Physical Exam  Vitals reviewed.   Constitutional:       General: He is not in acute distress.     Appearance: He is well-developed. He is not ill-appearing, toxic-appearing or diaphoretic.      Comments: Proper supportive shoegear      Cardiovascular:      Pulses:           Dorsalis pedis pulses are 1+ on the right side and 1+ on the left side.        Posterior tibial pulses are 1+ on the right side and 1+ on the left side.      Comments: cvs changes; dermatitis to anteroir legs and dorsal right foot. No ischemia  Musculoskeletal:         General: No swelling, tenderness or deformity.      Right lower le+ Edema present.      Left lower le+ Edema present.      Right ankle: Normal.      Right Achilles Tendon: Normal.      Left ankle: Normal.      Left Achilles Tendon: Normal.      Right foot: Decreased range of motion. No deformity, tenderness or bony " tenderness.      Left foot: Decreased range of motion. No deformity, tenderness or bony tenderness.      Comments: Right 5th digit ampuation   Feet:      Right foot:      Protective Sensation: 10 sites tested. 6 sites sensed.      Skin integrity: Skin breakdown and dry skin present. No ulcer, blister, erythema, warmth or callus.      Left foot:      Protective Sensation: 10 sites tested. 6 sites sensed.      Skin integrity: Skin breakdown present. No ulcer, blister, erythema, warmth, callus or dry skin.      Comments: CVS changes, excoriations, no wheeping to anteroir legs    No acute soi  Skin:     General: Skin is warm and dry.      Capillary Refill: Capillary refill takes 2 to 3 seconds.      Coloration: Skin is not pale.      Findings: No erythema or rash.      Nails: There is no clubbing.   Neurological:      Mental Status: He is alert and oriented to person, place, and time.      Sensory: Sensory deficit present.      Gait: Gait abnormal.   Psychiatric:         Attention and Perception: Attention normal.         Mood and Affect: Mood normal.         Speech: Speech normal.         Behavior: Behavior normal.         Thought Content: Thought content normal.         Cognition and Memory: Cognition normal.         Judgment: Judgment normal.               Assessment:       Encounter Diagnoses   Name Primary?   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    - most information obtained from chart review and patient    - complex history of lower extremity venous stasis wounds/rash    - recommend Dermatology evaluation; patient is currently using a combination of steroids and moisturizers for his upper extremity using none to lower  Recommend Aquaphor 1 take off in a boots    - With patient's permission, I applied an Unna boot dressing (bi-layered pink Coflex brand)  using a spiral technique beginning with the foam layer followed by the cohesive bandage avoiding creases or folds.  The wrap was started behind the first metatarsal and ended below the tibial tubercle of the knee.  There was overlap of each turn half the width of the previous turn in order to better control edema. Patient tolerated well and related comfort to the b/l lower extremity .     - patient appears to have improved with previous unna boot application    - recommend [facility] consultation.  Patient also has some noted PVD decreased ABIs in the chart.  Goal would be to developed a proper compression stocking for patient as he relates this latest rash was secondary to the compression stockings being too tight    - discussed with patient keep Unna boots on till Monday if need to remove for any reason discussed safety removing them    -  continue diabetic shoes    - avoid yard work for now    - greater than and hour spent with patient care and chart review    - reviewed emergency room protocol if develops fever chills nausea vomiting may need oral or   IV antibiotics; none indicated today        Follow up in about 4 days (around 8/14/2022).

## 2022-08-10 NOTE — TELEPHONE ENCOUNTER
----- Message from Winter Zhao sent at 8/10/2022  9:50 AM CDT -----  Regarding: CAll BAck  Name of Who is Calling:DARIANA GUTIÉRREZ [[id number]]              What is the request in detail: Patient requesting a appointment for today 08- patient has a Appointment for 08-  also on the wait list state he needs to be seen today. Please assist              Can the clinic reply by [facility]: No              [facility][phone]

## 2022-08-10 NOTE — TELEPHONE ENCOUNTER
----- Message from Ifrah Raza DPM sent at 8/10/2022 11:48 AM CDT -----  Please call and ask patient what the urgent issue is. If it is an ulcer or injury I will place a xray for him to take before visit. Please ask what side    thanks

## 2022-08-10 NOTE — TELEPHONE ENCOUNTER
Staff contacted and spoke with patient wanting to be seen today on August 10th.    Staff informed patient that Dr. Warren do not have an opening for today and he has an appointment with Dr. Rosa on Monday, August 15th, to go to the Urgent Care or ER.    Patient verbalized understanding.

## 2022-08-11 ENCOUNTER — TELEPHONE (OUTPATIENT)
Dept: VASCULAR SURGERY | Facility: CLINIC | Age: 82
End: 2022-08-11
Payer: MEDICARE

## 2022-08-11 DIAGNOSIS — I83.813 VARICOSE VEINS OF LEG WITH PAIN, BILATERAL: ICD-10-CM

## 2022-08-11 DIAGNOSIS — I83.893 VARICOSE VEINS OF LEG WITH SWELLING, BILATERAL: ICD-10-CM

## 2022-08-11 DIAGNOSIS — I83.893 VARICOSE VEINS OF LEG WITH EDEMA, BILATERAL: Primary | ICD-10-CM

## 2022-08-14 DIAGNOSIS — I50.22 CHRONIC SYSTOLIC HEART FAILURE: ICD-10-CM

## 2022-08-15 ENCOUNTER — OFFICE VISIT (OUTPATIENT)
Dept: DERMATOLOGY | Facility: CLINIC | Age: 82
End: 2022-08-15
Payer: MEDICARE

## 2022-08-15 ENCOUNTER — OFFICE VISIT (OUTPATIENT)
Dept: PODIATRY | Facility: CLINIC | Age: 82
End: 2022-08-15
Payer: MEDICARE

## 2022-08-15 VITALS
WEIGHT: 164 LBS | DIASTOLIC BLOOD PRESSURE: 63 MMHG | HEART RATE: 68 BPM | BODY MASS INDEX: 22.96 KG/M2 | SYSTOLIC BLOOD PRESSURE: 115 MMHG | HEIGHT: 71 IN

## 2022-08-15 DIAGNOSIS — L30.9 ECZEMA, UNSPECIFIED TYPE: ICD-10-CM

## 2022-08-15 DIAGNOSIS — L30.0 NUMMULAR ECZEMA: Primary | ICD-10-CM

## 2022-08-15 DIAGNOSIS — B02.29 POST HERPETIC NEURALGIA: ICD-10-CM

## 2022-08-15 DIAGNOSIS — E11.40 TYPE 2 DIABETES MELLITUS WITH DIABETIC NEUROPATHY, WITH LONG-TERM CURRENT USE OF INSULIN: Primary | ICD-10-CM

## 2022-08-15 DIAGNOSIS — L82.1 SEBORRHEIC KERATOSES: ICD-10-CM

## 2022-08-15 DIAGNOSIS — L08.9 SUPERFICIAL SKIN INFECTION: ICD-10-CM

## 2022-08-15 DIAGNOSIS — Z79.01 CHRONIC ANTICOAGULATION: ICD-10-CM

## 2022-08-15 DIAGNOSIS — I87.2 VENOUS STASIS DERMATITIS OF BOTH LOWER EXTREMITIES: ICD-10-CM

## 2022-08-15 DIAGNOSIS — Z79.4 TYPE 2 DIABETES MELLITUS WITH DIABETIC NEUROPATHY, WITH LONG-TERM CURRENT USE OF INSULIN: Primary | ICD-10-CM

## 2022-08-15 DIAGNOSIS — B35.1 ONYCHOMYCOSIS DUE TO DERMATOPHYTE: ICD-10-CM

## 2022-08-15 PROCEDURE — 3078F PR MOST RECENT DIASTOLIC BLOOD PRESSURE < 80 MM HG: ICD-10-PCS | Mod: CPTII,S$GLB,, | Performed by: PODIATRIST

## 2022-08-15 PROCEDURE — 1159F PR MEDICATION LIST DOCUMENTED IN MEDICAL RECORD: ICD-10-PCS | Mod: CPTII,S$GLB,, | Performed by: PODIATRIST

## 2022-08-15 PROCEDURE — 87070 CULTURE OTHR SPECIMN AEROBIC: CPT | Performed by: DERMATOLOGY

## 2022-08-15 PROCEDURE — 1101F PT FALLS ASSESS-DOCD LE1/YR: CPT | Mod: CPTII,S$GLB,, | Performed by: DERMATOLOGY

## 2022-08-15 PROCEDURE — 3078F DIAST BP <80 MM HG: CPT | Mod: CPTII,S$GLB,, | Performed by: PODIATRIST

## 2022-08-15 PROCEDURE — 3288F PR FALLS RISK ASSESSMENT DOCUMENTED: ICD-10-PCS | Mod: CPTII,S$GLB,, | Performed by: DERMATOLOGY

## 2022-08-15 PROCEDURE — 3074F SYST BP LT 130 MM HG: CPT | Mod: CPTII,S$GLB,, | Performed by: PODIATRIST

## 2022-08-15 PROCEDURE — 1159F MED LIST DOCD IN RCRD: CPT | Mod: CPTII,S$GLB,, | Performed by: DERMATOLOGY

## 2022-08-15 PROCEDURE — 99213 PR OFFICE/OUTPT VISIT, EST, LEVL III, 20-29 MIN: ICD-10-PCS | Mod: 25,S$GLB,, | Performed by: PODIATRIST

## 2022-08-15 PROCEDURE — 1126F PR PAIN SEVERITY QUANTIFIED, NO PAIN PRESENT: ICD-10-PCS | Mod: CPTII,S$GLB,, | Performed by: PODIATRIST

## 2022-08-15 PROCEDURE — 1159F PR MEDICATION LIST DOCUMENTED IN MEDICAL RECORD: ICD-10-PCS | Mod: CPTII,S$GLB,, | Performed by: DERMATOLOGY

## 2022-08-15 PROCEDURE — 3074F PR MOST RECENT SYSTOLIC BLOOD PRESSURE < 130 MM HG: ICD-10-PCS | Mod: CPTII,S$GLB,, | Performed by: PODIATRIST

## 2022-08-15 PROCEDURE — 99999 PR PBB SHADOW E&M-EST. PATIENT-LVL IV: ICD-10-PCS | Mod: PBBFAC,,, | Performed by: DERMATOLOGY

## 2022-08-15 PROCEDURE — 11721 DEBRIDE NAIL 6 OR MORE: CPT | Mod: Q7,S$GLB,, | Performed by: PODIATRIST

## 2022-08-15 PROCEDURE — 99213 OFFICE O/P EST LOW 20 MIN: CPT | Mod: 25,S$GLB,, | Performed by: PODIATRIST

## 2022-08-15 PROCEDURE — 11721 PR DEBRIDEMENT OF NAILS, 6 OR MORE: ICD-10-PCS | Mod: Q7,S$GLB,, | Performed by: PODIATRIST

## 2022-08-15 PROCEDURE — 1126F AMNT PAIN NOTED NONE PRSNT: CPT | Mod: CPTII,S$GLB,, | Performed by: PODIATRIST

## 2022-08-15 PROCEDURE — 1159F MED LIST DOCD IN RCRD: CPT | Mod: CPTII,S$GLB,, | Performed by: PODIATRIST

## 2022-08-15 PROCEDURE — 1126F PR PAIN SEVERITY QUANTIFIED, NO PAIN PRESENT: ICD-10-PCS | Mod: CPTII,S$GLB,, | Performed by: DERMATOLOGY

## 2022-08-15 PROCEDURE — 99214 OFFICE O/P EST MOD 30 MIN: CPT | Mod: S$GLB,,, | Performed by: DERMATOLOGY

## 2022-08-15 PROCEDURE — 1101F PR PT FALLS ASSESS DOC 0-1 FALLS W/OUT INJ PAST YR: ICD-10-PCS | Mod: CPTII,S$GLB,, | Performed by: DERMATOLOGY

## 2022-08-15 PROCEDURE — 1126F AMNT PAIN NOTED NONE PRSNT: CPT | Mod: CPTII,S$GLB,, | Performed by: DERMATOLOGY

## 2022-08-15 PROCEDURE — 99999 PR PBB SHADOW E&M-EST. PATIENT-LVL IV: CPT | Mod: PBBFAC,,, | Performed by: PODIATRIST

## 2022-08-15 PROCEDURE — 99214 PR OFFICE/OUTPT VISIT, EST, LEVL IV, 30-39 MIN: ICD-10-PCS | Mod: S$GLB,,, | Performed by: DERMATOLOGY

## 2022-08-15 PROCEDURE — 99999 PR PBB SHADOW E&M-EST. PATIENT-LVL IV: CPT | Mod: PBBFAC,,, | Performed by: DERMATOLOGY

## 2022-08-15 PROCEDURE — 99999 PR PBB SHADOW E&M-EST. PATIENT-LVL IV: ICD-10-PCS | Mod: PBBFAC,,, | Performed by: PODIATRIST

## 2022-08-15 PROCEDURE — 3288F FALL RISK ASSESSMENT DOCD: CPT | Mod: CPTII,S$GLB,, | Performed by: DERMATOLOGY

## 2022-08-15 RX ORDER — SACUBITRIL AND VALSARTAN 49; 51 MG/1; MG/1
1 TABLET, FILM COATED ORAL 2 TIMES DAILY
Qty: 60 TABLET | Refills: 11 | OUTPATIENT
Start: 2022-08-15

## 2022-08-15 RX ORDER — TRIAMCINOLONE ACETONIDE 1 MG/G
OINTMENT TOPICAL
Qty: 80 G | Refills: 4 | Status: SHIPPED | OUTPATIENT
Start: 2022-08-15

## 2022-08-15 NOTE — PROGRESS NOTES
Subjective:      Patient ID: Randy Camejo is a 82 y.o. male.    Chief Complaint:   Follow-up (Pcp-[facility] 10/4/21) and Diabetes Mellitus    Randy is a 82 y.o. male who presents to the clinic for evaluation and treatment of high risk feet. 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 The patient's f/u rash on legs    Presents with wife    He was able to keep the Unna boots on the entire weekend with no issues.  Overall he feels the legs look great now that the wraps are off  Patient saw Dermatology this morning did not look at legs however made recommendations for his eczema.  Moisturizers and steroids  No new complaint  PCP: Susie Lazo MD         Current shoe gear:  Affected Foot: Extra depth shoes with custome accommodative insoles     Unaffected Foot: Extra depth shoes with custome accommodative insoles    Hemoglobin A1C   Date Value Ref Range Status   09/29/2021 5.6 4.0 - 5.6 % Final     Comment:     ADA Screening Guidelines:  5.7-6.4%  Consistent with prediabetes  >or=6.5%  Consistent with diabetes    High levels of fetal hemoglobin interfere with the HbA1C  assay. Heterozygous hemoglobin variants (HbS, HgC, etc)do  not significantly interfere with this assay.   However, presence of multiple variants may affect accuracy.     07/30/2021 6.3 (H) 4.0 - 5.6 % Final     Comment:     ADA Screening Guidelines:  5.7-6.4%  Consistent with prediabetes  >or=6.5%  Consistent with diabetes    High levels of fetal hemoglobin interfere with the HbA1C  assay. Heterozygous hemoglobin  variants (HbS, HgC, etc)do  not significantly interfere with this assay.   However, presence of multiple variants may affect accuracy.     04/20/2021 5.9 (H) 4.0 - 5.6 % Final     Comment:     ADA Screening Guidelines:  5.7-6.4%  Consistent with prediabetes  >or=6.5%  Consistent with diabetes    High levels of fetal hemoglobin interfere with the HbA1C  assay. Heterozygous hemoglobin variants (HbS, HgC, etc)do  not significantly interfere with this assay.   However, presence of multiple variants may affect accuracy.          Past Medical History:   Diagnosis Date    Asthma     childhood    Atrial fibrillation     CHF (congestive heart failure)     Chronic anticoagulation 2/25/2019    Coronary artery disease     Coronary artery disease 2/25/2019    Diabetes mellitus, type 2     Heart attack     stent    High risk medication use 3/22/2019    2/19/2019: Began amiodarone.    History of coronary artery stent placement 3/22/2019    2000: [facility]: MI and stent.    History of myocardial infarction 3/22/2019    2000: [facility]: MI and stent.    Hypercholesterolemia 3/22/2019    Hypertension 3/22/2019    Tachycardia induced cardiomyopathy 2/25/2019     Past Surgical History:   Procedure Laterality Date    CARDIAC CATHETERIZATION      CARDIOVERSION N/A 2/21/2019    Procedure: CARDIOVERSION;  Surgeon: Panchito Joseph MD;  Location: [facility] CATH LAB;  Service: Cardiology;  Laterality: N/A;    CATARACT EXTRACTION W/  INTRAOCULAR LENS IMPLANT Right 12/18/2017    Dr. Beavers    CATARACT EXTRACTION W/  INTRAOCULAR LENS IMPLANT Left 01/08/2018    Dr. Beavers    TOE AMPUTATION Right 8/31/2019    Procedure: AMPUTATION, 5th TOE possible partial 5th ray amputation;  Surgeon: Kathy Talley DPM;  Location: [address];  Service: Podiatry;  Laterality: Right;    TONSILLECTOMY      TREATMENT OF CARDIAC ARRHYTHMIA N/A 2/25/2019    Procedure: CARDIOVERSION OR DEFIBRILLATION;  Surgeon: Panchito Joseph MD;  Location: [facility] CATH LAB;   "Service: Cardiology;  Laterality: N/A;     Current Outpatient Medications on File Prior to Visit   Medication Sig Dispense Refill    acetaminophen (TYLENOL) 325 MG tablet Take 2 tablets (650 mg total) by mouth every 4 (four) hours as needed.  0    alcohol swabs PadM use 2 (two) times a day. E11.49 200 each 5    amiodarone (PACERONE) 100 MG Tab Take 1 tablet (100 mg total) by mouth once daily. 30 tablet 6    atorvastatin (LIPITOR) 10 MG tablet TAKE 1 TABLET BY MOUTH DAILY 90 tablet 1    blood glucose control, low Soln Use 1 each to check glucose level of glucometer weekly 4 each 11    blood sugar diagnostic (TRUE METRIX GLUCOSE TEST STRIP) Strp use 2 (two) times a day. To check blood sugar 200 strip 5    blood sugar diagnostic (TRUE METRIX GLUCOSE TEST STRIP) Strp use 2 (two) times a day. To check blood sugar 200 strip 5    carvediloL (COREG) 3.125 MG tablet Take 1 tablet (3.125 mg total) by mouth 2 (two) times daily. 180 tablet 3    carvediloL (COREG) 6.25 MG tablet TAKE 1 TABLET(6.25 MG) BY MOUTH TWICE DAILY 180 tablet 3    dimethicone 6 % Crea Apply 1 application topically once daily. For legs and feet. 57 g 10    ELIQUIS 5 mg Tab TAKE 1 TABLET BY MOUTH TWICE DAILY 180 tablet 3    famotidine (PEPCID) 20 MG tablet TAKE 1 TABLET(20 MG) BY MOUTH EVERY DAY 30 tablet 11    furosemide (LASIX) 40 MG tablet Take 1 tablet (40 mg total) by mouth once daily. 60 tablet 11    insulin detemir U-100 (LEVEMIR FLEXTOUCH U-100 INSULN) 100 unit/mL (3 mL) InPn pen Inject 8 Units into the skin every evening. 7.2 mL 3    lancets (TRUEPLUS LANCETS) 33 gauge Misc use twice a day as directed 200 each 5    lancets (TRUEPLUS LANCETS) 33 gauge Misc use twice a day as directed (Patient taking differently: use twice a day as directed) 200 each 5    levothyroxine (SYNTHROID) 112 MCG tablet Take 1 tablet (112 mcg total) by mouth once daily. 90 tablet 3    pen needle, diabetic 31 gauge x 5/16" Ndle 1 Device by Misc.(Non-Drug; " Combo Route) route once daily. Use with insulin. ICD10: E11.49 100 each 3    PREVNAR 13, PF, 0.5 mL Syrg       sacubitriL-valsartan (ENTRESTO) 49-51 mg per tablet Take 1 tablet by mouth 2 (two) times daily. 60 tablet 11    TRUE METRIX AIR GLUCOSE METER kit       gabapentin (NEURONTIN) 300 MG capsule Take 1 capsule (300 mg total) by mouth every evening. 90 capsule 3    valACYclovir (VALTREX) 1000 MG tablet Take 1 tablet (1,000 mg total) by mouth 3 (three) times daily. for 7 days 21 tablet 0     No current facility-administered medications on file prior to visit.     Review of patient's allergies indicates:  No Known Allergies    Review of Systems   Constitutional: Negative for chills, decreased appetite, fever, malaise/fatigue, night sweats, weight gain and weight loss.   Cardiovascular: Positive for leg swelling. Negative for chest pain, claudication, dyspnea on exertion, palpitations and syncope.   Respiratory: Negative for cough and shortness of breath.    Endocrine: Negative for cold intolerance and heat intolerance.   Hematologic/Lymphatic: Negative for bleeding problem. Does not bruise/bleed easily.   Skin: Positive for dry skin, nail changes, rash and suspicious lesions. Negative for color change, flushing, itching, poor wound healing, skin cancer and unusual hair distribution.   Musculoskeletal: Positive for myalgias and stiffness. Negative for arthritis, back pain, falls, gout, joint pain, joint swelling, muscle cramps, muscle weakness and neck pain.   Gastrointestinal: Negative for diarrhea, nausea and vomiting.   Neurological: Positive for numbness and paresthesias. Negative for dizziness, focal weakness, light-headedness, tremors, vertigo and weakness.   Psychiatric/Behavioral: Negative for altered mental status and depression. The patient does not have insomnia.    Allergic/Immunologic: Negative.            Objective:       Vitals:    08/15/22 1407   BP: 115/63   Pulse: 68   Weight: 74.4 kg (164 lb  "0.4 oz)   Height: 5' 11" (1.803 m)   PainSc: 0-No pain   74.4 kg (164 lb 0.4 oz)     Physical Exam  Vitals reviewed.   Constitutional:       General: He is not in acute distress.     Appearance: He is well-developed. He is not ill-appearing, toxic-appearing or diaphoretic.   Cardiovascular:      Pulses:           Dorsalis pedis pulses are 1+ on the right side and 1+ on the left side.        Posterior tibial pulses are 1+ on the right side and 1+ on the left side.      Comments: cvs changes; dermatitis to anteroir legs and dorsal right foot. No ischemia  Musculoskeletal:         General: No swelling, tenderness or deformity.      Right lower le+ Edema present.      Left lower le+ Edema present.      Right ankle: Normal.      Right Achilles Tendon: Normal.      Left ankle: Normal.      Left Achilles Tendon: Normal.      Right foot: Decreased range of motion. No deformity, tenderness or bony tenderness.      Left foot: Decreased range of motion. No deformity, tenderness or bony tenderness.      Comments: Right 5th digit ampuation   Feet:      Right foot:      Protective Sensation: 10 sites tested. 6 sites sensed.      Skin integrity: Dry skin present. No ulcer, blister, skin breakdown, erythema, warmth or callus.      Toenail Condition: Right toenails are abnormally thick and long. Fungal disease present.     Left foot:      Protective Sensation: 10 sites tested. 6 sites sensed.      Skin integrity: Dry skin present. No ulcer, blister, skin breakdown, erythema, warmth or callus.      Toenail Condition: Left toenails are abnormally thick and long. Fungal disease present.     Comments: CVS changes, excoriations, no wheeping to anteroir legs  Significant improvement  No acute soi    Toenails 1-5 bilaterally are elongated by 4-5 mm, thickened by 3-4 mm, discolored/yellowed, dystrophic, brittle with subungual debris.  Skin:     General: Skin is warm and dry.      Capillary Refill: Capillary refill takes 2 to 3 " seconds.      Coloration: Skin is not pale.      Findings: No erythema or rash.      Nails: There is no clubbing.   Neurological:      Mental Status: He is alert and oriented to person, place, and time.      Sensory: Sensory deficit present.      Gait: Gait abnormal.   Psychiatric:         Attention and Perception: Attention normal.         Mood and Affect: Mood normal.         Speech: Speech normal.         Behavior: Behavior normal.         Thought Content: Thought content normal.         Cognition and Memory: Cognition normal.         Judgment: Judgment normal.               Assessment:       Encounter Diagnoses   Name Primary?    Type 2 diabetes mellitus with diabetic neuropathy, with long-term current use of insulin Yes    Venous stasis dermatitis of both lower extremities     Chronic anticoagulation     Onychomycosis due to dermatophyte          Plan:       Randy was seen today for follow-up and diabetes mellitus.    Diagnoses and all orders for this visit:    Type 2 diabetes mellitus with diabetic neuropathy, with long-term current use of insulin    Venous stasis dermatitis of both lower extremities    Chronic anticoagulation    Onychomycosis due to dermatophyte      I counseled the patient on his conditions, their implications and medical management.    - patient improved    - plan for venous stasis evaluation by [facility] possibly Friday; encouraged patient to bring current compression stockings which he feels aggravated the legs this time for evaluation    - complex history of lower extremity venous stasis wounds/rash    - With patient's permission, the toenails mentioned above were aggressively reduced and debrided using a nail nipper, removing all offending nail and debris. Utilizing a #15 scalpel, I trimmed the corns and calluses at the above mentioned location.      The patient will continue to monitor the areas daily, inspect the feet, wear protective shoe gear when ambulatory,  and moisturizer to maintain skin integrity.    - follow-up 6 weeks sooner if needed  Follow up in about 6 weeks (around 9/26/2022).

## 2022-08-15 NOTE — PATIENT INSTRUCTIONS
Start dove senstive skin or eczema body wash by cerave  Start cerave cream followed by vaseline q pm-- after bathing and patting dry skin, apply cerave cream two times a day at least if not 3x a day  Change to free and clear detergent (all, tide, purex)  Use warm water (no hot water)  If there are red inflammed areas apply steroid ointment at night  - Use cerave anti- itch cream as often a needed put in fridge. Ice will help with itch as well.     Counseling on topical steroids:  Patient counseled that the prolonged use of topical steroids can result in the increased appearance superficial blood vessels (telangiectasias) lightening (hypopigmentation), and   thinning of the skin ( atrophy).  Patient understands to avoid using high potency steroids in skin folds, the groin or the face.  The patient verbalized understanding of proper use and possible adverse effects of topical steroids.  All patient's questions and concerns were addressed.      XEROSIS (DRY SKIN)        Definition    Xerosis is the term for dry skin.  We all have a natural oil coating over our skin produced by the skin oil glands.  If this oil is removed, the skin becomes dry which can lead to cracking, which can lead to inflammation.  Xerosis is usually a long-term problem that recurs often, especially in the winter.    Cause    Long hot baths or showers can remove our natural oil and lead to xerosis.  One should never take more than one bath or shower a day and for no longer than ten minutes.  Use of harsh soaps such as Zest, Dial, and Ivory can worsen and cause xerosis.  Cold winter weather worsens xerosis because the amount of moisture contained in cold air is much less than the amount of moisture in warm air.    Treatment    Treatment is intended to restore the natural oil to your skin.  Keep the skin lubricated.    Do not take more than one bath or shower a day.  Use lukewarm water, not hot.  Hot water dries out the skin.    Use a gentle  moisturizing soap such as Cetaphil soap, Oil of Olay, Dove, Basis, Ivory moisture care, Restoraderm cleanser.    When toweling dry, dont rub.  Blot the skin so there is still some water left on the skin.  You should apply a moisturizing cream to all of the skin such as Cerave cream, Cetaphil cream, Lipikar Caro AP+ Intense Repair Moisturizing Cream or Restoraderm or Eucerin Original Formula cream.   Alpha hydroxyacid lotions, i.e., AmLactin, also work very well for preventing dry skin, but may burn when used on inflamed or reddened skin.    If you like to swim during the winter months, you should not use soap when getting out of the pool.  When you have finished swimming, rinse off the chlorine with cool to warm water.  If this will be the only shower of the day, then you may use Cetaphil or another mild soap to cleanse your skin.  After the shower, apply a moisturizing cream to all of the skin as above.        [facility] [place] [address]/ [phone] FAX/ [email or web address]

## 2022-08-15 NOTE — PROGRESS NOTES
Subjective:       Patient ID:  Randy Camejo is a 82 y.o. male who presents for   Chief Complaint   Patient presents with    Rash     Both arms and legs and back       Rash - Initial  Affected locations: right arm, left arm and back  Duration: 8 months  Signs / symptoms: itching and redness  Severity: mild to moderate  Timing: constant  Aggravated by: nothing  Relieving factors/Treatments tried: nothing  Improvement on treatment: no relief        Review of Systems   Constitutional: Negative for fever, chills and fatigue.   Skin: Positive for itching and rash. Negative for daily sunscreen use, recent sunburn and wears hat.   Hematologic/Lymphatic: Does not bruise/bleed easily.        Objective:    Physical Exam   Constitutional: He appears well-developed and well-nourished. No distress.   Neurological: He is alert and oriented to person, place, and time. He is not disoriented.   Psychiatric: He has a normal mood and affect.   Skin:   Areas Examined (abnormalities noted in diagram):   Scalp / Hair Palpated and Inspected  Head / Face Inspection Performed  Neck Inspection Performed  Chest / Axilla Inspection Performed  Abdomen Inspection Performed  Back Inspection Performed  RUE Inspected  LUE Inspection Performed  RLE Inspected  LLE Inspection Performed  Nails and Digits Inspection Performed         Dictation #1  MRN:[id number]  CSN:[id number]        Diagram Legend     Erythematous scaling macule/papule c/w actinic keratosis       Vascular papule c/w angioma      Pigmented verrucoid papule/plaque c/w seborrheic keratosis      Yellow umbilicated papule c/w sebaceous hyperplasia      Irregularly shaped tan macule c/w lentigo     1-2 mm smooth white papules consistent with Milia      Movable subcutaneous cyst with punctum c/w epidermal inclusion cyst      Subcutaneous movable cyst c/w pilar cyst      Firm pink to brown papule c/w dermatofibroma      Pedunculated fleshy papule(s) c/w skin tag(s)      Evenly pigmented  macule c/w junctional nevus     Mildly variegated pigmented, slightly irregular-bordered macule c/w mildly atypical nevus      Flesh colored to evenly pigmented papule c/w intradermal nevus       Pink pearly papule/plaque c/w basal cell carcinoma      Erythematous hyperkeratotic cursted plaque c/w SCC      Surgical scar with no sign of skin cancer recurrence      Open and closed comedones      Inflammatory papules and pustules      Verrucoid papule consistent consistent with wart     Erythematous eczematous patches and plaques     Dystrophic onycholytic nail with subungual debris c/w onychomycosis     Umbilicated papule    Erythematous-base heme-crusted tan verrucoid plaque consistent with inflamed seborrheic keratosis     Erythematous Silvery Scaling Plaque c/w Psoriasis     See annotation              Assessment / Plan:        Nummular eczema  - culture taken today  -     triamcinolone acetonide 0.1% (KENALOG) 0.1 % ointment; aaa bid avoid face/ groin  Dispense: 80 g; Refill: 4  Start dove senstive skin or eczema body wash by cerave  Start cerave cream followed by vaseline q pm-- after bathing and patting dry skin, apply cerave cream two times a day at least if not 3x a day  Change to free and clear detergent (all, tide, purex)  Use warm water (no hot water)  If there are red inflammed areas apply steroid ointment at night  - Use cerave anti- itch cream as often a needed put in fridge. Ice will help with itch as well.     Counseling on topical steroids:  Patient counseled that the prolonged use of topical steroids can result in the increased appearance superficial blood vessels (telangiectasias) lightening (hypopigmentation), and   thinning of the skin ( atrophy).  Patient understands to avoid using high potency steroids in skin folds, the groin or the face.  The patient verbalized understanding of proper use and possible adverse effects of topical steroids.  All patient's questions and concerns were  addressed.        Seborrheic keratoses  These are benign inherited growths without a malignant potential. Reassurance given to patient. No treatment is necessary.     Eczema, unspecified type    Post herpetic neuralgia  -     Ambulatory referral/consult to Pain Clinic; Future; Expected date: 08/22/2022  Patient has 300 mg gabapentin take one nightly, patient isn't sure if he wants to do this  Counseled he will continue to have pain    F/u 4 mo eczema           No follow-ups on file.

## 2022-08-17 DIAGNOSIS — I50.22 CHRONIC SYSTOLIC HEART FAILURE: ICD-10-CM

## 2022-08-17 DIAGNOSIS — L30.0 NUMMULAR ECZEMA: ICD-10-CM

## 2022-08-17 RX ORDER — SACUBITRIL AND VALSARTAN 49; 51 MG/1; MG/1
1 TABLET, FILM COATED ORAL 2 TIMES DAILY
Qty: 60 TABLET | Refills: 11 | Status: SHIPPED | OUTPATIENT
Start: 2022-08-17 | End: 2023-08-25 | Stop reason: SDUPTHER

## 2022-08-18 RX ORDER — SACUBITRIL AND VALSARTAN 49; 51 MG/1; MG/1
1 TABLET, FILM COATED ORAL 2 TIMES DAILY
Qty: 60 TABLET | Refills: 11 | OUTPATIENT
Start: 2022-08-18

## 2022-08-19 LAB — BACTERIA SPEC AEROBE CULT: NO GROWTH

## 2022-08-19 NOTE — PROGRESS NOTES
There is no infection present, continue previous plan as discussed for dry skin  Start dove senstive skin or eczema body wash by cerave  Start cerave cream or eczema creamy oil  q pm-- after bathing and patting dry skin, apply two times a day at least if not 3x a day  Change to free and clear detergent (all, tide, purex)  Use warm water (no hot water)  If there are red inflammed areas apply steroid ointment at night

## 2022-09-28 NOTE — TELEPHONE ENCOUNTER
Refill Decision Note   Randy Camejo  is requesting a refill authorization.  Brief Assessment and Rationale for Refill:  Approve    -Medication-Related Problems Identified:   Requires labs  Requires appointment  Medication Therapy Plan:       Medication Reconciliation Completed: No   Comments:     Provider Staff:     Action is required for this patient.   Please see care gap opportunities below in Care Due Message.     Thanks!  [facility]     Appointments      Date Provider   Last Visit   10/4/2021 Susie Lazo MD   Next Visit   Visit date not found Susie Lazo MD     Note composed:1:36 PM 09/28/2022           Note composed:1:36 PM 09/28/2022

## 2022-09-28 NOTE — TELEPHONE ENCOUNTER
Care Due:                  Date            Visit Type   Department     Provider  --------------------------------------------------------------------------------                                EP -                              PRIMARY      MET INTERNAL  Last Visit: 10-      [facility]   MEDICINE       Susie Lazo  Next Visit: None Scheduled  None         None Found                                                            Last  Test          Frequency    Reason                     Performed    Due Date  --------------------------------------------------------------------------------    Office Visit  12 months..  atorvastatin, carvediloL,   10-   09-                             insulin, levothyroxine...    CMP.........  12 months..  atorvastatin, insulin....  09- 09-    HBA1C.......  6 months...  insulin..................  09- 03-    Lipid Panel.  12 months..  atorvastatin.............  09- 09-    TSH.........  12 months..  levothyroxine............  09- 09-    [employer] Embedded Care Gaps. Reference number: [id number]. 9/28/2022   12:24:20 PM CDT

## 2022-09-29 RX ORDER — LANCETS 33 GAUGE
EACH MISCELLANEOUS
Qty: 200 EACH | Refills: 5 | Status: SHIPPED | OUTPATIENT
Start: 2022-09-29 | End: 2022-09-29

## 2022-09-29 RX ORDER — LANCETS 33 GAUGE
EACH MISCELLANEOUS
Qty: 200 EACH | Refills: 5 | Status: SHIPPED | OUTPATIENT
Start: 2022-09-29

## 2022-09-29 NOTE — TELEPHONE ENCOUNTER
No new care gaps identified.  [facility] Embedded Care Gaps. Reference number: [id number]. 9/29/2022   5:35:08 PM CDT

## 2022-10-26 DIAGNOSIS — D53.9 MACROCYTIC ANEMIA: ICD-10-CM

## 2022-10-26 DIAGNOSIS — E78.5 HYPERLIPIDEMIA, UNSPECIFIED HYPERLIPIDEMIA TYPE: ICD-10-CM

## 2022-10-26 DIAGNOSIS — Z79.4 TYPE 2 DIABETES MELLITUS WITH DIABETIC NEUROPATHY, WITH LONG-TERM CURRENT USE OF INSULIN: Primary | ICD-10-CM

## 2022-10-26 DIAGNOSIS — Z12.5 ENCOUNTER FOR PROSTATE CANCER SCREENING: ICD-10-CM

## 2022-10-26 DIAGNOSIS — I15.2 HYPERTENSION ASSOCIATED WITH DIABETES: ICD-10-CM

## 2022-10-26 DIAGNOSIS — E11.40 TYPE 2 DIABETES MELLITUS WITH DIABETIC NEUROPATHY, WITH LONG-TERM CURRENT USE OF INSULIN: Primary | ICD-10-CM

## 2022-10-26 DIAGNOSIS — E11.59 HYPERTENSION ASSOCIATED WITH DIABETES: ICD-10-CM

## 2022-10-26 RX ORDER — ATORVASTATIN CALCIUM 10 MG/1
TABLET, FILM COATED ORAL
Qty: 90 TABLET | Refills: 1 | OUTPATIENT
Start: 2022-10-26

## 2022-10-26 RX ORDER — ATORVASTATIN CALCIUM 10 MG/1
10 TABLET, FILM COATED ORAL DAILY
Qty: 90 TABLET | Refills: 1 | Status: CANCELLED | OUTPATIENT
Start: 2022-10-26

## 2022-10-26 NOTE — TELEPHONE ENCOUNTER
Refill Routing Note   Medication(s) are not appropriate for processing by [facility] for the following reason(s):      - Required laboratory values are outdated    [facility] action(s):  Defer          Medication reconciliation completed: No     Appointments  past 12m or future 3m with PCP    Date Provider   Last Visit   10/4/2021 Susie Lazo MD   Next Visit   Visit date not found Susie Lazo MD   ED visits in past 90 days: 0        Note composed:3:44 PM 10/26/2022

## 2022-10-26 NOTE — TELEPHONE ENCOUNTER
No new care gaps identified.  [facility] Embedded Care Gaps. Reference number: [id number]. 10/26/2022   4:44:25 PM CDT

## 2022-10-26 NOTE — TELEPHONE ENCOUNTER
No new care gaps identified.  [facility] Embedded Care Gaps. Reference number: [id number]. 10/26/2022   3:35:45 PM CDT

## 2022-10-27 ENCOUNTER — PATIENT MESSAGE (OUTPATIENT)
Dept: INTERNAL MEDICINE | Facility: CLINIC | Age: 82
End: 2022-10-27
Payer: MEDICARE

## 2022-10-28 ENCOUNTER — TELEPHONE (OUTPATIENT)
Dept: INTERNAL MEDICINE | Facility: CLINIC | Age: 82
End: 2022-10-28
Payer: MEDICARE

## 2022-10-28 DIAGNOSIS — E78.5 HYPERLIPIDEMIA, UNSPECIFIED HYPERLIPIDEMIA TYPE: ICD-10-CM

## 2022-10-28 RX ORDER — ATORVASTATIN CALCIUM 10 MG/1
10 TABLET, FILM COATED ORAL DAILY
Qty: 90 TABLET | Refills: 0 | Status: SHIPPED | OUTPATIENT
Start: 2022-10-28 | End: 2023-05-01 | Stop reason: SDUPTHER

## 2022-10-28 NOTE — TELEPHONE ENCOUNTER
----- Message from Alissa Jarvis sent at 10/28/2022 12:08 PM CDT -----  Contact: [phone]  Pt called to speak to the provider or nurse. Please Advise

## 2022-10-28 NOTE — TELEPHONE ENCOUNTER
The patient is asking if he can get a small supply of Lipitor.   Annual scheduled for 11/9 and labs for 11/3.   The patient is completely out of medication.

## 2022-11-01 NOTE — TELEPHONE ENCOUNTER
Provider Staff:     Action required for this patient.    Please note Refusal of medication.            Requested Prescriptions     Refused Prescriptions Disp Refills    atorvastatin (LIPITOR) 10 MG tablet [Pharmacy Med Name: ATORVASTATIN 10MG TABLETS] 90 tablet 1     Sig: TAKE 1 TABLET BY MOUTH DAILY     Refused By: RAND GUNTER     Reason for Refusal: Patient needs an appointment      Thanks!  [facility]   Note composed: 11/01/2022 3:23 PM

## 2022-11-03 ENCOUNTER — LAB VISIT (OUTPATIENT)
Dept: LAB | Facility: HOSPITAL | Age: 82
End: 2022-11-03
Attending: HOSPITALIST
Payer: MEDICARE

## 2022-11-03 DIAGNOSIS — E78.5 HYPERLIPIDEMIA, UNSPECIFIED HYPERLIPIDEMIA TYPE: ICD-10-CM

## 2022-11-03 DIAGNOSIS — Z79.4 TYPE 2 DIABETES MELLITUS WITH DIABETIC NEUROPATHY, WITH LONG-TERM CURRENT USE OF INSULIN: ICD-10-CM

## 2022-11-03 DIAGNOSIS — E11.40 TYPE 2 DIABETES MELLITUS WITH DIABETIC NEUROPATHY, WITH LONG-TERM CURRENT USE OF INSULIN: ICD-10-CM

## 2022-11-03 DIAGNOSIS — D53.9 MACROCYTIC ANEMIA: ICD-10-CM

## 2022-11-03 DIAGNOSIS — Z12.5 ENCOUNTER FOR PROSTATE CANCER SCREENING: ICD-10-CM

## 2022-11-03 LAB
ALBUMIN SERPL BCP-MCNC: 3.9 G/DL (ref 3.5–5.2)
ALP SERPL-CCNC: 80 U/L (ref 55–135)
ALT SERPL W/O P-5'-P-CCNC: 37 U/L (ref 10–44)
ANION GAP SERPL CALC-SCNC: 8 MMOL/L (ref 8–16)
AST SERPL-CCNC: 27 U/L (ref 10–40)
BASOPHILS # BLD AUTO: 0.06 K/UL (ref 0–0.2)
BASOPHILS NFR BLD: 0.9 % (ref 0–1.9)
BILIRUB SERPL-MCNC: 0.5 MG/DL (ref 0.1–1)
BUN SERPL-MCNC: 34 MG/DL (ref 8–23)
CALCIUM SERPL-MCNC: 8.8 MG/DL (ref 8.7–10.5)
CHLORIDE SERPL-SCNC: 108 MMOL/L (ref 95–110)
CHOLEST SERPL-MCNC: 96 MG/DL (ref 120–199)
CHOLEST/HDLC SERPL: 3.1 {RATIO} (ref 2–5)
CO2 SERPL-SCNC: 25 MMOL/L (ref 23–29)
COMPLEXED PSA SERPL-MCNC: 1.8 NG/ML (ref 0–4)
CREAT SERPL-MCNC: 1.7 MG/DL (ref 0.5–1.4)
DIFFERENTIAL METHOD: ABNORMAL
EOSINOPHIL # BLD AUTO: 0.4 K/UL (ref 0–0.5)
EOSINOPHIL NFR BLD: 5.8 % (ref 0–8)
ERYTHROCYTE [DISTWIDTH] IN BLOOD BY AUTOMATED COUNT: 11.8 % (ref 11.5–14.5)
EST. GFR  (NO RACE VARIABLE): 40 ML/MIN/1.73 M^2
ESTIMATED AVG GLUCOSE: 126 MG/DL (ref 68–131)
FERRITIN SERPL-MCNC: 193 NG/ML (ref 20–300)
FOLATE SERPL-MCNC: 6.3 NG/ML (ref 4–24)
GLUCOSE SERPL-MCNC: 113 MG/DL (ref 70–110)
HBA1C MFR BLD: 6 % (ref 4–5.6)
HCT VFR BLD AUTO: 36.8 % (ref 40–54)
HDLC SERPL-MCNC: 31 MG/DL (ref 40–75)
HDLC SERPL: 32.3 % (ref 20–50)
HGB BLD-MCNC: 11.8 G/DL (ref 14–18)
IMM GRANULOCYTES # BLD AUTO: 0.03 K/UL (ref 0–0.04)
IMM GRANULOCYTES NFR BLD AUTO: 0.4 % (ref 0–0.5)
IRON SERPL-MCNC: 76 UG/DL (ref 45–160)
LDLC SERPL CALC-MCNC: 45 MG/DL (ref 63–159)
LYMPHOCYTES # BLD AUTO: 1.9 K/UL (ref 1–4.8)
LYMPHOCYTES NFR BLD: 28.3 % (ref 18–48)
MCH RBC QN AUTO: 33.2 PG (ref 27–31)
MCHC RBC AUTO-ENTMCNC: 32.1 G/DL (ref 32–36)
MCV RBC AUTO: 104 FL (ref 82–98)
MONOCYTES # BLD AUTO: 0.7 K/UL (ref 0.3–1)
MONOCYTES NFR BLD: 10.6 % (ref 4–15)
NEUTROPHILS # BLD AUTO: 3.7 K/UL (ref 1.8–7.7)
NEUTROPHILS NFR BLD: 54 % (ref 38–73)
NONHDLC SERPL-MCNC: 65 MG/DL
NRBC BLD-RTO: 0 /100 WBC
PLATELET # BLD AUTO: 141 K/UL (ref 150–450)
PMV BLD AUTO: 12 FL (ref 9.2–12.9)
POTASSIUM SERPL-SCNC: 4.2 MMOL/L (ref 3.5–5.1)
PROT SERPL-MCNC: 8 G/DL (ref 6–8.4)
RBC # BLD AUTO: 3.55 M/UL (ref 4.6–6.2)
SATURATED IRON: 27 % (ref 20–50)
SODIUM SERPL-SCNC: 141 MMOL/L (ref 136–145)
TOTAL IRON BINDING CAPACITY: 277 UG/DL (ref 250–450)
TRANSFERRIN SERPL-MCNC: 187 MG/DL (ref 200–375)
TRANSFERRIN SERPL-MCNC: 187 MG/DL (ref 200–375)
TRIGL SERPL-MCNC: 100 MG/DL (ref 30–150)
TSH SERPL DL<=0.005 MIU/L-ACNC: 0.53 UIU/ML (ref 0.4–4)
VIT B12 SERPL-MCNC: 407 PG/ML (ref 210–950)
WBC # BLD AUTO: 6.86 K/UL (ref 3.9–12.7)

## 2022-11-03 PROCEDURE — 82607 VITAMIN B-12: CPT | Performed by: HOSPITALIST

## 2022-11-03 PROCEDURE — 84466 ASSAY OF TRANSFERRIN: CPT | Performed by: HOSPITALIST

## 2022-11-03 PROCEDURE — 82746 ASSAY OF FOLIC ACID SERUM: CPT | Performed by: HOSPITALIST

## 2022-11-03 PROCEDURE — 82728 ASSAY OF FERRITIN: CPT | Performed by: HOSPITALIST

## 2022-11-03 PROCEDURE — 84443 ASSAY THYROID STIM HORMONE: CPT | Performed by: HOSPITALIST

## 2022-11-03 PROCEDURE — 80061 LIPID PANEL: CPT | Performed by: HOSPITALIST

## 2022-11-03 PROCEDURE — 36415 COLL VENOUS BLD VENIPUNCTURE: CPT | Mod: PN | Performed by: HOSPITALIST

## 2022-11-03 PROCEDURE — 83036 HEMOGLOBIN GLYCOSYLATED A1C: CPT | Performed by: HOSPITALIST

## 2022-11-03 PROCEDURE — 80053 COMPREHEN METABOLIC PANEL: CPT | Performed by: HOSPITALIST

## 2022-11-03 PROCEDURE — 84153 ASSAY OF PSA TOTAL: CPT | Performed by: HOSPITALIST

## 2022-11-03 PROCEDURE — 85025 COMPLETE CBC W/AUTO DIFF WBC: CPT | Performed by: HOSPITALIST

## 2022-11-04 ENCOUNTER — PATIENT MESSAGE (OUTPATIENT)
Dept: CARDIOLOGY | Facility: CLINIC | Age: 82
End: 2022-11-04
Payer: MEDICARE

## 2022-11-04 DIAGNOSIS — I48.0 PAROXYSMAL ATRIAL FIBRILLATION: ICD-10-CM

## 2022-11-04 DIAGNOSIS — I25.2 HISTORY OF MYOCARDIAL INFARCTION: ICD-10-CM

## 2022-11-04 DIAGNOSIS — I25.10 CORONARY ARTERY DISEASE INVOLVING NATIVE CORONARY ARTERY OF NATIVE HEART WITHOUT ANGINA PECTORIS: Primary | ICD-10-CM

## 2022-11-09 ENCOUNTER — OFFICE VISIT (OUTPATIENT)
Dept: INTERNAL MEDICINE | Facility: CLINIC | Age: 82
End: 2022-11-09
Payer: MEDICARE

## 2022-11-09 ENCOUNTER — LAB VISIT (OUTPATIENT)
Dept: LAB | Facility: HOSPITAL | Age: 82
End: 2022-11-09
Attending: HOSPITALIST
Payer: MEDICARE

## 2022-11-09 VITALS
HEART RATE: 60 BPM | OXYGEN SATURATION: 97 % | DIASTOLIC BLOOD PRESSURE: 42 MMHG | HEIGHT: 71 IN | BODY MASS INDEX: 22.04 KG/M2 | TEMPERATURE: 97 F | RESPIRATION RATE: 20 BRPM | SYSTOLIC BLOOD PRESSURE: 80 MMHG | WEIGHT: 157.44 LBS

## 2022-11-09 DIAGNOSIS — I15.2 HYPERTENSION ASSOCIATED WITH DIABETES: ICD-10-CM

## 2022-11-09 DIAGNOSIS — I25.10 CORONARY ARTERY DISEASE INVOLVING NATIVE CORONARY ARTERY OF NATIVE HEART WITHOUT ANGINA PECTORIS: ICD-10-CM

## 2022-11-09 DIAGNOSIS — E11.40 TYPE 2 DIABETES MELLITUS WITH DIABETIC NEUROPATHY, WITH LONG-TERM CURRENT USE OF INSULIN: ICD-10-CM

## 2022-11-09 DIAGNOSIS — N18.31 STAGE 3A CHRONIC KIDNEY DISEASE: ICD-10-CM

## 2022-11-09 DIAGNOSIS — E11.59 HYPERTENSION ASSOCIATED WITH DIABETES: ICD-10-CM

## 2022-11-09 DIAGNOSIS — Z79.4 TYPE 2 DIABETES MELLITUS WITH DIABETIC NEUROPATHY, WITH LONG-TERM CURRENT USE OF INSULIN: ICD-10-CM

## 2022-11-09 DIAGNOSIS — Z00.00 ENCOUNTER FOR PREVENTIVE HEALTH EXAMINATION: Primary | ICD-10-CM

## 2022-11-09 DIAGNOSIS — I50.22 HEART FAILURE, SYSTOLIC, CHRONIC: ICD-10-CM

## 2022-11-09 PROCEDURE — 1159F MED LIST DOCD IN RCRD: CPT | Mod: CPTII,S$GLB,, | Performed by: HOSPITALIST

## 2022-11-09 PROCEDURE — 36415 COLL VENOUS BLD VENIPUNCTURE: CPT | Mod: PO | Performed by: HOSPITALIST

## 2022-11-09 PROCEDURE — 99999 PR PBB SHADOW E&M-EST. PATIENT-LVL V: ICD-10-PCS | Mod: PBBFAC,,, | Performed by: HOSPITALIST

## 2022-11-09 PROCEDURE — 1101F PR PT FALLS ASSESS DOC 0-1 FALLS W/OUT INJ PAST YR: ICD-10-PCS | Mod: CPTII,S$GLB,, | Performed by: HOSPITALIST

## 2022-11-09 PROCEDURE — 80048 BASIC METABOLIC PNL TOTAL CA: CPT | Performed by: HOSPITALIST

## 2022-11-09 PROCEDURE — 1126F PR PAIN SEVERITY QUANTIFIED, NO PAIN PRESENT: ICD-10-PCS | Mod: CPTII,S$GLB,, | Performed by: HOSPITALIST

## 2022-11-09 PROCEDURE — 1101F PT FALLS ASSESS-DOCD LE1/YR: CPT | Mod: CPTII,S$GLB,, | Performed by: HOSPITALIST

## 2022-11-09 PROCEDURE — 1160F RVW MEDS BY RX/DR IN RCRD: CPT | Mod: CPTII,S$GLB,, | Performed by: HOSPITALIST

## 2022-11-09 PROCEDURE — 3074F SYST BP LT 130 MM HG: CPT | Mod: CPTII,S$GLB,, | Performed by: HOSPITALIST

## 2022-11-09 PROCEDURE — 3078F PR MOST RECENT DIASTOLIC BLOOD PRESSURE < 80 MM HG: ICD-10-PCS | Mod: CPTII,S$GLB,, | Performed by: HOSPITALIST

## 2022-11-09 PROCEDURE — 99999 PR PBB SHADOW E&M-EST. PATIENT-LVL V: CPT | Mod: PBBFAC,,, | Performed by: HOSPITALIST

## 2022-11-09 PROCEDURE — 1160F PR REVIEW ALL MEDS BY PRESCRIBER/CLIN PHARMACIST DOCUMENTED: ICD-10-PCS | Mod: CPTII,S$GLB,, | Performed by: HOSPITALIST

## 2022-11-09 PROCEDURE — 99397 PR PREVENTIVE VISIT,EST,65 & OVER: ICD-10-PCS | Mod: S$GLB,,, | Performed by: HOSPITALIST

## 2022-11-09 PROCEDURE — G0008 ADMIN INFLUENZA VIRUS VAC: HCPCS | Mod: S$GLB,,, | Performed by: HOSPITALIST

## 2022-11-09 PROCEDURE — 90694 FLU VACCINE - QUADRIVALENT - ADJUVANTED: ICD-10-PCS | Mod: S$GLB,,, | Performed by: HOSPITALIST

## 2022-11-09 PROCEDURE — 1159F PR MEDICATION LIST DOCUMENTED IN MEDICAL RECORD: ICD-10-PCS | Mod: CPTII,S$GLB,, | Performed by: HOSPITALIST

## 2022-11-09 PROCEDURE — 1126F AMNT PAIN NOTED NONE PRSNT: CPT | Mod: CPTII,S$GLB,, | Performed by: HOSPITALIST

## 2022-11-09 PROCEDURE — 3288F FALL RISK ASSESSMENT DOCD: CPT | Mod: CPTII,S$GLB,, | Performed by: HOSPITALIST

## 2022-11-09 PROCEDURE — 90694 VACC AIIV4 NO PRSRV 0.5ML IM: CPT | Mod: S$GLB,,, | Performed by: HOSPITALIST

## 2022-11-09 PROCEDURE — G0008 FLU VACCINE - QUADRIVALENT - ADJUVANTED: ICD-10-PCS | Mod: S$GLB,,, | Performed by: HOSPITALIST

## 2022-11-09 PROCEDURE — 3078F DIAST BP <80 MM HG: CPT | Mod: CPTII,S$GLB,, | Performed by: HOSPITALIST

## 2022-11-09 PROCEDURE — 3288F PR FALLS RISK ASSESSMENT DOCUMENTED: ICD-10-PCS | Mod: CPTII,S$GLB,, | Performed by: HOSPITALIST

## 2022-11-09 PROCEDURE — 99397 PER PM REEVAL EST PAT 65+ YR: CPT | Mod: S$GLB,,, | Performed by: HOSPITALIST

## 2022-11-09 PROCEDURE — 3074F PR MOST RECENT SYSTOLIC BLOOD PRESSURE < 130 MM HG: ICD-10-PCS | Mod: CPTII,S$GLB,, | Performed by: HOSPITALIST

## 2022-11-09 RX ORDER — FAMOTIDINE 20 MG/1
20 TABLET, FILM COATED ORAL DAILY
Qty: 90 TABLET | Refills: 3 | Status: SHIPPED | OUTPATIENT
Start: 2022-11-09 | End: 2023-12-04 | Stop reason: SDUPTHER

## 2022-11-09 RX ORDER — CARVEDILOL 3.12 MG/1
3.12 TABLET ORAL 2 TIMES DAILY
Qty: 180 TABLET | Refills: 3 | Status: SHIPPED | OUTPATIENT
Start: 2022-11-09 | End: 2023-11-05 | Stop reason: SDUPTHER

## 2022-11-09 NOTE — PROGRESS NOTES
Subjective:     @Patient ID: Randy Camejo is a 82 y.o. male.    Chief Complaint: Annual Exam    HPI    80 yo male presents for annual. Accompanied today by his wife      1. DM2 with neuropathy: on levemir 8 units qhs. Last A1c 6 (11/2022). Due for eye exam. Had foot exam with podiatry this year. On gabapentin for neuropathy   2. CHF: systolic. Last 2d echo 6/2/20 showed EF 20%, severe biatrial enlargement, pulm htn and cvp 15.  Has upcoming f/u. He is currently on Entresto and lasix is once daily. Also on coreg. Had bradycardia, syncope in Jan 2021. Admitted to hospital for observation. Reports he was evaluated by cardiology in the hospital who stated he may need to have lower dose of coreg to 3.125 mg bid. Currently Rx states 3.25 mg bid. Has upcoming echo  3. HTN: on coreg  4. Paroxysmal Afib:  on amiodarone per cardiology and eliquis 2.5 mg bid   5. CAD w/ stent: on coreg 3.125 mg bid, lipitor 10 mg, (non on asa due to being on eliquis).   6. Leg weakness: stable  7. CKD3: last Cr 1.7  8. Subclinical hypothyroidism: TSH wnl. Continue synthroid 112 mcg qday            Review of Systems   Constitutional:  Negative for chills and fever.   HENT:  Negative for congestion and sore throat.    Eyes:  Negative for pain and visual disturbance.   Respiratory:  Negative for cough and shortness of breath.    Cardiovascular:  Negative for chest pain and leg swelling.   Gastrointestinal:  Negative for abdominal pain, nausea and vomiting.   Endocrine: Negative for polydipsia and polyuria.   Genitourinary:  Negative for difficulty urinating and dysuria.   Musculoskeletal:  Negative for arthralgias and back pain.   Skin:  Negative for rash and wound.   Neurological:  Negative for weakness and headaches.   Psychiatric/Behavioral:  Negative for agitation and confusion.    Past medical history, surgical history, and family medical history reviewed and updated as appropriate.    Medications and allergies reviewed.     Objective:  "    Vitals:    11/09/22 1136 11/09/22 1151   BP: (!) 76/42 (!) 80/42   BP Location: Right arm    Patient Position: Sitting    BP Method: Medium (Manual)    Pulse: 60    Resp: 20    Temp: 97.3 °F (36.3 °C)    TempSrc: Temporal    SpO2: 97%    Weight: 71.4 kg (157 lb 6.5 oz)    Height: 5' 11" (1.803 m)      Body mass index is 21.95 kg/m².  Physical Exam  Vitals reviewed.   Constitutional:       General: He is not in acute distress.     Appearance: He is well-developed.   HENT:      Head: Normocephalic and atraumatic.      Right Ear: Tympanic membrane normal.      Left Ear: Tympanic membrane normal.      Mouth/Throat:      Mouth: Mucous membranes are moist.      Pharynx: No oropharyngeal exudate.   Eyes:      General:         Right eye: No discharge.         Left eye: No discharge.      Conjunctiva/sclera: Conjunctivae normal.   Cardiovascular:      Rate and Rhythm: Normal rate and regular rhythm.      Heart sounds: No murmur heard.    No friction rub.   Pulmonary:      Effort: Pulmonary effort is normal.      Breath sounds: Normal breath sounds.   Abdominal:      General: Bowel sounds are normal. There is no distension.      Palpations: Abdomen is soft.      Tenderness: There is no abdominal tenderness.   Musculoskeletal:      Cervical back: Normal range of motion and neck supple.      Right lower leg: No edema.      Left lower leg: No edema.   Lymphadenopathy:      Cervical: No cervical adenopathy.   Skin:     General: Skin is warm and dry.   Neurological:      Mental Status: He is alert and oriented to person, place, and time.   Psychiatric:         Mood and Affect: Mood normal.         Behavior: Behavior normal.       Lab Results   Component Value Date    WBC 6.86 11/03/2022    HGB 11.8 (L) 11/03/2022    HCT 36.8 (L) 11/03/2022     (L) 11/03/2022    CHOL 96 (L) 11/03/2022    TRIG 100 11/03/2022    HDL 31 (L) 11/03/2022    ALT 37 11/03/2022    AST 27 11/03/2022     11/03/2022    K 4.2 11/03/2022    CL " 108 11/03/2022    CREATININE 1.7 (H) 11/03/2022    BUN 34 (H) 11/03/2022    CO2 25 11/03/2022    TSH 0.530 11/03/2022    PSA 1.8 11/03/2022    INR 1.3 (H) 08/29/2019    HGBA1C 6.0 (H) 11/03/2022       Assessment:     1. Encounter for preventive health examination    2. Type 2 diabetes mellitus with diabetic neuropathy, with long-term current use of insulin    3. Hypertension associated with diabetes    4. Coronary artery disease involving native coronary artery of native heart without angina pectoris    5. Heart failure, systolic, chronic    6. Stage 3a chronic kidney disease      Plan:   Randy was seen today for annual exam.    Diagnoses and all orders for this visit:    Encounter for preventive health examination  - labs done. Reviewed in clinic     Type 2 diabetes mellitus with diabetic neuropathy, with long-term current use of insulin  - Stable. Continue home meds     Hypertension associated with diabetes  - Stable. Continue home meds     Coronary artery disease involving native coronary artery of native heart without angina pectoris  - Stable. Continue home meds and f/u with cardiology    Heart failure, systolic, chronic  - Stable. Continue home meds and f/u with cardiology      -     carvediloL (COREG) 3.125 MG tablet; Take 1 tablet (3.125 mg total) by mouth 2 (two) times daily.    Stage 3a chronic kidney disease  -  Encouraged hydration. Continue to monitor renal function  -     BASIC METABOLIC PANEL; Future    Other orders  -     famotidine (PEPCID) 20 MG tablet; Take 1 tablet (20 mg total) by mouth once daily.  -     Influenza - Quadrivalent (Adjuvanted)    \  Susie Lazo MD  Internal Medicine    11/9/2022

## 2022-11-10 ENCOUNTER — OFFICE VISIT (OUTPATIENT)
Dept: PODIATRY | Facility: CLINIC | Age: 82
End: 2022-11-10
Payer: MEDICARE

## 2022-11-10 VITALS
HEART RATE: 58 BPM | DIASTOLIC BLOOD PRESSURE: 51 MMHG | WEIGHT: 157 LBS | BODY MASS INDEX: 21.98 KG/M2 | SYSTOLIC BLOOD PRESSURE: 83 MMHG | HEIGHT: 71 IN

## 2022-11-10 DIAGNOSIS — B35.1 DERMATOPHYTOSIS OF NAIL: ICD-10-CM

## 2022-11-10 DIAGNOSIS — Z89.421 HISTORY OF PARTIAL RAY AMPUTATION OF FIFTH TOE OF RIGHT FOOT: ICD-10-CM

## 2022-11-10 DIAGNOSIS — Z79.4 TYPE 2 DIABETES MELLITUS WITH DIABETIC NEUROPATHY, WITH LONG-TERM CURRENT USE OF INSULIN: ICD-10-CM

## 2022-11-10 DIAGNOSIS — L84 CORN OR CALLUS: ICD-10-CM

## 2022-11-10 DIAGNOSIS — Z79.01 CHRONIC ANTICOAGULATION: Primary | ICD-10-CM

## 2022-11-10 DIAGNOSIS — E11.40 TYPE 2 DIABETES MELLITUS WITH DIABETIC NEUROPATHY, WITH LONG-TERM CURRENT USE OF INSULIN: ICD-10-CM

## 2022-11-10 LAB
ANION GAP SERPL CALC-SCNC: 10 MMOL/L (ref 8–16)
BUN SERPL-MCNC: 43 MG/DL (ref 8–23)
CALCIUM SERPL-MCNC: 8.7 MG/DL (ref 8.7–10.5)
CHLORIDE SERPL-SCNC: 107 MMOL/L (ref 95–110)
CO2 SERPL-SCNC: 22 MMOL/L (ref 23–29)
CREAT SERPL-MCNC: 1.9 MG/DL (ref 0.5–1.4)
EST. GFR  (NO RACE VARIABLE): 35 ML/MIN/1.73 M^2
GLUCOSE SERPL-MCNC: 91 MG/DL (ref 70–110)
POTASSIUM SERPL-SCNC: 4.7 MMOL/L (ref 3.5–5.1)
SODIUM SERPL-SCNC: 139 MMOL/L (ref 136–145)

## 2022-11-10 PROCEDURE — 1126F AMNT PAIN NOTED NONE PRSNT: CPT | Mod: CPTII,S$GLB,, | Performed by: PODIATRIST

## 2022-11-10 PROCEDURE — 3074F PR MOST RECENT SYSTOLIC BLOOD PRESSURE < 130 MM HG: ICD-10-PCS | Mod: CPTII,S$GLB,, | Performed by: PODIATRIST

## 2022-11-10 PROCEDURE — 11056 ROUTINE FOOT CARE: ICD-10-PCS | Mod: Q7,S$GLB,, | Performed by: PODIATRIST

## 2022-11-10 PROCEDURE — 3074F SYST BP LT 130 MM HG: CPT | Mod: CPTII,S$GLB,, | Performed by: PODIATRIST

## 2022-11-10 PROCEDURE — 11721 DEBRIDE NAIL 6 OR MORE: CPT | Mod: 59,Q7,S$GLB, | Performed by: PODIATRIST

## 2022-11-10 PROCEDURE — 3078F PR MOST RECENT DIASTOLIC BLOOD PRESSURE < 80 MM HG: ICD-10-PCS | Mod: CPTII,S$GLB,, | Performed by: PODIATRIST

## 2022-11-10 PROCEDURE — 1159F PR MEDICATION LIST DOCUMENTED IN MEDICAL RECORD: ICD-10-PCS | Mod: CPTII,S$GLB,, | Performed by: PODIATRIST

## 2022-11-10 PROCEDURE — 1126F PR PAIN SEVERITY QUANTIFIED, NO PAIN PRESENT: ICD-10-PCS | Mod: CPTII,S$GLB,, | Performed by: PODIATRIST

## 2022-11-10 PROCEDURE — 3078F DIAST BP <80 MM HG: CPT | Mod: CPTII,S$GLB,, | Performed by: PODIATRIST

## 2022-11-10 PROCEDURE — 11721 ROUTINE FOOT CARE: ICD-10-PCS | Mod: 59,Q7,S$GLB, | Performed by: PODIATRIST

## 2022-11-10 PROCEDURE — 99999 PR PBB SHADOW E&M-EST. PATIENT-LVL IV: ICD-10-PCS | Mod: PBBFAC,,, | Performed by: PODIATRIST

## 2022-11-10 PROCEDURE — 1160F RVW MEDS BY RX/DR IN RCRD: CPT | Mod: CPTII,S$GLB,, | Performed by: PODIATRIST

## 2022-11-10 PROCEDURE — 1101F PR PT FALLS ASSESS DOC 0-1 FALLS W/OUT INJ PAST YR: ICD-10-PCS | Mod: CPTII,S$GLB,, | Performed by: PODIATRIST

## 2022-11-10 PROCEDURE — 99499 UNLISTED E&M SERVICE: CPT | Mod: S$GLB,,, | Performed by: PODIATRIST

## 2022-11-10 PROCEDURE — 1101F PT FALLS ASSESS-DOCD LE1/YR: CPT | Mod: CPTII,S$GLB,, | Performed by: PODIATRIST

## 2022-11-10 PROCEDURE — 1160F PR REVIEW ALL MEDS BY PRESCRIBER/CLIN PHARMACIST DOCUMENTED: ICD-10-PCS | Mod: CPTII,S$GLB,, | Performed by: PODIATRIST

## 2022-11-10 PROCEDURE — 3288F FALL RISK ASSESSMENT DOCD: CPT | Mod: CPTII,S$GLB,, | Performed by: PODIATRIST

## 2022-11-10 PROCEDURE — 99999 PR PBB SHADOW E&M-EST. PATIENT-LVL IV: CPT | Mod: PBBFAC,,, | Performed by: PODIATRIST

## 2022-11-10 PROCEDURE — 3288F PR FALLS RISK ASSESSMENT DOCUMENTED: ICD-10-PCS | Mod: CPTII,S$GLB,, | Performed by: PODIATRIST

## 2022-11-10 PROCEDURE — 11056 PARNG/CUTG B9 HYPRKR LES 2-4: CPT | Mod: Q7,S$GLB,, | Performed by: PODIATRIST

## 2022-11-10 PROCEDURE — 1159F MED LIST DOCD IN RCRD: CPT | Mod: CPTII,S$GLB,, | Performed by: PODIATRIST

## 2022-11-10 PROCEDURE — 99499 NO LOS: ICD-10-PCS | Mod: S$GLB,,, | Performed by: PODIATRIST

## 2022-11-11 NOTE — PROGRESS NOTES
Chief Concern:  Nail Problem (Toe Fungus)      HPI:  Randy Camejo is a 82 y.o. male presents for foot exam.  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    He is wearing hospital socks.   Due for a prescription for new diabetes shoes in the new year.         The patient is under the active care of his PCP Dr. Susie Lazo MD for chronic conditions, including diabetes.    He last saw his PCP on 11/9/2022.   He is also on Eliquis for atrial fibrillation           Patient Active Problem List   Diagnosis    Nuclear sclerosis, bilateral    Nuclear sclerotic cataract of left eye    Atrial fibrillation    Type 2 diabetes mellitus with neurologic complication, with long-term current use of insulin    Heart failure, systolic, chronic    Pre-ulcerative corn or callous    Peripheral vascular disease    Tachycardia induced cardiomyopathy    Coronary artery disease    Chronic anticoagulation    History of myocardial infarction    History of coronary artery stent placement    Essential hypertension    Hypercholesterolemia    High risk medication use    Subclinical hypothyroidism    Stage 3a chronic kidney disease    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  0    alcohol swabs PadM use 2 (two) times a day. E11.49 200 each 5    amiodarone (PACERONE) 100 MG Tab Take 1 tablet (100 mg total) by mouth once daily. 30 tablet 11    apixaban (ELIQUIS) 5 mg Tab Take 1 tablet (5 mg total) by mouth 2 (two) times daily. 180 tablet 3    atorvastatin (LIPITOR) 10 MG tablet Take 1 tablet (10 mg total) by mouth once daily. 90 tablet 0    blood glucose control, low Soln Use 1 each to check glucose level of glucometer weekly 4 each 11    blood sugar diagnostic (TRUE  "METRIX GLUCOSE TEST STRIP) Strp use 2 (two) times a day. To check blood sugar 200 strip 5    blood sugar diagnostic (TRUE METRIX GLUCOSE TEST STRIP) Strp use 2 (two) times a day. To check blood sugar 200 strip 5    carvediloL (COREG) 3.125 MG tablet Take 1 tablet (3.125 mg total) by mouth 2 (two) times daily. 180 tablet 3    dimethicone 6 % Crea Apply 1 application topically once daily. For legs and feet. 57 g 10    famotidine (PEPCID) 20 MG tablet Take 1 tablet (20 mg total) by mouth once daily. 90 tablet 3    furosemide (LASIX) 40 MG tablet Take 1 tablet (40 mg total) by mouth once daily. 60 tablet 11    insulin detemir U-100 (LEVEMIR FLEXTOUCH U-100 INSULN) 100 unit/mL (3 mL) InPn pen Inject 8 Units into the skin every evening. 7.2 mL 3    lancets (TRUEPLUS LANCETS) 33 gauge Misc use twice a day as directed 200 each 5    levothyroxine (SYNTHROID) 112 MCG tablet Take 1 tablet (112 mcg total) by mouth once daily. 90 tablet 3    pen needle, diabetic 31 gauge x 5/16" Ndle 1 Device by Misc.(Non-Drug; Combo Route) route once daily. Use with insulin. ICD10: E11.49 100 each 3    PREVNAR 13, PF, 0.5 mL Syrg       sacubitriL-valsartan (ENTRESTO) 49-51 mg per tablet Take 1 tablet by mouth 2 (two) times daily. 60 tablet 11    triamcinolone acetonide 0.1% (KENALOG) 0.1 % ointment aaa bid avoid face/ groin 80 g 4    TRUE METRIX AIR GLUCOSE METER kit       gabapentin (NEURONTIN) 300 MG capsule Take 1 capsule (300 mg total) by mouth every evening. 90 capsule 3     No current facility-administered medications on file prior to visit.           Review of patient's allergies indicates:  No Known Allergies              Objective:      Vitals:    11/10/22 1433   BP: (!) 83/51   Pulse: (!) 58   Weight: 71.2 kg (157 lb)   Height: 5' 11" (1.803 m)         Physical Exam  Constitutional:       General: He is not in acute distress.     Appearance: He is well-developed. He is not diaphoretic.   Cardiovascular:      Pulses:    "        Dorsalis pedis pulses are 1+ on the right side and 1+ on the left side.        Posterior tibial pulses are 1+ on the right side and 1+ on the left side.      Comments: Toes warm, pink, cap fill <5 seconds.  Musculoskeletal:      Comments: Partial 5th ray resection right foot without symptoms.   Lymphadenopathy:      Comments: Negative lymphadenopathy bilateral popliteal fossa and tarsal tunnel.     Skin:     General: Skin is warm and dry.      Coloration: Skin is not pale.      Findings: No abrasion, bruising, burn, ecchymosis, erythema, laceration, lesion or rash.      Comments:   Skin thin, shiny, atrophic.     Neurological:      Sensory: Sensory deficit present.      Comments: Diminished/loss of protective sensation all toes bilateral to 10 gram monofilament.                     Assessment:       Encounter Diagnoses   Name Primary?   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  Shoe inspection.     Continue good nutrition and blood sugar control to help prevent podiatric complications of diabetes.   Maintain proper foot hygiene.   Continue wearing proper shoe gear, daily foot inspections, never walking without protective shoe gear, never putting sharp instruments to feet.    Routine Foot Care    Date/Time: 11/10/2022 2:30 PM  Performed by: Jess Rosa DPM  Authorized by: Jess Rosa DPM      Consent Done?:  Yes (Verbal)  Hyperkeratotic Skin Lesions?: Yes    Number of trimmed lesions:  2  Location(s):  Left Plantar and Right Plantar    Nail Care Type:  Debride(Left 1st Toe, Left 3rd Toe, Left 2nd Toe, Left 4th Toe, Left 5th Toe, Right 1st Toe, Right 2nd Toe, Right 3rd Toe and Right 4th Toe)  Patient tolerance:  Patient tolerated the procedure well with no immediate complications     With patient's permission, the toenails mentioned above were reduced and debrided using a nail nipper, removing offending nail and debris.   Utilizing a #15 scalpel, I trimmed the corns and calluses at the above mentioned location.      The patient will continue to monitor the areas daily, inspect the feet, wear protective shoe gear when ambulatory, and moisturizer to maintain skin integrity.      Follow up 2-3months or sooner if concerned.

## 2022-11-15 ENCOUNTER — OFFICE VISIT (OUTPATIENT)
Dept: CARDIOLOGY | Facility: CLINIC | Age: 82
End: 2022-11-15
Payer: MEDICARE

## 2022-11-15 VITALS
WEIGHT: 156 LBS | SYSTOLIC BLOOD PRESSURE: 110 MMHG | BODY MASS INDEX: 21.84 KG/M2 | DIASTOLIC BLOOD PRESSURE: 54 MMHG | HEART RATE: 56 BPM | HEIGHT: 71 IN

## 2022-11-15 DIAGNOSIS — Z79.4 TYPE 2 DIABETES MELLITUS WITH DIABETIC NEUROPATHY, WITH LONG-TERM CURRENT USE OF INSULIN: ICD-10-CM

## 2022-11-15 DIAGNOSIS — E11.40 TYPE 2 DIABETES MELLITUS WITH DIABETIC NEUROPATHY, WITH LONG-TERM CURRENT USE OF INSULIN: ICD-10-CM

## 2022-11-15 DIAGNOSIS — E78.2 MIXED HYPERLIPIDEMIA: Chronic | ICD-10-CM

## 2022-11-15 DIAGNOSIS — I10 ESSENTIAL HYPERTENSION: ICD-10-CM

## 2022-11-15 DIAGNOSIS — Z79.899 HIGH RISK MEDICATION USE: ICD-10-CM

## 2022-11-15 DIAGNOSIS — I48.0 PAROXYSMAL ATRIAL FIBRILLATION: Chronic | ICD-10-CM

## 2022-11-15 DIAGNOSIS — I50.22 HEART FAILURE, SYSTOLIC, CHRONIC: ICD-10-CM

## 2022-11-15 DIAGNOSIS — I50.22 CHRONIC SYSTOLIC HEART FAILURE: Primary | Chronic | ICD-10-CM

## 2022-11-15 DIAGNOSIS — I25.10 CORONARY ARTERY DISEASE INVOLVING NATIVE CORONARY ARTERY OF NATIVE HEART WITHOUT ANGINA PECTORIS: ICD-10-CM

## 2022-11-15 DIAGNOSIS — N18.32 CHRONIC RENAL FAILURE, STAGE 3B: ICD-10-CM

## 2022-11-15 DIAGNOSIS — I25.2 HISTORY OF MYOCARDIAL INFARCTION: ICD-10-CM

## 2022-11-15 DIAGNOSIS — Z95.5 HISTORY OF CORONARY ARTERY STENT PLACEMENT: ICD-10-CM

## 2022-11-15 PROCEDURE — 99204 OFFICE O/P NEW MOD 45 MIN: CPT | Mod: 25,S$GLB,, | Performed by: INTERNAL MEDICINE

## 2022-11-15 PROCEDURE — 1159F MED LIST DOCD IN RCRD: CPT | Mod: CPTII,S$GLB,, | Performed by: INTERNAL MEDICINE

## 2022-11-15 PROCEDURE — 1159F PR MEDICATION LIST DOCUMENTED IN MEDICAL RECORD: ICD-10-PCS | Mod: CPTII,S$GLB,, | Performed by: INTERNAL MEDICINE

## 2022-11-15 PROCEDURE — 99999 PR PBB SHADOW E&M-EST. PATIENT-LVL IV: ICD-10-PCS | Mod: PBBFAC,,, | Performed by: INTERNAL MEDICINE

## 2022-11-15 PROCEDURE — 1126F PR PAIN SEVERITY QUANTIFIED, NO PAIN PRESENT: ICD-10-PCS | Mod: CPTII,S$GLB,, | Performed by: INTERNAL MEDICINE

## 2022-11-15 PROCEDURE — 99499 RISK ADDL DX/OHS AUDIT: ICD-10-PCS | Mod: HCNC,S$GLB,, | Performed by: INTERNAL MEDICINE

## 2022-11-15 PROCEDURE — 1101F PT FALLS ASSESS-DOCD LE1/YR: CPT | Mod: CPTII,S$GLB,, | Performed by: INTERNAL MEDICINE

## 2022-11-15 PROCEDURE — 99204 PR OFFICE/OUTPT VISIT, NEW, LEVL IV, 45-59 MIN: ICD-10-PCS | Mod: 25,S$GLB,, | Performed by: INTERNAL MEDICINE

## 2022-11-15 PROCEDURE — 3288F FALL RISK ASSESSMENT DOCD: CPT | Mod: CPTII,S$GLB,, | Performed by: INTERNAL MEDICINE

## 2022-11-15 PROCEDURE — 1160F RVW MEDS BY RX/DR IN RCRD: CPT | Mod: CPTII,S$GLB,, | Performed by: INTERNAL MEDICINE

## 2022-11-15 PROCEDURE — 1101F PR PT FALLS ASSESS DOC 0-1 FALLS W/OUT INJ PAST YR: ICD-10-PCS | Mod: CPTII,S$GLB,, | Performed by: INTERNAL MEDICINE

## 2022-11-15 PROCEDURE — 3074F PR MOST RECENT SYSTOLIC BLOOD PRESSURE < 130 MM HG: ICD-10-PCS | Mod: CPTII,S$GLB,, | Performed by: INTERNAL MEDICINE

## 2022-11-15 PROCEDURE — 93000 EKG 12-LEAD: ICD-10-PCS | Mod: S$GLB,,, | Performed by: INTERNAL MEDICINE

## 2022-11-15 PROCEDURE — 99999 PR PBB SHADOW E&M-EST. PATIENT-LVL IV: CPT | Mod: PBBFAC,,, | Performed by: INTERNAL MEDICINE

## 2022-11-15 PROCEDURE — 3078F DIAST BP <80 MM HG: CPT | Mod: CPTII,S$GLB,, | Performed by: INTERNAL MEDICINE

## 2022-11-15 PROCEDURE — 1126F AMNT PAIN NOTED NONE PRSNT: CPT | Mod: CPTII,S$GLB,, | Performed by: INTERNAL MEDICINE

## 2022-11-15 PROCEDURE — 1160F PR REVIEW ALL MEDS BY PRESCRIBER/CLIN PHARMACIST DOCUMENTED: ICD-10-PCS | Mod: CPTII,S$GLB,, | Performed by: INTERNAL MEDICINE

## 2022-11-15 PROCEDURE — 93000 ELECTROCARDIOGRAM COMPLETE: CPT | Mod: S$GLB,,, | Performed by: INTERNAL MEDICINE

## 2022-11-15 PROCEDURE — 3074F SYST BP LT 130 MM HG: CPT | Mod: CPTII,S$GLB,, | Performed by: INTERNAL MEDICINE

## 2022-11-15 PROCEDURE — 3288F PR FALLS RISK ASSESSMENT DOCUMENTED: ICD-10-PCS | Mod: CPTII,S$GLB,, | Performed by: INTERNAL MEDICINE

## 2022-11-15 PROCEDURE — 99499 UNLISTED E&M SERVICE: CPT | Mod: HCNC,S$GLB,, | Performed by: INTERNAL MEDICINE

## 2022-11-15 PROCEDURE — 3078F PR MOST RECENT DIASTOLIC BLOOD PRESSURE < 80 MM HG: ICD-10-PCS | Mod: CPTII,S$GLB,, | Performed by: INTERNAL MEDICINE

## 2022-11-15 RX ORDER — FUROSEMIDE 40 MG/1
40 TABLET ORAL DAILY PRN
Qty: 30 TABLET | Refills: 11 | Status: SHIPPED | OUTPATIENT
Start: 2022-11-15

## 2022-11-15 NOTE — PROGRESS NOTES
Subjective:   Patient ID:  Randy Camejo is a 82 y.o. male who presents for follow up of Atrial Fibrillation, Congestive Heart Failure, Peripheral Arterial Disease, hx of MI, Hyperlipidemia, and Hypertension    Patient here for follow-up.  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  He is not had an echocardiogram for 2 years.  He is not followed up regularly.  He is not having chest pains or tightness, PND or orthopnea.  Has not had lower extremity edema valve renal insufficiency, furosemide was changed to as needed.    Previously underwent coronary stenting and had a heart attack then.  He is not had recurrent chest pains or tightness.      Paroxysmal atrial fibrillation is present, currently treated with amiodarone, maintained sinus rhythm.  Eliquis, patient now greater than age 81 with creatinine of 1.9, should be on 2.5 mg twice a day.    Hyperlipidemia is treated with moderate dose statin therapy, last LDL well controlled at 45            Prior cardiology  HPI: 8 month f/u.  He's dealing with a case of the shingles - appears to be L1/L2 height roughly. He's being treated appropriately.    His HR is running in the low to mid 50s most of the time and occasionally is as low as 47.    He is otherwise doing well otherwise with no new symptoms or cardiovascular complaints and no change in exercise capacity.  He denies chest discomfort, QUEZADA, palpitations, PND/orthopnea, lightheadedness and syncope.    Weight is down to 174# today.    No bleeding, hematochezia, or melena.  No TIA/stroke symtoms.    He is fully vaccinated against COVID (second Pfizer dose administered on 2/5/2021).      9/30/2020 HPI: Back for f/u after nearly 3 months.  Weight has been down quite a bit since early July with improved diuresis and he's feeling better.  He forgets to take his second 40mg of lasix a lot of the time.     He now is doing well with no new symptoms or  cardiovascular complaints and he's had improvement in exercise capacity.  His dyspnea with exertion is minimal and only with significant exertion. He denies chest discomfort, palpitations, PND/orthopnea, lightheadedness and syncope.        7/8/2020 HPI: Very pleasant man who previously saw Dr. Joseph for chronic systolic heart failure, AF, and CAD s/p stenting presents to establish care with [facility].  He had an echo about 5 weeks and it showed a CVP of 15, bilateral pleural effusions, a small pericardial effusion, and an EF of 20% with a PASP of 69mm Hg.     He was as high as 198 with his weight in early June but is now down to 191 on an increased dose of lasix (20 --> 20 bid).     He denies chest discomfort, palpitations, PND/orthopnea, lightheadedness and syncope.  He does get out of breath with climbing stairs or with other exertion that is more than minimal.     No bleeding, hematochezia, or melena.  No TIA/stroke symtoms.     No F/C/cough/diarrhea/anosmia.        Dr. Joseph's May 2019 HPI: Randy Camejo is a 78 y.o. male with hypertension and diabetes mellitus type 2. He suffered a myocardial infarction in 2000 when he presented to [facility] and had a stent placed. He was treated for DM2 for a few years with metformin but then stopped it. He did not had any regular medical follow up for several years. He began feeling weak and have diarrhea on 2/15/2019. He diarrhea continued and he became weaker over the next several weak. He had nausea and vomited. On 2/19/2019 he went to  and was referred to the ER. He was noted to be in atrial fibrillation and admitted. 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 He was given amiodarone, digoxin and metoprolol. He was anticoagulation with heparin iv and later apixiban. On 2/25/2019 he underwent a DILLAN guided CV. There was severe left ventricular dysfunction with an EF of 15%.  The TERESO had spontaneous echo contrast ut no thrombus. He was cardioverted with a 100 J shock to sinus rhythm. On 2/26/2019 he became hyperkalemic with a K of 6.0 after given enalapril. On 4/5/2019 he had a follow up Echo that revealed a moderately dilated left ventricle with moderate systolic dysfunction. The EF was in the 35-40% range. There was an anteroseptal as well as an inferior WMA. There was moderate diastolic dysfunction, a moderately dilated LA, mild aortic valve sclerosis with apeak velocity of 1.1 m/s, mild AR and moderate MR. He has slowly gained strength. No exertional chest pain or exertional dyspnea. No palpitations or weak spells. He however has some back pain and is sometimes a bit unsteady on his feet. Feeling well overall.     Patient Active Problem List   Diagnosis    Nuclear sclerosis, bilateral    Nuclear sclerotic cataract of left eye    Paroxysmal atrial fibrillation    Type 2 diabetes mellitus with neurologic complication, with long-term current use of insulin    Chronic systolic heart failure    Pre-ulcerative corn or callous    Peripheral vascular disease    Tachycardia induced cardiomyopathy    Coronary artery disease involving native coronary artery of native heart without angina pectoris    Chronic anticoagulation    History of myocardial infarction    History of coronary artery stent placement    Essential hypertension    Mixed hyperlipidemia    High risk medication use    Subclinical hypothyroidism    Stage 3a chronic kidney disease    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    alcohol swabs PadM use 2 (two) times a day. E11.49    amiodarone (PACERONE) 100 MG Tab Take 1 tablet (100 mg total) by mouth once daily.    apixaban (ELIQUIS) 5 mg Tab Take 1  "tablet (5 mg total) by mouth 2 (two) times daily.    atorvastatin (LIPITOR) 10 MG tablet Take 1 tablet (10 mg total) by mouth once daily.    blood glucose control, low Soln Use 1 each to check glucose level of glucometer weekly    blood sugar diagnostic (TRUE METRIX GLUCOSE TEST STRIP) Strp use 2 (two) times a day. To check blood sugar    blood sugar diagnostic (TRUE METRIX GLUCOSE TEST STRIP) Strp use 2 (two) times a day. To check blood sugar    carvediloL (COREG) 3.125 MG tablet Take 1 tablet (3.125 mg total) by mouth 2 (two) times daily.    dimethicone 6 % Crea Apply 1 application topically once daily. For legs and feet.    famotidine (PEPCID) 20 MG tablet Take 1 tablet (20 mg total) by mouth once daily.    insulin detemir U-100 (LEVEMIR FLEXTOUCH U-100 INSULN) 100 unit/mL (3 mL) InPn pen Inject 8 Units into the skin every evening.    lancets (TRUEPLUS LANCETS) 33 gauge Misc use twice a day as directed    levothyroxine (SYNTHROID) 112 MCG tablet Take 1 tablet (112 mcg total) by mouth once daily.    pen needle, diabetic 31 gauge x 5/16" Ndle 1 Device by Misc.(Non-Drug; Combo Route) route once daily. Use with insulin. ICD10: E11.49    sacubitriL-valsartan (ENTRESTO) 49-51 mg per tablet Take 1 tablet by mouth 2 (two) times daily.    triamcinolone acetonide 0.1% (KENALOG) 0.1 % ointment aaa bid avoid face/ groin    TRUE METRIX AIR GLUCOSE METER kit     furosemide (LASIX) 40 MG tablet Take 1 tablet (40 mg total) by mouth daily as needed (swelling).    PREVNAR 13, PF, 0.5 mL Syrg      No current facility-administered medications for this visit.       Review of Systems   Constitutional: Negative.   HENT: Negative.     Eyes: Negative.    Cardiovascular:  Positive for leg swelling. Negative for chest pain, dyspnea on exertion, near-syncope, orthopnea and palpitations.   Respiratory: Negative.  Negative for cough and shortness of breath.    Endocrine: Negative.    Hematologic/Lymphatic: Negative.    Skin:  Positive for " rash.   Musculoskeletal: Negative.    Gastrointestinal: Negative.    Genitourinary: Negative.    Neurological: Negative.    Psychiatric/Behavioral: Negative.     Objective:   Physical Exam  Vitals reviewed.   Constitutional:       Appearance: He is well-developed.   HENT:      Head: Normocephalic and atraumatic.   Eyes:      General: No scleral icterus.     Conjunctiva/sclera: Conjunctivae normal.   Neck:      Vascular: No JVD.   Cardiovascular:      Rate and Rhythm: Normal rate and regular rhythm.      Pulses: Intact distal pulses.      Heart sounds: Normal heart sounds. No murmur heard.    No friction rub. No gallop.   Pulmonary:      Effort: Pulmonary effort is normal.      Breath sounds: Normal breath sounds. No wheezing or rales.   Abdominal:      General: Bowel sounds are normal. There is no distension.      Palpations: Abdomen is soft.      Tenderness: There is no abdominal tenderness.   Musculoskeletal:         General: Normal range of motion.      Cervical back: Normal range of motion and neck supple.      Comments: 1-2+ bilateral pretibial edema   Skin:     General: Skin is warm and dry.      Findings: No erythema or rash.   Neurological:      Mental Status: He is alert and oriented to person, place, and time.   Psychiatric:         Behavior: Behavior normal.         Thought Content: Thought content normal.         Judgment: Judgment normal.       Lab Results   Component Value Date    WBC 6.86 11/03/2022    HGB 11.8 (L) 11/03/2022    HCT 36.8 (L) 11/03/2022     (H) 11/03/2022     (L) 11/03/2022         Chemistry        Component Value Date/Time     11/09/2022 1259    K 4.7 11/09/2022 1259     11/09/2022 1259    CO2 22 (L) 11/09/2022 1259    BUN 43 (H) 11/09/2022 1259    CREATININE 1.9 (H) 11/09/2022 1259    GLU 91 11/09/2022 1259        Component Value Date/Time    CALCIUM 8.7 11/09/2022 1259    ALKPHOS 80 11/03/2022 1020    AST 27 11/03/2022 1020    ALT 37 11/03/2022 1020     BILITOT 0.5 11/03/2022 1020    ESTGFRAFRICA >60.0 09/29/2021 0945    EGFRNONAA 56.8 (A) 09/29/2021 0945            Lab Results   Component Value Date    CHOL 96 (L) 11/03/2022    CHOL 97 (L) 09/29/2021    CHOL 116 (L) 07/30/2021     Lab Results   Component Value Date    HDL 31 (L) 11/03/2022    HDL 37 (L) 09/29/2021    HDL 38 (L) 07/30/2021     Lab Results   Component Value Date    LDLCALC 45.0 (L) 11/03/2022    LDLCALC 48.8 (L) 09/29/2021    LDLCALC 60.4 (L) 07/30/2021     Lab Results   Component Value Date    TRIG 100 11/03/2022    TRIG 56 09/29/2021    TRIG 88 07/30/2021     Lab Results   Component Value Date    CHOLHDL 32.3 11/03/2022    CHOLHDL 38.1 09/29/2021    CHOLHDL 32.8 07/30/2021       Lab Results   Component Value Date    TSH 0.530 11/03/2022       Lab Results   Component Value Date    HGBA1C 6.0 (H) 11/03/2022   EKG shows sinus bradycardia with first-degree AV block, low anterior forces, old inferior    Assessment:     Chronic systolic heart failure  Comments:  Appears euvolemic  Orders:  -     Echo; Future; Expected date: 11/22/2022    Coronary artery disease involving native coronary artery of native heart without angina pectoris  Comments:  No angina    Essential hypertension  Comments:  Well controlled    History of coronary artery stent placement  Comments:  Eliquis, dose needs adjustment, no aspirin    History of myocardial infarction    Mixed hyperlipidemia  Comments:  LDL well controlled on current statin dose    Paroxysmal atrial fibrillation  Comments:  Sinus rhythm on amiodarone, no change  Orders:  -     EKG 12-lead    Type 2 diabetes mellitus with diabetic neuropathy, with long-term current use of insulin    Chronic renal failure, stage 3b  Comments:  Lasix p.r.n. ordered    Heart failure, systolic, chronic  -     furosemide (LASIX) 40 MG tablet; Take 1 tablet (40 mg total) by mouth daily as needed (swelling).  Dispense: 30 tablet; Refill: 11    High risk medication  use  Comments:  Follow-up for amiodarone and Eliquis, both high-risk medications, TSH normal.  The changes       Plan:

## 2022-11-22 ENCOUNTER — HOSPITAL ENCOUNTER (OUTPATIENT)
Dept: CARDIOLOGY | Facility: HOSPITAL | Age: 82
Discharge: HOME | End: 2022-11-22
Attending: INTERNAL MEDICINE
Payer: MEDICARE

## 2022-11-22 VITALS
WEIGHT: 156 LBS | SYSTOLIC BLOOD PRESSURE: 120 MMHG | BODY MASS INDEX: 21.84 KG/M2 | HEIGHT: 71 IN | HEART RATE: 53 BPM | DIASTOLIC BLOOD PRESSURE: 60 MMHG

## 2022-11-22 DIAGNOSIS — I50.22 CHRONIC SYSTOLIC HEART FAILURE: ICD-10-CM

## 2022-11-22 LAB
ASCENDING AORTA: 3.4 CM
AV INDEX (PROSTH): 0.7
AV MEAN GRADIENT: 2 MMHG
AV PEAK GRADIENT: 3 MMHG
AV VALVE AREA: 2.66 CM2
AV VELOCITY RATIO: 0.68
BSA FOR ECHO PROCEDURE: 1.88 M2
CV ECHO LV RWT: 0.26 CM
DOP CALC AO PEAK VEL: 0.91 M/S
DOP CALC AO VTI: 24.35 CM
DOP CALC LVOT AREA: 3.8 CM2
DOP CALC LVOT DIAMETER: 2.2 CM
DOP CALC LVOT PEAK VEL: 0.62 M/S
DOP CALC LVOT STROKE VOLUME: 64.74 CM3
DOP CALCLVOT PEAK VEL VTI: 17.04 CM
E WAVE DECELERATION TIME: 189.06 MSEC
E/A RATIO: 1.71
E/E' RATIO: 16 M/S
ECHO LV POSTERIOR WALL: 0.71 CM (ref 0.6–1.1)
EJECTION FRACTION: 35 %
FRACTIONAL SHORTENING: 12 % (ref 28–44)
INTERVENTRICULAR SEPTUM: 0.91 CM (ref 0.6–1.1)
IVRT: 94.2 MSEC
LA MAJOR: 5.87 CM
LA MINOR: 5.84 CM
LA WIDTH: 4.8 CM
LEFT ATRIUM SIZE: 3.85 CM
LEFT ATRIUM VOLUME INDEX MOD: 44.3 ML/M2
LEFT ATRIUM VOLUME INDEX: 48.4 ML/M2
LEFT ATRIUM VOLUME MOD: 84.11 CM3
LEFT ATRIUM VOLUME: 91.97 CM3
LEFT INTERNAL DIMENSION IN SYSTOLE: 4.84 CM (ref 2.1–4)
LEFT VENTRICLE DIASTOLIC VOLUME INDEX: 76.98 ML/M2
LEFT VENTRICLE DIASTOLIC VOLUME: 146.26 ML
LEFT VENTRICLE MASS INDEX: 85 G/M2
LEFT VENTRICLE SYSTOLIC VOLUME INDEX: 57.6 ML/M2
LEFT VENTRICLE SYSTOLIC VOLUME: 109.37 ML
LEFT VENTRICULAR INTERNAL DIMENSION IN DIASTOLE: 5.48 CM (ref 3.5–6)
LEFT VENTRICULAR MASS: 161.46 G
LV LATERAL E/E' RATIO: 12 M/S
LV SEPTAL E/E' RATIO: 24 M/S
MV PEAK A VEL: 0.56 M/S
MV PEAK E VEL: 0.96 M/S
MV STENOSIS PRESSURE HALF TIME: 54.83 MS
MV VALVE AREA P 1/2 METHOD: 4.01 CM2
PISA TR MAX VEL: 3.68 M/S
PULM VEIN S/D RATIO: 0.38
PV PEAK D VEL: 0.82 M/S
PV PEAK S VEL: 0.31 M/S
RA MAJOR: 5.4 CM
RA PRESSURE: 3 MMHG
RA WIDTH: 3.26 CM
RIGHT VENTRICULAR END-DIASTOLIC DIMENSION: 2.77 CM
SINUS: 4.13 CM
STJ: 3.19 CM
TDI LATERAL: 0.08 M/S
TDI SEPTAL: 0.04 M/S
TDI: 0.06 M/S
TR MAX PG: 54 MMHG
TRICUSPID ANNULAR PLANE SYSTOLIC EXCURSION: 2.36 CM
TV REST PULMONARY ARTERY PRESSURE: 57 MMHG

## 2022-11-22 PROCEDURE — 93306 TTE W/DOPPLER COMPLETE: CPT | Mod: 26,,, | Performed by: INTERNAL MEDICINE

## 2022-11-22 PROCEDURE — 93306 TTE W/DOPPLER COMPLETE: CPT

## 2022-11-22 PROCEDURE — 93306 ECHO (CUPID ONLY): ICD-10-PCS | Mod: 26,,, | Performed by: INTERNAL MEDICINE

## 2022-12-16 ENCOUNTER — LAB VISIT (OUTPATIENT)
Dept: LAB | Facility: HOSPITAL | Age: 82
End: 2022-12-16
Attending: INTERNAL MEDICINE
Payer: MEDICARE

## 2022-12-16 DIAGNOSIS — N18.32 CHRONIC RENAL FAILURE, STAGE 3B: ICD-10-CM

## 2022-12-16 LAB
ANION GAP SERPL CALC-SCNC: 7 MMOL/L (ref 8–16)
BUN SERPL-MCNC: 21 MG/DL (ref 8–23)
CALCIUM SERPL-MCNC: 8.5 MG/DL (ref 8.7–10.5)
CHLORIDE SERPL-SCNC: 110 MMOL/L (ref 95–110)
CO2 SERPL-SCNC: 23 MMOL/L (ref 23–29)
CREAT SERPL-MCNC: 1.4 MG/DL (ref 0.5–1.4)
EST. GFR  (NO RACE VARIABLE): 50.5 ML/MIN/1.73 M^2
GLUCOSE SERPL-MCNC: 135 MG/DL (ref 70–110)
POTASSIUM SERPL-SCNC: 5.2 MMOL/L (ref 3.5–5.1)
SODIUM SERPL-SCNC: 140 MMOL/L (ref 136–145)

## 2022-12-16 PROCEDURE — 36415 COLL VENOUS BLD VENIPUNCTURE: CPT | Mod: PN | Performed by: INTERNAL MEDICINE

## 2022-12-16 PROCEDURE — 80048 BASIC METABOLIC PNL TOTAL CA: CPT | Performed by: INTERNAL MEDICINE

## 2022-12-28 NOTE — TELEPHONE ENCOUNTER
No new care gaps identified.  [facility] Embedded Care Gaps. Reference number: [id number]. 12/28/2022   11:16:14 AM CST

## 2023-02-03 ENCOUNTER — PES CALL (OUTPATIENT)
Dept: ADMINISTRATIVE | Facility: CLINIC | Age: 83
End: 2023-02-03
Payer: MEDICARE

## 2023-02-09 DIAGNOSIS — Z00.00 ENCOUNTER FOR MEDICARE ANNUAL WELLNESS EXAM: ICD-10-CM

## 2023-02-10 ENCOUNTER — TELEPHONE (OUTPATIENT)
Dept: INTERNAL MEDICINE | Facility: CLINIC | Age: 83
End: 2023-02-10
Payer: MEDICARE

## 2023-02-10 RX ORDER — LEVOTHYROXINE SODIUM 112 UG/1
TABLET ORAL
Qty: 90 TABLET | Refills: 2 | Status: SHIPPED | OUTPATIENT
Start: 2023-02-10 | End: 2023-12-04 | Stop reason: SDUPTHER

## 2023-02-10 NOTE — TELEPHONE ENCOUNTER
Care Due:                  Date            Visit Type   Department     Provider  --------------------------------------------------------------------------------                                EP -                              PRIMARY      MET INTERNAL  Last Visit: 11-      CARE ([facility])   [facility]       Susie  Clifton                              EP -                              PRIMARY      [facility] INTERNAL  Next Visit: 05-      [facility]   [facility]                                                            Last  Test          Frequency    Reason                     Performed    Due Date  --------------------------------------------------------------------------------    HBA1C.......  6 months...  insulin..................  11- 05-    [employer] Embedded Care Gaps. Reference number: [id number]. 2/10/2023   5:41:47 AM CST

## 2023-02-10 NOTE — TELEPHONE ENCOUNTER
----- Message from Alissa Jarvis sent at 2/10/2023  2:16 PM CST -----  Contact: [phone]  Pt called to advise that he is having a hard time locating his insulin pen. Pt says his pharmacy says the medication is on back order. Pt would like to know if there is something he should do. Please Advise

## 2023-02-10 NOTE — TELEPHONE ENCOUNTER
Refill Decision Note   Randy Camejo  is requesting a refill authorization.  Brief Assessment and Rationale for Refill:  Approve     Medication Therapy Plan:       Medication Reconciliation Completed: No   Comments:     Provider Staff:     Action is required for this patient.   Please see care gap opportunities below in Care Due Message.     Thanks!  [facility]     Appointments      Date Provider   Last Visit   11/9/2022 Susie Lazo MD   Next Visit   5/9/2023 Susie Lazo MD     Note composed:11:46 AM 02/10/2023           Note composed:11:46 AM 02/10/2023

## 2023-03-15 RX ORDER — LANCETS 33 GAUGE
EACH MISCELLANEOUS
Qty: 200 EACH | Refills: 5 | Status: SHIPPED | OUTPATIENT
Start: 2023-03-15

## 2023-03-15 NOTE — TELEPHONE ENCOUNTER
No new care gaps identified.  [facility] Embedded Care Gaps. Reference number: [id number]. 3/15/2023   12:30:14 PM CDT

## 2023-04-12 ENCOUNTER — OFFICE VISIT (OUTPATIENT)
Dept: PODIATRY | Facility: CLINIC | Age: 83
End: 2023-04-12
Payer: MEDICARE

## 2023-04-12 VITALS
WEIGHT: 156 LBS | BODY MASS INDEX: 21.84 KG/M2 | HEIGHT: 71 IN | SYSTOLIC BLOOD PRESSURE: 97 MMHG | HEART RATE: 78 BPM | DIASTOLIC BLOOD PRESSURE: 61 MMHG

## 2023-04-12 DIAGNOSIS — Z79.01 CHRONIC ANTICOAGULATION: Primary | ICD-10-CM

## 2023-04-12 DIAGNOSIS — B35.1 DERMATOPHYTOSIS OF NAIL: ICD-10-CM

## 2023-04-12 DIAGNOSIS — L84 CORN OR CALLUS: ICD-10-CM

## 2023-04-12 DIAGNOSIS — Z89.421 HISTORY OF PARTIAL RAY AMPUTATION OF FIFTH TOE OF RIGHT FOOT: ICD-10-CM

## 2023-04-12 PROCEDURE — 99999 PR PBB SHADOW E&M-EST. PATIENT-LVL V: ICD-10-PCS | Mod: PBBFAC,HCNC,, | Performed by: PODIATRIST

## 2023-04-12 PROCEDURE — 1160F PR REVIEW ALL MEDS BY PRESCRIBER/CLIN PHARMACIST DOCUMENTED: ICD-10-PCS | Mod: HCNC,CPTII,S$GLB, | Performed by: PODIATRIST

## 2023-04-12 PROCEDURE — 99499 UNLISTED E&M SERVICE: CPT | Mod: HCNC,S$GLB,, | Performed by: PODIATRIST

## 2023-04-12 PROCEDURE — 11721 ROUTINE FOOT CARE: ICD-10-PCS | Mod: 59,Q7,HCNC,S$GLB | Performed by: PODIATRIST

## 2023-04-12 PROCEDURE — 3078F DIAST BP <80 MM HG: CPT | Mod: HCNC,CPTII,S$GLB, | Performed by: PODIATRIST

## 2023-04-12 PROCEDURE — 99999 PR PBB SHADOW E&M-EST. PATIENT-LVL V: CPT | Mod: PBBFAC,HCNC,, | Performed by: PODIATRIST

## 2023-04-12 PROCEDURE — 11056 PARNG/CUTG B9 HYPRKR LES 2-4: CPT | Mod: Q7,HCNC,S$GLB, | Performed by: PODIATRIST

## 2023-04-12 PROCEDURE — 3074F SYST BP LT 130 MM HG: CPT | Mod: HCNC,CPTII,S$GLB, | Performed by: PODIATRIST

## 2023-04-12 PROCEDURE — 1126F AMNT PAIN NOTED NONE PRSNT: CPT | Mod: HCNC,CPTII,S$GLB, | Performed by: PODIATRIST

## 2023-04-12 PROCEDURE — 3074F PR MOST RECENT SYSTOLIC BLOOD PRESSURE < 130 MM HG: ICD-10-PCS | Mod: HCNC,CPTII,S$GLB, | Performed by: PODIATRIST

## 2023-04-12 PROCEDURE — 1101F PT FALLS ASSESS-DOCD LE1/YR: CPT | Mod: HCNC,CPTII,S$GLB, | Performed by: PODIATRIST

## 2023-04-12 PROCEDURE — 1101F PR PT FALLS ASSESS DOC 0-1 FALLS W/OUT INJ PAST YR: ICD-10-PCS | Mod: HCNC,CPTII,S$GLB, | Performed by: PODIATRIST

## 2023-04-12 PROCEDURE — 99499 NO LOS: ICD-10-PCS | Mod: HCNC,S$GLB,, | Performed by: PODIATRIST

## 2023-04-12 PROCEDURE — 11721 DEBRIDE NAIL 6 OR MORE: CPT | Mod: 59,Q7,HCNC,S$GLB | Performed by: PODIATRIST

## 2023-04-12 PROCEDURE — 1159F PR MEDICATION LIST DOCUMENTED IN MEDICAL RECORD: ICD-10-PCS | Mod: HCNC,CPTII,S$GLB, | Performed by: PODIATRIST

## 2023-04-12 PROCEDURE — 1160F RVW MEDS BY RX/DR IN RCRD: CPT | Mod: HCNC,CPTII,S$GLB, | Performed by: PODIATRIST

## 2023-04-12 PROCEDURE — 11056 ROUTINE FOOT CARE: ICD-10-PCS | Mod: Q7,HCNC,S$GLB, | Performed by: PODIATRIST

## 2023-04-12 PROCEDURE — 1126F PR PAIN SEVERITY QUANTIFIED, NO PAIN PRESENT: ICD-10-PCS | Mod: HCNC,CPTII,S$GLB, | Performed by: PODIATRIST

## 2023-04-12 PROCEDURE — 1159F MED LIST DOCD IN RCRD: CPT | Mod: HCNC,CPTII,S$GLB, | Performed by: PODIATRIST

## 2023-04-12 PROCEDURE — 3078F PR MOST RECENT DIASTOLIC BLOOD PRESSURE < 80 MM HG: ICD-10-PCS | Mod: HCNC,CPTII,S$GLB, | Performed by: PODIATRIST

## 2023-04-12 PROCEDURE — 3288F PR FALLS RISK ASSESSMENT DOCUMENTED: ICD-10-PCS | Mod: HCNC,CPTII,S$GLB, | Performed by: PODIATRIST

## 2023-04-12 PROCEDURE — 3288F FALL RISK ASSESSMENT DOCD: CPT | Mod: HCNC,CPTII,S$GLB, | Performed by: PODIATRIST

## 2023-04-12 NOTE — PROGRESS NOTES
Chief Concern:  Diabetic Foot Exam (Foot Exam/PCP Susie Lazo MD  05/09/23)      HPI:  Randy Camejo is a 82 y.o. male presents for foot exam.  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          The patient is under the active care of his PCP Dr. Susie Lazo MD for chronic conditions, including diabetes.    He last saw his PCP Diabetic Foot Exam (Foot Exam/PCP Susie Lazo MD  05/09/23)   He is also on Eliquis for atrial fibrillation           Patient Active Problem List   Diagnosis    Nuclear sclerosis, bilateral    Nuclear sclerotic cataract of left eye    Paroxysmal atrial fibrillation    Type 2 diabetes mellitus with neurologic complication, with long-term current use of insulin    Chronic systolic heart failure    Pre-ulcerative corn or callous    Peripheral vascular disease    Tachycardia induced cardiomyopathy    Coronary artery disease involving native coronary artery of native heart without angina pectoris    Chronic anticoagulation    History of myocardial infarction    History of coronary artery stent placement    Essential hypertension    Mixed hyperlipidemia    High risk medication use    Subclinical hypothyroidism    Stage 3a chronic kidney disease    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  0    alcohol swabs PadM use 2 (two) times a day. E11.49 200 each 5    amiodarone (PACERONE) 100 MG Tab Take 1 tablet (100 mg total) by mouth once daily. 30 tablet 11    apixaban (ELIQUIS) 2.5 mg Tab Take 1 tablet (2.5 mg total) by mouth 2 (two) times daily. 180 tablet 3    atorvastatin (LIPITOR) 10 MG tablet Take 1 tablet (10 mg total) by mouth once daily. 90 tablet 0    blood glucose control, low Soln Use 1  "each to check glucose level of glucometer weekly 4 each 11    blood sugar diagnostic (TRUE METRIX GLUCOSE TEST STRIP) Strp use 2 (two) times a day. To check blood sugar 200 strip 5    blood sugar diagnostic (TRUE METRIX GLUCOSE TEST STRIP) Strp use 2 (two) times a day. To check blood sugar 200 strip 5    blood sugar diagnostic (TRUE METRIX GLUCOSE TEST STRIP) Strp use 2 (two) times a day. To check blood sugar 200 strip 3    carvediloL (COREG) 3.125 MG tablet Take 1 tablet (3.125 mg total) by mouth 2 (two) times daily. 180 tablet 3    dimethicone 6 % Crea Apply 1 application topically once daily. For legs and feet. 57 g 10    famotidine (PEPCID) 20 MG tablet Take 1 tablet (20 mg total) by mouth once daily. 90 tablet 3    furosemide (LASIX) 40 MG tablet Take 1 tablet (40 mg total) by mouth daily as needed (swelling). 30 tablet 11    insulin detemir U-100 (LEVEMIR FLEXTOUCH U-100 INSULN) 100 unit/mL (3 mL) InPn pen Inject 8 into the skin every evening 6 mL 3    lancets (TRUEPLUS LANCETS) 33 gauge Misc use twice a day as directed 200 each 5    lancets (TRUEPLUS LANCETS) 33 gauge Misc use twice a day as directed 200 each 5    levothyroxine (SYNTHROID) 112 MCG tablet TAKE 1 TABLET(112 MCG) BY MOUTH EVERY DAY 90 tablet 2    pen needle, diabetic (BD ULTRA-FINE SHORT PEN NEEDLE) 31 gauge x 5/16" Ndle 1 Device by Misc.(Non-Drug; Combo Route) route once daily. Use with insulin. ICD10: E11.49 100 each 3    PREVNAR 13, PF, 0.5 mL Syrg       sacubitriL-valsartan (ENTRESTO) 49-51 mg per tablet Take 1 tablet by mouth 2 (two) times daily. 60 tablet 11    triamcinolone acetonide 0.1% (KENALOG) 0.1 % ointment aaa bid avoid face/ groin 80 g 4    TRUE METRIX AIR GLUCOSE METER kit        No current facility-administered medications on file prior to visit.           Review of patient's allergies indicates:  No Known Allergies              Objective:      Vitals:    04/12/23 1512   BP: 97/61   Pulse: 78   Weight: 70.8 kg (156 lb)   Height: " "5' 11" (1.803 m)         Physical Exam  Constitutional:       General: He is not in acute distress.     Appearance: He is well-developed. He is not diaphoretic.   Cardiovascular:      Pulses:           Dorsalis pedis pulses are 1+ on the right side and 1+ on the left side.        Posterior tibial pulses are 1+ on the right side and 1+ on the left side.      Comments: Toes warm, pink, cap fill <5 seconds.  Musculoskeletal:      Comments: Partial 5th ray resection right foot without symptoms.   Lymphadenopathy:      Comments: Negative lymphadenopathy bilateral popliteal fossa and tarsal tunnel.     Skin:     General: Skin is warm and dry.      Coloration: Skin is not pale.      Findings: No abrasion, bruising, burn, ecchymosis, erythema, laceration, lesion or rash.      Comments:   Skin thin, shiny, atrophic.     Neurological:      Sensory: Sensory deficit present.      Comments: Diminished/loss of protective sensation all toes bilateral to 10 gram monofilament.                     Assessment:       Encounter Diagnoses   Name Primary?    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  Shoe inspection.     Continue good nutrition and blood sugar control to help prevent podiatric complications of diabetes.   Maintain proper foot hygiene.   Continue wearing proper shoe gear, daily foot inspections, never walking without protective shoe gear, never putting sharp instruments to feet.    Routine Foot Care    Date/Time: 4/12/2023 3:15 PM  Performed by: " Jess Rosa DPM  Authorized by: Jess Rosa DPM     Consent Done?:  Yes (Verbal)  Hyperkeratotic Skin Lesions?: Yes    Number of trimmed lesions:  2  Location(s):  Left 1st Toe and Right 1st Toe    Nail Care Type:  Debride(Left 1st Toe, Left 3rd Toe, Left 2nd Toe, Left 4th Toe, Left 5th Toe, Right 1st Toe, Right 2nd Toe, Right 3rd Toe and Right 4th Toe)  Patient tolerance:  Patient tolerated the procedure well with no immediate complications     With patient's permission, the toenails mentioned above were reduced and debrided using a nail nipper, removing offending nail and debris.   Utilizing a #15 scalpel, I trimmed the corns and calluses at the above mentioned location.      The patient will continue to monitor the areas daily, inspect the feet, wear protective shoe gear when ambulatory, and moisturizer to maintain skin integrity.      Follow up 2-3months or sooner if concerned.

## 2023-04-13 NOTE — TELEPHONE ENCOUNTER
Date of doctor appointment:  01/17   Date of lab appointment:  01/15   Physical or EP: follow up     Please order labs if needed.   Detail Level: Detailed Depth Of Biopsy: dermis Was A Bandage Applied: Yes Size Of Lesion In Cm: 0.4 X Size Of Lesion In Cm: 0 Biopsy Type: H and E Biopsy Method: 15 blade Anesthesia Type: 1% lidocaine with epinephrine Anesthesia Volume In Cc (Will Not Render If 0): 0.5 Hemostasis: Drysol Wound Care: Petrolatum Dressing: bandage Destruction After The Procedure: No Type Of Destruction Used: Curettage Curettage Text: The wound bed was treated with curettage after the biopsy was performed. Cryotherapy Text: The wound bed was treated with cryotherapy after the biopsy was performed. Electrodesiccation Text: The wound bed was treated with electrodesiccation after the biopsy was performed. Electrodesiccation And Curettage Text: The wound bed was treated with electrodesiccation and curettage after the biopsy was performed. Silver Nitrate Text: The wound bed was treated with silver nitrate after the biopsy was performed. Lab: -100 Consent: Written consent was obtained and risks were reviewed including but not limited to scarring, infection, bleeding, scabbing, incomplete removal, nerve damage and allergy to anesthesia. Post-Care Instructions: I reviewed with the patient in detail post-care instructions. Patient is to keep the biopsy site dry overnight, and then apply bacitracin twice daily until healed. Patient may apply hydrogen peroxide soaks to remove any crusting. Notification Instructions: Patient will be notified of biopsy results. However, patient instructed to call the office if not contacted within 2 weeks. Billing Type: Third-Party Bill Information: Selecting Yes will display possible errors in your note based on the variables you have selected. This validation is only offered as a suggestion for you. PLEASE NOTE THAT THE VALIDATION TEXT WILL BE REMOVED WHEN YOU FINALIZE YOUR NOTE. IF YOU WANT TO FAX A PRELIMINARY NOTE YOU WILL NEED TO TOGGLE THIS TO 'NO' IF YOU DO NOT WANT IT IN YOUR FAXED NOTE. Size Of Lesion In Cm: 0.6 Size Of Lesion In Cm: 1

## 2023-04-18 ENCOUNTER — PATIENT MESSAGE (OUTPATIENT)
Dept: PODIATRY | Facility: CLINIC | Age: 83
End: 2023-04-18
Payer: MEDICARE

## 2023-04-18 DIAGNOSIS — Z89.421 HISTORY OF PARTIAL RAY AMPUTATION OF FIFTH TOE OF RIGHT FOOT: Primary | ICD-10-CM

## 2023-04-18 DIAGNOSIS — E11.40 TYPE 2 DIABETES MELLITUS WITH DIABETIC NEUROPATHY, WITH LONG-TERM CURRENT USE OF INSULIN: ICD-10-CM

## 2023-04-18 DIAGNOSIS — Z79.4 TYPE 2 DIABETES MELLITUS WITH DIABETIC NEUROPATHY, WITH LONG-TERM CURRENT USE OF INSULIN: ICD-10-CM

## 2023-05-01 DIAGNOSIS — E78.5 HYPERLIPIDEMIA, UNSPECIFIED HYPERLIPIDEMIA TYPE: ICD-10-CM

## 2023-05-02 DIAGNOSIS — E78.5 HYPERLIPIDEMIA, UNSPECIFIED HYPERLIPIDEMIA TYPE: ICD-10-CM

## 2023-05-02 RX ORDER — ATORVASTATIN CALCIUM 10 MG/1
TABLET, FILM COATED ORAL
Qty: 90 TABLET | Refills: 0 | OUTPATIENT
Start: 2023-05-02

## 2023-05-02 RX ORDER — ATORVASTATIN CALCIUM 10 MG/1
10 TABLET, FILM COATED ORAL DAILY
Qty: 90 TABLET | Refills: 1 | Status: SHIPPED | OUTPATIENT
Start: 2023-05-02 | End: 2023-11-05 | Stop reason: SDUPTHER

## 2023-05-02 NOTE — TELEPHONE ENCOUNTER
Care Due:                  Date            Visit Type   Department     Provider  --------------------------------------------------------------------------------                                EP -                              PRIMARY      MET INTERNAL  Last Visit: 11-      CARE ([facility])   MEDICINE       Susie  Clifton                              EP -                              PRIMARY      [facility] INTERNAL  Next Visit: 05-      CARE ([facility])   [facility]                                                            Last  Test          Frequency    Reason                     Performed    Due Date  --------------------------------------------------------------------------------    HBA1C.......  6 months...  insulin..................  11- 05-    Health [facility] Embedded Care Due Messages. Reference number: [id number].   5/01/2023 8:35:38 PM CDT

## 2023-05-02 NOTE — TELEPHONE ENCOUNTER
No care due was identified.  Health [facility] Embedded Care Due Messages. Reference number: [id number].   5/02/2023 5:14:52 AM CDT

## 2023-05-02 NOTE — TELEPHONE ENCOUNTER
Refill Decision Note   Randy Camejo  is requesting a refill authorization.  Brief Assessment and Rationale for Refill:  Quick Discontinue     Medication Therapy Plan:       Medication Reconciliation Completed: No   Comments:     No Care Gaps recommended.     Note composed:9:34 AM 05/02/2023

## 2023-05-02 NOTE — TELEPHONE ENCOUNTER
OB ED Provider Note    Name: Kerrie Benjamin MRN: [id number]     YOB: 1975  Age: 40 y.o. Sex: female      Subjective: 41 yo G14 P 10 0 3 10 presents to [facility] with complaint of contractions every 15 minutes today, 3/10 on pain scale, now less uncomfortable and spacing out to less frequent. No VB or LOF. No other concerns. Endorses active fetus. Reason for Presentation to [facility] ED:  contractions/possible labor    History of Present Illness: Ms. Berenice Biggs is a 40 y.o.  at 38w3d gestation with Estimated Date of Delivery: 22. Her current obstetrical history is significant for GBS bacturia, AMA and grand multiparity. Prenatal records reviewed. Patient was 3 cm dilated in office on most recent check. She is scheduled for IOL 22.         OB History    Para Term  AB Living   14 10 10   3 1   SAB IAB Ectopic Molar Multiple Live Births   1         1      # Outcome Date GA Lbr Pj/2nd Weight Sex Delivery Anes PTL Lv   14 Current            13 Term 21 39w0d  8 lb 0.4 oz (3.64 kg)  Vag-Spont      12 Term 18 38w3d  7 lb 4.9 oz (3.315 kg)  Vag-Spont      11 Term 11/03/15 39w4d  7 lb 4.8 oz (3.31 kg)  Vag-Spont      10 Term  39w0d    Vag-Spont      9 Term  39w0d    Vag-Spont      8 Term  38w0d    Vag-Spont      7 Term  38w0d    Vag-Spont      6 Term  38w0d    Vag-Spont      5 AB 2004 6w0d          4 Term  38w0d    Vag-Spont      3 AB 2002 6w0d    Vag-Spont      2 SAB  16w0d       FD   1 Term  39w0d    Vag-Spont   [facility]FF[place]     Past Medical History:   Diagnosis Date    H/O varicose veins      Past Surgical History:   Procedure Laterality Date    APPENDECTOMY      BREAST LUMPECTOMY Right     WISDOM TOOTH EXTRACTION       Social History     Occupational History    Not on file   Tobacco Use    Smoking status: Never    Smokeless tobacco: Never   Substance and Sexual Activity    Alcohol use: Never    Drug use: Never    Sexual activity: Never        No Known Refill Decision Note   Randy Camejo  is requesting a refill authorization.  Brief Assessment and Rationale for Refill:  Approve     Medication Therapy Plan:         Comments:     Provider Staff:     Action is required for this patient.   Please see care gap opportunities below in Care Due Message.     Thanks!  [facility]     Appointments      Date Provider   Last Visit   11/9/2022 Susie Lazo MD   Next Visit   5/2/2023 Susie Lazo MD     Note composed:9:34 AM 05/02/2023           Note composed:9:34 AM 05/02/2023             Allergies  Prior to Admission medications    Medication Sig Start Date End Date Taking? Authorizing Provider   Prenatal Vit-Fe Fumarate-FA (PRENATAL VITAMIN PO) Take by mouth    Historical Provider, MD          Objective:     Physical Exam:  VITALS:  /60   Pulse 74   Temp 98.1 °F (36.7 °C) (Oral)   Resp 16   Ht 5' 6\" (1.676 m)   Wt 140 lb (63.5 kg)   LMP 2021   SpO2 98%   BMI 22.60 kg/m²   TEMPERATURE:  Current - Temp: 98.1 °F (36.7 °C); Max - Temp  Av.1 °F (36.7 °C)  Min: 98.1 °F (36.7 °C)  Max: 98.1 °F (36.7 °C)    Constitutional: The patient appears well, alert, oriented x 3. GI: Abdomen soft, nontender, no guarding  No fundal tenderness  Musculoskeletal: no cva tenderness  Upper ext: no edema, reflexes +2  Lower ext: no edema, neg janice's, reflexes +2  Skin: no rashes or lesions  Psychiatric:Mood/ Affect: appropriate  Genitourinary: SVE: 3/40/-3  FHTs: Baseline: 135 bpm moderate variability, accels present, decels absent, category 1, reactive  Uterine activity/Contractions: None seen on monitor  Membranes: intact    Lab/Data Review & Summary:  none    Assessment:  38w4d gestation  Not in labor  Obstetrical history significant for AMA, grand multip  Reassuring fetal status  GBS positive    Plan:  DC to home  Labor precautions reviewed in detail  Cont kick counts daily and fu in am for ob recheck as scheduled.

## 2023-05-09 ENCOUNTER — OFFICE VISIT (OUTPATIENT)
Dept: INTERNAL MEDICINE | Facility: CLINIC | Age: 83
End: 2023-05-09
Payer: MEDICARE

## 2023-05-09 ENCOUNTER — TELEPHONE (OUTPATIENT)
Dept: INTERNAL MEDICINE | Facility: CLINIC | Age: 83
End: 2023-05-09
Payer: MEDICARE

## 2023-05-09 VITALS
TEMPERATURE: 97 F | HEART RATE: 97 BPM | HEIGHT: 71 IN | WEIGHT: 167.75 LBS | RESPIRATION RATE: 20 BRPM | BODY MASS INDEX: 23.48 KG/M2 | SYSTOLIC BLOOD PRESSURE: 110 MMHG | OXYGEN SATURATION: 99 % | DIASTOLIC BLOOD PRESSURE: 60 MMHG

## 2023-05-09 DIAGNOSIS — E11.40 TYPE 2 DIABETES MELLITUS WITH DIABETIC NEUROPATHY, WITH LONG-TERM CURRENT USE OF INSULIN: Primary | ICD-10-CM

## 2023-05-09 DIAGNOSIS — E78.5 HYPERLIPIDEMIA ASSOCIATED WITH TYPE 2 DIABETES MELLITUS: ICD-10-CM

## 2023-05-09 DIAGNOSIS — N18.31 STAGE 3A CHRONIC KIDNEY DISEASE: ICD-10-CM

## 2023-05-09 DIAGNOSIS — E11.59 HYPERTENSION ASSOCIATED WITH DIABETES: ICD-10-CM

## 2023-05-09 DIAGNOSIS — E11.69 HYPERLIPIDEMIA ASSOCIATED WITH TYPE 2 DIABETES MELLITUS: ICD-10-CM

## 2023-05-09 DIAGNOSIS — I25.10 CORONARY ARTERY DISEASE INVOLVING NATIVE CORONARY ARTERY OF NATIVE HEART WITHOUT ANGINA PECTORIS: Chronic | ICD-10-CM

## 2023-05-09 DIAGNOSIS — Z79.4 TYPE 2 DIABETES MELLITUS WITH DIABETIC NEUROPATHY, WITH LONG-TERM CURRENT USE OF INSULIN: Primary | ICD-10-CM

## 2023-05-09 DIAGNOSIS — I15.2 HYPERTENSION ASSOCIATED WITH DIABETES: ICD-10-CM

## 2023-05-09 DIAGNOSIS — I48.0 PAROXYSMAL ATRIAL FIBRILLATION: Chronic | ICD-10-CM

## 2023-05-09 DIAGNOSIS — B02.29 POST HERPETIC NEURALGIA: ICD-10-CM

## 2023-05-09 PROCEDURE — 1126F AMNT PAIN NOTED NONE PRSNT: CPT | Mod: HCNC,CPTII,S$GLB, | Performed by: HOSPITALIST

## 2023-05-09 PROCEDURE — 3074F SYST BP LT 130 MM HG: CPT | Mod: HCNC,CPTII,S$GLB, | Performed by: HOSPITALIST

## 2023-05-09 PROCEDURE — 90677 PCV20 VACCINE IM: CPT | Mod: HCNC,S$GLB,, | Performed by: HOSPITALIST

## 2023-05-09 PROCEDURE — 3078F PR MOST RECENT DIASTOLIC BLOOD PRESSURE < 80 MM HG: ICD-10-PCS | Mod: HCNC,CPTII,S$GLB, | Performed by: HOSPITALIST

## 2023-05-09 PROCEDURE — 3288F FALL RISK ASSESSMENT DOCD: CPT | Mod: HCNC,CPTII,S$GLB, | Performed by: HOSPITALIST

## 2023-05-09 PROCEDURE — 99214 PR OFFICE/OUTPT VISIT, EST, LEVL IV, 30-39 MIN: ICD-10-PCS | Mod: HCNC,S$GLB,, | Performed by: HOSPITALIST

## 2023-05-09 PROCEDURE — 90677 PNEUMOCOCCAL CONJUGATE VACCINE 20-VALENT: ICD-10-PCS | Mod: HCNC,S$GLB,, | Performed by: HOSPITALIST

## 2023-05-09 PROCEDURE — 99214 OFFICE O/P EST MOD 30 MIN: CPT | Mod: HCNC,S$GLB,, | Performed by: HOSPITALIST

## 2023-05-09 PROCEDURE — G0009 PNEUMOCOCCAL CONJUGATE VACCINE 20-VALENT: ICD-10-PCS | Mod: HCNC,S$GLB,, | Performed by: HOSPITALIST

## 2023-05-09 PROCEDURE — 1101F PR PT FALLS ASSESS DOC 0-1 FALLS W/OUT INJ PAST YR: ICD-10-PCS | Mod: HCNC,CPTII,S$GLB, | Performed by: HOSPITALIST

## 2023-05-09 PROCEDURE — G0009 ADMIN PNEUMOCOCCAL VACCINE: HCPCS | Mod: HCNC,S$GLB,, | Performed by: HOSPITALIST

## 2023-05-09 PROCEDURE — 1160F RVW MEDS BY RX/DR IN RCRD: CPT | Mod: HCNC,CPTII,S$GLB, | Performed by: HOSPITALIST

## 2023-05-09 PROCEDURE — 3074F PR MOST RECENT SYSTOLIC BLOOD PRESSURE < 130 MM HG: ICD-10-PCS | Mod: HCNC,CPTII,S$GLB, | Performed by: HOSPITALIST

## 2023-05-09 PROCEDURE — 1101F PT FALLS ASSESS-DOCD LE1/YR: CPT | Mod: HCNC,CPTII,S$GLB, | Performed by: HOSPITALIST

## 2023-05-09 PROCEDURE — 3078F DIAST BP <80 MM HG: CPT | Mod: HCNC,CPTII,S$GLB, | Performed by: HOSPITALIST

## 2023-05-09 PROCEDURE — 1160F PR REVIEW ALL MEDS BY PRESCRIBER/CLIN PHARMACIST DOCUMENTED: ICD-10-PCS | Mod: HCNC,CPTII,S$GLB, | Performed by: HOSPITALIST

## 2023-05-09 PROCEDURE — 99999 PR PBB SHADOW E&M-EST. PATIENT-LVL V: CPT | Mod: PBBFAC,HCNC,, | Performed by: HOSPITALIST

## 2023-05-09 PROCEDURE — 1126F PR PAIN SEVERITY QUANTIFIED, NO PAIN PRESENT: ICD-10-PCS | Mod: HCNC,CPTII,S$GLB, | Performed by: HOSPITALIST

## 2023-05-09 PROCEDURE — 1159F MED LIST DOCD IN RCRD: CPT | Mod: HCNC,CPTII,S$GLB, | Performed by: HOSPITALIST

## 2023-05-09 PROCEDURE — 1159F PR MEDICATION LIST DOCUMENTED IN MEDICAL RECORD: ICD-10-PCS | Mod: HCNC,CPTII,S$GLB, | Performed by: HOSPITALIST

## 2023-05-09 PROCEDURE — 99999 PR PBB SHADOW E&M-EST. PATIENT-LVL V: ICD-10-PCS | Mod: PBBFAC,HCNC,, | Performed by: HOSPITALIST

## 2023-05-09 PROCEDURE — 3288F PR FALLS RISK ASSESSMENT DOCUMENTED: ICD-10-PCS | Mod: HCNC,CPTII,S$GLB, | Performed by: HOSPITALIST

## 2023-05-09 RX ORDER — PNEUMOCOCCAL 20-VALENT CONJUGATE VACCINE 2.2; 2.2; 2.2; 2.2; 2.2; 2.2; 2.2; 2.2; 2.2; 2.2; 2.2; 2.2; 2.2; 2.2; 2.2; 2.2; 4.4; 2.2; 2.2; 2.2 UG/.5ML; UG/.5ML; UG/.5ML; UG/.5ML; UG/.5ML; UG/.5ML; UG/.5ML; UG/.5ML; UG/.5ML; UG/.5ML; UG/.5ML; UG/.5ML; UG/.5ML; UG/.5ML; UG/.5ML; UG/.5ML; UG/.5ML; UG/.5ML; UG/.5ML; UG/.5ML
0.5 INJECTION, SUSPENSION INTRAMUSCULAR ONCE
Qty: 0.5 ML | Refills: 0 | Status: CANCELLED | OUTPATIENT
Start: 2023-05-09 | End: 2023-05-09

## 2023-05-09 NOTE — TELEPHONE ENCOUNTER
----- Message from Caitlin Parra sent at 5/9/2023  3:39 PM CDT -----  Contact: Pt Mobile [phone]  Patient is calling in regards to him saying that he will be another five minutes late for his three O'clock appointment that is scheduled with you on today. Patient said that you spoke with him about ten minutes ago and told him that it will be alright for him to still come in.

## 2023-05-09 NOTE — PROGRESS NOTES
Subjective:     @Patient ID: Randy Camejo is a 82 y.o. male.    Chief Complaint: Follow-up      81 yo male presents for f/u of chronic conditions. Accompanied today by his wife      1. DM2 with neuropathy: on levemir 8 units qhs. Last A1c 6 (11/2022). Due for eye exam. Had foot exam with podiatry this year. On gabapentin for neuropathy   2. CHF: systolic.  Last 2d echo 6/2/20 showed EF 20%, severe biatrial enlargement, pulm htn and cvp 15. He is currently on Entresto and lasix is once daily. Also on coreg. Had bradycardia, syncope in Jan 2021. Admitted to hospital for observation. Reports he was evaluated by cardiology in the hospital who stated he may need to have lower dose of coreg to 3.125 mg bid.   3. HTN: on coreg 3.125 mg bid   4. Paroxysmal Afib:  on amiodarone per cardiology and eliquis 2.5 mg bid   5. CAD w/ stent, PVD: on coreg 3.125 mg bid, lipitor 10 mg, (non on asa due to being on eliquis).   6. Leg weakness: stable  7. CKD3: last Cr 1.4  8. Subclinical hypothyroidism: TSH wnl. Continue synthroid 112 mcg qday  9. HLD: on atorvastatin 10 mg qday   10. Post herpetic neuralgia: reports still having pain. Does not want gabapentin that was prescribed by pain management. Will try lidocaine patch    Follow-up  Pertinent negatives include no abdominal pain, arthralgias, chest pain, chills, congestion, coughing, fever, headaches, nausea, rash, sore throat, vomiting or weakness.            Review of Systems   Constitutional:  Negative for chills and fever.   HENT:  Negative for congestion and sore throat.    Eyes:  Negative for pain and visual disturbance.   Respiratory:  Negative for cough and shortness of breath.    Cardiovascular:  Negative for chest pain and leg swelling.   Gastrointestinal:  Negative for abdominal pain, nausea and vomiting.   Endocrine: Negative for polydipsia and polyuria.   Genitourinary:  Negative for difficulty urinating and dysuria.   Musculoskeletal:  Negative for arthralgias and  "back pain.   Skin:  Negative for rash and wound.   Neurological:  Negative for weakness and headaches.   Psychiatric/Behavioral:  Negative for agitation and confusion.    Past medical history, surgical history, and family medical history reviewed and updated as appropriate.    Medications and allergies reviewed.     Objective:     Vitals:    05/09/23 1551   BP: 110/60   BP Location: Left arm   Patient Position: Sitting   Pulse: 97   Resp: 20   Temp: 97.4 °F (36.3 °C)   SpO2: 99%   Weight: 76.1 kg (167 lb 12.3 oz)   Height: 5' 11" (1.803 m)     Body mass index is 23.4 kg/m².  Physical Exam  Constitutional:       Appearance: Normal appearance.   HENT:      Head: Normocephalic and atraumatic.   Eyes:      General:         Right eye: No discharge.         Left eye: No discharge.      Conjunctiva/sclera: Conjunctivae normal.   Cardiovascular:      Rate and Rhythm: Normal rate and regular rhythm.      Heart sounds: No murmur heard.  Pulmonary:      Effort: Pulmonary effort is normal.      Breath sounds: Normal breath sounds.   Musculoskeletal:         General: Normal range of motion.      Cervical back: Normal range of motion and neck supple.      Right lower leg: No edema.      Left lower leg: No edema.   Skin:     General: Skin is warm and dry.   Neurological:      Mental Status: He is alert and oriented to person, place, and time.   Psychiatric:         Mood and Affect: Mood normal.         Behavior: Behavior normal.       Lab Results   Component Value Date    WBC 6.86 11/03/2022    HGB 11.8 (L) 11/03/2022    HCT 36.8 (L) 11/03/2022     (L) 11/03/2022    CHOL 96 (L) 11/03/2022    TRIG 100 11/03/2022    HDL 31 (L) 11/03/2022    ALT 37 11/03/2022    AST 27 11/03/2022     12/16/2022    K 5.2 (H) 12/16/2022     12/16/2022    CREATININE 1.4 12/16/2022    BUN 21 12/16/2022    CO2 23 12/16/2022    TSH 0.530 11/03/2022    PSA 1.8 11/03/2022    INR 1.3 (H) 08/29/2019    HGBA1C 6.0 (H) 11/03/2022 "       Assessment:     1. Type 2 diabetes mellitus with diabetic neuropathy, with long-term current use of insulin    2. Hypertension associated with diabetes    3. Hyperlipidemia associated with type 2 diabetes mellitus    4. Coronary artery disease involving native coronary artery of native heart without angina pectoris    5. Stage 3a chronic kidney disease    6. Paroxysmal atrial fibrillation    7. Post herpetic neuralgia      Plan:   Randy was seen today for follow-up.    Diagnoses and all orders for this visit:    Type 2 diabetes mellitus with diabetic neuropathy, with long-term current use of insulin  - Stable. Continue home meds. Due for pna 20 today    -     Hemoglobin A1C; Future  -     Comprehensive Metabolic Panel; Future  -     Lipid Panel; Future  -     Microalbumin/Creatinine Ratio, Urine; Future  -     Ambulatory referral/consult to Optometry; Future  -     CBC W/ AUTO DIFFERENTIAL; Future    Hypertension associated with diabetes  - Stable. Continue home meds     -     Hemoglobin A1C; Future  -     Comprehensive Metabolic Panel; Future  -     Lipid Panel; Future  -     Microalbumin/Creatinine Ratio, Urine; Future  -     CBC W/ AUTO DIFFERENTIAL; Future    Hyperlipidemia associated with type 2 diabetes mellitus  - Stable. Continue home meds     -     Hemoglobin A1C; Future  -     Comprehensive Metabolic Panel; Future  -     Lipid Panel; Future  -     Microalbumin/Creatinine Ratio, Urine; Future  -     CBC W/ AUTO DIFFERENTIAL; Future    Coronary artery disease involving native coronary artery of native heart without angina pectoris  - Stable. Continue home meds     -     CBC W/ AUTO DIFFERENTIAL; Future    Stage 3a chronic kidney disease  - Chronic. Stable. Continue to monitor     -     Comprehensive Metabolic Panel; Future  -     Microalbumin/Creatinine Ratio, Urine; Future  -     CBC W/ AUTO DIFFERENTIAL; Future    Paroxysmal atrial fibrillation  - Chronic. Stable. Continue to monitor   -     CBC W/  AUTO DIFFERENTIAL; Future    Post herpetic neuralgia  - chronic. Ok to try lidocaine patch     Other orders  -     (In Office Administered) Pneumococcal Conjugate Vaccine (20 Valent) (IM)           RTC 6 months     Susie Lazo MD  Internal Medicine    5/9/2023

## 2023-06-08 NOTE — TELEPHONE ENCOUNTER
[facility]    Medicine Progress Note - Hospitalist Service, GOLD TEAM 10    Date of Admission:  6/4/2023    Assessment & Plan   Izabella Og is a 64 year old female admitted on 6/4/2023. She has PMH of ESRD (on HD, TThS), DM II, autonomic dysfunction, orthostatic HTN, Diabetic Neuropathy, stage II colon cA, chronic diarrhea with recurrent C.diff s/p FMT (2021), obesity s/p Rouz-en-Y (2011) who was being admitted for dialysis access issues and hyperkalemia. Attempted fistulogram with repair of fistula on 6/6 by IR which was unsuccessful so L internal jugular tunneled line placed.     Today:   -R neck/arm ultrasound for further assessment of etiology of severe R neck pain; may be radiation from shoulder injury exacerbated by recent central catheter placement.   -Ordered oxycodone and low dose IV dilaudid for severe pain while working up etiology   -Hopefully discharge tomorrow if pain improved and no concerning findings on ultrasound   -HD today   -Increase calcitriol per nephrology   -Discussed R shoulder MRI findings with orthopedic surgery - recommend outpatient follow up     Dialysis Access Issues   Dysfunctional Fistula s/p unsuccessful declot   S/p R internal jugular tunneled placement 6/6    Patient presented at her HD center on Saturday for her regular HD, however, they were not able to access the fistula and was not dialyzed.   R IJ temp line placed by IR 6/5 followed by HD. Fistulogram/declot by IR 6/6 attempted, unsuccessful so L internal jugular tunneled line placed and temp R internal jugular removed.   -Continue HD through L tunneled internal jugular line (placed 6/6)   -Outpatient follow up for further management of long term access      Hyperkalemia 2/2 missed HD - improved   Hypocalcemia   ESRD on HD T,TH,S  Patient last full HD was on 6/1 prior to admission, hyperkalemic to 6.5 on presentation, likely 2/2 poor dialysis as outpatient, s/p shifting and  Informed pt per Dr. Rosa that he can discontinue his HH. Pt voiced understanding and call ended.    calcium gluconate. Improved with resumption of HD.   - Nephrology consulted   - HD access as above   - Calcitriol per nephrology for hypocalcemia      R shoulder pain   Patient c/o R shoulder/clavicular pain, states pain is present since her fall two weeks ago. Shoulder XR with arthritic changes, no fractures of dislocations.   - Tylenol for pain   - MRI R shoulder showed suprapinatus tear   -Discussed with orthopedic surgery, recommend outpatient follow up; referral placed   - PT/OT consults - rec home PT/OT      Autonomic Dysfunction  Orthostatic hypotension  - Continue PTA midodrine 5mg PRN if hypotensive during HD   - Continue to monitor      Diabetic Neuropathy  - Continue gabapentin      Pancytopenia   Pt has chronic mild leukopenia and thrombocytopenia in addition to anemia of ESRD  - Monitor CBC daily      T2DM  - Managed by diet     CAD  HLD  - continue ASA  - Cont statin           Diet: Renal Diet (dialysis)  Diet    DVT Prophylaxis: Heparin SQ  Mcgovern Catheter: Not present  Lines: PRESENT      CVC Double Lumen Left Internal jugular Valved;Tunneled-Site Assessment: WDL      Cardiac Monitoring: None  Code Status: Full Code      Clinically Significant Risk Factors        # Hyperkalemia: Highest K = 5.6 mmol/L in last 2 days, will monitor as appropriate  # Hyponatremia: Lowest Na = 127 mmol/L in last 2 days, will monitor as appropriate  # Hypocalcemia: Lowest iCa = 3.6 mg/dL in last 2 days, will monitor and replace as appropriate   # Hypomagnesemia: Lowest Mg = 1.4 mg/dL in last 2 days, will replace as needed   # Hypoalbuminemia: Lowest albumin = 3.4 g/dL at 6/4/2023  6:34 PM, will monitor as appropriate   # Thrombocytopenia: Lowest platelets = 84 in last 2 days, will monitor for bleeding                   Disposition Plan      Expected Discharge Date: 06/09/2023,  3:00 PM    Destination: home;home with family;home with help/services  Discharge Comments: Dispo: Home with resumption of HD & HC PT  Delays:  Severe neck pain          Gisella Stevenson DO  Hospitalist Service, GOLD TEAM 10  [facility]  Securely message with [employer] (more info)  Text page via [employer] Paging/Directory   See signed in provider for up to date coverage information  ______________________________________________________________________    Interval History   No overnight events reported.  She continues to have severe R neck pain, worse than prior. Feels very concerned about her pain. Reports it was present before admission, and was worsened particularly after R internal jugular catheter was placed (now removed with ongoing, worsening pain)     Physical Exam   Vital Signs: Temp: 97.8  F (36.6  C) Temp src: Oral BP: 111/66 Pulse: 76   Resp: 16 SpO2: 96 % O2 Device: None (Room air)    Weight: 178 lbs 9.6 oz    Constitutional: no apparent distress, pleasant and cooperative   Respiratory: normal work of breathing   Neuro: alert and oriented, no gross focal deficits noted   Skin: L tunneled internal jugular in place, no erythema or drainage   MSK: R shoulder and neck tender to superficial palpation, no obvious deformity or injury, prior internal jugular catheter site is clean and well healing     Medical Decision Making       **CLEAR ALL SELECTIONS**      Data     I have personally reviewed the following data over the past 24 hrs:    2.4 (L)  \   10.7 (L)   / 89 (L)     127 (L) 90 (L) 40.4 (H) /  107 (H)   5.6 (H) 21 (L) 8.37 (H) \       Imaging results reviewed over the past 24 hrs:   Recent Results (from the past 24 hour(s))   MR Shoulder Right w/o Contrast    Narrative    EXAM: MR SHOULDER RIGHT W/O CONTRAST  LOCATION: [facility]  DATE/TIME: 6/7/2023 6:41 PM CDT    INDICATION: Right shoulder pain after fall.  COMPARISON: 06/04/2023.  TECHNIQUE: Unenhanced.    FINDINGS:    ROTATOR CUFF:  -Supraspinatus: There is a high-grade distal supraspinatus tendon tear with  articular sided delamination measuring approximately 1.2 cm, for example on coronal image 16. There is underlying tendinopathy and attritional thinning. No visible full-thickness   involvement. The tear is poorly defined but measures approximately 1.2 cm in AP dimension. Muscle bulk is maintained.  -Infraspinatus: No tendon tear, tendinopathy or fatty atrophy.  -Subscapularis: Mild subscapularis tendinopathy. No tear. Muscle bulk is maintained.  -Teres minor: No tendon tear, tendinopathy or fatty atrophy.    CORACOACROMIAL ARCH:  -Morphology: Type II acromion. No subacromial spur. Subacromial and subcoracoid space are normal.   -Bursa: Small amount of fluid in the subacromial/subdeltoid bursa.    ACROMIOCLAVICULAR JOINT:   -Moderate AC joint arthrosis.     LONG HEAD OF BICEPS TENDON:   -No significant tendinopathy or tear. No tenosynovitis or subluxation.    GLENOHUMERAL JOINT:   -Labrum: No displaced labral tear.   -Cartilage: No focal cartilage defects or subchondral bone marrow edema.   -Joint space: No effusion or synovitis.  -Glenohumeral ligaments and capsule: No pericapsular inflammation.    BONES:   -There is diffuse heterogeneity and marrow replacement throughout the visualized bone marrow.    SOFT TISSUES:   -Low-grade soft tissue/intramuscular contusion along the superior aspect of the shoulder near the distal clavicle and distal trapezius muscle. No high-grade tear or adjacent fracture of the clavicle.      Impression    IMPRESSION:  1.  No acute fracture.   2.  High-grade distal supraspinatus tendon tear with articular sided delamination measuring up to 1.2 cm in AP dimension. No visible full-thickness involvement. This is age-indeterminant.  3.  Mild subacromial/subdeltoid bursitis.  4.  Acute appearing soft tissue/intramuscular contusion along the superior aspect of the shoulder adjacent to the distal clavicle.  5.  Diffuse heterogeneity and marrow replacement throughout the visualized bone marrow.  This finding is nonspecific and can be seen with a marrow replacing or replacing disorders, including renal osteodystrophy given the patient's history of renal   disease and dialysis.  6.  Moderate AC joint arthrosis.     US Upper Extremity Venous Duplex Right    Narrative    EXAMINATION: DOPPLER VENOUS ULTRASOUND OF THE RIGHT UPPER EXTREMITY,  6/8/2023 3:31 PM     COMPARISON: 1/22/2021    HISTORY: eval for damage to R IJ vein, recent catheter placement,  having severe pain. Also eval for hematoma or other damage    TECHNIQUE:  Gray-scale evaluation with compression, spectral flow and  color Doppler assessment of the deep venous system of the right upper  extremity.    FINDINGS:  Right: Normal blood flow and waveforms are demonstrated in the  internal jugular, innominate, subclavian, and axillary veins. There is  normal compressibility of the brachial, basilic and cephalic veins.    There is a small outpouching in the inferior right internal jugular  vein which is contiguous with the previous central venous catheter  tract. No hematoma.      Impression    IMPRESSION:  1.  No evidence of right upper extremity deep venous thrombosis.   2.  Small outpouching in the inferior right internal jugular vein  associated with catheter tract. No hematoma.    I have personally reviewed the examination and initial interpretation  and I agree with the findings.    CHRISTOPH FARRELL MD         SYSTEM ID:  [id number]

## 2023-06-26 ENCOUNTER — PATIENT MESSAGE (OUTPATIENT)
Dept: INTERNAL MEDICINE | Facility: CLINIC | Age: 83
End: 2023-06-26
Payer: MEDICARE

## 2023-06-28 ENCOUNTER — OFFICE VISIT (OUTPATIENT)
Dept: CARDIOLOGY | Facility: CLINIC | Age: 83
End: 2023-06-28
Payer: MEDICARE

## 2023-06-28 VITALS
WEIGHT: 168.56 LBS | HEIGHT: 71 IN | SYSTOLIC BLOOD PRESSURE: 116 MMHG | BODY MASS INDEX: 23.6 KG/M2 | HEART RATE: 76 BPM | DIASTOLIC BLOOD PRESSURE: 60 MMHG

## 2023-06-28 DIAGNOSIS — R00.0 TACHYCARDIA INDUCED CARDIOMYOPATHY: ICD-10-CM

## 2023-06-28 DIAGNOSIS — I83.008 VENOUS STASIS ULCER OF OTHER PART OF LOWER LEG LIMITED TO BREAKDOWN OF SKIN WITH VARICOSE VEINS, UNSPECIFIED LATERALITY: Primary | ICD-10-CM

## 2023-06-28 DIAGNOSIS — E11.59 HYPERTENSION ASSOCIATED WITH DIABETES: ICD-10-CM

## 2023-06-28 DIAGNOSIS — I15.2 HYPERTENSION ASSOCIATED WITH DIABETES: ICD-10-CM

## 2023-06-28 DIAGNOSIS — Z79.01 CHRONIC ANTICOAGULATION: ICD-10-CM

## 2023-06-28 DIAGNOSIS — I48.0 PAROXYSMAL ATRIAL FIBRILLATION: Chronic | ICD-10-CM

## 2023-06-28 DIAGNOSIS — E11.69 HYPERLIPIDEMIA ASSOCIATED WITH TYPE 2 DIABETES MELLITUS: ICD-10-CM

## 2023-06-28 DIAGNOSIS — Z95.5 HISTORY OF CORONARY ARTERY STENT PLACEMENT: Chronic | ICD-10-CM

## 2023-06-28 DIAGNOSIS — E11.40 TYPE 2 DIABETES MELLITUS WITH DIABETIC NEUROPATHY, WITH LONG-TERM CURRENT USE OF INSULIN: ICD-10-CM

## 2023-06-28 DIAGNOSIS — L97.801 VENOUS STASIS ULCER OF OTHER PART OF LOWER LEG LIMITED TO BREAKDOWN OF SKIN WITH VARICOSE VEINS, UNSPECIFIED LATERALITY: Primary | ICD-10-CM

## 2023-06-28 DIAGNOSIS — I25.10 CORONARY ARTERY DISEASE INVOLVING NATIVE CORONARY ARTERY OF NATIVE HEART WITHOUT ANGINA PECTORIS: Chronic | ICD-10-CM

## 2023-06-28 DIAGNOSIS — I25.2 HISTORY OF MYOCARDIAL INFARCTION: Chronic | ICD-10-CM

## 2023-06-28 DIAGNOSIS — Z79.4 TYPE 2 DIABETES MELLITUS WITH DIABETIC NEUROPATHY, WITH LONG-TERM CURRENT USE OF INSULIN: ICD-10-CM

## 2023-06-28 DIAGNOSIS — I50.22 CHRONIC SYSTOLIC HEART FAILURE: ICD-10-CM

## 2023-06-28 DIAGNOSIS — E78.5 HYPERLIPIDEMIA ASSOCIATED WITH TYPE 2 DIABETES MELLITUS: ICD-10-CM

## 2023-06-28 DIAGNOSIS — I43 TACHYCARDIA INDUCED CARDIOMYOPATHY: ICD-10-CM

## 2023-06-28 DIAGNOSIS — Z79.899 LONG TERM CURRENT USE OF AMIODARONE: ICD-10-CM

## 2023-06-28 PROCEDURE — 99999 PR PBB SHADOW E&M-EST. PATIENT-LVL IV: CPT | Mod: PBBFAC,,, | Performed by: INTERNAL MEDICINE

## 2023-06-28 PROCEDURE — 99999 PR PBB SHADOW E&M-EST. PATIENT-LVL IV: ICD-10-PCS | Mod: PBBFAC,,, | Performed by: INTERNAL MEDICINE

## 2023-06-28 PROCEDURE — 1101F PR PT FALLS ASSESS DOC 0-1 FALLS W/OUT INJ PAST YR: ICD-10-PCS | Mod: CPTII,S$GLB,, | Performed by: INTERNAL MEDICINE

## 2023-06-28 PROCEDURE — 1160F PR REVIEW ALL MEDS BY PRESCRIBER/CLIN PHARMACIST DOCUMENTED: ICD-10-PCS | Mod: CPTII,S$GLB,, | Performed by: INTERNAL MEDICINE

## 2023-06-28 PROCEDURE — 3078F PR MOST RECENT DIASTOLIC BLOOD PRESSURE < 80 MM HG: ICD-10-PCS | Mod: CPTII,S$GLB,, | Performed by: INTERNAL MEDICINE

## 2023-06-28 PROCEDURE — 99499 UNLISTED E&M SERVICE: CPT | Mod: HCNC,S$GLB,, | Performed by: INTERNAL MEDICINE

## 2023-06-28 PROCEDURE — 1126F AMNT PAIN NOTED NONE PRSNT: CPT | Mod: CPTII,S$GLB,, | Performed by: INTERNAL MEDICINE

## 2023-06-28 PROCEDURE — 1101F PT FALLS ASSESS-DOCD LE1/YR: CPT | Mod: CPTII,S$GLB,, | Performed by: INTERNAL MEDICINE

## 2023-06-28 PROCEDURE — 1160F RVW MEDS BY RX/DR IN RCRD: CPT | Mod: CPTII,S$GLB,, | Performed by: INTERNAL MEDICINE

## 2023-06-28 PROCEDURE — 1159F MED LIST DOCD IN RCRD: CPT | Mod: CPTII,S$GLB,, | Performed by: INTERNAL MEDICINE

## 2023-06-28 PROCEDURE — 3078F DIAST BP <80 MM HG: CPT | Mod: CPTII,S$GLB,, | Performed by: INTERNAL MEDICINE

## 2023-06-28 PROCEDURE — 3074F PR MOST RECENT SYSTOLIC BLOOD PRESSURE < 130 MM HG: ICD-10-PCS | Mod: CPTII,S$GLB,, | Performed by: INTERNAL MEDICINE

## 2023-06-28 PROCEDURE — 3074F SYST BP LT 130 MM HG: CPT | Mod: CPTII,S$GLB,, | Performed by: INTERNAL MEDICINE

## 2023-06-28 PROCEDURE — 1159F PR MEDICATION LIST DOCUMENTED IN MEDICAL RECORD: ICD-10-PCS | Mod: CPTII,S$GLB,, | Performed by: INTERNAL MEDICINE

## 2023-06-28 PROCEDURE — 3288F PR FALLS RISK ASSESSMENT DOCUMENTED: ICD-10-PCS | Mod: CPTII,S$GLB,, | Performed by: INTERNAL MEDICINE

## 2023-06-28 PROCEDURE — 1126F PR PAIN SEVERITY QUANTIFIED, NO PAIN PRESENT: ICD-10-PCS | Mod: CPTII,S$GLB,, | Performed by: INTERNAL MEDICINE

## 2023-06-28 PROCEDURE — 99499 RISK ADDL DX/OHS AUDIT: ICD-10-PCS | Mod: HCNC,S$GLB,, | Performed by: INTERNAL MEDICINE

## 2023-06-28 PROCEDURE — 99215 PR OFFICE/OUTPT VISIT, EST, LEVL V, 40-54 MIN: ICD-10-PCS | Mod: S$GLB,,, | Performed by: INTERNAL MEDICINE

## 2023-06-28 PROCEDURE — 3288F FALL RISK ASSESSMENT DOCD: CPT | Mod: CPTII,S$GLB,, | Performed by: INTERNAL MEDICINE

## 2023-06-28 PROCEDURE — 99215 OFFICE O/P EST HI 40 MIN: CPT | Mod: S$GLB,,, | Performed by: INTERNAL MEDICINE

## 2023-06-28 NOTE — PROGRESS NOTES
Subjective:   Patient ID:  Randy Camejo is a 82 y.o. male who presents for follow up of chronic systolic heart failure and Coronary Artery Disease      HPI: Back for 8 month f/u and doing well.  He's limited by heat and fatigue, but not dyspnea.  He denies chest discomfort, QUEZADA, palpitations, PND/orthopnea, lightheadedness and syncope.    No bleeding, hematochezia, or melena.  No TIA/stroke symtoms.      Dr. Agustin' Nov 2022 HPI:  Patient here for follow-up.  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  He is not had an echocardiogram for 2 years.  He is not followed up regularly.  He is not having chest pains or tightness, PND or orthopnea.  Has not had lower extremity edema valve renal insufficiency, furosemide was changed to as needed.    Previously underwent coronary stenting and had a heart attack then.  He is not had recurrent chest pains or tightness.      Paroxysmal atrial fibrillation is present, currently treated with amiodarone, maintained sinus rhythm.  Eliquis, patient now greater than age 81 with creatinine of 1.9, should be on 2.5 mg twice a day.     Hyperlipidemia is treated with moderate dose statin therapy, last LDL well controlled at 45        My June 2021 HPI:  8 month f/u.  He's dealing with a case of the shingles - appears to be L1/L2 height roughly. He's being treated appropriately.     His HR is running in the low to mid 50s most of the time and occasionally is as low as 47.     He is otherwise doing well otherwise with no new symptoms or cardiovascular complaints and no change in exercise capacity.  He denies chest discomfort, QUEZADA, palpitations, PND/orthopnea, lightheadedness and syncope.     Weight is down to 174# today.     No bleeding, hematochezia, or melena.  No TIA/stroke symtoms.     He is fully vaccinated against COVID (second Pfizer dose administered on 2/5/2021).        9/30/2020 HPI: Back for f/u after nearly 3  months.  Weight has been down quite a bit since early July with improved diuresis and he's feeling better.  He forgets to take his second 40mg of lasix a lot of the time.     He now is doing well with no new symptoms or cardiovascular complaints and he's had improvement in exercise capacity.  His dyspnea with exertion is minimal and only with significant exertion. He denies chest discomfort, palpitations, PND/orthopnea, lightheadedness and syncope.        7/8/2020 HPI: Very pleasant man who previously saw Dr. Joseph for chronic systolic heart failure, AF, and CAD s/p stenting presents to establish care with [facility].  He had an echo about 5 weeks and it showed a CVP of 15, bilateral pleural effusions, a small pericardial effusion, and an EF of 20% with a PASP of 69mm Hg.     He was as high as 198 with his weight in early June but is now down to 191 on an increased dose of lasix (20 --> 20 bid).     He denies chest discomfort, palpitations, PND/orthopnea, lightheadedness and syncope.  He does get out of breath with climbing stairs or with other exertion that is more than minimal.     No bleeding, hematochezia, or melena.  No TIA/stroke symtoms.     No F/C/cough/diarrhea/anosmia.        Dr. Joseph's May 2019 HPI: Randy Camejo is a 78 y.o. male with hypertension and diabetes mellitus type 2. He suffered a myocardial infarction in 2000 when he presented to [facility] and had a stent placed. He was treated for DM2 for a few years with metformin but then stopped it. He did not had any regular medical follow up for several years. He began feeling weak and have diarrhea on 2/15/2019. He diarrhea continued and he became weaker over the next several weak. He had nausea and vomited. On 2/19/2019 he went to  and was referred to the ER. He was noted to be in atrial fibrillation and admitted. An Echocardiogram revealed severe left ventricular dysfunction and it was felt that he probably had a tachycardia induced  cardiomyopathy. He was given amiodarone, digoxin and metoprolol. He was anticoagulation with heparin iv and later apixiban. On 2/25/2019 he underwent a DILLAN guided CV. There was severe left ventricular dysfunction with an EF of 15%. The TERESO had spontaneous echo contrast ut no thrombus. He was cardioverted with a 100 J shock to sinus rhythm. On 2/26/2019 he became hyperkalemic with a K of 6.0 after given enalapril. On 4/5/2019 he had a follow up Echo that revealed a moderately dilated left ventricle with moderate systolic dysfunction. The EF was in the 35-40% range. There was an anteroseptal as well as an inferior WMA. There was moderate diastolic dysfunction, a moderately dilated LA, mild aortic valve sclerosis with apeak velocity of 1.1 m/s, mild AR and moderate MR. He has slowly gained strength. No exertional chest pain or exertional dyspnea. No palpitations or weak spells. He however has some back pain and is sometimes a bit unsteady on his feet. Feeling well overall.       Patient Active Problem List   Diagnosis    Nuclear sclerosis, bilateral    Nuclear sclerotic cataract of left eye    Paroxysmal atrial fibrillation    Type 2 diabetes mellitus with neurologic complication, with long-term current use of insulin    Chronic systolic heart failure    Pre-ulcerative corn or callous    Peripheral vascular disease    Tachycardia induced cardiomyopathy    Coronary artery disease involving native coronary artery of native heart without angina pectoris    Chronic anticoagulation    History of myocardial infarction    History of coronary artery stent placement    Hypertension associated with diabetes    Hyperlipidemia associated with type 2 diabetes mellitus    Subclinical hypothyroidism    Stage 3a chronic kidney disease    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    alcohol swabs PadM use 2 (two) times a day. E11.49    amiodarone (PACERONE) 100 MG Tab Take 1 tablet (100 mg total) by mouth once daily.    apixaban (ELIQUIS) 2.5 mg Tab Take 1 tablet (2.5 mg total) by mouth 2 (two) times daily. (Patient taking differently: Take 2.5 mg by mouth once daily.)    atorvastatin (LIPITOR) 10 MG tablet Take 1 tablet (10 mg total) by mouth once daily.    blood glucose control, low Soln Use 1 each to check glucose level of glucometer weekly    blood sugar diagnostic (TRUE METRIX GLUCOSE TEST STRIP) Strp use 2 (two) times a day. To check blood sugar    blood sugar diagnostic (TRUE METRIX GLUCOSE TEST STRIP) Strp use 2 (two) times a day. To check blood sugar    blood sugar diagnostic (TRUE METRIX GLUCOSE TEST STRIP) Strp use 2 (two) times a day. To check blood sugar    carvediloL (COREG) 3.125 MG tablet Take 1 tablet (3.125 mg total) by mouth 2 (two) times daily.    dimethicone 6 % Crea Apply 1 application topically once daily. For legs and feet.    famotidine (PEPCID) 20 MG tablet Take 1 tablet (20 mg total) by mouth once daily.    furosemide (LASIX) 40 MG tablet Take 1 tablet (40 mg total) by mouth daily as needed (swelling).    insulin detemir U-100 (LEVEMIR FLEXTOUCH U-100 INSULN) 100 unit/mL (3 mL) InPn pen Inject 8 into the skin every evening    lancets (TRUEPLUS LANCETS) 33 gauge Misc use twice a day as directed    lancets (TRUEPLUS LANCETS) 33 gauge Misc use twice a day as directed    levothyroxine (SYNTHROID) 112 MCG tablet TAKE 1 TABLET(112 MCG) BY MOUTH EVERY DAY    pen needle, diabetic (BD ULTRA-FINE SHORT PEN NEEDLE) 31 gauge x 5/16" Ndle 1 Device by Misc.(Non-Drug; Combo Route) route once daily. Use with " insulin. ICD10: E11.49    sacubitriL-valsartan (ENTRESTO) 49-51 mg per tablet Take 1 tablet by mouth 2 (two) times daily.    triamcinolone acetonide 0.1% (KENALOG) 0.1 % ointment aaa bid avoid face/ groin    TRUE METRIX AIR GLUCOSE METER kit      No current facility-administered medications for this visit.       ROS  The review of systems is negative except as above.     Objective:   Physical Exam  Vitals reviewed.   Constitutional:       Appearance: He is well-developed.   HENT:      Head: Normocephalic and atraumatic.   Eyes:      General: No scleral icterus.     Conjunctiva/sclera: Conjunctivae normal.   Neck:      Vascular: No JVD.   Cardiovascular:      Rate and Rhythm: Normal rate. Rhythm irregular.      Pulses: Intact distal pulses.      Heart sounds: Normal heart sounds. No murmur heard.    No friction rub. No gallop.   Pulmonary:      Effort: Pulmonary effort is normal.      Breath sounds: Normal breath sounds. No wheezing or rales.   Abdominal:      General: Bowel sounds are normal. There is no distension.      Palpations: Abdomen is soft.      Tenderness: There is no abdominal tenderness.   Musculoskeletal:         General: Normal range of motion.      Cervical back: Normal range of motion and neck supple.   Skin:     General: Skin is warm and dry.      Findings: No erythema or rash.   Neurological:      Mental Status: He is alert and oriented to person, place, and time.   Psychiatric:         Behavior: Behavior normal.         Thought Content: Thought content normal.         Judgment: Judgment normal.       Lab Results   Component Value Date    WBC 6.86 11/03/2022    HGB 11.8 (L) 11/03/2022    HCT 36.8 (L) 11/03/2022     (H) 11/03/2022     (L) 11/03/2022         Chemistry        Component Value Date/Time     12/16/2022 1326    K 5.2 (H) 12/16/2022 1326     12/16/2022 1326    CO2 23 12/16/2022 1326    BUN 21 12/16/2022 1326    CREATININE 1.4 12/16/2022 1326     (H)  12/16/2022 1326        Component Value Date/Time    CALCIUM 8.5 (L) 12/16/2022 1326    ALKPHOS 80 11/03/2022 1020    AST 27 11/03/2022 1020    ALT 37 11/03/2022 1020    BILITOT 0.5 11/03/2022 1020    ESTGFRAFRICA >60.0 09/29/2021 0945    EGFRNONAA 56.8 (A) 09/29/2021 0945            Lab Results   Component Value Date    CHOL 96 (L) 11/03/2022    CHOL 97 (L) 09/29/2021    CHOL 116 (L) 07/30/2021     Lab Results   Component Value Date    HDL 31 (L) 11/03/2022    HDL 37 (L) 09/29/2021    HDL 38 (L) 07/30/2021     Lab Results   Component Value Date    LDLCALC 45.0 (L) 11/03/2022    LDLCALC 48.8 (L) 09/29/2021    LDLCALC 60.4 (L) 07/30/2021     Lab Results   Component Value Date    TRIG 100 11/03/2022    TRIG 56 09/29/2021    TRIG 88 07/30/2021     Lab Results   Component Value Date    CHOLHDL 32.3 11/03/2022    CHOLHDL 38.1 09/29/2021    CHOLHDL 32.8 07/30/2021       Lab Results   Component Value Date    TSH 0.530 11/03/2022       Lab Results   Component Value Date    HGBA1C 6.0 (H) 11/03/2022       Assessment:     1. Venous stasis ulcer of other part of lower leg limited to breakdown of skin with varicose veins, unspecified laterality    2. Chronic systolic heart failure    3. Paroxysmal atrial fibrillation    4. Coronary artery disease involving native coronary artery of native heart without angina pectoris    5. Tachycardia induced cardiomyopathy    6. Hypertension associated with diabetes    7. Hyperlipidemia associated with type 2 diabetes mellitus    8. Type 2 diabetes mellitus with diabetic neuropathy, with long-term current use of insulin    9. Chronic anticoagulation    10. Long term current use of amiodarone    11. History of myocardial infarction    12. History of coronary artery stent placement        Plan:     Continue current medicines.  Even though his last creatinine was 1.4, I'd still keep him on 2.5 bid of apixaban.    He's on amiodarone 100 now and appears to be in AF today.  He doesn't want to make a  change now nor does he want to see EP for consideration of a cardioversion.  I'm leery of him getting too fast without amiodarone, but it also seems as though chasing sinus rhythm with continued amio use may not be of value.    48 hour Holter    We discussed the role of SGLT-2 inhibitors but he's doing well and already struggling with the cost of Entresto.    He's due for an eye exam and needs PFTs.    Diet/exercise goals reinforced.    F/U 6 months

## 2023-07-13 ENCOUNTER — OFFICE VISIT (OUTPATIENT)
Dept: OPTOMETRY | Facility: CLINIC | Age: 83
End: 2023-07-13
Payer: COMMERCIAL

## 2023-07-13 DIAGNOSIS — H52.4 PRESBYOPIA: Primary | ICD-10-CM

## 2023-07-13 DIAGNOSIS — Z13.5 GLAUCOMA SCREENING: ICD-10-CM

## 2023-07-13 DIAGNOSIS — E11.9 TYPE 2 DIABETES MELLITUS WITHOUT RETINOPATHY: ICD-10-CM

## 2023-07-13 DIAGNOSIS — H02.826 EYELID CYST, LEFT: ICD-10-CM

## 2023-07-13 PROCEDURE — 92015 DETERMINE REFRACTIVE STATE: CPT | Mod: S$GLB,,, | Performed by: OPTOMETRIST

## 2023-07-13 PROCEDURE — 99999 PR PBB SHADOW E&M-EST. PATIENT-LVL III: ICD-10-PCS | Mod: PBBFAC,,, | Performed by: OPTOMETRIST

## 2023-07-13 PROCEDURE — 92015 PR REFRACTION: ICD-10-PCS | Mod: S$GLB,,, | Performed by: OPTOMETRIST

## 2023-07-13 PROCEDURE — 92014 PR EYE EXAM, EST PATIENT,COMPREHESV: ICD-10-PCS | Mod: S$GLB,,, | Performed by: OPTOMETRIST

## 2023-07-13 PROCEDURE — 99999 PR PBB SHADOW E&M-EST. PATIENT-LVL III: CPT | Mod: PBBFAC,,, | Performed by: OPTOMETRIST

## 2023-07-13 PROCEDURE — 92014 COMPRE OPH EXAM EST PT 1/>: CPT | Mod: S$GLB,,, | Performed by: OPTOMETRIST

## 2023-07-13 NOTE — Clinical Note
Hi--  this patient is a personal friend of mine, and since his last eye exam he has developed a pigmented lesion on his LLL near his punctum.  Can you call to sched an eval w Dr Hector?  Thanks!!!

## 2023-07-13 NOTE — PROGRESS NOTES
HPI    81 Y/o male is her for routine eye exam with no C/o about ocular health pt   wear OTC READERS +1.25      Pt denies pain and discomfort   No f/f    Eye med: no gtt   Last edited by Bigg Castillo MA on 7/13/2023  2:08 PM.            Assessment /Plan     For exam results, see Encounter Report.    Presbyopia    Type 2 diabetes mellitus without retinopathy    Glaucoma screening    Eyelid cyst, left      1. Mild pco sp pciol OU--pt happy w otc readers  2. DM- WITHOUT RETINOPATHY.  Advised yearly DFE (pt lost his toe to DM)  3. 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 ) with resultant K scarring OU  4. Pt developed a new pigmented lesion LLL near canthus since my last exam 2020    PLAN:    Will sched eyelid consult w Dr Hector to ro neoplasm  2. rtc 1 yr to me

## 2023-07-17 NOTE — TELEPHONE ENCOUNTER
Care Due:                  Date            Visit Type   Department     Provider  --------------------------------------------------------------------------------                                EP -                              PRIMARY      MET INTERNAL  Last Visit: 05-      CARE ([facility])   MEDICINE       Susie Lazo  Next Visit: None Scheduled  None         None Found                                                            Last  Test          Frequency    Reason                     Performed    Due Date  --------------------------------------------------------------------------------    HBA1C.......  6 months...  insulin..................  11- 05-    [facility] Embedded Care Due Messages. Reference number: [id number].   7/17/2023 12:13:11 PM CDT

## 2023-07-18 ENCOUNTER — HOSPITAL ENCOUNTER (OUTPATIENT)
Dept: CARDIOLOGY | Facility: HOSPITAL | Age: 83
Discharge: HOME | End: 2023-07-18
Attending: INTERNAL MEDICINE
Payer: MEDICARE

## 2023-07-18 DIAGNOSIS — I48.0 PAROXYSMAL ATRIAL FIBRILLATION: ICD-10-CM

## 2023-07-18 PROCEDURE — 93226 XTRNL ECG REC<48 HR SCAN A/R: CPT | Mod: HCNC

## 2023-07-18 PROCEDURE — 93227 XTRNL ECG REC<48 HR R&I: CPT | Mod: HCNC,,, | Performed by: INTERNAL MEDICINE

## 2023-07-18 PROCEDURE — 93227 HOLTER MONITOR - 48 HOUR (CUPID ONLY): ICD-10-PCS | Mod: HCNC,,, | Performed by: INTERNAL MEDICINE

## 2023-07-18 RX ORDER — INSULIN DETEMIR 100 [IU]/ML
INJECTION, SOLUTION SUBCUTANEOUS
Qty: 15 ML | Refills: 3 | Status: SHIPPED | OUTPATIENT
Start: 2023-07-18

## 2023-07-18 NOTE — TELEPHONE ENCOUNTER
Refill Routing Note   Medication(s) are not appropriate for processing by [facility] for the following reason(s):      Required labs outdated    [facility] action(s):  Defer Care Due:  Labs due            Appointments  past 12m or future 3m with PCP    Date Provider   Last Visit   5/9/2023 Susie Lazo MD   Next Visit   Visit date not found Susie Lazo MD   ED visits in past 90 days: 0        Note composed:7:57 AM 07/18/2023

## 2023-07-21 LAB
OHS CV EVENT MONITOR DAY: 0
OHS CV HOLTER LENGTH DECIMAL HOURS: 48
OHS CV HOLTER LENGTH HOURS: 48
OHS CV HOLTER LENGTH MINUTES: 0

## 2023-08-25 DIAGNOSIS — I50.22 CHRONIC SYSTOLIC HEART FAILURE: ICD-10-CM

## 2023-08-25 RX ORDER — SACUBITRIL AND VALSARTAN 49; 51 MG/1; MG/1
1 TABLET, FILM COATED ORAL 2 TIMES DAILY
Qty: 60 TABLET | Refills: 11 | Status: SHIPPED | OUTPATIENT
Start: 2023-08-25 | End: 2023-08-28 | Stop reason: SDUPTHER

## 2023-08-28 DIAGNOSIS — I50.22 CHRONIC SYSTOLIC HEART FAILURE: ICD-10-CM

## 2023-08-29 RX ORDER — SACUBITRIL AND VALSARTAN 49; 51 MG/1; MG/1
1 TABLET, FILM COATED ORAL 2 TIMES DAILY
Qty: 60 TABLET | Refills: 11 | Status: SHIPPED | OUTPATIENT
Start: 2023-08-29 | End: 2023-09-05 | Stop reason: SDUPTHER

## 2023-09-05 DIAGNOSIS — I48.0 PAROXYSMAL ATRIAL FIBRILLATION: Chronic | ICD-10-CM

## 2023-09-05 DIAGNOSIS — I50.22 CHRONIC SYSTOLIC HEART FAILURE: ICD-10-CM

## 2023-09-05 RX ORDER — SACUBITRIL AND VALSARTAN 49; 51 MG/1; MG/1
1 TABLET, FILM COATED ORAL 2 TIMES DAILY
Qty: 60 TABLET | Refills: 1 | Status: SHIPPED | OUTPATIENT
Start: 2023-09-05 | End: 2023-11-03 | Stop reason: SDUPTHER

## 2023-09-19 ENCOUNTER — OFFICE VISIT (OUTPATIENT)
Dept: PRIMARY CARE CLINIC | Facility: CLINIC | Age: 83
End: 2023-09-19
Payer: MEDICARE

## 2023-09-19 VITALS
DIASTOLIC BLOOD PRESSURE: 70 MMHG | OXYGEN SATURATION: 98 % | WEIGHT: 170.44 LBS | HEART RATE: 77 BPM | TEMPERATURE: 98 F | SYSTOLIC BLOOD PRESSURE: 120 MMHG | BODY MASS INDEX: 23.86 KG/M2 | HEIGHT: 71 IN

## 2023-09-19 DIAGNOSIS — I25.10 CORONARY ARTERY DISEASE INVOLVING NATIVE CORONARY ARTERY OF NATIVE HEART WITHOUT ANGINA PECTORIS: Chronic | ICD-10-CM

## 2023-09-19 DIAGNOSIS — Z79.4 TYPE 2 DIABETES MELLITUS WITH DIABETIC NEUROPATHY, WITH LONG-TERM CURRENT USE OF INSULIN: ICD-10-CM

## 2023-09-19 DIAGNOSIS — Z79.01 CHRONIC ANTICOAGULATION: ICD-10-CM

## 2023-09-19 DIAGNOSIS — I50.22 CHRONIC SYSTOLIC HEART FAILURE: Chronic | ICD-10-CM

## 2023-09-19 DIAGNOSIS — B96.89 ACUTE BACTERIAL BRONCHITIS: Primary | ICD-10-CM

## 2023-09-19 DIAGNOSIS — J20.8 ACUTE BACTERIAL BRONCHITIS: Primary | ICD-10-CM

## 2023-09-19 DIAGNOSIS — N18.32 CHRONIC RENAL FAILURE, STAGE 3B: ICD-10-CM

## 2023-09-19 DIAGNOSIS — E11.40 TYPE 2 DIABETES MELLITUS WITH DIABETIC NEUROPATHY, WITH LONG-TERM CURRENT USE OF INSULIN: ICD-10-CM

## 2023-09-19 DIAGNOSIS — D69.6 THROMBOCYTOPENIA, UNSPECIFIED: ICD-10-CM

## 2023-09-19 PROCEDURE — 1101F PR PT FALLS ASSESS DOC 0-1 FALLS W/OUT INJ PAST YR: ICD-10-PCS | Mod: HCNC,CPTII,S$GLB, | Performed by: FAMILY MEDICINE

## 2023-09-19 PROCEDURE — 1159F MED LIST DOCD IN RCRD: CPT | Mod: HCNC,CPTII,S$GLB, | Performed by: FAMILY MEDICINE

## 2023-09-19 PROCEDURE — 1126F AMNT PAIN NOTED NONE PRSNT: CPT | Mod: HCNC,CPTII,S$GLB, | Performed by: FAMILY MEDICINE

## 2023-09-19 PROCEDURE — 1159F PR MEDICATION LIST DOCUMENTED IN MEDICAL RECORD: ICD-10-PCS | Mod: HCNC,CPTII,S$GLB, | Performed by: FAMILY MEDICINE

## 2023-09-19 PROCEDURE — 3288F FALL RISK ASSESSMENT DOCD: CPT | Mod: HCNC,CPTII,S$GLB, | Performed by: FAMILY MEDICINE

## 2023-09-19 PROCEDURE — 99999 PR PBB SHADOW E&M-EST. PATIENT-LVL V: CPT | Mod: PBBFAC,HCNC,, | Performed by: FAMILY MEDICINE

## 2023-09-19 PROCEDURE — 1160F RVW MEDS BY RX/DR IN RCRD: CPT | Mod: HCNC,CPTII,S$GLB, | Performed by: FAMILY MEDICINE

## 2023-09-19 PROCEDURE — 3074F SYST BP LT 130 MM HG: CPT | Mod: HCNC,CPTII,S$GLB, | Performed by: FAMILY MEDICINE

## 2023-09-19 PROCEDURE — 1160F PR REVIEW ALL MEDS BY PRESCRIBER/CLIN PHARMACIST DOCUMENTED: ICD-10-PCS | Mod: HCNC,CPTII,S$GLB, | Performed by: FAMILY MEDICINE

## 2023-09-19 PROCEDURE — 99214 OFFICE O/P EST MOD 30 MIN: CPT | Mod: HCNC,S$GLB,, | Performed by: FAMILY MEDICINE

## 2023-09-19 PROCEDURE — 1126F PR PAIN SEVERITY QUANTIFIED, NO PAIN PRESENT: ICD-10-PCS | Mod: HCNC,CPTII,S$GLB, | Performed by: FAMILY MEDICINE

## 2023-09-19 PROCEDURE — 99214 PR OFFICE/OUTPT VISIT, EST, LEVL IV, 30-39 MIN: ICD-10-PCS | Mod: HCNC,S$GLB,, | Performed by: FAMILY MEDICINE

## 2023-09-19 PROCEDURE — 3074F PR MOST RECENT SYSTOLIC BLOOD PRESSURE < 130 MM HG: ICD-10-PCS | Mod: HCNC,CPTII,S$GLB, | Performed by: FAMILY MEDICINE

## 2023-09-19 PROCEDURE — 3078F DIAST BP <80 MM HG: CPT | Mod: HCNC,CPTII,S$GLB, | Performed by: FAMILY MEDICINE

## 2023-09-19 PROCEDURE — 3288F PR FALLS RISK ASSESSMENT DOCUMENTED: ICD-10-PCS | Mod: HCNC,CPTII,S$GLB, | Performed by: FAMILY MEDICINE

## 2023-09-19 PROCEDURE — 99999 PR PBB SHADOW E&M-EST. PATIENT-LVL V: ICD-10-PCS | Mod: PBBFAC,HCNC,, | Performed by: FAMILY MEDICINE

## 2023-09-19 PROCEDURE — 1101F PT FALLS ASSESS-DOCD LE1/YR: CPT | Mod: HCNC,CPTII,S$GLB, | Performed by: FAMILY MEDICINE

## 2023-09-19 PROCEDURE — 3078F PR MOST RECENT DIASTOLIC BLOOD PRESSURE < 80 MM HG: ICD-10-PCS | Mod: HCNC,CPTII,S$GLB, | Performed by: FAMILY MEDICINE

## 2023-09-19 RX ORDER — PROMETHAZINE HYDROCHLORIDE AND DEXTROMETHORPHAN HYDROBROMIDE 6.25; 15 MG/5ML; MG/5ML
5 SYRUP ORAL NIGHTLY PRN
Qty: 118 ML | Refills: 0 | Status: SHIPPED | OUTPATIENT
Start: 2023-09-19 | End: 2023-09-19 | Stop reason: SDUPTHER

## 2023-09-19 RX ORDER — PROMETHAZINE HYDROCHLORIDE AND DEXTROMETHORPHAN HYDROBROMIDE 6.25; 15 MG/5ML; MG/5ML
5 SYRUP ORAL NIGHTLY PRN
Qty: 118 ML | Refills: 0 | Status: SHIPPED | OUTPATIENT
Start: 2023-09-19 | End: 2023-10-12

## 2023-09-19 RX ORDER — AZITHROMYCIN 250 MG/1
TABLET, FILM COATED ORAL
Qty: 6 TABLET | Refills: 0 | Status: SHIPPED | OUTPATIENT
Start: 2023-09-19 | End: 2023-09-19

## 2023-09-19 RX ORDER — AZITHROMYCIN 250 MG/1
TABLET, FILM COATED ORAL
Qty: 6 TABLET | Refills: 0 | Status: SHIPPED | OUTPATIENT
Start: 2023-09-19 | End: 2023-09-24

## 2023-09-19 RX ORDER — AZITHROMYCIN 250 MG/1
TABLET, FILM COATED ORAL
Qty: 6 TABLET | Refills: 0 | Status: SHIPPED | OUTPATIENT
Start: 2023-09-19 | End: 2023-09-19 | Stop reason: SDUPTHER

## 2023-09-19 NOTE — PROGRESS NOTES
"    /70 (BP Location: Right arm, Patient Position: Sitting, BP Method: Medium (Manual))   Pulse 77   Temp 97.7 °F (36.5 °C) (Oral)   Ht 5' 11" (1.803 m)   Wt 77.3 kg (170 lb 6.7 oz)   SpO2 98%   BMI 23.77 kg/m²       ===========    Chief Complaint: No chief complaint on file.          HPI    Randy Camejo is a 83 y.o. male     here with his wife for    Cough for a week. Tmax 101 which lasted off and on for about a day and a half then fever resolved.  No chills.  No loss of sense of taste or smell.  Appetite is okay.  Hydrating fairly well he states.  Urine is clear to light straw-colored.    Covid negative x two home tests.    Sore throat initially but this improved and then the cough started.    Cough is bad at night. Keeping him awake.  Cough with thick greenish mucus in color and throughout the day.          Patient queried and denies any further complaints    Patient has MEDICAL HISTORY OF  Patient Active Problem List   Diagnosis    Nuclear sclerosis, bilateral    Nuclear sclerotic cataract of left eye    Paroxysmal atrial fibrillation    Type 2 diabetes mellitus with neurologic complication, with long-term current use of insulin    Chronic systolic heart failure    Thrombocytopenia, unspecified    Pre-ulcerative corn or callous    Peripheral vascular disease    Tachycardia induced cardiomyopathy    Coronary artery disease involving native coronary artery of native heart without angina pectoris    Chronic anticoagulation    History of myocardial infarction    History of coronary artery stent placement    Hypertension associated with diabetes    Hyperlipidemia associated with type 2 diabetes mellitus    Subclinical hypothyroidism    Stage 3a chronic kidney disease    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  Past Surgical History:   Procedure Laterality Date    CARDIAC CATHETERIZATION      CARDIOVERSION N/A 2/21/2019    Procedure: CARDIOVERSION;  Surgeon: Panchito Joseph MD;  Location: [facility] CATH LAB;  Service: Cardiology;  Laterality: N/A;    CATARACT EXTRACTION W/  INTRAOCULAR LENS IMPLANT Right 12/18/2017    Dr. Beavers    CATARACT EXTRACTION W/  INTRAOCULAR LENS IMPLANT Left 01/08/2018    Dr. Beavers    TOE AMPUTATION Right 8/31/2019    Procedure: AMPUTATION, 5th TOE possible partial 5th ray amputation;  Surgeon: Kathy Talley DPM;  Location: [facility] OR [address] [place] [address];  Service: Podiatry;  Laterality: Right;    TONSILLECTOMY      TREATMENT OF CARDIAC ARRHYTHMIA N/A 2/25/2019    Procedure: CARDIOVERSION OR DEFIBRILLATION;  Surgeon: Panchito Joseph MD;  Location: [facility] CATH LAB;  Service: Cardiology;  Laterality: N/A;     ALLERGIES updated and reviewed.  Review of patient's allergies indicates:  No Known Allergies    CURRENT OUTPATIENT MEDICATIONS updated and reviewed    Current Outpatient Medications:     acetaminophen (TYLENOL) 325 MG tablet, Take 2 tablets (650 mg total) by mouth every 4 (four) hours as needed., Disp: , Rfl: 0    alcohol swabs PadM, use 2 (two) times a day. E11.49, Disp: 200 each, Rfl: 5    amiodarone (PACERONE) 100 MG Tab, Take 1 tablet (100 mg total) by mouth once daily., Disp: 30 tablet, Rfl: 11    apixaban (ELIQUIS) 2.5 mg Tab, Take 1 tablet (2.5 mg total) by mouth 2 (two) times daily., Disp: 180 tablet, Rfl: 3    atorvastatin (LIPITOR) 10 MG tablet, Take 1 tablet (10 mg total) by mouth once daily., Disp: 90 tablet, Rfl: 1    blood glucose control, low Soln, Use 1 each to check glucose level of glucometer weekly, Disp: 4 each, Rfl: 11    blood sugar diagnostic (TRUE METRIX GLUCOSE TEST STRIP) Strp, use 2 (two) times a day. To check blood sugar, Disp: 200 strip, Rfl: 5    blood sugar diagnostic (TRUE METRIX GLUCOSE TEST  "STRIP) Strp, use 2 (two) times a day. To check blood sugar, Disp: 200 strip, Rfl: 5    blood sugar diagnostic (TRUE METRIX GLUCOSE TEST STRIP) Strp, use 2 (two) times a day. To check blood sugar, Disp: 200 strip, Rfl: 3    carvediloL (COREG) 3.125 MG tablet, Take 1 tablet (3.125 mg total) by mouth 2 (two) times daily., Disp: 180 tablet, Rfl: 3    dimethicone 6 % Crea, Apply 1 application topically once daily. For legs and feet., Disp: 57 g, Rfl: 10    famotidine (PEPCID) 20 MG tablet, Take 1 tablet (20 mg total) by mouth once daily., Disp: 90 tablet, Rfl: 3    furosemide (LASIX) 40 MG tablet, Take 1 tablet (40 mg total) by mouth daily as needed (swelling)., Disp: 30 tablet, Rfl: 11    insulin detemir U-100, Levemir, (LEVEMIR FLEXPEN) 100 unit/mL (3 mL) InPn pen, Inject 8 units into the skin every evening, Disp: 15 mL, Rfl: 3    lancets (TRUEPLUS LANCETS) 33 gauge Misc, use twice a day as directed, Disp: 200 each, Rfl: 5    lancets (TRUEPLUS LANCETS) 33 gauge Misc, use twice a day as directed, Disp: 200 each, Rfl: 5    levothyroxine (SYNTHROID) 112 MCG tablet, TAKE 1 TABLET(112 MCG) BY MOUTH EVERY DAY, Disp: 90 tablet, Rfl: 2    pen needle, diabetic (BD ULTRA-FINE SHORT PEN NEEDLE) 31 gauge x 5/16" Ndle, 1 Device by Misc.(Non-Drug; Combo Route) route once daily. Use with insulin. ICD10: E11.49, Disp: 100 each, Rfl: 3    sacubitriL-valsartan (ENTRESTO) 49-51 mg per tablet, Take 1 tablet by mouth 2 (two) times daily., Disp: 60 tablet, Rfl: 1    triamcinolone acetonide 0.1% (KENALOG) 0.1 % ointment, aaa bid avoid face/ groin, Disp: 80 g, Rfl: 4    TRUE METRIX AIR GLUCOSE METER kit, , Disp: , Rfl:     azithromycin (Z-ANGELA) 250 MG tablet, Take 2 tablets by mouth on day 1; Take 1 tablet by mouth on days 2-5, Disp: 6 tablet, Rfl: 0    promethazine-dextromethorphan (PROMETHAZINE-DM) 6.25-15 mg/5 mL Syrp, Take 5 mLs by mouth nightly as needed (cough)., Disp: 118 mL, Rfl: 0    Review of Systems   Constitutional:  Negative for " "activity change, appetite change, chills, diaphoresis, fatigue, fever and unexpected weight change.   HENT:  Negative for congestion, ear discharge, ear pain, facial swelling, hearing loss, nosebleeds, postnasal drip, rhinorrhea, sinus pressure, sneezing, sore throat, tinnitus, trouble swallowing and voice change.    Eyes:  Negative for photophobia, pain, discharge, redness, itching and visual disturbance.   Respiratory:  Positive for cough and shortness of breath. Negative for chest tightness and wheezing.    Cardiovascular:  Negative for chest pain, palpitations and leg swelling.   Gastrointestinal:  Negative for abdominal distention, abdominal pain, anal bleeding, blood in stool, constipation, diarrhea, nausea, rectal pain and vomiting.   Endocrine: Negative for cold intolerance, heat intolerance, polydipsia, polyphagia and polyuria.   Genitourinary:  Negative for difficulty urinating, dysuria and flank pain.   Musculoskeletal:  Negative for arthralgias, back pain, joint swelling, myalgias and neck pain.   Skin:  Negative for rash.   Neurological:  Negative for dizziness, tremors, seizures, syncope, speech difficulty, weakness, light-headedness, numbness and headaches.   Psychiatric/Behavioral:  Negative for behavioral problems, confusion, decreased concentration, dysphoric mood, sleep disturbance and suicidal ideas. The patient is not nervous/anxious and is not hyperactive.        /70 (BP Location: Right arm, Patient Position: Sitting, BP Method: Medium (Manual))   Pulse 77   Temp 97.7 °F (36.5 °C) (Oral)   Ht 5' 11" (1.803 m)   Wt 77.3 kg (170 lb 6.7 oz)   SpO2 98%   BMI 23.77 kg/m²   Physical Exam  Vitals and nursing note reviewed.   Constitutional:       General: He is not in acute distress.     Appearance: Normal appearance. He is well-developed. He is not ill-appearing, toxic-appearing or diaphoretic.   HENT:      Head: Normocephalic and atraumatic.      Right Ear: Tympanic membrane, ear canal " and external ear normal.      Left Ear: Tympanic membrane, ear canal and external ear normal.      Nose: Nose normal.      Mouth/Throat:      Lips: Pink.      Mouth: Mucous membranes are moist.      Pharynx: No oropharyngeal exudate or posterior oropharyngeal erythema.   Eyes:      General: No scleral icterus.        Right eye: No discharge.         Left eye: No discharge.      Extraocular Movements: Extraocular movements intact.      Conjunctiva/sclera: Conjunctivae normal.   Cardiovascular:      Rate and Rhythm: Normal rate and regular rhythm.      Pulses: Normal pulses.      Heart sounds: Normal heart sounds. No murmur heard.  Pulmonary:      Effort: Pulmonary effort is normal. No respiratory distress.      Breath sounds: Normal breath sounds. No wheezing or rales.   Abdominal:      General: Bowel sounds are normal. There is no distension.      Palpations: Abdomen is soft. There is no mass.      Tenderness: There is no abdominal tenderness. There is no right CVA tenderness, left CVA tenderness, guarding or rebound.      Hernia: No hernia is present.   Musculoskeletal:      Cervical back: Normal range of motion and neck supple. No rigidity or tenderness.   Lymphadenopathy:      Cervical: No cervical adenopathy.   Skin:     General: Skin is warm and dry.   Neurological:      General: No focal deficit present.      Mental Status: He is alert and oriented to person, place, and time. Mental status is at baseline.   Psychiatric:         Mood and Affect: Mood normal.         Behavior: Behavior normal. Behavior is cooperative.         ASSESSMENT/PLAN  Diagnoses and all orders for this visit:    Acute bacterial bronchitis    Thrombocytopenia, unspecified    Coronary artery disease involving native coronary artery of native heart without angina pectoris    Chronic systolic heart failure    Chronic renal failure, stage 3b    Chronic anticoagulation    Type 2 diabetes mellitus with diabetic neuropathy, with long-term current  use of insulin    Other orders  -     Discontinue: promethazine-dextromethorphan (PROMETHAZINE-DM) 6.25-15 mg/5 mL Syrp; Take 5 mLs by mouth nightly as needed (cough).  -     Discontinue: azithromycin (Z-ANGELA) 250 MG tablet; Take 2 tablets by mouth on day 1; Take 1 tablet by mouth on days 2-5  -     Discontinue: azithromycin (Z-ANGELA) 250 MG tablet; Take 2 tablets by mouth on day 1; Take 1 tablet by mouth on days 2-5  -     promethazine-dextromethorphan (PROMETHAZINE-DM) 6.25-15 mg/5 mL Syrp; Take 5 mLs by mouth nightly as needed (cough).  -     azithromycin (Z-ANGELA) 250 MG tablet; Take 2 tablets by mouth on day 1; Take 1 tablet by mouth on days 2-5        Follow-up with PCP if not improving in 2 weeks.  See treatment plan above and below    Hydrate, rest, Mucinex Expectorant as directed for thinning out the mucus; or MucinexDM if with significant cough and mucus.  Zyrtec, Xyzal, Allegra or Claritin. (Would lean towards Allegra which may be a bit more effective than claritin and will not cause sedation as Xyzal or Zyrtec may.)  Nasal saline spray such as [place] brand as needed.  Flonase or Nasonex nasal spray after the nasal saline.  Warm salt water gargles and warm liquids may help soothe a sore throat better than cool liquids.  Tylenol as directed as needed for fever, body aches, headaches.   All are OTC  Most of all, stay well-hydrated.      All lab results over past 2 years available reviewed inc labs and any cardiology or radiology studies.  Any new prescription medications gone over in detail including reason for taking the medication, the general mechanism of action, most common possible side effects and possible costs, etcetera.    Chronic conditions updated. Other than changes or additions as above, cont current medications and maintain follow-up with specialists if indicated.     Lj Vega MD  A dictation device was used to produce this document. Use of such devices sometimes results in grammatical  errors or replacement of words that sound similarly.

## 2023-09-19 NOTE — PATIENT INSTRUCTIONS
Hydrate, rest, Mucinex Expectorant as directed for thinning out the mucus; or MucinexDM if with significant cough and mucus.  Zyrtec, Xyzal, Allegra or Claritin. (Would lean towards Allegra which may be a bit more effective than claritin and will not cause sedation as Xyzal or Zyrtec may.)  Nasal saline spray such as [place] brand as needed.  Flonase or Nasonex nasal spray after the nasal saline.  Warm salt water gargles and warm liquids may help soothe a sore throat better than cool liquids.  Tylenol as directed as needed for fever, body aches, headaches.   All are OTC  Most of all, stay well-hydrated.

## 2023-10-05 ENCOUNTER — OFFICE VISIT (OUTPATIENT)
Dept: PODIATRY | Facility: CLINIC | Age: 83
End: 2023-10-05
Payer: MEDICARE

## 2023-10-05 VITALS
HEART RATE: 60 BPM | BODY MASS INDEX: 23.8 KG/M2 | SYSTOLIC BLOOD PRESSURE: 118 MMHG | HEIGHT: 71 IN | WEIGHT: 170 LBS | DIASTOLIC BLOOD PRESSURE: 73 MMHG

## 2023-10-05 DIAGNOSIS — B35.1 DERMATOPHYTOSIS OF NAIL: ICD-10-CM

## 2023-10-05 DIAGNOSIS — L84 CORN OR CALLUS: ICD-10-CM

## 2023-10-05 DIAGNOSIS — Z79.01 CHRONIC ANTICOAGULATION: ICD-10-CM

## 2023-10-05 DIAGNOSIS — B35.1 ONYCHOMYCOSIS DUE TO DERMATOPHYTE: ICD-10-CM

## 2023-10-05 DIAGNOSIS — Z89.421 HISTORY OF PARTIAL RAY AMPUTATION OF FIFTH TOE OF RIGHT FOOT: ICD-10-CM

## 2023-10-05 DIAGNOSIS — Z79.4 TYPE 2 DIABETES MELLITUS WITH DIABETIC NEUROPATHY, WITH LONG-TERM CURRENT USE OF INSULIN: Primary | ICD-10-CM

## 2023-10-05 DIAGNOSIS — E11.40 TYPE 2 DIABETES MELLITUS WITH DIABETIC NEUROPATHY, WITH LONG-TERM CURRENT USE OF INSULIN: Primary | ICD-10-CM

## 2023-10-05 PROCEDURE — 1159F MED LIST DOCD IN RCRD: CPT | Mod: HCNC,CPTII,S$GLB, | Performed by: PODIATRIST

## 2023-10-05 PROCEDURE — 1159F PR MEDICATION LIST DOCUMENTED IN MEDICAL RECORD: ICD-10-PCS | Mod: HCNC,CPTII,S$GLB, | Performed by: PODIATRIST

## 2023-10-05 PROCEDURE — 1160F PR REVIEW ALL MEDS BY PRESCRIBER/CLIN PHARMACIST DOCUMENTED: ICD-10-PCS | Mod: HCNC,CPTII,S$GLB, | Performed by: PODIATRIST

## 2023-10-05 PROCEDURE — 11056 PARNG/CUTG B9 HYPRKR LES 2-4: CPT | Mod: Q7,HCNC,S$GLB, | Performed by: PODIATRIST

## 2023-10-05 PROCEDURE — 99213 OFFICE O/P EST LOW 20 MIN: CPT | Mod: 25,HCNC,S$GLB, | Performed by: PODIATRIST

## 2023-10-05 PROCEDURE — 1126F PR PAIN SEVERITY QUANTIFIED, NO PAIN PRESENT: ICD-10-PCS | Mod: HCNC,CPTII,S$GLB, | Performed by: PODIATRIST

## 2023-10-05 PROCEDURE — 11056 ROUTINE FOOT CARE: ICD-10-PCS | Mod: Q7,HCNC,S$GLB, | Performed by: PODIATRIST

## 2023-10-05 PROCEDURE — 99213 PR OFFICE/OUTPT VISIT, EST, LEVL III, 20-29 MIN: ICD-10-PCS | Mod: 25,HCNC,S$GLB, | Performed by: PODIATRIST

## 2023-10-05 PROCEDURE — 3074F PR MOST RECENT SYSTOLIC BLOOD PRESSURE < 130 MM HG: ICD-10-PCS | Mod: HCNC,CPTII,S$GLB, | Performed by: PODIATRIST

## 2023-10-05 PROCEDURE — 3078F DIAST BP <80 MM HG: CPT | Mod: HCNC,CPTII,S$GLB, | Performed by: PODIATRIST

## 2023-10-05 PROCEDURE — 1126F AMNT PAIN NOTED NONE PRSNT: CPT | Mod: HCNC,CPTII,S$GLB, | Performed by: PODIATRIST

## 2023-10-05 PROCEDURE — 3074F SYST BP LT 130 MM HG: CPT | Mod: HCNC,CPTII,S$GLB, | Performed by: PODIATRIST

## 2023-10-05 PROCEDURE — 3288F PR FALLS RISK ASSESSMENT DOCUMENTED: ICD-10-PCS | Mod: HCNC,CPTII,S$GLB, | Performed by: PODIATRIST

## 2023-10-05 PROCEDURE — 3288F FALL RISK ASSESSMENT DOCD: CPT | Mod: HCNC,CPTII,S$GLB, | Performed by: PODIATRIST

## 2023-10-05 PROCEDURE — 1101F PR PT FALLS ASSESS DOC 0-1 FALLS W/OUT INJ PAST YR: ICD-10-PCS | Mod: HCNC,CPTII,S$GLB, | Performed by: PODIATRIST

## 2023-10-05 PROCEDURE — 11721 DEBRIDE NAIL 6 OR MORE: CPT | Mod: 59,Q7,HCNC,S$GLB | Performed by: PODIATRIST

## 2023-10-05 PROCEDURE — 11721 ROUTINE FOOT CARE: ICD-10-PCS | Mod: 59,Q7,HCNC,S$GLB | Performed by: PODIATRIST

## 2023-10-05 PROCEDURE — 1160F RVW MEDS BY RX/DR IN RCRD: CPT | Mod: HCNC,CPTII,S$GLB, | Performed by: PODIATRIST

## 2023-10-05 PROCEDURE — 99999 PR PBB SHADOW E&M-EST. PATIENT-LVL V: CPT | Mod: PBBFAC,HCNC,, | Performed by: PODIATRIST

## 2023-10-05 PROCEDURE — 99999 PR PBB SHADOW E&M-EST. PATIENT-LVL V: ICD-10-PCS | Mod: PBBFAC,HCNC,, | Performed by: PODIATRIST

## 2023-10-05 PROCEDURE — 3078F PR MOST RECENT DIASTOLIC BLOOD PRESSURE < 80 MM HG: ICD-10-PCS | Mod: HCNC,CPTII,S$GLB, | Performed by: PODIATRIST

## 2023-10-05 PROCEDURE — 1101F PT FALLS ASSESS-DOCD LE1/YR: CPT | Mod: HCNC,CPTII,S$GLB, | Performed by: PODIATRIST

## 2023-10-05 RX ORDER — CLOTRIMAZOLE 1 %
CREAM (GRAM) TOPICAL 2 TIMES DAILY
Qty: 60 G | Refills: 3 | Status: SHIPPED | OUTPATIENT
Start: 2023-10-05

## 2023-10-05 NOTE — PROGRESS NOTES
Chief Concern:  Foot Problem (Patient coming in for routine foot care do to Chronic anticoagulation.)      HPI:  Randy Camejo is a 83 y.o. male presents for foot exam.  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        The patient is under the active care of his PCP Dr. Susie Lazo MD for chronic conditions, including diabetes.    He last saw his PCP Foot Problem (Patient coming in for routine foot care do to Chronic anticoagulation.)   He is also on Eliquis for atrial fibrillation     Last PCP visit:   on 9/19/2023      Patient Active Problem List   Diagnosis    Nuclear sclerosis, bilateral    Nuclear sclerotic cataract of left eye    Paroxysmal atrial fibrillation    Type 2 diabetes mellitus with neurologic complication, with long-term current use of insulin    Chronic systolic heart failure    Thrombocytopenia, unspecified    Pre-ulcerative corn or callous    Peripheral vascular disease    Tachycardia induced cardiomyopathy    Coronary artery disease involving native coronary artery of native heart without angina pectoris    Chronic anticoagulation    History of myocardial infarction    History of coronary artery stent placement    Hypertension associated with diabetes    Hyperlipidemia associated with type 2 diabetes mellitus    Subclinical hypothyroidism    Stage 3a chronic kidney disease    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  0    alcohol swabs PadM use 2 (two) times a day. E11.49 200 each 5    amiodarone (PACERONE) 100 MG Tab  "Take 1 tablet (100 mg total) by mouth once daily. 30 tablet 11    apixaban (ELIQUIS) 2.5 mg Tab Take 1 tablet (2.5 mg total) by mouth 2 (two) times daily. 180 tablet 3    atorvastatin (LIPITOR) 10 MG tablet Take 1 tablet (10 mg total) by mouth once daily. 90 tablet 1    blood glucose control, low Soln Use 1 each to check glucose level of glucometer weekly 4 each 11    blood sugar diagnostic (TRUE METRIX GLUCOSE TEST STRIP) Strp use 2 (two) times a day. To check blood sugar 200 strip 5    blood sugar diagnostic (TRUE METRIX GLUCOSE TEST STRIP) Strp use 2 (two) times a day. To check blood sugar 200 strip 5    blood sugar diagnostic (TRUE METRIX GLUCOSE TEST STRIP) Strp use 2 (two) times a day. To check blood sugar 200 strip 3    carvediloL (COREG) 3.125 MG tablet Take 1 tablet (3.125 mg total) by mouth 2 (two) times daily. 180 tablet 3    dimethicone 6 % Crea Apply 1 application topically once daily. For legs and feet. 57 g 10    famotidine (PEPCID) 20 MG tablet Take 1 tablet (20 mg total) by mouth once daily. 90 tablet 3    furosemide (LASIX) 40 MG tablet Take 1 tablet (40 mg total) by mouth daily as needed (swelling). 30 tablet 11    insulin detemir U-100, Levemir, (LEVEMIR FLEXPEN) 100 unit/mL (3 mL) InPn pen Inject 8 units into the skin every evening 15 mL 3    lancets (TRUEPLUS LANCETS) 33 gauge Misc use twice a day as directed 200 each 5    lancets (TRUEPLUS LANCETS) 33 gauge Misc use twice a day as directed 200 each 5    levothyroxine (SYNTHROID) 112 MCG tablet TAKE 1 TABLET(112 MCG) BY MOUTH EVERY DAY 90 tablet 2    pen needle, diabetic (BD ULTRA-FINE SHORT PEN NEEDLE) 31 gauge x 5/16" Ndle 1 Device by Misc.(Non-Drug; Combo Route) route once daily. Use with insulin. ICD10: E11.49 100 each 3    promethazine-dextromethorphan (PROMETHAZINE-DM) 6.25-15 mg/5 mL Syrp Take 5 mLs by mouth nightly as needed (cough). 118 mL 0    sacubitriL-valsartan (ENTRESTO) 49-51 mg per tablet Take 1 tablet by mouth " "2 (two) times daily. 60 tablet 1    triamcinolone acetonide 0.1% (KENALOG) 0.1 % ointment aaa bid avoid face/ groin 80 g 4    TRUE METRIX AIR GLUCOSE METER kit        No current facility-administered medications on file prior to visit.           Review of patient's allergies indicates:  No Known Allergies              Objective:      Vitals:    10/05/23 1450   BP: 118/73   Pulse: 60   Weight: 77.1 kg (170 lb)   Height: 5' 11" (1.803 m)         Physical Exam  Constitutional:       General: He is not in acute distress.     Appearance: He is well-developed. He is not diaphoretic.   Cardiovascular:      Pulses:           Dorsalis pedis pulses are 1+ on the right side and 1+ on the left side.        Posterior tibial pulses are 1+ on the right side and 1+ on the left side.      Comments: Toes warm, pink, cap fill <5 seconds.  Musculoskeletal:      Comments: Partial 5th ray resection right foot without symptoms.   Lymphadenopathy:      Comments: Negative lymphadenopathy bilateral popliteal fossa and tarsal tunnel.     Skin:     General: Skin is warm and dry.      Coloration: Skin is not pale.      Findings: No abrasion, bruising, burn, ecchymosis, erythema, laceration, lesion or rash.      Comments:   Skin thin, shiny, atrophic.     Neurological:      Sensory: Sensory deficit present.      Comments: Diminished/loss of protective sensation all toes bilateral to 10 gram monofilament.                     Assessment:       Encounter Diagnoses   Name Primary?   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 To toenails and area around the toes.    Corn or callus    Onychomycosis due to dermatophyte          I counseled the patient on his conditions, their implications and medical management.  Shoe inspection.     Continue good nutrition and blood sugar control to help prevent podiatric complications of diabetes.   Maintain proper foot hygiene.   Continue wearing proper shoe gear, daily foot inspections, never walking without protective shoe gear, never putting sharp instruments to feet.  Separately routine foot care      Routine Foot Care    Date/Time: 10/5/2023 2:30 PM    Performed by: Jess Rosa DPM  Authorized by: Jess Rosa DPM    Consent Done?:  Yes (Verbal)  Hyperkeratotic Skin Lesions?: Yes    Number of trimmed lesions:  2  Location(s):  Left 2nd Toe and Left 1st Toe    Nail Care Type:  Debride(Left 1st Toe, Left 3rd Toe, Left 2nd Toe, Left 4th Toe, Left 5th Toe, Right 1st Toe, Right 2nd Toe, Right 3rd Toe and Right 4th Toe)  Patient tolerance:  Patient tolerated the procedure well with no immediate complications     With patient's permission, the toenails mentioned above were reduced and debrided using a nail nipper, removing offending nail and debris.   Utilizing a #15 scalpel, I trimmed the corns and calluses at the above mentioned location.      The patient will continue to monitor the areas daily, inspect the feet, wear protective shoe gear when ambulatory, and moisturizer to maintain skin integrity.        Follow up 2-3months or sooner if concerned.

## 2023-11-03 DIAGNOSIS — I50.22 CHRONIC SYSTOLIC HEART FAILURE: ICD-10-CM

## 2023-11-05 DIAGNOSIS — I50.22 HEART FAILURE, SYSTOLIC, CHRONIC: ICD-10-CM

## 2023-11-05 DIAGNOSIS — E78.5 HYPERLIPIDEMIA, UNSPECIFIED HYPERLIPIDEMIA TYPE: ICD-10-CM

## 2023-11-06 RX ORDER — SACUBITRIL AND VALSARTAN 49; 51 MG/1; MG/1
1 TABLET, FILM COATED ORAL 2 TIMES DAILY
Qty: 60 TABLET | Refills: 6 | Status: SHIPPED | OUTPATIENT
Start: 2023-11-06

## 2023-11-06 RX ORDER — ATORVASTATIN CALCIUM 10 MG/1
10 TABLET, FILM COATED ORAL DAILY
Qty: 90 TABLET | Refills: 1 | Status: SHIPPED | OUTPATIENT
Start: 2023-11-06

## 2023-11-06 RX ORDER — CARVEDILOL 3.12 MG/1
3.12 TABLET ORAL 2 TIMES DAILY
Qty: 180 TABLET | Refills: 3 | Status: SHIPPED | OUTPATIENT
Start: 2023-11-06

## 2023-11-06 NOTE — TELEPHONE ENCOUNTER
No care due was identified.  [facility] Embedded Care Due Messages. Reference number: [id number].   11/05/2023 11:18:43 PM CST

## 2023-11-06 NOTE — TELEPHONE ENCOUNTER
Care Due:                  Date            Visit Type   Department     Provider  --------------------------------------------------------------------------------                                EP -                              PRIMARY      [facility] INTERNAL  Last Visit: 05-      CARE ([facility])   MEDICINE       Susie  Clifton                              MYCHART                              FOLLOWUP/OF  [facility] INTERNAL  Next Visit: 01-      FICE VISIT   MEDICINE       [facility]                                                            Last  Test          Frequency    Reason                     Performed    Due Date  --------------------------------------------------------------------------------    CMP.........  12 months..  atorvastatin, famotidine,   11-   10-                             insulin..................    HBA1C.......  6 months...  insulin..................  11- 05-    Lipid Panel.  12 months..  atorvastatin.............  11-   10-    [facility] Embedded Care Due Messages. Reference number: [id number].   11/05/2023 11:16:14 PM CST

## 2023-11-13 ENCOUNTER — PATIENT MESSAGE (OUTPATIENT)
Dept: ADMINISTRATIVE | Facility: HOSPITAL | Age: 83
End: 2023-11-13
Payer: MEDICARE

## 2023-11-14 RX ORDER — AMIODARONE HYDROCHLORIDE 100 MG/1
100 TABLET ORAL DAILY
Qty: 30 TABLET | Refills: 11 | Status: SHIPPED | OUTPATIENT
Start: 2023-11-14

## 2023-11-22 ENCOUNTER — PATIENT MESSAGE (OUTPATIENT)
Dept: ADMINISTRATIVE | Facility: HOSPITAL | Age: 83
End: 2023-11-22
Payer: MEDICARE

## 2023-11-22 ENCOUNTER — PATIENT OUTREACH (OUTPATIENT)
Dept: ADMINISTRATIVE | Facility: HOSPITAL | Age: 83
End: 2023-11-22
Payer: MEDICARE

## 2023-11-22 NOTE — PROGRESS NOTES

## 2023-12-04 NOTE — TELEPHONE ENCOUNTER
No care due was identified.  [facility] Embedded Care Due Messages. Reference number: [id number].   12/04/2023 1:04:08 PM CST

## 2023-12-04 NOTE — PROGRESS NOTES
[facility] Note    Chief Complaint      Chief Complaint   Patient presents with    Medicare AWV         History of Present Illness      Randy Camejo is a 83 y.o. male who presents today for   Chief Complaint   Patient presents with    Medicare AWV           Patient presents to the clinic for his annual wellness exam required by medicare. He reports feeling well today. There are no complaints today.       Review of Systems   All 12 systems otherwise negative.     Family History:  family history includes Cancer in his father; Cataracts in his father; Diabetes in his mother; Stroke in his mother.   Family history was reviewed with patient.     Medications:  Outpatient Encounter Medications as of 12/19/2023   Medication Sig Dispense Refill    acetaminophen (TYLENOL) 325 MG tablet Take 2 tablets (650 mg total) by mouth every 4 (four) hours as needed.  0    alcohol swabs PadM use 2 (two) times a day. E11.49 200 each 5    amiodarone (PACERONE) 100 MG Tab Take 1 tablet (100 mg total) by mouth once daily. 30 tablet 11    apixaban (ELIQUIS) 2.5 mg Tab Take 1 tablet (2.5 mg total) by mouth 2 (two) times daily. 180 tablet 3    atorvastatin (LIPITOR) 10 MG tablet Take 1 tablet (10 mg total) by mouth once daily. 90 tablet 1    blood glucose control, low Soln Use 1 each to check glucose level of glucometer weekly 4 each 11    blood sugar diagnostic (TRUE METRIX GLUCOSE TEST STRIP) Strp use 2 (two) times a day. To check blood sugar 200 strip 5    blood sugar diagnostic (TRUE METRIX GLUCOSE TEST STRIP) Strp use 2 (two) times a day. To check blood sugar 200 strip 5    blood sugar diagnostic (TRUE METRIX GLUCOSE TEST STRIP) Strp use 2 (two) times a day. To check blood sugar 200 strip 3    carvediloL (COREG) 3.125 MG tablet Take 1 tablet (3.125 mg total) by mouth 2 (two) times daily. 180 tablet 3    clotrimazole (LOTRIMIN) 1 % cream Apply topically 2 (two) times daily. To toenails and area around the  "toes. 60 g 3    dimethicone 6 % Crea Apply 1 application topically once daily. For legs and feet. 57 g 10    famotidine (PEPCID) 20 MG tablet Take 1 tablet (20 mg total) by mouth once daily. 90 tablet 3    furosemide (LASIX) 40 MG tablet Take 1 tablet (40 mg total) by mouth daily as needed (swelling). 30 tablet 11    insulin detemir U-100, Levemir, (LEVEMIR FLEXPEN) 100 unit/mL (3 mL) InPn pen Inject 8 units into the skin every evening 15 mL 3    lancets (TRUEPLUS LANCETS) 33 gauge Misc use twice a day as directed 200 each 5    lancets (TRUEPLUS LANCETS) 33 gauge Misc use twice a day as directed 200 each 5    levothyroxine (SYNTHROID) 112 MCG tablet Take 1 tablet (112 mcg total) by mouth once daily. 90 tablet 2    pen needle, diabetic (BD ULTRA-FINE SHORT PEN NEEDLE) 31 gauge x 5/16" Ndle 1 Device by Misc.(Non-Drug; Combo Route) route once daily. Use with insulin. ICD10: E11.49 100 each 3    sacubitriL-valsartan (ENTRESTO) 49-51 mg per tablet Take 1 tablet by mouth 2 (two) times daily. 60 tablet 6    triamcinolone acetonide 0.1% (KENALOG) 0.1 % ointment aaa bid avoid face/ groin 80 g 4    TRUE METRIX AIR GLUCOSE METER kit       [DISCONTINUED] famotidine (PEPCID) 20 MG tablet Take 1 tablet (20 mg total) by mouth once daily. 90 tablet 3    [DISCONTINUED] levothyroxine (SYNTHROID) 112 MCG tablet TAKE 1 TABLET(112 MCG) BY MOUTH EVERY DAY 90 tablet 2     No facility-administered encounter medications on file as of 12/19/2023.     Review for Opioid Screening: Pt does not have Rx for Opioids    Review for Substance Use Disorders: Patient does not have a controlled substance abuse disorder.        Allergies:  Review of patient's allergies indicates:  No Known Allergies    Health Maintenance:  Health Maintenance   Topic Date Due    Hemoglobin A1c  05/03/2023    Lipid Panel  11/03/2023    TETANUS VACCINE  12/19/2024 (Originally 7/16/1958)    Shingles Vaccine (1 of 2) 12/19/2024 (Originally 7/16/1991)    Eye " "Exam  07/13/2024     Health Maintenance Topics with due status: Not Due       Topic Last Completion Date    Eye Exam 07/13/2023       Physical Exam      Vital Signs  Temp: 97.6 °F (36.4 °C)  Temp Source: Oral  Pulse: 69  Resp: 16  SpO2: 97 %  BP: 118/72  BP Location: Right arm  Patient Position: Sitting  Pain Score: 0-No pain  Height and Weight  Height: 5' 11" (180.3 cm)  Weight: 78.7 kg (173 lb 8 oz)  BSA (Calculated - sq m): 1.99 sq meters  BMI (Calculated): 24.2  Weight in (lb) to have BMI = 25: 178.9]    Physical Exam  Vitals reviewed.   Constitutional:       Appearance: Normal appearance. He is normal weight.   HENT:      Head: Normocephalic and atraumatic.      Nose: Nose normal.      Mouth/Throat:      Mouth: Mucous membranes are moist.      Pharynx: Oropharynx is clear.   Eyes:      Extraocular Movements: Extraocular movements intact.      Conjunctiva/sclera: Conjunctivae normal.      Pupils: Pupils are equal, round, and reactive to light.   Cardiovascular:      Rate and Rhythm: Normal rate and regular rhythm.      Pulses: Normal pulses.      Heart sounds: Normal heart sounds.   Pulmonary:      Effort: Pulmonary effort is normal.      Breath sounds: Normal breath sounds.   Musculoskeletal:      Cervical back: Normal range of motion and neck supple.   Skin:     General: Skin is warm and dry.      Capillary Refill: Capillary refill takes less than 2 seconds.   Neurological:      General: No focal deficit present.      Mental Status: He is alert and oriented to person, place, and time. Mental status is at baseline.   Psychiatric:         Mood and Affect: Mood normal.         Behavior: Behavior normal.         Thought Content: Thought content normal.         Judgment: Judgment normal.          Assessment/Plan     Randy Camejo is a 83 y.o.male with:    Abnormality of gait and mobility    Encounter for Medicare annual wellness exam  -     Ambulatory Referral/Consult to [facility] Annual Wellness Visit " (eAWV)    Encounter for preventive health examination      As above, continue current medications and maintain follow up with specialists.  Return to clinic as needed.    Greater than 50% of visit was spent face to face with patient.  All questions were answered to patient's satisfaction.           Danya Sandhu, JENNIFER-C [facility]    I offered to discuss advanced care planning, including how to pick a person who would make decisions for you if you were unable to make them for yourself, called a health care power of , and what kind of decisions you might make such as use of life sustaining treatments such as ventilators and tube feeding when faced with a life limiting illness recorded on a living will that they will need to know. (How you want to be cared for as you near the end of your natural life)     X Patient is interested in learning more about how to make advanced directives.  I provided them paperwork and offered to discuss this with them.

## 2023-12-05 RX ORDER — LEVOTHYROXINE SODIUM 112 UG/1
112 TABLET ORAL DAILY
Qty: 90 TABLET | Refills: 2 | Status: SHIPPED | OUTPATIENT
Start: 2023-12-05

## 2023-12-05 RX ORDER — FAMOTIDINE 20 MG/1
20 TABLET, FILM COATED ORAL DAILY
Qty: 90 TABLET | Refills: 3 | Status: SHIPPED | OUTPATIENT
Start: 2023-12-05

## 2023-12-19 ENCOUNTER — OFFICE VISIT (OUTPATIENT)
Dept: PRIMARY CARE CLINIC | Facility: CLINIC | Age: 83
End: 2023-12-19
Payer: MEDICARE

## 2023-12-19 VITALS
RESPIRATION RATE: 16 BRPM | HEIGHT: 71 IN | SYSTOLIC BLOOD PRESSURE: 118 MMHG | WEIGHT: 173.5 LBS | BODY MASS INDEX: 24.29 KG/M2 | OXYGEN SATURATION: 97 % | HEART RATE: 69 BPM | TEMPERATURE: 98 F | DIASTOLIC BLOOD PRESSURE: 72 MMHG

## 2023-12-19 DIAGNOSIS — R26.9 ABNORMALITY OF GAIT AND MOBILITY: Primary | ICD-10-CM

## 2023-12-19 DIAGNOSIS — Z00.00 ENCOUNTER FOR PREVENTIVE HEALTH EXAMINATION: ICD-10-CM

## 2023-12-19 DIAGNOSIS — Z00.00 ENCOUNTER FOR MEDICARE ANNUAL WELLNESS EXAM: ICD-10-CM

## 2023-12-19 PROCEDURE — 1160F PR REVIEW ALL MEDS BY PRESCRIBER/CLIN PHARMACIST DOCUMENTED: ICD-10-PCS | Mod: HCNC,CPTII,S$GLB, | Performed by: NURSE PRACTITIONER

## 2023-12-19 PROCEDURE — 1126F AMNT PAIN NOTED NONE PRSNT: CPT | Mod: HCNC,CPTII,S$GLB, | Performed by: NURSE PRACTITIONER

## 2023-12-19 PROCEDURE — 3078F DIAST BP <80 MM HG: CPT | Mod: HCNC,CPTII,S$GLB, | Performed by: NURSE PRACTITIONER

## 2023-12-19 PROCEDURE — 1170F FXNL STATUS ASSESSED: CPT | Mod: HCNC,CPTII,S$GLB, | Performed by: NURSE PRACTITIONER

## 2023-12-19 PROCEDURE — 3288F FALL RISK ASSESSMENT DOCD: CPT | Mod: HCNC,CPTII,S$GLB, | Performed by: NURSE PRACTITIONER

## 2023-12-19 PROCEDURE — 1101F PT FALLS ASSESS-DOCD LE1/YR: CPT | Mod: HCNC,CPTII,S$GLB, | Performed by: NURSE PRACTITIONER

## 2023-12-19 PROCEDURE — 1160F RVW MEDS BY RX/DR IN RCRD: CPT | Mod: HCNC,CPTII,S$GLB, | Performed by: NURSE PRACTITIONER

## 2023-12-19 PROCEDURE — G0439 PR MEDICARE ANNUAL WELLNESS SUBSEQUENT VISIT: ICD-10-PCS | Mod: HCNC,S$GLB,, | Performed by: NURSE PRACTITIONER

## 2023-12-19 PROCEDURE — 1101F PR PT FALLS ASSESS DOC 0-1 FALLS W/OUT INJ PAST YR: ICD-10-PCS | Mod: HCNC,CPTII,S$GLB, | Performed by: NURSE PRACTITIONER

## 2023-12-19 PROCEDURE — G0439 PPPS, SUBSEQ VISIT: HCPCS | Mod: HCNC,S$GLB,, | Performed by: NURSE PRACTITIONER

## 2023-12-19 PROCEDURE — 3288F PR FALLS RISK ASSESSMENT DOCUMENTED: ICD-10-PCS | Mod: HCNC,CPTII,S$GLB, | Performed by: NURSE PRACTITIONER

## 2023-12-19 PROCEDURE — 3078F PR MOST RECENT DIASTOLIC BLOOD PRESSURE < 80 MM HG: ICD-10-PCS | Mod: HCNC,CPTII,S$GLB, | Performed by: NURSE PRACTITIONER

## 2023-12-19 PROCEDURE — 1170F PR FUNCTIONAL STATUS ASSESSED: ICD-10-PCS | Mod: HCNC,CPTII,S$GLB, | Performed by: NURSE PRACTITIONER

## 2023-12-19 PROCEDURE — 3074F PR MOST RECENT SYSTOLIC BLOOD PRESSURE < 130 MM HG: ICD-10-PCS | Mod: HCNC,CPTII,S$GLB, | Performed by: NURSE PRACTITIONER

## 2023-12-19 PROCEDURE — 1159F PR MEDICATION LIST DOCUMENTED IN MEDICAL RECORD: ICD-10-PCS | Mod: HCNC,CPTII,S$GLB, | Performed by: NURSE PRACTITIONER

## 2023-12-19 PROCEDURE — 3074F SYST BP LT 130 MM HG: CPT | Mod: HCNC,CPTII,S$GLB, | Performed by: NURSE PRACTITIONER

## 2023-12-19 PROCEDURE — 99999 PR PBB SHADOW E&M-EST. PATIENT-LVL V: CPT | Mod: PBBFAC,HCNC,, | Performed by: NURSE PRACTITIONER

## 2023-12-19 PROCEDURE — 1126F PR PAIN SEVERITY QUANTIFIED, NO PAIN PRESENT: ICD-10-PCS | Mod: HCNC,CPTII,S$GLB, | Performed by: NURSE PRACTITIONER

## 2023-12-19 PROCEDURE — 99999 PR PBB SHADOW E&M-EST. PATIENT-LVL V: ICD-10-PCS | Mod: PBBFAC,HCNC,, | Performed by: NURSE PRACTITIONER

## 2023-12-19 PROCEDURE — 1159F MED LIST DOCD IN RCRD: CPT | Mod: HCNC,CPTII,S$GLB, | Performed by: NURSE PRACTITIONER

## 2023-12-19 NOTE — PATIENT INSTRUCTIONS
Counseling and Referral of Other Preventative  (Italic type indicates deductible and co-insurance are waived)    Patient Name: Randy Camejo  Today's Date: 12/19/2023    Health Maintenance       Date Due Completion Date    Hemoglobin A1c 05/03/2023 11/3/2022    COVID-19 Vaccine (5 - 2023-24 season) 09/01/2023 6/15/2022    Lipid Panel 11/03/2023 11/3/2022    Diabetes Urine Screening 11/03/2023 11/3/2022    RSV Vaccine (Age 60+ and Pregnant patients) (1 - 1-dose 60+ series) 12/19/2023 (Originally 7/16/2001) ---    TETANUS VACCINE 12/19/2024 (Originally 7/16/1958) ---    Shingles Vaccine (1 of 2) 12/19/2024 (Originally 7/16/1991) ---    Eye Exam 07/13/2024 7/13/2023        No orders of the defined types were placed in this encounter.      The following information is provided to all patients.  This information is to help you find resources for any of the problems found today that may be affecting your health:                Living healthy guide: www.[facility].[place].[facility]      Understanding Diabetes: www.diabetes.org      Eating healthy: www.cdc.gov/healthyweight      CDC home safety checklist: www.cdc.gov/steadi/patient.html      Agency on Aging: www.goea.[place]      Alcoholics anonymous (AA): www.aa.org      Physical Activity: www.jess.nih.gov/bj7tekv      Tobacco use: www.quitwithusla.org     Counseling and Referral of Other Preventative  (Italic type indicates deductible and co-insurance are waived)    Patient Name: Randy Camejo  Today's Date: 12/19/2023    Health Maintenance       Date Due Completion Date    Hemoglobin A1c 05/03/2023 11/3/2022    COVID-19 Vaccine (5 - 2023-24 season) 09/01/2023 6/15/2022    Lipid Panel 11/03/2023 11/3/2022    Diabetes Urine Screening 11/03/2023 11/3/2022    RSV Vaccine (Age 60+ and Pregnant patients) (1 - 1-dose 60+ series) 12/19/2023 (Originally 7/16/2001) ---    TETANUS VACCINE 12/19/2024 (Originally [birth year]16/1958) ---    Shingles Vaccine (1 of 2) 12/19/2024 (Originally 7/16/1991)  ---    Eye Exam 07/13/2024 7/13/2023        No orders of the defined types were placed in this encounter.      The following information is provided to all patients.  This information is to help you find resources for any of the problems found today that may be affecting your health:                Living healthy guide: www.[facility].[place]      Understanding Diabetes: www.diabetes.org      Eating healthy: www.cdc.gov/healthyweight      CDC home safety checklist: www.cdc.gov/steadi/patient.html      Agency on Aging: www.goea.[place]      Alcoholics anonymous (AA): www.aa.org      Physical Activity: www.jess.nih.gov/am2rphy      Tobacco use: www.quitwithusla.org

## 2023-12-23 DIAGNOSIS — I48.0 PAROXYSMAL ATRIAL FIBRILLATION: Chronic | ICD-10-CM

## 2024-02-09 NOTE — PROGRESS NOTES
[facility] Note    Chief Complaint      Chief Complaint   Patient presents with    [facility]    Annual Exam       History of Present Illness      Randy Camejo is a 83 y.o. male who presents today for   Chief Complaint   Patient presents with    [facility]    Annual Exam         Patient is new to me.  He presents to clinic today to establish primary care with me for his annual medical exam.  He also reports having dry skin which has been a chronic issue of the year he has tried clotrimazole ointment and triamcinolone ointment with no resolution of dry skin.  He is present with his wife today.  He reports feeling well today.  There are no other complaints         Review of Systems   All 12 systems otherwise negative.       Family History:  family history includes Atrial fibrillation in his brother; Cataracts in his father; Diabetes in his brother, maternal grandmother, mother, and paternal grandmother; Heart disease in his son; Hypertension in his son; No Known Problems in his daughter, half-brother, half-sister, half-sister, maternal grandfather, paternal grandfather, and son; Stomach cancer in his father; Stroke in his mother.   Family history was reviewed with patient.     Medications:  Outpatient Encounter Medications as of 2/28/2024   Medication Sig Dispense Refill    acetaminophen (TYLENOL) 325 MG tablet Take 2 tablets (650 mg total) by mouth every 4 (four) hours as needed.  0    alcohol swabs PadM use 2 (two) times a day. E11.49 200 each 5    amiodarone (PACERONE) 100 MG Tab Take 1 tablet (100 mg total) by mouth once daily. 30 tablet 11    apixaban (ELIQUIS) 2.5 mg Tab Take 1 tablet (2.5 mg total) by mouth 2 (two) times daily. 180 tablet 3    atorvastatin (LIPITOR) 10 MG tablet Take 1 tablet (10 mg total) by mouth once daily. 90 tablet 1    blood glucose control, low Soln Use 1 each to check glucose level of glucometer weekly 4 each 11    blood sugar diagnostic (TRUE METRIX GLUCOSE  "TEST STRIP) Strp use 2 (two) times a day. To check blood sugar 200 strip 5    blood sugar diagnostic (TRUE METRIX GLUCOSE TEST STRIP) Strp use 2 (two) times a day. To check blood sugar 200 strip 5    blood sugar diagnostic (TRUE METRIX GLUCOSE TEST STRIP) Strp use 2 (two) times a day. To check blood sugar 200 strip 3    carvediloL (COREG) 3.125 MG tablet Take 1 tablet (3.125 mg total) by mouth 2 (two) times daily. 180 tablet 3    clotrimazole (LOTRIMIN) 1 % cream Apply topically 2 (two) times daily. To toenails and area around the toes. 60 g 3    dimethicone 6 % Crea Apply 1 application topically once daily. For legs and feet. 57 g 10    famotidine (PEPCID) 20 MG tablet Take 1 tablet (20 mg total) by mouth once daily. 90 tablet 3    furosemide (LASIX) 40 MG tablet Take 1 tablet (40 mg total) by mouth daily as needed (swelling). 30 tablet 11    insulin detemir U-100, Levemir, (LEVEMIR FLEXPEN) 100 unit/mL (3 mL) InPn pen Inject 8 units into the skin every evening 15 mL 3    lancets (TRUEPLUS LANCETS) 33 gauge Misc use twice a day as directed 200 each 5    lancets (TRUEPLUS LANCETS) 33 gauge Misc use twice a day as directed 200 each 5    levothyroxine (SYNTHROID) 112 MCG tablet Take 1 tablet (112 mcg total) by mouth once daily. 90 tablet 2    pen needle, diabetic (BD ULTRA-FINE SHORT PEN NEEDLE) 31 gauge x 5/16" Ndle 1 Device by Misc.(Non-Drug; Combo Route) route once daily. Use with insulin. ICD10: E11.49 100 each 3    sacubitriL-valsartan (ENTRESTO) 49-51 mg per tablet Take 1 tablet by mouth 2 (two) times daily. 60 tablet 6    triamcinolone acetonide 0.1% (KENALOG) 0.1 % ointment aaa bid avoid face/ groin 80 g 4    TRUE METRIX AIR GLUCOSE METER kit       [DISCONTINUED] blood sugar diagnostic (TRUE METRIX GLUCOSE TEST STRIP) Strp use 2 (two) times a day. To check blood sugar 200 strip 3     No facility-administered encounter medications on file as of 2/28/2024.       Allergies:  Review of patient's allergies " "indicates:  No Known Allergies    Health Maintenance:  Health Maintenance   Topic Date Due    Hemoglobin A1c  05/03/2023    Lipid Panel  11/03/2023    TETANUS VACCINE  12/19/2024 (Originally 7/16/1958)    Shingles Vaccine (1 of 2) 12/19/2024 (Originally 7/16/1991)    Eye Exam  07/13/2024     Health Maintenance Topics with due status: Not Due       Topic Last Completion Date    Eye Exam 07/13/2023       Physical Exam      Vital Signs  Temp: 97.6 °F (36.4 °C)  Temp Source: Oral  Pulse: 70  Resp: 16  SpO2: 98 %  BP: 120/70  BP Location: Right arm  Patient Position: Sitting  Pain Score: 0-No pain  Height and Weight  Height: 5' 11" (180.3 cm)  Weight: 81.3 kg (179 lb 3.7 oz)  BSA (Calculated - sq m): 2.02 sq meters  BMI (Calculated): 25  Weight in (lb) to have BMI = 25: 178.9]    Physical Exam  Vitals reviewed.   Constitutional:       Appearance: Normal appearance. He is normal weight.   HENT:      Head: Normocephalic and atraumatic.      Right Ear: Tympanic membrane, ear canal and external ear normal.      Left Ear: Tympanic membrane, ear canal and external ear normal.      Nose: Nose normal.      Mouth/Throat:      Mouth: Mucous membranes are moist.      Pharynx: Oropharynx is clear.   Eyes:      Extraocular Movements: Extraocular movements intact.      Conjunctiva/sclera: Conjunctivae normal.      Pupils: Pupils are equal, round, and reactive to light.   Cardiovascular:      Rate and Rhythm: Normal rate and regular rhythm.      Pulses: Normal pulses.      Heart sounds: Normal heart sounds.   Pulmonary:      Effort: Pulmonary effort is normal.      Breath sounds: Normal breath sounds.   Abdominal:      General: Abdomen is flat. Bowel sounds are normal.      Palpations: Abdomen is soft.   Musculoskeletal:         General: Normal range of motion.      Cervical back: Normal range of motion and neck supple.      Comments: + ambulates with assistance of a cane.   Skin:     General: Skin is warm and dry.      Capillary " Refill: Capillary refill takes less than 2 seconds.   Neurological:      General: No focal deficit present.      Mental Status: He is alert and oriented to person, place, and time. Mental status is at baseline.   Psychiatric:         Mood and Affect: Mood normal.         Behavior: Behavior normal.         Thought Content: Thought content normal.         Judgment: Judgment normal.            Assessment/Plan     Randy Camejo is a 83 y.o.male with:    Routine medical exam  -     Comprehensive Metabolic Panel; Future; Expected date: 02/28/2024    Mixed dyslipidemia  -     Lipid Panel; Future; Expected date: 02/28/2024    Chronic renal failure, stage 3b  -     Comprehensive Metabolic Panel; Future; Expected date: 02/28/2024    Type 2 diabetes mellitus with diabetic neuropathy, with long-term current use of insulin  -     Hemoglobin A1C; Future; Expected date: 02/28/2024    Thrombocytopenia, unspecified  -     CBC Auto Differential; Future; Expected date: 02/28/2024    Hypothyroidism, unspecified type  -     T4, Free; Future; Expected date: 02/28/2024  -     TSH; Future; Expected date: 02/28/2024    Dry skin  -     Ambulatory referral/consult to Dermatology; Future; Expected date: 03/06/2024    Impacted cerumen, unspecified laterality  -     Ambulatory referral/consult to ENT; Future; Expected date: 03/06/2024        As above, continue current medications and maintain follow up with specialists.  Return to clinic as needed.    Greater than 50% of visit was spent face to face with patient.  All questions were answered to patient's satisfaction.          Karen L Spencer, NP-C [facility]

## 2024-02-22 DIAGNOSIS — Z79.4 TYPE 2 DIABETES MELLITUS WITH DIABETIC NEUROPATHY, WITH LONG-TERM CURRENT USE OF INSULIN: Primary | ICD-10-CM

## 2024-02-22 DIAGNOSIS — E11.40 TYPE 2 DIABETES MELLITUS WITH DIABETIC NEUROPATHY, WITH LONG-TERM CURRENT USE OF INSULIN: Primary | ICD-10-CM

## 2024-02-22 NOTE — TELEPHONE ENCOUNTER
Provider Staff:  Action required for this patient    Requires labs      Please see care gap opportunities below in Care Due Message.    Thanks!  [facility]     Appointments      Date Provider   Last Visit   5/9/2023 Susie Lazo MD   Next Visit   Visit date not found Susie Lazo MD     Refill Decision Note   Randy Camejo  is requesting a refill authorization.  Brief Assessment and Rationale for Refill:  Approve     Medication Therapy Plan:         Alert overridden per protocol: Yes   Comments:     Note composed:2:19 PM 02/22/2024

## 2024-02-22 NOTE — TELEPHONE ENCOUNTER
Care Due:                  Date            Visit Type   Department     Provider  --------------------------------------------------------------------------------                                EP -                              PRIMARY      [facility] INTERNAL  Last Visit: 05-      CARE ([facility])   MEDICINE       Susie Lazo  Next Visit: None Scheduled  None         None Found                                                            Last  Test          Frequency    Reason                     Performed    Due Date  --------------------------------------------------------------------------------    CMP.........  12 months..  atorvastatin, famotidine,   11-   10-                             insulin..................    HBA1C.......  6 months...  insulin..................  11- 05-    Lipid Panel.  12 months..  atorvastatin.............  11-   10-    TSH.........  12 months..  levothyroxine............  11-   10-    Health [facility] Embedded Care Due Messages. Reference number: [id number].   2/22/2024 2:06:37 PM CST

## 2024-02-26 ENCOUNTER — PATIENT MESSAGE (OUTPATIENT)
Dept: ADMINISTRATIVE | Facility: HOSPITAL | Age: 84
End: 2024-02-26
Payer: MEDICARE

## 2024-02-26 ENCOUNTER — PATIENT MESSAGE (OUTPATIENT)
Dept: PRIMARY CARE CLINIC | Facility: CLINIC | Age: 84
End: 2024-02-26
Payer: MEDICARE

## 2024-02-27 ENCOUNTER — PATIENT OUTREACH (OUTPATIENT)
Dept: ADMINISTRATIVE | Facility: HOSPITAL | Age: 84
End: 2024-02-27
Payer: MEDICARE

## 2024-02-27 NOTE — PROGRESS NOTES

## 2024-02-28 ENCOUNTER — OFFICE VISIT (OUTPATIENT)
Dept: PRIMARY CARE CLINIC | Facility: CLINIC | Age: 84
End: 2024-02-28
Payer: MEDICARE

## 2024-02-28 VITALS
HEART RATE: 70 BPM | BODY MASS INDEX: 25.1 KG/M2 | DIASTOLIC BLOOD PRESSURE: 70 MMHG | OXYGEN SATURATION: 98 % | HEIGHT: 71 IN | RESPIRATION RATE: 16 BRPM | SYSTOLIC BLOOD PRESSURE: 120 MMHG | WEIGHT: 179.25 LBS | TEMPERATURE: 98 F

## 2024-02-28 DIAGNOSIS — L85.3 DRY SKIN: ICD-10-CM

## 2024-02-28 DIAGNOSIS — H61.20 IMPACTED CERUMEN, UNSPECIFIED LATERALITY: ICD-10-CM

## 2024-02-28 DIAGNOSIS — Z00.00 ROUTINE MEDICAL EXAM: Primary | ICD-10-CM

## 2024-02-28 DIAGNOSIS — E03.9 HYPOTHYROIDISM, UNSPECIFIED TYPE: ICD-10-CM

## 2024-02-28 DIAGNOSIS — E78.2 MIXED DYSLIPIDEMIA: ICD-10-CM

## 2024-02-28 DIAGNOSIS — Z79.4 TYPE 2 DIABETES MELLITUS WITH DIABETIC NEUROPATHY, WITH LONG-TERM CURRENT USE OF INSULIN: ICD-10-CM

## 2024-02-28 DIAGNOSIS — E11.40 TYPE 2 DIABETES MELLITUS WITH DIABETIC NEUROPATHY, WITH LONG-TERM CURRENT USE OF INSULIN: ICD-10-CM

## 2024-02-28 DIAGNOSIS — D69.6 THROMBOCYTOPENIA, UNSPECIFIED: ICD-10-CM

## 2024-02-28 DIAGNOSIS — N18.32 CHRONIC RENAL FAILURE, STAGE 3B: ICD-10-CM

## 2024-02-28 PROBLEM — Z89.421 HISTORY OF PARTIAL RAY AMPUTATION OF FIFTH TOE OF RIGHT FOOT: Status: RESOLVED | Noted: 2020-07-30 | Resolved: 2024-02-28

## 2024-02-28 PROBLEM — R55 SYNCOPE: Status: RESOLVED | Noted: 2021-01-05 | Resolved: 2024-02-28

## 2024-02-28 PROBLEM — I83.008: Status: RESOLVED | Noted: 2023-06-28 | Resolved: 2024-02-28

## 2024-02-28 PROBLEM — L97.801: Status: RESOLVED | Noted: 2023-06-28 | Resolved: 2024-02-28

## 2024-02-28 PROCEDURE — 1159F MED LIST DOCD IN RCRD: CPT | Mod: HCNC,CPTII,S$GLB, | Performed by: NURSE PRACTITIONER

## 2024-02-28 PROCEDURE — 3078F DIAST BP <80 MM HG: CPT | Mod: HCNC,CPTII,S$GLB, | Performed by: NURSE PRACTITIONER

## 2024-02-28 PROCEDURE — 1160F RVW MEDS BY RX/DR IN RCRD: CPT | Mod: HCNC,CPTII,S$GLB, | Performed by: NURSE PRACTITIONER

## 2024-02-28 PROCEDURE — 99397 PER PM REEVAL EST PAT 65+ YR: CPT | Mod: HCNC,S$GLB,, | Performed by: NURSE PRACTITIONER

## 2024-02-28 PROCEDURE — 3074F SYST BP LT 130 MM HG: CPT | Mod: HCNC,CPTII,S$GLB, | Performed by: NURSE PRACTITIONER

## 2024-02-28 PROCEDURE — 3072F LOW RISK FOR RETINOPATHY: CPT | Mod: HCNC,CPTII,S$GLB, | Performed by: NURSE PRACTITIONER

## 2024-02-28 PROCEDURE — 99999 PR PBB SHADOW E&M-EST. PATIENT-LVL V: CPT | Mod: PBBFAC,HCNC,, | Performed by: NURSE PRACTITIONER

## 2024-02-28 PROCEDURE — 1126F AMNT PAIN NOTED NONE PRSNT: CPT | Mod: HCNC,CPTII,S$GLB, | Performed by: NURSE PRACTITIONER

## 2024-03-25 ENCOUNTER — OFFICE VISIT (OUTPATIENT)
Dept: PODIATRY | Facility: CLINIC | Age: 84
End: 2024-03-25
Payer: MEDICARE

## 2024-03-25 VITALS
HEIGHT: 71 IN | HEART RATE: 74 BPM | WEIGHT: 179.25 LBS | SYSTOLIC BLOOD PRESSURE: 102 MMHG | DIASTOLIC BLOOD PRESSURE: 67 MMHG | BODY MASS INDEX: 25.1 KG/M2

## 2024-03-25 DIAGNOSIS — B35.1 DERMATOPHYTOSIS OF NAIL: ICD-10-CM

## 2024-03-25 DIAGNOSIS — Z79.4 TYPE 2 DIABETES MELLITUS WITH DIABETIC NEUROPATHY, WITH LONG-TERM CURRENT USE OF INSULIN: Primary | ICD-10-CM

## 2024-03-25 DIAGNOSIS — E11.40 TYPE 2 DIABETES MELLITUS WITH DIABETIC NEUROPATHY, WITH LONG-TERM CURRENT USE OF INSULIN: Primary | ICD-10-CM

## 2024-03-25 DIAGNOSIS — Z89.421 HISTORY OF PARTIAL RAY AMPUTATION OF FIFTH TOE OF RIGHT FOOT: ICD-10-CM

## 2024-03-25 DIAGNOSIS — L84 CORN OR CALLUS: Chronic | ICD-10-CM

## 2024-03-25 PROCEDURE — 99212 OFFICE O/P EST SF 10 MIN: CPT | Mod: 25,HCNC,S$GLB, | Performed by: PODIATRIST

## 2024-03-25 PROCEDURE — 11721 DEBRIDE NAIL 6 OR MORE: CPT | Mod: 59,Q7,HCNC,S$GLB | Performed by: PODIATRIST

## 2024-03-25 PROCEDURE — 1160F RVW MEDS BY RX/DR IN RCRD: CPT | Mod: HCNC,CPTII,S$GLB, | Performed by: PODIATRIST

## 2024-03-25 PROCEDURE — 3078F DIAST BP <80 MM HG: CPT | Mod: HCNC,CPTII,S$GLB, | Performed by: PODIATRIST

## 2024-03-25 PROCEDURE — 3288F FALL RISK ASSESSMENT DOCD: CPT | Mod: HCNC,CPTII,S$GLB, | Performed by: PODIATRIST

## 2024-03-25 PROCEDURE — 99999 PR PBB SHADOW E&M-EST. PATIENT-LVL V: CPT | Mod: PBBFAC,HCNC,, | Performed by: PODIATRIST

## 2024-03-25 PROCEDURE — 3072F LOW RISK FOR RETINOPATHY: CPT | Mod: HCNC,CPTII,S$GLB, | Performed by: PODIATRIST

## 2024-03-25 PROCEDURE — 1159F MED LIST DOCD IN RCRD: CPT | Mod: HCNC,CPTII,S$GLB, | Performed by: PODIATRIST

## 2024-03-25 PROCEDURE — 1101F PT FALLS ASSESS-DOCD LE1/YR: CPT | Mod: HCNC,CPTII,S$GLB, | Performed by: PODIATRIST

## 2024-03-25 PROCEDURE — 1126F AMNT PAIN NOTED NONE PRSNT: CPT | Mod: HCNC,CPTII,S$GLB, | Performed by: PODIATRIST

## 2024-03-25 PROCEDURE — 3074F SYST BP LT 130 MM HG: CPT | Mod: HCNC,CPTII,S$GLB, | Performed by: PODIATRIST

## 2024-03-25 PROCEDURE — 11055 PARING/CUTG B9 HYPRKER LES 1: CPT | Mod: Q7,HCNC,S$GLB, | Performed by: PODIATRIST

## 2024-03-25 NOTE — PROGRESS NOTES
Chief Concern:  Diabetic Foot Exam (Foot Exam/PCP Danya Sandhu NP  02/28/24)      HPI:  Randy Camejo is a 83 y.o. male presents for foot exam.  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        The patient is under the active care of his PCP Dr. Susie Lazo MD for chronic conditions, including diabetes.    He last saw his PCP on 2/28/2024.      He is also on Eliquis for atrial fibrillation           Patient Active Problem List   Diagnosis    Nuclear sclerosis, bilateral    Nuclear sclerotic cataract of left eye    Paroxysmal atrial fibrillation    Type 2 diabetes mellitus with neurologic complication, with long-term current use of insulin    Chronic systolic heart failure    Thrombocytopenia, unspecified    Pre-ulcerative corn or callous    Peripheral vascular disease    Tachycardia induced cardiomyopathy    Coronary artery disease involving native coronary artery of native heart without angina pectoris    Chronic anticoagulation    History of myocardial infarction    History of coronary artery stent placement    Hypertension associated with diabetes    Hyperlipidemia associated with type 2 diabetes mellitus    Subclinical hypothyroidism    Stage 3a chronic kidney disease    Chronic renal failure, stage 3b    Long term current use of amiodarone           Current Outpatient Medications on File Prior to Visit   Medication Sig Dispense Refill    acetaminophen (TYLENOL) 325 MG tablet Take 2 tablets (650 mg total) by mouth every 4 (four) hours as needed.  0    alcohol swabs PadM use 2 (two) times a day. E11.49 200 each 5    amiodarone (PACERONE) 100 MG Tab Take 1 tablet (100 mg total) by mouth once daily. 30 tablet 11    apixaban (ELIQUIS) 2.5 mg Tab Take 1 tablet (2.5 mg total) by mouth 2 (two) times daily. 180 tablet 3    atorvastatin (LIPITOR) 10 MG tablet Take 1 tablet (10 mg total) by mouth once daily. 90 tablet 1    blood glucose control, low Soln Use 1 each to check glucose level of  "glucometer weekly 4 each 11    blood sugar diagnostic (TRUE METRIX GLUCOSE TEST STRIP) Strp use 2 (two) times a day. To check blood sugar 200 strip 5    blood sugar diagnostic (TRUE METRIX GLUCOSE TEST STRIP) Strp use 2 (two) times a day. To check blood sugar 200 strip 5    blood sugar diagnostic (TRUE METRIX GLUCOSE TEST STRIP) Strp use 2 (two) times a day. To check blood sugar 200 strip 3    carvediloL (COREG) 3.125 MG tablet Take 1 tablet (3.125 mg total) by mouth 2 (two) times daily. 180 tablet 3    clotrimazole (LOTRIMIN) 1 % cream Apply topically 2 (two) times daily. To toenails and area around the toes. 60 g 3    dimethicone 6 % Crea Apply 1 application topically once daily. For legs and feet. 57 g 10    famotidine (PEPCID) 20 MG tablet Take 1 tablet (20 mg total) by mouth once daily. 90 tablet 3    furosemide (LASIX) 40 MG tablet Take 1 tablet (40 mg total) by mouth daily as needed (swelling). 30 tablet 11    insulin detemir U-100, Levemir, (LEVEMIR FLEXPEN) 100 unit/mL (3 mL) InPn pen Inject 8 units into the skin every evening 15 mL 3    lancets (TRUEPLUS LANCETS) 33 gauge Misc use twice a day as directed 200 each 5    lancets (TRUEPLUS LANCETS) 33 gauge Misc use twice a day as directed 200 each 5    levothyroxine (SYNTHROID) 112 MCG tablet Take 1 tablet (112 mcg total) by mouth once daily. 90 tablet 2    pen needle, diabetic (BD ULTRA-FINE SHORT PEN NEEDLE) 31 gauge x 5/16" Ndle 1 Device by Misc.(Non-Drug; Combo Route) route once daily. Use with insulin. ICD10: E11.49 100 each 3    sacubitriL-valsartan (ENTRESTO) 49-51 mg per tablet Take 1 tablet by mouth 2 (two) times daily. 60 tablet 6    triamcinolone acetonide 0.1% (KENALOG) 0.1 % ointment aaa bid avoid face/ groin 80 g 4    TRUE METRIX AIR GLUCOSE METER kit        No current facility-administered medications on file prior to visit.           Review of patient's allergies indicates:  No Known Allergies              Objective:      Vitals:    03/25/24 " "1458   BP: 102/67   Pulse: 74   Weight: 81.3 kg (179 lb 3.7 oz)   Height: 5' 11" (1.803 m)         Physical Exam  Constitutional:       General: He is not in acute distress.     Appearance: He is well-developed. He is not diaphoretic.   Cardiovascular:      Pulses:           Dorsalis pedis pulses are 1+ on the right side and 1+ on the left side.        Posterior tibial pulses are 1+ on the right side and 1+ on the left side.      Comments: Toes warm, pink, cap fill <5 seconds.  Musculoskeletal:      Comments: Partial 5th ray resection right foot without symptoms.   Lymphadenopathy:      Comments: Negative lymphadenopathy bilateral popliteal fossa and tarsal tunnel.     Skin:     General: Skin is warm and dry.      Coloration: Skin is not pale.      Findings: No abrasion, bruising, burn, ecchymosis, erythema, laceration, lesion or rash.      Comments:   Skin thin, shiny, atrophic.     Neurological:      Sensory: Sensory deficit present.      Comments: Diminished/loss of protective sensation all toes bilateral to 10 gram monofilament.                     Assessment:       Encounter Diagnoses   Name Primary?    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  Shoe inspection.     Continue good nutrition and blood " "sugar control to help prevent podiatric complications of diabetes.   Maintain proper foot hygiene.   Continue wearing proper shoe gear, daily foot inspections, never walking without protective shoe gear, never putting sharp instruments to feet.  Separately routine foot care      Routine Foot Care    Date/Time: 3/25/2024 3:00 PM    Performed by: Jess Rosa DPM  Authorized by: Jess Rosa DPM    Time out: Immediately prior to procedure a "time out" was called to verify the correct patient, procedure, equipment, support staff and site/side marked as required.    Hyperkeratotic Skin Lesions?: Yes    Number of trimmed lesions:  1  Location(s):  Left 2nd Toe    Nail Care Type:  Debride(Left 1st Toe, Left 3rd Toe, Left 2nd Toe, Left 4th Toe, Left 5th Toe, Right 1st Toe, Right 2nd Toe, Right 3rd Toe and Right 4th Toe)  Patient tolerance:  Patient tolerated the procedure well with no immediate complications     With patient's permission, the toenails mentioned above were reduced and debrided using a nail nipper, removing offending nail and debris.   Utilizing a #15 scalpel, I trimmed the corns and calluses at the above mentioned location.      The patient will continue to monitor the areas daily, inspect the feet, wear protective shoe gear when ambulatory, and moisturizer to maintain skin integrity.              "

## 2024-04-02 RX ORDER — PEN NEEDLE, DIABETIC 30 GX3/16"
NEEDLE, DISPOSABLE MISCELLANEOUS
Qty: 100 EACH | Refills: 3 | Status: SHIPPED | OUTPATIENT
Start: 2024-04-02

## 2024-04-10 ENCOUNTER — TELEPHONE (OUTPATIENT)
Dept: OTOLARYNGOLOGY | Facility: CLINIC | Age: 84
End: 2024-04-10
Payer: MEDICARE

## 2024-04-22 ENCOUNTER — LAB VISIT (OUTPATIENT)
Dept: LAB | Facility: HOSPITAL | Age: 84
End: 2024-04-22
Payer: MEDICARE

## 2024-04-22 DIAGNOSIS — N18.32 CHRONIC RENAL FAILURE, STAGE 3B: ICD-10-CM

## 2024-04-22 DIAGNOSIS — Z00.00 ROUTINE MEDICAL EXAM: ICD-10-CM

## 2024-04-22 DIAGNOSIS — D69.6 THROMBOCYTOPENIA, UNSPECIFIED: ICD-10-CM

## 2024-04-22 DIAGNOSIS — E78.2 MIXED DYSLIPIDEMIA: ICD-10-CM

## 2024-04-22 DIAGNOSIS — Z79.4 TYPE 2 DIABETES MELLITUS WITH DIABETIC NEUROPATHY, WITH LONG-TERM CURRENT USE OF INSULIN: ICD-10-CM

## 2024-04-22 DIAGNOSIS — E11.40 TYPE 2 DIABETES MELLITUS WITH DIABETIC NEUROPATHY, WITH LONG-TERM CURRENT USE OF INSULIN: ICD-10-CM

## 2024-04-22 DIAGNOSIS — E03.9 HYPOTHYROIDISM, UNSPECIFIED TYPE: ICD-10-CM

## 2024-04-22 LAB
ALBUMIN SERPL BCP-MCNC: 3.7 G/DL (ref 3.5–5.2)
ALP SERPL-CCNC: 65 U/L (ref 55–135)
ALT SERPL W/O P-5'-P-CCNC: 12 U/L (ref 10–44)
ANION GAP SERPL CALC-SCNC: 7 MMOL/L (ref 8–16)
AST SERPL-CCNC: 17 U/L (ref 10–40)
BASOPHILS # BLD AUTO: 0.03 K/UL (ref 0–0.2)
BASOPHILS NFR BLD: 0.7 % (ref 0–1.9)
BILIRUB SERPL-MCNC: 0.7 MG/DL (ref 0.1–1)
BUN SERPL-MCNC: 21 MG/DL (ref 8–23)
CALCIUM SERPL-MCNC: 9 MG/DL (ref 8.7–10.5)
CHLORIDE SERPL-SCNC: 106 MMOL/L (ref 95–110)
CHOLEST SERPL-MCNC: 74 MG/DL (ref 120–199)
CHOLEST/HDLC SERPL: 2.2 {RATIO} (ref 2–5)
CO2 SERPL-SCNC: 26 MMOL/L (ref 23–29)
CREAT SERPL-MCNC: 1.4 MG/DL (ref 0.5–1.4)
DIFFERENTIAL METHOD BLD: ABNORMAL
EOSINOPHIL # BLD AUTO: 0.2 K/UL (ref 0–0.5)
EOSINOPHIL NFR BLD: 3.6 % (ref 0–8)
ERYTHROCYTE [DISTWIDTH] IN BLOOD BY AUTOMATED COUNT: 14.2 % (ref 11.5–14.5)
EST. GFR  (NO RACE VARIABLE): 50.2 ML/MIN/1.73 M^2
ESTIMATED AVG GLUCOSE: 131 MG/DL (ref 68–131)
GLUCOSE SERPL-MCNC: 92 MG/DL (ref 70–110)
HBA1C MFR BLD: 6.2 % (ref 4–5.6)
HCT VFR BLD AUTO: 41.1 % (ref 40–54)
HDLC SERPL-MCNC: 33 MG/DL (ref 40–75)
HDLC SERPL: 44.6 % (ref 20–50)
HGB BLD-MCNC: 12.7 G/DL (ref 14–18)
IMM GRANULOCYTES # BLD AUTO: 0.02 K/UL (ref 0–0.04)
IMM GRANULOCYTES NFR BLD AUTO: 0.4 % (ref 0–0.5)
LDLC SERPL CALC-MCNC: 31.8 MG/DL (ref 63–159)
LYMPHOCYTES # BLD AUTO: 0.9 K/UL (ref 1–4.8)
LYMPHOCYTES NFR BLD: 20.4 % (ref 18–48)
MCH RBC QN AUTO: 33.2 PG (ref 27–31)
MCHC RBC AUTO-ENTMCNC: 30.9 G/DL (ref 32–36)
MCV RBC AUTO: 107 FL (ref 82–98)
MONOCYTES # BLD AUTO: 0.6 K/UL (ref 0.3–1)
MONOCYTES NFR BLD: 12.8 % (ref 4–15)
NEUTROPHILS # BLD AUTO: 2.8 K/UL (ref 1.8–7.7)
NEUTROPHILS NFR BLD: 62.1 % (ref 38–73)
NONHDLC SERPL-MCNC: 41 MG/DL
NRBC BLD-RTO: 0 /100 WBC
PLATELET # BLD AUTO: 96 K/UL (ref 150–450)
PMV BLD AUTO: 12.1 FL (ref 9.2–12.9)
POTASSIUM SERPL-SCNC: 4.9 MMOL/L (ref 3.5–5.1)
PROT SERPL-MCNC: 7.5 G/DL (ref 6–8.4)
RBC # BLD AUTO: 3.83 M/UL (ref 4.6–6.2)
SODIUM SERPL-SCNC: 139 MMOL/L (ref 136–145)
T4 FREE SERPL-MCNC: 1.24 NG/DL (ref 0.71–1.51)
TRIGL SERPL-MCNC: 46 MG/DL (ref 30–150)
TSH SERPL DL<=0.005 MIU/L-ACNC: 3.31 UIU/ML (ref 0.4–4)
WBC # BLD AUTO: 4.47 K/UL (ref 3.9–12.7)

## 2024-04-22 PROCEDURE — 84443 ASSAY THYROID STIM HORMONE: CPT | Mod: HCNC | Performed by: NURSE PRACTITIONER

## 2024-04-22 PROCEDURE — 84439 ASSAY OF FREE THYROXINE: CPT | Mod: HCNC | Performed by: NURSE PRACTITIONER

## 2024-04-22 PROCEDURE — 80061 LIPID PANEL: CPT | Mod: HCNC | Performed by: NURSE PRACTITIONER

## 2024-04-22 PROCEDURE — 80053 COMPREHEN METABOLIC PANEL: CPT | Mod: HCNC | Performed by: NURSE PRACTITIONER

## 2024-04-22 PROCEDURE — 85025 COMPLETE CBC W/AUTO DIFF WBC: CPT | Mod: HCNC | Performed by: NURSE PRACTITIONER

## 2024-04-22 PROCEDURE — 83036 HEMOGLOBIN GLYCOSYLATED A1C: CPT | Mod: HCNC | Performed by: NURSE PRACTITIONER

## 2024-04-22 PROCEDURE — 36415 COLL VENOUS BLD VENIPUNCTURE: CPT | Mod: HCNC,PN | Performed by: NURSE PRACTITIONER

## 2024-04-23 ENCOUNTER — PATIENT MESSAGE (OUTPATIENT)
Dept: PRIMARY CARE CLINIC | Facility: CLINIC | Age: 84
End: 2024-04-23
Payer: MEDICARE

## 2024-04-30 ENCOUNTER — TELEPHONE (OUTPATIENT)
Dept: PODIATRY | Facility: CLINIC | Age: 84
End: 2024-04-30
Payer: MEDICARE

## 2024-05-03 DIAGNOSIS — E78.5 HYPERLIPIDEMIA, UNSPECIFIED HYPERLIPIDEMIA TYPE: ICD-10-CM

## 2024-05-03 RX ORDER — ATORVASTATIN CALCIUM 10 MG/1
10 TABLET, FILM COATED ORAL DAILY
Qty: 90 TABLET | Refills: 1 | Status: SHIPPED | OUTPATIENT
Start: 2024-05-03

## 2024-05-03 NOTE — TELEPHONE ENCOUNTER
Please see the attached refill request.   Post Op Note     6/16/2019  Jenny Caro is a 65 y.o. female status post: feels well overall, no temps since 3/28/201,vaginal drainage has decreased over the past days. Pain well controlled, tolerating PO and ambulating without difficulty. Has PO Abx to cover through Friday not sure if additional dosing is needed.     3/13/19:  PROCEDURE:    1. Robotic assisted Sacrocolpopexy with permanent mesh ([place] Ref: [id number] Lot# [id number])   2. Cystourethroscopy  3. Posterior colporrhaphy     doing well with some problems : post operative abscess, admitted by Dr. Marino for IV  antibiotics and managed without patient Augmentin/flgyl changed to Vantin/ Flagyl 4/5/2018. Managed by Dr. Hill.  Doing well no fever chills or abnormal discharge.    Incontinence episode: No,   OAB symptoms: No   Incomplete emptying: No  Prolapse symptoms: No      4/1/2019 CT scan : 4.6 cm mixed density collection concerning for abscess formation within the right adnexa abutting the sigmoid colon with colonic involvement not entirely excluded.  There is surrounding mesenteric haziness in fat stranding.     4/1/2019 Vaginal Culture : MORGANELLA MORGANII Moderate - pan sensitive    3/29/2019:    PREVOTELLA (B.) DISIENS   Moderate        Anaerobic Culture PREVOTELLA TIMONENSIS   Moderate             Review of patient's allergies indicates:   Allergen Reactions    Bactrim [sulfamethoxazole-trimethoprim] Itching      Past Medical History:   Diagnosis Date    Abnormal Pap smear of cervix 2009    LEEP 2009, mild dysplasia normal paps since    Arthritis     ankles  born with club feet    GERD (gastroesophageal reflux disease)     HPV in female     Hyperlipidemia     Hypertension     Ulcerative colitis      Past Surgical History:   Procedure Laterality Date    CERVICAL BIOPSY  W/ LOOP ELECTRODE EXCISION  2009    COLONOSCOPY  2013    approx    COLPORRHAPHY, POSTERIOR N/A 3/13/2019    Performed by [facility]ui [facility]ano [facility] at  [facility] OR    CYSTOSCOPY N/A 3/13/2019    [facility] at [facility] OR    DILATION AND CURETTAGE, UTERUS  11/20/2018    Performed by Arvind Russell MD at [facility] OR    HYSTERECTOMY  03/13/2019    Robotic DVH/ BSO     HYSTEROSCOPY; TRUCLEAR RESECTION OF UTERINE POLYP N/A 11/20/2018    Performed by [facility]ind Russell MD at [facility] OR    LAPAROSCOPIC GASTRIC BANDING  2006    ROBOTIC HYSTERECTOMY N/A 3/13/2019    Performed by Arvind Russell [facility] at [facility] OR    ROBOTIC SACROCOLPOPEXY, ABDOMEN N/A 3/13/2019    Performed by [facility]ano DO at [facility] OR    ROBOTIC SALPINGO-OOPHORECTOMY Bilateral 3/13/2019    Performed by [facility]ind Russell MD at [facility] OR    SLEEVE GASTROPLASTY  08/2015     Family History   Problem Relation Age of Onset    Diabetes Mother     Heart disease Father     Stroke Sister     Diabetes Sister     Breast cancer Neg Hx     Ovarian cancer Neg Hx     Cervical cancer Neg Hx     Endometrial cancer Neg Hx     Vaginal cancer Neg Hx        Current Outpatient Medications on File Prior to Visit   Medication Sig Dispense Refill    ascorbic acid, vitamin C, (VITAMIN C) 100 MG tablet Take 100 mg by mouth once daily.      aspirin (ECOTRIN) 81 MG EC tablet Take 81 mg by mouth once daily.      BETA-CAROTENE,A, W-C & E/MIN (OCUVITE ORAL) Take by mouth.      biotin 1 mg Cap Take by mouth.      cetirizine (ZYRTEC) 10 MG tablet Take 10 mg by mouth once daily.      diphenhydrAMINE (BENADRYL) 50 MG capsule Take 50 mg by mouth nightly as needed for Itching.      ibuprofen (ADVIL) 200 MG tablet Take 200 mg by mouth every 6 (six) hours as needed for Pain.      krill oil 500 mg Cap Take by mouth once daily.      LIALDA 1.2 gram TbEC 2.4 g daily with breakfast.       lisinopril 10 MG tablet Take 10 mg by mouth once daily.      loratadine (CLARITIN) 10 mg tablet Take 10 mg by mouth once daily.      metoprolol succinate (TOPROL-XL) 25 MG 24 hr tablet TK 1  T PO QHS PRN HEART  3    omeprazole  "(PRILOSEC) 20 MG capsule 20 mg once daily.       pravastatin (PRAVACHOL) 40 MG tablet       simethicone (GAS-X ORAL) Take by mouth once daily.       ciprofloxacin HCl (CIPRO) 750 MG tablet Take 1 tablet (750 mg total) by mouth every 12 (twelve) hours. 60 tablet 1    meloxicam (MOBIC) 15 MG tablet TK 1 T PO  QD  4    metroNIDAZOLE (FLAGYL) 500 MG tablet Take 1 tablet (500 mg total) by mouth every 8 (eight) hours. 90 tablet 1     No current facility-administered medications on file prior to visit.          PE  Vitals:    06/12/19 1421   BP: 110/70   Weight: 109 kg (240 lb 4.8 oz)   Height: 5' 3" (1.6 m)   PainSc: 0-No pain     Lung:clear to auscultation and percussion, no chest deformities noted  Heart:RRR, normal S1 & S2, no murmurs, rubs, or gallops, 2+ peripheral pulses  Abdomen: BS normal.  Abdomen soft, non-tender.  No masses or organomegaly  Incision: healing well.  Extremities: Normal.  Vagina: No mesh erosion     POP-Q:     Aa                            -2 Ba                            -2 C                         -5   GH                           3 PB                            2 TVL                     8   Ap                            -2 Bp                            -2 D                         n/a       Impression: post operative infection, cuff abscess resolved       Plan:   Cysto in 3 months   Follow up in 6 months       "

## 2024-05-06 ENCOUNTER — OFFICE VISIT (OUTPATIENT)
Dept: PRIMARY CARE CLINIC | Facility: CLINIC | Age: 84
End: 2024-05-06
Payer: MEDICARE

## 2024-05-06 ENCOUNTER — HOSPITAL ENCOUNTER (OUTPATIENT)
Dept: RADIOLOGY | Facility: HOSPITAL | Age: 84
Discharge: HOME | End: 2024-05-06
Attending: NURSE PRACTITIONER
Payer: MEDICARE

## 2024-05-06 VITALS
HEART RATE: 62 BPM | HEIGHT: 71 IN | OXYGEN SATURATION: 95 % | TEMPERATURE: 98 F | WEIGHT: 180.75 LBS | SYSTOLIC BLOOD PRESSURE: 118 MMHG | BODY MASS INDEX: 25.31 KG/M2 | DIASTOLIC BLOOD PRESSURE: 58 MMHG

## 2024-05-06 DIAGNOSIS — M79.89 LEG SWELLING: ICD-10-CM

## 2024-05-06 DIAGNOSIS — M10.9 GOUT, UNSPECIFIED CAUSE, UNSPECIFIED CHRONICITY, UNSPECIFIED SITE: ICD-10-CM

## 2024-05-06 DIAGNOSIS — R06.89 DECREASED BREATH SOUNDS AT LEFT LUNG BASE: ICD-10-CM

## 2024-05-06 DIAGNOSIS — R06.89 DECREASED BREATH SOUNDS AT LEFT LUNG BASE: Primary | ICD-10-CM

## 2024-05-06 DIAGNOSIS — R06.02 SHORTNESS OF BREATH: ICD-10-CM

## 2024-05-06 PROCEDURE — 3072F LOW RISK FOR RETINOPATHY: CPT | Mod: HCNC,CPTII,S$GLB, | Performed by: NURSE PRACTITIONER

## 2024-05-06 PROCEDURE — 99214 OFFICE O/P EST MOD 30 MIN: CPT | Mod: HCNC,S$GLB,, | Performed by: NURSE PRACTITIONER

## 2024-05-06 PROCEDURE — 1126F AMNT PAIN NOTED NONE PRSNT: CPT | Mod: HCNC,CPTII,S$GLB, | Performed by: NURSE PRACTITIONER

## 2024-05-06 PROCEDURE — 1101F PT FALLS ASSESS-DOCD LE1/YR: CPT | Mod: HCNC,CPTII,S$GLB, | Performed by: NURSE PRACTITIONER

## 2024-05-06 PROCEDURE — 1160F RVW MEDS BY RX/DR IN RCRD: CPT | Mod: HCNC,CPTII,S$GLB, | Performed by: NURSE PRACTITIONER

## 2024-05-06 PROCEDURE — 3078F DIAST BP <80 MM HG: CPT | Mod: HCNC,CPTII,S$GLB, | Performed by: NURSE PRACTITIONER

## 2024-05-06 PROCEDURE — 71046 X-RAY EXAM CHEST 2 VIEWS: CPT | Mod: TC,HCNC,PN

## 2024-05-06 PROCEDURE — 3074F SYST BP LT 130 MM HG: CPT | Mod: HCNC,CPTII,S$GLB, | Performed by: NURSE PRACTITIONER

## 2024-05-06 PROCEDURE — 99999 PR PBB SHADOW E&M-EST. PATIENT-LVL V: CPT | Mod: PBBFAC,HCNC,, | Performed by: NURSE PRACTITIONER

## 2024-05-06 PROCEDURE — 1159F MED LIST DOCD IN RCRD: CPT | Mod: HCNC,CPTII,S$GLB, | Performed by: NURSE PRACTITIONER

## 2024-05-06 PROCEDURE — 3288F FALL RISK ASSESSMENT DOCD: CPT | Mod: HCNC,CPTII,S$GLB, | Performed by: NURSE PRACTITIONER

## 2024-05-06 PROCEDURE — 71046 X-RAY EXAM CHEST 2 VIEWS: CPT | Mod: 26,HCNC,, | Performed by: RADIOLOGY

## 2024-05-06 RX ORDER — ALLOPURINOL 100 MG/1
100 TABLET ORAL DAILY
Qty: 30 TABLET | Refills: 2 | Status: SHIPPED | OUTPATIENT
Start: 2024-05-06

## 2024-05-06 RX ORDER — FUROSEMIDE 20 MG/1
20 TABLET ORAL DAILY
Qty: 30 TABLET | Refills: 1 | Status: SHIPPED | OUTPATIENT
Start: 2024-05-06 | End: 2025-05-06

## 2024-05-06 NOTE — PROGRESS NOTES
[facility] Note    Chief Complaint      Chief Complaint   Patient presents with    Follow-up     so    Shortness of Breath    Fatigue    Wheezing       History of Present Illness      Randy Camejo is a 83 y.o. male who presents today for   Chief Complaint   Patient presents with    Follow-up     so    Shortness of Breath    Fatigue    Wheezing         Patient is known to me.  Presents to clinic today accompanied by his wife.  He is in a wheelchair at this time.  He reports having shortness breath, chest heaviness, right leg swelling for the past 2 weeks.  He has a history of atrial fibrillation.  Currently he is on carvedilol 3.125 mg daily.  He is also taking amiodarone 100 mg daily.  Blood pressure is 118/58.    Patient has a appoint with cardio on 06/13/2024.    Right lower extremity has 1+ pitting edema.  Heart rate is irregular.  Left upper and lower lung sounds are decreased.         Review of Systems   Constitutional:  Positive for malaise/fatigue.   Respiratory:  Positive for shortness of breath.    Cardiovascular:  Positive for leg swelling.   All 12 systems otherwise negative.       Family History:  family history includes Atrial fibrillation in his brother; Cataracts in his father; Diabetes in his brother, maternal grandmother, mother, and paternal grandmother; Heart disease in his son; Hypertension in his son; No Known Problems in his daughter, half-brother, half-sister, half-sister, maternal grandfather, paternal grandfather, and son; Stomach cancer in his father; Stroke in his mother.   Family history was reviewed with patient.     Medications:  Outpatient Encounter Medications as of 5/6/2024   Medication Sig Dispense Refill    acetaminophen (TYLENOL) 325 MG tablet Take 2 tablets (650 mg total) by mouth every 4 (four) hours as needed.  0    alcohol swabs PadM use 2 (two) times a day. E11.49 200 each 5    amiodarone (PACERONE) 100 MG Tab Take 1 tablet (100 mg total) by mouth once  "daily. 30 tablet 11    apixaban (ELIQUIS) 2.5 mg Tab Take 1 tablet (2.5 mg total) by mouth 2 (two) times daily. 180 tablet 3    atorvastatin (LIPITOR) 10 MG tablet Take 1 tablet (10 mg total) by mouth once daily. 90 tablet 1    blood glucose control, low Soln Use 1 each to check glucose level of glucometer weekly 4 each 11    blood sugar diagnostic (TRUE METRIX GLUCOSE TEST STRIP) Strp use 2 (two) times a day. To check blood sugar 200 strip 5    blood sugar diagnostic (TRUE METRIX GLUCOSE TEST STRIP) Strp use 2 (two) times a day. To check blood sugar 200 strip 5    blood sugar diagnostic (TRUE METRIX GLUCOSE TEST STRIP) Strp use 2 (two) times a day. To check blood sugar 200 strip 3    carvediloL (COREG) 3.125 MG tablet Take 1 tablet (3.125 mg total) by mouth 2 (two) times daily. 180 tablet 3    clotrimazole (LOTRIMIN) 1 % cream Apply topically 2 (two) times daily. To toenails and area around the toes. 60 g 3    dimethicone 6 % Crea Apply 1 application topically once daily. For legs and feet. 57 g 10    famotidine (PEPCID) 20 MG tablet Take 1 tablet (20 mg total) by mouth once daily. 90 tablet 3    insulin detemir U-100, Levemir, (LEVEMIR FLEXPEN) 100 unit/mL (3 mL) InPn pen Inject 8 units into the skin every evening 15 mL 3    lancets (TRUEPLUS LANCETS) 33 gauge Misc use twice a day as directed 200 each 5    lancets (TRUEPLUS LANCETS) 33 gauge Misc use twice a day as directed 200 each 5    levothyroxine (SYNTHROID) 112 MCG tablet Take 1 tablet (112 mcg total) by mouth once daily. 90 tablet 2    pen needle, diabetic (BD ULTRA-FINE SHORT PEN NEEDLE) 31 gauge x 5/16" Ndle 1 Device by Misc.(Non-Drug; Combo Route) route once daily. Use with insulin. ICD10: E11.49 100 each 3    sacubitriL-valsartan (ENTRESTO) 49-51 mg per tablet Take 1 tablet by mouth 2 (two) times daily. 60 tablet 6    triamcinolone acetonide 0.1% (KENALOG) 0.1 % ointment aaa bid avoid face/ groin 80 g 4    TRUE METRIX AIR GLUCOSE METER kit       " "allopurinoL (ZYLOPRIM) 100 MG tablet Take 1 tablet (100 mg total) by mouth once daily. 30 tablet 2    furosemide (LASIX) 20 MG tablet Take 1 tablet (20 mg total) by mouth once daily. 30 tablet 1    furosemide (LASIX) 40 MG tablet Take 1 tablet (40 mg total) by mouth daily as needed (swelling). (Patient not taking: Reported on 5/6/2024) 30 tablet 11    [DISCONTINUED] atorvastatin (LIPITOR) 10 MG tablet Take 1 tablet (10 mg total) by mouth once daily. 90 tablet 1     No facility-administered encounter medications on file as of 5/6/2024.       Allergies:  Review of patient's allergies indicates:  No Known Allergies    Health Maintenance:  Health Maintenance   Topic Date Due    Eye Exam  07/13/2024    TETANUS VACCINE  12/19/2024 (Originally 7/16/1958)    Shingles Vaccine (1 of 2) 12/19/2024 (Originally 7/16/1990)    Hemoglobin A1c  10/22/2024    Lipid Panel  04/22/2025     Health Maintenance Topics with due status: Not Due       Topic Last Completion Date    Lipid Panel 04/22/2024    Hemoglobin A1c 04/22/2024       Physical Exam      Vital Signs  Temp: 98.1 °F (36.7 °C)  Pulse: 62  SpO2: 95 %  BP: (!) 118/58  BP Location: Right arm  Patient Position: Sitting  Pain Score: 0-No pain  Height and Weight  Height: 5' 11" (180.3 cm)  Weight: 82 kg (180 lb 12.4 oz)  BSA (Calculated - sq m): 2.03 sq meters  BMI (Calculated): 25.2  Weight in (lb) to have BMI = 25: 178.9]    Physical Exam  Vitals reviewed.   Constitutional:       Appearance: Normal appearance. He is normal weight.   HENT:      Head: Normocephalic and atraumatic.      Nose: Nose normal.      Mouth/Throat:      Mouth: Mucous membranes are moist.      Pharynx: Oropharynx is clear.   Eyes:      Extraocular Movements: Extraocular movements intact.      Conjunctiva/sclera: Conjunctivae normal.      Pupils: Pupils are equal, round, and reactive to light.   Cardiovascular:      Rate and Rhythm: Normal rate and regular rhythm.      Pulses: Normal pulses.   Pulmonary:      " Effort: Pulmonary effort is normal.      Comments: + decreased lung sounds in the left upper lobe and left lower low.  Musculoskeletal:         General: Normal range of motion.      Cervical back: Normal range of motion and neck supple.      Right lower leg: Edema present.      Comments: + 1+ pitting edema to right lower extremity.   Skin:     General: Skin is warm and dry.      Capillary Refill: Capillary refill takes less than 2 seconds.   Neurological:      General: No focal deficit present.      Mental Status: He is alert and oriented to person, place, and time. Mental status is at baseline.   Psychiatric:         Mood and Affect: Mood normal.         Behavior: Behavior normal.         Thought Content: Thought content normal.         Judgment: Judgment normal.            Assessment/Plan     Randy Camejo is a 83 y.o.male with:    Decreased breath sounds at left lung base  -     X-Ray Chest PA And Lateral; Future; Expected date: 05/06/2024    Leg swelling  -     furosemide (LASIX) 20 MG tablet; Take 1 tablet (20 mg total) by mouth once daily.  Dispense: 30 tablet; Refill: 1    Shortness of breath  -     X-Ray Chest PA And Lateral; Future; Expected date: 05/06/2024    Gout, unspecified cause, unspecified chronicity, unspecified site  -     allopurinoL (ZYLOPRIM) 100 MG tablet; Take 1 tablet (100 mg total) by mouth once daily.  Dispense: 30 tablet; Refill: 2        As above, continue current medications and maintain follow up with specialists.  Return to clinic as needed.    Greater than 50% of visit was spent face to face with patient.  All questions were answered to patient's satisfaction.          Karen L Spencer, NP-C [facility]

## 2024-05-07 DIAGNOSIS — E11.40 TYPE 2 DIABETES MELLITUS WITH DIABETIC NEUROPATHY, WITH LONG-TERM CURRENT USE OF INSULIN: Primary | ICD-10-CM

## 2024-05-07 DIAGNOSIS — Z79.4 TYPE 2 DIABETES MELLITUS WITH DIABETIC NEUROPATHY, WITH LONG-TERM CURRENT USE OF INSULIN: Primary | ICD-10-CM

## 2024-05-07 NOTE — TELEPHONE ENCOUNTER
Refill Routing Note   Medication(s) are not appropriate for processing by [facility] for the following reason(s):        Non-participating provider:     [facility] action(s):  Route      Medication Therapy Plan:         Appointments  past 12m or future 3m with PCP    Date Provider   Last Visit   5/6/2024 Danya Sandhu NP   Next Visit   Visit date not found Danya Sandhu NP   ED visits in past 90 days: 0        Note composed:3:56 PM 05/07/2024

## 2024-05-08 RX ORDER — LANCETS 33 GAUGE
EACH MISCELLANEOUS
Qty: 200 EACH | Refills: 3 | Status: SHIPPED | OUTPATIENT
Start: 2024-05-08

## 2024-05-16 ENCOUNTER — PATIENT MESSAGE (OUTPATIENT)
Dept: PRIMARY CARE CLINIC | Facility: CLINIC | Age: 84
End: 2024-05-16
Payer: MEDICARE

## 2024-05-16 NOTE — TELEPHONE ENCOUNTER
Patient is requesting an anti-anxiety medication prior to her exams.  It looks like she has gotten Xanax in the past.  Thank you Refill Routing Note   Medication(s) are not appropriate for processing by [facility] for the following reason(s):        Non-participating provider  Responsible provider unclear    [facility] action(s):  Route             Appointments  past 12m or future 3m with PCP    Date Provider   Last Visit   5/6/2024 Danya Sandhu NP   Next Visit   Visit date not found Danya Sandhu NP   ED visits in past 90 days: 0        Note composed:6:55 PM 05/16/2024

## 2024-05-17 RX ORDER — FAMOTIDINE 20 MG/1
20 TABLET, FILM COATED ORAL DAILY
Qty: 90 TABLET | Refills: 3 | Status: SHIPPED | OUTPATIENT
Start: 2024-05-17

## 2024-05-17 NOTE — TELEPHONE ENCOUNTER
LOV 5/6/24    Pt said Both leg swelling and wheezing have stop and Pt more mobile  Pt would like advised on how long he need to take Lasix

## 2024-06-11 DIAGNOSIS — I50.22 CHRONIC SYSTOLIC HEART FAILURE: ICD-10-CM

## 2024-06-12 ENCOUNTER — TELEPHONE (OUTPATIENT)
Dept: PRIMARY CARE CLINIC | Facility: CLINIC | Age: 84
End: 2024-06-12
Payer: MEDICARE

## 2024-06-12 RX ORDER — SACUBITRIL AND VALSARTAN 49; 51 MG/1; MG/1
1 TABLET, FILM COATED ORAL 2 TIMES DAILY
Qty: 60 TABLET | Refills: 6 | Status: SHIPPED | OUTPATIENT
Start: 2024-06-12

## 2024-06-12 NOTE — TELEPHONE ENCOUNTER
----- Message from Mary Harry sent at 6/12/2024  9:50 AM CDT -----  Contact: RONY/ /Nayana  1MEDICALADVICE     Patient is calling for Medical Advice regarding: Claudia Kraus is calling for a PA for insulin detemir U-100, Levemir, (LEVEMIR FLEXPEN) 100 unit/mL (3 mL) InPn pen///  fax is on its way.       Pharmacy name and phone#: FAX# [phone]        Comments: Ref # [id number]

## 2024-06-13 ENCOUNTER — PATIENT MESSAGE (OUTPATIENT)
Dept: PRIMARY CARE CLINIC | Facility: CLINIC | Age: 84
End: 2024-06-13
Payer: MEDICARE

## 2024-06-13 DIAGNOSIS — E78.5 HYPERLIPIDEMIA ASSOCIATED WITH TYPE 2 DIABETES MELLITUS: Primary | ICD-10-CM

## 2024-06-13 DIAGNOSIS — E11.69 HYPERLIPIDEMIA ASSOCIATED WITH TYPE 2 DIABETES MELLITUS: Primary | ICD-10-CM

## 2024-06-13 RX ORDER — INSULIN DETEMIR 100 [IU]/ML
INJECTION, SOLUTION SUBCUTANEOUS
Qty: 15 ML | Refills: 3 | Status: SHIPPED | OUTPATIENT
Start: 2024-06-13 | End: 2024-06-14 | Stop reason: SDUPTHER

## 2024-06-13 NOTE — TELEPHONE ENCOUNTER
LOV 05/06/24    Pt is needing a prescription of Metformin.     At our last visit you said you would probably prescribe metformin. If ok please send in prescription asap. [facility], [address] Thanks.

## 2024-06-13 NOTE — TELEPHONE ENCOUNTER
Refill Routing Note   Medication(s) are not appropriate for processing by [facility] for the following reason(s):        Non-participating provider    [facility] action(s):  Route               Appointments  past 12m or future 3m with PCP    Date Provider   Last Visit   5/6/2024 Danya Sandhu, JENNIFER   Next Visit   Visit date not found Danya Sandhu NP   ED visits in past 90 days: 0        Note composed:4:19 PM 06/13/2024

## 2024-06-14 ENCOUNTER — TELEPHONE (OUTPATIENT)
Dept: PRIMARY CARE CLINIC | Facility: CLINIC | Age: 84
End: 2024-06-14
Payer: MEDICARE

## 2024-06-14 DIAGNOSIS — E78.5 HYPERLIPIDEMIA ASSOCIATED WITH TYPE 2 DIABETES MELLITUS: ICD-10-CM

## 2024-06-14 DIAGNOSIS — Z79.4 TYPE 2 DIABETES MELLITUS WITH DIABETIC NEUROPATHY, WITH LONG-TERM CURRENT USE OF INSULIN: Primary | ICD-10-CM

## 2024-06-14 DIAGNOSIS — E11.40 TYPE 2 DIABETES MELLITUS WITH DIABETIC NEUROPATHY, WITH LONG-TERM CURRENT USE OF INSULIN: Primary | ICD-10-CM

## 2024-06-14 DIAGNOSIS — E11.69 HYPERLIPIDEMIA ASSOCIATED WITH TYPE 2 DIABETES MELLITUS: ICD-10-CM

## 2024-06-14 RX ORDER — METFORMIN HYDROCHLORIDE 500 MG/1
500 TABLET ORAL 2 TIMES DAILY WITH MEALS
Qty: 180 TABLET | Refills: 3 | Status: SHIPPED | OUTPATIENT
Start: 2024-06-14 | End: 2025-06-14

## 2024-06-14 RX ORDER — INSULIN DETEMIR 100 [IU]/ML
INJECTION, SOLUTION SUBCUTANEOUS
Qty: 15 ML | Refills: 5 | Status: SHIPPED | OUTPATIENT
Start: 2024-06-14

## 2024-06-14 NOTE — TELEPHONE ENCOUNTER
----- Message from Gala Dacosta sent at 6/14/2024  2:22 PM CDT -----  Contact: Pt  [phone]  Patient is returning a phone call.  Who left a message for the patient: Zoë Alonso MA  Does patient know what this is regarding:  yes  Would you like a call back, or a response through your [facility] portal?:   Call back  Comments:   Patient is running out of medication and needs to speak to you    Please call and advise.    Thank You

## 2024-06-18 ENCOUNTER — OFFICE VISIT (OUTPATIENT)
Dept: PRIMARY CARE CLINIC | Facility: CLINIC | Age: 84
End: 2024-06-18
Payer: MEDICARE

## 2024-06-18 DIAGNOSIS — Z79.4 TYPE 2 DIABETES MELLITUS WITH DIABETIC NEUROPATHY, WITH LONG-TERM CURRENT USE OF INSULIN: Primary | ICD-10-CM

## 2024-06-18 DIAGNOSIS — E11.40 TYPE 2 DIABETES MELLITUS WITH DIABETIC NEUROPATHY, WITH LONG-TERM CURRENT USE OF INSULIN: Primary | ICD-10-CM

## 2024-06-18 DIAGNOSIS — I87.8 VENOUS STASIS: ICD-10-CM

## 2024-06-18 DIAGNOSIS — B35.1 DERMATOPHYTOSIS OF NAIL: ICD-10-CM

## 2024-06-18 DIAGNOSIS — L30.0 NUMMULAR ECZEMA: ICD-10-CM

## 2024-06-18 PROCEDURE — 3072F LOW RISK FOR RETINOPATHY: CPT | Mod: HCNC,CPTII,95, | Performed by: NURSE PRACTITIONER

## 2024-06-18 PROCEDURE — 99213 OFFICE O/P EST LOW 20 MIN: CPT | Mod: HCNC,95,, | Performed by: NURSE PRACTITIONER

## 2024-06-18 PROCEDURE — 1159F MED LIST DOCD IN RCRD: CPT | Mod: HCNC,CPTII,95, | Performed by: NURSE PRACTITIONER

## 2024-06-18 PROCEDURE — 1160F RVW MEDS BY RX/DR IN RCRD: CPT | Mod: HCNC,CPTII,95, | Performed by: NURSE PRACTITIONER

## 2024-06-18 RX ORDER — TRIAMCINOLONE ACETONIDE 1 MG/G
OINTMENT TOPICAL
Qty: 80 G | Refills: 4 | Status: SHIPPED | OUTPATIENT
Start: 2024-06-18

## 2024-06-18 NOTE — PROGRESS NOTES
[facility] Note    Chief Complaint      Chief Complaint   Patient presents with    Diabetes       History of Present Illness      Randy Camejo is a 83 y.o. male who presents today via virtual visit for   Chief Complaint   Patient presents with    Diabetes         Patient is known to me.  He presents via virtual visit to discuss beginning metformin 500 mg b.i.d. for diabetes.  He was on Levemir insulin but his insurance does not cover for this medication anymore.  His last hemoglobin A1c was 6.2 on 02/22/2024.  We will draw another hemoglobin A1c in 3 months to re-evaluate plan of care.  I discussed side effects with patient.  Patient verbalized understanding.  Otherwise he is feeling well today.  No other complaints today.    Diabetes  He has type 2 diabetes mellitus. The initial diagnosis of diabetes was made 10 years ago. Pertinent negatives for hypoglycemia include no confusion, dizziness, headaches, hunger, mood changes, nervousness/anxiousness, pallor, seizures, sleepiness, speech difficulty, sweats or tremors. Associated symptoms include foot paresthesias. Pertinent negatives for diabetes include no blurred vision, no chest pain, no fatigue, no foot ulcerations, no polydipsia, no polyphagia, no polyuria, no visual change, no weakness and no weight loss. Pertinent negatives for hypoglycemia complications include no blackouts, no hospitalization, no nocturnal hypoglycemia, no required assistance and no required glucagon injection. Symptoms are stable. Diabetic complications include heart disease. Pertinent negatives for diabetic complications include no autonomic neuropathy, CVA, impotence, nephropathy, peripheral neuropathy, PVD or retinopathy. Risk factors for coronary artery disease include family history and diabetes mellitus. When asked about current treatments, none were reported. He is compliant with treatment all of the time. He is currently taking insulin at bedtime. Insulin  injections are given by patient. Rotation sites for injection include the abdominal wall. His weight is stable. He is following a generally healthy diet. When asked about meal planning, he reported none. He has not had a previous visit with a dietitian. He participates in exercise intermittently. He monitors blood glucose at home 1-2 x per day. He monitors urine at home <1 x per month. Blood glucose monitoring compliance is excellent. He sees a podiatrist.Eye exam is current.        Review of Systems   Constitutional:  Negative for fatigue and weight loss.   Eyes:  Negative for blurred vision.   Cardiovascular:  Negative for chest pain.   Genitourinary:  Negative for impotence.   Skin:  Negative for pallor.   Neurological:  Negative for dizziness, tremors, seizures, speech difficulty, weakness and headaches.   Endo/Heme/Allergies:  Negative for polydipsia and polyphagia.   Psychiatric/Behavioral:  Negative for confusion. The patient is not nervous/anxious.    All 12 systems otherwise negative.       Family History:  family history includes Atrial fibrillation in his brother; Cataracts in his father; Diabetes in his brother, maternal grandmother, mother, and paternal grandmother; Heart disease in his son; Hypertension in his son; No Known Problems in his daughter, half-brother, half-sister, half-sister, maternal grandfather, paternal grandfather, and son; Stomach cancer in his father; Stroke in his mother.   Family history was reviewed with patient.     Medications:  Outpatient Encounter Medications as of 6/18/2024   Medication Sig Dispense Refill    acetaminophen (TYLENOL) 325 MG tablet Take 2 tablets (650 mg total) by mouth every 4 (four) hours as needed.  0    alcohol swabs PadM use 2 (two) times a day. E11.49 200 each 5    allopurinoL (ZYLOPRIM) 100 MG tablet Take 1 tablet (100 mg total) by mouth once daily. 30 tablet 2    amiodarone (PACERONE) 100 MG Tab Take 1 tablet (100 mg total) by mouth once daily. 30  "tablet 11    apixaban (ELIQUIS) 2.5 mg Tab Take 1 tablet (2.5 mg total) by mouth 2 (two) times daily. 180 tablet 3    atorvastatin (LIPITOR) 10 MG tablet Take 1 tablet (10 mg total) by mouth once daily. 90 tablet 1    blood glucose control, low Soln Use 1 each to check glucose level of glucometer weekly 4 each 11    blood sugar diagnostic (TRUE METRIX GLUCOSE TEST STRIP) Strp use 2 (two) times a day. To check blood sugar 200 strip 5    blood sugar diagnostic (TRUE METRIX GLUCOSE TEST STRIP) Strp use 2 (two) times a day. To check blood sugar 200 strip 5    blood sugar diagnostic (TRUE METRIX GLUCOSE TEST STRIP) Strp use 2 (two) times a day. To check blood sugar 200 strip 3    carvediloL (COREG) 3.125 MG tablet Take 1 tablet (3.125 mg total) by mouth 2 (two) times daily. 180 tablet 3    clotrimazole (LOTRIMIN) 1 % cream Apply topically 2 (two) times daily. To toenails and area around the toes. 60 g 3    dimethicone 6 % Crea Apply 1 application topically once daily. For legs and feet. 57 g 10    famotidine (PEPCID) 20 MG tablet Take 1 tablet (20 mg total) by mouth once daily. 90 tablet 3    furosemide (LASIX) 20 MG tablet Take 1 tablet (20 mg total) by mouth once daily. 30 tablet 1    insulin detemir U-100, Levemir, (LEVEMIR FLEXPEN) 100 unit/mL (3 mL) InPn pen Inject 8 units into the skin every evening 15 mL 5    lancets (TRUEPLUS LANCETS) 33 gauge Misc use twice a day as directed 200 each 5    lancets (TRUEPLUS LANCETS) 33 gauge Misc Use to test blood glucose two (2) times a day, discard lancet after each use 200 each 3    levothyroxine (SYNTHROID) 112 MCG tablet Take 1 tablet (112 mcg total) by mouth once daily. 90 tablet 2    metFORMIN (GLUCOPHAGE) 500 MG tablet Take 1 tablet (500 mg total) by mouth 2 (two) times daily with meals. 180 tablet 3    pen needle, diabetic (BD ULTRA-FINE SHORT PEN NEEDLE) 31 gauge x 5/16" Ndle 1 Device by Misc.(Non-Drug; Combo Route) route once daily. Use with insulin. ICD10: E11.49 100 " each 3    sacubitriL-valsartan (ENTRESTO) 49-51 mg per tablet Take 1 tablet by mouth 2 (two) times daily. 60 tablet 6    triamcinolone acetonide 0.1% (KENALOG) 0.1 % ointment aaa bid avoid face/ groin 80 g 4    TRUE METRIX AIR GLUCOSE METER kit        No facility-administered encounter medications on file as of 6/18/2024.       Allergies:  Review of patient's allergies indicates:  No Known Allergies    Health Maintenance:  Health Maintenance   Topic Date Due    Eye Exam  07/13/2024    TETANUS VACCINE  12/19/2024 (Originally 7/16/1958)    Shingles Vaccine (1 of 2) 12/19/2024 (Originally 7/16/1991)    Hemoglobin A1c  10/22/2024    Lipid Panel  04/22/2025     Health Maintenance Topics with due status: Not Due       Topic Last Completion Date    Lipid Panel 04/22/2024    Hemoglobin A1c 04/22/2024       Physical Exam       ]        Assessment/Plan     Randy Camejo is a 83 y.o.male with:    Type 2 diabetes mellitus with diabetic neuropathy, with long-term current use of insulin  -     Hemoglobin A1C; Future; Expected date: 09/18/2024        As above, continue current medications and maintain follow up with specialists.  Return to clinic as needed.    Greater than 50% of this time was spent face to face via virtual visit with patient.  All questions were answered to patient's satisfaction.    The patient location is: home  The chief complaint leading to consultation is: medication change to metformin    Visit type: audiovisual    Face to Face time with patient:   10 minutes of total time spent on the encounter, which includes face to face time and non-face to face time preparing to see the patient (eg, review of tests), Obtaining and/or reviewing separately obtained history, Documenting clinical information in the electronic or other health record, Independently interpreting results (not separately reported) and communicating results to the patient/family/caregiver, or Care coordination (not separately reported).          Each patient to whom he or she provides medical services by telemedicine is:  (1) informed of the relationship between the physician and patient and the respective role of any other health care provider with respect to management of the patient; and (2) notified that he or she may decline to receive medical services by telemedicine and may withdraw from such care at any time.       Danya Sandhu, NP-C  [facility]

## 2024-07-18 ENCOUNTER — OFFICE VISIT (OUTPATIENT)
Dept: CARDIOLOGY | Facility: CLINIC | Age: 84
End: 2024-07-18
Payer: MEDICARE

## 2024-07-18 VITALS
DIASTOLIC BLOOD PRESSURE: 64 MMHG | HEART RATE: 64 BPM | BODY MASS INDEX: 23.55 KG/M2 | HEIGHT: 71 IN | SYSTOLIC BLOOD PRESSURE: 114 MMHG | WEIGHT: 168.19 LBS

## 2024-07-18 DIAGNOSIS — E11.59 HYPERTENSION ASSOCIATED WITH DIABETES: ICD-10-CM

## 2024-07-18 DIAGNOSIS — Z79.01 CHRONIC ANTICOAGULATION: ICD-10-CM

## 2024-07-18 DIAGNOSIS — I43 TACHYCARDIA INDUCED CARDIOMYOPATHY: Primary | ICD-10-CM

## 2024-07-18 DIAGNOSIS — E78.5 HYPERLIPIDEMIA ASSOCIATED WITH TYPE 2 DIABETES MELLITUS: ICD-10-CM

## 2024-07-18 DIAGNOSIS — I15.2 HYPERTENSION ASSOCIATED WITH DIABETES: ICD-10-CM

## 2024-07-18 DIAGNOSIS — I73.9 PERIPHERAL VASCULAR DISEASE: ICD-10-CM

## 2024-07-18 DIAGNOSIS — I48.0 PAROXYSMAL ATRIAL FIBRILLATION: ICD-10-CM

## 2024-07-18 DIAGNOSIS — I25.2 HISTORY OF MYOCARDIAL INFARCTION: Chronic | ICD-10-CM

## 2024-07-18 DIAGNOSIS — R00.0 TACHYCARDIA INDUCED CARDIOMYOPATHY: Primary | ICD-10-CM

## 2024-07-18 DIAGNOSIS — I50.22 CHRONIC SYSTOLIC HEART FAILURE: ICD-10-CM

## 2024-07-18 DIAGNOSIS — I25.10 CORONARY ARTERY DISEASE INVOLVING NATIVE CORONARY ARTERY OF NATIVE HEART WITHOUT ANGINA PECTORIS: Chronic | ICD-10-CM

## 2024-07-18 DIAGNOSIS — E11.69 HYPERLIPIDEMIA ASSOCIATED WITH TYPE 2 DIABETES MELLITUS: ICD-10-CM

## 2024-07-18 DIAGNOSIS — Z79.4 TYPE 2 DIABETES MELLITUS WITH DIABETIC NEUROPATHY, WITH LONG-TERM CURRENT USE OF INSULIN: ICD-10-CM

## 2024-07-18 DIAGNOSIS — E11.40 TYPE 2 DIABETES MELLITUS WITH DIABETIC NEUROPATHY, WITH LONG-TERM CURRENT USE OF INSULIN: ICD-10-CM

## 2024-07-18 DIAGNOSIS — Z95.5 HISTORY OF CORONARY ARTERY STENT PLACEMENT: Chronic | ICD-10-CM

## 2024-07-18 DIAGNOSIS — Z79.899 LONG TERM CURRENT USE OF AMIODARONE: ICD-10-CM

## 2024-07-18 PROCEDURE — 1101F PT FALLS ASSESS-DOCD LE1/YR: CPT | Mod: HCNC,CPTII,S$GLB, | Performed by: INTERNAL MEDICINE

## 2024-07-18 PROCEDURE — 99214 OFFICE O/P EST MOD 30 MIN: CPT | Mod: HCNC,S$GLB,, | Performed by: INTERNAL MEDICINE

## 2024-07-18 PROCEDURE — 1126F AMNT PAIN NOTED NONE PRSNT: CPT | Mod: HCNC,CPTII,S$GLB, | Performed by: INTERNAL MEDICINE

## 2024-07-18 PROCEDURE — 3078F DIAST BP <80 MM HG: CPT | Mod: HCNC,CPTII,S$GLB, | Performed by: INTERNAL MEDICINE

## 2024-07-18 PROCEDURE — 3074F SYST BP LT 130 MM HG: CPT | Mod: HCNC,CPTII,S$GLB, | Performed by: INTERNAL MEDICINE

## 2024-07-18 PROCEDURE — 3288F FALL RISK ASSESSMENT DOCD: CPT | Mod: HCNC,CPTII,S$GLB, | Performed by: INTERNAL MEDICINE

## 2024-07-18 PROCEDURE — 99999 PR PBB SHADOW E&M-EST. PATIENT-LVL V: CPT | Mod: PBBFAC,HCNC,, | Performed by: INTERNAL MEDICINE

## 2024-07-18 PROCEDURE — 1159F MED LIST DOCD IN RCRD: CPT | Mod: HCNC,CPTII,S$GLB, | Performed by: INTERNAL MEDICINE

## 2024-07-18 PROCEDURE — 3072F LOW RISK FOR RETINOPATHY: CPT | Mod: HCNC,CPTII,S$GLB, | Performed by: INTERNAL MEDICINE

## 2024-07-18 NOTE — PROGRESS NOTES
Subjective:   Patient ID:  Randy Camejo is a 84 y.o. male who presents for follow up of No chief complaint on file.      HPI: Routine yearly f/u.  Had a period recently when he saw more leg swelling and dyspnea, and a little lasix made things better.      He's on apixaban 2.5 bid as his creatinine had been nearer 2, though it was 1.4 on the last check.  No bleeding, hematochezia, or melena.  No TIA/stroke symtoms.        My June 2023 HPI: Back for 8 month f/u and doing well.  He's limited by heat and fatigue, but not dyspnea.  He denies chest discomfort, QUEZADA, palpitations, PND/orthopnea, lightheadedness and syncope.     No bleeding, hematochezia, or melena.  No TIA/stroke symtoms.        Dr. Agustin' Nov 2022 HPI:  Patient here for follow-up.  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  He is not had an echocardiogram for 2 years.  He is not followed up regularly.  He is not having chest pains or tightness, PND or orthopnea.  Has not had lower extremity edema valve renal insufficiency, furosemide was changed to as needed.    Previously underwent coronary stenting and had a heart attack then.  He is not had recurrent chest pains or tightness.      Paroxysmal atrial fibrillation is present, currently treated with amiodarone, maintained sinus rhythm.  Eliquis, patient now greater than age 81 with creatinine of 1.9, should be on 2.5 mg twice a day.     Hyperlipidemia is treated with moderate dose statin therapy, last LDL well controlled at 46 November 2022 Echo  The left ventricle is moderately enlarged with moderately decreased systolic function.  The estimated ejection fraction is 35%.  Grade II left ventricular diastolic dysfunction.  Normal right ventricular size with normal right ventricular systolic function.  Severe left atrial enlargement.  Mild aortic regurgitation.  Mild-to-moderate mitral regurgitation.  Mild tricuspid  regurgitation.  Normal central venous pressure (3 mmHg).  The estimated PA systolic pressure is 57 mmHg.     My June 2021 HPI:  8 month f/u.  He's dealing with a case of the shingles - appears to be L1/L2 height roughly. He's being treated appropriately.     His HR is running in the low to mid 50s most of the time and occasionally is as low as 47.     He is otherwise doing well otherwise with no new symptoms or cardiovascular complaints and no change in exercise capacity.  He denies chest discomfort, QUEZADA, palpitations, PND/orthopnea, lightheadedness and syncope.     Weight is down to 174# today.     No bleeding, hematochezia, or melena.  No TIA/stroke symtoms.     He is fully vaccinated against COVID (second Pfizer dose administered on 2/5/2021).        9/30/2020 HPI: Back for f/u after nearly 3 months.  Weight has been down quite a bit since early July with improved diuresis and he's feeling better.  He forgets to take his second 40mg of lasix a lot of the time.     He now is doing well with no new symptoms or cardiovascular complaints and he's had improvement in exercise capacity.  His dyspnea with exertion is minimal and only with significant exertion. He denies chest discomfort, palpitations, PND/orthopnea, lightheadedness and syncope.        7/8/2020 HPI: Very pleasant man who previously saw Dr. Joseph for chronic systolic heart failure, AF, and CAD s/p stenting presents to establish care with [facility].  He had an echo about 5 weeks and it showed a CVP of 15, bilateral pleural effusions, a small pericardial effusion, and an EF of 20% with a PASP of 69mm Hg.     He was as high as 198 with his weight in early June but is now down to 191 on an increased dose of lasix (20 --> 20 bid).     He denies chest discomfort, palpitations, PND/orthopnea, lightheadedness and syncope.  He does get out of breath with climbing stairs or with other exertion that is more than minimal.     No bleeding, hematochezia, or melena.   No TIA/stroke symtoms.     No F/C/cough/diarrhea/anosmia.        Dr. Joseph's May 2019 HPI: Randy Camejo is a 78 y.o. male with hypertension and diabetes mellitus type 2. He suffered a myocardial infarction in 2000 when he presented to [facility] and had a stent placed. He was treated for DM2 for a few years with metformin but then stopped it. He did not had any regular medical follow up for several years. He began feeling weak and have diarrhea on 2/15/2019. He diarrhea continued and he became weaker over the next several weak. He had nausea and vomited. On 2/19/2019 he went to  and was referred to the ER. He was noted to be in atrial fibrillation and admitted. 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 He was given amiodarone, digoxin and metoprolol. He was anticoagulation with heparin iv and later apixiban. On 2/25/2019 he underwent a DILLAN guided CV. There was severe left ventricular dysfunction with an EF of 15%. The TERESO had spontaneous echo contrast ut no thrombus. He was cardioverted with a 100 J shock to sinus rhythm. On 2/26/2019 he became hyperkalemic with a K of 6.0 after given enalapril. On 4/5/2019 he had a follow up Echo that revealed a moderately dilated left ventricle with moderate systolic dysfunction. The EF was in the 35-40% range. There was an anteroseptal as well as an inferior WMA. There was moderate diastolic dysfunction, a moderately dilated LA, mild aortic valve sclerosis with apeak velocity of 1.1 m/s, mild AR and moderate MR. He has slowly gained strength. No exertional chest pain or exertional dyspnea. No palpitations or weak spells. He however has some back pain and is sometimes a bit unsteady on his feet. Feeling well overall.            Patient Active Problem List   Diagnosis    Nuclear sclerosis, bilateral    Nuclear sclerotic cataract of left eye    Paroxysmal atrial fibrillation     Type 2 diabetes mellitus with neurologic complication, with long-term current use of insulin    Chronic systolic heart failure    Thrombocytopenia, unspecified    Pre-ulcerative corn or callous    Peripheral vascular disease    Tachycardia induced cardiomyopathy    Coronary artery disease involving native coronary artery of native heart without angina pectoris    Chronic anticoagulation    History of myocardial infarction    History of coronary artery stent placement    Hypertension associated with diabetes    Hyperlipidemia associated with type 2 diabetes mellitus    Subclinical hypothyroidism    Stage 3a chronic kidney disease    Chronic renal failure, stage 3b    Long term current use of amiodarone       Current Outpatient Medications   Medication Sig    acetaminophen (TYLENOL) 325 MG tablet Take 2 tablets (650 mg total) by mouth every 4 (four) hours as needed.    alcohol swabs PadM use 2 (two) times a day. E11.49    allopurinoL (ZYLOPRIM) 100 MG tablet Take 1 tablet (100 mg total) by mouth once daily.    amiodarone (PACERONE) 100 MG Tab Take 1 tablet (100 mg total) by mouth once daily.    apixaban (ELIQUIS) 2.5 mg Tab Take 1 tablet (2.5 mg total) by mouth 2 (two) times daily.    atorvastatin (LIPITOR) 10 MG tablet Take 1 tablet (10 mg total) by mouth once daily.    blood glucose control, low Soln Use 1 each to check glucose level of glucometer weekly    blood sugar diagnostic (TRUE METRIX GLUCOSE TEST STRIP) Strp use 2 (two) times a day. To check blood sugar    blood sugar diagnostic (TRUE METRIX GLUCOSE TEST STRIP) Strp use 2 (two) times a day. To check blood sugar    blood sugar diagnostic (TRUE METRIX GLUCOSE TEST STRIP) Strp use 2 (two) times a day. To check blood sugar    carvediloL (COREG) 3.125 MG tablet Take 1 tablet (3.125 mg total) by mouth 2 (two) times daily.    clotrimazole (LOTRIMIN) 1 % cream Apply topically 2 (two) times daily. To toenails and area around the toes.     "dimethicone 6 % Crea Apply 1 application topically once daily. For legs and feet.    famotidine (PEPCID) 20 MG tablet Take 1 tablet (20 mg total) by mouth once daily.    furosemide (LASIX) 20 MG tablet Take 1 tablet (20 mg total) by mouth once daily.    insulin detemir U-100, Levemir, (LEVEMIR FLEXPEN) 100 unit/mL (3 mL) InPn pen Inject 8 units into the skin every evening    lancets (TRUEPLUS LANCETS) 33 gauge Misc use twice a day as directed    lancets (TRUEPLUS LANCETS) 33 gauge Misc Use to test blood glucose two (2) times a day, discard lancet after each use    levothyroxine (SYNTHROID) 112 MCG tablet Take 1 tablet (112 mcg total) by mouth once daily.    metFORMIN (GLUCOPHAGE) 500 MG tablet Take 1 tablet (500 mg total) by mouth 2 (two) times daily with meals.    pen needle, diabetic (BD ULTRA-FINE SHORT PEN NEEDLE) 31 gauge x 5/16" Ndle 1 Device by Misc.(Non-Drug; Combo Route) route once daily. Use with insulin. ICD10: E11.49    sacubitriL-valsartan (ENTRESTO) 49-51 mg per tablet Take 1 tablet by mouth 2 (two) times daily.    triamcinolone acetonide 0.1% (KENALOG) 0.1 % ointment aaa bid avoid face/ groin    TRUE METRIX AIR GLUCOSE METER kit      No current facility-administered medications for this visit.       ROS  The review of systems is negative except as above.     Objective:   Physical Exam  Vitals reviewed.   Constitutional:       Appearance: He is well-developed.   HENT:      Head: Normocephalic and atraumatic.   Eyes:      General: No scleral icterus.     Conjunctiva/sclera: Conjunctivae normal.   Neck:      Vascular: No JVD.   Cardiovascular:      Rate and Rhythm: Normal rate. Rhythm irregular.      Pulses: Intact distal pulses.      Heart sounds: Normal heart sounds. No murmur heard.     No friction rub. No gallop.   Pulmonary:      Effort: Pulmonary effort is normal.      Breath sounds: Normal breath sounds. No wheezing or rales.   Abdominal:      General: Bowel sounds are normal. There is " no distension.      Palpations: Abdomen is soft.      Tenderness: There is no abdominal tenderness.   Musculoskeletal:         General: Normal range of motion.      Cervical back: Normal range of motion and neck supple.   Skin:     General: Skin is warm and dry.      Findings: No erythema or rash.   Neurological:      Mental Status: He is alert and oriented to person, place, and time.   Psychiatric:         Behavior: Behavior normal.         Thought Content: Thought content normal.         Judgment: Judgment normal.       Lab Results   Component Value Date    WBC 4.47 04/22/2024    HGB 12.7 (L) 04/22/2024    HCT 41.1 04/22/2024     (H) 04/22/2024    PLT 96 (L) 04/22/2024         Chemistry        Component Value Date/Time     04/22/2024 1025    K 4.9 04/22/2024 1025     04/22/2024 1025    CO2 26 04/22/2024 1025    BUN 21 04/22/2024 1025    CREATININE 1.4 04/22/2024 1025    GLU 92 04/22/2024 1025        Component Value Date/Time    CALCIUM 9.0 04/22/2024 1025    ALKPHOS 65 04/22/2024 1025    AST 17 04/22/2024 1025    ALT 12 04/22/2024 1025    BILITOT 0.7 04/22/2024 1025    ESTGFRAFRICA >60.0 09/29/2021 0945    EGFRNONAA 56.8 (A) 09/29/2021 0945            Lab Results   Component Value Date    CHOL 74 (L) 04/22/2024    CHOL 96 (L) 11/03/2022    CHOL 97 (L) 09/29/2021     Lab Results   Component Value Date    HDL 33 (L) 04/22/2024    HDL 31 (L) 11/03/2022    HDL 37 (L) 09/29/2021     Lab Results   Component Value Date    LDLCALC 31.8 (L) 04/22/2024    LDLCALC 45.0 (L) 11/03/2022    LDLCALC 48.8 (L) 09/29/2021     Lab Results   Component Value Date    TRIG 46 04/22/2024    TRIG 100 11/03/2022    TRIG 56 09/29/2021     Lab Results   Component Value Date    CHOLHDL 44.6 04/22/2024    CHOLHDL 32.3 11/03/2022    CHOLHDL 38.1 09/29/2021       Lab Results   Component Value Date    TSH 3.311 04/22/2024       Lab Results   Component Value Date    HGBA1C 6.2 (H) 04/22/2024       Assessment:     1. Tachycardia  induced cardiomyopathy    2. Chronic systolic heart failure    3. Paroxysmal atrial fibrillation    4. Peripheral vascular disease    5. Coronary artery disease involving native coronary artery of native heart without angina pectoris    6. Hypertension associated with diabetes    7. Hyperlipidemia associated with type 2 diabetes mellitus    8. History of myocardial infarction    9. History of coronary artery stent placement    10. Type 2 diabetes mellitus with diabetic neuropathy, with long-term current use of insulin    11. Chronic anticoagulation    12. Long term current use of amiodarone        Plan:     Continue current medicines.  Again, we discussed how an SGLT-2 inhibitor would be ideal for him, but he's quite concerned about the cost    Diet/exercise goals reinforced.    F/U 12 months

## 2024-07-21 ENCOUNTER — PATIENT MESSAGE (OUTPATIENT)
Dept: PRIMARY CARE CLINIC | Facility: CLINIC | Age: 84
End: 2024-07-21
Payer: MEDICARE

## 2024-07-22 ENCOUNTER — OFFICE VISIT (OUTPATIENT)
Dept: PODIATRY | Facility: CLINIC | Age: 84
End: 2024-07-22
Payer: MEDICARE

## 2024-07-22 VITALS
BODY MASS INDEX: 23.55 KG/M2 | WEIGHT: 168.19 LBS | DIASTOLIC BLOOD PRESSURE: 62 MMHG | SYSTOLIC BLOOD PRESSURE: 97 MMHG | HEIGHT: 71 IN | HEART RATE: 71 BPM

## 2024-07-22 DIAGNOSIS — Z89.421 HISTORY OF PARTIAL RAY AMPUTATION OF FIFTH TOE OF RIGHT FOOT: ICD-10-CM

## 2024-07-22 DIAGNOSIS — Z79.01 CHRONIC ANTICOAGULATION: ICD-10-CM

## 2024-07-22 DIAGNOSIS — M20.5X2 CROSSOVER TOE DEFORMITY OF LEFT FOOT: Chronic | ICD-10-CM

## 2024-07-22 DIAGNOSIS — B35.1 DERMATOPHYTOSIS OF NAIL: Chronic | ICD-10-CM

## 2024-07-22 DIAGNOSIS — E11.40 TYPE 2 DIABETES MELLITUS WITH DIABETIC NEUROPATHY, WITH LONG-TERM CURRENT USE OF INSULIN: Primary | ICD-10-CM

## 2024-07-22 DIAGNOSIS — Z79.4 TYPE 2 DIABETES MELLITUS WITH DIABETIC NEUROPATHY, WITH LONG-TERM CURRENT USE OF INSULIN: Primary | ICD-10-CM

## 2024-07-22 DIAGNOSIS — L84 CORN OR CALLUS: ICD-10-CM

## 2024-07-22 PROCEDURE — 99212 OFFICE O/P EST SF 10 MIN: CPT | Mod: 25,HCNC,S$GLB, | Performed by: PODIATRIST

## 2024-07-22 PROCEDURE — 3078F DIAST BP <80 MM HG: CPT | Mod: HCNC,CPTII,S$GLB, | Performed by: PODIATRIST

## 2024-07-22 PROCEDURE — 1126F AMNT PAIN NOTED NONE PRSNT: CPT | Mod: HCNC,CPTII,S$GLB, | Performed by: PODIATRIST

## 2024-07-22 PROCEDURE — 3074F SYST BP LT 130 MM HG: CPT | Mod: HCNC,CPTII,S$GLB, | Performed by: PODIATRIST

## 2024-07-22 PROCEDURE — 1160F RVW MEDS BY RX/DR IN RCRD: CPT | Mod: HCNC,CPTII,S$GLB, | Performed by: PODIATRIST

## 2024-07-22 PROCEDURE — 1159F MED LIST DOCD IN RCRD: CPT | Mod: HCNC,CPTII,S$GLB, | Performed by: PODIATRIST

## 2024-07-22 PROCEDURE — 99999 PR PBB SHADOW E&M-EST. PATIENT-LVL IV: CPT | Mod: PBBFAC,HCNC,, | Performed by: PODIATRIST

## 2024-07-22 PROCEDURE — 11055 PARING/CUTG B9 HYPRKER LES 1: CPT | Mod: Q7,HCNC,S$GLB, | Performed by: PODIATRIST

## 2024-07-22 PROCEDURE — 3072F LOW RISK FOR RETINOPATHY: CPT | Mod: HCNC,CPTII,S$GLB, | Performed by: PODIATRIST

## 2024-07-22 PROCEDURE — 11721 DEBRIDE NAIL 6 OR MORE: CPT | Mod: 59,Q7,HCNC,S$GLB | Performed by: PODIATRIST

## 2024-07-22 PROCEDURE — 1101F PT FALLS ASSESS-DOCD LE1/YR: CPT | Mod: HCNC,CPTII,S$GLB, | Performed by: PODIATRIST

## 2024-07-22 PROCEDURE — 3288F FALL RISK ASSESSMENT DOCD: CPT | Mod: HCNC,CPTII,S$GLB, | Performed by: PODIATRIST

## 2024-07-22 RX ORDER — LEVOTHYROXINE SODIUM 112 UG/1
112 TABLET ORAL DAILY
Qty: 90 TABLET | Refills: 2 | Status: SHIPPED | OUTPATIENT
Start: 2024-07-22

## 2024-07-22 NOTE — PROGRESS NOTES
Chief Concern:  Diabetic Foot Exam (Foot Exam/PCP Danya Sandhu, NP  06/18/24)      HPI:  Randy Camejo is a 84 y.o. male presents for foot exam.  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      The patient is under the active care of his PCP Danya Sandhu NP, for chronic conditions, including diabetes.    He last saw his PCP on 6/18/2024     He is also on Eliquis for atrial fibrillation       Patient Active Problem List   Diagnosis    Nuclear sclerosis, bilateral    Nuclear sclerotic cataract of left eye    Paroxysmal atrial fibrillation    Type 2 diabetes mellitus with neurologic complication, with long-term current use of insulin    Chronic systolic heart failure    Thrombocytopenia, unspecified    Pre-ulcerative corn or callous    Peripheral vascular disease    Tachycardia induced cardiomyopathy    Coronary artery disease involving native coronary artery of native heart without angina pectoris    Chronic anticoagulation    History of myocardial infarction    History of coronary artery stent placement    Hypertension associated with diabetes    Hyperlipidemia associated with type 2 diabetes mellitus    Subclinical hypothyroidism    Stage 3a chronic kidney disease    Chronic renal failure, stage 3b    Long term current use of amiodarone           Current Outpatient Medications on File Prior to Visit   Medication Sig Dispense Refill    acetaminophen (TYLENOL) 325 MG tablet Take 2 tablets (650 mg total) by mouth every 4 (four) hours as needed.  0    alcohol swabs PadM use 2 (two) times a day. E11.49 200 each 5    allopurinoL (ZYLOPRIM) 100 MG tablet Take 1 tablet (100 mg total) by mouth once daily. 30 tablet 2    amiodarone (PACERONE) 100 MG Tab Take 1 tablet (100 mg total) by mouth once daily. 30 tablet 11    apixaban (ELIQUIS) 2.5 mg Tab Take 1 tablet (2.5 mg total) by mouth 2 (two) times daily. 180 tablet 3    atorvastatin (LIPITOR) 10 MG tablet Take 1 tablet (10 mg total) by mouth once  daily. 90 tablet 1    blood glucose control, low Soln Use 1 each to check glucose level of glucometer weekly 4 each 11    blood sugar diagnostic (TRUE METRIX GLUCOSE TEST STRIP) Strp use 2 (two) times a day. To check blood sugar 200 strip 5    blood sugar diagnostic (TRUE METRIX GLUCOSE TEST STRIP) Strp use 2 (two) times a day. To check blood sugar 200 strip 5    blood sugar diagnostic (TRUE METRIX GLUCOSE TEST STRIP) Strp use 2 (two) times a day. To check blood sugar 200 strip 3    carvediloL (COREG) 3.125 MG tablet Take 1 tablet (3.125 mg total) by mouth 2 (two) times daily. 180 tablet 3    clotrimazole (LOTRIMIN) 1 % cream Apply topically 2 (two) times daily. To toenails and area around the toes. 60 g 3    dimethicone 6 % Crea Apply 1 application topically once daily. For legs and feet. 57 g 10    famotidine (PEPCID) 20 MG tablet Take 1 tablet (20 mg total) by mouth once daily. 90 tablet 3    furosemide (LASIX) 20 MG tablet Take 1 tablet (20 mg total) by mouth once daily. 30 tablet 1    lancets (TRUEPLUS LANCETS) 33 gauge Misc use twice a day as directed 200 each 5    lancets (TRUEPLUS LANCETS) 33 gauge Misc Use to test blood glucose two (2) times a day, discard lancet after each use 200 each 3    levothyroxine (SYNTHROID) 112 MCG tablet Take 1 tablet (112 mcg total) by mouth once daily. 90 tablet 2    metFORMIN (GLUCOPHAGE) 500 MG tablet Take 1 tablet (500 mg total) by mouth 2 (two) times daily with meals. 180 tablet 3    sacubitriL-valsartan (ENTRESTO) 49-51 mg per tablet Take 1 tablet by mouth 2 (two) times daily. 60 tablet 6    triamcinolone acetonide 0.1% (KENALOG) 0.1 % ointment aaa bid avoid face/ groin 80 g 4    TRUE METRIX AIR GLUCOSE METER kit       [DISCONTINUED] levothyroxine (SYNTHROID) 112 MCG tablet Take 1 tablet (112 mcg total) by mouth once daily. 90 tablet 2     No current facility-administered medications on file prior to visit.           Review of patient's allergies indicates:  No Known  "Allergies              Objective:      Vitals:    07/22/24 1421   BP: 97/62   Pulse: 71   Weight: 76.3 kg (168 lb 3.4 oz)   Height: 5' 11" (1.803 m)         Physical Exam  Constitutional:       General: He is not in acute distress.     Appearance: He is well-developed. He is not diaphoretic.   Cardiovascular:      Pulses:           Dorsalis pedis pulses are 1+ on the right side and 1+ on the left side.        Posterior tibial pulses are 1+ on the right side and 1+ on the left side.      Comments: Toes warm, pink, cap fill <5 seconds.  Musculoskeletal:      Comments: Partial 5th ray resection right foot without symptoms.   Lymphadenopathy:      Comments: Negative lymphadenopathy bilateral popliteal fossa and tarsal tunnel.     Skin:     General: Skin is warm and dry.      Coloration: Skin is not pale.      Findings: No abrasion, bruising, burn, ecchymosis, erythema, laceration, lesion or rash.      Comments:   Skin thin, shiny, atrophic.     Neurological:      Sensory: Sensory deficit present.      Comments: Diminished/loss of protective sensation all toes bilateral to 10 gram monofilament.               Assessment:       Problem List Items Addressed This Visit       Chronic anticoagulation    Overview     [id number] (CHA2DV).  2/23/2019: Began apixiban.         Relevant Orders    Routine Foot Care    Type 2 diabetes mellitus with neurologic complication, with long-term current use of insulin - Primary    Overview     2015: Diagnosed. Complications: CAD. Medications: Insulin.         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  Shoe inspection.     Continue good nutrition and blood sugar control to help prevent podiatric complications of diabetes.   Maintain proper foot hygiene.   Continue wearing proper shoe gear, daily foot inspections, never walking without protective shoe gear, never putting sharp instruments to feet.  Budin splint to try for crossover toe.   Separately routine foot care      Routine Foot Care    Date/Time: 7/22/2024 2:15 PM    Performed by: Jess Rosa DPM  Authorized by: Jess Rosa DPM    Time out: Immediately prior to procedure a "time out" was called to verify the correct patient, procedure, equipment, support staff and site/side marked as required.    Hyperkeratotic Skin Lesions?: Yes    Number of trimmed lesions:  1  Location(s):  Left 2nd Toe    Nail Care Type:  Debride(Left 1st Toe, Left 3rd Toe, Left 2nd Toe, Left 4th Toe, Left 5th Toe, Right 1st Toe, Right 2nd Toe, Right 3rd Toe and Right 4th Toe)  Patient tolerance:  Patient tolerated the procedure well with no immediate complications     With patient's permission, the toenails mentioned above were reduced and debrided using a nail nipper, removing offending nail and debris.   Utilizing a #15 scalpel, I trimmed the corns and calluses at the above mentioned location.      The patient will continue to monitor the areas daily, inspect the feet, wear protective shoe gear when ambulatory, and moisturizer to maintain skin integrity.              "

## 2024-08-21 ENCOUNTER — OFFICE VISIT (OUTPATIENT)
Dept: DERMATOLOGY | Facility: CLINIC | Age: 84
End: 2024-08-21
Payer: MEDICARE

## 2024-08-21 DIAGNOSIS — L29.9 PRURITUS: Primary | ICD-10-CM

## 2024-08-21 DIAGNOSIS — L82.1 SK (SEBORRHEIC KERATOSIS): ICD-10-CM

## 2024-08-21 DIAGNOSIS — B02.29 POST HERPETIC NEURALGIA: ICD-10-CM

## 2024-08-21 DIAGNOSIS — L85.3 XEROSIS CUTIS: ICD-10-CM

## 2024-08-21 PROCEDURE — 1159F MED LIST DOCD IN RCRD: CPT | Mod: HCNC,CPTII,S$GLB, | Performed by: DERMATOLOGY

## 2024-08-21 PROCEDURE — 99999 PR PBB SHADOW E&M-EST. PATIENT-LVL IV: CPT | Mod: PBBFAC,HCNC,, | Performed by: DERMATOLOGY

## 2024-08-21 PROCEDURE — 1126F AMNT PAIN NOTED NONE PRSNT: CPT | Mod: HCNC,CPTII,S$GLB, | Performed by: DERMATOLOGY

## 2024-08-21 PROCEDURE — 3288F FALL RISK ASSESSMENT DOCD: CPT | Mod: HCNC,CPTII,S$GLB, | Performed by: DERMATOLOGY

## 2024-08-21 PROCEDURE — 1101F PT FALLS ASSESS-DOCD LE1/YR: CPT | Mod: HCNC,CPTII,S$GLB, | Performed by: DERMATOLOGY

## 2024-08-21 PROCEDURE — 99213 OFFICE O/P EST LOW 20 MIN: CPT | Mod: HCNC,S$GLB,, | Performed by: DERMATOLOGY

## 2024-08-21 PROCEDURE — 1160F RVW MEDS BY RX/DR IN RCRD: CPT | Mod: HCNC,CPTII,S$GLB, | Performed by: DERMATOLOGY

## 2024-08-21 PROCEDURE — 3072F LOW RISK FOR RETINOPATHY: CPT | Mod: HCNC,CPTII,S$GLB, | Performed by: DERMATOLOGY

## 2024-08-21 RX ORDER — MOMETASONE FUROATE 1 MG/ML
SOLUTION TOPICAL
Qty: 60 ML | Refills: 3 | Status: SHIPPED | OUTPATIENT
Start: 2024-08-21

## 2024-08-21 NOTE — PATIENT INSTRUCTIONS
Chest and right side  Mix 4-5 drops of mometasone in scoop of cerave cream and apply to chest nightly  and can use in area of shingles.   Can do cool compresses in these areas   Can try Dermeleve samples and this can be purchased online if it is helpful         Discussed with patient good skin care regimen including avoiding fragranced products and very hot showers.  Recommended dove sensitive skin bar soap or cerave hydrating cleanser or bar.  Recommend Cerave cream  For moisturization daily -2x daily.

## 2024-08-21 NOTE — PROGRESS NOTES
Subjective:      Patient ID:  Randy Camejo is a 84 y.o. male who presents for   Chief Complaint   Patient presents with    Rash     Chest      Pt here for skin itching , pt states affected areas include the chest ; worse at night.  No rash .     Also has scaly lesions on back.    C/o pain/itching on right side. Had shingles in this area and this continues to be an issue. No tx.     Rash      Review of Systems   Skin:  Positive for itching and dry skin. Negative for rash.       Objective:   Physical Exam   Constitutional: He appears well-developed and well-nourished. No distress.   Neurological: He is alert and oriented to person, place, and time. He is not disoriented.   Psychiatric: He has a normal mood and affect.   Skin:   Areas Examined (abnormalities noted in diagram):   Chest / Axilla Inspection Performed  Abdomen Inspection Performed  Back Inspection Performed            Diagram Legend     Erythematous scaling macule/papule c/w actinic keratosis       Vascular papule c/w angioma      Pigmented verrucoid papule/plaque c/w seborrheic keratosis      Yellow umbilicated papule c/w sebaceous hyperplasia      Irregularly shaped tan macule c/w lentigo     1-2 mm smooth white papules consistent with Milia      Movable subcutaneous cyst with punctum c/w epidermal inclusion cyst      Subcutaneous movable cyst c/w pilar cyst      Firm pink to brown papule c/w dermatofibroma      Pedunculated fleshy papule(s) c/w skin tag(s)      Evenly pigmented macule c/w junctional nevus     Mildly variegated pigmented, slightly irregular-bordered macule c/w mildly atypical nevus      Flesh colored to evenly pigmented papule c/w intradermal nevus       Pink pearly papule/plaque c/w basal cell carcinoma      Erythematous hyperkeratotic cursted plaque c/w SCC      Surgical scar with no sign of skin cancer recurrence      Open and closed comedones      Inflammatory papules and pustules      Verrucoid papule consistent consistent with  wart     Erythematous eczematous patches and plaques     Dystrophic onycholytic nail with subungual debris c/w onychomycosis     Umbilicated papule    Erythematous-base heme-crusted tan verrucoid plaque consistent with inflamed seborrheic keratosis     Erythematous Silvery Scaling Plaque c/w Psoriasis     See annotation      Assessment / Plan:        Pruritus  Xerosis cutis  -     mometasone (ELOCON) 0.1 % solution; Mix 4-5 drops in scoop of cerave cream and apply to chest and side qd- bid prn itching/pain  Dispense: 60 mL; Refill: 3    Chest and right side  Mix 4-5 drops of mometasone in scoop of cerave cream and apply to chest nightly  and can use in area of shingles.   Can do cool compresses in these areas   Can try Dermeleve samples and this can be purchased online if it is helpful         Discussed with patient good skin care regimen including avoiding fragranced products and very hot showers.  Recommended dove sensitive skin bar soap or cerave hydrating cleanser or bar.  Recommend Cerave cream  For moisturization daily -2x daily.     Post herpetic neuralgia  Chest and right side  Mix 4-5 drops of mometasone in scoop of cerave cream and apply to chest nightly  and can use in area of shingles.   Can do cool compresses in these areas   Can try Dermeleve samples and this can be purchased online if it is helpful   If persists may need more aggressive treatment     SK (seborrheic keratosis)  These are benign inherited growths without a malignant potential. Reassurance given to patient. No treatment is necessary.              Follow up if symptoms worsen or fail to improve.

## 2024-08-29 ENCOUNTER — PATIENT MESSAGE (OUTPATIENT)
Dept: PRIMARY CARE CLINIC | Facility: CLINIC | Age: 84
End: 2024-08-29
Payer: MEDICARE

## 2024-09-03 ENCOUNTER — PATIENT MESSAGE (OUTPATIENT)
Dept: PRIMARY CARE CLINIC | Facility: CLINIC | Age: 84
End: 2024-09-03
Payer: MEDICARE

## 2024-09-03 DIAGNOSIS — M10.9 GOUT, UNSPECIFIED CAUSE, UNSPECIFIED CHRONICITY, UNSPECIFIED SITE: ICD-10-CM

## 2024-09-04 RX ORDER — ALLOPURINOL 100 MG/1
100 TABLET ORAL DAILY
Qty: 30 TABLET | Refills: 2 | Status: SHIPPED | OUTPATIENT
Start: 2024-09-04

## 2024-10-28 ENCOUNTER — TELEPHONE (OUTPATIENT)
Dept: PODIATRY | Facility: CLINIC | Age: 84
End: 2024-10-28
Payer: MEDICARE

## 2024-11-03 DIAGNOSIS — E78.5 HYPERLIPIDEMIA, UNSPECIFIED HYPERLIPIDEMIA TYPE: ICD-10-CM

## 2024-11-04 ENCOUNTER — PATIENT MESSAGE (OUTPATIENT)
Dept: PRIMARY CARE CLINIC | Facility: CLINIC | Age: 84
End: 2024-11-04
Payer: MEDICARE

## 2024-11-04 DIAGNOSIS — I50.22 HEART FAILURE, SYSTOLIC, CHRONIC: ICD-10-CM

## 2024-11-04 DIAGNOSIS — M10.9 GOUT, UNSPECIFIED CAUSE, UNSPECIFIED CHRONICITY, UNSPECIFIED SITE: ICD-10-CM

## 2024-11-04 RX ORDER — ATORVASTATIN CALCIUM 10 MG/1
10 TABLET, FILM COATED ORAL DAILY
Qty: 90 TABLET | Refills: 1 | Status: SHIPPED | OUTPATIENT
Start: 2024-11-04

## 2024-11-05 RX ORDER — ALLOPURINOL 100 MG/1
100 TABLET ORAL DAILY
Qty: 30 TABLET | Refills: 2 | Status: SHIPPED | OUTPATIENT
Start: 2024-11-05

## 2024-11-05 RX ORDER — CARVEDILOL 3.12 MG/1
3.12 TABLET ORAL 2 TIMES DAILY
Qty: 180 TABLET | Refills: 3 | Status: SHIPPED | OUTPATIENT
Start: 2024-11-05

## 2024-11-21 ENCOUNTER — OFFICE VISIT (OUTPATIENT)
Dept: PODIATRY | Facility: CLINIC | Age: 84
End: 2024-11-21
Payer: MEDICARE

## 2024-11-21 VITALS
WEIGHT: 168.19 LBS | HEIGHT: 71 IN | HEART RATE: 62 BPM | BODY MASS INDEX: 23.55 KG/M2 | DIASTOLIC BLOOD PRESSURE: 58 MMHG | SYSTOLIC BLOOD PRESSURE: 98 MMHG

## 2024-11-21 DIAGNOSIS — Z79.01 CHRONIC ANTICOAGULATION: ICD-10-CM

## 2024-11-21 DIAGNOSIS — Z89.421 HISTORY OF PARTIAL RAY AMPUTATION OF FIFTH TOE OF RIGHT FOOT: ICD-10-CM

## 2024-11-21 DIAGNOSIS — Z79.4 TYPE 2 DIABETES MELLITUS WITH DIABETIC NEUROPATHY, WITH LONG-TERM CURRENT USE OF INSULIN: Primary | ICD-10-CM

## 2024-11-21 DIAGNOSIS — B35.1 DERMATOPHYTOSIS OF NAIL: ICD-10-CM

## 2024-11-21 DIAGNOSIS — E11.40 TYPE 2 DIABETES MELLITUS WITH DIABETIC NEUROPATHY, WITH LONG-TERM CURRENT USE OF INSULIN: Primary | ICD-10-CM

## 2024-11-21 DIAGNOSIS — L84 CORN OR CALLUS: ICD-10-CM

## 2024-11-21 PROCEDURE — 99499 UNLISTED E&M SERVICE: CPT | Mod: HCNC,S$GLB,, | Performed by: PODIATRIST

## 2024-11-21 PROCEDURE — 11056 PARNG/CUTG B9 HYPRKR LES 2-4: CPT | Mod: Q7,HCNC,S$GLB, | Performed by: PODIATRIST

## 2024-11-21 PROCEDURE — 99999 PR PBB SHADOW E&M-EST. PATIENT-LVL IV: CPT | Mod: PBBFAC,HCNC,, | Performed by: PODIATRIST

## 2024-11-21 PROCEDURE — 11721 DEBRIDE NAIL 6 OR MORE: CPT | Mod: 59,Q7,HCNC,S$GLB | Performed by: PODIATRIST

## 2024-11-25 RX ORDER — AMIODARONE HYDROCHLORIDE 100 MG/1
100 TABLET ORAL DAILY
Qty: 30 TABLET | Refills: 6 | Status: SHIPPED | OUTPATIENT
Start: 2024-11-25

## 2024-12-02 NOTE — PROGRESS NOTES
Chief Concern:  Foot exam     HPI:  Randy Camejo is a 84 y.o. male presents for foot exam.  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        The patient is under the active care of his PCP Danya Sandhu NP, for chronic conditions, including diabetes.      He last saw his PCP on 6/18/2024     He is also on Eliquis for atrial fibrillation .  Seen Dr. Mack in Cardiology on 7/18/2024      Patient Active Problem List   Diagnosis    Nuclear sclerosis, bilateral    Nuclear sclerotic cataract of left eye    Paroxysmal atrial fibrillation    Type 2 diabetes mellitus with neurologic complication, with long-term current use of insulin    Chronic systolic heart failure    Thrombocytopenia, unspecified    Pre-ulcerative corn or callous    Peripheral vascular disease    Tachycardia induced cardiomyopathy    Coronary artery disease involving native coronary artery of native heart without angina pectoris    Chronic anticoagulation    History of myocardial infarction    History of coronary artery stent placement    Hypertension associated with diabetes    Hyperlipidemia associated with type 2 diabetes mellitus    Subclinical hypothyroidism    Stage 3a chronic kidney disease    Chronic renal failure, stage 3b    Long term current use of amiodarone           Current Outpatient Medications on File Prior to Visit   Medication Sig Dispense Refill    acetaminophen (TYLENOL) 325 MG tablet Take 2 tablets (650 mg total) by mouth every 4 (four) hours as needed.  0    alcohol swabs PadM use 2 (two) times a day. E11.49 200 each 5    allopurinoL (ZYLOPRIM) 100 MG tablet Take 1 tablet (100 mg total) by mouth once daily. 30 tablet 2    apixaban (ELIQUIS) 2.5 mg Tab Take 1 tablet (2.5 mg total) by mouth 2 (two) times daily. 180 tablet 3    atorvastatin (LIPITOR) 10 MG tablet Take 1 tablet (10 mg total) by mouth once daily. 90 tablet 1    blood glucose control, low Soln Use 1 each to check glucose level of glucometer weekly 4  each 11    blood sugar diagnostic (TRUE METRIX GLUCOSE TEST STRIP) Strp use 2 (two) times a day. To check blood sugar 200 strip 5    blood sugar diagnostic (TRUE METRIX GLUCOSE TEST STRIP) Strp use 2 (two) times a day. To check blood sugar 200 strip 5    blood sugar diagnostic (TRUE METRIX GLUCOSE TEST STRIP) Strp use 2 (two) times a day. To check blood sugar 200 strip 3    carvediloL (COREG) 3.125 MG tablet Take 1 tablet (3.125 mg total) by mouth 2 (two) times daily. 180 tablet 3    clotrimazole (LOTRIMIN) 1 % cream Apply topically 2 (two) times daily. To toenails and area around the toes. 60 g 3    dimethicone 6 % Crea Apply 1 application topically once daily. For legs and feet. 57 g 10    famotidine (PEPCID) 20 MG tablet Take 1 tablet (20 mg total) by mouth once daily. 90 tablet 3    furosemide (LASIX) 20 MG tablet Take 1 tablet (20 mg total) by mouth once daily. 30 tablet 1    lancets (TRUEPLUS LANCETS) 33 gauge Misc use twice a day as directed 200 each 5    lancets (TRUEPLUS LANCETS) 33 gauge Misc Use to test blood glucose two (2) times a day, discard lancet after each use 200 each 3    levothyroxine (SYNTHROID) 112 MCG tablet Take 1 tablet (112 mcg total) by mouth once daily. 90 tablet 2    metFORMIN (GLUCOPHAGE) 500 MG tablet Take 1 tablet (500 mg total) by mouth 2 (two) times daily with meals. 180 tablet 3    mometasone (ELOCON) 0.1 % solution Mix 4-5 drops in scoop of cerave cream and apply to chest and side qd- bid prn itching/pain 60 mL 3    sacubitriL-valsartan (ENTRESTO) 49-51 mg per tablet Take 1 tablet by mouth 2 (two) times daily. 60 tablet 6    triamcinolone acetonide 0.1% (KENALOG) 0.1 % ointment aaa bid avoid face/ groin 80 g 4    TRUE METRIX AIR GLUCOSE METER kit        No current facility-administered medications on file prior to visit.           Review of patient's allergies indicates:  No Known Allergies              Objective:      Vitals:    11/21/24 1414   BP: (!) 98/58   Pulse: 62  "  Weight: 76.3 kg (168 lb 3.4 oz)   Height: 5' 11" (1.803 m)         Physical Exam  Constitutional:       General: He is not in acute distress.     Appearance: He is well-developed. He is not diaphoretic.   Cardiovascular:      Pulses:           Dorsalis pedis pulses are 1+ on the right side and 1+ on the left side.        Posterior tibial pulses are 1+ on the right side and 1+ on the left side.      Comments: Toes warm, pink, cap fill <5 seconds.  Musculoskeletal:      Comments: Partial 5th ray resection right foot without symptoms.   Lymphadenopathy:      Comments: Negative lymphadenopathy bilateral popliteal fossa and tarsal tunnel.     Skin:     General: Skin is warm and dry.      Coloration: Skin is not pale.      Findings: No abrasion, bruising, burn, ecchymosis, erythema, laceration, lesion or rash.      Comments:   Skin thin, shiny, atrophic.     Neurological:      Sensory: Sensory deficit present.      Comments: Diminished/loss of protective sensation all toes bilateral to 10 gram monofilament.               Assessment:       Problem List Items Addressed This Visit       Chronic anticoagulation    Overview     [id number] (CHA2DV).  2/23/2019: Began apixiban.         Type 2 diabetes mellitus with neurologic complication, with long-term current use of insulin - Primary    Overview     2015: Diagnosed. Complications: CAD. Medications: Insulin.          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  Shoe inspection.     Continue good nutrition and blood sugar control to help prevent podiatric complications of diabetes.   Maintain proper foot hygiene.   Continue wearing proper shoe gear, daily foot inspections, never walking without protective shoe gear, never putting sharp instruments to " "feet.      Routine Foot Care    Date/Time: 11/21/2024 2:00 PM    Performed by: Jess Rosa DPM  Authorized by: Jess Rosa DPM    Time out: Immediately prior to procedure a "time out" was called to verify the correct patient, procedure, equipment, support staff and site/side marked as required.    Hyperkeratotic Skin Lesions?: Yes    Number of trimmed lesions:  2  Location(s):  Right Plantar and Left Plantar    Nail Care Type:  Debride(Left 1st Toe, Left 3rd Toe, Left 2nd Toe, Left 4th Toe, Left 5th Toe, Right 1st Toe, Right 2nd Toe, Right 3rd Toe and Right 4th Toe)  Patient tolerance:  Patient tolerated the procedure well with no immediate complications     With patient's permission, the toenails mentioned above were reduced and debrided using a nail nipper, removing offending nail and debris.   Utilizing a #15 scalpel, I trimmed the corns and calluses at the above mentioned location.      The patient will continue to monitor the areas daily, inspect the feet, wear protective shoe gear when ambulatory, and moisturizer to maintain skin integrity.              "

## 2025-01-06 DIAGNOSIS — I48.0 PAROXYSMAL ATRIAL FIBRILLATION: Chronic | ICD-10-CM

## 2025-01-13 DIAGNOSIS — Z00.00 ENCOUNTER FOR MEDICARE ANNUAL WELLNESS EXAM: ICD-10-CM

## 2025-01-20 ENCOUNTER — NURSE TRIAGE (OUTPATIENT)
Dept: ADMINISTRATIVE | Facility: CLINIC | Age: 85
End: 2025-01-20
Payer: MEDICARE

## 2025-01-20 DIAGNOSIS — I50.22 CHRONIC SYSTOLIC HEART FAILURE: ICD-10-CM

## 2025-01-20 NOTE — TELEPHONE ENCOUNTER
OOC RN  Need Entresto,    Need a refill from the doctor,  Called in to [facility].    He is not out of meds, has a couple more until wed.  Care advise is to call Dr. Vickers when open for refill.    For any new or worsening symptoms to callback.    Reason for Disposition   [1] Prescription refill request for NON-ESSENTIAL medicine (i.e., no harm to patient if med not taken) AND [2] triager unable to refill per department policy    Additional Information   Negative: Caller requesting a CONTROLLED substance prescription refill (e.g., narcotics, ADHD medicines)   Negative: [1] Prescription refill request for ESSENTIAL medicine (i.e., likelihood of harm to patient if not taken) AND [2] triager unable to refill per department policy   Negative: [1] Prescription not at pharmacy AND [2] was prescribed by PCP recently  (Exception: Triager has access to EMR and prescription is recorded there. Go to Home Care and confirm for pharmacy.)   Negative: [1] Pharmacy calling with prescription questions AND [2] triager unable to answer question   Negative: Prescription request for new medicine (not a refill)   Negative: New-onset or worsening symptoms, see that guideline (e.g., diarrhea, runny nose, sore throat)   Negative: Medicine question not related to refill or renewal   Negative: Caller (e.g., patient or pharmacist) requesting information about a new medicine   Negative: Caller requesting information unrelated to medicine    Protocols used: Medication Refill and Renewal Call-A-

## 2025-01-22 RX ORDER — SACUBITRIL AND VALSARTAN 49; 51 MG/1; MG/1
1 TABLET, FILM COATED ORAL 2 TIMES DAILY
Qty: 60 TABLET | Refills: 11 | Status: SHIPPED | OUTPATIENT
Start: 2025-01-22

## 2025-02-05 DIAGNOSIS — M10.9 GOUT, UNSPECIFIED CAUSE, UNSPECIFIED CHRONICITY, UNSPECIFIED SITE: ICD-10-CM

## 2025-02-05 RX ORDER — ALLOPURINOL 100 MG/1
TABLET ORAL
Qty: 30 TABLET | Refills: 2 | Status: SHIPPED | OUTPATIENT
Start: 2025-02-05

## 2025-02-20 NOTE — PROGRESS NOTES
Patient ID: Randy Camejo is a 84 y.o. male.    Chief Complaint: Follow-up    History of Present Illness    CHIEF COMPLAINT:  Patient presents with fluid retention in the legs and feet, accompanied by wheezing and fatigue.    HPI:  Patient reports fluid retention in his legs and feet, with the right side more affected than the left. He also complains of wheezing, which worsens at night when supine. Patient mentions fatigue and occasional dyspnea. He has mobility issues, including difficulty transferring from bed and rising from the commode. Patient reports recent falls from slipping off the bed and from a chair.    Patient's sleep hygiene is poor, typically going to bed around 12-1 AM and waking up around 11 AM. He has red eyes, rhinorrhea, and morning hoarseness that improves throughout the day. Patient also reports a slight cough, usually occurring while eating.    Patient expresses concern about possible sleep apnea, noting recent worsening of snoring as reported by his wife. He has always snored, but it seems to have intensified lately.    Patient reports ongoing neuropathy affecting his feet and little finger, describing it as severe and sensitive, particularly when ambulating. He also mentions having eczema, for which he has medication.    Patient had a recent gout flare-up. He is evaluated by a podiatrist regularly for toe fungus. Patient also reports possible GERD symptoms, for which he takes Pepcid intermittently.    Patient reports history of atrial fibrillation is currently being controlled with amiodarone 100 mg daily and carvedilol 3.125 mg twice a day Eliquis 2.5 mg twice a day to prevent blood clots which may occur with atrial fibrillation.     MEDICAL HISTORY:  Patient has a history of gout, systolic and atrial heart failure, neuropathy, eczema, GERD (gastroesophageal reflux disease), and suspected sleep apnea. He also has a history of anemia from when he was young, diabetes, and  hypertension.      ROS:  General: -fever, -chills, +fatigue, -weight gain, -weight loss, +difficulty falling asleep, +sleep disturbances  Eyes: -vision changes, +redness, -discharge  ENT: -ear pain, -nasal congestion, -sore throat, +runny nose, +nasal discharge, +hoarseness  Cardiovascular: -chest pain, -palpitations, +lower extremity edema  Respiratory: +cough, +shortness of breath, +wheezing, +snoring  Gastrointestinal: -abdominal pain, -nausea, -vomiting, -diarrhea, -constipation, -blood in stool  Genitourinary: -dysuria, -hematuria, -frequency  Musculoskeletal: -joint pain, -muscle pain  Skin: -rash, -lesion  Neurological: -headache, -dizziness, +numbness, +tingling, +nerve pain  Psychiatric: -anxiety, -depression, -sleep difficulty         Physical Exam    General: No acute distress. Well-developed. Well-nourished.  Eyes: EOMI. Sclerae anicteric.  HENT: Normocephalic. Atraumatic. Nares patent. Moist oral mucosa.  Cardiovascular: Regular rate. Arrhythmia with slight pause. No murmurs. No rubs. No gallops. Normal S1, S2.  Respiratory: Normal respiratory effort. Clear to auscultation bilaterally. No rales. No rhonchi. No wheezing.  Musculoskeletal: No  obvious deformity.  Extremities: No lower extremity edema.  Neurological: Alert & oriented x3. No slurred speech. Normal gait.  Psychiatric: Normal mood. Normal affect. Good insight. Good judgment.  Skin: Warm. Dry. No rash.         Assessment & Plan    IMPRESSION:  - Patient presents with lower extremity fluid retention, fatigue, and shortness of breath  - History of systolic heart failure and atrial fibrillation  - Controlled with amiodarone 100 mg daily and carvedilol 3.125 mg twice a day Eliquis 2.5 mg twice a day to prevent blood clots which may occur with atrial fibrillation.   - Restarted furosemide 20 mg daily to address fluid retention to lower extremities  - Continued metformin for diabetes management  - Maintained current blood pressure and heart  medications to protect kidney function  - Avoided gabapentin for neuropathy due to potential side effects and fall risk  -     TYPE 2 DIABETES MELLITUS WITH DIABETIC NEUROPATHY, UNSPECIFIED:  - Discussed the relationship between diabetes and kidney function with the patient.  - Continued metformin at current dose for diabetes management.  - Ordered hemoglobin A1c lab test.  - Patient reports stable blood sugar in the 6 range and ongoing neuropathy symptoms in feet and little finger.  - Decided not to initiate Gabapentin for neuropathy due to potential side effects of drowsiness, combined with patient's unsteady gait.  - Planned to check basic CBC, CMP, lipids, and cholesterol levels.  - Instructed the patient to follow up in 3 months lower extremity edema.    CHRONIC SYSTOLIC HEART FAILURE:  - Explained to the patient how heart conditions can lead to fluid buildup in legs and lungs.  - Patient reports fluid retention in legs and feet, with right side more affected, as well as wheezing and shortness of breath.  - Restarted furosemide (Lasix) to reduce fluid retention, instructing the patient to take it in the morning.  - continue amiodarone 100 mg  daily and carvedilol 3.125 mg b.i.d.  - Patient is to stop furosemide once leg swelling has decreased.  - Auscultated the heart and noted arrhythmia.  - Lungs sound  clear.  - Recommend increasing physical activity and using a pedal exerciser.    PAROXYSMAL ATRIAL FIBRILLATION:  - Continued amiodarone 100 mg daily and carvedilol 3.125 mg BID   - Noted arrhythmia with slight pauses during heart exam.    CHRONIC RENAL FAILURE, STAGE 3B:  - Discussed the relationship between diabetes and kidney function with the patient.  - Ordered CMP to check kidney function.  - Emphasized the importance of maintaining kidney function through blood pressure and diabetes management.    HYPERTENSION:  -Today's blood pressure is 132/72.  - Discussed the relationship between hypertension and  kidney function with the patient.  - Assessed that the patient's blood pressure is under control.  - Will maintain current blood pressure medications.    GOUT:  - Continued allopurinol at current dose for gout management.  - Patient reported a recent gout flare-up.    GASTROESOPHAGEAL REFLUX DISEASE (GERD):  - Continued Pepcid (famotidine) for GERD management.  - Instructed the patient to take Pepcid daily instead of as needed, as the patient reports having GERD symptoms.    DERMATITIS (ECZEMA):  - Patient reports having eczema and has medication for its management.    SLEEP APNEA:  - Patient reports snoring and suspects sleep apnea.  - Acknowledged the patient's concern.  - Referred to sleep study clinic    TINEA UNGUIUM (TOE FUNGUS):  - Patient mentions having toe fungus and sees a podiatrist regularly for treatment.    HYPOTHYROIDISM:  - Refilled levothyroxine 112 mcg for hypothyroidism management.  - Planned to check thyroid levels in upcoming labs.    HISTORY OF ANEMIA:  - Patient mentions a history of anemia.    FALL RISK AND MOBILITY:  - Patient reports recent falls and difficulty with transfers.  - Instructed the patient to use a walker for all ambulation to prevent falls.  - Referred the [facility] for home health physical therapy evaluation and treatment.    GENERAL RECOMMENDATIONS:  - Recommend gradually increasing water intake to 4 glasses per day, spaced out throughout the day.  - Suggest adjusting sleep schedule: go to bed earlier and wake up earlier.    FOLLOW-UP AND LABS:  - Follow up in 1-2 weeks for lab work.              No follow-ups on file.    This note was generated with the assistance of ambient listening technology. Verbal consent was obtained by the patient and accompanying visitor(s) for the recording of patient appointment to facilitate this note. I attest to having reviewed and edited the generated note for accuracy, though some syntax or spelling errors may persist. Please contact the  "author of this note for any clarification.  [facility] Note    Chief Complaint      Chief Complaint   Patient presents with    Follow-up       History of Present Illness      Randy Camejo is a 84 y.o. male who presents today for   Chief Complaint   Patient presents with    Follow-up         HPI           Family History:  family history includes Atrial fibrillation in his brother; Cataracts in his father; Diabetes in his brother, maternal grandmother, mother, and paternal grandmother; Heart disease in his son; Hypertension in his son; No Known Problems in his daughter, half-brother, half-sister, half-sister, maternal grandfather, paternal grandfather, and son; Stomach cancer in his father; Stroke in his mother.   Family history was reviewed with patient.     Medications:  Encounter Medications[1]    Allergies:  Review of patient's allergies indicates:  No Known Allergies    Health Maintenance:  Health Maintenance   Topic Date Due    TETANUS VACCINE  Never done    Shingles Vaccine (1 of 2) Never done    RSV Vaccine (Age 60+ and Pregnant patients) (1 - 1-dose 75+ series) Never done    Diabetes Urine Screening  11/03/2023    Diabetic Eye Exam  07/13/2024    Hemoglobin A1c  10/22/2024    Lipid Panel  04/22/2025    COVID-19 Vaccine  Completed    Pneumococcal Vaccines (Age 50+)  Completed    Influenza Vaccine  Discontinued     Health Maintenance Topics with due status: Not Due       Topic Last Completion Date    Lipid Panel 04/22/2024       Physical Exam      Vital Signs  Temp: 98 °F (36.7 °C)  Temp Source: Oral  Pulse: 77  SpO2: 97 %  BP: 132/72  BP Location: Right arm  Patient Position: Sitting  Pain Score: 0-No pain  Height and Weight  Height: 5' 11" (180.3 cm)  Weight:  (pt refused)  Weight in (lb) to have BMI = 25: 178.9]    Physical Exam       Assessment/Plan     Randy Camejo is a 84 y.o.male with:    Type 2 diabetes mellitus with diabetic neuropathy, with long-term current use of insulin  -    "  Comprehensive Metabolic Panel; Future; Expected date: 03/13/2025  -     Hemoglobin A1C; Future; Expected date: 03/13/2025  -     Ambulatory referral/consult to Home Health; Future; Expected date: 03/14/2025    Leg swelling  -     furosemide (LASIX) 20 MG tablet; Take 1 tablet (20 mg total) by mouth once daily.  Dispense: 30 tablet; Refill: 1  -     Ambulatory referral/consult to Home Health; Future; Expected date: 03/14/2025    Hyperlipidemia associated with type 2 diabetes mellitus  -     Lipid Panel; Future; Expected date: 03/13/2025    Chronic renal failure, stage 3b    Thrombocytopenia, unspecified  -     CBC Auto Differential; Future; Expected date: 03/13/2025    Hypothyroidism, unspecified type  -     T4, Free; Future; Expected date: 03/13/2025  -     TSH; Future; Expected date: 03/13/2025    Chronic systolic heart failure  -     Ambulatory referral/consult to Home Health; Future; Expected date: 03/14/2025    Snoring  -     Ambulatory referral/consult to Sleep Disorders; Future; Expected date: 03/20/2025    Paroxysmal atrial fibrillation        As above, continue current medications and maintain follow up with specialists.  Return to clinic as needed.    Greater than 50% of visit was spent face to face with patient.  All questions were answered to patient's satisfaction.          Karen L Spencer, NP-C [facility]                     [1]   Outpatient Encounter Medications as of 3/13/2025   Medication Sig Dispense Refill    alcohol swabs PadM use 2 (two) times a day. E11.49 200 each 5    allopurinoL (ZYLOPRIM) 100 MG tablet TAKE 1 TABLET(100 MG) BY MOUTH DAILY 30 tablet 2    amiodarone (PACERONE) 100 MG Tab Take 1 tablet (100 mg total) by mouth once daily. 30 tablet 6    apixaban (ELIQUIS) 2.5 mg Tab Take 1 tablet (2.5 mg total) by mouth 2 (two) times daily. 180 tablet 3    atorvastatin (LIPITOR) 10 MG tablet Take 1 tablet (10 mg total) by mouth once daily. 90 tablet 1    blood glucose control, low  Soln Use 1 each to check glucose level of glucometer weekly 4 each 11    blood sugar diagnostic (TRUE METRIX GLUCOSE TEST STRIP) Strp use 2 (two) times a day. To check blood sugar 200 strip 5    blood sugar diagnostic (TRUE METRIX GLUCOSE TEST STRIP) Strp use 2 (two) times a day. To check blood sugar 200 strip 3    carvediloL (COREG) 3.125 MG tablet Take 1 tablet (3.125 mg total) by mouth 2 (two) times daily. 180 tablet 3    clotrimazole (LOTRIMIN) 1 % cream Apply topically 2 (two) times daily. To toenails and area around the toes. 60 g 3    famotidine (PEPCID) 20 MG tablet Take 1 tablet (20 mg total) by mouth once daily. 90 tablet 3    lancets (TRUEPLUS LANCETS) 33 gauge Misc use twice a day as directed 200 each 5    lancets (TRUEPLUS LANCETS) 33 gauge Misc Use to test blood glucose two (2) times a day, discard lancet after each use 200 each 3    levothyroxine (SYNTHROID) 112 MCG tablet Take 1 tablet (112 mcg total) by mouth once daily. 90 tablet 2    metFORMIN (GLUCOPHAGE) 500 MG tablet Take 1 tablet (500 mg total) by mouth 2 (two) times daily with meals. 180 tablet 3    mometasone (ELOCON) 0.1 % solution Mix 4-5 drops in scoop of cerave cream and apply to chest and side qd- bid prn itching/pain 60 mL 3    sacubitriL-valsartan (ENTRESTO) 49-51 mg per tablet Take 1 tablet by mouth 2 (two) times daily. 60 tablet 11    triamcinolone acetonide 0.1% (KENALOG) 0.1 % ointment aaa bid avoid face/ groin 80 g 4    [DISCONTINUED] furosemide (LASIX) 20 MG tablet Take 1 tablet (20 mg total) by mouth once daily. 30 tablet 1    furosemide (LASIX) 20 MG tablet Take 1 tablet (20 mg total) by mouth once daily. 30 tablet 1    [DISCONTINUED] acetaminophen (TYLENOL) 325 MG tablet Take 2 tablets (650 mg total) by mouth every 4 (four) hours as needed.  0    [DISCONTINUED] blood sugar diagnostic (TRUE METRIX GLUCOSE TEST STRIP) Strp use 2 (two) times a day. To check blood sugar 200 strip 5    [DISCONTINUED] dimethicone 6 % Crea Apply 1  application topically once daily. For legs and feet. 57 g 10    [DISCONTINUED] TRUE METRIX AIR GLUCOSE METER kit        No facility-administered encounter medications on file as of 3/13/2025.

## 2025-03-11 ENCOUNTER — TELEPHONE (OUTPATIENT)
Dept: PRIMARY CARE CLINIC | Facility: CLINIC | Age: 85
End: 2025-03-11
Payer: MEDICARE

## 2025-03-11 NOTE — TELEPHONE ENCOUNTER
----- Message from Raeann sent at 3/11/2025 12:27 PM CDT -----  Contact: [phone]  Type: Orders RequestWhat orders/ testing are being requested? Blood Test  before his apptIs there a future appointment scheduled for the patient with PCP? Yes When?03/13/2025Would you prefer a response via [employer]?  Call back Comments: Thank you

## 2025-03-13 ENCOUNTER — OFFICE VISIT (OUTPATIENT)
Dept: PRIMARY CARE CLINIC | Facility: CLINIC | Age: 85
End: 2025-03-13
Payer: MEDICARE

## 2025-03-13 VITALS
TEMPERATURE: 98 F | SYSTOLIC BLOOD PRESSURE: 132 MMHG | BODY MASS INDEX: 23.46 KG/M2 | HEIGHT: 71 IN | OXYGEN SATURATION: 97 % | HEART RATE: 77 BPM | DIASTOLIC BLOOD PRESSURE: 72 MMHG

## 2025-03-13 DIAGNOSIS — E11.40 TYPE 2 DIABETES MELLITUS WITH DIABETIC NEUROPATHY, WITH LONG-TERM CURRENT USE OF INSULIN: Primary | ICD-10-CM

## 2025-03-13 DIAGNOSIS — E03.9 HYPOTHYROIDISM, UNSPECIFIED TYPE: ICD-10-CM

## 2025-03-13 DIAGNOSIS — I48.0 PAROXYSMAL ATRIAL FIBRILLATION: ICD-10-CM

## 2025-03-13 DIAGNOSIS — D69.6 THROMBOCYTOPENIA, UNSPECIFIED: ICD-10-CM

## 2025-03-13 DIAGNOSIS — N18.32 CHRONIC RENAL FAILURE, STAGE 3B: ICD-10-CM

## 2025-03-13 DIAGNOSIS — E78.5 HYPERLIPIDEMIA ASSOCIATED WITH TYPE 2 DIABETES MELLITUS: ICD-10-CM

## 2025-03-13 DIAGNOSIS — M79.89 LEG SWELLING: ICD-10-CM

## 2025-03-13 DIAGNOSIS — Z79.4 TYPE 2 DIABETES MELLITUS WITH DIABETIC NEUROPATHY, WITH LONG-TERM CURRENT USE OF INSULIN: Primary | ICD-10-CM

## 2025-03-13 DIAGNOSIS — R06.83 SNORING: ICD-10-CM

## 2025-03-13 DIAGNOSIS — E11.69 HYPERLIPIDEMIA ASSOCIATED WITH TYPE 2 DIABETES MELLITUS: ICD-10-CM

## 2025-03-13 DIAGNOSIS — I50.22 CHRONIC SYSTOLIC HEART FAILURE: ICD-10-CM

## 2025-03-13 PROCEDURE — 99999 PR PBB SHADOW E&M-EST. PATIENT-LVL V: CPT | Mod: PBBFAC,HCNC,, | Performed by: NURSE PRACTITIONER

## 2025-03-13 PROCEDURE — 3078F DIAST BP <80 MM HG: CPT | Mod: HCNC,CPTII,S$GLB, | Performed by: NURSE PRACTITIONER

## 2025-03-13 PROCEDURE — 99214 OFFICE O/P EST MOD 30 MIN: CPT | Mod: HCNC,S$GLB,, | Performed by: NURSE PRACTITIONER

## 2025-03-13 PROCEDURE — 1159F MED LIST DOCD IN RCRD: CPT | Mod: HCNC,CPTII,S$GLB, | Performed by: NURSE PRACTITIONER

## 2025-03-13 PROCEDURE — 1101F PT FALLS ASSESS-DOCD LE1/YR: CPT | Mod: HCNC,CPTII,S$GLB, | Performed by: NURSE PRACTITIONER

## 2025-03-13 PROCEDURE — 3288F FALL RISK ASSESSMENT DOCD: CPT | Mod: HCNC,CPTII,S$GLB, | Performed by: NURSE PRACTITIONER

## 2025-03-13 PROCEDURE — 1160F RVW MEDS BY RX/DR IN RCRD: CPT | Mod: HCNC,CPTII,S$GLB, | Performed by: NURSE PRACTITIONER

## 2025-03-13 PROCEDURE — 1126F AMNT PAIN NOTED NONE PRSNT: CPT | Mod: HCNC,CPTII,S$GLB, | Performed by: NURSE PRACTITIONER

## 2025-03-13 PROCEDURE — 3075F SYST BP GE 130 - 139MM HG: CPT | Mod: HCNC,CPTII,S$GLB, | Performed by: NURSE PRACTITIONER

## 2025-03-13 RX ORDER — FUROSEMIDE 20 MG/1
20 TABLET ORAL DAILY
Qty: 30 TABLET | Refills: 1 | Status: SHIPPED | OUTPATIENT
Start: 2025-03-13 | End: 2026-03-13

## 2025-03-20 ENCOUNTER — LAB VISIT (OUTPATIENT)
Dept: LAB | Facility: HOSPITAL | Age: 85
End: 2025-03-20
Attending: NURSE PRACTITIONER
Payer: MEDICARE

## 2025-03-20 DIAGNOSIS — E11.40 TYPE 2 DIABETES MELLITUS WITH DIABETIC NEUROPATHY, WITH LONG-TERM CURRENT USE OF INSULIN: ICD-10-CM

## 2025-03-20 DIAGNOSIS — E03.9 HYPOTHYROIDISM, UNSPECIFIED TYPE: ICD-10-CM

## 2025-03-20 DIAGNOSIS — E78.5 HYPERLIPIDEMIA ASSOCIATED WITH TYPE 2 DIABETES MELLITUS: ICD-10-CM

## 2025-03-20 DIAGNOSIS — Z79.4 TYPE 2 DIABETES MELLITUS WITH DIABETIC NEUROPATHY, WITH LONG-TERM CURRENT USE OF INSULIN: ICD-10-CM

## 2025-03-20 DIAGNOSIS — E11.69 HYPERLIPIDEMIA ASSOCIATED WITH TYPE 2 DIABETES MELLITUS: ICD-10-CM

## 2025-03-20 DIAGNOSIS — D69.6 THROMBOCYTOPENIA, UNSPECIFIED: ICD-10-CM

## 2025-03-20 LAB
ALBUMIN SERPL BCP-MCNC: 3.6 G/DL (ref 3.5–5.2)
ALP SERPL-CCNC: 78 U/L (ref 40–150)
ALT SERPL W/O P-5'-P-CCNC: 9 U/L (ref 10–44)
ANION GAP SERPL CALC-SCNC: 11 MMOL/L (ref 8–16)
AST SERPL-CCNC: 17 U/L (ref 10–40)
BASOPHILS # BLD AUTO: 0.03 K/UL (ref 0–0.2)
BASOPHILS NFR BLD: 0.6 % (ref 0–1.9)
BILIRUB SERPL-MCNC: 0.7 MG/DL (ref 0.1–1)
BUN SERPL-MCNC: 26 MG/DL (ref 8–23)
CALCIUM SERPL-MCNC: 8.8 MG/DL (ref 8.7–10.5)
CHLORIDE SERPL-SCNC: 105 MMOL/L (ref 95–110)
CHOLEST SERPL-MCNC: 87 MG/DL (ref 120–199)
CHOLEST/HDLC SERPL: 2.2 {RATIO} (ref 2–5)
CO2 SERPL-SCNC: 23 MMOL/L (ref 23–29)
CREAT SERPL-MCNC: 1.4 MG/DL (ref 0.5–1.4)
DIFFERENTIAL METHOD BLD: ABNORMAL
EOSINOPHIL # BLD AUTO: 0.3 K/UL (ref 0–0.5)
EOSINOPHIL NFR BLD: 5.6 % (ref 0–8)
ERYTHROCYTE [DISTWIDTH] IN BLOOD BY AUTOMATED COUNT: 13.6 % (ref 11.5–14.5)
EST. GFR  (NO RACE VARIABLE): 49.9 ML/MIN/1.73 M^2
ESTIMATED AVG GLUCOSE: 117 MG/DL (ref 68–131)
GLUCOSE SERPL-MCNC: 93 MG/DL (ref 70–110)
HBA1C MFR BLD: 5.7 % (ref 4–5.6)
HCT VFR BLD AUTO: 34.4 % (ref 40–54)
HDLC SERPL-MCNC: 40 MG/DL (ref 40–75)
HDLC SERPL: 46 % (ref 20–50)
HGB BLD-MCNC: 10.6 G/DL (ref 14–18)
IMM GRANULOCYTES # BLD AUTO: 0.01 K/UL (ref 0–0.04)
IMM GRANULOCYTES NFR BLD AUTO: 0.2 % (ref 0–0.5)
LDLC SERPL CALC-MCNC: 34.4 MG/DL (ref 63–159)
LYMPHOCYTES # BLD AUTO: 0.9 K/UL (ref 1–4.8)
LYMPHOCYTES NFR BLD: 17.7 % (ref 18–48)
MCH RBC QN AUTO: 32.7 PG (ref 27–31)
MCHC RBC AUTO-ENTMCNC: 30.8 G/DL (ref 32–36)
MCV RBC AUTO: 106 FL (ref 82–98)
MONOCYTES # BLD AUTO: 0.5 K/UL (ref 0.3–1)
MONOCYTES NFR BLD: 9.5 % (ref 4–15)
NEUTROPHILS # BLD AUTO: 3.2 K/UL (ref 1.8–7.7)
NEUTROPHILS NFR BLD: 66.4 % (ref 38–73)
NONHDLC SERPL-MCNC: 47 MG/DL
NRBC BLD-RTO: 0 /100 WBC
PLATELET # BLD AUTO: 125 K/UL (ref 150–450)
PMV BLD AUTO: 11.9 FL (ref 9.2–12.9)
POTASSIUM SERPL-SCNC: 4.5 MMOL/L (ref 3.5–5.1)
PROT SERPL-MCNC: 7.6 G/DL (ref 6–8.4)
RBC # BLD AUTO: 3.24 M/UL (ref 4.6–6.2)
SODIUM SERPL-SCNC: 139 MMOL/L (ref 136–145)
T4 FREE SERPL-MCNC: 1.13 NG/DL (ref 0.71–1.51)
TRIGL SERPL-MCNC: 63 MG/DL (ref 30–150)
TSH SERPL DL<=0.005 MIU/L-ACNC: 11.97 UIU/ML (ref 0.4–4)
WBC # BLD AUTO: 4.85 K/UL (ref 3.9–12.7)

## 2025-03-20 PROCEDURE — 84439 ASSAY OF FREE THYROXINE: CPT | Mod: HCNC | Performed by: NURSE PRACTITIONER

## 2025-03-20 PROCEDURE — 84443 ASSAY THYROID STIM HORMONE: CPT | Mod: HCNC | Performed by: NURSE PRACTITIONER

## 2025-03-20 PROCEDURE — 83036 HEMOGLOBIN GLYCOSYLATED A1C: CPT | Mod: HCNC | Performed by: NURSE PRACTITIONER

## 2025-03-20 PROCEDURE — 80061 LIPID PANEL: CPT | Mod: HCNC | Performed by: NURSE PRACTITIONER

## 2025-03-20 PROCEDURE — 85025 COMPLETE CBC W/AUTO DIFF WBC: CPT | Mod: HCNC | Performed by: NURSE PRACTITIONER

## 2025-03-20 PROCEDURE — 80053 COMPREHEN METABOLIC PANEL: CPT | Mod: HCNC | Performed by: NURSE PRACTITIONER

## 2025-03-21 ENCOUNTER — RESULTS FOLLOW-UP (OUTPATIENT)
Dept: PRIMARY CARE CLINIC | Facility: CLINIC | Age: 85
End: 2025-03-21

## 2025-03-21 DIAGNOSIS — E03.9 HYPOTHYROIDISM, UNSPECIFIED TYPE: Primary | ICD-10-CM

## 2025-03-21 DIAGNOSIS — D64.9 ANEMIA, UNSPECIFIED TYPE: ICD-10-CM

## 2025-04-08 DIAGNOSIS — E11.40 TYPE 2 DIABETES MELLITUS WITH DIABETIC NEUROPATHY, WITH LONG-TERM CURRENT USE OF INSULIN: ICD-10-CM

## 2025-04-08 DIAGNOSIS — Z79.4 TYPE 2 DIABETES MELLITUS WITH DIABETIC NEUROPATHY, WITH LONG-TERM CURRENT USE OF INSULIN: ICD-10-CM

## 2025-05-14 DIAGNOSIS — M10.9 GOUT, UNSPECIFIED CAUSE, UNSPECIFIED CHRONICITY, UNSPECIFIED SITE: ICD-10-CM

## 2025-05-15 RX ORDER — ALLOPURINOL 100 MG/1
TABLET ORAL
Qty: 30 TABLET | Refills: 2 | Status: SHIPPED | OUTPATIENT
Start: 2025-05-15

## 2025-05-20 DIAGNOSIS — E78.5 HYPERLIPIDEMIA, UNSPECIFIED HYPERLIPIDEMIA TYPE: ICD-10-CM

## 2025-05-20 NOTE — TELEPHONE ENCOUNTER
----- Message from Melissa sent at 5/20/2025  1:22 PM CDT -----  Contact: [phone]  Requesting an RX refill or new RX.Is this a refill or new RX: refillRX name and strength (copy/paste from chart):  atorvastatin (LIPITOR) 10 MG tablet Is this a 30 day or 90 day RX: Pharmacy name and phone # (copy/paste from chart):  [id number] - [facility] [address] AT [address] [id number] [facility] [address]R[place]: [phone] Fax: [phone] doctors have asked that we provide their patients with the following 2 reminders -- prescription refills can take up to 72 hours, and a friendly reminder that in the future you can use your [facility] account to request refills: yes

## 2025-05-21 RX ORDER — ATORVASTATIN CALCIUM 10 MG/1
10 TABLET, FILM COATED ORAL DAILY
Qty: 90 TABLET | Refills: 1 | Status: SHIPPED | OUTPATIENT
Start: 2025-05-21

## 2025-05-26 DIAGNOSIS — M79.89 LEG SWELLING: ICD-10-CM

## 2025-05-26 RX ORDER — FUROSEMIDE 20 MG/1
TABLET ORAL
Qty: 30 TABLET | Refills: 1 | Status: SHIPPED | OUTPATIENT
Start: 2025-05-26

## 2025-05-29 RX ORDER — FAMOTIDINE 20 MG/1
20 TABLET, FILM COATED ORAL DAILY
Qty: 90 TABLET | Refills: 3 | Status: SHIPPED | OUTPATIENT
Start: 2025-05-29

## 2025-06-10 ENCOUNTER — PATIENT MESSAGE (OUTPATIENT)
Dept: PRIMARY CARE CLINIC | Facility: CLINIC | Age: 85
End: 2025-06-10
Payer: MEDICARE

## 2025-06-13 ENCOUNTER — LAB VISIT (OUTPATIENT)
Dept: LAB | Facility: HOSPITAL | Age: 85
End: 2025-06-13
Attending: NURSE PRACTITIONER
Payer: MEDICARE

## 2025-06-13 ENCOUNTER — OFFICE VISIT (OUTPATIENT)
Dept: PRIMARY CARE CLINIC | Facility: CLINIC | Age: 85
End: 2025-06-13
Payer: MEDICARE

## 2025-06-13 VITALS
OXYGEN SATURATION: 100 % | DIASTOLIC BLOOD PRESSURE: 56 MMHG | HEART RATE: 63 BPM | HEIGHT: 61 IN | SYSTOLIC BLOOD PRESSURE: 110 MMHG | WEIGHT: 156.5 LBS | BODY MASS INDEX: 29.55 KG/M2

## 2025-06-13 DIAGNOSIS — Z00.00 ENCOUNTER FOR MEDICARE ANNUAL WELLNESS EXAM: ICD-10-CM

## 2025-06-13 DIAGNOSIS — M10.9 GOUT, UNSPECIFIED CAUSE, UNSPECIFIED CHRONICITY, UNSPECIFIED SITE: ICD-10-CM

## 2025-06-13 DIAGNOSIS — E03.9 HYPOTHYROIDISM, UNSPECIFIED TYPE: ICD-10-CM

## 2025-06-13 DIAGNOSIS — Z79.4 TYPE 2 DIABETES MELLITUS WITH DIABETIC NEUROPATHY, WITH LONG-TERM CURRENT USE OF INSULIN: ICD-10-CM

## 2025-06-13 DIAGNOSIS — D64.9 ANEMIA, UNSPECIFIED TYPE: ICD-10-CM

## 2025-06-13 DIAGNOSIS — Z79.4 TYPE 2 DIABETES MELLITUS WITH DIABETIC NEUROPATHY, WITH LONG-TERM CURRENT USE OF INSULIN: Primary | ICD-10-CM

## 2025-06-13 DIAGNOSIS — E11.40 TYPE 2 DIABETES MELLITUS WITH DIABETIC NEUROPATHY, WITH LONG-TERM CURRENT USE OF INSULIN: Primary | ICD-10-CM

## 2025-06-13 DIAGNOSIS — E11.40 TYPE 2 DIABETES MELLITUS WITH DIABETIC NEUROPATHY, WITH LONG-TERM CURRENT USE OF INSULIN: ICD-10-CM

## 2025-06-13 LAB
IRON SATN MFR SERPL: 42 % (ref 20–50)
IRON SERPL-MCNC: 109 UG/DL (ref 45–160)
T4 FREE SERPL-MCNC: 1.4 NG/DL (ref 0.71–1.51)
TIBC SERPL-MCNC: 258 UG/DL (ref 250–450)
TRANSFERRIN SERPL-MCNC: 174 MG/DL (ref 200–375)
TSH SERPL-ACNC: 3.46 UIU/ML (ref 0.4–4)

## 2025-06-13 PROCEDURE — 84439 ASSAY OF FREE THYROXINE: CPT | Mod: HCNC

## 2025-06-13 PROCEDURE — 84443 ASSAY THYROID STIM HORMONE: CPT | Mod: HCNC

## 2025-06-13 PROCEDURE — 83540 ASSAY OF IRON: CPT | Mod: HCNC

## 2025-06-13 PROCEDURE — 36415 COLL VENOUS BLD VENIPUNCTURE: CPT | Mod: HCNC,PN

## 2025-06-13 PROCEDURE — 99999 PR PBB SHADOW E&M-EST. PATIENT-LVL V: CPT | Mod: PBBFAC,HCNC,, | Performed by: NURSE PRACTITIONER

## 2025-06-13 RX ORDER — LANCETS 33 GAUGE
EACH MISCELLANEOUS
Qty: 200 EACH | Refills: 3 | Status: CANCELLED | OUTPATIENT
Start: 2025-06-13

## 2025-06-13 RX ORDER — LANCETS 33 GAUGE
EACH MISCELLANEOUS
Qty: 200 EACH | Refills: 5 | Status: SHIPPED | OUTPATIENT
Start: 2025-06-13

## 2025-06-13 NOTE — PROGRESS NOTES
[facility] Note    Chief Complaint      Chief Complaint   Patient presents with    Follow-up     3 mo f/u       History of Present Illness      Randy Camejo is a 84 y.o. male who presents today for   Chief Complaint   Patient presents with    Follow-up     3 mo f/u         CHIEF COMPLAINT:  Patient presents for a follow-up visit to discuss recent lab results and medication management.    HPI:  Patient reports ongoing anemia since childhood. He feels cold frequently, potentially related to anemia or thyroid issues. Patient also reports hoarseness, which he attributes to possible seasonal allergies, with occasional eye tearing and nasal discharge.    Patient previously had fluid retention in his legs, causing mobility difficulties requiring wheelchair use. He reports improvement with prescribed furosemide and is no longer using the wheelchair.    Patient has gout episodes, which have improved but still occur periodically. He also reports occasional diarrhea with Metformin use, particularly after consuming high-sugar foods.    Patient describes episodes characterized by pressure behind the neck, limiting his ability to walk and necessitating sitting down. These episodes typically occur in the morning or during the day, especially after prolonged television viewing.    Patient has ongoing neuropathy issues, particularly in his feet, with paresthesia and throbbing, especially at night. He has persistent pain on his side, which he attributes to shingles from approximately 2 years ago.    Patient has eczema or pruritus, for which his dermatologist prescribed a combination of liquid and cream. This treatment appears effective in managing his skin condition.    Patient reports feeling unusually sleepy recently, which could be related to his anemia, thyroid issues, or age.    Patient does not explicitly deny any symptoms or medical diagnoses.    MEDICAL HISTORY:  Patient has a history of anemia, gout,  diabetes, neuropathy, shingles, and eczema or pruritus. He has received both doses of the shingles vaccine in his lifetime.      ROS:  General: -fever, -chills, +fatigue, -weight gain, -weight loss  Eyes: -vision changes, -redness, -discharge, +eye watering  ENT: -ear pain, -nasal congestion, -sore throat, +hoarseness, +runny nose, +nasal discharge  Cardiovascular: -chest pain, -palpitations, -lower extremity edema  Respiratory: -cough, -shortness of breath  Gastrointestinal: -abdominal pain, -nausea, -vomiting, +diarrhea, -constipation, -blood in stool  Genitourinary: -dysuria, -hematuria, -frequency  Musculoskeletal: -joint pain, -muscle pain, +neck tension, +difficulty walking  Skin: -rash, -lesion, +nerve pain  Neurological: -headache, -dizziness, +numbness, +tingling  Psychiatric: -anxiety, -depression, -sleep difficulty  Endocrine: +cold intolerance  Head: +teeth problems    Follow-up               Family History:  family history includes Atrial fibrillation in his brother; Cataracts in his father; Diabetes in his brother, maternal grandmother, mother, and paternal grandmother; Heart disease in his son; Hypertension in his son; No Known Problems in his daughter, half-brother, half-sister, half-sister, maternal grandfather, paternal grandfather, and son; Stomach cancer in his father; Stroke in his mother.   Family history was reviewed with patient.     Medications:  Encounter Medications[1]    Allergies:  Review of patient's allergies indicates:  No Known Allergies    Health Maintenance:  Health Maintenance   Topic Date Due    Diabetes Urine Screening  11/03/2023    RSV Vaccine (Age 60+ and Pregnant patients) (1 - 1-dose 75+ series) 06/13/2025 (Originally 7/16/2016)    TETANUS VACCINE  06/13/2026 (Originally [birth year]/1959)    Shingles Vaccine (1 of 2) 06/13/2026 (Originally 7/16/1991)    Hemoglobin A1c  09/20/2025    Lipid Panel  03/20/2026    COVID-19 Vaccine  Completed    Pneumococcal Vaccines (Age 50+)   "Completed    Influenza Vaccine  Discontinued    Diabetic Eye Exam  Discontinued     Health Maintenance Topics with due status: Not Due       Topic Last Completion Date    Lipid Panel 03/20/2025    Hemoglobin A1c 03/20/2025       Physical Exam      Vital Signs  Pulse: 63  SpO2: 100 %  BP: (!) 110/56  BP Location: Left arm  Patient Position: Sitting  Pain Score: 0-No pain  Height and Weight  Height: 5' 1" (154.9 cm)  Weight: 71 kg (156 lb 8.4 oz)  BSA (Calculated - sq m): 1.75 sq meters  BMI (Calculated): 29.6  Weight in (lb) to have BMI = 25: 132]    Physical Exam  Vitals reviewed.   Constitutional:       Appearance: Normal appearance. He is normal weight.   HENT:      Head: Normocephalic and atraumatic.      Nose: Nose normal.      Mouth/Throat:      Mouth: Mucous membranes are moist.      Pharynx: Oropharynx is clear.   Eyes:      Extraocular Movements: Extraocular movements intact.      Conjunctiva/sclera: Conjunctivae normal.      Pupils: Pupils are equal, round, and reactive to light.   Cardiovascular:      Rate and Rhythm: Normal rate and regular rhythm.      Pulses: Normal pulses.      Heart sounds: Normal heart sounds.   Pulmonary:      Effort: Pulmonary effort is normal.      Breath sounds: Normal breath sounds.   Musculoskeletal:         General: Normal range of motion.      Cervical back: Normal range of motion and neck supple.   Skin:     General: Skin is warm and dry.      Capillary Refill: Capillary refill takes less than 2 seconds.   Neurological:      General: No focal deficit present.      Mental Status: He is alert and oriented to person, place, and time. Mental status is at baseline.   Psychiatric:         Mood and Affect: Mood normal.         Behavior: Behavior normal.         Thought Content: Thought content normal.         Judgment: Judgment normal.            Assessment/Plan     Randy Camejo is a 84 y.o.male with:    Type 2 diabetes mellitus with diabetic neuropathy, with long-term current " use of insulin  -     lancets (TRUEPLUS LANCETS) 33 gauge Misc; use twice a day as directed  Dispense: 200 each; Refill: 5  -     MICROALBUMIN / CREATININE RATIO URINE  -     Ambulatory Referral/Consult to Enhanced Annual Wellness Visit (eAWV); Future; Expected date: 06/20/2025    Anemia, unspecified type  - cbc today  - iron panel today    Gout, unspecified cause, unspecified chronicity, unspecified site  - take allopurinol as directed when needed.      As above, continue current medications and maintain follow up with specialists.  Return to clinic as needed.    Greater than 50% of visit was spent face to face with patient.  All questions were answered to patient's satisfaction.          Karen L Spencer, NP-C [facility]                     [1]   Outpatient Encounter Medications as of 6/13/2025   Medication Sig Dispense Refill    alcohol swabs PadM use 2 (two) times a day. E11.49 200 each 5    allopurinoL (ZYLOPRIM) 100 MG tablet TAKE 1 TABLET(100 MG) BY MOUTH DAILY 30 tablet 2    amiodarone (PACERONE) 100 MG Tab Take 1 tablet (100 mg total) by mouth once daily. 30 tablet 6    apixaban (ELIQUIS) 2.5 mg Tab Take 1 tablet (2.5 mg total) by mouth 2 (two) times daily. 180 tablet 3    atorvastatin (LIPITOR) 10 MG tablet Take 1 tablet (10 mg total) by mouth once daily. 90 tablet 1    blood glucose control, low Soln Use 1 each to check glucose level of glucometer weekly 4 each 11    blood sugar diagnostic (TRUE METRIX GLUCOSE TEST STRIP) Strp use 2 (two) times a day. To check blood sugar 200 strip 5    blood sugar diagnostic (TRUE METRIX GLUCOSE TEST STRIP) Strp use 2 (two) times a day. To check blood sugar 200 strip 3    carvediloL (COREG) 3.125 MG tablet Take 1 tablet (3.125 mg total) by mouth 2 (two) times daily. 180 tablet 3    clotrimazole (LOTRIMIN) 1 % cream Apply topically 2 (two) times daily. To toenails and area around the toes. 60 g 3    famotidine (PEPCID) 20 MG tablet Take 1 tablet (20 mg total) by  mouth once daily. 90 tablet 3    furosemide (LASIX) 20 MG tablet TAKE 1 TABLET(20 MG) BY MOUTH DAILY 30 tablet 1    lancets (TRUEPLUS LANCETS) 33 gauge Misc Use to test blood glucose two (2) times a day, discard lancet after each use 200 each 3    levothyroxine (SYNTHROID) 112 MCG tablet Take 1 tablet (112 mcg total) by mouth once daily. 90 tablet 2    metFORMIN (GLUCOPHAGE) 500 MG tablet Take 1 tablet (500 mg total) by mouth 2 (two) times daily with meals. 180 tablet 3    mometasone (ELOCON) 0.1 % solution Mix 4-5 drops in scoop of cerave cream and apply to chest and side qd- bid prn itching/pain 60 mL 3    sacubitriL-valsartan (ENTRESTO) 49-51 mg per tablet Take 1 tablet by mouth 2 (two) times daily. 60 tablet 11    triamcinolone acetonide 0.1% (KENALOG) 0.1 % ointment aaa bid avoid face/ groin 80 g 4    [DISCONTINUED] lancets (TRUEPLUS LANCETS) 33 gauge Misc use twice a day as directed 200 each 5    lancets (TRUEPLUS LANCETS) 33 gauge Misc use twice a day as directed 200 each 5    [DISCONTINUED] atorvastatin (LIPITOR) 10 MG tablet Take 1 tablet (10 mg total) by mouth once daily. 90 tablet 1    [DISCONTINUED] famotidine (PEPCID) 20 MG tablet Take 1 tablet (20 mg total) by mouth once daily. 90 tablet 3    [DISCONTINUED] furosemide (LASIX) 20 MG tablet Take 1 tablet (20 mg total) by mouth once daily. 30 tablet 1     No facility-administered encounter medications on file as of 6/13/2025.

## 2025-06-13 NOTE — PATIENT INSTRUCTIONS
Counseling and Referral of Other Preventative  (Italic type indicates deductible and co-insurance are waived)    Patient Name: Randy Camejo  Today's Date: 6/13/2025    Health Maintenance       Date Due Completion Date    Diabetes Urine Screening 11/03/2023 11/3/2022    RSV Vaccine (Age 60+ and Pregnant patients) (1 - 1-dose 75+ series) 06/13/2025 (Originally 7/16/2016) ---    TETANUS VACCINE 06/13/2026 (Originally 7/16/1959) ---    Shingles Vaccine (1 of 2) 06/13/2026 (Originally 7/16/1991) ---    Hemoglobin A1c 09/20/2025 3/20/2025    Lipid Panel 03/20/2026 3/20/2025        Orders Placed This Encounter   Procedures    MICROALBUMIN / CREATININE RATIO URINE    Ambulatory Referral/Consult to Enhanced Annual Wellness Visit (eAWV)       The following information is provided to all patients.  This information is to help you find resources for any of the problems found today that may be affecting your health:                  Living healthy guide: www.[facility].[place].gov      Understanding Diabetes: www.diabetes.org      Eating healthy: www.cdc.gov/healthyweight      CDC home safety checklist: www.cdc.gov/steadi/patient.html      Agency on Aging: www.goea.[place].gov      Alcoholics anonymous (AA): www.aa.org      Physical Activity: www.jess.nih.gov/zl5vkab      Tobacco use: www.quitwithusla.org

## 2025-06-16 ENCOUNTER — RESULTS FOLLOW-UP (OUTPATIENT)
Dept: PRIMARY CARE CLINIC | Facility: CLINIC | Age: 85
End: 2025-06-16

## 2025-06-19 PROCEDURE — 82570 ASSAY OF URINE CREATININE: CPT | Mod: HCNC | Performed by: NURSE PRACTITIONER

## 2025-06-21 DIAGNOSIS — Z79.4 TYPE 2 DIABETES MELLITUS WITH DIABETIC NEUROPATHY, WITH LONG-TERM CURRENT USE OF INSULIN: ICD-10-CM

## 2025-06-21 DIAGNOSIS — E11.40 TYPE 2 DIABETES MELLITUS WITH DIABETIC NEUROPATHY, WITH LONG-TERM CURRENT USE OF INSULIN: ICD-10-CM

## 2025-06-23 RX ORDER — METFORMIN HYDROCHLORIDE 500 MG/1
500 TABLET ORAL 2 TIMES DAILY
Qty: 180 TABLET | Refills: 3 | Status: SHIPPED | OUTPATIENT
Start: 2025-06-23

## 2025-06-23 NOTE — TELEPHONE ENCOUNTER
Refill Routing Note   Medication(s) are not appropriate for processing by [facility] for the following reason(s):        Outside of protocol    [facility] action(s):  Route             Appointments  past 12m or future 3m with PCP    Date Provider   Last Visit   Visit date not found Mandy Yap MD   Next Visit   Visit date not found Mandy Yap MD   ED visits in past 90 days: 0        Note composed:5:58 PM 06/23/2025

## 2025-06-25 RX ORDER — AMIODARONE HYDROCHLORIDE 100 MG/1
100 TABLET ORAL DAILY
Qty: 30 TABLET | Refills: 11 | Status: SHIPPED | OUTPATIENT
Start: 2025-06-25

## 2025-07-17 ENCOUNTER — OFFICE VISIT (OUTPATIENT)
Dept: PODIATRY | Facility: CLINIC | Age: 85
End: 2025-07-17
Payer: MEDICARE

## 2025-07-17 VITALS — WEIGHT: 156.5 LBS | BODY MASS INDEX: 29.55 KG/M2 | HEIGHT: 61 IN

## 2025-07-17 DIAGNOSIS — E11.40 TYPE 2 DIABETES MELLITUS WITH DIABETIC NEUROPATHY, WITH LONG-TERM CURRENT USE OF INSULIN: Primary | ICD-10-CM

## 2025-07-17 DIAGNOSIS — B35.1 DERMATOPHYTOSIS OF NAIL: ICD-10-CM

## 2025-07-17 DIAGNOSIS — Z79.01 CHRONIC ANTICOAGULATION: ICD-10-CM

## 2025-07-17 DIAGNOSIS — L84 CORN OR CALLUS: ICD-10-CM

## 2025-07-17 DIAGNOSIS — Z89.421 HISTORY OF PARTIAL RAY AMPUTATION OF FIFTH TOE OF RIGHT FOOT: ICD-10-CM

## 2025-07-17 DIAGNOSIS — Z79.4 TYPE 2 DIABETES MELLITUS WITH DIABETIC NEUROPATHY, WITH LONG-TERM CURRENT USE OF INSULIN: Primary | ICD-10-CM

## 2025-07-17 PROCEDURE — 99999 PR PBB SHADOW E&M-EST. PATIENT-LVL III: CPT | Mod: PBBFAC,HCNC,, | Performed by: PODIATRIST

## 2025-07-21 ENCOUNTER — HOSPITAL ENCOUNTER (EMERGENCY)
Facility: OTHER | Age: 85
Discharge: HOME | End: 2025-07-21
Attending: EMERGENCY MEDICINE
Payer: MEDICARE

## 2025-07-21 VITALS
DIASTOLIC BLOOD PRESSURE: 70 MMHG | SYSTOLIC BLOOD PRESSURE: 124 MMHG | RESPIRATION RATE: 17 BRPM | OXYGEN SATURATION: 97 % | TEMPERATURE: 98 F | HEART RATE: 65 BPM

## 2025-07-21 DIAGNOSIS — Z79.01 ON CONTINUOUS ORAL ANTICOAGULATION: ICD-10-CM

## 2025-07-21 DIAGNOSIS — S00.03XA HEMATOMA OF FRONTAL SCALP, INITIAL ENCOUNTER: Primary | ICD-10-CM

## 2025-07-21 DIAGNOSIS — S09.90XA MINOR HEAD INJURY WITHOUT LOSS OF CONSCIOUSNESS, INITIAL ENCOUNTER: ICD-10-CM

## 2025-07-21 PROCEDURE — 99284 EMERGENCY DEPT VISIT MOD MDM: CPT | Mod: 25,HCNC

## 2025-07-21 NOTE — ED PROVIDER NOTES
Chief complaint:  Fall (Trip and fall x 1 hour, reports hitting head. Denies LOC. Reports blood thinner use. Abrasion and hematoma noted. )      Source of information:  Patient, wife, old chart    HPI:  Randy Camejo is a 85 y.o. male presenting with his wife.  The patient normally uses a walker to ambulate, but instead was using only a cane, walking across cement when he began to feel his legs were weak and he fell forward striking his forehead on the pavement.  There was no syncope or loss of consciousness.  He is not having headache.  He is on blood thinner, Eliquis.  He is not having any blurry vision, nausea, focal weakness or numbness.  He did not injure anything else when he fell.  No other complaints    ROS: As per HPI    Review of patient's allergies indicates:  No Known Allergies    Medications Ordered Prior to Encounter[1]    PMH:  As per HPI and below:  Past Medical History:   Diagnosis Date    Asthma     childhood    Chronic anticoagulation 02/25/2019    Diabetes mellitus, type 2     High risk medication use 03/22/2019 2/19/2019: Began amiodarone.    History of coronary artery stent placement 03/22/2019    2000: [facility]: MI and stent.    History of myocardial infarction 03/22/2019    2000: [facility]: MI and stent.    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 Beavers    CATARACT EXTRACTION W/  INTRAOCULAR LENS IMPLANT Left 01/08/2018    Dr. Beavers    TOE AMPUTATION  Right 8/31/2019    Procedure: AMPUTATION, 5th TOE possible partial 5th ray amputation;  Surgeon: Kathy Talley DPM;  Location: [facility] OR [address] [place] [address];  Service: Podiatry;  Laterality: Right;    TONSILLECTOMY      TREATMENT OF CARDIAC ARRHYTHMIA N/A 2/25/2019    Procedure: CARDIOVERSION OR DEFIBRILLATION;  Surgeon: Panchito Joseph MD;  Location: [facility] CATH LAB;  Service: Cardiology;  Laterality: N/A;         Physical Exam:    Vitals:    07/21/25 1800   BP:    Pulse: 65   Resp:    Temp:        General: No acute distress. Well developed. Well nourished.  Eyes: PERRL. EOM intact. no photophobia, no nystagmus  Conjunctivae - no pallor or icterus.   ENT: HEAD: Normal abrasion central forehead just below the hairline.  2 cm hematoma just within the hairline above this.  Negative raccoon and escobedo sign.  No other craniofacial trauma.  Normal external ears. Normal nose.  No facial asymmetry. Mucous membranes - moist.  Neck: Neck supple.  No midline tenderness.  No JVD.  Musculoskeletal:  Normal weight-bearing and gait No deformities. Normal ROM x4.   Integument: No acute skin rashes. No clubbing or cyanosis  Neurologic: No gross neurological deficits.  5/5 strength and normal sensation x4.  No facial asymmetry.  Psychiatric: Awake, alert.  Oriented x3.  Normal speech and mentation.  No amnesia or confusion.        Labs Reviewed   HEPATITIS C ANTIBODY   HEP C VIRUS HOLD SPECIMEN   HIV 1 / 2 ANTIBODY   POCT GLUCOSE MONITORING CONTINUOUS       Medications - No data to display    Independently interpreted x-ray and/or EKG:  CT of head shows no intra cranial injury.    MDM:    85 y.o. male on blood thinners who fell from standing position, struck forehead on concrete but did not have LOC. is not having unusual headache.  Has nonfocal neurologic exam.  Ice pack has been applied to the hematoma.  Workup was initiated from triage.  CT scan of the brain does not reveal any intracranial injury.  CT of C-spine does not reveal fracture or  other acute injury.  Discussed with the patient and wife at bedside return precautions such as headache nausea and vomiting confusion or other neurologic symptoms    Medications - No data to display    Launch MDCalc MDM  MDCalc MDM Module  Jul 21 2025 6:56 PM [Jono Luo]  Data:  - Independent interpretation: I independently reviewed the CT Head WO contr. See MDM section and/or ED Course for my interpretation. [Jono Luo]  - Test/documents/historian: 2 tests ordered  Additional encounter diagnoses: Hematoma of frontal scalp, initial encounter, Minor head injury without loss of consciousness, initial encounter, On continuous oral anticoagulation  Risk: CT Head WO contr + 1 more (CT w/o IV contrast)        ASSESSMENT:   1. Hematoma of frontal scalp, initial encounter    2. Minor head injury without loss of consciousness, initial encounter    3. On continuous oral anticoagulation               [1]   No current facility-administered medications on file prior to encounter.     Current Outpatient Medications on File Prior to Encounter   Medication Sig Dispense Refill    alcohol swabs PadM use 2 (two) times a day. E11.49 200 each 5    allopurinoL (ZYLOPRIM) 100 MG tablet TAKE 1 TABLET(100 MG) BY MOUTH DAILY 30 tablet 2    amiodarone (PACERONE) 100 MG Tab Take 1 tablet (100 mg total) by mouth once daily. 30 tablet 11    apixaban (ELIQUIS) 2.5 mg Tab Take 1 tablet (2.5 mg total) by mouth 2 (two) times daily. 180 tablet 3    atorvastatin (LIPITOR) 10 MG tablet Take 1 tablet (10 mg total) by mouth once daily. 90 tablet 1    blood glucose control, low Soln Use 1 each to check glucose level of glucometer weekly 4 each 11    blood sugar diagnostic (TRUE METRIX GLUCOSE TEST STRIP) Strp use 2 (two) times a day. To check blood sugar 200 strip 5    blood sugar diagnostic (TRUE METRIX GLUCOSE TEST STRIP) Strp use 2 (two) times a day. To check blood sugar 200 strip 3    carvediloL (COREG) 3.125 MG tablet Take 1 tablet (3.125  mg total) by mouth 2 (two) times daily. 180 tablet 3    clotrimazole (LOTRIMIN) 1 % cream Apply topically 2 (two) times daily. To toenails and area around the toes. 60 g 3    famotidine (PEPCID) 20 MG tablet Take 1 tablet (20 mg total) by mouth once daily. 90 tablet 3    furosemide (LASIX) 20 MG tablet TAKE 1 TABLET(20 MG) BY MOUTH DAILY 30 tablet 1    lancets (TRUEPLUS LANCETS) 33 gauge Misc Use to test blood glucose two (2) times a day, discard lancet after each use 200 each 3    lancets (TRUEPLUS LANCETS) 33 gauge Misc use twice a day as directed 200 each 5    levothyroxine (SYNTHROID) 112 MCG tablet Take 1 tablet (112 mcg total) by mouth once daily. 90 tablet 2    metFORMIN (GLUCOPHAGE) 500 MG tablet TAKE 1 TABLET BY MOUTH TWICE DAILY 180 tablet 3    mometasone (ELOCON) 0.1 % solution Mix 4-5 drops in scoop of cerave cream and apply to chest and side qd- bid prn itching/pain 60 mL 3    sacubitriL-valsartan (ENTRESTO) 49-51 mg per tablet Take 1 tablet by mouth 2 (two) times daily. 60 tablet 11    triamcinolone acetonide 0.1% (KENALOG) 0.1 % ointment aaa bid avoid face/ groin 80 g 4        Jono Luo II, MD  07/21/25 0052

## 2025-07-21 NOTE — ED TRIAGE NOTES
Pt. Is a 85 yr. Old male. Pt. Presents to the ED via [facility] secondary to a trip and fall. Pt. Reports striking his had on the concrete. No LOC. Pt. Is on blood thinners. Pt. Also braced his fall with his left hand(just bruised) he is not complaining of any pain in the left hand. Pt. Is alert/oriented an all vitals are within normal limits. Pt. Has a HX of DM CAD an MI. Pt. Has NKDA. GCS-15.    HEAD-HEENT,ABC's are intact.  NECK-No JVD or Trach deviation  CHEST-CBBS=EXP,Denies SOB or CP  ABD-No Distention or pain  BACK-No pain or wounds  SKIN-Warm & Dry  LOWER EXT.-FOM,+piulses & sensation  UPPER EXT.-Left hand-bruised,Left-FOM,+pulses & sensation

## 2025-07-22 ENCOUNTER — PATIENT OUTREACH (OUTPATIENT)
Facility: OTHER | Age: 85
End: 2025-07-22
Payer: MEDICARE

## 2025-07-25 NOTE — PROGRESS NOTES
Chief Concern:  Foot exam     HPI:  Randy Camejo is a 85 y.o. male presents for foot exam.  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        The patient is under the active care of his PCP Danya Sandhu NP, for chronic conditions, including diabetes.      Last PCP Visit: 6/13/2025      Patient Active Problem List   Diagnosis    Nuclear sclerosis, bilateral    Nuclear sclerotic cataract of left eye    Paroxysmal atrial fibrillation    Type 2 diabetes mellitus with neurologic complication, with long-term current use of insulin    Chronic systolic heart failure    Thrombocytopenia, unspecified    Pre-ulcerative corn or callous    Peripheral vascular disease    Tachycardia induced cardiomyopathy    Coronary artery disease involving native coronary artery of native heart without angina pectoris    Chronic anticoagulation    History of myocardial infarction    History of coronary artery stent placement    Hypertension associated with diabetes    Hyperlipidemia associated with type 2 diabetes mellitus    Subclinical hypothyroidism    Stage 3a chronic kidney disease    Chronic renal failure, stage 3b    Long term current use of amiodarone           Current Outpatient Medications on File Prior to Visit   Medication Sig Dispense Refill    alcohol swabs PadM use 2 (two) times a day. E11.49 200 each 5    allopurinoL (ZYLOPRIM) 100 MG tablet TAKE 1 TABLET(100 MG) BY MOUTH DAILY 30 tablet 2    amiodarone (PACERONE) 100 MG Tab Take 1 tablet (100 mg total) by mouth once daily. 30 tablet 11    apixaban (ELIQUIS) 2.5 mg Tab Take 1 tablet (2.5 mg total) by mouth 2 (two) times daily. 180 tablet 3    atorvastatin (LIPITOR) 10 MG tablet Take 1 tablet (10 mg total) by mouth once daily. 90 tablet 1    blood glucose control, low Soln Use 1 each to check glucose level of glucometer weekly 4 each 11    blood sugar diagnostic (TRUE METRIX GLUCOSE TEST STRIP) Strp use 2 (two) times a day. To check blood sugar 200 strip 5  "   blood sugar diagnostic (TRUE METRIX GLUCOSE TEST STRIP) Strp use 2 (two) times a day. To check blood sugar 200 strip 3    carvediloL (COREG) 3.125 MG tablet Take 1 tablet (3.125 mg total) by mouth 2 (two) times daily. 180 tablet 3    clotrimazole (LOTRIMIN) 1 % cream Apply topically 2 (two) times daily. To toenails and area around the toes. 60 g 3    famotidine (PEPCID) 20 MG tablet Take 1 tablet (20 mg total) by mouth once daily. 90 tablet 3    furosemide (LASIX) 20 MG tablet TAKE 1 TABLET(20 MG) BY MOUTH DAILY 30 tablet 1    lancets (TRUEPLUS LANCETS) 33 gauge Misc Use to test blood glucose two (2) times a day, discard lancet after each use 200 each 3    lancets (TRUEPLUS LANCETS) 33 gauge Misc use twice a day as directed 200 each 5    levothyroxine (SYNTHROID) 112 MCG tablet Take 1 tablet (112 mcg total) by mouth once daily. 90 tablet 2    metFORMIN (GLUCOPHAGE) 500 MG tablet TAKE 1 TABLET BY MOUTH TWICE DAILY 180 tablet 3    mometasone (ELOCON) 0.1 % solution Mix 4-5 drops in scoop of cerave cream and apply to chest and side qd- bid prn itching/pain 60 mL 3    sacubitriL-valsartan (ENTRESTO) 49-51 mg per tablet Take 1 tablet by mouth 2 (two) times daily. 60 tablet 11    triamcinolone acetonide 0.1% (KENALOG) 0.1 % ointment aaa bid avoid face/ groin 80 g 4     No current facility-administered medications on file prior to visit.           Review of patient's allergies indicates:  No Known Allergies              Objective:      Vitals:    07/17/25 1356   Weight: 71 kg (156 lb 8.4 oz)   Height: 5' 1" (1.549 m)         Physical Exam  Constitutional:       General: He is not in acute distress.     Appearance: He is well-developed. He is not diaphoretic.   Cardiovascular:      Pulses:           Dorsalis pedis pulses are 1+ on the right side and 1+ on the left side.        Posterior tibial pulses are 1+ on the right side and 1+ on the left side.      Comments: Toes warm, pink, cap fill <5 seconds.    Musculoskeletal: " "     Comments: Partial 5th ray resection right foot without symptoms.      Skin:     General: Skin is warm and dry.      Coloration: Skin is not pale.      Findings: No abrasion, bruising, burn, ecchymosis, erythema, laceration, lesion or rash.      Comments:   Skin thin, shiny, atrophic.     Neurological:      Sensory: Sensory deficit present.      Comments: Diminished/loss of protective sensation all toes bilateral to 10 gram monofilament.               Assessment:       Problem List Items Addressed This Visit       Chronic anticoagulation    Overview   [id number]=1 (CHA2DV).  2/23/2019: Began apixiban.         Type 2 diabetes mellitus with neurologic complication, with long-term current use of insulin - Primary    Overview   2015: Diagnosed. Complications: CAD. Medications: Insulin.          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  Shoe inspection.     Continue good nutrition and blood sugar control to help prevent podiatric complications of diabetes.   Maintain proper foot hygiene.   Continue wearing proper shoe gear, daily foot inspections, never walking without protective shoe gear, never putting sharp instruments to feet.      Routine Foot Care    Date/Time: 7/17/2025 2:00 PM    Performed by: Jess Rosa DPM  Authorized by: Jess Rosa DPM    Time out: Immediately prior to procedure a "time out" was called to verify the correct patient, procedure, equipment, support staff and site/side marked as required.    Hyperkeratotic Skin Lesions?: Yes    Number of trimmed lesions:  2  Location(s):  Left Plantar and Right Plantar    Nail Care Type:  Debride(Left 1st Toe, Left 3rd Toe, Left 2nd Toe, Left 4th Toe, Left 5th Toe, Right 1st Toe, Right 2nd Toe, Right 3rd Toe and Right 4th " Toe)  Patient tolerance:  Patient tolerated the procedure well with no immediate complications     With patient's permission, the toenails mentioned above were reduced and debrided using a nail nipper, removing offending nail and debris.   Utilizing a #15 scalpel, I trimmed the corns and calluses at the above mentioned location.      The patient will continue to monitor the areas daily, inspect the feet, wear protective shoe gear when ambulatory, and moisturizer to maintain skin integrity.

## 2025-07-26 LAB — POCT GLUCOSE: 101 MG/DL (ref 70–110)

## 2025-08-12 DIAGNOSIS — M10.9 GOUT, UNSPECIFIED CAUSE, UNSPECIFIED CHRONICITY, UNSPECIFIED SITE: ICD-10-CM

## 2025-08-12 RX ORDER — ALLOPURINOL 100 MG/1
TABLET ORAL
Qty: 30 TABLET | Refills: 2 | Status: SHIPPED | OUTPATIENT
Start: 2025-08-12

## 2025-08-13 ENCOUNTER — TELEPHONE (OUTPATIENT)
Dept: CARDIOLOGY | Facility: CLINIC | Age: 85
End: 2025-08-13
Payer: MEDICARE

## 2025-08-13 ENCOUNTER — OFFICE VISIT (OUTPATIENT)
Dept: CARDIOLOGY | Facility: CLINIC | Age: 85
End: 2025-08-13
Payer: MEDICARE

## 2025-08-13 VITALS
OXYGEN SATURATION: 97 % | SYSTOLIC BLOOD PRESSURE: 126 MMHG | WEIGHT: 167.56 LBS | HEIGHT: 61 IN | HEART RATE: 77 BPM | DIASTOLIC BLOOD PRESSURE: 73 MMHG | BODY MASS INDEX: 31.63 KG/M2

## 2025-08-13 DIAGNOSIS — R00.2 PALPITATIONS: Primary | ICD-10-CM

## 2025-08-13 DIAGNOSIS — I50.22 CHRONIC SYSTOLIC HEART FAILURE: Primary | Chronic | ICD-10-CM

## 2025-08-13 DIAGNOSIS — E11.40 TYPE 2 DIABETES MELLITUS WITH DIABETIC NEUROPATHY, WITH LONG-TERM CURRENT USE OF INSULIN: ICD-10-CM

## 2025-08-13 DIAGNOSIS — I50.22 HEART FAILURE, SYSTOLIC, CHRONIC: ICD-10-CM

## 2025-08-13 DIAGNOSIS — I25.2 HISTORY OF MYOCARDIAL INFARCTION: Chronic | ICD-10-CM

## 2025-08-13 DIAGNOSIS — I15.2 HYPERTENSION ASSOCIATED WITH DIABETES: ICD-10-CM

## 2025-08-13 DIAGNOSIS — D69.6 THROMBOCYTOPENIA, UNSPECIFIED: ICD-10-CM

## 2025-08-13 DIAGNOSIS — I48.0 PAROXYSMAL ATRIAL FIBRILLATION: Chronic | ICD-10-CM

## 2025-08-13 DIAGNOSIS — E03.8 SUBCLINICAL HYPOTHYROIDISM: ICD-10-CM

## 2025-08-13 DIAGNOSIS — N18.32 CHRONIC RENAL FAILURE, STAGE 3B: ICD-10-CM

## 2025-08-13 DIAGNOSIS — I73.9 PERIPHERAL VASCULAR DISEASE: ICD-10-CM

## 2025-08-13 DIAGNOSIS — Z79.899 LONG TERM CURRENT USE OF AMIODARONE: ICD-10-CM

## 2025-08-13 DIAGNOSIS — I42.8 TACHYCARDIA INDUCED CARDIOMYOPATHY: ICD-10-CM

## 2025-08-13 DIAGNOSIS — Z79.01 CHRONIC ANTICOAGULATION: ICD-10-CM

## 2025-08-13 DIAGNOSIS — E78.5 HYPERLIPIDEMIA ASSOCIATED WITH TYPE 2 DIABETES MELLITUS: ICD-10-CM

## 2025-08-13 DIAGNOSIS — Z95.5 HISTORY OF CORONARY ARTERY STENT PLACEMENT: Chronic | ICD-10-CM

## 2025-08-13 DIAGNOSIS — I25.10 CORONARY ARTERY DISEASE INVOLVING NATIVE CORONARY ARTERY OF NATIVE HEART WITHOUT ANGINA PECTORIS: Chronic | ICD-10-CM

## 2025-08-13 DIAGNOSIS — Z79.4 TYPE 2 DIABETES MELLITUS WITH DIABETIC NEUROPATHY, WITH LONG-TERM CURRENT USE OF INSULIN: ICD-10-CM

## 2025-08-13 DIAGNOSIS — E11.59 HYPERTENSION ASSOCIATED WITH DIABETES: ICD-10-CM

## 2025-08-13 DIAGNOSIS — E11.69 HYPERLIPIDEMIA ASSOCIATED WITH TYPE 2 DIABETES MELLITUS: ICD-10-CM

## 2025-08-13 DIAGNOSIS — J90 PLEURAL EFFUSION ON LEFT: ICD-10-CM

## 2025-08-13 PROBLEM — N18.31 STAGE 3A CHRONIC KIDNEY DISEASE: Status: RESOLVED | Noted: 2019-06-06 | Resolved: 2025-08-13

## 2025-08-13 PROCEDURE — 93000 ELECTROCARDIOGRAM COMPLETE: CPT | Mod: HCNC,S$GLB,, | Performed by: INTERNAL MEDICINE

## 2025-08-13 PROCEDURE — 99999 PR PBB SHADOW E&M-EST. PATIENT-LVL IV: CPT | Mod: PBBFAC,HCNC,, | Performed by: INTERNAL MEDICINE

## 2025-08-13 RX ORDER — SACUBITRIL AND VALSARTAN 49; 51 MG/1; MG/1
1 TABLET ORAL 2 TIMES DAILY
Qty: 180 TABLET | Refills: 3 | Status: SHIPPED | OUTPATIENT
Start: 2025-08-13 | End: 2026-08-08

## 2025-08-13 RX ORDER — CARVEDILOL 3.12 MG/1
3.12 TABLET ORAL 2 TIMES DAILY
Qty: 180 TABLET | Refills: 3 | Status: SHIPPED | OUTPATIENT
Start: 2025-08-13

## 2025-08-13 RX ORDER — AMIODARONE HYDROCHLORIDE 200 MG/1
200 TABLET ORAL DAILY
Qty: 90 TABLET | Refills: 3 | Status: SHIPPED | OUTPATIENT
Start: 2025-08-13

## 2025-08-14 LAB
OHS QRS DURATION: 118 MS
OHS QTC CALCULATION: 486 MS

## 2025-08-15 ENCOUNTER — HOSPITAL ENCOUNTER (OUTPATIENT)
Dept: RADIOLOGY | Facility: HOSPITAL | Age: 85
Discharge: HOME | End: 2025-08-15
Attending: INTERNAL MEDICINE
Payer: MEDICARE

## 2025-08-15 DIAGNOSIS — J90 PLEURAL EFFUSION ON LEFT: ICD-10-CM

## 2025-08-15 PROCEDURE — 71046 X-RAY EXAM CHEST 2 VIEWS: CPT | Mod: 26,HCNC,, | Performed by: RADIOLOGY

## 2025-08-15 PROCEDURE — 71046 X-RAY EXAM CHEST 2 VIEWS: CPT | Mod: TC,HCNC,PO

## 2025-08-29 ENCOUNTER — TELEPHONE (OUTPATIENT)
Dept: PRIMARY CARE CLINIC | Facility: CLINIC | Age: 85
End: 2025-08-29
Payer: MEDICARE

## 2025-09-02 ENCOUNTER — TELEPHONE (OUTPATIENT)
Dept: PRIMARY CARE CLINIC | Facility: CLINIC | Age: 85
End: 2025-09-02
Payer: MEDICARE

## (undated) DEVICE — Device

## (undated) DEVICE — SEE MEDLINE ITEM 146417

## (undated) DEVICE — GLASSES EYE PROTECTIVE

## (undated) DEVICE — GLOVE BIOGEL SKINSENSE PI 6.5

## (undated) DEVICE — BLADE SAGITTA 5/BX

## (undated) DEVICE — GOWN ISOLATION IMPERVIOUS

## (undated) DEVICE — DRESSING N ADH OIL EMUL 3X8

## (undated) DEVICE — SOL BETADINE 5%

## (undated) DEVICE — SEE MEDLINE ITEM 154981

## (undated) DEVICE — CASSETTE INFINITI

## (undated) DEVICE — TRAY MINOR ORTHO

## (undated) DEVICE — ELECTRODE REM PLYHSV RETURN 9

## (undated) DEVICE — KIT COLLECTION E SWAB REGULAR

## (undated) DEVICE — SPONGE DERMACEA GAUZE 4X4

## (undated) DEVICE — SOL WATER STRL IRR 1000ML

## (undated) DEVICE — SEE MEDLINE ITEM 152515

## (undated) DEVICE — NDL HYPO REG 25G X 1 1/2

## (undated) DEVICE — SEE MEDLINE ITEM 152529

## (undated) DEVICE — GAUZE SPONGE 4'X4 12 PLY

## (undated) DEVICE — GLOVE BIOGEL SKINSENSE PI 7.5